# Patient Record
Sex: FEMALE | Race: WHITE | NOT HISPANIC OR LATINO | Employment: OTHER | URBAN - METROPOLITAN AREA
[De-identification: names, ages, dates, MRNs, and addresses within clinical notes are randomized per-mention and may not be internally consistent; named-entity substitution may affect disease eponyms.]

---

## 2023-02-23 LAB
ALANINE AMINOTRANSFERASE (SGPT) (U/L) IN SER/PLAS: 124 U/L (ref 7–45)
ALBUMIN (G/DL) IN SER/PLAS: 4.4 G/DL (ref 3.4–5)
ALKALINE PHOSPHATASE (U/L) IN SER/PLAS: 108 U/L (ref 33–136)
ANION GAP IN SER/PLAS: 12 MMOL/L (ref 10–20)
ASPARTATE AMINOTRANSFERASE (SGOT) (U/L) IN SER/PLAS: 101 U/L (ref 9–39)
BASOPHILS (10*3/UL) IN BLOOD BY AUTOMATED COUNT: 0.05 X10E9/L (ref 0–0.1)
BASOPHILS/100 LEUKOCYTES IN BLOOD BY AUTOMATED COUNT: 1.3 % (ref 0–2)
BILIRUBIN TOTAL (MG/DL) IN SER/PLAS: 0.9 MG/DL (ref 0–1.2)
CALCIDIOL (25 OH VITAMIN D3) (NG/ML) IN SER/PLAS: 39 NG/ML
CALCIUM (MG/DL) IN SER/PLAS: 9.3 MG/DL (ref 8.6–10.3)
CARBON DIOXIDE, TOTAL (MMOL/L) IN SER/PLAS: 30 MMOL/L (ref 21–32)
CHLORIDE (MMOL/L) IN SER/PLAS: 101 MMOL/L (ref 98–107)
CHOLESTEROL (MG/DL) IN SER/PLAS: 106 MG/DL (ref 0–199)
CHOLESTEROL IN HDL (MG/DL) IN SER/PLAS: 59.4 MG/DL
CHOLESTEROL/HDL RATIO: 1.8
COBALAMIN (VITAMIN B12) (PG/ML) IN SER/PLAS: 1246 PG/ML (ref 211–911)
CREATININE (MG/DL) IN SER/PLAS: 2.1 MG/DL (ref 0.5–1.05)
EOSINOPHILS (10*3/UL) IN BLOOD BY AUTOMATED COUNT: 0.08 X10E9/L (ref 0–0.4)
EOSINOPHILS/100 LEUKOCYTES IN BLOOD BY AUTOMATED COUNT: 2.1 % (ref 0–6)
ERYTHROCYTE DISTRIBUTION WIDTH (RATIO) BY AUTOMATED COUNT: 15.6 % (ref 11.5–14.5)
ERYTHROCYTE MEAN CORPUSCULAR HEMOGLOBIN CONCENTRATION (G/DL) BY AUTOMATED: 31.1 G/DL (ref 32–36)
ERYTHROCYTE MEAN CORPUSCULAR VOLUME (FL) BY AUTOMATED COUNT: 90 FL (ref 80–100)
ERYTHROCYTES (10*6/UL) IN BLOOD BY AUTOMATED COUNT: 5.57 X10E12/L (ref 4–5.2)
GFR FEMALE: 22 ML/MIN/1.73M2
GLUCOSE (MG/DL) IN SER/PLAS: 77 MG/DL (ref 74–99)
HEMATOCRIT (%) IN BLOOD BY AUTOMATED COUNT: 49.9 % (ref 36–46)
HEMOGLOBIN (G/DL) IN BLOOD: 15.5 G/DL (ref 12–16)
IMMATURE GRANULOCYTES/100 LEUKOCYTES IN BLOOD BY AUTOMATED COUNT: 0 % (ref 0–0.9)
LDL: 31 MG/DL (ref 0–99)
LEUKOCYTES (10*3/UL) IN BLOOD BY AUTOMATED COUNT: 3.8 X10E9/L (ref 4.4–11.3)
LYMPHOCYTES (10*3/UL) IN BLOOD BY AUTOMATED COUNT: 0.72 X10E9/L (ref 0.8–3)
LYMPHOCYTES/100 LEUKOCYTES IN BLOOD BY AUTOMATED COUNT: 18.8 % (ref 13–44)
MONOCYTES (10*3/UL) IN BLOOD BY AUTOMATED COUNT: 0.71 X10E9/L (ref 0.05–0.8)
MONOCYTES/100 LEUKOCYTES IN BLOOD BY AUTOMATED COUNT: 18.5 % (ref 2–10)
NEUTROPHILS (10*3/UL) IN BLOOD BY AUTOMATED COUNT: 2.27 X10E9/L (ref 1.6–5.5)
NEUTROPHILS/100 LEUKOCYTES IN BLOOD BY AUTOMATED COUNT: 59.3 % (ref 40–80)
PLATELETS (10*3/UL) IN BLOOD AUTOMATED COUNT: 240 X10E9/L (ref 150–450)
POTASSIUM (MMOL/L) IN SER/PLAS: 4.7 MMOL/L (ref 3.5–5.3)
PROTEIN TOTAL: 6.7 G/DL (ref 6.4–8.2)
SODIUM (MMOL/L) IN SER/PLAS: 138 MMOL/L (ref 136–145)
TRIGLYCERIDE (MG/DL) IN SER/PLAS: 77 MG/DL (ref 0–149)
UREA NITROGEN (MG/DL) IN SER/PLAS: 48 MG/DL (ref 6–23)
VLDL: 15 MG/DL (ref 0–40)

## 2023-03-10 ENCOUNTER — TELEPHONE (OUTPATIENT)
Dept: PRIMARY CARE | Facility: CLINIC | Age: 88
End: 2023-03-10

## 2023-03-23 LAB
ANION GAP IN SER/PLAS: 11 MMOL/L (ref 10–20)
CALCIUM (MG/DL) IN SER/PLAS: 9.3 MG/DL (ref 8.6–10.3)
CARBON DIOXIDE, TOTAL (MMOL/L) IN SER/PLAS: 31 MMOL/L (ref 21–32)
CHLORIDE (MMOL/L) IN SER/PLAS: 102 MMOL/L (ref 98–107)
CREATININE (MG/DL) IN SER/PLAS: 1.93 MG/DL (ref 0.5–1.05)
GFR FEMALE: 25 ML/MIN/1.73M2
GLUCOSE (MG/DL) IN SER/PLAS: 87 MG/DL (ref 74–99)
POTASSIUM (MMOL/L) IN SER/PLAS: 4.5 MMOL/L (ref 3.5–5.3)
SODIUM (MMOL/L) IN SER/PLAS: 139 MMOL/L (ref 136–145)
UREA NITROGEN (MG/DL) IN SER/PLAS: 47 MG/DL (ref 6–23)

## 2023-05-09 ENCOUNTER — OFFICE VISIT (OUTPATIENT)
Dept: PRIMARY CARE | Facility: CLINIC | Age: 88
End: 2023-05-09
Payer: MEDICARE

## 2023-05-09 VITALS
WEIGHT: 112 LBS | BODY MASS INDEX: 20.61 KG/M2 | DIASTOLIC BLOOD PRESSURE: 66 MMHG | TEMPERATURE: 97.6 F | SYSTOLIC BLOOD PRESSURE: 118 MMHG | OXYGEN SATURATION: 98 % | HEART RATE: 64 BPM | HEIGHT: 62 IN

## 2023-05-09 DIAGNOSIS — F41.0 PANIC ATTACK: ICD-10-CM

## 2023-05-09 DIAGNOSIS — F51.04 CHRONIC INSOMNIA: Primary | ICD-10-CM

## 2023-05-09 PROBLEM — D58.2 ELEVATED HEMOGLOBIN (CMS-HCC): Status: ACTIVE | Noted: 2023-05-09

## 2023-05-09 PROBLEM — I10 BENIGN ESSENTIAL HYPERTENSION: Status: ACTIVE | Noted: 2018-12-31

## 2023-05-09 PROBLEM — I43 CARDIOMYOPATHY IN DISEASES CLASSIFIED ELSEWHERE (MULTI): Status: ACTIVE | Noted: 2018-12-31

## 2023-05-09 PROBLEM — Z95.5 PRESENCE OF STENT IN CORONARY ARTERY: Status: ACTIVE | Noted: 2023-05-09

## 2023-05-09 PROBLEM — I48.20 CHRONIC ATRIAL FIBRILLATION (MULTI): Status: ACTIVE | Noted: 2023-05-09

## 2023-05-09 PROBLEM — I47.29 VENTRICULAR TACHYCARDIA (PAROXYSMAL) (MULTI): Status: ACTIVE | Noted: 2023-05-09

## 2023-05-09 PROBLEM — E03.9 HYPOTHYROIDISM: Status: ACTIVE | Noted: 2023-05-09

## 2023-05-09 PROBLEM — I25.10 ASHD (ARTERIOSCLEROTIC HEART DISEASE): Status: ACTIVE | Noted: 2023-05-09

## 2023-05-09 PROBLEM — K21.9 GASTRO-ESOPHAGEAL REFLUX DISEASE WITHOUT ESOPHAGITIS: Status: ACTIVE | Noted: 2018-12-31

## 2023-05-09 PROBLEM — E78.5 HYPERLIPIDEMIA: Status: ACTIVE | Noted: 2018-12-31

## 2023-05-09 PROBLEM — E53.8 VITAMIN B12 DEFICIENCY: Status: ACTIVE | Noted: 2023-05-09

## 2023-05-09 PROBLEM — E55.9 VITAMIN D DEFICIENCY: Status: ACTIVE | Noted: 2023-05-09

## 2023-05-09 PROBLEM — R79.89 ELEVATED LFTS: Status: ACTIVE | Noted: 2023-05-09

## 2023-05-09 PROBLEM — I50.42 CHRONIC COMBINED SYSTOLIC AND DIASTOLIC HEART FAILURE (MULTI): Status: ACTIVE | Noted: 2023-05-09

## 2023-05-09 PROCEDURE — 3078F DIAST BP <80 MM HG: CPT | Performed by: FAMILY MEDICINE

## 2023-05-09 PROCEDURE — 1036F TOBACCO NON-USER: CPT | Performed by: FAMILY MEDICINE

## 2023-05-09 PROCEDURE — 99213 OFFICE O/P EST LOW 20 MIN: CPT | Performed by: FAMILY MEDICINE

## 2023-05-09 PROCEDURE — 1160F RVW MEDS BY RX/DR IN RCRD: CPT | Performed by: FAMILY MEDICINE

## 2023-05-09 PROCEDURE — 3074F SYST BP LT 130 MM HG: CPT | Performed by: FAMILY MEDICINE

## 2023-05-09 PROCEDURE — 1159F MED LIST DOCD IN RCRD: CPT | Performed by: FAMILY MEDICINE

## 2023-05-09 PROCEDURE — 1157F ADVNC CARE PLAN IN RCRD: CPT | Performed by: FAMILY MEDICINE

## 2023-05-09 RX ORDER — OMEPRAZOLE 20 MG/1
CAPSULE, DELAYED RELEASE ORAL
Status: ON HOLD | COMMUNITY
Start: 2015-10-12 | End: 2023-10-02

## 2023-05-09 RX ORDER — ALPRAZOLAM 0.5 MG/1
0.5 TABLET ORAL NIGHTLY PRN
Qty: 90 TABLET | Refills: 0 | Status: SHIPPED | OUTPATIENT
Start: 2023-05-09 | End: 2023-08-07 | Stop reason: SDUPTHER

## 2023-05-09 RX ORDER — ATORVASTATIN CALCIUM 80 MG/1
1 TABLET, FILM COATED ORAL NIGHTLY
COMMUNITY
Start: 2017-12-20 | End: 2024-01-26 | Stop reason: SDUPTHER

## 2023-05-09 RX ORDER — CALCIUM CARBONATE/VITAMIN D3 500 MG-10
1 TABLET,CHEWABLE ORAL DAILY
COMMUNITY
End: 2023-11-03 | Stop reason: ENTERED-IN-ERROR

## 2023-05-09 RX ORDER — ASPIRIN 81 MG/1
1 TABLET ORAL DAILY
COMMUNITY
Start: 2019-02-18

## 2023-05-09 RX ORDER — LEVOTHYROXINE SODIUM 25 UG/1
25 TABLET ORAL DAILY
COMMUNITY
End: 2023-07-06 | Stop reason: SDUPTHER

## 2023-05-09 RX ORDER — GLUCOSAMINE HCL 500 MG
1 TABLET ORAL DAILY
COMMUNITY
End: 2023-11-06 | Stop reason: ALTCHOICE

## 2023-05-09 RX ORDER — LISINOPRIL 5 MG/1
5 TABLET ORAL DAILY
COMMUNITY
Start: 2020-09-17 | End: 2023-11-05 | Stop reason: HOSPADM

## 2023-05-09 RX ORDER — METOPROLOL SUCCINATE 50 MG/1
1 TABLET, EXTENDED RELEASE ORAL DAILY
COMMUNITY
Start: 2018-02-28

## 2023-05-09 RX ORDER — LANOLIN ALCOHOL/MO/W.PET/CERES
2 CREAM (GRAM) TOPICAL DAILY
COMMUNITY
Start: 2023-03-02 | End: 2024-02-22 | Stop reason: ALTCHOICE

## 2023-05-09 RX ORDER — ALPRAZOLAM 0.5 MG/1
1 TABLET ORAL NIGHTLY PRN
COMMUNITY
Start: 2015-05-23 | End: 2023-05-09 | Stop reason: SDUPTHER

## 2023-05-09 RX ORDER — FUROSEMIDE 40 MG/1
1.5 TABLET ORAL
COMMUNITY
Start: 2017-09-28 | End: 2023-10-11 | Stop reason: HOSPADM

## 2023-05-09 RX ORDER — DAPAGLIFLOZIN 10 MG/1
1 TABLET, FILM COATED ORAL DAILY
COMMUNITY
Start: 2022-11-02 | End: 2023-11-27

## 2023-05-09 RX ORDER — AMIODARONE HYDROCHLORIDE 200 MG/1
1 TABLET ORAL DAILY
COMMUNITY
Start: 2015-10-04 | End: 2024-02-21

## 2023-05-09 ASSESSMENT — PATIENT HEALTH QUESTIONNAIRE - PHQ9
1. LITTLE INTEREST OR PLEASURE IN DOING THINGS: NOT AT ALL
SUM OF ALL RESPONSES TO PHQ9 QUESTIONS 1 AND 2: 0
2. FEELING DOWN, DEPRESSED OR HOPELESS: NOT AT ALL

## 2023-05-09 NOTE — PROGRESS NOTES
Subjective   Patient ID: Adwoa Forrest is a 87 y.o. female who presents for Follow-up (Needs alprazolam refilled /Was in the hospital since last visit/Ask about her bloodcount being low).  HPI  OARRS:  Minh Farmer DO on 5/9/2023  2:00 PM  I have personally reviewed the OARRS report for Adwoa Forrest. I have considered the risks of abuse, dependence, addiction and diversion    Is the patient prescribed a combination of a benzodiazepine and opioid?  Yes, I feel it is clincially indicated to continue the medication and have discussed with the patient risks/benefits/alternatives.    Last Urine Drug Screen / ordered today: Yes  No results found for this or any previous visit (from the past 50421 hour(s)).  Results are as expected.     Controlled Substance Agreement:  Date of the Last Agreement: 5/9/2023  Reviewed Controlled Substance Agreement including but not limited to the benefits, risks, and alternatives to treatment with a Controlled Substance medication(s).    Benzodiazepines:  What is the patient's goal of therapy? Help with sleep and anxiety  Is this being achieved with current treatment? yes    KIZZY-7:  No data recorded    Activities of Daily Living:   Is your overall impression that this patient is benefiting (symptom reduction outweighs side effects) from benzodiazepine therapy? Yes     1. Physical Functioning: Better  2. Family Relationship: Same  3. Social Relationship: Same  4. Mood: Same  5. Sleep Patterns: Better  6. 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 Currently stable. Pace Maker is working well.     CKD 3B- Controlling cholesterol/Hypertension/ and sugars- stable. Increase water to 40 oz daily. Discussed adding on Farxiga 10 mg  daily    Benign essential hypertension-controlled- lisinopril 2.5 mg daily and metoprolol ER 50 mg daily    Gastroesophageal reflux disease-controlled. Stopped pantoprazole    Hyperlipidemia-controlled on atorvastatin 80 mg daily    Hypothyroidism- controlled on current dose of levothyroxine 25 µg daily    Elevated LFT's- No risk except statin medication. Continue to monitor    Stop Iron. Anemia and iron deficiency improved.     Polycythemia/Elevated HBG and HCT- Decreasing with last blood draw,. Repeat in 3 months.     Chronic insomnia/anxiety-we reviewed the patient's OARRS and it was normal with no aberrant behavior. The medication is working well for her and she is only taking it at nighttime. Currently taking alprazolam 0.5 mg 1 p.o. nightly or taking 1 as needed for the anxiety.

## 2023-06-02 LAB
ALANINE AMINOTRANSFERASE (SGPT) (U/L) IN SER/PLAS: 26 U/L (ref 7–45)
ALBUMIN (G/DL) IN SER/PLAS: 4.3 G/DL (ref 3.4–5)
ALKALINE PHOSPHATASE (U/L) IN SER/PLAS: 97 U/L (ref 33–136)
ANION GAP IN SER/PLAS: 13 MMOL/L (ref 10–20)
ASPARTATE AMINOTRANSFERASE (SGOT) (U/L) IN SER/PLAS: 32 U/L (ref 9–39)
BILIRUBIN DIRECT (MG/DL) IN SER/PLAS: 0.2 MG/DL (ref 0–0.3)
BILIRUBIN TOTAL (MG/DL) IN SER/PLAS: 1 MG/DL (ref 0–1.2)
CALCIUM (MG/DL) IN SER/PLAS: 9.9 MG/DL (ref 8.6–10.3)
CARBON DIOXIDE, TOTAL (MMOL/L) IN SER/PLAS: 30 MMOL/L (ref 21–32)
CHLORIDE (MMOL/L) IN SER/PLAS: 99 MMOL/L (ref 98–107)
CREATININE (MG/DL) IN SER/PLAS: 1.86 MG/DL (ref 0.5–1.05)
GFR FEMALE: 26 ML/MIN/1.73M2
GLUCOSE (MG/DL) IN SER/PLAS: 83 MG/DL (ref 74–99)
POTASSIUM (MMOL/L) IN SER/PLAS: 4.6 MMOL/L (ref 3.5–5.3)
PROTEIN TOTAL: 6.6 G/DL (ref 6.4–8.2)
SODIUM (MMOL/L) IN SER/PLAS: 137 MMOL/L (ref 136–145)
UREA NITROGEN (MG/DL) IN SER/PLAS: 38 MG/DL (ref 6–23)

## 2023-07-06 ENCOUNTER — PATIENT MESSAGE (OUTPATIENT)
Dept: PRIMARY CARE | Facility: CLINIC | Age: 88
End: 2023-07-06
Payer: MEDICARE

## 2023-07-06 DIAGNOSIS — E03.8 OTHER SPECIFIED HYPOTHYROIDISM: Primary | ICD-10-CM

## 2023-07-06 RX ORDER — LEVOTHYROXINE SODIUM 25 UG/1
25 TABLET ORAL DAILY
Qty: 90 TABLET | Refills: 3 | Status: SHIPPED | OUTPATIENT
Start: 2023-07-06 | End: 2023-07-10

## 2023-07-09 DIAGNOSIS — E03.8 OTHER SPECIFIED HYPOTHYROIDISM: ICD-10-CM

## 2023-07-10 RX ORDER — LEVOTHYROXINE SODIUM 25 UG/1
TABLET ORAL
Qty: 90 TABLET | Refills: 3 | Status: SHIPPED | OUTPATIENT
Start: 2023-07-10

## 2023-07-24 LAB
ANION GAP IN SER/PLAS: 9 MMOL/L (ref 10–20)
BASOPHILS (10*3/UL) IN BLOOD BY AUTOMATED COUNT: 0.03 X10E9/L (ref 0–0.1)
BASOPHILS/100 LEUKOCYTES IN BLOOD BY AUTOMATED COUNT: 0.5 % (ref 0–2)
CALCIUM (MG/DL) IN SER/PLAS: 8.6 MG/DL (ref 8.6–10.3)
CARBON DIOXIDE, TOTAL (MMOL/L) IN SER/PLAS: 29 MMOL/L (ref 21–32)
CHLORIDE (MMOL/L) IN SER/PLAS: 108 MMOL/L (ref 98–107)
CREATININE (MG/DL) IN SER/PLAS: 1.5 MG/DL (ref 0.5–1.05)
EOSINOPHILS (10*3/UL) IN BLOOD BY AUTOMATED COUNT: 0.13 X10E9/L (ref 0–0.4)
EOSINOPHILS/100 LEUKOCYTES IN BLOOD BY AUTOMATED COUNT: 2 % (ref 0–6)
ERYTHROCYTE DISTRIBUTION WIDTH (RATIO) BY AUTOMATED COUNT: 16.6 % (ref 11.5–14.5)
ERYTHROCYTE MEAN CORPUSCULAR HEMOGLOBIN CONCENTRATION (G/DL) BY AUTOMATED: 31.3 G/DL (ref 32–36)
ERYTHROCYTE MEAN CORPUSCULAR VOLUME (FL) BY AUTOMATED COUNT: 89 FL (ref 80–100)
ERYTHROCYTES (10*6/UL) IN BLOOD BY AUTOMATED COUNT: 4.64 X10E12/L (ref 4–5.2)
GFR FEMALE: 33 ML/MIN/1.73M2
GLUCOSE (MG/DL) IN SER/PLAS: 81 MG/DL (ref 74–99)
HEMATOCRIT (%) IN BLOOD BY AUTOMATED COUNT: 41.5 % (ref 36–46)
HEMOGLOBIN (G/DL) IN BLOOD: 13 G/DL (ref 12–16)
IMMATURE GRANULOCYTES/100 LEUKOCYTES IN BLOOD BY AUTOMATED COUNT: 0.8 % (ref 0–0.9)
LEUKOCYTES (10*3/UL) IN BLOOD BY AUTOMATED COUNT: 6.5 X10E9/L (ref 4.4–11.3)
LYMPHOCYTES (10*3/UL) IN BLOOD BY AUTOMATED COUNT: 0.66 X10E9/L (ref 0.8–3)
LYMPHOCYTES/100 LEUKOCYTES IN BLOOD BY AUTOMATED COUNT: 10.2 % (ref 13–44)
MONOCYTES (10*3/UL) IN BLOOD BY AUTOMATED COUNT: 0.71 X10E9/L (ref 0.05–0.8)
MONOCYTES/100 LEUKOCYTES IN BLOOD BY AUTOMATED COUNT: 11 % (ref 2–10)
NEUTROPHILS (10*3/UL) IN BLOOD BY AUTOMATED COUNT: 4.88 X10E9/L (ref 1.6–5.5)
NEUTROPHILS/100 LEUKOCYTES IN BLOOD BY AUTOMATED COUNT: 75.5 % (ref 40–80)
PLATELETS (10*3/UL) IN BLOOD AUTOMATED COUNT: 296 X10E9/L (ref 150–450)
POTASSIUM (MMOL/L) IN SER/PLAS: 4.6 MMOL/L (ref 3.5–5.3)
SODIUM (MMOL/L) IN SER/PLAS: 141 MMOL/L (ref 136–145)
UREA NITROGEN (MG/DL) IN SER/PLAS: 38 MG/DL (ref 6–23)

## 2023-07-31 LAB
ERYTHROCYTE DISTRIBUTION WIDTH (RATIO) BY AUTOMATED COUNT: 16.9 % (ref 11.5–14.5)
ERYTHROCYTE MEAN CORPUSCULAR HEMOGLOBIN CONCENTRATION (G/DL) BY AUTOMATED: 30.9 G/DL (ref 32–36)
ERYTHROCYTE MEAN CORPUSCULAR VOLUME (FL) BY AUTOMATED COUNT: 90 FL (ref 80–100)
ERYTHROCYTES (10*6/UL) IN BLOOD BY AUTOMATED COUNT: 5.05 X10E12/L (ref 4–5.2)
HEMATOCRIT (%) IN BLOOD BY AUTOMATED COUNT: 45.7 % (ref 36–46)
HEMOGLOBIN (G/DL) IN BLOOD: 14.1 G/DL (ref 12–16)
LEUKOCYTES (10*3/UL) IN BLOOD BY AUTOMATED COUNT: 6.6 X10E9/L (ref 4.4–11.3)
PLATELETS (10*3/UL) IN BLOOD AUTOMATED COUNT: 363 X10E9/L (ref 150–450)

## 2023-08-03 ENCOUNTER — APPOINTMENT (OUTPATIENT)
Dept: PRIMARY CARE | Facility: CLINIC | Age: 88
End: 2023-08-03
Payer: MEDICARE

## 2023-08-07 ENCOUNTER — TELEMEDICINE (OUTPATIENT)
Dept: PRIMARY CARE | Facility: CLINIC | Age: 88
End: 2023-08-07
Payer: MEDICARE

## 2023-08-07 DIAGNOSIS — S92.041A CLOSED DISPLACED FRACTURE OF TUBEROSITY OF RIGHT CALCANEUS, UNSPECIFIED FRACTURE MORPHOLOGY, INITIAL ENCOUNTER: Primary | ICD-10-CM

## 2023-08-07 DIAGNOSIS — F51.04 CHRONIC INSOMNIA: ICD-10-CM

## 2023-08-07 PROCEDURE — 99496 TRANSJ CARE MGMT HIGH F2F 7D: CPT | Performed by: FAMILY MEDICINE

## 2023-08-07 RX ORDER — ALPRAZOLAM 0.5 MG/1
0.5 TABLET ORAL NIGHTLY PRN
Qty: 90 TABLET | Refills: 0 | Status: ON HOLD | OUTPATIENT
Start: 2023-08-07 | End: 2023-10-11 | Stop reason: SDUPTHER

## 2023-08-07 NOTE — PROGRESS NOTES
Subjective   Patient ID: Adwoa Forrest is a 88 y.o. female who presents for follow up  HPI      OARRS:  Minh Farmer DO on 8/7/2023  5:27 PM  I have personally reviewed the OARRS report for Adwoa Forrest. I have considered the risks of abuse, dependence, addiction and diversion    Is the patient prescribed a combination of a benzodiazepine and opioid?  No    Last Urine Drug Screen / ordered today: No  No results found for this or any previous visit (from the past 04293 hour(s)).  N/A    Controlled Substance Agreement:  Date of the Last Agreement: May 9, 2023  Reviewed Controlled Substance Agreement including but not limited to the benefits, risks, and alternatives to treatment with a Controlled Substance medication(s).    Opioids:  What is the patient's goal of therapy?  Help insomnia  Is this being achieved with current treatment?  Yes    I have calculated the patient's Morphine Dose Equivalent (MED):   I have considered referral to Pain Management and/or a specialist, and do not feel it is necessary at this time.    I feel that it is clinically indicated to continue this current medication regimen after consideration of alternative therapies, and other non-opioid treatment.    Opioid Risk Screening:    Discharge Facility: [facility]   Discharge Diagnosis: UNSPECIFIED FRACTURE OF RIGHT CALCANEUS, SUBSEQUENT ENCOUNTER FOR FRACTURE WITH ROUTINE HEALING  Admission Date: 7-22-23  Discharge Date: 8-7-23     PCP Appointment Date:8-7-23  Specialist Appointment Date: 8-22-23 Dr. Naik   [facility] Encounter and Summary:  not available at this time- [facility]   See discharge assessment below for further details  Engagement  Call Start Time: 0940 (8/9/2023  9:42 AM)     Medications  Medications reviewed with patient/caregiver?: Yes (8/9/2023  9:42 AM)  Is the patient having any side effects they believe may be caused by any medication additions or changes?: No (8/9/2023  9:42 AM)  Does the patient  have all medications ordered at discharge?: Yes (8/9/2023  9:42 AM)  Care Management Interventions: No intervention needed (8/9/2023  9:42 AM)  Is the patient taking all medications as directed (includes completed medication regime)?: Yes (8/9/2023  9:42 AM)  Care Management Interventions: Provided patient education (8/9/2023  9:42 AM)     Appointments  Does the patient have a primary care provider?: Yes (8/9/2023  9:42 AM)  Care Management Interventions: Verified appointment date/time/provider (8/9/2023  9:42 AM)  Has the patient kept scheduled appointments due by today?: Yes (8/9/2023  9:42 AM)  Care Management Interventions: Advised patient to keep appointment (8/9/2023  9:42 AM)     Self Management  What is the home health agency?:  [facility] (8/9/2023  9:42 AM)  Has home health visited the patient within 72 hours of discharge?: Yes (8/9/2023  9:42 AM)     Patient Teaching  Does the patient have access to their discharge instructions?: Yes (8/9/2023  9:42 AM)  Care Management Interventions: Reviewed instructions with patient (8/9/2023  9:42 AM)  What is the patient's perception of their health status since discharge?: Improving (8/9/2023  9:42 AM)  Is the patient/caregiver able to teach back the hierarchy of who to call/visit for symptoms/problems? PCP, Specialist, Home Health nurse, Urgent Care, ED, 911: Yes (8/9/2023  9:42 AM)     No data recorded    Pain Assessment:  No data recorded  Review of Systems    Objective   Physical Exam    Assessment/Plan   Problem List Items Addressed This Visit       Chronic insomnia    Relevant Medications    ALPRAZolam (Xanax) 0.5 mg tablet   Acute on chronic combined systolic and diastolic heart failure/ASHD/chronic atrial fibrillation-followed by electrophysiologist Dr Bell and Dr Latham. Currently stable. Pace Maker is working well.      CKD 3B- Controlling cholesterol/Hypertension/ and sugars- stable. Increase water to 40 oz daily. Discussed adding on  Farxiga 10 mg daily     Benign essential hypertension-controlled- lisinopril 2.5 mg daily and metoprolol ER 50 mg daily     Gastroesophageal reflux disease-controlled. Stopped pantoprazole     Hyperlipidemia-controlled on atorvastatin 80 mg daily     Hypothyroidism- controlled on current dose of levothyroxine 25 µg daily     Elevated LFT's- No risk except statin medication. Continue to monitor     Stop Iron. Anemia and iron deficiency improved.      Polycythemia/Elevated HBG and HCT- Decreasing with last blood draw,. Repeat in 3 months.      Chronic insomnia/anxiety-we reviewed the patient's OARRS and it was normal with no aberrant behavior. The medication is working well for her and she is only taking it at nighttime. Currently taking alprazolam 0.5 mg 1 p.o. nightly or taking 1 as needed for the anxiety.    Torn Achilles tendon-healing well.  Reviewed patient's release from [place].

## 2023-08-09 ENCOUNTER — PATIENT OUTREACH (OUTPATIENT)
Dept: PRIMARY CARE | Facility: CLINIC | Age: 88
End: 2023-08-09
Payer: MEDICARE

## 2023-08-09 NOTE — PROGRESS NOTES
Discharge Facility: [facility]   Discharge Diagnosis: UNSPECIFIED FRACTURE OF RIGHT CALCANEUS, SUBSEQUENT ENCOUNTER FOR FRACTURE WITH ROUTINE HEALING  Admission Date: 7-22-23  Discharge Date: 8-7-23    PCP Appointment Date:8-7-23  Specialist Appointment Date: 8-22-23 Dr. Naik   [facility] Encounter and Summary:  not available at this time- [facility]   See discharge assessment below for further details  Engagement  Call Start Time: 0940 (8/9/2023  9:42 AM)    Medications  Medications reviewed with patient/caregiver?: Yes (8/9/2023  9:42 AM)  Is the patient having any side effects they believe may be caused by any medication additions or changes?: No (8/9/2023  9:42 AM)  Does the patient have all medications ordered at discharge?: Yes (8/9/2023  9:42 AM)  Care Management Interventions: No intervention needed (8/9/2023  9:42 AM)  Is the patient taking all medications as directed (includes completed medication regime)?: Yes (8/9/2023  9:42 AM)  Care Management Interventions: Provided patient education (8/9/2023  9:42 AM)    Appointments  Does the patient have a primary care provider?: Yes (8/9/2023  9:42 AM)  Care Management Interventions: Verified appointment date/time/provider (8/9/2023  9:42 AM)  Has the patient kept scheduled appointments due by today?: Yes (8/9/2023  9:42 AM)  Care Management Interventions: Advised patient to keep appointment (8/9/2023  9:42 AM)    Self Management  What is the home health agency?:  [facility] (8/9/2023  9:42 AM)  Has home health visited the patient within 72 hours of discharge?: Yes (8/9/2023  9:42 AM)    Patient Teaching  Does the patient have access to their discharge instructions?: Yes (8/9/2023  9:42 AM)  Care Management Interventions: Reviewed instructions with patient (8/9/2023  9:42 AM)  What is the patient's perception of their health status since discharge?: Improving (8/9/2023  9:42 AM)  Is the patient/caregiver able to teach back the  hierarchy of who to call/visit for symptoms/problems? PCP, Specialist, Home Health nurse, Urgent Care, ED, 911: Yes (8/9/2023  9:42 AM)        Bo Gómez LPN

## 2023-08-23 ENCOUNTER — PATIENT OUTREACH (OUTPATIENT)
Dept: PRIMARY CARE | Facility: CLINIC | Age: 88
End: 2023-08-23

## 2023-08-23 ENCOUNTER — APPOINTMENT (OUTPATIENT)
Dept: PRIMARY CARE | Facility: CLINIC | Age: 88
End: 2023-08-23
Payer: MEDICARE

## 2023-08-28 LAB
GRAM STAIN: ABNORMAL
TISSUE/WOUND CULTURE/SMEAR: ABNORMAL
TISSUE/WOUND CULTURE/SMEAR: ABNORMAL

## 2023-10-02 ENCOUNTER — HOSPITAL ENCOUNTER (INPATIENT)
Facility: HOSPITAL | Age: 88
LOS: 9 days | Discharge: NURSING, REHAB OR LONG-TERM CARE | DRG: 291 | End: 2023-10-11
Attending: EMERGENCY MEDICINE | Admitting: INTERNAL MEDICINE
Payer: MEDICARE

## 2023-10-02 ENCOUNTER — APPOINTMENT (OUTPATIENT)
Dept: RADIOLOGY | Facility: HOSPITAL | Age: 88
DRG: 291 | End: 2023-10-02
Payer: MEDICARE

## 2023-10-02 ENCOUNTER — APPOINTMENT (OUTPATIENT)
Dept: CARDIOLOGY | Facility: HOSPITAL | Age: 88
End: 2023-10-02
Payer: MEDICARE

## 2023-10-02 DIAGNOSIS — S91.301A OPEN WOUND OF RIGHT HEEL, INITIAL ENCOUNTER: ICD-10-CM

## 2023-10-02 DIAGNOSIS — R06.09 DYSPNEA ON EXERTION: ICD-10-CM

## 2023-10-02 DIAGNOSIS — R60.0 EDEMA LEG: ICD-10-CM

## 2023-10-02 DIAGNOSIS — R09.02 HYPOXIA: ICD-10-CM

## 2023-10-02 DIAGNOSIS — I50.9 ACUTE ON CHRONIC CONGESTIVE HEART FAILURE, UNSPECIFIED HEART FAILURE TYPE (MULTI): Primary | ICD-10-CM

## 2023-10-02 DIAGNOSIS — I43 CARDIOMYOPATHY IN DISEASES CLASSIFIED ELSEWHERE (MULTI): ICD-10-CM

## 2023-10-02 DIAGNOSIS — J96.01 ACUTE HYPOXIC RESPIRATORY FAILURE (MULTI): ICD-10-CM

## 2023-10-02 DIAGNOSIS — J43.9 PULMONARY EMPHYSEMA, UNSPECIFIED EMPHYSEMA TYPE (MULTI): ICD-10-CM

## 2023-10-02 DIAGNOSIS — I48.20 CHRONIC ATRIAL FIBRILLATION (MULTI): ICD-10-CM

## 2023-10-02 DIAGNOSIS — F51.04 CHRONIC INSOMNIA: ICD-10-CM

## 2023-10-02 DIAGNOSIS — I73.00 RAYNAUD'S DISEASE WITHOUT GANGRENE: ICD-10-CM

## 2023-10-02 PROBLEM — I10 HYPERTENSION: Status: ACTIVE | Noted: 2023-10-02

## 2023-10-02 PROBLEM — S81.801A NON-HEALING WOUND OF RIGHT LOWER EXTREMITY: Status: ACTIVE | Noted: 2023-10-02

## 2023-10-02 PROBLEM — I48.0 PAROXYSMAL ATRIAL FIBRILLATION (MULTI): Status: ACTIVE | Noted: 2023-10-02

## 2023-10-02 PROBLEM — R79.89 ELEVATED TROPONIN I LEVEL: Status: ACTIVE | Noted: 2023-10-02

## 2023-10-02 PROBLEM — N18.30 CKD (CHRONIC KIDNEY DISEASE) STAGE 3, GFR 30-59 ML/MIN (MULTI): Status: ACTIVE | Noted: 2023-10-02

## 2023-10-02 LAB
ALBUMIN SERPL BCP-MCNC: 3.6 G/DL (ref 3.4–5)
ALP SERPL-CCNC: 121 U/L (ref 33–136)
ALT SERPL W P-5'-P-CCNC: 23 U/L (ref 7–45)
ANION GAP SERPL CALC-SCNC: 13 MMOL/L (ref 10–20)
AST SERPL W P-5'-P-CCNC: 30 U/L (ref 9–39)
BASE EXCESS BLDV CALC-SCNC: 4.9 MMOL/L (ref -2–3)
BASOPHILS # BLD AUTO: 0.07 X10*3/UL (ref 0–0.1)
BASOPHILS NFR BLD AUTO: 0.8 %
BILIRUB SERPL-MCNC: 0.7 MG/DL (ref 0–1.2)
BNP SERPL-MCNC: 1279 PG/ML (ref 0–99)
BODY TEMPERATURE: 37 DEGREES CELSIUS
BUN SERPL-MCNC: 39 MG/DL (ref 6–23)
CALCIUM SERPL-MCNC: 9.1 MG/DL (ref 8.6–10.3)
CARDIAC TROPONIN I PNL SERPL HS: 29 NG/L (ref 0–13)
CARDIAC TROPONIN I PNL SERPL HS: 30 NG/L (ref 0–13)
CHLORIDE SERPL-SCNC: 99 MMOL/L (ref 98–107)
CO2 SERPL-SCNC: 28 MMOL/L (ref 21–32)
CREAT SERPL-MCNC: 1.62 MG/DL (ref 0.5–1.05)
CRP SERPL-MCNC: 6.22 MG/DL
EOSINOPHIL # BLD AUTO: 0.17 X10*3/UL (ref 0–0.4)
EOSINOPHIL NFR BLD AUTO: 1.9 %
ERYTHROCYTE [DISTWIDTH] IN BLOOD BY AUTOMATED COUNT: 18 % (ref 11.5–14.5)
ERYTHROCYTE [SEDIMENTATION RATE] IN BLOOD BY WESTERGREN METHOD: 41 MM/H (ref 0–30)
GFR SERPL CREATININE-BSD FRML MDRD: 31 ML/MIN/1.73M*2
GLUCOSE SERPL-MCNC: 108 MG/DL (ref 74–99)
HCO3 BLDV-SCNC: 33 MMOL/L (ref 22–26)
HCT VFR BLD AUTO: 45.1 % (ref 36–46)
HGB BLD-MCNC: 13.9 G/DL (ref 12–16)
IMM GRANULOCYTES # BLD AUTO: 0.07 X10*3/UL (ref 0–0.5)
IMM GRANULOCYTES NFR BLD AUTO: 0.8 % (ref 0–0.9)
LYMPHOCYTES # BLD AUTO: 0.4 X10*3/UL (ref 0.8–3)
LYMPHOCYTES NFR BLD AUTO: 4.4 %
MAGNESIUM SERPL-MCNC: 2.32 MG/DL (ref 1.6–2.4)
MCH RBC QN AUTO: 26.7 PG (ref 26–34)
MCHC RBC AUTO-ENTMCNC: 30.8 G/DL (ref 32–36)
MCV RBC AUTO: 87 FL (ref 80–100)
MONOCYTES # BLD AUTO: 0.73 X10*3/UL (ref 0.05–0.8)
MONOCYTES NFR BLD AUTO: 8 %
NEUTROPHILS # BLD AUTO: 7.65 X10*3/UL (ref 1.6–5.5)
NEUTROPHILS NFR BLD AUTO: 84.1 %
NRBC BLD-RTO: 0 /100 WBCS (ref 0–0)
OXYHGB MFR BLDV: 24.5 % (ref 45–75)
PCO2 BLDV: 64 MM HG (ref 41–51)
PH BLDV: 7.32 PH (ref 7.33–7.43)
PLATELET # BLD AUTO: 420 X10*3/UL (ref 150–450)
PMV BLD AUTO: 8.7 FL (ref 7.5–11.5)
PO2 BLDV: 23 MM HG (ref 35–45)
POTASSIUM SERPL-SCNC: 5.2 MMOL/L (ref 3.5–5.3)
PROT SERPL-MCNC: 6.4 G/DL (ref 6.4–8.2)
RBC # BLD AUTO: 5.21 X10*6/UL (ref 4–5.2)
SAO2 % BLDV: 25 % (ref 45–75)
SARS-COV-2 RNA RESP QL NAA+PROBE: NOT DETECTED
SODIUM SERPL-SCNC: 135 MMOL/L (ref 136–145)
WBC # BLD AUTO: 9.1 X10*3/UL (ref 4.4–11.3)

## 2023-10-02 PROCEDURE — 80053 COMPREHEN METABOLIC PANEL: CPT | Performed by: PHYSICIAN ASSISTANT

## 2023-10-02 PROCEDURE — 2500000001 HC RX 250 WO HCPCS SELF ADMINISTERED DRUGS (ALT 637 FOR MEDICARE OP): Performed by: INTERNAL MEDICINE

## 2023-10-02 PROCEDURE — 87075 CULTR BACTERIA EXCEPT BLOOD: CPT | Mod: CMCLAB,PORLAB | Performed by: PHYSICIAN ASSISTANT

## 2023-10-02 PROCEDURE — 82805 BLOOD GASES W/O2 SATURATION: CPT | Performed by: PHYSICIAN ASSISTANT

## 2023-10-02 PROCEDURE — 85652 RBC SED RATE AUTOMATED: CPT | Performed by: PHYSICIAN ASSISTANT

## 2023-10-02 PROCEDURE — 86140 C-REACTIVE PROTEIN: CPT | Performed by: PHYSICIAN ASSISTANT

## 2023-10-02 PROCEDURE — 83735 ASSAY OF MAGNESIUM: CPT | Performed by: PHYSICIAN ASSISTANT

## 2023-10-02 PROCEDURE — 87635 SARS-COV-2 COVID-19 AMP PRB: CPT | Performed by: PHYSICIAN ASSISTANT

## 2023-10-02 PROCEDURE — 85025 COMPLETE CBC W/AUTO DIFF WBC: CPT | Performed by: PHYSICIAN ASSISTANT

## 2023-10-02 PROCEDURE — 36415 COLL VENOUS BLD VENIPUNCTURE: CPT | Performed by: PHYSICIAN ASSISTANT

## 2023-10-02 PROCEDURE — 71045 X-RAY EXAM CHEST 1 VIEW: CPT

## 2023-10-02 PROCEDURE — 73610 X-RAY EXAM OF ANKLE: CPT | Mod: RT,FY

## 2023-10-02 PROCEDURE — 2500000001 HC RX 250 WO HCPCS SELF ADMINISTERED DRUGS (ALT 637 FOR MEDICARE OP): Performed by: STUDENT IN AN ORGANIZED HEALTH CARE EDUCATION/TRAINING PROGRAM

## 2023-10-02 PROCEDURE — 99285 EMERGENCY DEPT VISIT HI MDM: CPT | Performed by: EMERGENCY MEDICINE

## 2023-10-02 PROCEDURE — 2500000004 HC RX 250 GENERAL PHARMACY W/ HCPCS (ALT 636 FOR OP/ED): Performed by: PHYSICIAN ASSISTANT

## 2023-10-02 PROCEDURE — 73610 X-RAY EXAM OF ANKLE: CPT | Mod: RIGHT SIDE

## 2023-10-02 PROCEDURE — 96374 THER/PROPH/DIAG INJ IV PUSH: CPT

## 2023-10-02 PROCEDURE — 2060000001 HC INTERMEDIATE ICU ROOM DAILY

## 2023-10-02 PROCEDURE — 84484 ASSAY OF TROPONIN QUANT: CPT | Performed by: PHYSICIAN ASSISTANT

## 2023-10-02 PROCEDURE — 83880 ASSAY OF NATRIURETIC PEPTIDE: CPT | Performed by: PHYSICIAN ASSISTANT

## 2023-10-02 PROCEDURE — 99223 1ST HOSP IP/OBS HIGH 75: CPT | Performed by: INTERNAL MEDICINE

## 2023-10-02 PROCEDURE — 87070 CULTURE OTHR SPECIMN AEROBIC: CPT | Mod: CMCLAB,PORLAB | Performed by: PHYSICIAN ASSISTANT

## 2023-10-02 RX ORDER — FUROSEMIDE 10 MG/ML
40 INJECTION INTRAMUSCULAR; INTRAVENOUS ONCE
Status: COMPLETED | OUTPATIENT
Start: 2023-10-02 | End: 2023-10-02

## 2023-10-02 RX ORDER — LANOLIN ALCOHOL/MO/W.PET/CERES
2000 CREAM (GRAM) TOPICAL DAILY
Status: DISCONTINUED | OUTPATIENT
Start: 2023-10-02 | End: 2023-10-12 | Stop reason: HOSPADM

## 2023-10-02 RX ORDER — POLYETHYLENE GLYCOL 3350 17 G/17G
17 POWDER, FOR SOLUTION ORAL DAILY
Status: DISCONTINUED | OUTPATIENT
Start: 2023-10-02 | End: 2023-10-12 | Stop reason: HOSPADM

## 2023-10-02 RX ORDER — METOPROLOL SUCCINATE 50 MG/1
50 TABLET, EXTENDED RELEASE ORAL DAILY
Status: DISCONTINUED | OUTPATIENT
Start: 2023-10-03 | End: 2023-10-03

## 2023-10-02 RX ORDER — LISINOPRIL 5 MG/1
5 TABLET ORAL DAILY
Status: DISCONTINUED | OUTPATIENT
Start: 2023-10-03 | End: 2023-10-12 | Stop reason: HOSPADM

## 2023-10-02 RX ORDER — ACETAMINOPHEN 325 MG/1
650 TABLET ORAL EVERY 4 HOURS PRN
Status: DISCONTINUED | OUTPATIENT
Start: 2023-10-02 | End: 2023-10-12 | Stop reason: HOSPADM

## 2023-10-02 RX ORDER — ASPIRIN 81 MG/1
81 TABLET ORAL DAILY
Status: DISCONTINUED | OUTPATIENT
Start: 2023-10-02 | End: 2023-10-12 | Stop reason: HOSPADM

## 2023-10-02 RX ORDER — ATORVASTATIN CALCIUM 40 MG/1
80 TABLET, FILM COATED ORAL NIGHTLY
Status: DISCONTINUED | OUTPATIENT
Start: 2023-10-02 | End: 2023-10-12 | Stop reason: HOSPADM

## 2023-10-02 RX ORDER — HEPARIN SODIUM 5000 [USP'U]/ML
5000 INJECTION, SOLUTION INTRAVENOUS; SUBCUTANEOUS EVERY 8 HOURS
Status: DISCONTINUED | OUTPATIENT
Start: 2023-10-02 | End: 2023-10-12 | Stop reason: HOSPADM

## 2023-10-02 RX ORDER — LEVOTHYROXINE SODIUM 25 UG/1
25 TABLET ORAL
Status: DISCONTINUED | OUTPATIENT
Start: 2023-10-03 | End: 2023-10-12 | Stop reason: HOSPADM

## 2023-10-02 RX ORDER — BISACODYL 5 MG
10 TABLET, DELAYED RELEASE (ENTERIC COATED) ORAL DAILY PRN
Status: DISCONTINUED | OUTPATIENT
Start: 2023-10-02 | End: 2023-10-12 | Stop reason: HOSPADM

## 2023-10-02 RX ORDER — FUROSEMIDE 10 MG/ML
40 INJECTION INTRAMUSCULAR; INTRAVENOUS 2 TIMES DAILY
Status: DISCONTINUED | OUTPATIENT
Start: 2023-10-02 | End: 2023-10-05

## 2023-10-02 RX ORDER — AMIODARONE HYDROCHLORIDE 200 MG/1
200 TABLET ORAL DAILY
Status: DISCONTINUED | OUTPATIENT
Start: 2023-10-03 | End: 2023-10-12 | Stop reason: HOSPADM

## 2023-10-02 RX ORDER — ALPRAZOLAM 0.5 MG/1
0.5 TABLET ORAL NIGHTLY PRN
Status: DISCONTINUED | OUTPATIENT
Start: 2023-10-02 | End: 2023-10-12 | Stop reason: HOSPADM

## 2023-10-02 RX ADMIN — ATORVASTATIN CALCIUM 80 MG: 40 TABLET, FILM COATED ORAL at 20:47

## 2023-10-02 RX ADMIN — ACETAMINOPHEN 650 MG: 325 TABLET, FILM COATED ORAL at 21:48

## 2023-10-02 RX ADMIN — ALPRAZOLAM 0.5 MG: 0.5 TABLET ORAL at 21:47

## 2023-10-02 RX ADMIN — FUROSEMIDE 40 MG: 10 INJECTION, SOLUTION INTRAMUSCULAR; INTRAVENOUS at 14:01

## 2023-10-02 ASSESSMENT — PAIN SCALES - GENERAL
PAINLEVEL_OUTOF10: 0 - NO PAIN
PAINLEVEL_OUTOF10: 3
PAINLEVEL_OUTOF10: 0 - NO PAIN

## 2023-10-02 ASSESSMENT — COLUMBIA-SUICIDE SEVERITY RATING SCALE - C-SSRS
1. IN THE PAST MONTH, HAVE YOU WISHED YOU WERE DEAD OR WISHED YOU COULD GO TO SLEEP AND NOT WAKE UP?: NO
2. HAVE YOU ACTUALLY HAD ANY THOUGHTS OF KILLING YOURSELF?: NO

## 2023-10-02 ASSESSMENT — LIFESTYLE VARIABLES
HAVE PEOPLE ANNOYED YOU BY CRITICIZING YOUR DRINKING: NO
HAVE YOU EVER FELT YOU SHOULD CUT DOWN ON YOUR DRINKING: NO
EVER HAD A DRINK FIRST THING IN THE MORNING TO STEADY YOUR NERVES TO GET RID OF A HANGOVER: NO
EVER FELT BAD OR GUILTY ABOUT YOUR DRINKING: NO

## 2023-10-02 ASSESSMENT — PAIN - FUNCTIONAL ASSESSMENT: PAIN_FUNCTIONAL_ASSESSMENT: 0-10

## 2023-10-02 NOTE — PROGRESS NOTES
"[facility]   infusion Clinic Note   Date: 2023   Name: Adwoa Forrest  : 1935   MRN: [id number]         Reason for Visit: No chief complaint on file.      Accompanied by:{ACCOMPANIED BY..:[id number]}   Visit Type:: {visit type:66602}   Diagnosis: No diagnosis found.    Allergies:   Allergies as of 10/02/2023 - Reviewed 2023   Allergen Reaction Noted    Amiodarone Other 2023    Codeine Nausea Only and Other 11/15/2015    Hydrocodone Nausea Only and Swelling 2018    Sotalol Other 2023    Tetracyclines Itching 11/15/2015      Current Meds has a current medication list which includes the following prescription(s): alprazolam, amiodarone, aspirin, atorvastatin, calcium carbonate-vitamin d3, cyanocobalamin, farxiga, furosemide, glucosamine hcl, levothyroxine, lisinopril, metoprolol succinate xl, and omeprazole.        Vitals:There were no vitals filed for this visit.   Infusion Pre-procedure Checklist   Allergies reviewed: {YES NO YES DEFAULT:11652}   Medications reviewed: {YES NO YES DEFAULT:96592}   Contraindications to treatment:{YES OR NO - DEFAULT NO:02524}   Previous reaction to current treatment:{YES OR NO - DEFAULT NO:29175}   Current Health Issues: {current health issues-default None:88689::\"None\"}   Pain: '    Is the pain different from normal: {YES OR NO - DEFAULT NO:99557}   Is the pain tolerable: {YES NO YES DEFAULT:57448}   Is your Doctor aware: {YES NO YES DEFAULT:30498}   Contraindications based on patient history: {YES OR NO - DEFAULT NO:15421}   Provider notified: {YES/NO/NA:76373}   Labs: {LABS:51268}   Fall Risk Screening:      Review of Systems - Oncology   Negative for complaint: [] all other systems have been reviewed and are negative for complaint   Infusion Readiness:   Assessment Concerns Related to Infusion: {YES OR NO - DEFAULT NO:49991}  Provider notified: {YES NO YES DEFAULT:92157}  Assess patient for the concerns below. Document " "provider notification as appropriate:  - Does not meet criteria to treat {Yes No N/A Default N/A:96416::\"N/A\"}  - Has an active or recent infection with/without current antibiotic use {Yes No N/A Default N/A:63151::\"N/A\"}  - Has recent/planned dental work {Yes No N/A Default N/A:90416::\"N/A\"}  - Has recent/planned surgeries {Yes No N/A Default N/A:51396::\"N/A\"}  - Has recently received or plans to receive vaccinations {Yes No N/A Default N/A:94099::\"N/A\"}  - Has treatment related toxicities {Yes No N/A Default N/A:91868::\"N/A\"}  - Is pregnant (unless noted otherwise) {Yes No N/A Default N/A:99293::\"N/A\"}    Initiated By: Karlene Pemberton RN   Time: 10:34 AM     Adwoa Forrest had no medications administered during this visit.      "

## 2023-10-02 NOTE — ED PROVIDER NOTES
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  The patient states symptoms for the past week.  She reports exertional dyspnea along with orthopnea and swelling to her lower extremities.  She states that her Lasix was recently increased however she has not noticed any improvement.  Denies any chest pain.  No reports of fevers or myalgias.  Denies UTI-like symptoms.  Denies vomiting or diarrhea. The patient also reports that she has a wound to her right posterior ankle/heel which is being entered by a wound specialist.  She states no erythema surrounding the wound and endorses that the wound is improving      Attestation note:  88-year-old female history of for A-fib status post recent Watchman device hypertension dyslipidemia today comes in shortness of breath on exam she is in decompensated heart failure JVD elevated bibasilar crackles on auscultation chest x-ray confirms CHF BNP elevated troponin 30 patient was given IV Lasix and will be hospitalized for further care.    I agree with the CHERELLE's assessment treatment      History provided by:  Patient and medical records   used: No                        No data recorded                Patient History   Past Medical History:   Diagnosis Date    Abnormal findings on diagnostic imaging of heart and coronary circulation  06/13/2017    Abnormal computed tomography angiography of heart    Acute on chronic combined systolic (congestive) and diastolic (congestive) heart failure (CMS/[facility]) 09/28/2017    Acute on chronic combined systolic and diastolic heart failure    Acute on chronic combined systolic (congestive) and diastolic (congestive) heart failure (CMS/[facility]) 02/05/2020    Acute on chronic combined systolic and diastolic CHF, NYHA class 2    Anemia, unspecified 11/18/2020    Anemia, mild    Chronic diastolic (congestive) heart failure (CMS/[facility]) 09/15/2020    Chronic diastolic congestive heart failure    Disorder of the skin and subcutaneous tissue, unspecified 02/16/2020    Skin lesion of face    Diverticulitis of large intestine with perforation and abscess without bleeding     Perforation of sigmoid colon due to diverticulitis    Diverticulosis of intestine, part unspecified, without perforation or abscess without bleeding     Diverticulosis    Dorsalgia, unspecified 08/16/2019    Upper back pain    Encounter for immunization 11/13/2019    Need for vaccination    Encounter for surgical aftercare following surgery on the circulatory system 02/18/2019    Aftercare following surgery of the circulatory system    Essential (primary) hypertension 11/02/2022    Benign essential hypertension    Gastro-esophageal reflux disease without esophagitis 02/05/2020    Gastroesophageal reflux disease without esophagitis    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    Hypothyroidism due to medication    Long term (current) use of anticoagulants 10/22/2018    Long term (current) use of anticoagulants    Melena 04/10/2020    Blood in stool    Old myocardial infarction 10/19/2022    History of non-ST elevation myocardial infarction (NSTEMI)    Other forms of dyspnea 01/24/2020    Dyspnea on exertion    Other long term (current) drug therapy 05/10/2018    Long term current use of antiarrhythmic drug    Other specified  cardiac arrhythmias 02/26/2020    Arrhythmia, atrial    Paroxysmal atrial fibrillation (CMS/HCC) 02/01/2018    Paroxysmal atrial fibrillation    Permanent atrial fibrillation (CMS/HCC) 01/24/2020    Permanent atrial fibrillation    Personal history of diseases of the blood and blood-forming organs and certain disorders involving the immune mechanism 04/10/2020    History of anemia    Personal history of other diseases of the circulatory system 11/04/2018    History of abnormal electrocardiography    Personal history of other diseases of the digestive system 10/22/2018    History of lower GI bleeding    Personal history of other diseases of the musculoskeletal system and connective tissue     History of osteoporosis    Personal history of other diseases of the musculoskeletal system and connective tissue 08/16/2019    History of muscle spasm    Personal history of other endocrine, nutritional and metabolic disease 02/18/2019    History of hyperlipidemia    Personal history of other specified conditions 08/13/2020    History of diarrhea    Personal history of other specified conditions     History of precordial chest pain    Personal history of transient ischemic attack (TIA), and cerebral infarction without residual deficits 02/01/2018    Cerebrovascular accident (CVA) within last 3 months    Psychophysiologic insomnia 11/18/2020    Chronic insomnia    Segmental and somatic dysfunction of rib cage 08/16/2019    Segmental and somatic dysfunction of rib cage    Segmental and somatic dysfunction of thoracic region 08/16/2019    Segmental and somatic dysfunction of thoracic region    Segmental and somatic dysfunction of upper extremity 08/16/2019    Segmental and somatic dysfunction of upper extremity     Past Surgical History:   Procedure Laterality Date    APPENDECTOMY  05/04/2016    Appendectomy    CARDIAC PACEMAKER PLACEMENT  04/26/2018    Pacemaker Permanent Placement    CATARACT EXTRACTION  05/04/2016    Cataract  Surgery    CHOLECYSTECTOMY  05/04/2016    Cholecystectomy    COLECTOMY PARTIAL / TOTAL  08/17/2018    Partial Colectomy - Sigmoid    CORONARY ANGIOPLASTY  02/01/2018    PTCA    HYSTERECTOMY  05/04/2016    Hysterectomy    OTHER SURGICAL HISTORY  02/18/2019    Atrial appendage closure    OTHER SURGICAL HISTORY  04/26/2018    Catheter Ablation Atrioventricular Node    TONSILLECTOMY  05/04/2016    Tonsillectomy     No family history on file.  Social History     Tobacco Use    Smoking status: Never     Passive exposure: Never    Smokeless tobacco: Never   Substance Use Topics    Alcohol use: Not on file    Drug use: Not on file       Physical Exam   ED Triage Vitals [10/02/23 1111]   Temp Heart Rate Resp BP   36.7 °C (98 °F) 78 22 --      SpO2 Temp Source Heart Rate Source Patient Position   (!) 88 % Tympanic Monitor Sitting      BP Location FiO2 (%)     Left arm --       Physical Exam  Vitals and nursing note reviewed.   Constitutional:       General: She is not in acute distress.     Appearance: Normal appearance. She is normal weight. She is not ill-appearing, toxic-appearing or diaphoretic.   HENT:      Head: Normocephalic.      Nose: Nose normal.      Mouth/Throat:      Mouth: Mucous membranes are moist.   Eyes:      Extraocular Movements: Extraocular movements intact.      Conjunctiva/sclera: Conjunctivae normal.   Cardiovascular:      Rate and Rhythm: Normal rate and regular rhythm.      Pulses: Normal pulses.   Pulmonary:      Comments: No increased work of breathing or conversational dyspnea.  Lungs diminished with crackles at the bases bilaterally  Abdominal:      General: Abdomen is flat. Bowel sounds are normal. There is no distension.      Palpations: Abdomen is soft.      Tenderness: There is no abdominal tenderness. There is no guarding or rebound.   Musculoskeletal:         General: Normal range of motion.      Cervical back: Normal range of motion and neck supple.      Right lower leg: Edema present.       Left lower leg: Edema present.      Comments: Patient has symmetrical edema to the bilateral lower extremities.  No calf tenderness on exam.  No direct bony tenderness on evaluation of the right lower extremity   Skin:     General: Skin is warm and dry.      Capillary Refill: Capillary refill takes less than 2 seconds.      Comments: There is a ulcerative leg lesion with no surrounding erythema or induration to the posterior right ankle just above the heel   Neurological:      General: No focal deficit present.      Mental Status: She is alert and oriented to person, place, and time.   Psychiatric:         Mood and Affect: Mood normal.         Behavior: Behavior normal.         Thought Content: Thought content normal.         Judgment: Judgment normal.         ED Course & MDM   ED Course as of 10/02/23 1402   Mon Oct 02, 2023   1206 WBC: 9.1 [DS]   1207 HEMOGLOBIN: 13.9 [DS]   1207 POCT pH, Venous(!): 7.32 [DS]   1346 Serum creatinine 1.62 which is slightly increased from most recent but around baseline [DS]   1347 SODIUM(!): 135 [DS]   1347 Troponin I, High Sensitivity(!): 30 [DS]   1347 Coronavirus 2019, PCR: Not Detected [DS]   1347 MAGNESIUM: 2.32 [DS]   1347 BNP elevated at 1,279 [DS]   1348 IMPRESSION:  Unchanged cardiomegaly      Small left pleural effusion      Coarse interstitial markings are unchanged and probably related to  chronic lung disease.   [DS]   1348 IMPRESSION:  Metallic structures are present from internal fixation related to the  avulsion from the calcaneal tubercle      There is a subtle lucency on the lateral view at the cortical surface  of the tubercle just above the metallic pin which is questionably an  area of interval bone destruction versus a projectional difference.   [DS]      ED Course User Index  [DS] Hunter Carranza PA-C         Diagnoses as of 10/02/23 1402   Acute on chronic congestive heart failure, unspecified heart failure type (CMS/HCC)   Hypoxia   Dyspnea on exertion    Open wound of right heel, initial encounter       Medical Decision Making  Patient seen and evaluated for dyspnea with and without exertion as well as generalized weakness.  I reviewed the patient's electronic medical records along with her most recent encounters.  Patient states symptoms for the past 10 days.  On arrival the patient's oxygen saturation was found to be 88% on room air.  She is afebrile.  Patient reported that she has had increased swelling to her lower extremities along with orthopnea.  She states no chest pain and denies any cough or fever.  Patient does have a wound to the right posterior ankle/heel which is being managed on outpatient basis.  The patient states that the wound has been improving.  Patient was found to have symmetrical edematous lower extremities.  She also was found to have crackles at the bases bilaterally.  Blood work revealed no evidence of leukocytosis or concerning anemia.  Venous pH 7.32.  Sodium and magnesium within normal limits.  The patient's initial high-sensitivity troponin was found to be elevated at 30 however this is improved from her baseline and likely elevated due to her poor kidney function and increased oxygen demand.  The patient serum creatinine is 1.62 which is slightly increased from most recent but around her baseline.  BNP 1279.  Chest imaging revealed enlargement of the cardiac silhouette consistent with suspected acute on chronic CHF exacerbation. The patient was provided with 40 mg of Lasix IV.  Patient's oxygen saturation is around the mid 90s on supplemental oxygen.  Patient becomes hypoxic with slight increased work of breathing with exertion.  At this point given these findings I recommended admission to the hospital for further evaluation.  She was agreeable to this.  Repeat troponin will be obtained.  Patient's COVID-19 testing was negative.  Patient will be admitted to the hospitalist service for further evaluation    Amount and/or Complexity  of Data Reviewed  External Data Reviewed: labs, radiology, ECG and notes.     Details: Reviewed the most recent echocardiogram from February 16, 2023  Labs: ordered.  Radiology: ordered and independent interpretation performed.  ECG/medicine tests: ordered and independent interpretation performed.        Procedure  Procedures     Hunter Carranza PA-C  10/02/23 1402       Bala Junior MD  11/15/23 1703       Bala Junior MD  11/26/23 [id number]

## 2023-10-03 ENCOUNTER — APPOINTMENT (OUTPATIENT)
Dept: CARDIOLOGY | Facility: HOSPITAL | Age: 88
DRG: 291 | End: 2023-10-03
Payer: MEDICARE

## 2023-10-03 LAB
ANION GAP SERPL CALC-SCNC: 8 MMOL/L (ref 10–20)
APPEARANCE UR: CLEAR
BILIRUB UR STRIP.AUTO-MCNC: NEGATIVE MG/DL
BNP SERPL-MCNC: 1843 PG/ML (ref 0–99)
BUN SERPL-MCNC: 39 MG/DL (ref 6–23)
CALCIUM SERPL-MCNC: 8.7 MG/DL (ref 8.6–10.3)
CHLORIDE SERPL-SCNC: 101 MMOL/L (ref 98–107)
CO2 SERPL-SCNC: 32 MMOL/L (ref 21–32)
COLOR UR: ABNORMAL
CREAT SERPL-MCNC: 1.64 MG/DL (ref 0.5–1.05)
EJECTION FRACTION APICAL 4 CHAMBER: 76.2
ERYTHROCYTE [DISTWIDTH] IN BLOOD BY AUTOMATED COUNT: 17.6 % (ref 11.5–14.5)
GFR SERPL CREATININE-BSD FRML MDRD: 30 ML/MIN/1.73M*2
GLUCOSE SERPL-MCNC: 84 MG/DL (ref 74–99)
GLUCOSE UR STRIP.AUTO-MCNC: ABNORMAL MG/DL
HCT VFR BLD AUTO: 36.9 % (ref 36–46)
HGB BLD-MCNC: 11.5 G/DL (ref 12–16)
KETONES UR STRIP.AUTO-MCNC: NEGATIVE MG/DL
LEUKOCYTE ESTERASE UR QL STRIP.AUTO: ABNORMAL
MAGNESIUM SERPL-MCNC: 2.07 MG/DL (ref 1.6–2.4)
MCH RBC QN AUTO: 27.1 PG (ref 26–34)
MCHC RBC AUTO-ENTMCNC: 31.2 G/DL (ref 32–36)
MCV RBC AUTO: 87 FL (ref 80–100)
NITRITE UR QL STRIP.AUTO: NEGATIVE
NRBC BLD-RTO: 0 /100 WBCS (ref 0–0)
PH UR STRIP.AUTO: 6 [PH]
PLATELET # BLD AUTO: 357 X10*3/UL (ref 150–450)
PMV BLD AUTO: 8.6 FL (ref 7.5–11.5)
POTASSIUM SERPL-SCNC: 4 MMOL/L (ref 3.5–5.3)
PROT UR STRIP.AUTO-MCNC: NEGATIVE MG/DL
RBC # BLD AUTO: 4.25 X10*6/UL (ref 4–5.2)
RBC # UR STRIP.AUTO: NEGATIVE /UL
RBC #/AREA URNS AUTO: ABNORMAL /HPF
RENAL EPI CELLS #/AREA UR COMP ASSIST: ABNORMAL /HPF
SODIUM SERPL-SCNC: 137 MMOL/L (ref 136–145)
SP GR UR STRIP.AUTO: 1.01
SQUAMOUS #/AREA URNS AUTO: ABNORMAL /HPF
UROBILINOGEN UR STRIP.AUTO-MCNC: <2 MG/DL
WBC # BLD AUTO: 8 X10*3/UL (ref 4.4–11.3)
WBC #/AREA URNS AUTO: ABNORMAL /HPF

## 2023-10-03 PROCEDURE — 85027 COMPLETE CBC AUTOMATED: CPT | Performed by: STUDENT IN AN ORGANIZED HEALTH CARE EDUCATION/TRAINING PROGRAM

## 2023-10-03 PROCEDURE — 2500000004 HC RX 250 GENERAL PHARMACY W/ HCPCS (ALT 636 FOR OP/ED): Performed by: STUDENT IN AN ORGANIZED HEALTH CARE EDUCATION/TRAINING PROGRAM

## 2023-10-03 PROCEDURE — 83880 ASSAY OF NATRIURETIC PEPTIDE: CPT | Performed by: STUDENT IN AN ORGANIZED HEALTH CARE EDUCATION/TRAINING PROGRAM

## 2023-10-03 PROCEDURE — 2500000001 HC RX 250 WO HCPCS SELF ADMINISTERED DRUGS (ALT 637 FOR MEDICARE OP): Performed by: INTERNAL MEDICINE

## 2023-10-03 PROCEDURE — 80048 BASIC METABOLIC PNL TOTAL CA: CPT | Performed by: STUDENT IN AN ORGANIZED HEALTH CARE EDUCATION/TRAINING PROGRAM

## 2023-10-03 PROCEDURE — 99233 SBSQ HOSP IP/OBS HIGH 50: CPT | Performed by: PHYSICIAN ASSISTANT

## 2023-10-03 PROCEDURE — 2500000004 HC RX 250 GENERAL PHARMACY W/ HCPCS (ALT 636 FOR OP/ED): Performed by: INTERNAL MEDICINE

## 2023-10-03 PROCEDURE — 2500000005 HC RX 250 GENERAL PHARMACY W/O HCPCS: Performed by: INTERNAL MEDICINE

## 2023-10-03 PROCEDURE — 36415 COLL VENOUS BLD VENIPUNCTURE: CPT | Performed by: STUDENT IN AN ORGANIZED HEALTH CARE EDUCATION/TRAINING PROGRAM

## 2023-10-03 PROCEDURE — 99255 IP/OBS CONSLTJ NEW/EST HI 80: CPT | Performed by: INTERNAL MEDICINE

## 2023-10-03 PROCEDURE — 93306 TTE W/DOPPLER COMPLETE: CPT

## 2023-10-03 PROCEDURE — 83735 ASSAY OF MAGNESIUM: CPT | Performed by: STUDENT IN AN ORGANIZED HEALTH CARE EDUCATION/TRAINING PROGRAM

## 2023-10-03 PROCEDURE — 97165 OT EVAL LOW COMPLEX 30 MIN: CPT | Mod: GO | Performed by: OCCUPATIONAL THERAPIST

## 2023-10-03 PROCEDURE — 93306 TTE W/DOPPLER COMPLETE: CPT | Performed by: INTERNAL MEDICINE

## 2023-10-03 PROCEDURE — 97162 PT EVAL MOD COMPLEX 30 MIN: CPT | Mod: GP

## 2023-10-03 PROCEDURE — 2500000001 HC RX 250 WO HCPCS SELF ADMINISTERED DRUGS (ALT 637 FOR MEDICARE OP): Performed by: STUDENT IN AN ORGANIZED HEALTH CARE EDUCATION/TRAINING PROGRAM

## 2023-10-03 PROCEDURE — 2060000001 HC INTERMEDIATE ICU ROOM DAILY

## 2023-10-03 PROCEDURE — 2500000002 HC RX 250 W HCPCS SELF ADMINISTERED DRUGS (ALT 637 FOR MEDICARE OP, ALT 636 FOR OP/ED): Performed by: INTERNAL MEDICINE

## 2023-10-03 PROCEDURE — 81001 URINALYSIS AUTO W/SCOPE: CPT | Performed by: PHYSICIAN ASSISTANT

## 2023-10-03 RX ORDER — SPIRONOLACTONE 25 MG/1
12.5 TABLET ORAL
Status: DISCONTINUED | OUTPATIENT
Start: 2023-10-03 | End: 2023-10-12 | Stop reason: HOSPADM

## 2023-10-03 RX ORDER — SODIUM CHLORIDE 0.9 % (FLUSH) 0.9 %
SYRINGE (ML) INJECTION
Status: DISPENSED
Start: 2023-10-03 | End: 2023-10-03

## 2023-10-03 RX ORDER — METOPROLOL SUCCINATE 25 MG/1
25 TABLET, EXTENDED RELEASE ORAL DAILY
Status: DISCONTINUED | OUTPATIENT
Start: 2023-10-03 | End: 2023-10-12 | Stop reason: HOSPADM

## 2023-10-03 RX ORDER — TALC
9 POWDER (GRAM) TOPICAL NIGHTLY PRN
Status: DISCONTINUED | OUTPATIENT
Start: 2023-10-03 | End: 2023-10-12 | Stop reason: HOSPADM

## 2023-10-03 RX ORDER — CEFAZOLIN SODIUM 1 G/50ML
1 SOLUTION INTRAVENOUS EVERY 12 HOURS
Status: DISCONTINUED | OUTPATIENT
Start: 2023-10-03 | End: 2023-10-05

## 2023-10-03 RX ADMIN — HEPARIN SODIUM 5000 UNITS: 5000 INJECTION INTRAVENOUS; SUBCUTANEOUS at 23:36

## 2023-10-03 RX ADMIN — ACETAMINOPHEN 650 MG: 325 TABLET, FILM COATED ORAL at 21:04

## 2023-10-03 RX ADMIN — FUROSEMIDE 40 MG: 10 INJECTION, SOLUTION INTRAMUSCULAR; INTRAVENOUS at 09:52

## 2023-10-03 RX ADMIN — FUROSEMIDE 40 MG: 10 INJECTION, SOLUTION INTRAMUSCULAR; INTRAVENOUS at 14:00

## 2023-10-03 RX ADMIN — CYANOCOBALAMIN TAB 1000 MCG 2000 MCG: 1000 TAB at 09:54

## 2023-10-03 RX ADMIN — ASPIRIN 81 MG: 81 TABLET, COATED ORAL at 09:53

## 2023-10-03 RX ADMIN — Medication 9 MG: at 02:23

## 2023-10-03 RX ADMIN — LEVOTHYROXINE SODIUM 25 MCG: 0.03 TABLET ORAL at 05:58

## 2023-10-03 RX ADMIN — CEFAZOLIN SODIUM 1 G: 1 INJECTION, SOLUTION INTRAVENOUS at 19:54

## 2023-10-03 RX ADMIN — ACETAMINOPHEN 650 MG: 325 TABLET, FILM COATED ORAL at 02:23

## 2023-10-03 RX ADMIN — ATORVASTATIN CALCIUM 80 MG: 40 TABLET, FILM COATED ORAL at 21:02

## 2023-10-03 RX ADMIN — ALPRAZOLAM 0.5 MG: 0.5 TABLET ORAL at 21:03

## 2023-10-03 RX ADMIN — HUMAN ALBUMIN MICROSPHERES AND PERFLUTREN 0.5 ML: 10; .22 INJECTION, SOLUTION INTRAVENOUS at 15:45

## 2023-10-03 RX ADMIN — ACETAMINOPHEN 650 MG: 325 TABLET, FILM COATED ORAL at 16:49

## 2023-10-03 RX ADMIN — Medication 4 L/MIN: at 12:19

## 2023-10-03 RX ADMIN — Medication 4 L/MIN: at 08:00

## 2023-10-03 RX ADMIN — AMIODARONE HYDROCHLORIDE 200 MG: 200 TABLET ORAL at 09:54

## 2023-10-03 SDOH — ECONOMIC STABILITY: HOUSING INSECURITY
IN THE LAST 12 MONTHS, WAS THERE A TIME WHEN YOU DID NOT HAVE A STEADY PLACE TO SLEEP OR SLEPT IN A SHELTER (INCLUDING NOW)?: PATIENT REFUSED

## 2023-10-03 SDOH — SOCIAL STABILITY: SOCIAL INSECURITY: ARE THERE ANY APPARENT SIGNS OF INJURIES/BEHAVIORS THAT COULD BE RELATED TO ABUSE/NEGLECT?: NO

## 2023-10-03 SDOH — SOCIAL STABILITY: SOCIAL INSECURITY: ARE YOU OR HAVE YOU BEEN THREATENED OR ABUSED PHYSICALLY, EMOTIONALLY, OR SEXUALLY BY ANYONE?: NO

## 2023-10-03 SDOH — HEALTH STABILITY: MENTAL HEALTH: HOW OFTEN DO YOU HAVE A DRINK CONTAINING ALCOHOL?: NEVER

## 2023-10-03 SDOH — ECONOMIC STABILITY: TRANSPORTATION INSECURITY
IN THE PAST 12 MONTHS, HAS THE LACK OF TRANSPORTATION KEPT YOU FROM MEDICAL APPOINTMENTS OR FROM GETTING MEDICATIONS?: PATIENT DECLINED

## 2023-10-03 SDOH — ECONOMIC STABILITY: HOUSING INSECURITY: IN THE LAST 12 MONTHS, HOW MANY PLACES HAVE YOU LIVED?: 1

## 2023-10-03 SDOH — SOCIAL STABILITY: SOCIAL INSECURITY: DO YOU FEEL ANYONE HAS EXPLOITED OR TAKEN ADVANTAGE OF YOU FINANCIALLY OR OF YOUR PERSONAL PROPERTY?: NO

## 2023-10-03 SDOH — HEALTH STABILITY: MENTAL HEALTH: HOW MANY STANDARD DRINKS CONTAINING ALCOHOL DO YOU HAVE ON A TYPICAL DAY?: PATIENT DOES NOT DRINK

## 2023-10-03 SDOH — ECONOMIC STABILITY: INCOME INSECURITY: IN THE LAST 12 MONTHS, WAS THERE A TIME WHEN YOU WERE NOT ABLE TO PAY THE MORTGAGE OR RENT ON TIME?: PATIENT REFUSED

## 2023-10-03 SDOH — HEALTH STABILITY: MENTAL HEALTH: HOW OFTEN DO YOU HAVE 6 OR MORE DRINKS ON ONE OCCASION?: NEVER

## 2023-10-03 SDOH — SOCIAL STABILITY: SOCIAL INSECURITY: DO YOU FEEL UNSAFE GOING BACK TO THE PLACE WHERE YOU ARE LIVING?: NO

## 2023-10-03 SDOH — ECONOMIC STABILITY: TRANSPORTATION INSECURITY
IN THE PAST 12 MONTHS, HAS LACK OF TRANSPORTATION KEPT YOU FROM MEETINGS, WORK, OR FROM GETTING THINGS NEEDED FOR DAILY LIVING?: PATIENT DECLINED

## 2023-10-03 SDOH — SOCIAL STABILITY: SOCIAL INSECURITY: HAS ANYONE EVER THREATENED TO HURT YOUR FAMILY OR YOUR PETS?: NO

## 2023-10-03 SDOH — SOCIAL STABILITY: SOCIAL INSECURITY: DOES ANYONE TRY TO KEEP YOU FROM HAVING/CONTACTING OTHER FRIENDS OR DOING THINGS OUTSIDE YOUR HOME?: NO

## 2023-10-03 ASSESSMENT — LIFESTYLE VARIABLES
AUDIT-C TOTAL SCORE: 0
HOW MANY STANDARD DRINKS CONTAINING ALCOHOL DO YOU HAVE ON A TYPICAL DAY: PATIENT DOES NOT DRINK
HOW OFTEN DO YOU HAVE 6 OR MORE DRINKS ON ONE OCCASION: NEVER
SKIP TO QUESTIONS 9-10: 1
SKIP TO QUESTIONS 9-10: 1
HOW OFTEN DO YOU HAVE A DRINK CONTAINING ALCOHOL: NEVER
AUDIT-C TOTAL SCORE: 0
AUDIT-C TOTAL SCORE: 0

## 2023-10-03 ASSESSMENT — COGNITIVE AND FUNCTIONAL STATUS - GENERAL
MOVING TO AND FROM BED TO CHAIR: A LITTLE
MOBILITY SCORE: 13
MOBILITY SCORE: 15
EATING MEALS: TOTAL
DAILY ACTIVITIY SCORE: 17
HELP NEEDED FOR BATHING: A LITTLE
PERSONAL GROOMING: A LITTLE
DRESSING REGULAR LOWER BODY CLOTHING: A LOT
DRESSING REGULAR UPPER BODY CLOTHING: A LITTLE
CLIMB 3 TO 5 STEPS WITH RAILING: TOTAL
PERSONAL GROOMING: A LITTLE
DAILY ACTIVITIY SCORE: 16
MOVING TO AND FROM BED TO CHAIR: A LITTLE
DAILY ACTIVITIY SCORE: 24
CLIMB 3 TO 5 STEPS WITH RAILING: TOTAL
EATING MEALS: A LITTLE
PERSONAL GROOMING: A LITTLE
STANDING UP FROM CHAIR USING ARMS: A LITTLE
TOILETING: A LITTLE
WALKING IN HOSPITAL ROOM: A LOT
WALKING IN HOSPITAL ROOM: A LOT
DRESSING REGULAR LOWER BODY CLOTHING: A LITTLE
TURNING FROM BACK TO SIDE WHILE IN FLAT BAD: A LITTLE
MOVING FROM LYING ON BACK TO SITTING ON SIDE OF FLAT BED WITH BEDRAILS: A LITTLE
TOILETING: A LITTLE
STANDING UP FROM CHAIR USING ARMS: A LOT
MOBILITY SCORE: 17
STANDING UP FROM CHAIR USING ARMS: A LOT
DRESSING REGULAR UPPER BODY CLOTHING: A LITTLE
DRESSING REGULAR UPPER BODY CLOTHING: A LITTLE
EATING MEALS: A LITTLE
MOVING FROM LYING ON BACK TO SITTING ON SIDE OF FLAT BED WITH BEDRAILS: A LITTLE
MOBILITY SCORE: 13
HELP NEEDED FOR BATHING: A LITTLE
TURNING FROM BACK TO SIDE WHILE IN FLAT BAD: A LITTLE
DRESSING REGULAR LOWER BODY CLOTHING: A LOT
CLIMB 3 TO 5 STEPS WITH RAILING: TOTAL
HELP NEEDED FOR BATHING: A LITTLE
WALKING IN HOSPITAL ROOM: TOTAL
CLIMB 3 TO 5 STEPS WITH RAILING: A LOT
TOILETING: A LITTLE
TURNING FROM BACK TO SIDE WHILE IN FLAT BAD: A LITTLE
WALKING IN HOSPITAL ROOM: A LOT
TURNING FROM BACK TO SIDE WHILE IN FLAT BAD: A LITTLE
MOVING TO AND FROM BED TO CHAIR: A LOT
PATIENT BASELINE BEDBOUND: YES
MOVING TO AND FROM BED TO CHAIR: A LOT
STANDING UP FROM CHAIR USING ARMS: A LITTLE
DAILY ACTIVITIY SCORE: 17

## 2023-10-03 ASSESSMENT — ENCOUNTER SYMPTOMS
EYE PAIN: 0
HEMATURIA: 0
CHEST TIGHTNESS: 0
FREQUENCY: 0
FEVER: 0
LIGHT-HEADEDNESS: 0
FATIGUE: 0
CHILLS: 0
PALPITATIONS: 0
HALLUCINATIONS: 0
BACK PAIN: 0
FACIAL SWELLING: 0
WOUND: 1
NAUSEA: 0
SORE THROAT: 0
BLOOD IN STOOL: 0
WHEEZING: 0
CONSTIPATION: 0
TROUBLE SWALLOWING: 0
SHORTNESS OF BREATH: 1
FLANK PAIN: 0
WEAKNESS: 0
DIAPHORESIS: 0
APPETITE CHANGE: 0
JOINT SWELLING: 0
DYSURIA: 0
VOMITING: 0
DIZZINESS: 0
DIARRHEA: 0
NUMBNESS: 0
ABDOMINAL PAIN: 0
HEADACHES: 0
BRUISES/BLEEDS EASILY: 0
COUGH: 1

## 2023-10-03 ASSESSMENT — PAIN SCALES - GENERAL
PAINLEVEL_OUTOF10: 0 - NO PAIN
PAINLEVEL_OUTOF10: 3
PAINLEVEL_OUTOF10: 3
PAINLEVEL_OUTOF10: 5 - MODERATE PAIN
PAINLEVEL_OUTOF10: 0 - NO PAIN

## 2023-10-03 ASSESSMENT — COLUMBIA-SUICIDE SEVERITY RATING SCALE - C-SSRS
6. HAVE YOU EVER DONE ANYTHING, STARTED TO DO ANYTHING, OR PREPARED TO DO ANYTHING TO END YOUR LIFE?: NO
2. HAVE YOU ACTUALLY HAD ANY THOUGHTS OF KILLING YOURSELF?: NO
1. IN THE PAST MONTH, HAVE YOU WISHED YOU WERE DEAD OR WISHED YOU COULD GO TO SLEEP AND NOT WAKE UP?: NO

## 2023-10-03 ASSESSMENT — PAIN - FUNCTIONAL ASSESSMENT
PAIN_FUNCTIONAL_ASSESSMENT: 0-10

## 2023-10-03 ASSESSMENT — ACTIVITIES OF DAILY LIVING (ADL): ADL_ASSISTANCE: INDEPENDENT

## 2023-10-03 ASSESSMENT — PATIENT HEALTH QUESTIONNAIRE - PHQ9
2. FEELING DOWN, DEPRESSED OR HOPELESS: NOT AT ALL
1. LITTLE INTEREST OR PLEASURE IN DOING THINGS: NOT AT ALL
SUM OF ALL RESPONSES TO PHQ9 QUESTIONS 1 & 2: 0

## 2023-10-03 NOTE — H&P
Admission History  A. 07/17/23 till 07/22/23 = CKD 3, right calcaneus fracture, atrial fibrillation, diastolic congestive heart failure, anxiety and hypothyroidism.  Patient was discharged to skilled nursing facility.  Her post hospitalization was complicated by a right heel wound that has been debrided by wound care and the patient is currently scheduled for grafting of the wound on 10/06/2023.    History Of Present Illness  Adwoa Forrest is a 88 y.o.  female with a past medical history significant for hypertension/hyperlipidemia/coronary artery disease status post PCI, atrial fibrillation s/p dual-chamber PPM and complicated by failed Watchman s/p removal and left atrial appendage ligation in 2018, anxiety, depression, diverticulosis, perforated bowel s/p partial colectomy, recurrent GI bleeding secondary to anticoagulation, HFpEF: Echo 02/16/2023 with an EF of 50% and severely increased LV septal thickness compared to echo 09/22/2022 which showed an EF of 60% and mild AV regurgitation.    Patient presented to the ED with about 1 weeks worth of progressively worsening generalized weakness, shortness of breath, coughing, wheezing, congestion and difficulty ambulating.  Secondary to her ankle fracture that occurred in July 2023.  She states that she is supposed to be nonweightbearing status on that right leg for least another 3 to 4 months per her wound care physician and orthopedic surgeon.  The patient also noted increasing bilateral lower extremity edema all the way up to her knees which felt like lead breaks whenever she would try to sit up out of the chair or move her legs.  The reason for presenting today is that she could barely get up out of bed on her own or transfer herself to the wheelchair which then prompted her to present to the ED for further evaluation and management.  She denies any recent sick contacts, chemical/environmental exposures, changes in dietary habits or any recent traumatic  events/falls.  She denies any fevers, chills, night sweats, vision changes, auditory changes, change in taste/smell, loss of bowel/bladder control, loss of consciousness, dizziness, vertigo, syncope, seizure-like activity, chest pain, palpitations, hemoptysis, hematemesis, abdominal pain, nausea, vomiting, diarrhea, constipation, dysuria, hematuria, dyschezia, hematochezia or any lateralizing motor/sensory deficits other than noted above.    ED course:  1.  Vital signs on presentation: Temperature 36.7 °C, heart rate 72, respirations 20, blood pressure 138/69, pulse ox of 95% on 2 L via nasal cannula but was noted to be as low as 86% per the ED practitioner on room air  2.  Glucose of 108  3.  Sodium of 135, potassium is 5.2  4.  BUN/Creatinine of 39/1.62  5.  CRP of 6.22  6.  ESR of 41  7.  BNP of 1279  8.  HSTI = 30 --? 29 -->  9.  Tissue/wound culture was obtained per the ED orders  10.  VBG on presentation: pH is 7.32, PCO2 of 64, PO2 of 23, SO2 of 25, calculated bicarb of 33  11.  COVID-19 PCR negative  12.  XR chest: Noted unchanged cardiomegaly with a small left pleural effusion and coarse interstitial markings consistent with chronic lung disease as well as vascular congestion  13.  XR right ankle: Default full report as noted below there is noted mechanical fixation BX as well as right also questionable is interventional bone destruction versus projectional difference, full report as noted below    A 12 point ROS was performed with the patient denying any complaints at this time aside from those listed in the HPI above.     Past Medical History  She has a past medical history of Abnormal findings on diagnostic imaging of heart and coronary circulation (06/13/2017), Acute on chronic combined systolic (congestive) and diastolic (congestive) heart failure (CMS/HCC) (09/28/2017), Acute on chronic combined systolic (congestive) and diastolic (congestive) heart failure (CMS/HCC) (02/05/2020), Anemia, unspecified  (11/18/2020), Chronic diastolic (congestive) heart failure (CMS/HCC) (09/15/2020), Disorder of the skin and subcutaneous tissue, unspecified (02/16/2020), Diverticulitis of large intestine with perforation and abscess without bleeding, Diverticulosis of intestine, part unspecified, without perforation or abscess without bleeding, Dorsalgia, unspecified (08/16/2019), Encounter for immunization (11/13/2019), Encounter for surgical aftercare following surgery on the circulatory system (02/18/2019), Essential (primary) hypertension (11/02/2022), Gastro-esophageal reflux disease without esophagitis (02/05/2020), 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), Long term (current) use of anticoagulants (10/22/2018), Melena (04/10/2020), Old myocardial infarction (10/19/2022), Other forms of dyspnea (01/24/2020), Other long term (current) drug therapy (05/10/2018), Other specified cardiac arrhythmias (02/26/2020), Paroxysmal atrial fibrillation (CMS/HCC) (02/01/2018), Permanent atrial fibrillation (CMS/HCC) (01/24/2020), Personal history of diseases of the blood and blood-forming organs and certain disorders involving the immune mechanism (04/10/2020), Personal history of other diseases of the circulatory system (11/04/2018), Personal history of other diseases of the digestive system (10/22/2018), Personal history of other diseases of the musculoskeletal system and connective tissue, Personal history of other diseases of the musculoskeletal system and connective tissue (08/16/2019), Personal history of other endocrine, nutritional and metabolic disease (02/18/2019), Personal history of other specified conditions (08/13/2020), Personal history of other specified conditions, Personal history of transient ischemic attack (TIA), and cerebral infarction without residual deficits (02/01/2018), Psychophysiologic insomnia (11/18/2020), Segmental and somatic dysfunction of rib cage (08/16/2019),  Segmental and somatic dysfunction of thoracic region (08/16/2019), and Segmental and somatic dysfunction of upper extremity (08/16/2019).    Surgical History  She has a past surgical history that includes Cataract extraction (05/04/2016); Appendectomy (05/04/2016); Hysterectomy (05/04/2016); Tonsillectomy (05/04/2016); Cholecystectomy (05/04/2016); Other surgical history (02/18/2019); Other surgical history (04/26/2018); Cardiac pacemaker placement (04/26/2018); Coronary angioplasty (02/01/2018); and Colectomy partial / total (08/17/2018).     Social History  She reports that she has never smoked. She has never been exposed to tobacco smoke. She has never used smokeless tobacco. No history on file for alcohol use and drug use.    Family History  No family history on file.     Allergies  Amiodarone, Codeine, Hydrocodone, Sotalol, and Tetracyclines    Physical Exam by System:    Constitutional: Well developed, awake/alert/oriented x3, no distress on 2L via NC, alert and cooperative   Eyes: PERRL, EOMI, clear sclera   ENMT: mucous membranes moist, no apparent injury, no lesions seen   Head/Neck: Neck supple, no apparent injury, No JVD, trachea midline, no bruits   Respiratory/Thorax: Diminished breath sounds throughout all lung fields with patchy crackles throughout all lung fields particularly in the mid to lwoer lung fields L>R, nonlabored on 2L via NC    Cardiovascular: appears to be regular with the occasional extra beat on auscultation, no murmurs, 2+ equal pulses of the extremities, nml S1/S2   Gastrointestinal: Nondistended, soft, non-tender, no rebound tenderness or guarding, no adán/organomegaly appreciated on palpation, +BS   Musculoskeletal: ROM intact, no joint swelling, normal strength throughout except at the ankle --> currently wrapped intact sensation and some movement at the toes, wound pictures available within the chart   Extremities: no cyanosis, contusions, wounds or clubbing, 2+ edema bilaterally  up to the knees    Neurological: AOx3, intact senses, motor, response, reflexes & strength, CNs appear grossly intact   Psychological: Appropriate mood and behavior   Skin: Warm and dry, no lesions, no rashes      Last Recorded Vitals  /75 (BP Location: Left arm, Patient Position: Lying)   Pulse 65   Temp 36.7 °C (98 °F)   Resp 16   Wt 52.5 kg (115 lb 11.9 oz)   SpO2 96%     Relevant Results  Results for orders placed or performed during the hospital encounter of 10/02/23 (from the past 24 hour(s))   BLOOD GAS VENOUS   Result Value Ref Range    POCT pH, Venous 7.32 (L) 7.33 - 7.43 pH    POCT pCO2, Venous 64 (H) 41 - 51 mm Hg    POCT pO2, Venous 23 (L) 35 - 45 mm Hg    POCT SO2, Venous 25 (L) 45 - 75 %    POCT Oxy Hemoglobin, Venous 24.5 (L) 45.0 - 75.0 %    POCT Base Excess, Venous 4.9 (H) -2.0 - 3.0 mmol/L    POCT HCO3 Calculated, Venous 33.0 (H) 22.0 - 26.0 mmol/L    Patient Temperature 37.0 degrees Celsius   CBC and Auto Differential   Result Value Ref Range    WBC 9.1 4.4 - 11.3 x10*3/uL    nRBC 0.0 0.0 - 0.0 /100 WBCs    RBC 5.21 (H) 4.00 - 5.20 x10*6/uL    Hemoglobin 13.9 12.0 - 16.0 g/dL    Hematocrit 45.1 36.0 - 46.0 %    MCV 87 80 - 100 fL    MCH 26.7 26.0 - 34.0 pg    MCHC 30.8 (L) 32.0 - 36.0 g/dL    RDW 18.0 (H) 11.5 - 14.5 %    Platelets 420 150 - 450 x10*3/uL    MPV 8.7 7.5 - 11.5 fL    Neutrophils % 84.1 40.0 - 80.0 %    Immature Granulocytes %, Automated 0.8 0.0 - 0.9 %    Lymphocytes % 4.4 13.0 - 44.0 %    Monocytes % 8.0 2.0 - 10.0 %    Eosinophils % 1.9 0.0 - 6.0 %    Basophils % 0.8 0.0 - 2.0 %    Neutrophils Absolute 7.65 (H) 1.60 - 5.50 x10*3/uL    Immature Granulocytes Absolute, Automated 0.07 0.00 - 0.50 x10*3/uL    Lymphocytes Absolute 0.40 (L) 0.80 - 3.00 x10*3/uL    Monocytes Absolute 0.73 0.05 - 0.80 x10*3/uL    Eosinophils Absolute 0.17 0.00 - 0.40 x10*3/uL    Basophils Absolute 0.07 0.00 - 0.10 x10*3/uL   B-Type Natriuretic Peptide   Result Value Ref Range    BNP 1,279 (H) 0  - 99 pg/mL   Comprehensive metabolic panel   Result Value Ref Range    Glucose 108 (H) 74 - 99 mg/dL    Sodium 135 (L) 136 - 145 mmol/L    Potassium 5.2 3.5 - 5.3 mmol/L    Chloride 99 98 - 107 mmol/L    Bicarbonate 28 21 - 32 mmol/L    Anion Gap 13 10 - 20 mmol/L    Urea Nitrogen 39 (H) 6 - 23 mg/dL    Creatinine 1.62 (H) 0.50 - 1.05 mg/dL    eGFR 31 (L) >60 mL/min/1.73m*2    Calcium 9.1 8.6 - 10.3 mg/dL    Albumin 3.6 3.4 - 5.0 g/dL    Alkaline Phosphatase 121 33 - 136 U/L    Total Protein 6.4 6.4 - 8.2 g/dL    AST 30 9 - 39 U/L    Bilirubin, Total 0.7 0.0 - 1.2 mg/dL    ALT 23 7 - 45 U/L   Magnesium   Result Value Ref Range    Magnesium 2.32 1.60 - 2.40 mg/dL   Troponin I, High Sensitivity   Result Value Ref Range    Troponin I, High Sensitivity 30 (H) 0 - 13 ng/L   C-Reactive Protein   Result Value Ref Range    C-Reactive Protein 6.22 (H) <1.00 mg/dL   Sedimentation Rate   Result Value Ref Range    Sedimentation Rate 41 (H) 0 - 30 mm/h   SARS-CoV-2 RT PCR   Result Value Ref Range    Coronavirus 2019, PCR Not Detected Not Detected   Troponin I, High Sensitivity   Result Value Ref Range    Troponin I, High Sensitivity 29 (H) 0 - 13 ng/L       XR ankle right 3+ views    Result Date: 10/2/2023  Interpreted By:  Jeny Troy, STUDY: XR ANKLE RIGHT 3+ VIEWS; ;  10/2/2023 1:14 pm   INDICATION: Signs/Symptoms:Concern for osteomyelitis.   COMPARISON: 07/17/2023 and 09/12/2023   ACCESSION NUMBER(S): [id number]   ORDERING CLINICIAN: ISAIAH DOLL   FINDINGS: AP, oblique and lateral views were obtained. 2 screws anchor fracture fragment of the calcaneal tuberosity in position. There is also a metallic pin in the calcaneal tubercle. A small amount of lucency persists at the fracture site which does not appear significantly changed. Along the cortical surface of the calcaneal tubercle just slightly above the level of the metallic pin the bone appears questionably less dense. This is a fairly subtle finding and could be  projectional. However this is in the vicinity of the overlying skin ulceration. Superficial soft tissue air is noted at the level of the ulceration with no deeply penetrating soft tissue air.       Metallic structures are present from internal fixation related to the avulsion from the calcaneal tubercle   There is a subtle lucency on the lateral view at the cortical surface of the tubercle just above the metallic pin which is questionably an area of interval bone destruction versus a projectional difference.     MACRO: None   Signed by: Jeny Troy 10/2/2023 1:23 PM Dictation workstation:   [id number]    XR chest 1 view    Result Date: 10/2/2023  Interpreted By:  Jeny Troy, STUDY: XR CHEST 1 VIEW; ;  10/2/2023 1:13 pm   INDICATION: . Shortness of breath   COMPARISON: 02/15/2023   ACCESSION NUMBER(S): [id number]   ORDERING CLINICIAN: ISAIAH DOLL   TECHNIQUE: Portable AP upright   FINDINGS: ICD is present in the left upper chest wall with electrode wires in the right atrium and right ventricle. Cardiac silhouette is enlarged but appears similar to the prior study. Aorta is atherosclerotic. Interstitial markings are coarse but appears similar to the previous exam. Left costophrenic angle is indistinct which may indicate minimal left pleural fluid. Bones are osteopenic with no definite acute interval change.       Unchanged cardiomegaly   Small left pleural effusion   Coarse interstitial markings are unchanged and probably related to chronic lung disease.     Signed by: Jeny Troy 10/2/2023 1:18 PM Dictation workstation:   [id number]    Assessment/Plan     Results for orders placed or performed during the hospital encounter of 10/02/23 (from the past 24 hour(s))   BLOOD GAS VENOUS   Result Value Ref Range    POCT pH, Venous 7.32 (L) 7.33 - 7.43 pH    POCT pCO2, Venous 64 (H) 41 - 51 mm Hg    POCT pO2, Venous 23 (L) 35 - 45 mm Hg    POCT SO2, Venous 25 (L) 45 - 75 %    POCT Oxy Hemoglobin, Venous  24.5 (L) 45.0 - 75.0 %    POCT Base Excess, Venous 4.9 (H) -2.0 - 3.0 mmol/L    POCT HCO3 Calculated, Venous 33.0 (H) 22.0 - 26.0 mmol/L    Patient Temperature 37.0 degrees Celsius   CBC and Auto Differential   Result Value Ref Range    WBC 9.1 4.4 - 11.3 x10*3/uL    nRBC 0.0 0.0 - 0.0 /100 WBCs    RBC 5.21 (H) 4.00 - 5.20 x10*6/uL    Hemoglobin 13.9 12.0 - 16.0 g/dL    Hematocrit 45.1 36.0 - 46.0 %    MCV 87 80 - 100 fL    MCH 26.7 26.0 - 34.0 pg    MCHC 30.8 (L) 32.0 - 36.0 g/dL    RDW 18.0 (H) 11.5 - 14.5 %    Platelets 420 150 - 450 x10*3/uL    MPV 8.7 7.5 - 11.5 fL    Neutrophils % 84.1 40.0 - 80.0 %    Immature Granulocytes %, Automated 0.8 0.0 - 0.9 %    Lymphocytes % 4.4 13.0 - 44.0 %    Monocytes % 8.0 2.0 - 10.0 %    Eosinophils % 1.9 0.0 - 6.0 %    Basophils % 0.8 0.0 - 2.0 %    Neutrophils Absolute 7.65 (H) 1.60 - 5.50 x10*3/uL    Immature Granulocytes Absolute, Automated 0.07 0.00 - 0.50 x10*3/uL    Lymphocytes Absolute 0.40 (L) 0.80 - 3.00 x10*3/uL    Monocytes Absolute 0.73 0.05 - 0.80 x10*3/uL    Eosinophils Absolute 0.17 0.00 - 0.40 x10*3/uL    Basophils Absolute 0.07 0.00 - 0.10 x10*3/uL   B-Type Natriuretic Peptide   Result Value Ref Range    BNP 1,279 (H) 0 - 99 pg/mL   Comprehensive metabolic panel   Result Value Ref Range    Glucose 108 (H) 74 - 99 mg/dL    Sodium 135 (L) 136 - 145 mmol/L    Potassium 5.2 3.5 - 5.3 mmol/L    Chloride 99 98 - 107 mmol/L    Bicarbonate 28 21 - 32 mmol/L    Anion Gap 13 10 - 20 mmol/L    Urea Nitrogen 39 (H) 6 - 23 mg/dL    Creatinine 1.62 (H) 0.50 - 1.05 mg/dL    eGFR 31 (L) >60 mL/min/1.73m*2    Calcium 9.1 8.6 - 10.3 mg/dL    Albumin 3.6 3.4 - 5.0 g/dL    Alkaline Phosphatase 121 33 - 136 U/L    Total Protein 6.4 6.4 - 8.2 g/dL    AST 30 9 - 39 U/L    Bilirubin, Total 0.7 0.0 - 1.2 mg/dL    ALT 23 7 - 45 U/L   Magnesium   Result Value Ref Range    Magnesium 2.32 1.60 - 2.40 mg/dL   Troponin I, High Sensitivity   Result Value Ref Range    Troponin I, High  Sensitivity 30 (H) 0 - 13 ng/L   C-Reactive Protein   Result Value Ref Range    C-Reactive Protein 6.22 (H) <1.00 mg/dL   Sedimentation Rate   Result Value Ref Range    Sedimentation Rate 41 (H) 0 - 30 mm/h   SARS-CoV-2 RT PCR   Result Value Ref Range    Coronavirus 2019, PCR Not Detected Not Detected   Troponin I, High Sensitivity   Result Value Ref Range    Troponin I, High Sensitivity 29 (H) 0 - 13 ng/L     Paroxysmal atrial fibrillation (CMS/[facility])  Assessment & Plan  1. History of atrial fibrillation/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 and left appendage ligation  2. Status post dual-chamber PPM  3. History of recurrent diverticular bleeds preventing further anticoagulation given about 4 years ago  4. Continued on home medications   5. Continued outpatient followup with Cardiology & Electrophysiology  6. In the EMR she was noted to be allergic to Amiodarone, but this was also noted as still being taken on her medication list on Cardiology outpatient record on 09/25/23 and patient states she still takes Amiodarone  7. We will hold the amiodarone for now and discuss further with pharmacy to see if she is still filling this or not  8. Telemetry     Hypertension  Assessment & Plan  Continued on home medications    Non-healing wound of right lower extremity  Assessment & Plan  s/p ORIF 07/19/23 by Dr. Brunner with recommendations of continued non-weight bearing per orthopedics/wound care due to  a persistent wound as noted to be by the patient  Wound Care Consult   ESR = 41  CRP = 6.22  Wound pictures in chart and do not appear cellulitic or infected or osteomyelitis  Will monitor closely and will discuss with orthopedics and/or podiatry if warranted     Hypothyroidism  Assessment & Plan  Continued on home medications    Hyperlipidemia  Assessment & Plan  Continued on home medications    * Acute on chronic congestive heart failure, unspecified heart failure type (CMS/[facility])  Assessment &  Plan  Cardiology Consult appreciated   BNP = 1279  Imaging and examination consistent with volume overload  s/p 40mg IV Lasix in ED  Continued on IV Lasix 40mg IV BID  Strict I's & O's/Daily Weights   Echocardiogram pending   Echo 02/16/23 with EF of 50% and severely increased LV septal thickness compared to Echo 09/22/2022 with EF of 60% and mild AV regurgitation.  Monitor renal function closely  Per patient about a week ago she was seen in the CHF clinic and was increased back to Lasix 60mg po daily and given a IV dose as well. She was set to follow up today in the CHF clinic but given her profound symptoms she was prompted to the ED.         Jt House, DO

## 2023-10-03 NOTE — PROGRESS NOTES
"Occupational Therapy    Evaluation    Patient Name: Adwoa Forrest  MRN: [id number]  Today's Date: 10/3/2023  Time Calculation  Start Time: 1150  Stop Time: 1211  Time Calculation (min): 21 min        Assessment:  OT Assessment: at baseline function prior to admit     Plan:  No Skilled OT: At baseline function  OT Discharge Recommendations: No further acute OT       Subjective   Current Problem:  1. Acute on chronic congestive heart failure, unspecified heart failure type (CMS/HCC)  Transthoracic echo (TTE) complete    Transthoracic echo (TTE) complete      2. Hypoxia        3. Dyspnea on exertion        4. Open wound of right heel, initial encounter        5. 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 Chronic atrial fibrillation (CMS/HCC)  Transthoracic echo (TTE) complete    Transthoracic echo (TTE) complete        General:  General  Reason for Referral: ADLs  Referred By: Harsh  Past Medical History Relevant to Rehab: weakness, Hx. of R ankle fx. 7/23, CHF, hypothyroid  Patient Position Received: Bed, 0 rail up  General Comment: Pt. has no c/o any kind, states she is allowed \"toes only\" weight-bearing on l foot, which is ace-wrapped        Pain:  Pain Assessment  Pain Assessment: 0-10  Pain Score: 0 - No pain    Objective pt. pleasant, cooperative, states urinating often 2/2 Lasix  Cognition:  Overall Cognitive Status: Within Functional Limits           Home Living:  Type of Home: House  Lives With: Alone  Home Layout: One level  Home Living Comments: Pt. states home is accessible via wheelchair, door to bathroom removed for entry via wheelchair  Prior Function:  Level of [place]: Independent with ADLs and functional transfers  Receives Help From:  (daughter who works)  ADL Assistance: Independent  Ambulatory Assistance:  (transfers only to/from wheelchair 2/2 decreased weight-bearing on L foot)  IADL " History:  Homemaking Responsibilities: Yes (wheelchair level)  Meal Prep Responsibility: Primary  Cleaning Responsibility: Primary  ADL:  LE Dressing Assistance: Stand by  LE Dressing Deficit:  (to heather socks)  Toileting Deficit: Supervison/safety (to/from [facility] [place], [place])  Activity Tolerance:  Endurance: Endurance does not limit participation in activity  Bed Mobility/Transfers:     and Transfers  Transfer:  (SBA trfrs., stand-pivot)      Sensation:  Light Touch:  (cold fingers and toes 2/2 Raynauds, per pt.)  Strength:  Strength Comments: WNL UEs     Coordination:  Movements are Fluid and Coordinated: Yes        Outcome Measures:[facility] Daily Activity  Putting on and taking off regular lower body clothing: None  Bathing (including washing, rinsing, drying): None  Putting on and taking off regular upper body clothing: None  Toileting, which includes using toilet, bedpan or urinal: None  Taking care of personal grooming such as brushing teeth: None  Eating Meals: None  Daily Activity - Total Score: 24        Education Documentation  No documentation found.  Education Comments  No comments found.

## 2023-10-03 NOTE — ASSESSMENT & PLAN NOTE
Baseline creatinine of around 1.8 prior to 02/2023 and did have brief elevations throughout 2023 for dehydration and recent fractures but now seems to be around 1.6  Continue to monitor very closely in setting of diuresis   Nephrology consultation  Bladder scan for PVR x1

## 2023-10-03 NOTE — PROGRESS NOTES
Adwoa Forrest is a 88 y.o. female on day 1 of admission presenting with Acute on chronic congestive heart failure, unspecified heart failure type (CMS/HCC).      Subjective   10/03/23: No acute events overnight. Increased O2 to 4L to maintain saturation. Labs largely unchanged. Continues on diuresis, feels her breathing is improving. Appreciate podiatry evaluation of foot wound. ID consulted given elevated inflammatory markers.          Review of Systems   Constitutional:  Negative for appetite change, chills, diaphoresis, fatigue and fever.   HENT:  Negative for congestion, ear pain, facial swelling, hearing loss, nosebleeds, sore throat, tinnitus and trouble swallowing.    Eyes:  Negative for pain.   Respiratory:  Positive for cough and shortness of breath. Negative for chest tightness and wheezing.    Cardiovascular:  Positive for leg swelling. Negative for chest pain and palpitations.   Gastrointestinal:  Negative for abdominal pain, blood in stool, constipation, diarrhea, nausea and vomiting.   Genitourinary:  Negative for dysuria, flank pain, frequency, hematuria and urgency.   Musculoskeletal:  Negative for back pain and joint swelling.   Skin:  Positive for wound. Negative for rash.   Neurological:  Negative for dizziness, syncope, weakness, light-headedness, numbness and headaches.   Hematological:  Does not bruise/bleed easily.   Psychiatric/Behavioral:  Negative for behavioral problems, hallucinations and suicidal ideas.           Objective     Last Recorded Vitals  /71 (BP Location: Left arm, Patient Position: Lying)   Pulse 65   Temp 36.3 °C (97.3 °F)   Resp 16   Wt 50.7 kg (111 lb 12.4 oz)   SpO2 98%     Image Results  XR ankle right 3+ views    Result Date: 10/2/2023  Interpreted By:  Jeny Troy, STUDY: XR ANKLE RIGHT 3+ VIEWS; ;  10/2/2023 1:14 pm   INDICATION: Signs/Symptoms:Concern for osteomyelitis.   COMPARISON: 07/17/2023 and 09/12/2023   ACCESSION NUMBER(S): [id number]    ORDERING CLINICIAN: ISAIAH DOLL   FINDINGS: AP, oblique and lateral views were obtained. 2 screws anchor fracture fragment of the calcaneal tuberosity in position. There is also a metallic pin in the calcaneal tubercle. A small amount of lucency persists at the fracture site which does not appear significantly changed. Along the cortical surface of the calcaneal tubercle just slightly above the level of the metallic pin the bone appears questionably less dense. This is a fairly subtle finding and could be projectional. However this is in the vicinity of the overlying skin ulceration. Superficial soft tissue air is noted at the level of the ulceration with no deeply penetrating soft tissue air.       Metallic structures are present from internal fixation related to the avulsion from the calcaneal tubercle   There is a subtle lucency on the lateral view at the cortical surface of the tubercle just above the metallic pin which is questionably an area of interval bone destruction versus a projectional difference.     MACRO: None   Signed by: Jeny Troy 10/2/2023 1:23 PM Dictation workstation:   [id number]    XR chest 1 view    Result Date: 10/2/2023  Interpreted By:  Jeny Troy, STUDY: XR CHEST 1 VIEW; ;  10/2/2023 1:13 pm   INDICATION: . Shortness of breath   COMPARISON: 02/15/2023   ACCESSION NUMBER(S): [id number]   ORDERING CLINICIAN: ISAIAH DOLL   TECHNIQUE: Portable AP upright   FINDINGS: ICD is present in the left upper chest wall with electrode wires in the right atrium and right ventricle. Cardiac silhouette is enlarged but appears similar to the prior study. Aorta is atherosclerotic. Interstitial markings are coarse but appears similar to the previous exam. Left costophrenic angle is indistinct which may indicate minimal left pleural fluid. Bones are osteopenic with no definite acute interval change.       Unchanged cardiomegaly   Small left pleural effusion   Coarse interstitial markings are  unchanged and probably related to chronic lung disease.     Signed by: Jeny Troy 10/2/2023 1:18 PM Dictation workstation:   [id number]    XR foot    Result Date: 9/13/2023  Interpreted By:  GLADYS SHELDON MD MRN: [id number] Patient Name: CHERRY LEE  STUDY: FOOT; COMPLETE, MIN 3 VIEWS;  9/12/2023 1:03 pm  INDICATION: right foot pain  S92.031D: Closed displaced avulsion fracture of tuberosity of right calcaneus with routine healing, subsequent.  COMPARISON: 08/22/2023  ACCESSION NUMBER(S): [id number]  ORDERING CLINICIAN: JOANNE LANDA  FINDINGS: Right foot, three views  Postsurgical changes in the calcaneus status post fixation of an avulsion fracture with screws. The hardware is intact. Soft tissue edema posteriorly. There is no acute fracture. There is no dislocation      ORIF of calcaneal fracture with unchanged alignment and intact hardware. Associated soft tissue edema posteriorly       Lab Results  Results for orders placed or performed during the hospital encounter of 10/02/23 (from the past 24 hour(s))   Magnesium   Result Value Ref Range    Magnesium 2.07 1.60 - 2.40 mg/dL   B-Type Natriuretic Peptide   Result Value Ref Range    BNP 1,843 (H) 0 - 99 pg/mL   CBC   Result Value Ref Range    WBC 8.0 4.4 - 11.3 x10*3/uL    nRBC 0.0 0.0 - 0.0 /100 WBCs    RBC 4.25 4.00 - 5.20 x10*6/uL    Hemoglobin 11.5 (L) 12.0 - 16.0 g/dL    Hematocrit 36.9 36.0 - 46.0 %    MCV 87 80 - 100 fL    MCH 27.1 26.0 - 34.0 pg    MCHC 31.2 (L) 32.0 - 36.0 g/dL    RDW 17.6 (H) 11.5 - 14.5 %    Platelets 357 150 - 450 x10*3/uL    MPV 8.6 7.5 - 11.5 fL   Basic Metabolic Panel   Result Value Ref Range    Glucose 84 74 - 99 mg/dL    Sodium 137 136 - 145 mmol/L    Potassium 4.0 3.5 - 5.3 mmol/L    Chloride 101 98 - 107 mmol/L    Bicarbonate 32 21 - 32 mmol/L    Anion Gap 8 (L) 10 - 20 mmol/L    Urea Nitrogen 39 (H) 6 - 23 mg/dL    Creatinine 1.64 (H) 0.50 - 1.05 mg/dL    eGFR 30 (L) >60 mL/min/1.73m*2    Calcium 8.7 8.6 - 10.3  mg/dL   Urinalysis with Reflex Microscopic   Result Value Ref Range    Color, Urine Straw Straw, Yellow    Appearance, Urine Clear Clear    Specific Gravity, Urine 1.010 1.005 - 1.035    pH, Urine 6.0 5.0, 5.5, 6.0, 6.5, 7.0, 7.5, 8.0    Protein, Urine NEGATIVE NEGATIVE mg/dL    Glucose, Urine 150 (2+) (A) NEGATIVE mg/dL    Blood, Urine NEGATIVE NEGATIVE    Ketones, Urine NEGATIVE NEGATIVE mg/dL    Bilirubin, Urine NEGATIVE NEGATIVE    Urobilinogen, Urine <2.0 <2.0 mg/dL    Nitrite, Urine NEGATIVE NEGATIVE    Leukocyte Esterase, Urine LARGE (3+) (A) NEGATIVE        Medications  Scheduled medications:  amiodarone, 200 mg, oral, Daily  aspirin, 81 mg, oral, Daily  atorvastatin, 80 mg, oral, Nightly  cyanocobalamin, 2,000 mcg, oral, Daily  furosemide, 40 mg, intravenous, BID  heparin (porcine), 5,000 Units, subcutaneous, q8h  levothyroxine, 25 mcg, oral, Daily  [Held by provider] lisinopril, 5 mg, oral, Daily  metoprolol succinate XL, 25 mg, oral, Daily  oxygen, , inhalation, Continuous - 02/gases  perflutren protein A microsphere, 0.5 mL, intravenous, Once  polyethylene glycol, 17 g, oral, Daily  sodium chloride 0.9%, , ,   spironolactone, 12.5 mg, oral, q24h MACRINA      Continuous medications:     PRN medications:  PRN medications: acetaminophen, ALPRAZolam, bisacodyl, melatonin, sodium chloride 0.9%     Physical Exam  Constitutional:       General: She is not in acute distress.     Appearance: Normal appearance.   HENT:      Head: Normocephalic and atraumatic.      Right Ear: External ear normal.      Left Ear: External ear normal.      Nose: Nose normal.      Mouth/Throat:      Mouth: Mucous membranes are moist.      Pharynx: Oropharynx is clear.   Eyes:      Extraocular Movements: Extraocular movements intact.      Conjunctiva/sclera: Conjunctivae normal.      Pupils: Pupils are equal, round, and reactive to light.   Cardiovascular:      Rate and Rhythm: Normal rate and regular rhythm.      Pulses: Normal pulses.       Heart sounds: Normal heart sounds.   Pulmonary:      Effort: Pulmonary effort is normal. No respiratory distress.      Breath sounds: Rales present. No wheezing or rhonchi.      Comments: NC in place  Abdominal:      General: Abdomen is flat. Bowel sounds are normal.      Palpations: Abdomen is soft.      Tenderness: There is no abdominal tenderness. There is no right CVA tenderness, left CVA tenderness, guarding or rebound.   Musculoskeletal:         General: Normal range of motion.      Cervical back: Normal range of motion and neck supple.      Right lower leg: Edema present.      Left lower leg: Edema present.   Skin:     General: Skin is warm and dry.      Capillary Refill: Capillary refill takes less than 2 seconds.      Findings: Lesion (Heel) present. No rash.   Neurological:      General: No focal deficit present.      Mental Status: She is alert and oriented to person, place, and time. Mental status is at baseline.   Psychiatric:         Mood and Affect: Mood normal.         Behavior: Behavior normal.                  Code Status  Full Code     Assessment/Plan   This patient currently has cardiac telemetry ordered; if you would like to modify or discontinue the telemetry order, click here to go to the orders activity to modify/discontinue the order.      Acute on chronic congestive heart failure, unspecified heart failure type (CMS/HCC)  Cardiology Consult appreciated   BNP = 1279  Imaging and examination consistent with volume overload  Continued on IV Lasix 40mg IV BID  Strict I's & O's/Daily Weights   Echocardiogram pending   Echo 02/16/23 with EF of 50% and severely increased LV septal thickness compared to Echo 09/22/2022 with EF of 60% and mild AV regurgitation.  Monitor renal function closely    Non-healing wound of right lower extremity  s/p ORIF 07/19/23 by Dr. Brunner with recommendations of continued non-weight bearing per orthopedics/wound care due to  a persistent wound as noted to be by the  patient  Wound Care Consult   ESR = 41,CRP = 6.22  Consult to podiatry for assistance  Due o have graft in 1 week per patient  ID consult given elevated inflammatory markers  Hold off on antibiotics for time being as patient hemodynamically stable     Hypertension  Continued on home medications    Hyperlipidemia  Continued on home medications    Hypothyroidism  Continued on home medications    Paroxysmal atrial fibrillation (CMS/[facility])  History of atrial fibrillation/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 and left appendage ligation  Status post dual-chamber PPM  History of recurrent diverticular bleeds preventing further anticoagulation given about 4 years ago  Continued on home medications   ontinued outpatient followup with Cardiology & Electrophysiology  In the EMR she was noted to be allergic to Amiodarone, but this was also noted as still being taken on her medication list on Cardiology outpatient record on 09/25/23 and patient states she still takes Amiodarone- We will hold the amiodarone for now and discuss further with pharmacy to see if she is still filling this or not  Telemetry     CKD (chronic kidney disease) stage 3, GFR 30-59 ml/min (CMS/[facility])  Baseline creatinine of around 1.8 prior to 02/2023 and did have brief elevations throughout 2023 for dehydration and recent fractures but now seems to be around 1.6  Continue to monitor very closely in setting of diuresis and if progressively worsening then will need to discuss further with nephrology     Elevated troponin I level  30 --> 29   Likely in setting of volume overload and CKD   Nonischemic EKG noted on presentation  Telemetry Monitoring      DVT ppx: subcutaneous Heparin         Arpita Frazier PA-C

## 2023-10-03 NOTE — PROGRESS NOTES
Pt from home where she uses WC where as been navigating the home well with it.  Pt states she anticipates DC to home once medically cleared.  TCC to continue to follow to manage discharge planning and monitor for discharge needs.

## 2023-10-03 NOTE — ASSESSMENT & PLAN NOTE
30 --> 29   Likely in setting of volume overload and CKD   Nonischemic EKG noted on presentation  Telemetry Monitoring

## 2023-10-03 NOTE — CONSULTS
Inpatient consult to Cardiology  Consult performed by: Michael Latham MD  Consult ordered by: Bala Junior MD        History Of Present Illness:    Adwoa Forrest is a 88 y.o. female presenting with shortness of breath and ankle swelling.  She has history of persistent atrial fibrillation status post AV derek ablation and permanent pacemaker placement, failed Watchman device placement in 2018 requiring urgent surgery and left atrial appendage ligation, hypertension recurrent GI bleed not on oral anticoagulants, on amiodarone for nonsustained VT and symptomatic PVCs.  She also has chronic diastolic heart failure and follows regularly in the CHF clinic.  She used to be on 80 mg Lasix at 1 time which was reduced by the heart failure nurse practitioner to 60 mg due to worsening renal function.  Recently in July 2023 she presented with a ankle injury and at that time this was further reduced to 40 mg a day.  She states this ankle injury has been bothering her quite a bit and she has been having difficulty getting around.  This wound recently got infected and she required to take Augmentin follow almost 30 days that she just completed a week ago.  For last 1 week or so she is noticing worsening shortness of breath, legs are extremely swollen and heavy and she is unable to move as a result as it feels like lead.  She was given IV Lasix in the CHF clinic and was given 60 of Lasix to take at home but did not improve symptoms and therefore she came into the ER for further evaluation and treatment.  Denies any use of nonsteroidal inflammatories or other symptoms otherwise.  Interestingly, since starting on amiodarone she has converted to sinus rhythm.    At one time she was having deranged LFTs that improved after discontinuation of the fish oil she was taking.  Her EKG shows atrial paced ventricular paced rhythm.    Review of systems all other system reviewed and is negative.  Echocardiogram pending.  Chest x-ray  personally reviewed.  She stated ankle swelling has improved since she had IV Lasix but they are having trouble titrating off her oxygen.    Last Recorded Vitals:  Vitals:    10/03/23 0123 10/03/23 0525 10/03/23 0608 10/03/23 0938   BP: 100/63 95/54  107/62   BP Location: Left arm Right arm  Left arm   Patient Position: Lying Lying  Lying   Pulse: 65 65  65   Resp: 18 18  16   Temp: 36.2 °C (97.1 °F) 36.9 °C (98.5 °F)  36.3 °C (97.4 °F)   TempSrc:  Temporal     SpO2: 92% 92% 92% 92%   Weight:   50.7 kg (111 lb 12.4 oz)    Height:           Last Labs:  CBC - 10/3/2023:  6:06 AM  8.0 11.5 357    36.9      CMP - 10/3/2023:  6:06 AM  8.7 6.4 30 --- 0.7   4.1 3.6 23 121      PTT - 7/18/2023:  3:17 AM  1.0   11.4 32     Troponin I, High Sensitivity   Date/Time Value Ref Range Status   10/02/2023 02:05 PM 29 (H) 0 - 13 ng/L Final   10/02/2023 11:32 AM 30 (H) 0 - 13 ng/L Final     Troponin I   Date/Time Value Ref Range Status   02/15/2023 11:55 PM 48 (H) 0 - 13 ng/L Final     Comment:     .  Less than 99th percentile of normal range cutoff-  Female and children under 18 years old <14 ng/L; Male <21 ng/L: Negative  Repeat testing should be performed if clinically indicated.   .  Female and children under 18 years old 14-50 ng/L; Male 21-50 ng/L:  Consistent with possible cardiac damage and possible increased clinical   risk. Serial measurements may help to assess extent of myocardial damage.   .  >50 ng/L: Consistent with cardiac damage, increased clinical risk and  myocardial infarction. Serial measurements may help assess extent of   myocardial damage.   .   NOTE: Children less than 1 year old may have higher baseline troponin   levels and results should be interpreted in conjunction with the overall   clinical context.   .  NOTE: Troponin I testing is performed using a different   testing methodology at [facility] than at other   [facility] hospitals. Direct result comparisons should only   be made within the  same method.     02/15/2023 10:49 PM 49 (H) 0 - 13 ng/L Final     Comment:     .  Less than 99th percentile of normal range cutoff-  Female and children under 18 years old <14 ng/L; Male <21 ng/L: Negative  Repeat testing should be performed if clinically indicated.   .  Female and children under 18 years old 14-50 ng/L; Male 21-50 ng/L:  Consistent with possible cardiac damage and possible increased clinical   risk. Serial measurements may help to assess extent of myocardial damage.   .  >50 ng/L: Consistent with cardiac damage, increased clinical risk and  myocardial infarction. Serial measurements may help assess extent of   myocardial damage.   .   NOTE: Children less than 1 year old may have higher baseline troponin   levels and results should be interpreted in conjunction with the overall   clinical context.   .  NOTE: Troponin I testing is performed using a different   testing methodology at [facility] than at other   [facility]. Direct result comparisons should only   be made within the same method.     02/15/2023 09:41 PM 49 (H) 0 - 13 ng/L Final     Comment:     .  Less than 99th percentile of normal range cutoff-  Female and children under 18 years old <14 ng/L; Male <21 ng/L: Negative  Repeat testing should be performed if clinically indicated.   .  Female and children under 18 years old 14-50 ng/L; Male 21-50 ng/L:  Consistent with possible cardiac damage and possible increased clinical   risk. Serial measurements may help to assess extent of myocardial damage.   .  >50 ng/L: Consistent with cardiac damage, increased clinical risk and  myocardial infarction. Serial measurements may help assess extent of   myocardial damage.   .   NOTE: Children less than 1 year old may have higher baseline troponin   levels and results should be interpreted in conjunction with the overall   clinical context.   .  NOTE: Troponin I testing is performed using a different   testing methodology at  "[facility] than at other   [facility]. Direct result comparisons should only   be made within the same method.       BNP   Date/Time Value Ref Range Status   10/03/2023 06:06 AM 1,843 (H) 0 - 99 pg/mL Final   10/02/2023 11:32 AM 1,279 (H) 0 - 99 pg/mL Final     VLDL   Date/Time Value Ref Range Status   02/23/2023 10:08 AM 15 0 - 40 mg/dL Final   01/12/2022 09:53 AM 16 0 - 40 mg/dL Final   08/03/2021 09:45 AM 28 0 - 40 mg/dL Final      Last I/O:  I/O last 3 completed shifts:  In: 827 (16.3 mL/kg) [P.O.:827]  Out: 550 (10.8 mL/kg) [Urine:550 (0.3 mL/kg/hr)]  Weight: 50.7 kg     Past Cardiology Tests (Last 3 Years):  EKG:  No results found for this or any previous visit from the past 1095 days.    Echo:  No results found for this or any previous visit from the past 1095 days.    Ejection Fractions:  No results found for: \"EF\"  Cath:  No results found for this or any previous visit from the past 1095 days.    Stress Test:  Nuclear Stress Test 2/12/2023    Cardiac Imaging:  No results found for this or any previous visit from the past 1095 days.    CTSCAN  XR ankle right 3+ views    Result Date: 10/2/2023  Interpreted By:  Jeny Troy, STUDY: XR ANKLE RIGHT 3+ VIEWS; ;  10/2/2023 1:14 pm   INDICATION: Signs/Symptoms:Concern for osteomyelitis.   COMPARISON: 07/17/2023 and 09/12/2023   ACCESSION NUMBER(S): [id number]   ORDERING CLINICIAN: ISAIAH DOLL   FINDINGS: AP, oblique and lateral views were obtained. 2 screws anchor fracture fragment of the calcaneal tuberosity in position. There is also a metallic pin in the calcaneal tubercle. A small amount of lucency persists at the fracture site which does not appear significantly changed. Along the cortical surface of the calcaneal tubercle just slightly above the level of the metallic pin the bone appears questionably less dense. This is a fairly subtle finding and could be projectional. However this is in the vicinity of the overlying skin " ulceration. Superficial soft tissue air is noted at the level of the ulceration with no deeply penetrating soft tissue air.       Metallic structures are present from internal fixation related to the avulsion from the calcaneal tubercle   There is a subtle lucency on the lateral view at the cortical surface of the tubercle just above the metallic pin which is questionably an area of interval bone destruction versus a projectional difference.     MACRO: None   Signed by: Jeny Troy 10/2/2023 1:23 PM Dictation workstation:   [id number]    XR chest 1 view    Result Date: 10/2/2023  Interpreted By:  Jeny Troy, STUDY: XR CHEST 1 VIEW; ;  10/2/2023 1:13 pm   INDICATION: . Shortness of breath   COMPARISON: 02/15/2023   ACCESSION NUMBER(S): [id number]   ORDERING CLINICIAN: ISAIAH DOLL   TECHNIQUE: Portable AP upright   FINDINGS: ICD is present in the left upper chest wall with electrode wires in the right atrium and right ventricle. Cardiac silhouette is enlarged but appears similar to the prior study. Aorta is atherosclerotic. Interstitial markings are coarse but appears similar to the previous exam. Left costophrenic angle is indistinct which may indicate minimal left pleural fluid. Bones are osteopenic with no definite acute interval change.       Unchanged cardiomegaly   Small left pleural effusion   Coarse interstitial markings are unchanged and probably related to chronic lung disease.     Signed by: Jeny Troy 10/2/2023 1:18 PM Dictation workstation:   [id number]    XR foot    Result Date: 9/13/2023  Interpreted By:  GLADYS SHELDON MD MRN: [id number] Patient Name: CHERRY LEE  STUDY: FOOT; COMPLETE, MIN 3 VIEWS;  9/12/2023 1:03 pm  INDICATION: right foot pain  S92.031D: Closed displaced avulsion fracture of tuberosity of right calcaneus with routine healing, subsequent.  COMPARISON: 08/22/2023  ACCESSION NUMBER(S): [id number]  ORDERING CLINICIAN: JOANNE LANDA  FINDINGS: Right foot,  three views  Postsurgical changes in the calcaneus status post fixation of an avulsion fracture with screws. The hardware is intact. Soft tissue edema posteriorly. There is no acute fracture. There is no dislocation      ORIF of calcaneal fracture with unchanged alignment and intact hardware. Associated soft tissue edema posteriorly     Past Medical History:  She has a past medical history of Abnormal findings on diagnostic imaging of heart and coronary circulation (06/13/2017), Acute on chronic combined systolic (congestive) and diastolic (congestive) heart failure (CMS/[facility]) (09/28/2017), Acute on chronic combined systolic (congestive) and diastolic (congestive) heart failure (CMS/HCC) (02/05/2020), Anemia, unspecified (11/18/2020), Chronic diastolic (congestive) heart failure (CMS/[facility]) (09/15/2020), Disorder of the skin and subcutaneous tissue, unspecified (02/16/2020), Diverticulitis of large intestine with perforation and abscess without bleeding, Diverticulosis of intestine, part unspecified, without perforation or abscess without bleeding, Dorsalgia, unspecified (08/16/2019), Encounter for immunization (11/13/2019), Encounter for surgical aftercare following surgery on the circulatory system (02/18/2019), Essential (primary) hypertension (11/02/2022), Gastro-esophageal reflux disease without esophagitis (02/05/2020), 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), Long term (current) use of anticoagulants (10/22/2018), Melena (04/10/2020), Old myocardial infarction (10/19/2022), Other forms of dyspnea (01/24/2020), Other long term (current) drug therapy (05/10/2018), Other specified cardiac arrhythmias (02/26/2020), Paroxysmal atrial fibrillation (CMS/[facility]) (02/01/2018), Permanent atrial fibrillation (CMS/[facility]) (01/24/2020), Personal history of diseases of the blood and blood-forming organs and certain disorders involving the immune mechanism (04/10/2020), Personal history of other  diseases of the circulatory system (11/04/2018), Personal history of other diseases of the digestive system (10/22/2018), Personal history of other diseases of the musculoskeletal system and connective tissue, Personal history of other diseases of the musculoskeletal system and connective tissue (08/16/2019), Personal history of other endocrine, nutritional and metabolic disease (02/18/2019), Personal history of other specified conditions (08/13/2020), Personal history of other specified conditions, Personal history of transient ischemic attack (TIA), and cerebral infarction without residual deficits (02/01/2018), Psychophysiologic insomnia (11/18/2020), Segmental and somatic dysfunction of rib cage (08/16/2019), Segmental and somatic dysfunction of thoracic region (08/16/2019), and Segmental and somatic dysfunction of upper extremity (08/16/2019).    Past Surgical History:  She has a past surgical history that includes Cataract extraction (05/04/2016); Appendectomy (05/04/2016); Hysterectomy (05/04/2016); Tonsillectomy (05/04/2016); Cholecystectomy (05/04/2016); Other surgical history (02/18/2019); Other surgical history (04/26/2018); Cardiac pacemaker placement (04/26/2018); Coronary angioplasty (02/01/2018); and Colectomy partial / total (08/17/2018).      Social History:  She reports that she has never smoked. She has never been exposed to tobacco smoke. She has never used smokeless tobacco. No history on file for alcohol use and drug use.    Family History:  No family history on file.     Allergies:  Codeine, Hydrocodone, Sotalol, and Tetracyclines    Inpatient Medications:  Scheduled medications   Medication Dose Route Frequency    amiodarone  200 mg oral Daily    aspirin  81 mg oral Daily    atorvastatin  80 mg oral Nightly    cyanocobalamin  2,000 mcg oral Daily    furosemide  40 mg intravenous BID    heparin (porcine)  5,000 Units subcutaneous q8h    levothyroxine  25 mcg oral Daily    [Held by provider]  lisinopril  5 mg oral Daily    metoprolol succinate XL  25 mg oral Daily    oxygen   inhalation Continuous - 02/gases    polyethylene glycol  17 g oral Daily    sodium chloride 0.9%         PRN medications   Medication    acetaminophen    ALPRAZolam    bisacodyl    melatonin    sodium chloride 0.9%     Continuous Medications   Medication Dose Last Rate     Outpatient Medications:  Current Outpatient Medications   Medication Instructions    ALPRAZolam (XANAX) 0.5 mg, oral, Nightly PRN    amiodarone (Pacerone) 200 mg tablet 1 tablet, oral, Daily    aspirin 81 mg EC tablet 1 tablet, oral, Daily    atorvastatin (Lipitor) 80 mg tablet 1 tablet, oral, Nightly    calcium carbonate-vitamin D3 500 mg-10 mcg (400 unit) chewable tablet 1 tablet, oral, Daily    cyanocobalamin (Vitamin B-12) 2,000 mcg tablet 1 tablet, oral, Daily    Farxiga 10 mg 1 tablet, oral, Daily    furosemide (Lasix) 40 mg tablet 1.5 tablets, oral    glucosamine HCl 500 mg tablet 1 tablet, oral, Daily    levothyroxine (Synthroid, Levoxyl) 25 mcg tablet TAKE 1 TABLET BY MOUTH EVERY DAY    lisinopril 5 mg, oral, Daily    metoprolol succinate XL (Toprol-XL) 50 mg 24 hr tablet 1 tablet, oral, Daily       Physical Exam  Vitals reviewed.   Constitutional:       Appearance: Normal appearance.   Neck:      Vascular: JVD present. No carotid bruit.   Cardiovascular:      Rate and Rhythm: Normal rate and regular rhythm.      Pulses: Normal pulses.      Heart sounds: Normal heart sounds, S1 normal and S2 normal.   Pulmonary:      Effort: Pulmonary effort is normal. No respiratory distress.      Breath sounds: No wheezing or rales.   Abdominal:      General: Abdomen is flat.      Palpations: Abdomen is soft.   Musculoskeletal:      Right lower leg: No edema.      Left lower leg: No edema.   Skin:     General: Skin is warm.   Neurological:      Mental Status: She is alert and oriented to person, place, and time. Mental status is at baseline.   Psychiatric:         Mood  and Affect: Mood normal.         Behavior: Behavior normal.           Assessment/Plan   1-acute on chronic diastolic heart failure-continue IV Lasix for another 24 hours.  After that switch to 60 mg p.o. Lasix with close follow-up in CHF clinic.  Monitor electrolytes and strict I's and O's.  Add Aldactone continue SGLT2 inhibitors.  Possibly exacerbated by recent events including infection.  Will need close monitoring of electrolytes and potassium specifically.  Discussed with patient.    Agree with checking echo follow-up on results.    2-persistent atrial fibrillation-she seems to be maintaining sinus rhythm since being on amiodarone.  She is not anticoagulated but had a left atrial appendage ligation.  She has history of GI bleed.    3-nonsustained VT and PVCs highly symptomatic and remains on amiodarone with resolution of symptoms.  Outpatient monitoring.    Discussed with Arpita Turner.  Patient wants to go home instead of rehab.  I am requesting physical therapy inpatient.  Peripheral IV 10/02/23 20 G Distal;Right;Posterior Forearm (Active)   Site Assessment Clean;Dry;Intact 10/03/23 0602   Dressing Status Clean;Dry;Occlusive 10/03/23 0602   Number of days: 1       Code Status:  Full Code        Michael Latham MD  10/3/2023  11:52 AM

## 2023-10-03 NOTE — PROGRESS NOTES
Physical Therapy    Physical Therapy Evaluation    Patient Name: dAwoa Forrest  MRN: [id number]  Today's Date: 10/3/2023   Time Calculation  Start Time: 1152  Stop Time: 1211  Time Calculation (min): 19 min    Assessment/Plan   PT Assessment  PT Assessment Results: Decreased strength, Decreased mobility  Rehab Prognosis: Good  Evaluation/Treatment Tolerance: Patient tolerated treatment well  Medical Staff Made Aware: Yes  End of Session Communication: Bedside nurse  Assessment Comment:  (DECREASED STRENGTH ADN ENDURANCE, WNATS HEP FOR LEG STRENGTHENING, DECRESED ENDURANCE, WOULD BENEFIT FROM LOW REHAB ON DISCH)  End of Session Patient Position: Bed, 3 rail up, Alarm off, not on at start of session  IP OR SWING BED PT PLAN  Inpatient or Swing Bed: Inpatient  PT Plan  Treatment/Interventions: Transfer training, Strengthening  PT Plan: Skilled PT  PT Frequency: 3 times per week  Equipment Recommended upon Discharge:  (THERABAND FOR HEP)  PT Recommended Transfer Status: Stand by assist      Subjective   General Visit Information:  General  Reason for Referral: IMAPIRED MOBILITY, I MPAIRED COGNITION/SAFETY AWARENESS, GAIT TRAIING  Referred By: NANY  Past Medical History Relevant to Rehab: WEAKNESS, SOB; DX: ACUTE ON CHRONIC HF; HX: CKD, AF, R CALCANEAL FX/SURG, CHF, HYPOTHYROID, HTN, CAD, PACER, ANXIETY DEPRESSION PARTIAL COLECTOMY, TIA, CATARACT SURG  Family/Caregiver Present: No  Co-Treatment: OT  Prior to Session Communication: Bedside nurse  Patient Position Received: Bed, 3 rail up, Alarm off, not on at start of session  General Comment:  (PT STATES [facility] WAS RECENTLY TOLD THAT SE IS PERMITTED TO PLACE TOES ON FLOOR FOR BALANCE)  Home Living:  Home Living  Type of Home:  (LIVES IN 2 LEVEL HOME NORMALLY ALONE BUT DAUGHTER IS STAYING WITH HER, 5 STEPS WITH RAIL TO ENTER, DAUGHTER WORKS DAYS, DOES WC TRANSFERS INDEP, DOES MEALS ADN CHORES,HAS LIFE ALERT, DAUGHTER HAS BEEN TAKING HER TO APPTS)  Prior Level of Function:      Precautions:  Precautions  Precautions Comment: FALLS         Objective   Pain:  Pain Assessment  Pain Assessment: 0-10  Pain Score: 0 - No pain  Cognition:  Cognition  Overall Cognitive Status: Within Functional Limits  Attention: Within Functional Limits  Memory: Within Funtional Limits  Problem Solving: Within Functional Limits  Safety/Judgement: Within Functional Limits                Activity Tolerance  Endurance: Tolerates 10 - 20 min exercise with multiple rests  Activity Tolerance Comments: WFL    Sensation  Sensation Comment: STATES SHE HAS DECRESED SENSTATIN IN HANDS AND FEET DUE TO RAYNAUDS    Strength  Strength Comments: ROM IN HIPS ANDKNEES, R ANKLE ARE WFL, L ANKEL ROM IS TO NEUTRAL, TIGHTNESS IN HEEL CORDS DUE TO WOUND OVER ACHILLES;  STRENGTH IN  HIPS =3/5, KNEES 3+/5 AND NKLES WFL           Coordination  Movements are Fluid and Coordinated: Yes    Postural Control  Postural Control: Within Functional Limits    Dynamic Sitting Balance  Dynamic Sitting-Comments: BALANCE WFL       Functional Assessments:  Bed Mobility  Bed Mobility: Yes  Bed Mobility 1  Bed Mobility 1:  (SUPINE<.SIT SBA, ROLLING INDEP)    Transfers  Transfer: Yes  Transfer 1  Transfer From 1:  (SIT<>STAND AND PIVOT  BSC <> BED, SBA)                      Outcome Measures:  [facility] Basic Mobility  Turning from your back to your side while in a flat bed without using bedrails: None  Moving from lying on your back to sitting on the side of a flat bed without using bedrails: A little  Moving to and from bed to chair (including a wheelchair): A little  Standing up from a chair using your arms (e.g. wheelchair or bedside chair): A little  To walk in hospital room: Total  Climbing 3-5 steps with railing: Total  Basic Mobility - Total Score: 15    Encounter Problems       Encounter Problems (Active)       Mobility       STRENGTHENING (Progressing)       Start:  10/03/23    Expected End:  10/24/23       20+ RROM EX TO HIPS AND KNEES  STRENGTHENING LEGS FOR STABLE TRANSFERS         WC MOBILITY (Progressing)       Start:  10/03/23    Expected End:  10/06/23       INDEP AND SAFE WC MOBILITY                Education Documentation  No documentation found.  Education Comments  No comments found.

## 2023-10-03 NOTE — ASSESSMENT & PLAN NOTE
History of atrial fibrillation/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 and left appendage ligation  Status post dual-chamber PPM  History of recurrent diverticular bleeds preventing further anticoagulation given about 4 years ago  Continued on home medications   ontinued outpatient followup with Cardiology & Electrophysiology  In the EMR she was noted to be allergic to Amiodarone, but this was also noted as still being taken on her medication list on Cardiology outpatient record on 09/25/23 and patient states she still takes Amiodarone- We will hold the amiodarone for now and discuss further with pharmacy to see if she is still filling this or not  Telemetry

## 2023-10-03 NOTE — CONSULTS
Consults  Referred by  john Rivers MD: Minh Farmer DO    Reason For Consult      History Of Present Illness  Adwoa Forrest is a 88 y.o. female presenting with a past medical history of ORIF 7/19/2023 hypertension hyperlipidemia CAD status post PCI A-fib status post dual-chamber PPM complicated by failed Watchman device status post removal and left atrial hypertension ligation 2018 anxiety depression diverticulosis perforated bowel status post colectomy recurrent GI bleeding presented to the ED with worsening generalized weakness shortness of breath cough wheezing congestion and difficulty in ambulation patient noted increasing bilateral lower extremity edema denied any fevers chills nausea vomiting chest pain or diarrhea    In the ED patient was vitally stable and was placed on 2 L of oxygen C-reactive protein of 6.22 ESR of 41 BNP of 1279 tissue and wound cultures were obtained chest x-ray did not show any changes only coarse interstitial markings x-ray of the right ankle with interventional bone destruction versus projectional difference?    Pertinent labs with a C-reactive protein of 6.22 and ESR of 41 WBC 9.1 with predominance of neutrophils  Wound pictures in chart do not appear to be cellulitic or infected or deeper bone involvement  Podiatry also consulted      Past Medical History  She has a past medical history of Abnormal findings on diagnostic imaging of heart and coronary circulation (06/13/2017), Acute on chronic combined systolic (congestive) and diastolic (congestive) heart failure (CMS/HCC) (09/28/2017), Acute on chronic combined systolic (congestive) and diastolic (congestive) heart failure (CMS/HCC) (02/05/2020), Anemia, unspecified (11/18/2020), Chronic diastolic (congestive) heart failure (CMS/HCC) (09/15/2020), Disorder of the skin and subcutaneous tissue, unspecified (02/16/2020), Diverticulitis of large intestine with perforation and abscess without bleeding, Diverticulosis  of intestine, part unspecified, without perforation or abscess without bleeding, Dorsalgia, unspecified (08/16/2019), Encounter for immunization (11/13/2019), Encounter for surgical aftercare following surgery on the circulatory system (02/18/2019), Essential (primary) hypertension (11/02/2022), Gastro-esophageal reflux disease without esophagitis (02/05/2020), 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), Long term (current) use of anticoagulants (10/22/2018), Melena (04/10/2020), Old myocardial infarction (10/19/2022), Other forms of dyspnea (01/24/2020), Other long term (current) drug therapy (05/10/2018), Other specified cardiac arrhythmias (02/26/2020), Paroxysmal atrial fibrillation (CMS/HCC) (02/01/2018), Permanent atrial fibrillation (CMS/HCC) (01/24/2020), Personal history of diseases of the blood and blood-forming organs and certain disorders involving the immune mechanism (04/10/2020), Personal history of other diseases of the circulatory system (11/04/2018), Personal history of other diseases of the digestive system (10/22/2018), Personal history of other diseases of the musculoskeletal system and connective tissue, Personal history of other diseases of the musculoskeletal system and connective tissue (08/16/2019), Personal history of other endocrine, nutritional and metabolic disease (02/18/2019), Personal history of other specified conditions (08/13/2020), Personal history of other specified conditions, Personal history of transient ischemic attack (TIA), and cerebral infarction without residual deficits (02/01/2018), Psychophysiologic insomnia (11/18/2020), Segmental and somatic dysfunction of rib cage (08/16/2019), Segmental and somatic dysfunction of thoracic region (08/16/2019), and Segmental and somatic dysfunction of upper extremity (08/16/2019).    Surgical History  She has a past surgical history that includes Cataract extraction (05/04/2016); Appendectomy  (05/04/2016); Hysterectomy (05/04/2016); Tonsillectomy (05/04/2016); Cholecystectomy (05/04/2016); Other surgical history (02/18/2019); Other surgical history (04/26/2018); Cardiac pacemaker placement (04/26/2018); Coronary angioplasty (02/01/2018); and Colectomy partial / total (08/17/2018).     Social History     Occupational History    Not on file   Tobacco Use    Smoking status: Never     Passive exposure: Never    Smokeless tobacco: Never   Substance and Sexual Activity    Alcohol use: Not on file    Drug use: Not on file    Sexual activity: Not on file     Travel History   Travel since 09/03/23    No documented travel since 09/03/23              Family History  No family history on file.  Allergies  Codeine, Hydrocodone, Sotalol, and Tetracyclines     Immunization History   Administered Date(s) Administered    Pfizer Purple Cap SARS-CoV-2 01/28/2021, 02/25/2021, 11/02/2021     Medications  Home medications:  Medications Prior to Admission   Medication Sig Dispense Refill Last Dose    ALPRAZolam (Xanax) 0.5 mg tablet Take 1 tablet (0.5 mg) by mouth as needed at bedtime for sleep or anxiety. 90 tablet 0 10/1/2023    amiodarone (Pacerone) 200 mg tablet Take 1 tablet (200 mg) by mouth once daily.   10/2/2023    aspirin 81 mg EC tablet Take 1 tablet (81 mg) by mouth once daily.   10/2/2023    atorvastatin (Lipitor) 80 mg tablet Take 1 tablet (80 mg) by mouth once daily at bedtime.   10/1/2023    calcium carbonate-vitamin D3 500 mg-10 mcg (400 unit) chewable tablet Chew 1 tablet once daily.   10/1/2023    cyanocobalamin (Vitamin B-12) 2,000 mcg tablet Take 1 tablet (2,000 mcg) by mouth once daily.   10/2/2023    Farxiga 10 mg Take 1 tablet (10 mg) by mouth once daily.   10/2/2023    furosemide (Lasix) 40 mg tablet Take 1.5 tablets (60 mg) by mouth.   10/2/2023    glucosamine HCl 500 mg tablet Take 1 tablet by mouth once daily.   10/2/2023    levothyroxine (Synthroid, Levoxyl) 25 mcg tablet TAKE 1 TABLET BY MOUTH  "EVERY DAY 90 tablet 3 10/2/2023    lisinopril 2.5 mg tablet Take 2 tablets (5 mg) by mouth once daily.   10/2/2023    metoprolol succinate XL (Toprol-XL) 50 mg 24 hr tablet Take 1 tablet (50 mg) by mouth once daily.   10/2/2023     Current medications:  Scheduled medications  amiodarone, 200 mg, oral, Daily  aspirin, 81 mg, oral, Daily  atorvastatin, 80 mg, oral, Nightly  cyanocobalamin, 2,000 mcg, oral, Daily  furosemide, 40 mg, intravenous, BID  heparin (porcine), 5,000 Units, subcutaneous, q8h  levothyroxine, 25 mcg, oral, Daily  [Held by provider] lisinopril, 5 mg, oral, Daily  metoprolol succinate XL, 25 mg, oral, Daily  oxygen, , inhalation, Continuous - 02/gases  polyethylene glycol, 17 g, oral, Daily  sodium chloride 0.9%, , ,       Continuous medications     PRN medications  PRN medications: acetaminophen, ALPRAZolam, bisacodyl, melatonin, sodium chloride 0.9%    Review of Systems  12 point review of systems noted to be negative except as noted in HPI  Objective  Range of Vitals (last 24 hours)  Heart Rate:  [64-78]   Temp:  [36.2 °C (97.1 °F)-36.9 °C (98.5 °F)]   Resp:  [16-27]   BP: ()/(54-91)   Height:  [157.5 cm (5' 2\")]   Weight:  [50.7 kg (111 lb 12.4 oz)-52.5 kg (115 lb 11.9 oz)]   SpO2:  [80 %-97 %]   Daily Weight  10/03/23 : 50.7 kg (111 lb 12.4 oz)    Body mass index is 20.44 kg/m².     Physical Exam      General alert oriented x3  Lungs clear to auscultation bilaterally  Abdomen soft nontender  CV no murmurs  Ulcerative lesion with edema and surrounding erythema posteriorly to the right ankle  Relevant Results  Outside Hospital Results    Labs  Results from last 72 hours   Lab Units 10/03/23  0606 10/02/23  1132   WBC AUTO x10*3/uL 8.0 9.1   HEMOGLOBIN g/dL 11.5* 13.9   HEMATOCRIT % 36.9 45.1   PLATELETS AUTO x10*3/uL 357 420   NEUTROS PCT AUTO %  --  84.1   LYMPHS PCT AUTO %  --  4.4   MONOS PCT AUTO %  --  8.0   EOS PCT AUTO %  --  1.9     Results from last 72 hours   Lab Units " "10/03/23  0606 10/02/23  1132   SODIUM mmol/L 137 135*   POTASSIUM mmol/L 4.0 5.2   CHLORIDE mmol/L 101 99   CO2 mmol/L 32 28   BUN mg/dL 39* 39*   CREATININE mg/dL 1.64* 1.62*   GLUCOSE mg/dL 84 108*   CALCIUM mg/dL 8.7 9.1   ANION GAP mmol/L 8* 13   EGFR mL/min/1.73m*2 30* 31*     Results from last 72 hours   Lab Units 10/02/23  1132   ALK PHOS U/L 121   BILIRUBIN TOTAL mg/dL 0.7   PROTEIN TOTAL g/dL 6.4   ALT U/L 23   AST U/L 30   ALBUMIN g/dL 3.6     Estimated Creatinine Clearance: 19.1 mL/min (A) (by C-G formula based on SCr of 1.64 mg/dL (H)).  C-Reactive Protein   Date Value Ref Range Status   10/02/2023 6.22 (H) <1.00 mg/dL Final     Sedimentation Rate   Date Value Ref Range Status   10/02/2023 41 (H) 0 - 30 mm/h Final     No results found for: \"HIV1X2\", \"HIVCONF\", \"RBZMJA2MD\"  No results found for: \"HEPCABINIT\", \"HEPCAB\", \"HCVPCRQUANT\"  Microbiology  No results found for the last 14 days.        Imaging  XR ankle right 3+ views    Result Date: 10/2/2023  Interpreted By:  Jeny Troy, STUDY: XR ANKLE RIGHT 3+ VIEWS; ;  10/2/2023 1:14 pm   INDICATION: Signs/Symptoms:Concern for osteomyelitis.   COMPARISON: 07/17/2023 and 09/12/2023   ACCESSION NUMBER(S): [id number]   ORDERING CLINICIAN: ISAIAH DOLL   FINDINGS: AP, oblique and lateral views were obtained. 2 screws anchor fracture fragment of the calcaneal tuberosity in position. There is also a metallic pin in the calcaneal tubercle. A small amount of lucency persists at the fracture site which does not appear significantly changed. Along the cortical surface of the calcaneal tubercle just slightly above the level of the metallic pin the bone appears questionably less dense. This is a fairly subtle finding and could be projectional. However this is in the vicinity of the overlying skin ulceration. Superficial soft tissue air is noted at the level of the ulceration with no deeply penetrating soft tissue air.       Metallic structures are present from " internal fixation related to the avulsion from the calcaneal tubercle   There is a subtle lucency on the lateral view at the cortical surface of the tubercle just above the metallic pin which is questionably an area of interval bone destruction versus a projectional difference.     MACRO: None   Signed by: Jeny Troy 10/2/2023 1:23 PM Dictation workstation:   [id number]    XR chest 1 view    Result Date: 10/2/2023  Interpreted By:  Jeny Troy, STUDY: XR CHEST 1 VIEW; ;  10/2/2023 1:13 pm   INDICATION: . Shortness of breath   COMPARISON: 02/15/2023   ACCESSION NUMBER(S): [id number]   ORDERING CLINICIAN: ISAIAH DOLL   TECHNIQUE: Portable AP upright   FINDINGS: ICD is present in the left upper chest wall with electrode wires in the right atrium and right ventricle. Cardiac silhouette is enlarged but appears similar to the prior study. Aorta is atherosclerotic. Interstitial markings are coarse but appears similar to the previous exam. Left costophrenic angle is indistinct which may indicate minimal left pleural fluid. Bones are osteopenic with no definite acute interval change.       Unchanged cardiomegaly   Small left pleural effusion   Coarse interstitial markings are unchanged and probably related to chronic lung disease.     Signed by: Jeny Troy 10/2/2023 1:18 PM Dictation workstation:   [id number]    XR foot    Result Date: 9/13/2023  Interpreted By:  GLADYS SHELDON MD MRN: [id number] Patient Name: CHERRY LEE  STUDY: FOOT; COMPLETE, MIN 3 VIEWS;  9/12/2023 1:03 pm  INDICATION: right foot pain  S92.031D: Closed displaced avulsion fracture of tuberosity of right calcaneus with routine healing, subsequent.  COMPARISON: 08/22/2023  ACCESSION NUMBER(S): [id number]  ORDERING CLINICIAN: JOANNE LANDA  FINDINGS: Right foot, three views  Postsurgical changes in the calcaneus status post fixation of an avulsion fracture with screws. The hardware is intact. Soft tissue edema posteriorly. There  is no acute fracture. There is no dislocation      ORIF of calcaneal fracture with unchanged alignment and intact hardware. Associated soft tissue edema posteriorly      Assessment/Plan   Right lower extremity cellulitis and infected wound  Hypertension  Paroxysmal A-fib  Hypothyroidism  Hyperlipidemia  Acute on chronic congestive heart failure  Plan  We will start patient on Ancef  Follow fevers white count monitor electrolytes  If fevers obtain blood cultures  Continue wound care  Consult podiatry for additional recommendations  ID will follow with additional recommendations    Other medical issues  Paroxysmal A-fib hypothyroidism hyperlipidemia CHF no interactions noted with home medications with current antibiotics    I spent 25  minutes in the professional and overall care of this patient.      Vernon Arenas MD

## 2023-10-03 NOTE — CARE PLAN
Problem: Mobility  Goal: STRENGTHENING  Description: 20+ RROM EX TO HIPS AND KNEES STRENGTHENING LEGS FOR STABLE TRANSFERS  Outcome: Progressing  Goal: WC MOBILITY  Description: INDEP AND SAFE WC MOBILITY  Outcome: Progressing

## 2023-10-03 NOTE — ASSESSMENT & PLAN NOTE
Cardiology Consult appreciated   BNP = 1279  Imaging and examination consistent with volume overload  Continued on IV Lasix 40mg IV BID  Strict I's & O's/Daily Weights   Echocardiogram pending   Echo 02/16/23 with EF of 50% and severely increased LV septal thickness compared to Echo 09/22/2022 with EF of 60% and mild AV regurgitation.  Monitor renal function closely  Spironolactone added  Consider resumption of sglt2i

## 2023-10-03 NOTE — ASSESSMENT & PLAN NOTE
s/p ORIF 07/19/23 by Dr. Brunner with recommendations of continued non-weight bearing per orthopedics/wound care due to  a persistent wound as noted to be by the patient  Wound Care Consult   ESR = 41,CRP = 6.22  Consult to podiatry- wound healing well without infectious properties  Had graft scheduled for 10/6/23  ID consult given elevated inflammatory markers  Broad spectrum abx as stated elsewhere  W. Cx with mixed bacteria

## 2023-10-04 ENCOUNTER — APPOINTMENT (OUTPATIENT)
Dept: RADIOLOGY | Facility: HOSPITAL | Age: 88
DRG: 291 | End: 2023-10-04
Payer: MEDICARE

## 2023-10-04 PROBLEM — J96.01 ACUTE HYPOXIC RESPIRATORY FAILURE (MULTI): Status: ACTIVE | Noted: 2023-10-04

## 2023-10-04 LAB
ANION GAP SERPL CALC-SCNC: 10 MMOL/L (ref 10–20)
BASOPHILS # BLD AUTO: 0.06 X10*3/UL (ref 0–0.1)
BASOPHILS NFR BLD AUTO: 0.8 %
BUN SERPL-MCNC: 43 MG/DL (ref 6–23)
CALCIUM SERPL-MCNC: 8.6 MG/DL (ref 8.6–10.3)
CHLORIDE SERPL-SCNC: 100 MMOL/L (ref 98–107)
CO2 SERPL-SCNC: 31 MMOL/L (ref 21–32)
CREAT SERPL-MCNC: 1.85 MG/DL (ref 0.5–1.05)
CREAT UR-MCNC: 32.3 MG/DL (ref 20–320)
CREAT UR-MCNC: 32.7 MG/DL (ref 20–320)
CREAT UR-MCNC: 32.7 MG/DL (ref 20–320)
EOSINOPHIL # BLD AUTO: 0.41 X10*3/UL (ref 0–0.4)
EOSINOPHIL NFR BLD AUTO: 5.5 %
ERYTHROCYTE [DISTWIDTH] IN BLOOD BY AUTOMATED COUNT: 17.4 % (ref 11.5–14.5)
GFR SERPL CREATININE-BSD FRML MDRD: 26 ML/MIN/1.73M*2
GLUCOSE SERPL-MCNC: 94 MG/DL (ref 74–99)
HCT VFR BLD AUTO: 38.3 % (ref 36–46)
HGB BLD-MCNC: 11.5 G/DL (ref 12–16)
IMM GRANULOCYTES # BLD AUTO: 0.05 X10*3/UL (ref 0–0.5)
IMM GRANULOCYTES NFR BLD AUTO: 0.7 % (ref 0–0.9)
LYMPHOCYTES # BLD AUTO: 0.59 X10*3/UL (ref 0.8–3)
LYMPHOCYTES NFR BLD AUTO: 7.9 %
MAGNESIUM SERPL-MCNC: 1.97 MG/DL (ref 1.6–2.4)
MCH RBC QN AUTO: 26.3 PG (ref 26–34)
MCHC RBC AUTO-ENTMCNC: 30 G/DL (ref 32–36)
MCV RBC AUTO: 87 FL (ref 80–100)
MICROALBUMIN UR-MCNC: <7 MG/L
MICROALBUMIN/CREAT UR: NORMAL MG/G{CREAT}
MONOCYTES # BLD AUTO: 0.72 X10*3/UL (ref 0.05–0.8)
MONOCYTES NFR BLD AUTO: 9.6 %
NEUTROPHILS # BLD AUTO: 5.65 X10*3/UL (ref 1.6–5.5)
NEUTROPHILS NFR BLD AUTO: 75.5 %
NRBC BLD-RTO: 0 /100 WBCS (ref 0–0)
PLATELET # BLD AUTO: 357 X10*3/UL (ref 150–450)
PMV BLD AUTO: 8.7 FL (ref 7.5–11.5)
POTASSIUM SERPL-SCNC: 4.1 MMOL/L (ref 3.5–5.3)
RBC # BLD AUTO: 4.38 X10*6/UL (ref 4–5.2)
SODIUM SERPL-SCNC: 137 MMOL/L (ref 136–145)
SODIUM UR-SCNC: 66 MMOL/L
SODIUM/CREAT UR-RTO: 202 MMOL/G CREAT
WBC # BLD AUTO: 7.5 X10*3/UL (ref 4.4–11.3)

## 2023-10-04 PROCEDURE — 2500000002 HC RX 250 W HCPCS SELF ADMINISTERED DRUGS (ALT 637 FOR MEDICARE OP, ALT 636 FOR OP/ED): Performed by: INTERNAL MEDICINE

## 2023-10-04 PROCEDURE — 36415 COLL VENOUS BLD VENIPUNCTURE: CPT | Performed by: PHYSICIAN ASSISTANT

## 2023-10-04 PROCEDURE — 97530 THERAPEUTIC ACTIVITIES: CPT | Mod: GP,CQ

## 2023-10-04 PROCEDURE — 84300 ASSAY OF URINE SODIUM: CPT | Performed by: NURSE PRACTITIONER

## 2023-10-04 PROCEDURE — 82570 ASSAY OF URINE CREATININE: CPT | Performed by: NURSE PRACTITIONER

## 2023-10-04 PROCEDURE — 2500000004 HC RX 250 GENERAL PHARMACY W/ HCPCS (ALT 636 FOR OP/ED): Performed by: STUDENT IN AN ORGANIZED HEALTH CARE EDUCATION/TRAINING PROGRAM

## 2023-10-04 PROCEDURE — 99232 SBSQ HOSP IP/OBS MODERATE 35: CPT | Performed by: NURSE PRACTITIONER

## 2023-10-04 PROCEDURE — 76770 US EXAM ABDO BACK WALL COMP: CPT

## 2023-10-04 PROCEDURE — 85025 COMPLETE CBC W/AUTO DIFF WBC: CPT | Performed by: PHYSICIAN ASSISTANT

## 2023-10-04 PROCEDURE — 99233 SBSQ HOSP IP/OBS HIGH 50: CPT | Performed by: PHYSICIAN ASSISTANT

## 2023-10-04 PROCEDURE — 2500000005 HC RX 250 GENERAL PHARMACY W/O HCPCS: Performed by: INTERNAL MEDICINE

## 2023-10-04 PROCEDURE — 2060000001 HC INTERMEDIATE ICU ROOM DAILY

## 2023-10-04 PROCEDURE — 2500000001 HC RX 250 WO HCPCS SELF ADMINISTERED DRUGS (ALT 637 FOR MEDICARE OP): Performed by: INTERNAL MEDICINE

## 2023-10-04 PROCEDURE — 2500000001 HC RX 250 WO HCPCS SELF ADMINISTERED DRUGS (ALT 637 FOR MEDICARE OP): Performed by: PHYSICIAN ASSISTANT

## 2023-10-04 PROCEDURE — 2500000001 HC RX 250 WO HCPCS SELF ADMINISTERED DRUGS (ALT 637 FOR MEDICARE OP): Performed by: STUDENT IN AN ORGANIZED HEALTH CARE EDUCATION/TRAINING PROGRAM

## 2023-10-04 PROCEDURE — 2500000004 HC RX 250 GENERAL PHARMACY W/ HCPCS (ALT 636 FOR OP/ED): Performed by: INTERNAL MEDICINE

## 2023-10-04 PROCEDURE — 80048 BASIC METABOLIC PNL TOTAL CA: CPT | Performed by: PHYSICIAN ASSISTANT

## 2023-10-04 PROCEDURE — 83735 ASSAY OF MAGNESIUM: CPT | Performed by: PHYSICIAN ASSISTANT

## 2023-10-04 RX ORDER — SODIUM CHLORIDE 0.9 % (FLUSH) 0.9 %
SYRINGE (ML) INJECTION
Status: COMPLETED
Start: 2023-10-04 | End: 2023-10-04

## 2023-10-04 RX ADMIN — SPIRONOLACTONE 12.5 MG: 25 TABLET, FILM COATED ORAL at 08:31

## 2023-10-04 RX ADMIN — ACETAMINOPHEN 650 MG: 325 TABLET, FILM COATED ORAL at 22:37

## 2023-10-04 RX ADMIN — FUROSEMIDE 40 MG: 10 INJECTION, SOLUTION INTRAMUSCULAR; INTRAVENOUS at 16:53

## 2023-10-04 RX ADMIN — HEPARIN SODIUM 5000 UNITS: 5000 INJECTION INTRAVENOUS; SUBCUTANEOUS at 16:53

## 2023-10-04 RX ADMIN — CEFAZOLIN SODIUM 1 G: 1 INJECTION, SOLUTION INTRAVENOUS at 18:09

## 2023-10-04 RX ADMIN — ACETAMINOPHEN 650 MG: 325 TABLET, FILM COATED ORAL at 16:52

## 2023-10-04 RX ADMIN — ASPIRIN 81 MG: 81 TABLET, COATED ORAL at 08:32

## 2023-10-04 RX ADMIN — HEPARIN SODIUM 5000 UNITS: 5000 INJECTION INTRAVENOUS; SUBCUTANEOUS at 07:05

## 2023-10-04 RX ADMIN — Medication 4 L/MIN: at 11:45

## 2023-10-04 RX ADMIN — HEPARIN SODIUM 5000 UNITS: 5000 INJECTION INTRAVENOUS; SUBCUTANEOUS at 23:05

## 2023-10-04 RX ADMIN — ATORVASTATIN CALCIUM 80 MG: 40 TABLET, FILM COATED ORAL at 21:26

## 2023-10-04 RX ADMIN — AMIODARONE HYDROCHLORIDE 200 MG: 200 TABLET ORAL at 08:31

## 2023-10-04 RX ADMIN — Medication: at 17:00

## 2023-10-04 RX ADMIN — CYANOCOBALAMIN TAB 1000 MCG 2000 MCG: 1000 TAB at 08:31

## 2023-10-04 RX ADMIN — METOPROLOL SUCCINATE 25 MG: 25 TABLET, EXTENDED RELEASE ORAL at 08:32

## 2023-10-04 RX ADMIN — ACETAMINOPHEN 650 MG: 325 TABLET, FILM COATED ORAL at 05:28

## 2023-10-04 RX ADMIN — CEFAZOLIN SODIUM 1 G: 1 INJECTION, SOLUTION INTRAVENOUS at 05:30

## 2023-10-04 RX ADMIN — FUROSEMIDE 40 MG: 10 INJECTION, SOLUTION INTRAMUSCULAR; INTRAVENOUS at 08:32

## 2023-10-04 RX ADMIN — LEVOTHYROXINE SODIUM 25 MCG: 0.03 TABLET ORAL at 05:30

## 2023-10-04 RX ADMIN — ALPRAZOLAM 0.5 MG: 0.5 TABLET ORAL at 22:37

## 2023-10-04 ASSESSMENT — COGNITIVE AND FUNCTIONAL STATUS - GENERAL
CLIMB 3 TO 5 STEPS WITH RAILING: TOTAL
PERSONAL GROOMING: A LITTLE
WALKING IN HOSPITAL ROOM: TOTAL
STANDING UP FROM CHAIR USING ARMS: A LOT
DAILY ACTIVITIY SCORE: 18
TOILETING: A LITTLE
TURNING FROM BACK TO SIDE WHILE IN FLAT BAD: A LOT
MOBILITY SCORE: 14
CLIMB 3 TO 5 STEPS WITH RAILING: TOTAL
WALKING IN HOSPITAL ROOM: A LITTLE
TURNING FROM BACK TO SIDE WHILE IN FLAT BAD: A LITTLE
DRESSING REGULAR LOWER BODY CLOTHING: A LITTLE
MOBILITY SCORE: 17
WALKING IN HOSPITAL ROOM: TOTAL
MOVING TO AND FROM BED TO CHAIR: A LOT
STANDING UP FROM CHAIR USING ARMS: A LOT
TOILETING: A LITTLE
MOBILITY SCORE: 10
DAILY ACTIVITIY SCORE: 21
HELP NEEDED FOR BATHING: A LITTLE
MOVING TO AND FROM BED TO CHAIR: A LOT
MOVING TO AND FROM BED TO CHAIR: A LITTLE
DRESSING REGULAR LOWER BODY CLOTHING: A LITTLE
DRESSING REGULAR UPPER BODY CLOTHING: A LOT
HELP NEEDED FOR BATHING: A LITTLE
MOVING FROM LYING ON BACK TO SITTING ON SIDE OF FLAT BED WITH BEDRAILS: A LOT
MOVING FROM LYING ON BACK TO SITTING ON SIDE OF FLAT BED WITH BEDRAILS: A LITTLE
CLIMB 3 TO 5 STEPS WITH RAILING: A LOT
STANDING UP FROM CHAIR USING ARMS: A LITTLE

## 2023-10-04 ASSESSMENT — ENCOUNTER SYMPTOMS
CHEST TIGHTNESS: 0
COUGH: 1
FATIGUE: 0
PALPITATIONS: 0
SORE THROAT: 0
DIAPHORESIS: 0
TROUBLE SWALLOWING: 0
CHILLS: 0
DIARRHEA: 0
FREQUENCY: 0
WEAKNESS: 0
NUMBNESS: 0
DYSURIA: 0
FACIAL SWELLING: 0
BLOOD IN STOOL: 0
FEVER: 0
DIZZINESS: 0
WOUND: 1
CONSTIPATION: 0
HEMATURIA: 0
LIGHT-HEADEDNESS: 0
SHORTNESS OF BREATH: 1
HEADACHES: 0
WHEEZING: 0
APPETITE CHANGE: 0
FLANK PAIN: 0
VOMITING: 0
NAUSEA: 0
EYE PAIN: 0
HALLUCINATIONS: 0
BACK PAIN: 0
JOINT SWELLING: 0
ABDOMINAL PAIN: 0
BRUISES/BLEEDS EASILY: 0

## 2023-10-04 ASSESSMENT — PAIN SCALES - GENERAL
PAINLEVEL_OUTOF10: 4
PAINLEVEL_OUTOF10: 0 - NO PAIN
PAINLEVEL_OUTOF10: 3
PAINLEVEL_OUTOF10: 4

## 2023-10-04 ASSESSMENT — PAIN - FUNCTIONAL ASSESSMENT
PAIN_FUNCTIONAL_ASSESSMENT: 0-10

## 2023-10-04 ASSESSMENT — PAIN DESCRIPTION - DESCRIPTORS
DESCRIPTORS: ACHING
DESCRIPTORS: ACHING

## 2023-10-04 NOTE — PROGRESS NOTES
Physical Therapy    Physical Therapy Treatment    Patient Name: Adwoa Forrest  MRN: [id number]  Today's Date: 10/4/2023  Time Calculation  Start Time: 1248  Stop Time: 1300  Time Calculation (min): 12 min       Assessment/Plan   PT Assessment  PT Assessment Results: Decreased strength, Decreased range of motion, Decreased endurance, Impaired balance, Decreased mobility, Orthopedic restrictions  Rehab Prognosis: Fair  Evaluation/Treatment Tolerance: Patient tolerated treatment well  Medical Staff Made Aware: Yes  Barriers to Participation: Comorbidities  End of Session Communication: Bedside nurse, PCT/NA/CTA  Assessment Comment: prgoesssing, patieint requries verbal cues for safety with  mobitliy  End of Session Patient Position: Bed, 3 rail up, Alarm on  PT Plan  Inpatient/Swing Bed or Outpatient: Inpatient  PT Plan  Treatment/Interventions: Transfer training, Strengthening  PT Plan: Skilled PT  PT Frequency: 3 times per week  Equipment Recommended upon Discharge:  (THERABAND FOR HEP)  PT Recommended Transfer Status: Stand by assist      General Visit Information:   PT  Visit  PT Received On: 10/04/23  General  Prior to Session Communication: Bedside nurse, PCT/NA/CTA  Patient Position Received: Bed, 3 rail up, Alarm on    Subjective   Precautions:  Precautions  LE Weight Bearing Status: Right Non-Weight Bearing (per patient non weight bearing)  Precautions Comment: FALLS  Vital Signs:       Objective   Pain:  Pain Assessment  Pain Assessment: 0-10  Pain Score: 0 - No pain  Pain Type:  (no complaints)  Cognition:  Cognition  Overall Cognitive Status: Within Functional Limits  Postural Control:     Extremity/Trunk Assessments:    Activity Tolerance:  Activity Tolerance  Endurance: Tolerates 10 - 20 min exercise with multiple rests  Activity Tolerance Comments: fatigues quickly on feet due to NWB and patient stating she doesn' t fell today  Treatments:  Therapeutic Exercise  Therapeutic Exercise Performed:  Yes  Therapeutic Exercise Activity 1: seated marches, knee extesnion 10 reps each    Therapeutic Activity  Therapeutic Activity Performed: Yes  Therapeutic Activity 1: standpivot NWWB right back and forth to commode x 4 reps wiht mod assist  arm in arm    Transfers  Transfer: Yes  Transfer 1  Transfer From 1: Sit to, Stand to (all wiht min assit x 3 reps)    Outcome Measures:  [facility] Basic Mobility  Turning from your back to your side while in a flat bed without using bedrails: A lot  Moving from lying on your back to sitting on the side of a flat bed without using bedrails: A lot  Moving to and from bed to chair (including a wheelchair): A lot  Standing up from a chair using your arms (e.g. wheelchair or bedside chair): A lot  To walk in hospital room: Total  Climbing 3-5 steps with railing: Total  Basic Mobility - Total Score: 10    Education Documentation  Mobility Training, taught by Sherron Lozano PTA at 10/4/2023  1:40 PM.  Learner: Patient  Readiness: Acceptance  Method: Explanation  Response: Verbalizes Understanding, Needs Reinforcement  Comment: NWB right LE  and exericses    Education Comments  No comments found.        OP EDUCATION:       Encounter Problems       Encounter Problems (Active)       Mobility       STRENGTHENING (Progressing)       Start:  10/03/23    Expected End:  10/24/23       20+ RROM EX TO HIPS AND KNEES STRENGTHENING LEGS FOR STABLE TRANSFERS         WC MOBILITY (Not Progressing)       Start:  10/03/23    Expected End:  10/06/23       INDEP AND SAFE WC MOBILITY

## 2023-10-04 NOTE — CARE PLAN
Problem: Skin  Goal: Decreased wound size/increased tissue granulation at next dressing change  Outcome: Progressing  Goal: Participates in plan/prevention/treatment measures  Outcome: Progressing  Goal: Prevent/manage excess moisture  Outcome: Progressing  Goal: Prevent/minimize sheer/friction injuries  Outcome: Progressing  Goal: Promote/optimize nutrition  Outcome: Progressing  Goal: Promote skin healing  Outcome: Progressing     Problem: Fall/Injury  Goal: Not fall by end of shift  Outcome: Progressing  Goal: Be free from injury by end of the shift  Outcome: Progressing  Goal: Verbalize understanding of personal risk factors for fall in the hospital  Outcome: Progressing  Goal: Verbalize understanding of risk factor reduction measures to prevent injury from fall in the home  Outcome: Progressing  Goal: Use assistive devices by end of the shift  Outcome: Progressing  Goal: Pace activities to prevent fatigue by end of the shift  Outcome: Progressing     Problem: Fall/Injury  Goal: Not fall by end of shift  Outcome: Progressing  Goal: Be free from injury by end of the shift  Outcome: Progressing  Goal: Verbalize understanding of personal risk factors for fall in the hospital  Outcome: Progressing  Goal: Verbalize understanding of risk factor reduction measures to prevent injury from fall in the home  Outcome: Progressing  Goal: Use assistive devices by end of the shift  Outcome: Progressing  Goal: Pace activities to prevent fatigue by end of the shift  Outcome: Progressing       The patient's goals for the shift include      The clinical goals for the shift include heal wound    Over the shift, the patient did not make progress toward the following goals. Recommendations to address these barriers include education on staying ahead of the pain, nutrition, wound treatment, antibiotics.

## 2023-10-04 NOTE — CARE PLAN
The patient's goals for the shift include to be free from injury during shift.    The clinical goals for the shift include wound healing.

## 2023-10-04 NOTE — PROGRESS NOTES
History Of Present Illness:    Adwoa Forrest is a 88 y.o. female presenting with shortness of breath and ankle swelling.  She has history of persistent atrial fibrillation status post AV derek ablation and permanent pacemaker placement, failed Watchman device placement in 2018 requiring urgent surgery and left atrial appendage ligation, hypertension recurrent GI bleed not on oral anticoagulants, on amiodarone for nonsustained VT and symptomatic PVCs.  She also has chronic diastolic heart failure and follows regularly in the CHF clinic.  She used to be on 80 mg Lasix at 1 time which was reduced by the heart failure nurse practitioner to 60 mg due to worsening renal function.  Recently in July 2023 she presented with a ankle injury and at that time this was further reduced to 40 mg a day.  She states this ankle injury has been bothering her quite a bit and she has been having difficulty getting around.  This wound recently got infected and she required to take Augmentin follow almost 30 days that she just completed a week ago.  For last 1 week or so she is noticing worsening shortness of breath, legs are extremely swollen and heavy and she is unable to move as a result as it feels like lead.  She was given IV Lasix in the CHF clinic and was given 60 of Lasix to take at home but did not improve symptoms and therefore she came into the ER for further evaluation and treatment.  Denies any use of nonsteroidal inflammatories or other symptoms otherwise.  Interestingly, since starting on amiodarone she has converted to sinus rhythm.     At one time she was having deranged LFTs that improved after discontinuation of the fish oil she was taking.  Her EKG shows atrial paced ventricular paced rhythm.     Review of systems all other system reviewed and is negative.  Echocardiogram pending.  Chest x-ray personally reviewed.  She stated ankle swelling has improved since she had IV Lasix but they are having trouble titrating off  her oxygen.     Subjective Data:  No new issues overnight.  No CP/pressure/palpitations. Has mild dyspnea, still on O2.  Creatinine up to 1.85 today, K 4.1.  Nephro has been consulted. Monitor: AV paced    Overnight Events:    None     Objective Data:  Last Recorded Vitals:  Vitals:    10/03/23 2137 10/04/23 0100 10/04/23 0513 10/04/23 0919   BP: 104/58 95/52 104/58 111/67   BP Location: Left arm Left arm Left arm    Patient Position: Lying Lying Lying    Pulse: 65 65 65 64   Resp: 16 16 16 16   Temp: 36.5 °C (97.7 °F) 37.1 °C (98.8 °F) 36.2 °C (97.1 °F) 36.4 °C (97.5 °F)   TempSrc: Temporal Temporal Temporal    SpO2: 95% 93% 91% 94%   Weight:   48 kg (105 lb 13.1 oz)    Height:           Last Labs:  CBC - 10/4/2023:  3:34 AM  7.5 11.5 357    38.3      CMP - 10/4/2023:  3:34 AM  8.6 6.4 30 --- 0.7   4.1 3.6 23 121      PTT - 7/18/2023:  3:17 AM  1.0   11.4 32     TROPHS   Date/Time Value Ref Range Status   10/02/2023 02:05 PM 29 0 - 13 ng/L Final   10/02/2023 11:32 AM 30 0 - 13 ng/L Final   02/15/2023 11:55 PM 48 0 - 13 ng/L Final     Comment:     .  Less than 99th percentile of normal range cutoff-  Female and children under 18 years old <14 ng/L; Male <21 ng/L: Negative  Repeat testing should be performed if clinically indicated.   .  Female and children under 18 years old 14-50 ng/L; Male 21-50 ng/L:  Consistent with possible cardiac damage and possible increased clinical   risk. Serial measurements may help to assess extent of myocardial damage.   .  >50 ng/L: Consistent with cardiac damage, increased clinical risk and  myocardial infarction. Serial measurements may help assess extent of   myocardial damage.   .   NOTE: Children less than 1 year old may have higher baseline troponin   levels and results should be interpreted in conjunction with the overall   clinical context.   .  NOTE: Troponin I testing is performed using a different   testing methodology at [facility] than at other   system  hospitals. Direct result comparisons should only   be made within the same method.     02/15/2023 10:49 PM 49 0 - 13 ng/L Final     Comment:     .  Less than 99th percentile of normal range cutoff-  Female and children under 18 years old <14 ng/L; Male <21 ng/L: Negative  Repeat testing should be performed if clinically indicated.   .  Female and children under 18 years old 14-50 ng/L; Male 21-50 ng/L:  Consistent with possible cardiac damage and possible increased clinical   risk. Serial measurements may help to assess extent of myocardial damage.   .  >50 ng/L: Consistent with cardiac damage, increased clinical risk and  myocardial infarction. Serial measurements may help assess extent of   myocardial damage.   .   NOTE: Children less than 1 year old may have higher baseline troponin   levels and results should be interpreted in conjunction with the overall   clinical context.   .  NOTE: Troponin I testing is performed using a different   testing methodology at [facility] than at other   [facility]. Direct result comparisons should only   be made within the same method.     02/15/2023 09:41 PM 49 0 - 13 ng/L Final     Comment:     .  Less than 99th percentile of normal range cutoff-  Female and children under 18 years old <14 ng/L; Male <21 ng/L: Negative  Repeat testing should be performed if clinically indicated.   .  Female and children under 18 years old 14-50 ng/L; Male 21-50 ng/L:  Consistent with possible cardiac damage and possible increased clinical   risk. Serial measurements may help to assess extent of myocardial damage.   .  >50 ng/L: Consistent with cardiac damage, increased clinical risk and  myocardial infarction. Serial measurements may help assess extent of   myocardial damage.   .   NOTE: Children less than 1 year old may have higher baseline troponin   levels and results should be interpreted in conjunction with the overall   clinical context.   .  NOTE: Troponin I testing  is performed using a different   testing methodology at [facility] than at other   [facility]. Direct result comparisons should only   be made within the same method.       BNP   Date/Time Value Ref Range Status   10/03/2023 06:06 AM 1,843 0 - 99 pg/mL Final   10/02/2023 11:32 AM 1,279 0 - 99 pg/mL Final     VLDL   Date/Time Value Ref Range Status   02/23/2023 10:08 AM 15 0 - 40 mg/dL Final   01/12/2022 09:53 AM 16 0 - 40 mg/dL Final   08/03/2021 09:45 AM 28 0 - 40 mg/dL Final      Last I/O:  I/O last 3 completed shifts:  In: 1054 (22 mL/kg) [P.O.:1004; IV Piggyback:50]  Out: 530 (11 mL/kg) [Urine:530 (0.3 mL/kg/hr)]  Weight: 48 kg     Past Cardiology Tests (Last 3 Years):  CONCLUSIONS:   1. Left ventricular systolic function is normal with a 70-75% estimated ejection fraction.   2. There is moderate left ventricular hypertrophy.   3. There is mildly reduced right ventricular systolic function.   4. The right atrium is moderately dilated.   5. Moderately elevated right ventricular systolic pressure.   6. Mild to moderate aortic valve regurgitation.      Stress Test:  Nuclear Stress Test 2/12/2023          Inpatient Medications:  Scheduled medications   Medication Dose Route Frequency    amiodarone  200 mg oral Daily    aspirin  81 mg oral Daily    atorvastatin  80 mg oral Nightly    ceFAZolin  1 g intravenous q12h    cyanocobalamin  2,000 mcg oral Daily    furosemide  40 mg intravenous BID    heparin (porcine)  5,000 Units subcutaneous q8h    levothyroxine  25 mcg oral Daily    [Held by provider] lisinopril  5 mg oral Daily    metoprolol succinate XL  25 mg oral Daily    oxygen   inhalation Continuous - 02/gases    polyethylene glycol  17 g oral Daily    spironolactone  12.5 mg oral q24h MACRINA     PRN medications   Medication    acetaminophen    ALPRAZolam    bisacodyl    melatonin     Continuous Medications   Medication Dose Last Rate       Physical Exam:  Vitals reviewed.   Constitutional:        Appearance: Normal appearance.   Neck:      Vascular: JVD present. No carotid bruit.   Cardiovascular:      Rate and Rhythm: Normal rate and regular rhythm.      Pulses: Normal pulses.      Heart sounds: Normal heart sounds, S1 normal and S2 normal.   Pulmonary:      Effort: Pulmonary effort is normal. No respiratory distress.      Breath sounds: Crackles in posterior, on O2  Abdominal:      General: Abdomen is flat.      Palpations: Abdomen is soft.   Musculoskeletal:      Right lower leg: No edema.      Left lower leg: No edema.   Skin:     General: Skin is warm.   Neurological:      Mental Status: She is alert and oriented to person, place, and time. Mental status is at baseline.   Psychiatric:         Mood and Affect: Mood normal.         Behavior: Behavior normal.      Assessment/Plan   1-acute on chronic diastolic heart failure-continue IV Lasix for another 24 hours.  After that switch to 60 mg p.o. Lasix with close follow-up in CHF clinic.  Monitor electrolytes and strict I's and O's.  Add Aldactone continue SGLT2 inhibitors.  Possibly exacerbated by recent events including infection.  Will need close monitoring of electrolytes and potassium specifically.  Discussed with patient.     Agree with checking echo follow-up on results.    10-4-23: Echo as noted above. Lungs sound congested, no edema.  On IV Lasix, Metoprolol succinate and spironolactone.  Nephro has been consulted.  Recommend sodium restricted diet.       2-persistent atrial fibrillation-she seems to be maintaining sinus rhythm since being on amiodarone.  She is not anticoagulated but had a left atrial appendage ligation.  She has history of GI bleed.  10-4-23: On Amiodarone and BB.  HX of AV node ablation and PPM. She is no longer on oral anticoagulation, hx of STEFAINE ligation.  On ASA only. AV paced on tele.      3-nonsustained VT and PVCs highly symptomatic and remains on amiodarone with resolution of symptoms.  Outpatient monitoring.                                                                   Code Status:  Full Code            Shefali Ordaz, APRN-CNP

## 2023-10-04 NOTE — PROGRESS NOTES
Physical Therapy                 Therapy Communication Note    Patient Name: Adwoa Forrest  MRN: [id number]  Today's Date: 10/4/2023     Discipline: Physical Therapy    Missed Visit Reason:  patient just started breakfast     Missed Time: Attempt    Comment:

## 2023-10-04 NOTE — PROGRESS NOTES
Adwoa Forrest is a 88 y.o. female on day 2 of admission presenting with Acute on chronic congestive heart failure, unspecified heart failure type (CMS/HCC).      Subjective   10/04/23: No acute events overnight. Started on Ancef by ID- Podiatry eval wound and does not feel it is infected, recommend continue plan for  graft this Friday. Continue on supplemental O2. Nephro consulted for rising creatinine- suspect CRS, continue diuresis. Patient insistent on discharge to home tomorrow as she has appt for her graft on Friday         Review of Systems   Constitutional:  Negative for appetite change, chills, diaphoresis, fatigue and fever.   HENT:  Negative for congestion, ear pain, facial swelling, hearing loss, nosebleeds, sore throat, tinnitus and trouble swallowing.    Eyes:  Negative for pain.   Respiratory:  Positive for cough and shortness of breath. Negative for chest tightness and wheezing.    Cardiovascular:  Positive for leg swelling. Negative for chest pain and palpitations.   Gastrointestinal:  Negative for abdominal pain, blood in stool, constipation, diarrhea, nausea and vomiting.   Genitourinary:  Negative for dysuria, flank pain, frequency, hematuria and urgency.   Musculoskeletal:  Negative for back pain and joint swelling.   Skin:  Positive for wound. Negative for rash.   Neurological:  Negative for dizziness, syncope, weakness, light-headedness, numbness and headaches.   Hematological:  Does not bruise/bleed easily.   Psychiatric/Behavioral:  Negative for behavioral problems, hallucinations and suicidal ideas.           Objective     Last Recorded Vitals  /59   Pulse 65   Temp 36.9 °C (98.4 °F)   Resp 18   Wt 48 kg (105 lb 13.1 oz)   SpO2 92%     Image Results  XR ankle right 3+ views    Result Date: 10/2/2023  Interpreted By:  Jeny Troy, STUDY: XR ANKLE RIGHT 3+ VIEWS; ;  10/2/2023 1:14 pm   INDICATION: Signs/Symptoms:Concern for osteomyelitis.   COMPARISON: 07/17/2023 and 09/12/2023    ACCESSION NUMBER(S): [id number]   ORDERING CLINICIAN: ISAIAH DOLL   FINDINGS: AP, oblique and lateral views were obtained. 2 screws anchor fracture fragment of the calcaneal tuberosity in position. There is also a metallic pin in the calcaneal tubercle. A small amount of lucency persists at the fracture site which does not appear significantly changed. Along the cortical surface of the calcaneal tubercle just slightly above the level of the metallic pin the bone appears questionably less dense. This is a fairly subtle finding and could be projectional. However this is in the vicinity of the overlying skin ulceration. Superficial soft tissue air is noted at the level of the ulceration with no deeply penetrating soft tissue air.       Metallic structures are present from internal fixation related to the avulsion from the calcaneal tubercle   There is a subtle lucency on the lateral view at the cortical surface of the tubercle just above the metallic pin which is questionably an area of interval bone destruction versus a projectional difference.     MACRO: None   Signed by: Jeny Troy 10/2/2023 1:23 PM Dictation workstation:   [id number]    XR chest 1 view    Result Date: 10/2/2023  Interpreted By:  Jeny Troy, STUDY: XR CHEST 1 VIEW; ;  10/2/2023 1:13 pm   INDICATION: . Shortness of breath   COMPARISON: 02/15/2023   ACCESSION NUMBER(S): [id number]   ORDERING CLINICIAN: ISAIAH DOLL   TECHNIQUE: Portable AP upright   FINDINGS: ICD is present in the left upper chest wall with electrode wires in the right atrium and right ventricle. Cardiac silhouette is enlarged but appears similar to the prior study. Aorta is atherosclerotic. Interstitial markings are coarse but appears similar to the previous exam. Left costophrenic angle is indistinct which may indicate minimal left pleural fluid. Bones are osteopenic with no definite acute interval change.       Unchanged cardiomegaly   Small left pleural  effusion   Coarse interstitial markings are unchanged and probably related to chronic lung disease.     Signed by: Jeny Troy 10/2/2023 1:18 PM Dictation workstation:   [id number]    XR foot    Result Date: 9/13/2023  Interpreted By:  GLADYS SHELDON MD MRN: [id number] Patient Name: CHERRY LEE  STUDY: FOOT; COMPLETE, MIN 3 VIEWS;  9/12/2023 1:03 pm  INDICATION: right foot pain  S92.031D: Closed displaced avulsion fracture of tuberosity of right calcaneus with routine healing, subsequent.  COMPARISON: 08/22/2023  ACCESSION NUMBER(S): [id number]  ORDERING CLINICIAN: JOANNE LANDA  FINDINGS: Right foot, three views  Postsurgical changes in the calcaneus status post fixation of an avulsion fracture with screws. The hardware is intact. Soft tissue edema posteriorly. There is no acute fracture. There is no dislocation      ORIF of calcaneal fracture with unchanged alignment and intact hardware. Associated soft tissue edema posteriorly       Lab Results  Results for orders placed or performed during the hospital encounter of 10/02/23 (from the past 24 hour(s))   Transthoracic echo (TTE) complete   Result Value Ref Range    LV A4C EF 76.2    Basic Metabolic Panel   Result Value Ref Range    Glucose 94 74 - 99 mg/dL    Sodium 137 136 - 145 mmol/L    Potassium 4.1 3.5 - 5.3 mmol/L    Chloride 100 98 - 107 mmol/L    Bicarbonate 31 21 - 32 mmol/L    Anion Gap 10 10 - 20 mmol/L    Urea Nitrogen 43 (H) 6 - 23 mg/dL    Creatinine 1.85 (H) 0.50 - 1.05 mg/dL    eGFR 26 (L) >60 mL/min/1.73m*2    Calcium 8.6 8.6 - 10.3 mg/dL   Magnesium   Result Value Ref Range    Magnesium 1.97 1.60 - 2.40 mg/dL   CBC and Auto Differential   Result Value Ref Range    WBC 7.5 4.4 - 11.3 x10*3/uL    nRBC 0.0 0.0 - 0.0 /100 WBCs    RBC 4.38 4.00 - 5.20 x10*6/uL    Hemoglobin 11.5 (L) 12.0 - 16.0 g/dL    Hematocrit 38.3 36.0 - 46.0 %    MCV 87 80 - 100 fL    MCH 26.3 26.0 - 34.0 pg    MCHC 30.0 (L) 32.0 - 36.0 g/dL    RDW 17.4 (H) 11.5 - 14.5  %    Platelets 357 150 - 450 x10*3/uL    MPV 8.7 7.5 - 11.5 fL    Neutrophils % 75.5 40.0 - 80.0 %    Immature Granulocytes %, Automated 0.7 0.0 - 0.9 %    Lymphocytes % 7.9 13.0 - 44.0 %    Monocytes % 9.6 2.0 - 10.0 %    Eosinophils % 5.5 0.0 - 6.0 %    Basophils % 0.8 0.0 - 2.0 %    Neutrophils Absolute 5.65 (H) 1.60 - 5.50 x10*3/uL    Immature Granulocytes Absolute, Automated 0.05 0.00 - 0.50 x10*3/uL    Lymphocytes Absolute 0.59 (L) 0.80 - 3.00 x10*3/uL    Monocytes Absolute 0.72 0.05 - 0.80 x10*3/uL    Eosinophils Absolute 0.41 (H) 0.00 - 0.40 x10*3/uL    Basophils Absolute 0.06 0.00 - 0.10 x10*3/uL   Sodium, Urine Random   Result Value Ref Range    Sodium, Urine Random 66 mmol/L    Creatinine, Urine Random 32.7 20.0 - 320.0 mg/dL    Sodium/Creatinine Ratio 202 Not established. mmol/g Creat   Albumin, Urine Random   Result Value Ref Range    Albumin, Urine Random <7.0 Not established mg/L    Creatinine, Urine Random 32.7 20.0 - 320.0 mg/dL    Albumin/Creatine Ratio          Medications  Scheduled medications:  amiodarone, 200 mg, oral, Daily  aspirin, 81 mg, oral, Daily  atorvastatin, 80 mg, oral, Nightly  ceFAZolin, 1 g, intravenous, q12h  cyanocobalamin, 2,000 mcg, oral, Daily  furosemide, 40 mg, intravenous, BID  heparin (porcine), 5,000 Units, subcutaneous, q8h  levothyroxine, 25 mcg, oral, Daily  [Held by provider] lisinopril, 5 mg, oral, Daily  metoprolol succinate XL, 25 mg, oral, Daily  oxygen, , inhalation, Continuous - 02/gases  polyethylene glycol, 17 g, oral, Daily  spironolactone, 12.5 mg, oral, q24h MACRINA      Continuous medications:     PRN medications:  PRN medications: acetaminophen, ALPRAZolam, bisacodyl, melatonin     Physical Exam  Constitutional:       General: She is not in acute distress.     Appearance: Normal appearance.   HENT:      Head: Normocephalic and atraumatic.      Right Ear: External ear normal.      Left Ear: External ear normal.      Nose: Nose normal.      Mouth/Throat:       Mouth: Mucous membranes are moist.      Pharynx: Oropharynx is clear.   Eyes:      Extraocular Movements: Extraocular movements intact.      Conjunctiva/sclera: Conjunctivae normal.      Pupils: Pupils are equal, round, and reactive to light.   Cardiovascular:      Rate and Rhythm: Normal rate and regular rhythm.      Pulses: Normal pulses.      Heart sounds: Normal heart sounds.   Pulmonary:      Effort: Pulmonary effort is normal. No respiratory distress.      Breath sounds: Rales present. No wheezing or rhonchi.      Comments: NC in place  Abdominal:      General: Abdomen is flat. Bowel sounds are normal.      Palpations: Abdomen is soft.      Tenderness: There is no abdominal tenderness. There is no right CVA tenderness, left CVA tenderness, guarding or rebound.   Musculoskeletal:         General: Normal range of motion.      Cervical back: Normal range of motion and neck supple.      Right lower leg: Edema present.      Left lower leg: Edema present.   Skin:     General: Skin is warm and dry.      Capillary Refill: Capillary refill takes less than 2 seconds.      Findings: Lesion (Heel) present. No rash.   Neurological:      General: No focal deficit present.      Mental Status: She is alert and oriented to person, place, and time. Mental status is at baseline.   Psychiatric:         Mood and Affect: Mood normal.         Behavior: Behavior normal.                  Code Status  Full Code     Assessment/Plan   This patient currently has cardiac telemetry ordered; if you would like to modify or discontinue the telemetry order, click here to go to the orders activity to modify/discontinue the order.      Acute on chronic congestive heart failure, unspecified heart failure type (CMS/HCC)  Cardiology Consult appreciated   BNP = 1279  Imaging and examination consistent with volume overload  Continued on IV Lasix 40mg IV BID  Strict I's & O's/Daily Weights   Echocardiogram pending   Echo 02/16/23 with EF of 50% and  severely increased LV septal thickness compared to Echo 09/22/2022 with EF of 60% and mild AV regurgitation.  Monitor renal function closely  Spironolactone added  Consider resumption of sglt2i    Non-healing wound of right lower extremity  s/p ORIF 07/19/23 by Dr. Brunner with recommendations of continued non-weight bearing per orthopedics/wound care due to  a persistent wound as noted to be by the patient  Wound Care Consult   ESR = 41,CRP = 6.22  Consult to podiatry for assistance  Due o have graft in 1 week per patient  ID consult given elevated inflammatory markers  Hold off on antibiotics for time being as patient hemodynamically stable     Hypertension  Continued on home medications    Hyperlipidemia  Continued on home medications    Hypothyroidism  Continued on home medications    Paroxysmal atrial fibrillation (CMS/[facility])  History of atrial fibrillation/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 and left appendage ligation  Status post dual-chamber PPM  History of recurrent diverticular bleeds preventing further anticoagulation given about 4 years ago  Continued on home medications   ontinued outpatient followup with Cardiology & Electrophysiology  In the EMR she was noted to be allergic to Amiodarone, but this was also noted as still being taken on her medication list on Cardiology outpatient record on 09/25/23 and patient states she still takes Amiodarone- We will hold the amiodarone for now and discuss further with pharmacy to see if she is still filling this or not  Telemetry     CKD (chronic kidney disease) stage 3, GFR 30-59 ml/min (CMS/[facility])  Baseline creatinine of around 1.8 prior to 02/2023 and did have brief elevations throughout 2023 for dehydration and recent fractures but now seems to be around 1.6  Continue to monitor very closely in setting of diuresis   Nephrology consultation  Bladder scan for PVR x1    Elevated troponin I level  30 --> 29   Likely in setting of volume  overload and CKD   Nonischemic EKG noted on presentation  Telemetry Monitoring    Acute hypoxic respiratory failure (CMS/HCC)  2/2 volume overload  Continue diuresis  Home O2 eval prior to discharge      DVT ppx: subcutaneous Heparin         Arpita Frazier PA-C

## 2023-10-04 NOTE — CONSULTS
Wound Care Consult     Visit Date: 10/4/2023      Patient Name: Adwoa Forrest         MRN: [id number]           YOB: 1935    Patient was not seen for wound consult as she was seen by podiatry on 10/3/23. Will defer to Dr. Chandler recommendations. Message sent to Dr. Chandler for dressing orders.      Najma Mccarty RN  10/4/2023  9:30 AM

## 2023-10-04 NOTE — CONSULTS
Reason For Consult  Chronic ulcer on right heel    History Of Present Illness  Adwoa Forrest is a 88 y.o. female presenting with a non-healing ulcer on the right posterior heel. She reports it started after a non-helaing ORIF incision earlier this year. The surgical wound opened and formed an ulcer. She has been treated by another provider at the [facility] wound center. She reports having completed extended course of antibiotics and was due to receive skin grafting over the wound this week. She was admitted due to heart failure exacerbation, according to patient unrelated to any issue with her wound.      Past Medical History  She has a past medical history of Abnormal findings on diagnostic imaging of heart and coronary circulation (06/13/2017), Acute on chronic combined systolic (congestive) and diastolic (congestive) heart failure (CMS/HCC) (09/28/2017), Acute on chronic combined systolic (congestive) and diastolic (congestive) heart failure (CMS/HCC) (02/05/2020), Anemia, unspecified (11/18/2020), Chronic diastolic (congestive) heart failure (CMS/[facility]) (09/15/2020), Disorder of the skin and subcutaneous tissue, unspecified (02/16/2020), Diverticulitis of large intestine with perforation and abscess without bleeding, Diverticulosis of intestine, part unspecified, without perforation or abscess without bleeding, Dorsalgia, unspecified (08/16/2019), Encounter for immunization (11/13/2019), Encounter for surgical aftercare following surgery on the circulatory system (02/18/2019), Essential (primary) hypertension (11/02/2022), Gastro-esophageal reflux disease without esophagitis (02/05/2020), 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), Long term (current) use of anticoagulants (10/22/2018), Melena (04/10/2020), Old myocardial infarction (10/19/2022), Other forms of dyspnea (01/24/2020), Other long term (current) drug therapy (05/10/2018), Other specified cardiac arrhythmias  (02/26/2020), Paroxysmal atrial fibrillation (CMS/HCC) (02/01/2018), Permanent atrial fibrillation (CMS/HCC) (01/24/2020), Personal history of diseases of the blood and blood-forming organs and certain disorders involving the immune mechanism (04/10/2020), Personal history of other diseases of the circulatory system (11/04/2018), Personal history of other diseases of the digestive system (10/22/2018), Personal history of other diseases of the musculoskeletal system and connective tissue, Personal history of other diseases of the musculoskeletal system and connective tissue (08/16/2019), Personal history of other endocrine, nutritional and metabolic disease (02/18/2019), Personal history of other specified conditions (08/13/2020), Personal history of other specified conditions, Personal history of transient ischemic attack (TIA), and cerebral infarction without residual deficits (02/01/2018), Psychophysiologic insomnia (11/18/2020), Segmental and somatic dysfunction of rib cage (08/16/2019), Segmental and somatic dysfunction of thoracic region (08/16/2019), and Segmental and somatic dysfunction of upper extremity (08/16/2019).    Surgical History  She has a past surgical history that includes Cataract extraction (05/04/2016); Appendectomy (05/04/2016); Hysterectomy (05/04/2016); Tonsillectomy (05/04/2016); Cholecystectomy (05/04/2016); Other surgical history (02/18/2019); Other surgical history (04/26/2018); Cardiac pacemaker placement (04/26/2018); Coronary angioplasty (02/01/2018); and Colectomy partial / total (08/17/2018).     Social History  She reports that she has never smoked. She has never been exposed to tobacco smoke. She has never used smokeless tobacco. No history on file for alcohol use and drug use.    Family History  No family history on file.     Allergies  Codeine, Hydrocodone, Sotalol, and Tetracyclines    Review of Systems  Denies fever, chills, nausea. Reports weakness.   No shortness of breath.  "On supplemental O2.   No pain reported.   Lightly palpable pedal pulses.     Physical Exam  Full thickness ulcer on posterior right calcaneus. Exposed achilles tendon tissue, some is necrosed and fraying from the bone attachment. Bone is granulated over and not directly visible. Undermining noted. No overt signs of infection. Adequate periwound perfusion.         Last Recorded Vitals  Blood pressure 104/58, pulse 65, temperature 36.5 °C (97.7 °F), temperature source Temporal, resp. rate 16, height 1.575 m (5' 2\"), weight 50.7 kg (111 lb 12.4 oz), SpO2 95 %.    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 No current sign of infection.   -No surgical intervention planned on the wound while admitted.   -Follow up with wound care on discharge and proceed with skin grafting.   -Answered patient's questions regarding healing process.   -I will follow peripherally and reassess if needed.     I spent 35 minutes in the professional and overall care of this patient.      Nick Chandler DPM    "

## 2023-10-04 NOTE — CONSULTS
..[facility]  Nephrology Consult Note           Reason for Consult:  Volume overload/rising Scr  Requesting Physician:  Clyde Wright MD    Chief Complaint:    Chief Complaint   Patient presents with    Shortness of Breath     Pt to ER with c/o weakness and shortness of breath for 1 week       History of Present Ilness:   Patient is a 88 y.o. 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 Endorsing associated s/o ~1 week of progressively worsening generalized weakness, cough, wheezing, and congestion. Was unable to get out of bed prompting her presentation to the ED. Denies any recent sick contacts, changes in dietary habits or any recent traumatic events/falls, fevers, chills, night sweats, N/V/D, chest pain, issues with urination. Patient reports currently her breathing and swelling are significantly improved.    Nephrology is consulted for evaluation and management of YEVGENIY. Patient's current creatinine level is 1.84, previously 1.62 on admission. Baseline creatinine appears to be ~ 1.6-1.8.   Patient admits she has known about kidney disease for some time but has not seen a nephrologist.   Patient is on Lisinopril 2.5 mg, Farxiga 10 mg, Lasix 40 mg PTA.  The lisinopril is currently held. Farxiga is not ordered. Patient denies NSAID use. No evidence of recent exposure to IV contrast. Receiving IV diuretics with Lasix 40 mg BID and oral spironolactone 12.5 mg daily. Hypotension with documented BP as low as 95/52 this a.m. and is currently soft but improving.     Past Medical History:     Past Medical History:   Diagnosis Date    Abnormal findings on diagnostic imaging of heart and coronary circulation 06/13/2017    Abnormal computed tomography angiography of heart    Acute on chronic combined systolic (congestive) and diastolic (congestive) heart failure (CMS/[facility]) 09/28/2017    Acute on chronic combined systolic and diastolic heart failure    Acute on chronic combined systolic (congestive) and diastolic (congestive) heart failure (CMS/[facility]) 02/05/2020    Acute on chronic combined systolic and diastolic CHF, NYHA class 2    Anemia, unspecified 11/18/2020    Anemia, mild    Chronic diastolic (congestive) heart failure (CMS/[facility]) 09/15/2020    Chronic diastolic congestive heart failure    Disorder of the skin and subcutaneous tissue, unspecified 02/16/2020    Skin lesion of face    Diverticulitis of large intestine with perforation and abscess without bleeding     Perforation of sigmoid colon due to diverticulitis    Diverticulosis of intestine, part unspecified, without perforation or abscess without bleeding     Diverticulosis    Dorsalgia, unspecified 08/16/2019    Upper back pain    Encounter for immunization 11/13/2019    Need for vaccination    Encounter for surgical aftercare following surgery on the circulatory system 02/18/2019    Aftercare following surgery of the circulatory system    Essential (primary) hypertension 11/02/2022    Benign essential hypertension    Gastro-esophageal reflux disease without esophagitis 02/05/2020    Gastroesophageal reflux disease without esophagitis    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    Hypothyroidism due to medication    Long term (current) use of anticoagulants 10/22/2018    Long term (current) use of anticoagulants    Melena 04/10/2020    Blood in stool    Old myocardial infarction 10/19/2022    History of non-ST elevation myocardial infarction (NSTEMI)    Other forms of dyspnea 01/24/2020    Dyspnea on exertion     Other long term (current) drug therapy 05/10/2018    Long term current use of antiarrhythmic drug    Other specified cardiac arrhythmias 02/26/2020    Arrhythmia, atrial    Paroxysmal atrial fibrillation (CMS/HCC) 02/01/2018    Paroxysmal atrial fibrillation    Permanent atrial fibrillation (CMS/HCC) 01/24/2020    Permanent atrial fibrillation    Personal history of diseases of the blood and blood-forming organs and certain disorders involving the immune mechanism 04/10/2020    History of anemia    Personal history of other diseases of the circulatory system 11/04/2018    History of abnormal electrocardiography    Personal history of other diseases of the digestive system 10/22/2018    History of lower GI bleeding    Personal history of other diseases of the musculoskeletal system and connective tissue     History of osteoporosis    Personal history of other diseases of the musculoskeletal system and connective tissue 08/16/2019    History of muscle spasm    Personal history of other endocrine, nutritional and metabolic disease 02/18/2019    History of hyperlipidemia    Personal history of other specified conditions 08/13/2020    History of diarrhea    Personal history of other specified conditions     History of precordial chest pain    Personal history of transient ischemic attack (TIA), and cerebral infarction without residual deficits 02/01/2018    Cerebrovascular accident (CVA) within last 3 months    Psychophysiologic insomnia 11/18/2020    Chronic insomnia    Segmental and somatic dysfunction of rib cage 08/16/2019    Segmental and somatic dysfunction of rib cage    Segmental and somatic dysfunction of thoracic region 08/16/2019    Segmental and somatic dysfunction of thoracic region    Segmental and somatic dysfunction of upper extremity 08/16/2019    Segmental and somatic dysfunction of upper extremity       Past Surgical History:    Past Surgical History:   Procedure Laterality Date    APPENDECTOMY   05/04/2016    Appendectomy    CARDIAC PACEMAKER PLACEMENT  04/26/2018    Pacemaker Permanent Placement    CATARACT EXTRACTION  05/04/2016    Cataract Surgery    CHOLECYSTECTOMY  05/04/2016    Cholecystectomy    COLECTOMY PARTIAL / TOTAL  08/17/2018    Partial Colectomy - Sigmoid    CORONARY ANGIOPLASTY  02/01/2018    PTCA    HYSTERECTOMY  05/04/2016    Hysterectomy    OTHER SURGICAL HISTORY  02/18/2019    Atrial appendage closure    OTHER SURGICAL HISTORY  04/26/2018    Catheter Ablation Atrioventricular Node    TONSILLECTOMY  05/04/2016    Tonsillectomy       Home Medications:    No current facility-administered medications on file prior to encounter.     Current Outpatient Medications on File Prior to Encounter   Medication Sig Dispense Refill    ALPRAZolam (Xanax) 0.5 mg tablet Take 1 tablet (0.5 mg) by mouth as needed at bedtime for sleep or anxiety. 90 tablet 0    amiodarone (Pacerone) 200 mg tablet Take 1 tablet (200 mg) by mouth once daily.      aspirin 81 mg EC tablet Take 1 tablet (81 mg) by mouth once daily.      atorvastatin (Lipitor) 80 mg tablet Take 1 tablet (80 mg) by mouth once daily at bedtime.      calcium carbonate-vitamin D3 500 mg-10 mcg (400 unit) chewable tablet Chew 1 tablet once daily.      cyanocobalamin (Vitamin B-12) 2,000 mcg tablet Take 1 tablet (2,000 mcg) by mouth once daily.      Farxiga 10 mg Take 1 tablet (10 mg) by mouth once daily.      furosemide (Lasix) 40 mg tablet Take 1.5 tablets (60 mg) by mouth.      glucosamine HCl 500 mg tablet Take 1 tablet by mouth once daily.      levothyroxine (Synthroid, Levoxyl) 25 mcg tablet TAKE 1 TABLET BY MOUTH EVERY DAY 90 tablet 3    lisinopril 2.5 mg tablet Take 2 tablets (5 mg) by mouth once daily.      metoprolol succinate XL (Toprol-XL) 50 mg 24 hr tablet Take 1 tablet (50 mg) by mouth once daily.      [DISCONTINUED] omeprazole (PriLOSEC) 20 mg DR capsule          Allergies:  Codeine, Hydrocodone, Sotalol, and Tetracyclines    Social  History:    Social History     Socioeconomic History    Marital status:      Spouse name: Not on file    Number of children: Not on file    Years of education: Not on file    Highest education level: Not on file   Occupational History    Not on file   Tobacco Use    Smoking status: Never     Passive exposure: Never    Smokeless tobacco: Never   Substance and Sexual Activity    Alcohol use: Not on file    Drug use: Not on file    Sexual activity: Not on file   Other Topics Concern    Not on file   Social History Narrative    Not on file     Social Determinants of Health     Financial Resource Strain: Not on file   Food Insecurity: Not on file   Transportation Needs: Unknown (10/3/2023)    PRAPARE - Transportation     Lack of Transportation (Medical): Patient refused     Lack of Transportation (Non-Medical): Patient refused   Physical Activity: Not on file   Stress: Not on file   Social Connections: Not on file   Intimate Partner Violence: Not on file   Housing Stability: Unknown (10/3/2023)    Housing Stability Vital Sign     Unable to Pay for Housing in the Last Year: Patient refused     Number of Places Lived in the Last Year: 1     Unstable Housing in the Last Year: Patient refused       Family History:   No CKD or ESRD requiring dialysis    Review of Systems:   Pertinent positives stated above in HPI. All other systems were reviewed and were negative.      Physical exam:   Physical Exam  Constitutional:       General: She is not in acute distress.     Appearance: Normal appearance. She is not ill-appearing or toxic-appearing.   HENT:      Head: Normocephalic and atraumatic.      Mouth/Throat:      Mouth: Mucous membranes are moist.   Eyes:      General: No scleral icterus.     Extraocular Movements: Extraocular movements intact.      Conjunctiva/sclera: Conjunctivae normal.      Pupils: Pupils are equal, round, and reactive to light.   Neck:      Vascular: No carotid bruit.   Cardiovascular:      Rate and  Rhythm: Normal rate and regular rhythm.      Pulses: Normal pulses.      Heart sounds: Normal heart sounds. No murmur heard.     No friction rub. No gallop.   Pulmonary:      Effort: Pulmonary effort is normal. No respiratory distress.      Comments: Bibasilar crackles  Abdominal:      General: Abdomen is flat. Bowel sounds are normal. There is no distension.      Palpations: Abdomen is soft.      Tenderness: There is no right CVA tenderness or left CVA tenderness.   Musculoskeletal:         General: Signs of injury present.      Cervical back: Neck supple.      Right lower leg: Edema present.      Left lower leg: Edema present.   Skin:     General: Skin is warm and dry.      Findings: No rash.   Neurological:      General: No focal deficit present.      Mental Status: She is alert and oriented to person, place, and time.   Psychiatric:         Mood and Affect: Mood normal.            Vitals:    10/03/23 2137 10/04/23 0100 10/04/23 0513 10/04/23 0919   BP: 104/58 95/52 104/58 111/67   BP Location: Left arm Left arm Left arm    Patient Position: Lying Lying Lying    Pulse: 65 65 65 64   Resp: 16 16 16 16   Temp: 36.5 °C (97.7 °F) 37.1 °C (98.8 °F) 36.2 °C (97.1 °F) 36.4 °C (97.5 °F)   TempSrc: Temporal Temporal Temporal    SpO2: 95% 93% 91% 94%   Weight:   48 kg (105 lb 13.1 oz)    Height:           CURRENT TEMPERATURE:  Temp: 36.4 °C (97.5 °F)  MAXIMUM TEMPERATURE OVER 24HRS:  Temp (24hrs), Av.4 °C (97.6 °F), Min:36.2 °C (97.1 °F), Max:37.1 °C (98.8 °F)    CURRENT PULSE:  Heart Rate: 64  CURRENT BLOOD PRESSURE:  BP: 111/67  24HR BLOOD PRESSURE RANGE:  Systolic (24hrs), Av , Min:95 , Max:118   ; Diastolic (24hrs), Av, Min:52, Max:71    24HR INTAKE/OUTPUT:    Intake/Output Summary (Last 24 hours) at 10/4/2023 0964  Last data filed at 10/4/2023 0851  Gross per 24 hour   Intake 463 ml   Output 230 ml   Net 233 ml           Data:    Lab Results   Component Value Date    WBC 7.5 10/04/2023    HGB 11.5 (L)  10/04/2023    HCT 38.3 10/04/2023    MCV 87 10/04/2023     10/04/2023     Lab Results   Component Value Date    GLUCOSE 94 10/04/2023    CALCIUM 8.6 10/04/2023     10/04/2023    K 4.1 10/04/2023    CO2 31 10/04/2023     10/04/2023    BUN 43 (H) 10/04/2023    CREATININE 1.85 (H) 10/04/2023          Imaging: reviewed     XR chest: Noted unchanged cardiomegaly with a small left pleural effusion and coarse interstitial markings consistent with chronic lung disease as well as vascular congestion     Echo 10/3:   CONCLUSIONS:   1. Left ventricular systolic function is normal with a 70-75% estimated ejection fraction.   2. There is moderate left ventricular hypertrophy.   3. There is mildly reduced right ventricular systolic function.   4. The right atrium is moderately dilated.   5. Moderately elevated right ventricular systolic pressure.   6. Mild to moderate aortic valve regurgitation.      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  CKD 3b  Acute on chronic diastolic HFpEF 70-75%  Acute hypoxic respiratory failure (no oxygen at baseline)  Small left pleural effusion  RLE cellulitis and infected foot wound  Persistent Afib      Plan:  -Scr currently 1.84, bl~1.6-1.7, non oliguric, BP stable  -No acute need for RRT at this time  -Continue the IV lasix 40 mg BID and started on Aldactone 12.5 mg, cardiology managing   -Noted cardiology plans to transition Lasix to PO 60 mg possibly tomorrow, agree  -Will have to tolerate some rise in Scr for volume management  -Hold ACE-I and sglt2i for now and may consider resuming later  -YEVGENIY workup ordered: UA and urine studies  -Bladder scan x1 for pvr, place cooper for >250cc  -Avoid nephrotoxins and IV contrast dye  -Dose all meds per current eGFR  -Management of Afib defer to cardiology.  -IV Ancef for the cellulitis and defer to ID  -Further management of chronic foot would defer to surgery/Podiatry teams  -Currently on 4L oxygen via [place] and no baseline oxygen use, wean as  able, defer to primary  -All other care defer to primary   -We will follow closely with you    Thank you for the consult and the opportunity to participate in the care of this patient. Please do not hesitate to call with any questions or concerns.    INDIGO [facility], [facility]  [phone] office

## 2023-10-04 NOTE — PROGRESS NOTES
Pt plan to DC to home-may need O2 eval prior and this was explained to pt.  Pt states has had it in the past.

## 2023-10-04 NOTE — PROGRESS NOTES
Patient seen and examined denies any [facility] INFECTIOUS DISEASE PROGRESS NOTE    Patient Name: Adwoa Forrest  MRN: [id number]    INTERVAL HISTORY:   Patient seen and examined  Afebrile hemodynamically stable    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)    Chronic insomnia    Elevated hemoglobin (CMS/[facility]ly [facility])    Gastro-esophageal reflux disease without esophagitis    Elevated LFTs    Hyperlipidemia    Hypothyroidism    Presence of stent in coronary artery    Ventricular tachycardia (paroxysmal) (CMS/[facility]ly [facility])    Vitamin B12 deficiency    Vitamin D deficiency    Acute on chronic congestive heart failure, unspecified heart failure type (CMS/[facility]ly [facility])    Non-healing wound of right lower extremity    CKD (chronic kidney disease) stage 3, GFR 30-59 ml/min (CMS/[facility])    Hypertension    Paroxysmal atrial fibrillation (CMS/HCC)    Elevated troponin I level        Assessment/Plan   Right lower extremity cellulitis and infected wound  Hypertension  Paroxysmal A-fib  Hypothyroidism  Hyperlipidemia  Acute on chronic congestive heart failure  Plan  Continue patient on Ancef  Follow fevers white count monitor electrolytes  If fevers obtain blood cultures  Continue wound care  Noted podiatry recommendations  Wound cultures with mixed bacteria await identification  ID will follow with additional recommendations     Other medical issues  Paroxysmal A-fib hypothyroidism hyperlipidemia CHF no interactions noted with home medications with current antibiotics  MEDICATIONS: reviewed.    Current Facility-Administered Medications:     acetaminophen (Tylenol) tablet 650 mg, 650 mg, oral, q4h PRN, Jt House DO, 650 mg at 10/04/23 0528    ALPRAZolam (Xanax) tablet 0.5 mg, 0.5 mg, oral, Nightly PRN, Nallely  "HEATHER Clarke, 0.5 mg at 10/03/23 2103    amiodarone (Pacerone) tablet 200 mg, 200 mg, oral, Daily, Jt House DO, 200 mg at 10/04/23 0831    aspirin EC tablet 81 mg, 81 mg, oral, Daily, Nallely Clarke PA-C, 81 mg at 10/04/23 0832    atorvastatin (Lipitor) tablet 80 mg, 80 mg, oral, Nightly, Nallely Clarke PA-C, 80 mg at 10/03/23 210    bisacodyl (Dulcolax) EC tablet 10 mg, 10 mg, oral, Daily PRN, Nallely Clarke PA-C    ceFAZolin in dextrose (iso-os) (Ancef) IVPB 1 g, 1 g, intravenous, q12h, Vernon Arenas MD, Stopped at 10/04/23 0600    cyanocobalamin (Vitamin B-12) tablet 2,000 mcg, 2,000 mcg, oral, Daily, Nallely Clarke PA-C, 2,000 mcg at 10/04/23 0831    furosemide (Lasix) injection 40 mg, 40 mg, intravenous, BID, Nallely Clarke PA-C, 40 mg at 10/04/23 0832    heparin (porcine) injection 5,000 Units, 5,000 Units, subcutaneous, q8h, Nallely Clarke PA-C, 5,000 Units at 10/04/23 0705    levothyroxine (Synthroid, Levoxyl) tablet 25 mcg, 25 mcg, oral, Daily, Nallely Clarke PA-C, 25 mcg at 10/04/23 0530    [Held by provider] lisinopril tablet 5 mg, 5 mg, oral, Daily, Jt House DO    melatonin tablet 9 mg, 9 mg, oral, Nightly PRN, Jt House DO, 9 mg at 10/03/23 0223    metoprolol succinate XL (Toprol-XL) 24 hr tablet 25 mg, 25 mg, oral, Daily, Arpita Camp PA-C, 25 mg at 10/04/23 0832    oxygen (O2) therapy, , inhalation, Continuous - , Frederick Lowry MD, 4 L/min at 10/04/23 1145    polyethylene glycol (Glycolax, Miralax) packet 17 g, 17 g, oral, Daily, Nallely Clarke PA-C    spironolactone (Aldactone) tablet 12.5 mg, 12.5 mg, oral, q24h MACRINA, Michael Latham MD, 12.5 mg at 10/04/23 0831     PHYSICAL EXAM:  Vital signs: /67   Pulse 64   Temp 36.4 °C (97.5 °F)   Resp 16   Ht 1.575 m (5' 2\")   Wt 48 kg (105 lb 13.1 oz)   SpO2 92%   BMI 19.35 kg/m²   Temp (24hrs), Av.5 °C (97.7 °F), Min:36.2 °C (97.1 °F), Max:37.1 °C (98.8 °F)      General " alert oriented x3  Lungs clear to auscultation bilaterally  Abdomen soft nontender  CV no murmurs  Ulcerative lesion with edema and surrounding improved erythema posteriorly to the right ankle            Labs:    Results for orders placed or performed during the hospital encounter of 10/02/23 (from the past 96 hour(s))   BLOOD GAS VENOUS   Result Value Ref Range    POCT pH, Venous 7.32 (L) 7.33 - 7.43 pH    POCT pCO2, Venous 64 (H) 41 - 51 mm Hg    POCT pO2, Venous 23 (L) 35 - 45 mm Hg    POCT SO2, Venous 25 (L) 45 - 75 %    POCT Oxy Hemoglobin, Venous 24.5 (L) 45.0 - 75.0 %    POCT Base Excess, Venous 4.9 (H) -2.0 - 3.0 mmol/L    POCT HCO3 Calculated, Venous 33.0 (H) 22.0 - 26.0 mmol/L    Patient Temperature 37.0 degrees Celsius   CBC and Auto Differential   Result Value Ref Range    WBC 9.1 4.4 - 11.3 x10*3/uL    nRBC 0.0 0.0 - 0.0 /100 WBCs    RBC 5.21 (H) 4.00 - 5.20 x10*6/uL    Hemoglobin 13.9 12.0 - 16.0 g/dL    Hematocrit 45.1 36.0 - 46.0 %    MCV 87 80 - 100 fL    MCH 26.7 26.0 - 34.0 pg    MCHC 30.8 (L) 32.0 - 36.0 g/dL    RDW 18.0 (H) 11.5 - 14.5 %    Platelets 420 150 - 450 x10*3/uL    MPV 8.7 7.5 - 11.5 fL    Neutrophils % 84.1 40.0 - 80.0 %    Immature Granulocytes %, Automated 0.8 0.0 - 0.9 %    Lymphocytes % 4.4 13.0 - 44.0 %    Monocytes % 8.0 2.0 - 10.0 %    Eosinophils % 1.9 0.0 - 6.0 %    Basophils % 0.8 0.0 - 2.0 %    Neutrophils Absolute 7.65 (H) 1.60 - 5.50 x10*3/uL    Immature Granulocytes Absolute, Automated 0.07 0.00 - 0.50 x10*3/uL    Lymphocytes Absolute 0.40 (L) 0.80 - 3.00 x10*3/uL    Monocytes Absolute 0.73 0.05 - 0.80 x10*3/uL    Eosinophils Absolute 0.17 0.00 - 0.40 x10*3/uL    Basophils Absolute 0.07 0.00 - 0.10 x10*3/uL   B-Type Natriuretic Peptide   Result Value Ref Range    BNP 1,279 (H) 0 - 99 pg/mL   Comprehensive metabolic panel   Result Value Ref Range    Glucose 108 (H) 74 - 99 mg/dL    Sodium 135 (L) 136 - 145 mmol/L    Potassium 5.2 3.5 - 5.3 mmol/L    Chloride 99 98 - 107  mmol/L    Bicarbonate 28 21 - 32 mmol/L    Anion Gap 13 10 - 20 mmol/L    Urea Nitrogen 39 (H) 6 - 23 mg/dL    Creatinine 1.62 (H) 0.50 - 1.05 mg/dL    eGFR 31 (L) >60 mL/min/1.73m*2    Calcium 9.1 8.6 - 10.3 mg/dL    Albumin 3.6 3.4 - 5.0 g/dL    Alkaline Phosphatase 121 33 - 136 U/L    Total Protein 6.4 6.4 - 8.2 g/dL    AST 30 9 - 39 U/L    Bilirubin, Total 0.7 0.0 - 1.2 mg/dL    ALT 23 7 - 45 U/L   Magnesium   Result Value Ref Range    Magnesium 2.32 1.60 - 2.40 mg/dL   Troponin I, High Sensitivity   Result Value Ref Range    Troponin I, High Sensitivity 30 (H) 0 - 13 ng/L   C-Reactive Protein   Result Value Ref Range    C-Reactive Protein 6.22 (H) <1.00 mg/dL   Sedimentation Rate   Result Value Ref Range    Sedimentation Rate 41 (H) 0 - 30 mm/h   SARS-CoV-2 RT PCR   Result Value Ref Range    Coronavirus 2019, PCR Not Detected Not Detected   Tissue/Wound Culture/Smear    Specimen: Wound/Tissue; Tissue/Biopsy   Result Value Ref Range    Tissue/Wound Culture/Smear Culture in progress     Gram Stain No polymorphonuclear leukocytes seen (A)     Gram Stain (A)      (4+) Abundant Mixed Gram positive and Gram negative bacteria   Troponin I, High Sensitivity   Result Value Ref Range    Troponin I, High Sensitivity 29 (H) 0 - 13 ng/L   Magnesium   Result Value Ref Range    Magnesium 2.07 1.60 - 2.40 mg/dL   B-Type Natriuretic Peptide   Result Value Ref Range    BNP 1,843 (H) 0 - 99 pg/mL   CBC   Result Value Ref Range    WBC 8.0 4.4 - 11.3 x10*3/uL    nRBC 0.0 0.0 - 0.0 /100 WBCs    RBC 4.25 4.00 - 5.20 x10*6/uL    Hemoglobin 11.5 (L) 12.0 - 16.0 g/dL    Hematocrit 36.9 36.0 - 46.0 %    MCV 87 80 - 100 fL    MCH 27.1 26.0 - 34.0 pg    MCHC 31.2 (L) 32.0 - 36.0 g/dL    RDW 17.6 (H) 11.5 - 14.5 %    Platelets 357 150 - 450 x10*3/uL    MPV 8.6 7.5 - 11.5 fL   Basic Metabolic Panel   Result Value Ref Range    Glucose 84 74 - 99 mg/dL    Sodium 137 136 - 145 mmol/L    Potassium 4.0 3.5 - 5.3 mmol/L    Chloride 101 98 - 107  mmol/L    Bicarbonate 32 21 - 32 mmol/L    Anion Gap 8 (L) 10 - 20 mmol/L    Urea Nitrogen 39 (H) 6 - 23 mg/dL    Creatinine 1.64 (H) 0.50 - 1.05 mg/dL    eGFR 30 (L) >60 mL/min/1.73m*2    Calcium 8.7 8.6 - 10.3 mg/dL   Urinalysis with Reflex Microscopic   Result Value Ref Range    Color, Urine Straw Straw, Yellow    Appearance, Urine Clear Clear    Specific Gravity, Urine 1.010 1.005 - 1.035    pH, Urine 6.0 5.0, 5.5, 6.0, 6.5, 7.0, 7.5, 8.0    Protein, Urine NEGATIVE NEGATIVE mg/dL    Glucose, Urine 150 (2+) (A) NEGATIVE mg/dL    Blood, Urine NEGATIVE NEGATIVE    Ketones, Urine NEGATIVE NEGATIVE mg/dL    Bilirubin, Urine NEGATIVE NEGATIVE    Urobilinogen, Urine <2.0 <2.0 mg/dL    Nitrite, Urine NEGATIVE NEGATIVE    Leukocyte Esterase, Urine LARGE (3+) (A) NEGATIVE   Urinalysis Microscopic Only   Result Value Ref Range    WBC, Urine 11-20 (A) 1-5, NONE /HPF    RBC, Urine 1-2 NONE, 1-2, 3-5 /HPF    Squamous Epithelial Cells, Urine 10-25 (FEW) Reference range not established. /HPF    Renal Epithelial Cells, Urine 1-2 (FEW) Reference range not established. /HPF   Transthoracic echo (TTE) complete   Result Value Ref Range    LV A4C EF 76.2    Basic Metabolic Panel   Result Value Ref Range    Glucose 94 74 - 99 mg/dL    Sodium 137 136 - 145 mmol/L    Potassium 4.1 3.5 - 5.3 mmol/L    Chloride 100 98 - 107 mmol/L    Bicarbonate 31 21 - 32 mmol/L    Anion Gap 10 10 - 20 mmol/L    Urea Nitrogen 43 (H) 6 - 23 mg/dL    Creatinine 1.85 (H) 0.50 - 1.05 mg/dL    eGFR 26 (L) >60 mL/min/1.73m*2    Calcium 8.6 8.6 - 10.3 mg/dL   Magnesium   Result Value Ref Range    Magnesium 1.97 1.60 - 2.40 mg/dL   CBC and Auto Differential   Result Value Ref Range    WBC 7.5 4.4 - 11.3 x10*3/uL    nRBC 0.0 0.0 - 0.0 /100 WBCs    RBC 4.38 4.00 - 5.20 x10*6/uL    Hemoglobin 11.5 (L) 12.0 - 16.0 g/dL    Hematocrit 38.3 36.0 - 46.0 %    MCV 87 80 - 100 fL    MCH 26.3 26.0 - 34.0 pg    MCHC 30.0 (L) 32.0 - 36.0 g/dL    RDW 17.4 (H) 11.5 - 14.5 %     Platelets 357 150 - 450 x10*3/uL    MPV 8.7 7.5 - 11.5 fL    Neutrophils % 75.5 40.0 - 80.0 %    Immature Granulocytes %, Automated 0.7 0.0 - 0.9 %    Lymphocytes % 7.9 13.0 - 44.0 %    Monocytes % 9.6 2.0 - 10.0 %    Eosinophils % 5.5 0.0 - 6.0 %    Basophils % 0.8 0.0 - 2.0 %    Neutrophils Absolute 5.65 (H) 1.60 - 5.50 x10*3/uL    Immature Granulocytes Absolute, Automated 0.05 0.00 - 0.50 x10*3/uL    Lymphocytes Absolute 0.59 (L) 0.80 - 3.00 x10*3/uL    Monocytes Absolute 0.72 0.05 - 0.80 x10*3/uL    Eosinophils Absolute 0.41 (H) 0.00 - 0.40 x10*3/uL    Basophils Absolute 0.06 0.00 - 0.10 x10*3/uL                  Microbiology data: reviewed    Imaging data: reviewed    Vernon Arenas MD    Pager:[phone]  Date of service: 10/4/2023  Time of service: 12:53 PM

## 2023-10-04 NOTE — CARE PLAN
The patient's goals for the shift include  pain management.    The clinical goals for the shift include heal wound    Over the shift, the patient did not make progress toward the following goals. Recommendations to address these barriers include education on staying ahead of the pain, non pharmacology interventions, nutrition,  medication and wound treatment.

## 2023-10-04 NOTE — ASSESSMENT & PLAN NOTE
Continue diuresis  Home O2 eval prior to discharge  Worsening hypoxia morning of 10/5/23- CXR with retrocardiac opacity  Will obtain CT chest as well as VBG  Titrate oxygen  Broaden to vanco/zosyn  Check MRSA nares  IV decadron short course

## 2023-10-05 ENCOUNTER — APPOINTMENT (OUTPATIENT)
Dept: RADIOLOGY | Facility: HOSPITAL | Age: 88
DRG: 291 | End: 2023-10-05
Payer: MEDICARE

## 2023-10-05 ENCOUNTER — APPOINTMENT (OUTPATIENT)
Dept: VASCULAR MEDICINE | Facility: HOSPITAL | Age: 88
DRG: 291 | End: 2023-10-05
Payer: MEDICARE

## 2023-10-05 DIAGNOSIS — S92.031D: ICD-10-CM

## 2023-10-05 LAB
ANION GAP SERPL CALC-SCNC: 13 MMOL/L (ref 10–20)
BASE EXCESS BLDV CALC-SCNC: 7.1 MMOL/L (ref -2–3)
BASOPHILS # BLD AUTO: 0.08 X10*3/UL (ref 0–0.1)
BASOPHILS NFR BLD AUTO: 1.1 %
BODY TEMPERATURE: 37 DEGREES CELSIUS
BUN SERPL-MCNC: 46 MG/DL (ref 6–23)
CALCIUM SERPL-MCNC: 9.3 MG/DL (ref 8.6–10.3)
CHLORIDE SERPL-SCNC: 101 MMOL/L (ref 98–107)
CO2 SERPL-SCNC: 32 MMOL/L (ref 21–32)
CREAT SERPL-MCNC: 1.98 MG/DL (ref 0.5–1.05)
EOSINOPHIL # BLD AUTO: 0.42 X10*3/UL (ref 0–0.4)
EOSINOPHIL NFR BLD AUTO: 5.8 %
ERYTHROCYTE [DISTWIDTH] IN BLOOD BY AUTOMATED COUNT: 17.9 % (ref 11.5–14.5)
GFR SERPL CREATININE-BSD FRML MDRD: 24 ML/MIN/1.73M*2
GLUCOSE SERPL-MCNC: 88 MG/DL (ref 74–99)
HCO3 BLDV-SCNC: 33.9 MMOL/L (ref 22–26)
HCT VFR BLD AUTO: 42 % (ref 36–46)
HGB BLD-MCNC: 12.7 G/DL (ref 12–16)
IMM GRANULOCYTES # BLD AUTO: 0.05 X10*3/UL (ref 0–0.5)
IMM GRANULOCYTES NFR BLD AUTO: 0.7 % (ref 0–0.9)
LYMPHOCYTES # BLD AUTO: 0.41 X10*3/UL (ref 0.8–3)
LYMPHOCYTES NFR BLD AUTO: 5.6 %
MAGNESIUM SERPL-MCNC: 2.16 MG/DL (ref 1.6–2.4)
MCH RBC QN AUTO: 26.7 PG (ref 26–34)
MCHC RBC AUTO-ENTMCNC: 30.2 G/DL (ref 32–36)
MCV RBC AUTO: 88 FL (ref 80–100)
MONOCYTES # BLD AUTO: 0.75 X10*3/UL (ref 0.05–0.8)
MONOCYTES NFR BLD AUTO: 10.3 %
MRSA DNA SPEC QL NAA+PROBE: NOT DETECTED
NEUTROPHILS # BLD AUTO: 5.55 X10*3/UL (ref 1.6–5.5)
NEUTROPHILS NFR BLD AUTO: 76.5 %
NRBC BLD-RTO: 0 /100 WBCS (ref 0–0)
OXYHGB MFR BLDV: 87.6 % (ref 45–75)
PCO2 BLDV: 56 MM HG (ref 41–51)
PH BLDV: 7.39 PH (ref 7.33–7.43)
PLATELET # BLD AUTO: 389 X10*3/UL (ref 150–450)
PMV BLD AUTO: 8.7 FL (ref 7.5–11.5)
PO2 BLDV: 59 MM HG (ref 35–45)
POTASSIUM SERPL-SCNC: 4 MMOL/L (ref 3.5–5.3)
RBC # BLD AUTO: 4.76 X10*6/UL (ref 4–5.2)
SAO2 % BLDV: 90 % (ref 45–75)
SODIUM SERPL-SCNC: 142 MMOL/L (ref 136–145)
TEST COMMENT: ABNORMAL
WBC # BLD AUTO: 7.3 X10*3/UL (ref 4.4–11.3)

## 2023-10-05 PROCEDURE — 2500000002 HC RX 250 W HCPCS SELF ADMINISTERED DRUGS (ALT 637 FOR MEDICARE OP, ALT 636 FOR OP/ED): Performed by: INTERNAL MEDICINE

## 2023-10-05 PROCEDURE — 2500000001 HC RX 250 WO HCPCS SELF ADMINISTERED DRUGS (ALT 637 FOR MEDICARE OP): Performed by: STUDENT IN AN ORGANIZED HEALTH CARE EDUCATION/TRAINING PROGRAM

## 2023-10-05 PROCEDURE — 96372 THER/PROPH/DIAG INJ SC/IM: CPT | Performed by: STUDENT IN AN ORGANIZED HEALTH CARE EDUCATION/TRAINING PROGRAM

## 2023-10-05 PROCEDURE — 99233 SBSQ HOSP IP/OBS HIGH 50: CPT | Performed by: PHYSICIAN ASSISTANT

## 2023-10-05 PROCEDURE — 2060000001 HC INTERMEDIATE ICU ROOM DAILY

## 2023-10-05 PROCEDURE — 2500000001 HC RX 250 WO HCPCS SELF ADMINISTERED DRUGS (ALT 637 FOR MEDICARE OP): Performed by: PHYSICIAN ASSISTANT

## 2023-10-05 PROCEDURE — 87641 MR-STAPH DNA AMP PROBE: CPT | Performed by: PHYSICIAN ASSISTANT

## 2023-10-05 PROCEDURE — 84145 PROCALCITONIN (PCT): CPT | Mod: CMCLAB,PORLAB | Performed by: PHYSICIAN ASSISTANT

## 2023-10-05 PROCEDURE — 36415 COLL VENOUS BLD VENIPUNCTURE: CPT | Performed by: PHYSICIAN ASSISTANT

## 2023-10-05 PROCEDURE — 85025 COMPLETE CBC W/AUTO DIFF WBC: CPT | Performed by: PHYSICIAN ASSISTANT

## 2023-10-05 PROCEDURE — 71046 X-RAY EXAM CHEST 2 VIEWS: CPT | Performed by: RADIOLOGY

## 2023-10-05 PROCEDURE — 2500000004 HC RX 250 GENERAL PHARMACY W/ HCPCS (ALT 636 FOR OP/ED): Performed by: STUDENT IN AN ORGANIZED HEALTH CARE EDUCATION/TRAINING PROGRAM

## 2023-10-05 PROCEDURE — 2500000004 HC RX 250 GENERAL PHARMACY W/ HCPCS (ALT 636 FOR OP/ED): Performed by: INTERNAL MEDICINE

## 2023-10-05 PROCEDURE — 71250 CT THORAX DX C-: CPT | Performed by: RADIOLOGY

## 2023-10-05 PROCEDURE — 82805 BLOOD GASES W/O2 SATURATION: CPT | Performed by: PHYSICIAN ASSISTANT

## 2023-10-05 PROCEDURE — 99232 SBSQ HOSP IP/OBS MODERATE 35: CPT | Performed by: NURSE PRACTITIONER

## 2023-10-05 PROCEDURE — 71250 CT THORAX DX C-: CPT | Mod: MG

## 2023-10-05 PROCEDURE — 2500000001 HC RX 250 WO HCPCS SELF ADMINISTERED DRUGS (ALT 637 FOR MEDICARE OP): Performed by: INTERNAL MEDICINE

## 2023-10-05 PROCEDURE — 93970 EXTREMITY STUDY: CPT

## 2023-10-05 PROCEDURE — 2500000004 HC RX 250 GENERAL PHARMACY W/ HCPCS (ALT 636 FOR OP/ED): Performed by: PHYSICIAN ASSISTANT

## 2023-10-05 PROCEDURE — 80048 BASIC METABOLIC PNL TOTAL CA: CPT | Performed by: PHYSICIAN ASSISTANT

## 2023-10-05 PROCEDURE — 83735 ASSAY OF MAGNESIUM: CPT | Performed by: PHYSICIAN ASSISTANT

## 2023-10-05 PROCEDURE — 71046 X-RAY EXAM CHEST 2 VIEWS: CPT | Mod: FY

## 2023-10-05 PROCEDURE — 93970 EXTREMITY STUDY: CPT | Performed by: INTERNAL MEDICINE

## 2023-10-05 RX ORDER — TORSEMIDE 20 MG/1
20 TABLET ORAL DAILY
Status: DISCONTINUED | OUTPATIENT
Start: 2023-10-06 | End: 2023-10-12 | Stop reason: HOSPADM

## 2023-10-05 RX ORDER — GUAIFENESIN 600 MG/1
1200 TABLET, EXTENDED RELEASE ORAL 2 TIMES DAILY
Status: DISCONTINUED | OUTPATIENT
Start: 2023-10-05 | End: 2023-10-12 | Stop reason: HOSPADM

## 2023-10-05 RX ORDER — DEXAMETHASONE SODIUM PHOSPHATE 4 MG/ML
2 INJECTION, SOLUTION INTRA-ARTICULAR; INTRALESIONAL; INTRAMUSCULAR; INTRAVENOUS; SOFT TISSUE EVERY 8 HOURS
Status: COMPLETED | OUTPATIENT
Start: 2023-10-05 | End: 2023-10-06

## 2023-10-05 RX ORDER — VANCOMYCIN HYDROCHLORIDE 1 G/200ML
1000 INJECTION, SOLUTION INTRAVENOUS ONCE
Status: COMPLETED | OUTPATIENT
Start: 2023-10-05 | End: 2023-10-05

## 2023-10-05 RX ORDER — VANCOMYCIN HYDROCHLORIDE 1 G/20ML
INJECTION, POWDER, LYOPHILIZED, FOR SOLUTION INTRAVENOUS DAILY PRN
Status: DISCONTINUED | OUTPATIENT
Start: 2023-10-05 | End: 2023-10-06

## 2023-10-05 RX ORDER — SODIUM CHLORIDE 0.9 % (FLUSH) 0.9 %
SYRINGE (ML) INJECTION
Status: DISPENSED
Start: 2023-10-05 | End: 2023-10-06

## 2023-10-05 RX ADMIN — GUAIFENESIN 1200 MG: 600 TABLET ORAL at 21:56

## 2023-10-05 RX ADMIN — PIPERACILLIN SODIUM AND TAZOBACTAM SODIUM 2.25 G: 2; .25 INJECTION, SOLUTION INTRAVENOUS at 16:41

## 2023-10-05 RX ADMIN — ACETAMINOPHEN 650 MG: 325 TABLET, FILM COATED ORAL at 21:56

## 2023-10-05 RX ADMIN — CYANOCOBALAMIN TAB 1000 MCG 2000 MCG: 1000 TAB at 09:08

## 2023-10-05 RX ADMIN — PIPERACILLIN SODIUM AND TAZOBACTAM SODIUM 2.25 G: 2; .25 INJECTION, SOLUTION INTRAVENOUS at 22:00

## 2023-10-05 RX ADMIN — HEPARIN SODIUM 5000 UNITS: 5000 INJECTION INTRAVENOUS; SUBCUTANEOUS at 06:14

## 2023-10-05 RX ADMIN — FUROSEMIDE 40 MG: 10 INJECTION, SOLUTION INTRAMUSCULAR; INTRAVENOUS at 09:06

## 2023-10-05 RX ADMIN — HEPARIN SODIUM 5000 UNITS: 5000 INJECTION INTRAVENOUS; SUBCUTANEOUS at 23:05

## 2023-10-05 RX ADMIN — POLYETHYLENE GLYCOL 3350 17 G: 17 POWDER, FOR SOLUTION ORAL at 09:03

## 2023-10-05 RX ADMIN — HEPARIN SODIUM 5000 UNITS: 5000 INJECTION INTRAVENOUS; SUBCUTANEOUS at 14:52

## 2023-10-05 RX ADMIN — ALPRAZOLAM 0.5 MG: 0.5 TABLET ORAL at 21:56

## 2023-10-05 RX ADMIN — METOPROLOL SUCCINATE 25 MG: 25 TABLET, EXTENDED RELEASE ORAL at 09:09

## 2023-10-05 RX ADMIN — CEFAZOLIN SODIUM 1 G: 1 INJECTION, SOLUTION INTRAVENOUS at 06:13

## 2023-10-05 RX ADMIN — ACETAMINOPHEN 650 MG: 325 TABLET, FILM COATED ORAL at 09:04

## 2023-10-05 RX ADMIN — VANCOMYCIN HYDROCHLORIDE 1000 MG: 1 INJECTION, SOLUTION INTRAVENOUS at 21:46

## 2023-10-05 RX ADMIN — DEXAMETHASONE SODIUM PHOSPHATE 2 MG: 4 INJECTION, SOLUTION INTRAMUSCULAR; INTRAVENOUS at 21:56

## 2023-10-05 RX ADMIN — DEXAMETHASONE SODIUM PHOSPHATE 2 MG: 4 INJECTION, SOLUTION INTRAMUSCULAR; INTRAVENOUS at 14:52

## 2023-10-05 RX ADMIN — AMIODARONE HYDROCHLORIDE 200 MG: 200 TABLET ORAL at 09:07

## 2023-10-05 RX ADMIN — SPIRONOLACTONE 12.5 MG: 25 TABLET, FILM COATED ORAL at 09:07

## 2023-10-05 RX ADMIN — ASPIRIN 81 MG: 81 TABLET, COATED ORAL at 09:07

## 2023-10-05 RX ADMIN — LEVOTHYROXINE SODIUM 25 MCG: 0.03 TABLET ORAL at 06:14

## 2023-10-05 RX ADMIN — GUAIFENESIN 1200 MG: 600 TABLET ORAL at 14:53

## 2023-10-05 RX ADMIN — ATORVASTATIN CALCIUM 80 MG: 40 TABLET, FILM COATED ORAL at 21:56

## 2023-10-05 ASSESSMENT — ENCOUNTER SYMPTOMS
NAUSEA: 0
SHORTNESS OF BREATH: 1
FLANK PAIN: 0
APPETITE CHANGE: 0
FACIAL SWELLING: 0
DYSURIA: 0
VOMITING: 0
FEVER: 0
WOUND: 1
DIARRHEA: 0
CONSTIPATION: 0
EYE PAIN: 0
NUMBNESS: 0
LIGHT-HEADEDNESS: 0
BLOOD IN STOOL: 0
TROUBLE SWALLOWING: 0
FREQUENCY: 0
BACK PAIN: 0
CHEST TIGHTNESS: 0
WEAKNESS: 0
SORE THROAT: 0
HEADACHES: 0
DIAPHORESIS: 0
JOINT SWELLING: 0
COUGH: 1
DIZZINESS: 0
HALLUCINATIONS: 0
ABDOMINAL PAIN: 0
WHEEZING: 0
FATIGUE: 0
PALPITATIONS: 0
BRUISES/BLEEDS EASILY: 0
CHILLS: 0
HEMATURIA: 0

## 2023-10-05 ASSESSMENT — PAIN SCALES - GENERAL
PAINLEVEL_OUTOF10: 3
PAINLEVEL_OUTOF10: 0 - NO PAIN
PAINLEVEL_OUTOF10: 3

## 2023-10-05 ASSESSMENT — COGNITIVE AND FUNCTIONAL STATUS - GENERAL
MOVING FROM LYING ON BACK TO SITTING ON SIDE OF FLAT BED WITH BEDRAILS: A LITTLE
TURNING FROM BACK TO SIDE WHILE IN FLAT BAD: A LITTLE
TOILETING: A LOT
DRESSING REGULAR LOWER BODY CLOTHING: A LOT
MOBILITY SCORE: 14
HELP NEEDED FOR BATHING: A LOT
CLIMB 3 TO 5 STEPS WITH RAILING: TOTAL
MOBILITY SCORE: 13
DRESSING REGULAR UPPER BODY CLOTHING: A LITTLE
CLIMB 3 TO 5 STEPS WITH RAILING: TOTAL
STANDING UP FROM CHAIR USING ARMS: A LOT
DAILY ACTIVITIY SCORE: 16
MOVING TO AND FROM BED TO CHAIR: A LOT
DRESSING REGULAR LOWER BODY CLOTHING: A LOT
TURNING FROM BACK TO SIDE WHILE IN FLAT BAD: A LITTLE
DRESSING REGULAR UPPER BODY CLOTHING: A LITTLE
TOILETING: A LOT
PERSONAL GROOMING: A LITTLE
PERSONAL GROOMING: A LITTLE
WALKING IN HOSPITAL ROOM: TOTAL
WALKING IN HOSPITAL ROOM: A LOT
DAILY ACTIVITIY SCORE: 17
HELP NEEDED FOR BATHING: A LITTLE
STANDING UP FROM CHAIR USING ARMS: A LOT
MOVING TO AND FROM BED TO CHAIR: A LITTLE

## 2023-10-05 ASSESSMENT — PAIN - FUNCTIONAL ASSESSMENT
PAIN_FUNCTIONAL_ASSESSMENT: 0-10
PAIN_FUNCTIONAL_ASSESSMENT: 0-10

## 2023-10-05 ASSESSMENT — PAIN DESCRIPTION - DESCRIPTORS: DESCRIPTORS: ACHING

## 2023-10-05 NOTE — PROGRESS NOTES
Physical Therapy                 Therapy Communication Note    Patient Name: Adwoa Forrest  MRN: [id number]  Today's Date: 10/5/2023     Discipline: Physical Therapy    Missed Visit Reason:  patient out of room for a test     Missed Time: Attempt    Comment: third attempt 13:54 - patient out of room at CT scan now

## 2023-10-05 NOTE — PROGRESS NOTES
History Of Present Illness:    Adwoa Forrest is a 88 y.o. female presenting with shortness of breath and ankle swelling.  She has history of persistent atrial fibrillation status post AV derek ablation and permanent pacemaker placement, failed Watchman device placement in 2018 requiring urgent surgery and left atrial appendage ligation, hypertension recurrent GI bleed not on oral anticoagulants, on amiodarone for nonsustained VT and symptomatic PVCs.  She also has chronic diastolic heart failure and follows regularly in the CHF clinic.  She used to be on 80 mg Lasix at 1 time which was reduced by the heart failure nurse practitioner to 60 mg due to worsening renal function.  Recently in July 2023 she presented with a ankle injury and at that time this was further reduced to 40 mg a day.  She states this ankle injury has been bothering her quite a bit and she has been having difficulty getting around.  This wound recently got infected and she required to take Augmentin follow almost 30 days that she just completed a week ago.  For last 1 week or so she is noticing worsening shortness of breath, legs are extremely swollen and heavy and she is unable to move as a result as it feels like lead.  She was given IV Lasix in the CHF clinic and was given 60 of Lasix to take at home but did not improve symptoms and therefore she came into the ER for further evaluation and treatment.  Denies any use of nonsteroidal inflammatories or other symptoms otherwise.  Interestingly, since starting on amiodarone she has converted to sinus rhythm.     At one time she was having deranged LFTs that improved after discontinuation of the fish oil she was taking.  Her EKG shows atrial paced ventricular paced rhythm.     Review of systems all other system reviewed and is negative.  Echocardiogram pending.  Chest x-ray personally reviewed.  She stated ankle swelling has improved since she had IV Lasix but they are having trouble titrating off  "her oxygen.     Subjective Data:  No new issues overnight.  No CP/pressure/palpitations. Has mild dyspnea, still on O2.  Creatinine up to 1.85 today, K 4.1.  Nephro has been consulted. Monitor: AV paced    10-5-23: Having issues with worsening dyspnea and decreased O2 sats today. O2 has been increased and still dyspneic.  No CP/pressure/palpitations.  Just \"doesn't feel well,\"  K 4.0, creatinine 1.98. I d/w IMS: pt going for CXR and LE duplex today.      Overnight Events:    See above     Objective Data:  Last Recorded Vitals:  Vitals:    10/05/23 0059 10/05/23 0500 10/05/23 0747 10/05/23 0907   BP:  110/54  127/69   BP Location:       Patient Position:  Lying     Pulse:  65  65   Resp:  16  16   Temp:  37.2 °C (99 °F)  36.5 °C (97.7 °F)   TempSrc:    Temporal   SpO2: 93% 92%  91%   Weight:   48.7 kg (107 lb 5.8 oz)    Height:           Last Labs:  CBC - 10/5/2023:  5:48 AM  7.3 12.7 389    42.0      CMP - 10/5/2023:  5:48 AM  9.3 6.4 30 --- 0.7   4.1 3.6 23 121      PTT - 7/18/2023:  3:17 AM  1.0   11.4 32     TROPHS   Date/Time Value Ref Range Status   10/02/2023 02:05 PM 29 0 - 13 ng/L Final   10/02/2023 11:32 AM 30 0 - 13 ng/L Final   02/15/2023 11:55 PM 48 0 - 13 ng/L Final     Comment:     .  Less than 99th percentile of normal range cutoff-  Female and children under 18 years old <14 ng/L; Male <21 ng/L: Negative  Repeat testing should be performed if clinically indicated.   .  Female and children under 18 years old 14-50 ng/L; Male 21-50 ng/L:  Consistent with possible cardiac damage and possible increased clinical   risk. Serial measurements may help to assess extent of myocardial damage.   .  >50 ng/L: Consistent with cardiac damage, increased clinical risk and  myocardial infarction. Serial measurements may help assess extent of   myocardial damage.   .   NOTE: Children less than 1 year old may have higher baseline troponin   levels and results should be interpreted in conjunction with the overall "   clinical context.   .  NOTE: Troponin I testing is performed using a different   testing methodology at [facility] than at other   [facility] hospitals. Direct result comparisons should only   be made within the same method.     02/15/2023 10:49 PM 49 0 - 13 ng/L Final     Comment:     .  Less than 99th percentile of normal range cutoff-  Female and children under 18 years old <14 ng/L; Male <21 ng/L: Negative  Repeat testing should be performed if clinically indicated.   .  Female and children under 18 years old 14-50 ng/L; Male 21-50 ng/L:  Consistent with possible cardiac damage and possible increased clinical   risk. Serial measurements may help to assess extent of myocardial damage.   .  >50 ng/L: Consistent with cardiac damage, increased clinical risk and  myocardial infarction. Serial measurements may help assess extent of   myocardial damage.   .   NOTE: Children less than 1 year old may have higher baseline troponin   levels and results should be interpreted in conjunction with the overall   clinical context.   .  NOTE: Troponin I testing is performed using a different   testing methodology at [facility] than at other   [facility] hospitals. Direct result comparisons should only   be made within the same method.     02/15/2023 09:41 PM 49 0 - 13 ng/L Final     Comment:     .  Less than 99th percentile of normal range cutoff-  Female and children under 18 years old <14 ng/L; Male <21 ng/L: Negative  Repeat testing should be performed if clinically indicated.   .  Female and children under 18 years old 14-50 ng/L; Male 21-50 ng/L:  Consistent with possible cardiac damage and possible increased clinical   risk. Serial measurements may help to assess extent of myocardial damage.   .  >50 ng/L: Consistent with cardiac damage, increased clinical risk and  myocardial infarction. Serial measurements may help assess extent of   myocardial damage.   .   NOTE: Children less than 1 year old may  have higher baseline troponin   levels and results should be interpreted in conjunction with the overall   clinical context.   .  NOTE: Troponin I testing is performed using a different   testing methodology at [facility] than at other   [facility] hospitals. Direct result comparisons should only   be made within the same method.       BNP   Date/Time Value Ref Range Status   10/03/2023 06:06 AM 1,843 0 - 99 pg/mL Final   10/02/2023 11:32 AM 1,279 0 - 99 pg/mL Final     VLDL   Date/Time Value Ref Range Status   02/23/2023 10:08 AM 15 0 - 40 mg/dL Final   01/12/2022 09:53 AM 16 0 - 40 mg/dL Final   08/03/2021 09:45 AM 28 0 - 40 mg/dL Final      Last I/O:  I/O last 3 completed shifts:  In: 280 (5.8 mL/kg) [P.O.:220; I.V.:10 (0.2 mL/kg); IV Piggyback:50]  Out: 0 (0 mL/kg)   Weight: 48 kg     Past Cardiology Tests (Last 3 Years):  CONCLUSIONS:   1. Left ventricular systolic function is normal with a 70-75% estimated ejection fraction.   2. There is moderate left ventricular hypertrophy.   3. There is mildly reduced right ventricular systolic function.   4. The right atrium is moderately dilated.   5. Moderately elevated right ventricular systolic pressure.   6. Mild to moderate aortic valve regurgitation.      Stress Test:  Nuclear Stress Test 2/12/2023          Inpatient Medications:  Scheduled medications   Medication Dose Route Frequency    amiodarone  200 mg oral Daily    aspirin  81 mg oral Daily    atorvastatin  80 mg oral Nightly    ceFAZolin  1 g intravenous q12h    cyanocobalamin  2,000 mcg oral Daily    heparin (porcine)  5,000 Units subcutaneous q8h    levothyroxine  25 mcg oral Daily    [Held by provider] lisinopril  5 mg oral Daily    metoprolol succinate XL  25 mg oral Daily    oxygen   inhalation Continuous - 02/gases    polyethylene glycol  17 g oral Daily    spironolactone  12.5 mg oral q24h MACRINA    [START ON 10/6/2023] torsemide  20 mg oral Daily     PRN medications   Medication     acetaminophen    ALPRAZolam    bisacodyl    melatonin     Continuous Medications   Medication Dose Last Rate       Physical Exam:  Vitals reviewed.   Constitutional:       Appearance: Normal appearance.   Neck:      Vascular: JVD present. No carotid bruit.   Cardiovascular:      Rate and Rhythm: Normal rate and regular rhythm.      Pulses: Normal pulses.      Heart sounds: Normal heart sounds, S1 normal and S2 normal.   Pulmonary:      Effort: Pulmonary effort is normal. No respiratory distress.      Breath sounds: Crackles in posterior, worse in L base, diminished R base, on O2.  Lips slightly dusky  Abdominal:      General: Abdomen is flat.      Palpations: Abdomen is soft.   Musculoskeletal:      Right lower leg: No edema.      Left lower leg: No edema.   Skin:     General: Skin is warm.   Neurological:      Mental Status: She is alert and oriented to person, place, and time. Mental status is at baseline.   Psychiatric:         Mood and Affect: Mood normal.         Behavior: Behavior normal.      Assessment/Plan   1-acute on chronic diastolic heart failure-continue IV Lasix for another 24 hours.  After that switch to 60 mg p.o. Lasix with close follow-up in CHF clinic.  Monitor electrolytes and strict I's and O's.  Add Aldactone continue SGLT2 inhibitors.  Possibly exacerbated by recent events including infection.  Will need close monitoring of electrolytes and potassium specifically.  Discussed with patient.     Agree with checking echo follow-up on results.    10-4-23: Echo as noted above. Lungs sound congested, no edema.  On IV Lasix, Metoprolol succinate and spironolactone.  Nephro has been consulted.  Recommend sodium restricted diet.      10-5-23: Lungs with diminished R base and mod crackles L fields. Having a mild climb in creatinine (1.98) CXR and LE duplex pending. Will defer diuretics to nephro: recommendations in chart.      2-persistent atrial fibrillation-she seems to be maintaining sinus rhythm  since being on amiodarone.  She is not anticoagulated but had a left atrial appendage ligation.  She has history of GI bleed.  10-4-23: On Amiodarone and BB.  HX of AV node ablation and PPM. She is no longer on oral anticoagulation, hx of STEFANIE ligation.  On ASA only. AV paced on tele.     10-5-23: AV paced on tele. Pt is no longer on anticoagulation.     3-nonsustained VT and PVCs highly symptomatic and remains on amiodarone with resolution of symptoms.  Outpatient monitoring.    10-5-23:  continue on Amiodarone       Code Status:  Full Code          Shefali Ordaz, APRN-CNP

## 2023-10-05 NOTE — PROGRESS NOTES
Patient seen and examined denies any [facility] INFECTIOUS DISEASE PROGRESS NOTE    Patient Name: Adwoa Forrest  MRN: [id number]    INTERVAL HISTORY:   Patient seen and examined  Worsening hypoxia noted.   Remains afebrile.  SPO2 93% on 5L.  Patient reports that she is currently feeling better.   Denies any current complaints.     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)    Chronic insomnia    Elevated hemoglobin (CMS/HCC)    Gastro-esophageal reflux disease without esophagitis    Elevated LFTs    Hyperlipidemia    Hypothyroidism    Presence of stent in coronary artery    Ventricular tachycardia (paroxysmal) (CMS/[facility])    Vitamin B12 deficiency    Vitamin D deficiency    Acute on chronic congestive heart failure, unspecified heart failure type (CMS/[facility])    Non-healing wound of right lower extremity    CKD (chronic kidney disease) stage 3, GFR 30-59 ml/min (CMS/[facility])    Hypertension    Paroxysmal atrial fibrillation (CMS/HCC)    Elevated troponin I level    Acute hypoxic respiratory failure (CMS/[facility])        Assessment/Plan   Right lower extremity cellulitis and infected wound  Pneumonia   Hypoxia   Hypertension  Paroxysmal A-fib  Hypothyroidism  Hyperlipidemia  Acute on chronic congestive heart failure  Plan  Stop Ancef  Start Vancomycin  -monitor scr and vanco T  Start Zosyn   Check MRSA nares PCR   Follow fevers white count monitor electrolytes  If fevers obtain blood cultures  Continue wound care  Noted podiatry recommendations  Wound cultures with mixed bacteria await identification  ID will follow with additional recommendations  Plans d/w ID attending, Dr. Arenas and patient     Other medical issues  Paroxysmal A-fib hypothyroidism hyperlipidemia CHF no interactions noted with  home medications with current antibiotics  MEDICATIONS: reviewed.    Current Facility-Administered Medications:     acetaminophen (Tylenol) tablet 650 mg, 650 mg, oral, q4h PRN, Jt House DO, 650 mg at 10/05/23 0904    ALPRAZolam (Xanax) tablet 0.5 mg, 0.5 mg, oral, Nightly PRN, Nallely Clarke PA-C, 0.5 mg at 10/04/23 2237    amiodarone (Pacerone) tablet 200 mg, 200 mg, oral, Daily, Jt House DO, 200 mg at 10/05/23 0907    aspirin EC tablet 81 mg, 81 mg, oral, Daily, Nallely Clarke PA-C, 81 mg at 10/05/23 0907    atorvastatin (Lipitor) tablet 80 mg, 80 mg, oral, Nightly, Nalleyl Clarke PA-C, 80 mg at 10/04/23 2126    bisacodyl (Dulcolax) EC tablet 10 mg, 10 mg, oral, Daily PRN, Nallely Clarke PA-C    cyanocobalamin (Vitamin B-12) tablet 2,000 mcg, 2,000 mcg, oral, Daily, Nallely Clarke PA-C, 2,000 mcg at 10/05/23 0908    dexAMETHasone (Decadron) injection 2 mg, 2 mg, intravenous, q8h, SANDRO Tracy-C, 2 mg at 10/05/23 1452    guaiFENesin (Mucinex) 12 hr tablet 1,200 mg, 1,200 mg, oral, BID, SANDRO Tracy-C, 1,200 mg at 10/05/23 1453    heparin (porcine) injection 5,000 Units, 5,000 Units, subcutaneous, q8h, Nallely Clarke PA-C, 5,000 Units at 10/05/23 1452    levothyroxine (Synthroid, Levoxyl) tablet 25 mcg, 25 mcg, oral, Daily, Nallely Clarke PA-C, 25 mcg at 10/05/23 0614    [Held by provider] lisinopril tablet 5 mg, 5 mg, oral, Daily, Jt House DO    melatonin tablet 9 mg, 9 mg, oral, Nightly PRN, Jt House DO, 9 mg at 10/03/23 0223    metoprolol succinate XL (Toprol-XL) 24 hr tablet 25 mg, 25 mg, oral, Daily, Arpita Camp PA-C, 25 mg at 10/05/23 0909    oxygen (O2) therapy, , inhalation, Continuous - 02/gases, Frederick Lowry MD, 4 L/min at 10/04/23 2115    piperacillin-tazobactam-dextrose (Zosyn) IV 2.25 g, 2.25 g, intravenous, q8h, Arpita Camp PA-C    polyethylene glycol (Glycolax, Miralax) packet 17 g, 17 g, oral, Daily, Nallely Clarke,  "HEATHER, 17 g at 10/05/23 0903    spironolactone (Aldactone) tablet 12.5 mg, 12.5 mg, oral, q24h MACRINA, Michael Latham MD, 12.5 mg at 10/05/23 0907    [START ON 10/6/2023] torsemide (Demadex) tablet 20 mg, 20 mg, oral, Daily, Arpita Camp PA-C    vancomycin (Vancocin) placeholder, , miscellaneous, Daily PRN, Arpita Camp PA-C    vancomycin in dextrose 5 % (Vancocin) IVPB 1,000 mg, 1,000 mg, intravenous, Once, Arpita Camp PA-C     PHYSICAL EXAM:  Vital signs: /63 (Patient Position: Lying)   Pulse 65   Temp 36.7 °C (98 °F) (Temporal)   Resp 16   Ht 1.575 m (5' 2\")   Wt 48.7 kg (107 lb 5.8 oz)   SpO2 93%   BMI 19.64 kg/m²   Temp (24hrs), Av.6 °C (97.9 °F), Min:36.3 °C (97.3 °F), Max:37.2 °C (99 °F)      General alert oriented x3  Lungs clear to auscultation bilaterally - diminished to bilateral lung bases   Abdomen soft nontender  CV no murmurs  Ulcerative lesion with edema and surrounding improved erythema posteriorly to the right ankle            Labs:    Results for orders placed or performed during the hospital encounter of 10/02/23 (from the past 96 hour(s))   BLOOD GAS VENOUS   Result Value Ref Range    POCT pH, Venous 7.32 (L) 7.33 - 7.43 pH    POCT pCO2, Venous 64 (H) 41 - 51 mm Hg    POCT pO2, Venous 23 (L) 35 - 45 mm Hg    POCT SO2, Venous 25 (L) 45 - 75 %    POCT Oxy Hemoglobin, Venous 24.5 (L) 45.0 - 75.0 %    POCT Base Excess, Venous 4.9 (H) -2.0 - 3.0 mmol/L    POCT HCO3 Calculated, Venous 33.0 (H) 22.0 - 26.0 mmol/L    Patient Temperature 37.0 degrees Celsius   CBC and Auto Differential   Result Value Ref Range    WBC 9.1 4.4 - 11.3 x10*3/uL    nRBC 0.0 0.0 - 0.0 /100 WBCs    RBC 5.21 (H) 4.00 - 5.20 x10*6/uL    Hemoglobin 13.9 12.0 - 16.0 g/dL    Hematocrit 45.1 36.0 - 46.0 %    MCV 87 80 - 100 fL    MCH 26.7 26.0 - 34.0 pg    MCHC 30.8 (L) 32.0 - 36.0 g/dL    RDW 18.0 (H) 11.5 - 14.5 %    Platelets 420 150 - 450 x10*3/uL    MPV 8.7 7.5 - 11.5 fL    Neutrophils % 84.1 " 40.0 - 80.0 %    Immature Granulocytes %, Automated 0.8 0.0 - 0.9 %    Lymphocytes % 4.4 13.0 - 44.0 %    Monocytes % 8.0 2.0 - 10.0 %    Eosinophils % 1.9 0.0 - 6.0 %    Basophils % 0.8 0.0 - 2.0 %    Neutrophils Absolute 7.65 (H) 1.60 - 5.50 x10*3/uL    Immature Granulocytes Absolute, Automated 0.07 0.00 - 0.50 x10*3/uL    Lymphocytes Absolute 0.40 (L) 0.80 - 3.00 x10*3/uL    Monocytes Absolute 0.73 0.05 - 0.80 x10*3/uL    Eosinophils Absolute 0.17 0.00 - 0.40 x10*3/uL    Basophils Absolute 0.07 0.00 - 0.10 x10*3/uL   B-Type Natriuretic Peptide   Result Value Ref Range    BNP 1,279 (H) 0 - 99 pg/mL   Comprehensive metabolic panel   Result Value Ref Range    Glucose 108 (H) 74 - 99 mg/dL    Sodium 135 (L) 136 - 145 mmol/L    Potassium 5.2 3.5 - 5.3 mmol/L    Chloride 99 98 - 107 mmol/L    Bicarbonate 28 21 - 32 mmol/L    Anion Gap 13 10 - 20 mmol/L    Urea Nitrogen 39 (H) 6 - 23 mg/dL    Creatinine 1.62 (H) 0.50 - 1.05 mg/dL    eGFR 31 (L) >60 mL/min/1.73m*2    Calcium 9.1 8.6 - 10.3 mg/dL    Albumin 3.6 3.4 - 5.0 g/dL    Alkaline Phosphatase 121 33 - 136 U/L    Total Protein 6.4 6.4 - 8.2 g/dL    AST 30 9 - 39 U/L    Bilirubin, Total 0.7 0.0 - 1.2 mg/dL    ALT 23 7 - 45 U/L   Magnesium   Result Value Ref Range    Magnesium 2.32 1.60 - 2.40 mg/dL   Troponin I, High Sensitivity   Result Value Ref Range    Troponin I, High Sensitivity 30 (H) 0 - 13 ng/L   C-Reactive Protein   Result Value Ref Range    C-Reactive Protein 6.22 (H) <1.00 mg/dL   Sedimentation Rate   Result Value Ref Range    Sedimentation Rate 41 (H) 0 - 30 mm/h   SARS-CoV-2 RT PCR   Result Value Ref Range    Coronavirus 2019, PCR Not Detected Not Detected   Tissue/Wound Culture/Smear    Specimen: Wound/Tissue; Tissue/Biopsy   Result Value Ref Range    Tissue/Wound Culture/Smear Culture in progress     Gram Stain No polymorphonuclear leukocytes seen (A)     Gram Stain (A)      (4+) Abundant Mixed Gram positive and Gram negative bacteria   Troponin I,  High Sensitivity   Result Value Ref Range    Troponin I, High Sensitivity 29 (H) 0 - 13 ng/L   Magnesium   Result Value Ref Range    Magnesium 2.07 1.60 - 2.40 mg/dL   B-Type Natriuretic Peptide   Result Value Ref Range    BNP 1,843 (H) 0 - 99 pg/mL   CBC   Result Value Ref Range    WBC 8.0 4.4 - 11.3 x10*3/uL    nRBC 0.0 0.0 - 0.0 /100 WBCs    RBC 4.25 4.00 - 5.20 x10*6/uL    Hemoglobin 11.5 (L) 12.0 - 16.0 g/dL    Hematocrit 36.9 36.0 - 46.0 %    MCV 87 80 - 100 fL    MCH 27.1 26.0 - 34.0 pg    MCHC 31.2 (L) 32.0 - 36.0 g/dL    RDW 17.6 (H) 11.5 - 14.5 %    Platelets 357 150 - 450 x10*3/uL    MPV 8.6 7.5 - 11.5 fL   Basic Metabolic Panel   Result Value Ref Range    Glucose 84 74 - 99 mg/dL    Sodium 137 136 - 145 mmol/L    Potassium 4.0 3.5 - 5.3 mmol/L    Chloride 101 98 - 107 mmol/L    Bicarbonate 32 21 - 32 mmol/L    Anion Gap 8 (L) 10 - 20 mmol/L    Urea Nitrogen 39 (H) 6 - 23 mg/dL    Creatinine 1.64 (H) 0.50 - 1.05 mg/dL    eGFR 30 (L) >60 mL/min/1.73m*2    Calcium 8.7 8.6 - 10.3 mg/dL   Urinalysis with Reflex Microscopic   Result Value Ref Range    Color, Urine Straw Straw, Yellow    Appearance, Urine Clear Clear    Specific Gravity, Urine 1.010 1.005 - 1.035    pH, Urine 6.0 5.0, 5.5, 6.0, 6.5, 7.0, 7.5, 8.0    Protein, Urine NEGATIVE NEGATIVE mg/dL    Glucose, Urine 150 (2+) (A) NEGATIVE mg/dL    Blood, Urine NEGATIVE NEGATIVE    Ketones, Urine NEGATIVE NEGATIVE mg/dL    Bilirubin, Urine NEGATIVE NEGATIVE    Urobilinogen, Urine <2.0 <2.0 mg/dL    Nitrite, Urine NEGATIVE NEGATIVE    Leukocyte Esterase, Urine LARGE (3+) (A) NEGATIVE   Urinalysis Microscopic Only   Result Value Ref Range    WBC, Urine 11-20 (A) 1-5, NONE /HPF    RBC, Urine 1-2 NONE, 1-2, 3-5 /HPF    Squamous Epithelial Cells, Urine 10-25 (FEW) Reference range not established. /HPF    Renal Epithelial Cells, Urine 1-2 (FEW) Reference range not established. /HPF   Transthoracic echo (TTE) complete   Result Value Ref Range    LV A4C EF 76.2     Basic Metabolic Panel   Result Value Ref Range    Glucose 94 74 - 99 mg/dL    Sodium 137 136 - 145 mmol/L    Potassium 4.1 3.5 - 5.3 mmol/L    Chloride 100 98 - 107 mmol/L    Bicarbonate 31 21 - 32 mmol/L    Anion Gap 10 10 - 20 mmol/L    Urea Nitrogen 43 (H) 6 - 23 mg/dL    Creatinine 1.85 (H) 0.50 - 1.05 mg/dL    eGFR 26 (L) >60 mL/min/1.73m*2    Calcium 8.6 8.6 - 10.3 mg/dL   Magnesium   Result Value Ref Range    Magnesium 1.97 1.60 - 2.40 mg/dL   CBC and Auto Differential   Result Value Ref Range    WBC 7.5 4.4 - 11.3 x10*3/uL    nRBC 0.0 0.0 - 0.0 /100 WBCs    RBC 4.38 4.00 - 5.20 x10*6/uL    Hemoglobin 11.5 (L) 12.0 - 16.0 g/dL    Hematocrit 38.3 36.0 - 46.0 %    MCV 87 80 - 100 fL    MCH 26.3 26.0 - 34.0 pg    MCHC 30.0 (L) 32.0 - 36.0 g/dL    RDW 17.4 (H) 11.5 - 14.5 %    Platelets 357 150 - 450 x10*3/uL    MPV 8.7 7.5 - 11.5 fL    Neutrophils % 75.5 40.0 - 80.0 %    Immature Granulocytes %, Automated 0.7 0.0 - 0.9 %    Lymphocytes % 7.9 13.0 - 44.0 %    Monocytes % 9.6 2.0 - 10.0 %    Eosinophils % 5.5 0.0 - 6.0 %    Basophils % 0.8 0.0 - 2.0 %    Neutrophils Absolute 5.65 (H) 1.60 - 5.50 x10*3/uL    Immature Granulocytes Absolute, Automated 0.05 0.00 - 0.50 x10*3/uL    Lymphocytes Absolute 0.59 (L) 0.80 - 3.00 x10*3/uL    Monocytes Absolute 0.72 0.05 - 0.80 x10*3/uL    Eosinophils Absolute 0.41 (H) 0.00 - 0.40 x10*3/uL    Basophils Absolute 0.06 0.00 - 0.10 x10*3/uL   Sodium, Urine Random   Result Value Ref Range    Sodium, Urine Random 66 mmol/L    Creatinine, Urine Random 32.7 20.0 - 320.0 mg/dL    Sodium/Creatinine Ratio 202 Not established. mmol/g Creat   Albumin, Urine Random   Result Value Ref Range    Albumin, Urine Random <7.0 Not established mg/L    Creatinine, Urine Random 32.7 20.0 - 320.0 mg/dL    Albumin/Creatine Ratio     Creatinine, Urine Random   Result Value Ref Range    Creatinine, Urine Random 32.3 20.0 - 320.0 mg/dL   Basic Metabolic Panel   Result Value Ref Range    Glucose 88 74 -  99 mg/dL    Sodium 142 136 - 145 mmol/L    Potassium 4.0 3.5 - 5.3 mmol/L    Chloride 101 98 - 107 mmol/L    Bicarbonate 32 21 - 32 mmol/L    Anion Gap 13 10 - 20 mmol/L    Urea Nitrogen 46 (H) 6 - 23 mg/dL    Creatinine 1.98 (H) 0.50 - 1.05 mg/dL    eGFR 24 (L) >60 mL/min/1.73m*2    Calcium 9.3 8.6 - 10.3 mg/dL   Magnesium   Result Value Ref Range    Magnesium 2.16 1.60 - 2.40 mg/dL   CBC and Auto Differential   Result Value Ref Range    WBC 7.3 4.4 - 11.3 x10*3/uL    nRBC 0.0 0.0 - 0.0 /100 WBCs    RBC 4.76 4.00 - 5.20 x10*6/uL    Hemoglobin 12.7 12.0 - 16.0 g/dL    Hematocrit 42.0 36.0 - 46.0 %    MCV 88 80 - 100 fL    MCH 26.7 26.0 - 34.0 pg    MCHC 30.2 (L) 32.0 - 36.0 g/dL    RDW 17.9 (H) 11.5 - 14.5 %    Platelets 389 150 - 450 x10*3/uL    MPV 8.7 7.5 - 11.5 fL    Neutrophils % 76.5 40.0 - 80.0 %    Immature Granulocytes %, Automated 0.7 0.0 - 0.9 %    Lymphocytes % 5.6 13.0 - 44.0 %    Monocytes % 10.3 2.0 - 10.0 %    Eosinophils % 5.8 0.0 - 6.0 %    Basophils % 1.1 0.0 - 2.0 %    Neutrophils Absolute 5.55 (H) 1.60 - 5.50 x10*3/uL    Immature Granulocytes Absolute, Automated 0.05 0.00 - 0.50 x10*3/uL    Lymphocytes Absolute 0.41 (L) 0.80 - 3.00 x10*3/uL    Monocytes Absolute 0.75 0.05 - 0.80 x10*3/uL    Eosinophils Absolute 0.42 (H) 0.00 - 0.40 x10*3/uL    Basophils Absolute 0.08 0.00 - 0.10 x10*3/uL   Blood Gas Venous   Result Value Ref Range    POCT pH, Venous 7.39 7.33 - 7.43 pH    POCT pCO2, Venous 56 (H) 41 - 51 mm Hg    POCT pO2, Venous 59 (H) 35 - 45 mm Hg    POCT SO2, Venous 90 (H) 45 - 75 %    POCT Oxy Hemoglobin, Venous 87.6 (H) 45.0 - 75.0 %    POCT Base Excess, Venous 7.1 (H) -2.0 - 3.0 mmol/L    POCT HCO3 Calculated, Venous 33.9 (H) 22.0 - 26.0 mmol/L    Patient Temperature 37.0 degrees Celsius    Test Comment [facility] 1-S                   Microbiology data: Susceptibility data from last 90 days.  Collected Specimen Info Organism Amoxicillin/Clavulanate Ampicillin Aztreonam Cefazolin Cefepime  Ceftazidime Cefuroxime Ciprofloxacin Gentamicin Levofloxacin Piperacillin/Tazobactam Tobramycin Trimethoprim/Sulfamethoxazole   09/08/23 Tissue/Wound Klebsiella oxytoca/Raoultella species S R  R   S S S S S  S     Pseudomonas aeruginosa   S  S S  S S S S S    09/01/23 Tissue/Wound Pseudomonas aeruginosa   S  S S  S S S S S      Klebsiella oxytoca/Raoultella species S R  R   S S S S S  S   08/25/23 Tissue/Wound Pseudomonas aeruginosa   S  S S  S S S S S            Imaging data: CT chest wo IV contrast    Result Date: 10/5/2023  Interpreted By:  Mina Benedict, STUDY: CT CHEST WO IV CONTRAST;  10/5/2023 2:13 pm   INDICATION: Signs/Symptoms:Worsening hypoxia.   COMPARISON: 01/11/2019.   ACCESSION NUMBER(S): [id number]   ORDERING CLINICIAN: JULIET SAWYER   TECHNIQUE: Contiguous unenhanced axial images were obtained through the chest. Images were reformatted in axial, coronal, and sagittal planes.   FINDINGS: LUNGS and AIRWAYS: Emphysema is present with small scattered bulla throughout the lungs.   Multifocal irregular areas of consolidation and volume loss are seen in the left lower lobe, lingula and right lower lobe basilar aspect. Additional patchy irregular infiltrates are seen throughout the upper lungs bilaterally. Bilateral small volume pleural effusions are present.   No pneumothorax.   MEDIASTINUM and GURPREET, LOWER NECK AND AXILLA: Multi station mediastinal and bilateral hilar small partially calcified lymph nodes are compatible with old granulomatous disease. No axillary lymphadenopathy.   HEART and VESSELS: Dense irregular atherosclerotic calcifications are present throughout the aorta and branch vessels. No aortic aneurysm. Central pulmonary arterial enlargement is seen which may be related to pulmonary hypertension.   Dense coronary artery calcifications and/or stents are present. No pericardial effusion.  Heart is enlarged similar in configuration to prior.   UPPER ABDOMEN: Right hepatic lobe  posterior segment subcentimeter low-attenuation lesion is too small to characterize. Left renal posterior interpolar partially exophytic 1.3 cm cyst is noted. There is a left renal interpolar nonobstructing 5 mm calculus. No left hydronephrosis is seen.   CHEST WALL and OSSEOUS STRUCTURES: Degenerative changes of the shoulders are partially visualized. Thoracic mild S shaped scoliosis is seen. Mild multilevel disc space narrowing and endplate spurring is present in the spine.       1.  Emphysema. 2. Multifocal irregular regions of consolidation and volume loss within the left lower lobe, lingula and right lower lobe with additional patchy infiltrates in the upper aspects of both lungs. Bilateral small volume pleural effusions. Findings may reflect multifocal infection and/or edema. 3. Prominent cardiomegaly similar to prior. 4. Central pulmonary arterial enlargement which may be related to pulmonary hypertension.   MACRO: None.   Signed by: Mina Benedict 10/5/2023 2:56 PM Dictation workstation:   [id number]    XR chest 2 views    Result Date: 10/5/2023  Interpreted By:  Benito Jose, STUDY: XR CHEST 2 VIEWS;  10/5/2023 10:11 am   INDICATION: Signs/Symptoms:Hypoxia.   COMPARISON: 10/02/2023   ACCESSION NUMBER(S): [id number]   ORDERING CLINICIAN: JULIET SAWYER   FINDINGS: Left retrocardiac opacities appear worsened. Small right pleural effusion with adjacent opacity stable. Diffuse interstitial pulmonary opacities suggestive of pulmonary edema. Stable cardiomegaly. Cardiac device. Atherosclerosis. No pneumothorax.       Worsening left retrocardiac opacities could be due to atelectasis, infection, and/or effusion. Otherwise stable pulmonary edema, cardiomegaly, and small right pleural effusion.   Signed by: Benito Jose 10/5/2023 11:41 AM Dictation workstation:   [id number]    Lower extremity venous duplex bilateral    Result Date: 10/5/2023  Preliminary Cardiology Report              [place] [address]      Phone [phone] Fax [phone]  Preliminary Vascular Lab Report  Lower Venous Duplex Ultrasound  Patient Name:     CHERRY Noel Physician: [id number] Farzana Singh MD,                                                    RPVI Study Date:       10/5/2023     Ordering Provider: [id number] KISHORE CAO MRN/PID:          [id number]      Fellow: Accession#:       [id number]  Technologist:      Dori Galdamez RVT YOB: 1935      Technologist 2: Gender:           F             Encounter#:        [id number] Admission Status: Inpatient     Location           [facility]                                 Performed:  Diagnosis/ICD: Localized (leg) edema-R60.0  PRELIMINARY CONCLUSIONS: Right Lower Venous: No evidence of acute deep vein thrombus visualized in the right lower extremity. Left Lower Venous: No evidence of acute deep vein thrombus visualized in the left lower extremity. Additional Findings: Technically limited due to positioning.  Imaging & Doppler Findings:  Right                 Compressible Thrombus        Flow Distal External Iliac                None CFV                       Yes        None   Spontaneous/Phasic PFV                       Yes        None FV Proximal               Yes        None   Spontaneous/Phasic FV Mid                    Yes        None FV Distal                 Yes        None Popliteal                 Yes        None   Spontaneous/Phasic Peroneal                  Yes        None PTV                       Yes        None  Left                  Compress Thrombus        Flow Distal External Iliac            None CFV                     Yes      None   Spontaneous/Phasic PFV                     Yes      None FV Proximal             Yes      None   Spontaneous/Phasic FV Mid                  Yes      None FV Distal               Yes      None Popliteal               Yes      None   Spontaneous/Phasic  Peroneal                Yes      None PTV                     Yes      None VASCULAR PRELIMINARY REPORT completed by Dori Galdamez DIVINE on 10/5/2023 at 10:49:37 AM  ** Final **     US renal complete    Result Date: 10/4/2023  Interpreted By:  Jeny Troy, STUDY: US RENAL COMPLETE;  10/4/2023 4:27 pm   INDICATION: Signs/Symptoms:YEVGENIY.   COMPARISON: None.   ACCESSION NUMBER(S): [id number]   ORDERING CLINICIAN: GIOVANY MICHAELS   TECHNIQUE: Multiple images of the kidneys were obtained  .   FINDINGS: RIGHT KIDNEY: Length 8.6 cm. Parenchymal echogenicity is mildly increased. There is no hydronephrosis.   LEFT KIDNEY: Length 8.2 cm. Parenchymal echogenicity is mildly increased. There is no hydronephrosis. Cyst extends from the upper pole measuring 1.2 x 1.6 x 1.2 cm.   BLADDER: Bladder is physiologically full with no wall thickening or intraluminal echogenic foci.       Kidneys are in small normal range with mildly increased echogenicity   No hydronephrosis   MACRO: None   Signed by: Jeny Troy 10/4/2023 4:35 PM Dictation workstation:   [id number]    Transthoracic echo (TTE) complete    Result Date: 10/3/2023              [place] [address]      Phone [phone] Fax [phone] TRANSTHORACIC ECHOCARDIOGRAM REPORT  Patient Name:      CHERRY BOB JESUS Noel Physician:   [id number] Darrel Hampton MD Study Date:        10/3/2023          Ordering Provider:   [id number] STEWART FERREIRA MRN/PID:           [id number]           Fellow: Accession#:        [id number]       Nurse:               Amairani Zafar RN Date of Birth/Age: 1935 / 88      Sonographer:         Thea Espinoza RDCS                    years Gender:            F                  Additional Staff: Height:            157.48 cm          Admit Date:          10/2/2023 Weight:            50.35 kg           Admission Status:    Inpatient - Routine BSA:                1.49 m2            Department Location: [facility] Echo Lab Blood Pressure: 118 /71 mmHg Study Type:    TRANSTHORACIC ECHO (TTE) COMPLETE Diagnosis/ICD: Heart failure, unspecified-I50.9; Cardiomyopathy in diseases                classified elsewhere-I43 Indication:    CHF, Cardiomyopathy  Study Detail: The following Echo studies were performed: 2D, M-Mode, Doppler and               color flow. Technically challenging study due to body habitus,               prominent lung artifact and poor acoustic windows. Agitated saline               used as a contrast agent for intraseptal flow evaluation and               Optison used as a contrast agent for endocardial border               definition.  PHYSICIAN INTERPRETATION: Left Ventricle: Left ventricular systolic function is normal, with an estimated ejection fraction of 70-75%. There are no regional wall motion abnormalities. The left ventricular cavity size is normal. There is moderate left ventricular hypertrophy. Spectral Doppler shows a normal pattern of left ventricular diastolic filling. Left Atrium: The left atrium is normal in size. A bubble study using agitated saline was performed. Bubble study is negative. Right Ventricle: The right ventricle is normal in size. There is mildly reduced right ventricular systolic function. A device is visualized in the right ventricle. Right Atrium: The right atrium is moderately dilated. There is a device visualized in the right atrium. Aortic Valve: The aortic valve appears structurally normal. There is mild aortic valve cusp calcification. There is mild to moderate aortic valve thickening. There is mild to moderate aortic valve regurgitation. The peak instantaneous gradient of the aortic valve is 4.2 mmHg. The mean gradient of the aortic valve is 2.0 mmHg. Mitral Valve: The mitral valve is normal in structure. There is trace mitral valve regurgitation. Tricuspid Valve: The tricuspid valve is structurally normal.  There is mild tricuspid regurgitation. The Doppler estimated RVSP is moderately elevated at 51.7 mmHg. Pulmonic Valve: The pulmonic valve is not well visualized. There is no indication of pulmonic valve regurgitation. Pericardium: There is no pericardial effusion noted. Aorta: The aortic root is normal.  CONCLUSIONS:  1. Left ventricular systolic function is normal with a 70-75% estimated ejection fraction.  2. There is moderate left ventricular hypertrophy.  3. There is mildly reduced right ventricular systolic function.  4. The right atrium is moderately dilated.  5. Moderately elevated right ventricular systolic pressure.  6. Mild to moderate aortic valve regurgitation. QUANTITATIVE DATA SUMMARY: 2D MEASUREMENTS:                           Normal Ranges: Ao Root d:     3.00 cm    (2.0-3.7cm) LAs:           3.50 cm    (2.7-4.0cm) IVSd:          1.60 cm    (0.6-1.1cm) LVPWd:         1.42 cm    (0.6-1.1cm) LVIDd:         3.91 cm    (3.9-5.9cm) LVIDs:         2.40 cm LV Mass Index: 153.0 g/m2 LV % FS        38.6 % LA VOLUME:                               Normal Ranges: LA Vol A4C:        39.2 ml    (22+/-6mL/m2) LA Vol A2C:        50.4 ml LA Vol BP:         49.6 ml LA Vol Index A4C:  26.3ml/m2 LA Vol Index A2C:  33.8 ml/m2 LA Vol Index BP:   33.3 ml/m2 LA Area A4C:       14.7 cm2 LA Area A2C:       18.6 cm2 LA Major Axis A4C: 4.7 cm LA Major Axis A2C: 5.8 cm RA VOLUME BY A/L METHOD:                       Normal Ranges: RA Area A4C: 24.5 cm2 AORTA MEASUREMENTS:                      Normal Ranges: Ao Sinus, d: 3.00 cm (2.1-3.5cm) Ao STJ, d:   1.89 cm (1.7-3.4cm) Asc Ao, d:   3.60 cm (2.1-3.4cm) LV SYSTOLIC FUNCTION BY 2D PLANIMETRY (MOD):                     Normal Ranges: EF-A4C View: 76.2 % (>=55%) EF-A2C View: 73.7 % EF-Biplane:  74.5 % LV DIASTOLIC FUNCTION:                           Normal Ranges: MV Peak E:    0.58 m/s    (0.7-1.2 m/s) MV Peak A:    0.23 m/s    (0.42-0.7 m/s) E/A Ratio:    2.57        (1.0-2.2) MV  e'         0.04 m/s    (>8.0) MV lateral e' 0.06 m/s MV medial e'  0.03 m/s MV A Dur:     103.00 msec E/e' Ratio:   12.96       (<8.0) MITRAL VALVE:                 Normal Ranges: MV DT: 237 msec (150-240msec) AORTIC VALVE:                                   Normal Ranges: AoV Vmax:                1.03 m/s (<=1.7m/s) AoV Peak P.2 mmHg (<20mmHg) AoV Mean P.0 mmHg (1.7-11.5mmHg) LVOT Max Henry:            0.70 m/s (<=1.1m/s) AoV VTI:                 20.70 cm (18-25cm) LVOT VTI:                14.40 cm LVOT Diameter:           2.00 cm  (1.8-2.4cm) AoV Area, VTI:           2.19 cm2 (2.5-5.5cm2) AoV Area,Vmax:           2.13 cm2 (2.5-4.5cm2) AoV Dimensionless Index: 0.70 AORTIC INSUFFICIENCY: AI Vmax:       4.19 m/s AI Half-time:  438 msec AI Decel Rate: 335.50 cm/s2  RIGHT VENTRICLE: RV Basal 3.64 cm RV Mid   3.19 cm RV Major 7.5 cm TAPSE:   20.8 mm RV s'    0.07 m/s TRICUSPID VALVE/RVSP:                             Normal Ranges: Peak TR Velocity: 3.49 m/s RV Syst Pressure: 51.7 mmHg (< 30mmHg) IVC Diam:         1.50 cm  [id number] Darrel Hampton MD Electronically signed on 10/3/2023 at 4:11:07 PM  ** Final **     XR ankle right 3+ views    Result Date: 10/2/2023  Interpreted By:  Jeny Troy, STUDY: XR ANKLE RIGHT 3+ VIEWS; ;  10/2/2023 1:14 pm   INDICATION: Signs/Symptoms:Concern for osteomyelitis.   COMPARISON: 2023 and 2023   ACCESSION NUMBER(S): [id number]   ORDERING CLINICIAN: ISAIAH DOLL   FINDINGS: AP, oblique and lateral views were obtained. 2 screws anchor fracture fragment of the calcaneal tuberosity in position. There is also a metallic pin in the calcaneal tubercle. A small amount of lucency persists at the fracture site which does not appear significantly changed. Along the cortical surface of the calcaneal tubercle just slightly above the level of the metallic pin the bone appears questionably less dense. This is a fairly subtle finding and could be projectional.  However this is in the vicinity of the overlying skin ulceration. Superficial soft tissue air is noted at the level of the ulceration with no deeply penetrating soft tissue air.       Metallic structures are present from internal fixation related to the avulsion from the calcaneal tubercle   There is a subtle lucency on the lateral view at the cortical surface of the tubercle just above the metallic pin which is questionably an area of interval bone destruction versus a projectional difference.     MACRO: None   Signed by: Jeny Troy 10/2/2023 1:23 PM Dictation workstation:   [id number]    XR chest 1 view    Result Date: 10/2/2023  Interpreted By:  Jeny Troy, STUDY: XR CHEST 1 VIEW; ;  10/2/2023 1:13 pm   INDICATION: . Shortness of breath   COMPARISON: 02/15/2023   ACCESSION NUMBER(S): [id number]   ORDERING CLINICIAN: ISAIAH DOLL   TECHNIQUE: Portable AP upright   FINDINGS: ICD is present in the left upper chest wall with electrode wires in the right atrium and right ventricle. Cardiac silhouette is enlarged but appears similar to the prior study. Aorta is atherosclerotic. Interstitial markings are coarse but appears similar to the previous exam. Left costophrenic angle is indistinct which may indicate minimal left pleural fluid. Bones are osteopenic with no definite acute interval change.       Unchanged cardiomegaly   Small left pleural effusion   Coarse interstitial markings are unchanged and probably related to chronic lung disease.     Signed by: Jeny Troy 10/2/2023 1:18 PM Dictation workstation:   [id number]    XR foot    Result Date: 9/13/2023  Interpreted By:  GLADYS SHELDON MD MRN: [id number] Patient Name: CHERRY LEE  STUDY: FOOT; COMPLETE, MIN 3 VIEWS;  9/12/2023 1:03 pm  INDICATION: right foot pain  S92.031D: Closed displaced avulsion fracture of tuberosity of right calcaneus with routine healing, subsequent.  COMPARISON: 08/22/2023  ACCESSION NUMBER(S): [id number]  ORDERING  CLINICIAN: JOANNE LANDA  FINDINGS: Right foot, three views  Postsurgical changes in the calcaneus status post fixation of an avulsion fracture with screws. The hardware is intact. Soft tissue edema posteriorly. There is no acute fracture. There is no dislocation      ORIF of calcaneal fracture with unchanged alignment and intact hardware. Associated soft tissue edema posteriorly       Prachi Alfredo CNP  Answering Service [phone]  Date of service: 10/5/2023  Time of service: 4:16 PM

## 2023-10-05 NOTE — PROGRESS NOTES
..       [facility] Progress Note           Subjective/   88 y.o. year old female who we are seeing in consultation for YEVGENIY on CKD.     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  Having worsening SOB   C/o moist cough with some sputum in throat  Feels edema much better  Appetite ok  No issues with UOP  No other new issues at this time    ROS done and negative unless mentioned as above  No change in PFSH  All data labs/interval notes and overnight issues are reviewed  Objective/     Vitals:    10/05/23 0059 10/05/23 0500 10/05/23 0747 10/05/23 0907   BP:  110/54  127/69   BP Location:       Patient Position:  Lying     Pulse:  65  65   Resp:  16  16   Temp:  37.2 °C (99 °F)  36.5 °C (97.7 °F)   TempSrc:    Temporal   SpO2: 93% 92%  91%   Weight:   48.7 kg (107 lb 5.8 oz)    Height:            Intake/Output Summary (Last 24 hours) at 10/5/2023 0945  Last data filed at 10/5/2023 0747  Gross per 24 hour   Intake 630 ml   Output 0 ml   Net 630 ml          Current Facility-Administered Medications:     acetaminophen (Tylenol) tablet 650 mg, 650 mg, oral, q4h PRN, Jt House, DO, 650 mg at 10/05/23 0904    ALPRAZolam (Xanax) tablet 0.5 mg, 0.5 mg, oral, Nightly PRN, Nallely Clarke PA-C, 0.5 mg at 10/04/23 2237    amiodarone (Pacerone) tablet 200 mg, 200 mg, oral, Daily, Jt House DO, 200 mg at 10/05/23 0907    aspirin EC tablet 81 mg, 81 mg, oral, Daily, Nallely Clarke PA-C, 81 mg at 10/05/23 0907    atorvastatin (Lipitor) tablet 80 mg, 80 mg, oral, Nightly, SANDRO Lara-C, 80 mg at 10/04/23 2126    bisacodyl (Dulcolax) EC tablet 10 mg, 10 mg, oral, Daily PRN, SANDRO Lara-C    ceFAZolin in dextrose (iso-os) (Ancef) IVPB 1 g, 1 g, intravenous, q12h, Vernon Arenas MD, Stopped at 10/05/23 0643    cyanocobalamin (Vitamin B-12) tablet 2,000 mcg, 2,000 mcg, oral, Daily, Nallely Clarke PA-C, 2,000 mcg at 10/05/23 0908    heparin (porcine) injection 5,000 Units, 5,000 Units, subcutaneous,  q8h, Nallely Clarke PA-C, 5,000 Units at 10/05/23 0614    levothyroxine (Synthroid, Levoxyl) tablet 25 mcg, 25 mcg, oral, Daily, Nallely Clarke PA-C, 25 mcg at 10/05/23 0614    [Held by provider] lisinopril tablet 5 mg, 5 mg, oral, Daily, Jt House DO    melatonin tablet 9 mg, 9 mg, oral, Nightly PRN, Jt House DO, 9 mg at 10/03/23 0223    metoprolol succinate XL (Toprol-XL) 24 hr tablet 25 mg, 25 mg, oral, Daily, Arpita Camp PA-C, 25 mg at 10/05/23 0909    oxygen (O2) therapy, , inhalation, Continuous - 02/gases, Frederick Lowry MD, 4 L/min at 10/04/23 2115    polyethylene glycol (Glycolax, Miralax) packet 17 g, 17 g, oral, Daily, Nallely Clarke PA-C, 17 g at 10/05/23 0903    spironolactone (Aldactone) tablet 12.5 mg, 12.5 mg, oral, q24h MACRINA, Michael Latham MD, 12.5 mg at 10/05/23 0907    torsemide (Demadex) tablet 20 mg, 20 mg, oral, Daily, Arpita Camp PA-C    Physical Exam  Constitutional:       General: She is not in acute distress.     Appearance: Normal appearance. She is not ill-appearing or toxic-appearing.   HENT:      Head: Normocephalic and atraumatic.   Eyes:      General: No scleral icterus.     Conjunctiva/sclera: Conjunctivae normal.   Neck:      Vascular: No carotid bruit.   Cardiovascular:      Rate and Rhythm: Normal rate and regular rhythm.      Pulses: Normal pulses.      Heart sounds: Normal heart sounds.   Pulmonary:      Effort: Pulmonary effort is normal.      Comments: Faint bibasilar crackles  Abdominal:      General: Bowel sounds are normal. There is no distension.      Palpations: Abdomen is soft.      Tenderness: There is no abdominal tenderness.   Musculoskeletal:      Cervical back: Neck supple.      Right lower leg: No edema.      Left lower leg: No edema.   Skin:     General: Skin is warm and dry.   Neurological:      General: No focal deficit present.      Mental Status: She is alert and oriented to person, place, and time.   Psychiatric:          Mood and Affect: Mood normal.         Behavior: Behavior normal.         Judgment: Judgment normal.          Lab Results   Component Value Date    WBC 7.3 10/05/2023    HGB 12.7 10/05/2023    HCT 42.0 10/05/2023    MCV 88 10/05/2023     10/05/2023     Lab Results   Component Value Date    GLUCOSE 88 10/05/2023    CALCIUM 9.3 10/05/2023     10/05/2023    K 4.0 10/05/2023    CO2 32 10/05/2023     10/05/2023    BUN 46 (H) 10/05/2023    CREATININE 1.98 (H) 10/05/2023                                 Assessment/Plan   YEVGENIY/ATN likley 2/2 CRS  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  Acute on chronic diastolic HFpEF 70-75%  Acute hypoxic respiratory failure (no oxygen at baseline)  Small left pleural effusion  RLE cellulitis and infected foot wound  Persistent Afib  ASCVD        Plan:  -Scr 1.98 rising, bl~1.6-1.7, non oliguric, BP stable  -Transitioned to oral Torsemide 10 mg daily and started on Aldactone 12.5 mg, agree and cont for now  -May need to tolerate increase in Scr to keep out of CHF  -Reasonable to resume sglt2i, defer to cardiology   -Hold ACE for now and may consider resuming later  -Avoid large fluctuation in hemodynamics  -UA with glucosuria, large leukocyte esterase, WBC (11-20) ? UTI, defer to primary  -Fena 2.8 and c/w ATN. No significant albuminuria.  -On high dose statin therapy, continue.  -Management of Afib defer to cardiology.  -IV Ancef for the cellulitis and defer to ID  -Worsening dyspnea and increase O2 needs disproportionate to physical exam  -CXR noted, awaiting CT chest w/o IV contrast for further evaluation of pulmonary status  -Wean oxygen as able, defer to primary  -All other care defer to primary   -We will follow closely with you      Thank you for the consult and the opportunity to participate in the care of this patient. Please do not hesitate to contact us with any questions or concerns.     INDIGO Mckinnon-CNP  [facility], [facility]  [phone]

## 2023-10-05 NOTE — PROGRESS NOTES
Adwoa Forrest is a 88 y.o. female on day 3 of admission presenting with Acute on chronic congestive heart failure, unspecified heart failure type (CMS/HCC).      Subjective   Adwoa Forrest is a 88 y.o.  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 BNP 1800 on admission.  10/05/23: Worsening hypoxia this morning- reportedly 70's on 4L- increased to 6L. Feel more dyspneic. Productive cough. CXR with retrocardiac opacity- check CT chest. Broaden to vanc/zosyn, check MRSA nares. US BLE negative for DVT. Short course of decadron.         Review of Systems   Constitutional:  Negative for appetite change, chills, diaphoresis, fatigue and fever.   HENT:  Negative for congestion, ear pain, facial swelling, hearing loss, nosebleeds, sore throat, tinnitus and trouble swallowing.    Eyes:  Negative for pain.   Respiratory:  Positive for cough and shortness of breath. Negative for chest tightness and wheezing.    Cardiovascular:  Positive for leg swelling. Negative for chest pain and palpitations.   Gastrointestinal:  Negative for abdominal pain, blood in stool, constipation, diarrhea, nausea and vomiting.   Genitourinary:  Negative for dysuria, flank pain, frequency, hematuria and urgency.   Musculoskeletal:  Negative for back pain  and joint swelling.   Skin:  Positive for wound. Negative for rash.   Neurological:  Negative for dizziness, syncope, weakness, light-headedness, numbness and headaches.   Hematological:  Does not bruise/bleed easily.   Psychiatric/Behavioral:  Negative for behavioral problems, hallucinations and suicidal ideas.           Objective     Last Recorded Vitals  /63 (Patient Position: Lying)   Pulse 65   Temp 36.7 °C (98 °F) (Temporal)   Resp 16   Wt 48.7 kg (107 lb 5.8 oz)   SpO2 93%     Image Results  XR ankle right 3+ views    Result Date: 10/2/2023  Interpreted By:  Jeny Troy, STUDY: XR ANKLE RIGHT 3+ VIEWS; ;  10/2/2023 1:14 pm   INDICATION: Signs/Symptoms:Concern for osteomyelitis.   COMPARISON: 07/17/2023 and 09/12/2023   ACCESSION NUMBER(S): [id number]   ORDERING CLINICIAN: ISAIAH DOLL   FINDINGS: AP, oblique and lateral views were obtained. 2 screws anchor fracture fragment of the calcaneal tuberosity in position. There is also a metallic pin in the calcaneal tubercle. A small amount of lucency persists at the fracture site which does not appear significantly changed. Along the cortical surface of the calcaneal tubercle just slightly above the level of the metallic pin the bone appears questionably less dense. This is a fairly subtle finding and could be projectional. However this is in the vicinity of the overlying skin ulceration. Superficial soft tissue air is noted at the level of the ulceration with no deeply penetrating soft tissue air.       Metallic structures are present from internal fixation related to the avulsion from the calcaneal tubercle   There is a subtle lucency on the lateral view at the cortical surface of the tubercle just above the metallic pin which is questionably an area of interval bone destruction versus a projectional difference.     MACRO: None   Signed by: Jeny Troy 10/2/2023 1:23 PM Dictation workstation:   [id number]    XR chest 1 view    Result  Date: 10/2/2023  Interpreted By:  Jeny Troy, STUDY: XR CHEST 1 VIEW; ;  10/2/2023 1:13 pm   INDICATION: . Shortness of breath   COMPARISON: 02/15/2023   ACCESSION NUMBER(S): [id number]   ORDERING CLINICIAN: ISAIAH DOLL   TECHNIQUE: Portable AP upright   FINDINGS: ICD is present in the left upper chest wall with electrode wires in the right atrium and right ventricle. Cardiac silhouette is enlarged but appears similar to the prior study. Aorta is atherosclerotic. Interstitial markings are coarse but appears similar to the previous exam. Left costophrenic angle is indistinct which may indicate minimal left pleural fluid. Bones are osteopenic with no definite acute interval change.       Unchanged cardiomegaly   Small left pleural effusion   Coarse interstitial markings are unchanged and probably related to chronic lung disease.     Signed by: Jeny Troy 10/2/2023 1:18 PM Dictation workstation:   [id number]    XR foot    Result Date: 9/13/2023  Interpreted By:  GLADYS SHELDON MD MRN: [id number] Patient Name: CHERRY LEE  STUDY: FOOT; COMPLETE, MIN 3 VIEWS;  9/12/2023 1:03 pm  INDICATION: right foot pain  S92.031D: Closed displaced avulsion fracture of tuberosity of right calcaneus with routine healing, subsequent.  COMPARISON: 08/22/2023  ACCESSION NUMBER(S): [id number]  ORDERING CLINICIAN: JOANNE LANDA  FINDINGS: Right foot, three views  Postsurgical changes in the calcaneus status post fixation of an avulsion fracture with screws. The hardware is intact. Soft tissue edema posteriorly. There is no acute fracture. There is no dislocation      ORIF of calcaneal fracture with unchanged alignment and intact hardware. Associated soft tissue edema posteriorly       Lab Results  Results for orders placed or performed during the hospital encounter of 10/02/23 (from the past 24 hour(s))   Basic Metabolic Panel   Result Value Ref Range    Glucose 88 74 - 99 mg/dL    Sodium 142 136 - 145 mmol/L     Potassium 4.0 3.5 - 5.3 mmol/L    Chloride 101 98 - 107 mmol/L    Bicarbonate 32 21 - 32 mmol/L    Anion Gap 13 10 - 20 mmol/L    Urea Nitrogen 46 (H) 6 - 23 mg/dL    Creatinine 1.98 (H) 0.50 - 1.05 mg/dL    eGFR 24 (L) >60 mL/min/1.73m*2    Calcium 9.3 8.6 - 10.3 mg/dL   Magnesium   Result Value Ref Range    Magnesium 2.16 1.60 - 2.40 mg/dL   CBC and Auto Differential   Result Value Ref Range    WBC 7.3 4.4 - 11.3 x10*3/uL    nRBC 0.0 0.0 - 0.0 /100 WBCs    RBC 4.76 4.00 - 5.20 x10*6/uL    Hemoglobin 12.7 12.0 - 16.0 g/dL    Hematocrit 42.0 36.0 - 46.0 %    MCV 88 80 - 100 fL    MCH 26.7 26.0 - 34.0 pg    MCHC 30.2 (L) 32.0 - 36.0 g/dL    RDW 17.9 (H) 11.5 - 14.5 %    Platelets 389 150 - 450 x10*3/uL    MPV 8.7 7.5 - 11.5 fL    Neutrophils % 76.5 40.0 - 80.0 %    Immature Granulocytes %, Automated 0.7 0.0 - 0.9 %    Lymphocytes % 5.6 13.0 - 44.0 %    Monocytes % 10.3 2.0 - 10.0 %    Eosinophils % 5.8 0.0 - 6.0 %    Basophils % 1.1 0.0 - 2.0 %    Neutrophils Absolute 5.55 (H) 1.60 - 5.50 x10*3/uL    Immature Granulocytes Absolute, Automated 0.05 0.00 - 0.50 x10*3/uL    Lymphocytes Absolute 0.41 (L) 0.80 - 3.00 x10*3/uL    Monocytes Absolute 0.75 0.05 - 0.80 x10*3/uL    Eosinophils Absolute 0.42 (H) 0.00 - 0.40 x10*3/uL    Basophils Absolute 0.08 0.00 - 0.10 x10*3/uL   Blood Gas Venous   Result Value Ref Range    POCT pH, Venous 7.39 7.33 - 7.43 pH    POCT pCO2, Venous 56 (H) 41 - 51 mm Hg    POCT pO2, Venous 59 (H) 35 - 45 mm Hg    POCT SO2, Venous 90 (H) 45 - 75 %    POCT Oxy Hemoglobin, Venous 87.6 (H) 45.0 - 75.0 %    POCT Base Excess, Venous 7.1 (H) -2.0 - 3.0 mmol/L    POCT HCO3 Calculated, Venous 33.9 (H) 22.0 - 26.0 mmol/L    Patient Temperature 37.0 degrees Celsius    Test Comment [facility]         Medications  Scheduled medications:  amiodarone, 200 mg, oral, Daily  aspirin, 81 mg, oral, Daily  atorvastatin, 80 mg, oral, Nightly  cyanocobalamin, 2,000 mcg, oral, Daily  dexAMETHasone, 2 mg, intravenous,  q8h  guaiFENesin, 1,200 mg, oral, BID  heparin (porcine), 5,000 Units, subcutaneous, q8h  levothyroxine, 25 mcg, oral, Daily  [Held by provider] lisinopril, 5 mg, oral, Daily  metoprolol succinate XL, 25 mg, oral, Daily  oxygen, , inhalation, Continuous - 02/gases  piperacillin-tazobactam, 2.25 g, intravenous, q8h  polyethylene glycol, 17 g, oral, Daily  spironolactone, 12.5 mg, oral, q24h MACRINA  [START ON 10/6/2023] torsemide, 20 mg, oral, Daily  vancomycin, 1,000 mg, intravenous, Once      Continuous medications:     PRN medications:  PRN medications: acetaminophen, ALPRAZolam, bisacodyl, melatonin, vancomycin     Physical Exam  Constitutional:       General: She is not in acute distress.     Appearance: Normal appearance.   HENT:      Head: Normocephalic and atraumatic.      Right Ear: External ear normal.      Left Ear: External ear normal.      Nose: Nose normal.      Mouth/Throat:      Mouth: Mucous membranes are moist.      Pharynx: Oropharynx is clear.   Eyes:      Extraocular Movements: Extraocular movements intact.      Conjunctiva/sclera: Conjunctivae normal.      Pupils: Pupils are equal, round, and reactive to light.   Cardiovascular:      Rate and Rhythm: Normal rate and regular rhythm.      Pulses: Normal pulses.      Heart sounds: Normal heart sounds.   Pulmonary:      Effort: Pulmonary effort is normal. No respiratory distress.      Breath sounds: Wheezing and rales present. No rhonchi.      Comments: NC in place  Abdominal:      General: Abdomen is flat. Bowel sounds are normal.      Palpations: Abdomen is soft.      Tenderness: There is no abdominal tenderness. There is no right CVA tenderness, left CVA tenderness, guarding or rebound.   Musculoskeletal:         General: Normal range of motion.      Cervical back: Normal range of motion and neck supple.      Right lower leg: Edema present.      Left lower leg: Edema present.   Skin:     General: Skin is warm and dry.      Capillary Refill: Capillary  refill takes less than 2 seconds.      Findings: Lesion (Heel) present. No rash.   Neurological:      General: No focal deficit present.      Mental Status: She is alert and oriented to person, place, and time. Mental status is at baseline.   Psychiatric:         Mood and Affect: Mood normal.         Behavior: Behavior normal.                  Code Status  Full Code     Assessment/Plan   This patient currently has cardiac telemetry ordered; if you would like to modify or discontinue the telemetry order, click here to go to the orders activity to modify/discontinue the order.      Acute on chronic congestive heart failure, unspecified heart failure type (CMS/[facility])  Cardiology Consult appreciated   BNP = 1279  Imaging and examination consistent with volume overload  Continued on IV Lasix 40mg IV BID  Strict I's & O's/Daily Weights   Echocardiogram pending   Echo 02/16/23 with EF of 50% and severely increased LV septal thickness compared to Echo 09/22/2022 with EF of 60% and mild AV regurgitation.  Monitor renal function closely  Spironolactone added  Consider resumption of sglt2i    Non-healing wound of right lower extremity  s/p ORIF 07/19/23 by Dr. Brunner with recommendations of continued non-weight bearing per orthopedics/wound care due to  a persistent wound as noted to be by the patient  Wound Care Consult   ESR = 41,CRP = 6.22  Consult to podiatry- wound healing well without infectious properties  Had graft scheduled for 10/6/23  ID consult given elevated inflammatory markers  Broad spectrum abx as stated elsewhere  W. Cx with mixed bacteria    Hypertension  Continued on home medications    Hyperlipidemia  Continued on home medications    Hypothyroidism  Continued on home medications    Paroxysmal atrial fibrillation (CMS/[facility])  History of atrial fibrillation/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 and left appendage ligation  Status post dual-chamber PPM  History of recurrent diverticular  bleeds preventing further anticoagulation given about 4 years ago  Continued on home medications   ontinued outpatient followup with Cardiology & Electrophysiology  In the EMR she was noted to be allergic to Amiodarone, but this was also noted as still being taken on her medication list on Cardiology outpatient record on 09/25/23 and patient states she still takes Amiodarone- We will hold the amiodarone for now and discuss further with pharmacy to see if she is still filling this or not  Telemetry     CKD (chronic kidney disease) stage 3, GFR 30-59 ml/min (CMS/[facility])  Baseline creatinine of around 1.8 prior to 02/2023 and did have brief elevations throughout 2023 for dehydration and recent fractures but now seems to be around 1.6  Continue to monitor very closely in setting of diuresis   Nephrology consultation  Bladder scan for PVR x1    Elevated troponin I level  30 --> 29   Likely in setting of volume overload and CKD   Nonischemic EKG noted on presentation  Telemetry Monitoring    Acute hypoxic respiratory failure (CMS/[facility])  Continue diuresis  Home O2 eval prior to discharge  Worsening hypoxia morning of 10/5/23- CXR with retrocardiac opacity  Will obtain CT chest as well as VBG  Titrate oxygen  Broaden to vanco/zosyn  Check MRSA nares  IV decadron short course      DVT ppx: subcutaneous Heparin     Arpita Frazier PA-C

## 2023-10-06 ENCOUNTER — APPOINTMENT (OUTPATIENT)
Dept: RADIOLOGY | Facility: HOSPITAL | Age: 88
DRG: 291 | End: 2023-10-06
Payer: MEDICARE

## 2023-10-06 ENCOUNTER — APPOINTMENT (OUTPATIENT)
Dept: WOUND CARE | Facility: CLINIC | Age: 88
End: 2023-10-06
Payer: MEDICARE

## 2023-10-06 PROBLEM — J90 PLEURAL EFFUSION: Status: ACTIVE | Noted: 2023-10-06

## 2023-10-06 PROBLEM — J18.9 PNEUMONIA: Status: ACTIVE | Noted: 2023-10-06

## 2023-10-06 PROBLEM — R93.89 ABNORMAL CT OF THE CHEST: Status: ACTIVE | Noted: 2023-10-06

## 2023-10-06 LAB
ANION GAP SERPL CALC-SCNC: 11 MMOL/L (ref 10–20)
APPEARANCE UR: CLEAR
BACTERIA SPEC CULT: ABNORMAL
BASOPHILS # BLD AUTO: 0.02 X10*3/UL (ref 0–0.1)
BASOPHILS NFR BLD AUTO: 0.3 %
BILIRUB UR STRIP.AUTO-MCNC: NEGATIVE MG/DL
BNP SERPL-MCNC: 1795 PG/ML (ref 0–99)
BUN SERPL-MCNC: 49 MG/DL (ref 6–23)
CALCIUM SERPL-MCNC: 9.3 MG/DL (ref 8.6–10.3)
CHLORIDE SERPL-SCNC: 103 MMOL/L (ref 98–107)
CO2 SERPL-SCNC: 34 MMOL/L (ref 21–32)
COLOR UR: YELLOW
CREAT SERPL-MCNC: 1.8 MG/DL (ref 0.5–1.05)
EOSINOPHIL # BLD AUTO: 0 X10*3/UL (ref 0–0.4)
EOSINOPHIL NFR BLD AUTO: 0 %
ERYTHROCYTE [DISTWIDTH] IN BLOOD BY AUTOMATED COUNT: 17.5 % (ref 11.5–14.5)
GFR SERPL CREATININE-BSD FRML MDRD: 27 ML/MIN/1.73M*2
GLUCOSE SERPL-MCNC: 144 MG/DL (ref 74–99)
GLUCOSE UR STRIP.AUTO-MCNC: ABNORMAL MG/DL
GRAM STN SPEC: ABNORMAL
GRAM STN SPEC: ABNORMAL
HCT VFR BLD AUTO: 42.4 % (ref 36–46)
HGB BLD-MCNC: 12.6 G/DL (ref 12–16)
IMM GRANULOCYTES # BLD AUTO: 0.06 X10*3/UL (ref 0–0.5)
IMM GRANULOCYTES NFR BLD AUTO: 0.8 % (ref 0–0.9)
KETONES UR STRIP.AUTO-MCNC: NEGATIVE MG/DL
LDH SERPL L TO P-CCNC: 301 U/L (ref 84–246)
LEUKOCYTE ESTERASE UR QL STRIP.AUTO: NEGATIVE
LYMPHOCYTES # BLD AUTO: 0.54 X10*3/UL (ref 0.8–3)
LYMPHOCYTES NFR BLD AUTO: 7.5 %
MAGNESIUM SERPL-MCNC: 2.39 MG/DL (ref 1.6–2.4)
MCH RBC QN AUTO: 26.3 PG (ref 26–34)
MCHC RBC AUTO-ENTMCNC: 29.7 G/DL (ref 32–36)
MCV RBC AUTO: 89 FL (ref 80–100)
MONOCYTES # BLD AUTO: 0.1 X10*3/UL (ref 0.05–0.8)
MONOCYTES NFR BLD AUTO: 1.4 %
NEUTROPHILS # BLD AUTO: 6.52 X10*3/UL (ref 1.6–5.5)
NEUTROPHILS NFR BLD AUTO: 90 %
NITRITE UR QL STRIP.AUTO: NEGATIVE
NRBC BLD-RTO: 0 /100 WBCS (ref 0–0)
PH UR STRIP.AUTO: 5 [PH]
PLATELET # BLD AUTO: 374 X10*3/UL (ref 150–450)
PMV BLD AUTO: 8.6 FL (ref 7.5–11.5)
POTASSIUM SERPL-SCNC: 4.9 MMOL/L (ref 3.5–5.3)
PROCALCITONIN SERPL-MCNC: 0.04 NG/ML
PROT SERPL-MCNC: 6.3 G/DL (ref 6.4–8.2)
PROT UR STRIP.AUTO-MCNC: NEGATIVE MG/DL
RBC # BLD AUTO: 4.79 X10*6/UL (ref 4–5.2)
RBC # UR STRIP.AUTO: NEGATIVE /UL
SODIUM SERPL-SCNC: 143 MMOL/L (ref 136–145)
SP GR UR STRIP.AUTO: 1.01
UROBILINOGEN UR STRIP.AUTO-MCNC: <2 MG/DL
VANCOMYCIN SERPL-MCNC: 14 UG/ML (ref 5–20)
WBC # BLD AUTO: 7.2 X10*3/UL (ref 4.4–11.3)

## 2023-10-06 PROCEDURE — 2500000004 HC RX 250 GENERAL PHARMACY W/ HCPCS (ALT 636 FOR OP/ED): Performed by: INTERNAL MEDICINE

## 2023-10-06 PROCEDURE — 2500000001 HC RX 250 WO HCPCS SELF ADMINISTERED DRUGS (ALT 637 FOR MEDICARE OP): Performed by: INTERNAL MEDICINE

## 2023-10-06 PROCEDURE — 83880 ASSAY OF NATRIURETIC PEPTIDE: CPT | Performed by: NURSE PRACTITIONER

## 2023-10-06 PROCEDURE — 96372 THER/PROPH/DIAG INJ SC/IM: CPT | Performed by: STUDENT IN AN ORGANIZED HEALTH CARE EDUCATION/TRAINING PROGRAM

## 2023-10-06 PROCEDURE — 97530 THERAPEUTIC ACTIVITIES: CPT | Mod: GP,CQ

## 2023-10-06 PROCEDURE — 2720000007 HC OR 272 NO HCPCS

## 2023-10-06 PROCEDURE — 82310 ASSAY OF CALCIUM: CPT | Performed by: PHYSICIAN ASSISTANT

## 2023-10-06 PROCEDURE — 32555 ASPIRATE PLEURA W/ IMAGING: CPT | Mod: RT | Performed by: RADIOLOGY

## 2023-10-06 PROCEDURE — 85025 COMPLETE CBC W/AUTO DIFF WBC: CPT | Performed by: PHYSICIAN ASSISTANT

## 2023-10-06 PROCEDURE — 87070 CULTURE OTHR SPECIMN AEROBIC: CPT | Mod: CMCLAB,PORLAB | Performed by: PHYSICIAN ASSISTANT

## 2023-10-06 PROCEDURE — 88305 TISSUE EXAM BY PATHOLOGIST: CPT | Performed by: PATHOLOGY

## 2023-10-06 PROCEDURE — 83615 LACTATE (LD) (LDH) ENZYME: CPT | Mod: CMCLAB,PORLAB | Performed by: PHYSICIAN ASSISTANT

## 2023-10-06 PROCEDURE — 36415 COLL VENOUS BLD VENIPUNCTURE: CPT | Performed by: NURSE PRACTITIONER

## 2023-10-06 PROCEDURE — 80202 ASSAY OF VANCOMYCIN: CPT | Performed by: PHYSICIAN ASSISTANT

## 2023-10-06 PROCEDURE — 99233 SBSQ HOSP IP/OBS HIGH 50: CPT | Performed by: PHYSICIAN ASSISTANT

## 2023-10-06 PROCEDURE — 83615 LACTATE (LD) (LDH) ENZYME: CPT | Performed by: PHYSICIAN ASSISTANT

## 2023-10-06 PROCEDURE — 2500000004 HC RX 250 GENERAL PHARMACY W/ HCPCS (ALT 636 FOR OP/ED): Performed by: STUDENT IN AN ORGANIZED HEALTH CARE EDUCATION/TRAINING PROGRAM

## 2023-10-06 PROCEDURE — 88305 TISSUE EXAM BY PATHOLOGIST: CPT | Mod: TC | Performed by: PHYSICIAN ASSISTANT

## 2023-10-06 PROCEDURE — 88112 CYTOPATH CELL ENHANCE TECH: CPT | Performed by: PATHOLOGY

## 2023-10-06 PROCEDURE — C1729 CATH, DRAINAGE: HCPCS

## 2023-10-06 PROCEDURE — 87075 CULTR BACTERIA EXCEPT BLOOD: CPT | Mod: CMCLAB,PORLAB | Performed by: PHYSICIAN ASSISTANT

## 2023-10-06 PROCEDURE — 2500000002 HC RX 250 W HCPCS SELF ADMINISTERED DRUGS (ALT 637 FOR MEDICARE OP, ALT 636 FOR OP/ED): Performed by: INTERNAL MEDICINE

## 2023-10-06 PROCEDURE — 2500000001 HC RX 250 WO HCPCS SELF ADMINISTERED DRUGS (ALT 637 FOR MEDICARE OP): Performed by: STUDENT IN AN ORGANIZED HEALTH CARE EDUCATION/TRAINING PROGRAM

## 2023-10-06 PROCEDURE — 2500000004 HC RX 250 GENERAL PHARMACY W/ HCPCS (ALT 636 FOR OP/ED): Performed by: PHYSICIAN ASSISTANT

## 2023-10-06 PROCEDURE — 84157 ASSAY OF PROTEIN OTHER: CPT | Mod: CMCLAB,PORLAB | Performed by: PHYSICIAN ASSISTANT

## 2023-10-06 PROCEDURE — 99232 SBSQ HOSP IP/OBS MODERATE 35: CPT | Performed by: NURSE PRACTITIONER

## 2023-10-06 PROCEDURE — 97110 THERAPEUTIC EXERCISES: CPT | Mod: GP,CQ

## 2023-10-06 PROCEDURE — 0W993ZZ DRAINAGE OF RIGHT PLEURAL CAVITY, PERCUTANEOUS APPROACH: ICD-10-PCS | Performed by: RADIOLOGY

## 2023-10-06 PROCEDURE — 2060000001 HC INTERMEDIATE ICU ROOM DAILY

## 2023-10-06 PROCEDURE — 76942 ECHO GUIDE FOR BIOPSY: CPT

## 2023-10-06 PROCEDURE — 87205 SMEAR GRAM STAIN: CPT | Mod: CMCLAB,PORLAB | Performed by: PHYSICIAN ASSISTANT

## 2023-10-06 PROCEDURE — 81003 URINALYSIS AUTO W/O SCOPE: CPT | Performed by: NURSE PRACTITIONER

## 2023-10-06 PROCEDURE — 83735 ASSAY OF MAGNESIUM: CPT | Performed by: PHYSICIAN ASSISTANT

## 2023-10-06 PROCEDURE — 84155 ASSAY OF PROTEIN SERUM: CPT | Performed by: PHYSICIAN ASSISTANT

## 2023-10-06 PROCEDURE — 2500000001 HC RX 250 WO HCPCS SELF ADMINISTERED DRUGS (ALT 637 FOR MEDICARE OP): Performed by: PHYSICIAN ASSISTANT

## 2023-10-06 RX ORDER — SODIUM CHLORIDE 0.9 % (FLUSH) 0.9 %
SYRINGE (ML) INJECTION
Status: COMPLETED
Start: 2023-10-06 | End: 2023-10-06

## 2023-10-06 RX ORDER — VANCOMYCIN HYDROCHLORIDE 500 MG/100ML
500 INJECTION, SOLUTION INTRAVENOUS EVERY 24 HOURS
Status: DISCONTINUED | OUTPATIENT
Start: 2023-10-06 | End: 2023-10-09

## 2023-10-06 RX ADMIN — ACETAMINOPHEN 650 MG: 325 TABLET, FILM COATED ORAL at 06:01

## 2023-10-06 RX ADMIN — METOPROLOL SUCCINATE 25 MG: 25 TABLET, EXTENDED RELEASE ORAL at 09:00

## 2023-10-06 RX ADMIN — GUAIFENESIN 1200 MG: 600 TABLET ORAL at 09:32

## 2023-10-06 RX ADMIN — SPIRONOLACTONE 12.5 MG: 25 TABLET, FILM COATED ORAL at 09:31

## 2023-10-06 RX ADMIN — CYANOCOBALAMIN TAB 1000 MCG 2000 MCG: 1000 TAB at 09:31

## 2023-10-06 RX ADMIN — HEPARIN SODIUM 5000 UNITS: 5000 INJECTION INTRAVENOUS; SUBCUTANEOUS at 22:31

## 2023-10-06 RX ADMIN — LEVOTHYROXINE SODIUM 25 MCG: 0.03 TABLET ORAL at 05:53

## 2023-10-06 RX ADMIN — ATORVASTATIN CALCIUM 80 MG: 40 TABLET, FILM COATED ORAL at 21:01

## 2023-10-06 RX ADMIN — DEXAMETHASONE SODIUM PHOSPHATE 2 MG: 4 INJECTION, SOLUTION INTRAMUSCULAR; INTRAVENOUS at 05:53

## 2023-10-06 RX ADMIN — PIPERACILLIN SODIUM AND TAZOBACTAM SODIUM 2.25 G: 2; .25 INJECTION, SOLUTION INTRAVENOUS at 14:47

## 2023-10-06 RX ADMIN — ACETAMINOPHEN 650 MG: 325 TABLET, FILM COATED ORAL at 22:31

## 2023-10-06 RX ADMIN — ASPIRIN 81 MG: 81 TABLET, COATED ORAL at 09:31

## 2023-10-06 RX ADMIN — Medication: at 21:00

## 2023-10-06 RX ADMIN — AMIODARONE HYDROCHLORIDE 200 MG: 200 TABLET ORAL at 09:31

## 2023-10-06 RX ADMIN — ALPRAZOLAM 0.5 MG: 0.5 TABLET ORAL at 22:31

## 2023-10-06 RX ADMIN — GUAIFENESIN 1200 MG: 600 TABLET ORAL at 21:00

## 2023-10-06 RX ADMIN — ACETAMINOPHEN 650 MG: 325 TABLET, FILM COATED ORAL at 16:59

## 2023-10-06 RX ADMIN — TORSEMIDE 20 MG: 20 TABLET ORAL at 09:31

## 2023-10-06 RX ADMIN — PIPERACILLIN SODIUM AND TAZOBACTAM SODIUM 2.25 G: 2; .25 INJECTION, SOLUTION INTRAVENOUS at 22:32

## 2023-10-06 RX ADMIN — Medication: at 15:00

## 2023-10-06 RX ADMIN — Medication: at 08:00

## 2023-10-06 RX ADMIN — HEPARIN SODIUM 5000 UNITS: 5000 INJECTION INTRAVENOUS; SUBCUTANEOUS at 14:52

## 2023-10-06 RX ADMIN — VANCOMYCIN HYDROCHLORIDE 500 MG: 500 INJECTION, SOLUTION INTRAVENOUS at 09:32

## 2023-10-06 RX ADMIN — HEPARIN SODIUM 5000 UNITS: 5000 INJECTION INTRAVENOUS; SUBCUTANEOUS at 06:10

## 2023-10-06 SDOH — SOCIAL STABILITY: SOCIAL INSECURITY: DO YOU FEEL UNSAFE GOING BACK TO THE PLACE WHERE YOU ARE LIVING?: NO

## 2023-10-06 SDOH — SOCIAL STABILITY: SOCIAL INSECURITY: ARE THERE ANY APPARENT SIGNS OF INJURIES/BEHAVIORS THAT COULD BE RELATED TO ABUSE/NEGLECT?: NO

## 2023-10-06 SDOH — SOCIAL STABILITY: SOCIAL INSECURITY: ARE YOU OR HAVE YOU BEEN THREATENED OR ABUSED PHYSICALLY, EMOTIONALLY, OR SEXUALLY BY ANYONE?: NO

## 2023-10-06 SDOH — SOCIAL STABILITY: SOCIAL INSECURITY: DO YOU FEEL ANYONE HAS EXPLOITED OR TAKEN ADVANTAGE OF YOU FINANCIALLY OR OF YOUR PERSONAL PROPERTY?: NO

## 2023-10-06 SDOH — SOCIAL STABILITY: SOCIAL INSECURITY: HAS ANYONE EVER THREATENED TO HURT YOUR FAMILY OR YOUR PETS?: NO

## 2023-10-06 SDOH — SOCIAL STABILITY: SOCIAL INSECURITY: HAVE YOU HAD THOUGHTS OF HARMING ANYONE ELSE?: NO

## 2023-10-06 SDOH — SOCIAL STABILITY: SOCIAL INSECURITY: ABUSE: ADULT

## 2023-10-06 SDOH — SOCIAL STABILITY: SOCIAL INSECURITY: DOES ANYONE TRY TO KEEP YOU FROM HAVING/CONTACTING OTHER FRIENDS OR DOING THINGS OUTSIDE YOUR HOME?: NO

## 2023-10-06 SDOH — SOCIAL STABILITY: SOCIAL INSECURITY: WERE YOU ABLE TO COMPLETE ALL THE BEHAVIORAL HEALTH SCREENINGS?: YES

## 2023-10-06 ASSESSMENT — COGNITIVE AND FUNCTIONAL STATUS - GENERAL
EATING MEALS: A LITTLE
STANDING UP FROM CHAIR USING ARMS: A LOT
DRESSING REGULAR LOWER BODY CLOTHING: TOTAL
TOILETING: A LOT
TURNING FROM BACK TO SIDE WHILE IN FLAT BAD: A LOT
CLIMB 3 TO 5 STEPS WITH RAILING: TOTAL
STANDING UP FROM CHAIR USING ARMS: A LOT
WALKING IN HOSPITAL ROOM: TOTAL
CLIMB 3 TO 5 STEPS WITH RAILING: TOTAL
MOBILITY SCORE: 13
TURNING FROM BACK TO SIDE WHILE IN FLAT BAD: A LITTLE
CLIMB 3 TO 5 STEPS WITH RAILING: TOTAL
MOVING FROM LYING ON BACK TO SITTING ON SIDE OF FLAT BED WITH BEDRAILS: A LITTLE
EATING MEALS: A LITTLE
WALKING IN HOSPITAL ROOM: TOTAL
PERSONAL GROOMING: A LITTLE
MOVING FROM LYING ON BACK TO SITTING ON SIDE OF FLAT BED WITH BEDRAILS: A LOT
TURNING FROM BACK TO SIDE WHILE IN FLAT BAD: A LOT
HELP NEEDED FOR BATHING: A LITTLE
DRESSING REGULAR UPPER BODY CLOTHING: A LOT
MOBILITY SCORE: 13
MOBILITY SCORE: 10
DAILY ACTIVITIY SCORE: 14
MOVING TO AND FROM BED TO CHAIR: A LOT
TOILETING: A LOT
PERSONAL GROOMING: A LITTLE
MOVING TO AND FROM BED TO CHAIR: A LOT
DRESSING REGULAR LOWER BODY CLOTHING: A LOT
DRESSING REGULAR UPPER BODY CLOTHING: A LITTLE
WALKING IN HOSPITAL ROOM: TOTAL
MOVING TO AND FROM BED TO CHAIR: A LITTLE
DAILY ACTIVITIY SCORE: 15
STANDING UP FROM CHAIR USING ARMS: A LITTLE
HELP NEEDED FOR BATHING: A LOT

## 2023-10-06 ASSESSMENT — ENCOUNTER SYMPTOMS
TROUBLE SWALLOWING: 0
BRUISES/BLEEDS EASILY: 0
FEVER: 0
FREQUENCY: 0
HALLUCINATIONS: 0
NUMBNESS: 0
FLANK PAIN: 0
FACIAL SWELLING: 0
WOUND: 1
DYSURIA: 0
CHILLS: 0
CONSTIPATION: 0
DIAPHORESIS: 0
WHEEZING: 0
HEADACHES: 0
SHORTNESS OF BREATH: 1
PALPITATIONS: 0
VOMITING: 0
FATIGUE: 0
LIGHT-HEADEDNESS: 0
HEMATURIA: 0
EYE PAIN: 0
NAUSEA: 0
DIZZINESS: 0
BLOOD IN STOOL: 0
COUGH: 1
CHEST TIGHTNESS: 0
JOINT SWELLING: 0
APPETITE CHANGE: 0
DIARRHEA: 0
SORE THROAT: 0
WEAKNESS: 0
BACK PAIN: 0
ABDOMINAL PAIN: 0

## 2023-10-06 ASSESSMENT — ACTIVITIES OF DAILY LIVING (ADL)
GROOMING: NEEDS ASSISTANCE
ADEQUATE_TO_COMPLETE_ADL: YES
JUDGMENT_ADEQUATE_SAFELY_COMPLETE_DAILY_ACTIVITIES: YES
FEEDING YOURSELF: INDEPENDENT
BATHING: NEEDS ASSISTANCE
DRESSING YOURSELF: NEEDS ASSISTANCE
WALKS IN HOME: DEPENDENT
TOILETING: NEEDS ASSISTANCE
HEARING - LEFT EAR: FUNCTIONAL
PATIENT'S MEMORY ADEQUATE TO SAFELY COMPLETE DAILY ACTIVITIES?: YES
HEARING - RIGHT EAR: FUNCTIONAL

## 2023-10-06 ASSESSMENT — PAIN SCALES - GENERAL
PAINLEVEL_OUTOF10: 3
PAINLEVEL_OUTOF10: 0 - NO PAIN
PAINLEVEL_OUTOF10: 4
PAINLEVEL_OUTOF10: 3

## 2023-10-06 ASSESSMENT — PAIN - FUNCTIONAL ASSESSMENT: PAIN_FUNCTIONAL_ASSESSMENT: 0-10

## 2023-10-06 ASSESSMENT — LIFESTYLE VARIABLES
PRESCIPTION_ABUSE_PAST_12_MONTHS: NO
AUDIT-C TOTAL SCORE: 0
HOW MANY STANDARD DRINKS CONTAINING ALCOHOL DO YOU HAVE ON A TYPICAL DAY: PATIENT DOES NOT DRINK
AUDIT-C TOTAL SCORE: 0
SKIP TO QUESTIONS 9-10: 1
HOW OFTEN DO YOU HAVE A DRINK CONTAINING ALCOHOL: NEVER
SUBSTANCE_ABUSE_PAST_12_MONTHS: NO
HOW OFTEN DO YOU HAVE 6 OR MORE DRINKS ON ONE OCCASION: NEVER

## 2023-10-06 ASSESSMENT — PATIENT HEALTH QUESTIONNAIRE - PHQ9
2. FEELING DOWN, DEPRESSED OR HOPELESS: NOT AT ALL
SUM OF ALL RESPONSES TO PHQ9 QUESTIONS 1 & 2: 0
1. LITTLE INTEREST OR PLEASURE IN DOING THINGS: NOT AT ALL

## 2023-10-06 NOTE — PROGRESS NOTES
Notified by Jazlyn Corcoran RN [facility]    , that patient's preference for Skilled nursing facility is  [place] [facility]   .  Referral will be submitted for review.

## 2023-10-06 NOTE — ASSESSMENT & PLAN NOTE
Appears patient has emphysema based off imaging  Never formally diagnosed  Former smoker >30 years ago, +second hand smoke exposure  Patient will need PFTs once improved form respiratory standpoint  Will need follow up with pulmonology upon discharge  I discussed this at great length with patient and her son Barron at bedside, they are both in agreement

## 2023-10-06 NOTE — CARE PLAN
Problem: Skin  Goal: Decreased wound size/increased tissue granulation at next dressing change  Outcome: Progressing  Goal: Participates in plan/prevention/treatment measures  Outcome: Progressing  Goal: Prevent/manage excess moisture  Outcome: Progressing  Goal: Prevent/minimize sheer/friction injuries  Outcome: Progressing  Goal: Promote/optimize nutrition  Outcome: Progressing  Goal: Promote skin healing  Outcome: Progressing     Problem: Fall/Injury  Goal: Verbalize understanding of risk factor reduction measures to prevent injury from fall in the home  Outcome: Progressing  Goal: Use assistive devices by end of the shift  Outcome: Progressing  Goal: Pace activities to prevent fatigue by end of the shift  Outcome: Progressing     Problem: Chronic Conditions and Co-morbidities  Goal: Patient's chronic conditions and co-morbidity symptoms are monitored and maintained or improved  Outcome: Progressing     Problem: Heart Failure  Goal: Improved gas exchange this shift  Outcome: Progressing  Goal: Improved urinary output this shift  Outcome: Progressing  Goal: Reduction in peripheral edema within 24 hours  Outcome: Progressing  Goal: Report improvement of dyspnea/breathlessness this shift  Outcome: Progressing  Goal: Weight from fluid excess reduced over 2-3 days, then stabilize  Outcome: Progressing  Goal: Increase self care and/or family involvement in 24 hours  Outcome: Progressing     Problem: Pain  Goal: Performs ADL's with improved pain control throughout shift  Outcome: Progressing  Goal: Participates in PT with improved pain control throughout the shift  Outcome: Progressing  Goal: Free from acute confusion related to pain meds throughout the shift  Outcome: Progressing   The patient's goals for the shift include      The clinical goals for the shift include wound healing    Over the shift, the patient did not make progress toward the following goals. Barriers to progression include nutrition, orientation,  lives alone, education. Recommendations to address these barriers include reorienting, casewroker, nutrition consult, wound care, asking for assistance..

## 2023-10-06 NOTE — ASSESSMENT & PLAN NOTE
Has needed thoras in the past  Will order thoracentesis with protein/LDH, cytology, culture  Patient agreeable

## 2023-10-06 NOTE — PROGRESS NOTES
Patient has been recommended for Skilled Nursing Facility. Provided skilled nursing list to patient from [facility] directory that includes facilities that are within  [facility], as well as meeting patient's medical needs, and are in-network for patient's insurance; while also in discharge geographic area patient prefers, and identifies each facilities CMS star rating. Patient preferences are 1.[facility] 2. [facility]. ELENA Díaz notified.

## 2023-10-06 NOTE — PROGRESS NOTES
"Vancomycin Dosing by Pharmacy- FOLLOW UP    Adwoa Forrest is a 88 y.o. year old female who Pharmacy has been consulted for vancomycin dosing for pneumonia. Based on the patient's indication and renal status this patient is being dosed based on a goal AUC of 400-600.     Renal function is currently improving.    Current vancomycin dose: 1000 mg given once      Visit Vitals  /67 (Patient Position: Lying)   Pulse 64   Temp 36.8 °C (98.2 °F)   Resp 16        Lab Results   Component Value Date    CREATININE 1.80 (H) 10/06/2023    CREATININE 1.98 (H) 10/05/2023    CREATININE 1.85 (H) 10/04/2023    CREATININE 1.64 (H) 10/03/2023        Patient weight is No results found for: \"PTWEIGHT\"    No results found for: \"CULTURE\"     I/O last 3 completed shifts:  In: 1230 (25.3 mL/kg) [P.O.:1180; IV Piggyback:50]  Out: 0 (0 mL/kg)   Weight: 48.7 kg   [unfilled]    Lab Results   Component Value Date    PATIENTTEMP 37.0 10/05/2023    PATIENTTEMP 37.0 10/02/2023    PATIENTTEMP 37.0 12/23/2018    PATIENTTEMP 37.0 12/23/2018    PATIENTTEMP 37.0 12/23/2018        Assessment/Plan    Above goal AUC. Orders placed for new vancomcyin regimen of 500 mg every 24 hours to begin at 0900.    This dosing regimen is predicted by [employer] to result in the following pharmacokinetic parameters:  Regimen: 500 mg IV every 24 hours.  Start time: 09:46 on 10/06/2023  Exposure target: AUC24 (range)400-600 mg/L.hr   AUC24,ss: 514 mg/L.hr  Probability of AUC24 > 400: 88 %  Ctrough,ss: 17.8 mg/L  Probability of Ctrough,ss > 20: 32 %  Probability of nephrotoxicity (Lodise HUSSAIN 2009): 14 %      The next level will be obtained on 10/7 at 0500. May be obtained sooner if clinically indicated.   Will continue to monitor renal function daily while on vancomycin and order serum creatinine at least every 48 hours if not already ordered.  Follow for continued vancomycin needs, clinical response, and signs/symptoms of toxicity.       Xavier Badillo RPh "

## 2023-10-06 NOTE — PROGRESS NOTES
Patient seen and examined denies any [facility] INFECTIOUS DISEASE PROGRESS NOTE    Patient Name: Adwoa Forrest  MRN: [id number]    INTERVAL HISTORY:   Patient seen and examined  Worsening hypoxia noted.   Remains afebrile.  SPO2 93% on 5L.  Patient reports that she is currently feeling better.   Denies any current complaints.     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)    Chronic insomnia    Elevated hemoglobin (CMS/[facility])    Gastro-esophageal reflux disease without esophagitis    Elevated LFTs    Hyperlipidemia    Hypothyroidism    Presence of stent in coronary artery    Ventricular tachycardia (paroxysmal) (CMS/[facility])    Vitamin B12 deficiency    Vitamin D deficiency    Acute on chronic congestive heart failure, unspecified heart failure type (CMS/[facility])    Non-healing wound of right lower extremity    CKD (chronic kidney disease) stage 3, GFR 30-59 ml/min (CMS/[facility])    Hypertension    Paroxysmal atrial fibrillation (CMS/HCC)    Elevated troponin I level    Acute hypoxic respiratory failure (CMS/[facility])        Assessment/Plan   Right lower extremity cellulitis and infected wound  Pneumonia   Hypoxia   Hypertension  Paroxysmal A-fib  Hypothyroidism  Hyperlipidemia  Acute on chronic congestive heart failure  Plan    -monitor scr and vanco T  Continue Zosyn  Check MRSA nares PCR negative Will discontinue vancomycin  Follow fevers white count monitor electrolytes  If fevers obtain blood cultures  Continue wound care  Noted podiatry recommendations  Wound cultures with mixed bacteria await identification  ID will follow with additional recommendations      Other medical issues  Paroxysmal A-fib hypothyroidism hyperlipidemia CHF no interactions noted with home medications with current  antibiotics  MEDICATIONS: reviewed.    Current Facility-Administered Medications:     acetaminophen (Tylenol) tablet 650 mg, 650 mg, oral, q4h PRN, Jt House, DO, 650 mg at 10/06/23 0601    ALPRAZolam (Xanax) tablet 0.5 mg, 0.5 mg, oral, Nightly PRN, SANDRO Lara-C, 0.5 mg at 10/05/23 2156    amiodarone (Pacerone) tablet 200 mg, 200 mg, oral, Daily, Jt House, DO, 200 mg at 10/06/23 0931    aspirin EC tablet 81 mg, 81 mg, oral, Daily, SANDRO Lara-C, 81 mg at 10/06/23 0931    atorvastatin (Lipitor) tablet 80 mg, 80 mg, oral, Nightly, SANDRO Lara-C, 80 mg at 10/05/23 2156    bisacodyl (Dulcolax) EC tablet 10 mg, 10 mg, oral, Daily PRN, Nallely Clarke PA-C    cyanocobalamin (Vitamin B-12) tablet 2,000 mcg, 2,000 mcg, oral, Daily, SANDRO Lara-C, 2,000 mcg at 10/06/23 0931    guaiFENesin (Mucinex) 12 hr tablet 1,200 mg, 1,200 mg, oral, BID, Arpita Declaricimo, PA-C, 1,200 mg at 10/06/23 0932    heparin (porcine) injection 5,000 Units, 5,000 Units, subcutaneous, q8h, SANDRO Lara-C, 5,000 Units at 10/06/23 0610    levothyroxine (Synthroid, Levoxyl) tablet 25 mcg, 25 mcg, oral, Daily, SANDRO Lara-C, 25 mcg at 10/06/23 0553    [Held by provider] lisinopril tablet 5 mg, 5 mg, oral, Daily, Jt Franki DO    melatonin tablet 9 mg, 9 mg, oral, Nightly PRN, Jt House, DO, 9 mg at 10/03/23 0223    metoprolol succinate XL (Toprol-XL) 24 hr tablet 25 mg, 25 mg, oral, Daily, Arpita Camp PA-C, 25 mg at 10/06/23 0900    oxygen (O2) therapy, , inhalation, Continuous - 02/gases, Frederick Lowry MD, 5 L/min at 10/05/23 0903    piperacillin-tazobactam-dextrose (Zosyn) IV 2.25 g, 2.25 g, intravenous, q8h, Arpita Camp PA-C, Stopped at 10/05/23 2230    polyethylene glycol (Glycolax, Miralax) packet 17 g, 17 g, oral, Daily, Nallely lCarke PA-C, 17 g at 10/05/23 0903    spironolactone (Aldactone) tablet 12.5 mg, 12.5 mg, oral, q24h [facility], Michael Latham,  "MD, 12.5 mg at 10/06/23 0931    torsemide (Demadex) tablet 20 mg, 20 mg, oral, Daily, Arpita Camp PA-C, 20 mg at 10/06/23 0931    vancomycin in dextrose 5 % (Vancocin) IVPB 500 mg, 500 mg, intravenous, q24h, Arpita Camp PA-C, Stopped at 10/06/23 1002     PHYSICAL EXAM:  Vital signs: /72 (Patient Position: Lying)   Pulse 66   Temp 36.7 °C (98 °F) (Temporal)   Resp 16   Ht 1.575 m (5' 2\")   Wt 48.4 kg (106 lb 11.2 oz)   SpO2 (!) 87% Comment: PHOEBE Hinojosa aware of SPO2  BMI 19.52 kg/m²   Temp (24hrs), Av.7 °C (98 °F), Min:36.5 °C (97.7 °F), Max:36.8 °C (98.2 °F)      General alert oriented x3  Lungs clear to auscultation bilaterally - diminished to bilateral lung bases   Abdomen soft nontender  CV no murmurs  Ulcerative lesion with edema and surrounding improved erythema posteriorly to the right ankle            Labs:    Results for orders placed or performed during the hospital encounter of 10/02/23 (from the past 96 hour(s))   BLOOD GAS VENOUS   Result Value Ref Range    POCT pH, Venous 7.32 (L) 7.33 - 7.43 pH    POCT pCO2, Venous 64 (H) 41 - 51 mm Hg    POCT pO2, Venous 23 (L) 35 - 45 mm Hg    POCT SO2, Venous 25 (L) 45 - 75 %    POCT Oxy Hemoglobin, Venous 24.5 (L) 45.0 - 75.0 %    POCT Base Excess, Venous 4.9 (H) -2.0 - 3.0 mmol/L    POCT HCO3 Calculated, Venous 33.0 (H) 22.0 - 26.0 mmol/L    Patient Temperature 37.0 degrees Celsius   CBC and Auto Differential   Result Value Ref Range    WBC 9.1 4.4 - 11.3 x10*3/uL    nRBC 0.0 0.0 - 0.0 /100 WBCs    RBC 5.21 (H) 4.00 - 5.20 x10*6/uL    Hemoglobin 13.9 12.0 - 16.0 g/dL    Hematocrit 45.1 36.0 - 46.0 %    MCV 87 80 - 100 fL    MCH 26.7 26.0 - 34.0 pg    MCHC 30.8 (L) 32.0 - 36.0 g/dL    RDW 18.0 (H) 11.5 - 14.5 %    Platelets 420 150 - 450 x10*3/uL    MPV 8.7 7.5 - 11.5 fL    Neutrophils % 84.1 40.0 - 80.0 %    Immature Granulocytes %, Automated 0.8 0.0 - 0.9 %    Lymphocytes % 4.4 13.0 - 44.0 %    Monocytes % 8.0 2.0 - 10.0 %    " Eosinophils % 1.9 0.0 - 6.0 %    Basophils % 0.8 0.0 - 2.0 %    Neutrophils Absolute 7.65 (H) 1.60 - 5.50 x10*3/uL    Immature Granulocytes Absolute, Automated 0.07 0.00 - 0.50 x10*3/uL    Lymphocytes Absolute 0.40 (L) 0.80 - 3.00 x10*3/uL    Monocytes Absolute 0.73 0.05 - 0.80 x10*3/uL    Eosinophils Absolute 0.17 0.00 - 0.40 x10*3/uL    Basophils Absolute 0.07 0.00 - 0.10 x10*3/uL   B-Type Natriuretic Peptide   Result Value Ref Range    BNP 1,279 (H) 0 - 99 pg/mL   Comprehensive metabolic panel   Result Value Ref Range    Glucose 108 (H) 74 - 99 mg/dL    Sodium 135 (L) 136 - 145 mmol/L    Potassium 5.2 3.5 - 5.3 mmol/L    Chloride 99 98 - 107 mmol/L    Bicarbonate 28 21 - 32 mmol/L    Anion Gap 13 10 - 20 mmol/L    Urea Nitrogen 39 (H) 6 - 23 mg/dL    Creatinine 1.62 (H) 0.50 - 1.05 mg/dL    eGFR 31 (L) >60 mL/min/1.73m*2    Calcium 9.1 8.6 - 10.3 mg/dL    Albumin 3.6 3.4 - 5.0 g/dL    Alkaline Phosphatase 121 33 - 136 U/L    Total Protein 6.4 6.4 - 8.2 g/dL    AST 30 9 - 39 U/L    Bilirubin, Total 0.7 0.0 - 1.2 mg/dL    ALT 23 7 - 45 U/L   Magnesium   Result Value Ref Range    Magnesium 2.32 1.60 - 2.40 mg/dL   Troponin I, High Sensitivity   Result Value Ref Range    Troponin I, High Sensitivity 30 (H) 0 - 13 ng/L   C-Reactive Protein   Result Value Ref Range    C-Reactive Protein 6.22 (H) <1.00 mg/dL   Sedimentation Rate   Result Value Ref Range    Sedimentation Rate 41 (H) 0 - 30 mm/h   SARS-CoV-2 RT PCR   Result Value Ref Range    Coronavirus 2019, PCR Not Detected Not Detected   Tissue/Wound Culture/Smear    Specimen: Wound/Tissue; Tissue/Biopsy   Result Value Ref Range    Tissue/Wound Culture/Smear Culture in progress     Gram Stain No polymorphonuclear leukocytes seen (A)     Gram Stain (A)      (4+) Abundant Mixed Gram positive and Gram negative bacteria   Troponin I, High Sensitivity   Result Value Ref Range    Troponin I, High Sensitivity 29 (H) 0 - 13 ng/L   Magnesium   Result Value Ref Range     Magnesium 2.07 1.60 - 2.40 mg/dL   B-Type Natriuretic Peptide   Result Value Ref Range    BNP 1,843 (H) 0 - 99 pg/mL   CBC   Result Value Ref Range    WBC 8.0 4.4 - 11.3 x10*3/uL    nRBC 0.0 0.0 - 0.0 /100 WBCs    RBC 4.25 4.00 - 5.20 x10*6/uL    Hemoglobin 11.5 (L) 12.0 - 16.0 g/dL    Hematocrit 36.9 36.0 - 46.0 %    MCV 87 80 - 100 fL    MCH 27.1 26.0 - 34.0 pg    MCHC 31.2 (L) 32.0 - 36.0 g/dL    RDW 17.6 (H) 11.5 - 14.5 %    Platelets 357 150 - 450 x10*3/uL    MPV 8.6 7.5 - 11.5 fL   Basic Metabolic Panel   Result Value Ref Range    Glucose 84 74 - 99 mg/dL    Sodium 137 136 - 145 mmol/L    Potassium 4.0 3.5 - 5.3 mmol/L    Chloride 101 98 - 107 mmol/L    Bicarbonate 32 21 - 32 mmol/L    Anion Gap 8 (L) 10 - 20 mmol/L    Urea Nitrogen 39 (H) 6 - 23 mg/dL    Creatinine 1.64 (H) 0.50 - 1.05 mg/dL    eGFR 30 (L) >60 mL/min/1.73m*2    Calcium 8.7 8.6 - 10.3 mg/dL   Urinalysis with Reflex Microscopic   Result Value Ref Range    Color, Urine Straw Straw, Yellow    Appearance, Urine Clear Clear    Specific Gravity, Urine 1.010 1.005 - 1.035    pH, Urine 6.0 5.0, 5.5, 6.0, 6.5, 7.0, 7.5, 8.0    Protein, Urine NEGATIVE NEGATIVE mg/dL    Glucose, Urine 150 (2+) (A) NEGATIVE mg/dL    Blood, Urine NEGATIVE NEGATIVE    Ketones, Urine NEGATIVE NEGATIVE mg/dL    Bilirubin, Urine NEGATIVE NEGATIVE    Urobilinogen, Urine <2.0 <2.0 mg/dL    Nitrite, Urine NEGATIVE NEGATIVE    Leukocyte Esterase, Urine LARGE (3+) (A) NEGATIVE   Urinalysis Microscopic Only   Result Value Ref Range    WBC, Urine 11-20 (A) 1-5, NONE /HPF    RBC, Urine 1-2 NONE, 1-2, 3-5 /HPF    Squamous Epithelial Cells, Urine 10-25 (FEW) Reference range not established. /HPF    Renal Epithelial Cells, Urine 1-2 (FEW) Reference range not established. /HPF   Transthoracic echo (TTE) complete   Result Value Ref Range    LV A4C EF 76.2    Basic Metabolic Panel   Result Value Ref Range    Glucose 94 74 - 99 mg/dL    Sodium 137 136 - 145 mmol/L    Potassium 4.1 3.5 - 5.3  mmol/L    Chloride 100 98 - 107 mmol/L    Bicarbonate 31 21 - 32 mmol/L    Anion Gap 10 10 - 20 mmol/L    Urea Nitrogen 43 (H) 6 - 23 mg/dL    Creatinine 1.85 (H) 0.50 - 1.05 mg/dL    eGFR 26 (L) >60 mL/min/1.73m*2    Calcium 8.6 8.6 - 10.3 mg/dL   Magnesium   Result Value Ref Range    Magnesium 1.97 1.60 - 2.40 mg/dL   CBC and Auto Differential   Result Value Ref Range    WBC 7.5 4.4 - 11.3 x10*3/uL    nRBC 0.0 0.0 - 0.0 /100 WBCs    RBC 4.38 4.00 - 5.20 x10*6/uL    Hemoglobin 11.5 (L) 12.0 - 16.0 g/dL    Hematocrit 38.3 36.0 - 46.0 %    MCV 87 80 - 100 fL    MCH 26.3 26.0 - 34.0 pg    MCHC 30.0 (L) 32.0 - 36.0 g/dL    RDW 17.4 (H) 11.5 - 14.5 %    Platelets 357 150 - 450 x10*3/uL    MPV 8.7 7.5 - 11.5 fL    Neutrophils % 75.5 40.0 - 80.0 %    Immature Granulocytes %, Automated 0.7 0.0 - 0.9 %    Lymphocytes % 7.9 13.0 - 44.0 %    Monocytes % 9.6 2.0 - 10.0 %    Eosinophils % 5.5 0.0 - 6.0 %    Basophils % 0.8 0.0 - 2.0 %    Neutrophils Absolute 5.65 (H) 1.60 - 5.50 x10*3/uL    Immature Granulocytes Absolute, Automated 0.05 0.00 - 0.50 x10*3/uL    Lymphocytes Absolute 0.59 (L) 0.80 - 3.00 x10*3/uL    Monocytes Absolute 0.72 0.05 - 0.80 x10*3/uL    Eosinophils Absolute 0.41 (H) 0.00 - 0.40 x10*3/uL    Basophils Absolute 0.06 0.00 - 0.10 x10*3/uL   Sodium, Urine Random   Result Value Ref Range    Sodium, Urine Random 66 mmol/L    Creatinine, Urine Random 32.7 20.0 - 320.0 mg/dL    Sodium/Creatinine Ratio 202 Not established. mmol/g Creat   Albumin, Urine Random   Result Value Ref Range    Albumin, Urine Random <7.0 Not established mg/L    Creatinine, Urine Random 32.7 20.0 - 320.0 mg/dL    Albumin/Creatine Ratio     Creatinine, Urine Random   Result Value Ref Range    Creatinine, Urine Random 32.3 20.0 - 320.0 mg/dL   Basic Metabolic Panel   Result Value Ref Range    Glucose 88 74 - 99 mg/dL    Sodium 142 136 - 145 mmol/L    Potassium 4.0 3.5 - 5.3 mmol/L    Chloride 101 98 - 107 mmol/L    Bicarbonate 32 21 - 32  mmol/L    Anion Gap 13 10 - 20 mmol/L    Urea Nitrogen 46 (H) 6 - 23 mg/dL    Creatinine 1.98 (H) 0.50 - 1.05 mg/dL    eGFR 24 (L) >60 mL/min/1.73m*2    Calcium 9.3 8.6 - 10.3 mg/dL   Magnesium   Result Value Ref Range    Magnesium 2.16 1.60 - 2.40 mg/dL   CBC and Auto Differential   Result Value Ref Range    WBC 7.3 4.4 - 11.3 x10*3/uL    nRBC 0.0 0.0 - 0.0 /100 WBCs    RBC 4.76 4.00 - 5.20 x10*6/uL    Hemoglobin 12.7 12.0 - 16.0 g/dL    Hematocrit 42.0 36.0 - 46.0 %    MCV 88 80 - 100 fL    MCH 26.7 26.0 - 34.0 pg    MCHC 30.2 (L) 32.0 - 36.0 g/dL    RDW 17.9 (H) 11.5 - 14.5 %    Platelets 389 150 - 450 x10*3/uL    MPV 8.7 7.5 - 11.5 fL    Neutrophils % 76.5 40.0 - 80.0 %    Immature Granulocytes %, Automated 0.7 0.0 - 0.9 %    Lymphocytes % 5.6 13.0 - 44.0 %    Monocytes % 10.3 2.0 - 10.0 %    Eosinophils % 5.8 0.0 - 6.0 %    Basophils % 1.1 0.0 - 2.0 %    Neutrophils Absolute 5.55 (H) 1.60 - 5.50 x10*3/uL    Immature Granulocytes Absolute, Automated 0.05 0.00 - 0.50 x10*3/uL    Lymphocytes Absolute 0.41 (L) 0.80 - 3.00 x10*3/uL    Monocytes Absolute 0.75 0.05 - 0.80 x10*3/uL    Eosinophils Absolute 0.42 (H) 0.00 - 0.40 x10*3/uL    Basophils Absolute 0.08 0.00 - 0.10 x10*3/uL   Blood Gas Venous   Result Value Ref Range    POCT pH, Venous 7.39 7.33 - 7.43 pH    POCT pCO2, Venous 56 (H) 41 - 51 mm Hg    POCT pO2, Venous 59 (H) 35 - 45 mm Hg    POCT SO2, Venous 90 (H) 45 - 75 %    POCT Oxy Hemoglobin, Venous 87.6 (H) 45.0 - 75.0 %    POCT Base Excess, Venous 7.1 (H) -2.0 - 3.0 mmol/L    POCT HCO3 Calculated, Venous 33.9 (H) 22.0 - 26.0 mmol/L    Patient Temperature 37.0 degrees Celsius    Test Comment [facility]-S    MRSA Surveillance for Vancomycin De-escalation, PCR    Specimen: Anterior Nares; Swab   Result Value Ref Range    MRSA PCR Not Detected Not Detected   Basic Metabolic Panel   Result Value Ref Range    Glucose 144 (H) 74 - 99 mg/dL    Sodium 143 136 - 145 mmol/L    Potassium 4.9 3.5 - 5.3 mmol/L     Chloride 103 98 - 107 mmol/L    Bicarbonate 34 (H) 21 - 32 mmol/L    Anion Gap 11 10 - 20 mmol/L    Urea Nitrogen 49 (H) 6 - 23 mg/dL    Creatinine 1.80 (H) 0.50 - 1.05 mg/dL    eGFR 27 (L) >60 mL/min/1.73m*2    Calcium 9.3 8.6 - 10.3 mg/dL   Magnesium   Result Value Ref Range    Magnesium 2.39 1.60 - 2.40 mg/dL   CBC and Auto Differential   Result Value Ref Range    WBC 7.2 4.4 - 11.3 x10*3/uL    nRBC 0.0 0.0 - 0.0 /100 WBCs    RBC 4.79 4.00 - 5.20 x10*6/uL    Hemoglobin 12.6 12.0 - 16.0 g/dL    Hematocrit 42.4 36.0 - 46.0 %    MCV 89 80 - 100 fL    MCH 26.3 26.0 - 34.0 pg    MCHC 29.7 (L) 32.0 - 36.0 g/dL    RDW 17.5 (H) 11.5 - 14.5 %    Platelets 374 150 - 450 x10*3/uL    MPV 8.6 7.5 - 11.5 fL    Neutrophils % 90.0 40.0 - 80.0 %    Immature Granulocytes %, Automated 0.8 0.0 - 0.9 %    Lymphocytes % 7.5 13.0 - 44.0 %    Monocytes % 1.4 2.0 - 10.0 %    Eosinophils % 0.0 0.0 - 6.0 %    Basophils % 0.3 0.0 - 2.0 %    Neutrophils Absolute 6.52 (H) 1.60 - 5.50 x10*3/uL    Immature Granulocytes Absolute, Automated 0.06 0.00 - 0.50 x10*3/uL    Lymphocytes Absolute 0.54 (L) 0.80 - 3.00 x10*3/uL    Monocytes Absolute 0.10 0.05 - 0.80 x10*3/uL    Eosinophils Absolute 0.00 0.00 - 0.40 x10*3/uL    Basophils Absolute 0.02 0.00 - 0.10 x10*3/uL   Vancomycin   Result Value Ref Range    Vancomycin 14.0 5.0 - 20.0 ug/mL                  Microbiology data: Susceptibility data from last 90 days.  Collected Specimen Info Organism Amoxicillin/Clavulanate Ampicillin Aztreonam Cefazolin Cefepime Ceftazidime Cefuroxime Ciprofloxacin Gentamicin Levofloxacin Piperacillin/Tazobactam Tobramycin Trimethoprim/Sulfamethoxazole   09/08/23 Tissue/Wound Klebsiella oxytoca/Raoultella species S R  R   S S S S S  S     Pseudomonas aeruginosa   S  S S  S S S S S    09/01/23 Tissue/Wound Pseudomonas aeruginosa   S  S S  S S S S S      Klebsiella oxytoca/Raoultella species S R  R   S S S S S  S   08/25/23 Tissue/Wound Pseudomonas aeruginosa   S  S S  S S  S S S              Imaging data: CT chest wo IV contrast    Result Date: 10/5/2023  Interpreted By:  Mina Benedict, STUDY: CT CHEST WO IV CONTRAST;  10/5/2023 2:13 pm   INDICATION: Signs/Symptoms:Worsening hypoxia.   COMPARISON: 01/11/2019.   ACCESSION NUMBER(S): [id number]   ORDERING CLINICIAN: JULIET SAWYER   TECHNIQUE: Contiguous unenhanced axial images were obtained through the chest. Images were reformatted in axial, coronal, and sagittal planes.   FINDINGS: LUNGS and AIRWAYS: Emphysema is present with small scattered bulla throughout the lungs.   Multifocal irregular areas of consolidation and volume loss are seen in the left lower lobe, lingula and right lower lobe basilar aspect. Additional patchy irregular infiltrates are seen throughout the upper lungs bilaterally. Bilateral small volume pleural effusions are present.   No pneumothorax.   MEDIASTINUM and GURPREET, LOWER NECK AND AXILLA: Multi station mediastinal and bilateral hilar small partially calcified lymph nodes are compatible with old granulomatous disease. No axillary lymphadenopathy.   HEART and VESSELS: Dense irregular atherosclerotic calcifications are present throughout the aorta and branch vessels. No aortic aneurysm. Central pulmonary arterial enlargement is seen which may be related to pulmonary hypertension.   Dense coronary artery calcifications and/or stents are present. No pericardial effusion.  Heart is enlarged similar in configuration to prior.   UPPER ABDOMEN: Right hepatic lobe posterior segment subcentimeter low-attenuation lesion is too small to characterize. Left renal posterior interpolar partially exophytic 1.3 cm cyst is noted. There is a left renal interpolar nonobstructing 5 mm calculus. No left hydronephrosis is seen.   CHEST WALL and OSSEOUS STRUCTURES: Degenerative changes of the shoulders are partially visualized. Thoracic mild S shaped scoliosis is seen. Mild multilevel disc space narrowing and endplate spurring  is present in the spine.       1.  Emphysema. 2. Multifocal irregular regions of consolidation and volume loss within the left lower lobe, lingula and right lower lobe with additional patchy infiltrates in the upper aspects of both lungs. Bilateral small volume pleural effusions. Findings may reflect multifocal infection and/or edema. 3. Prominent cardiomegaly similar to prior. 4. Central pulmonary arterial enlargement which may be related to pulmonary hypertension.   MACRO: None.   Signed by: Mina Benedict 10/5/2023 2:56 PM Dictation workstation:   [id number]    XR chest 2 views    Result Date: 10/5/2023  Interpreted By:  Benito Jose, STUDY: XR CHEST 2 VIEWS;  10/5/2023 10:11 am   INDICATION: Signs/Symptoms:Hypoxia.   COMPARISON: 10/02/2023   ACCESSION NUMBER(S): [id number]   ORDERING CLINICIAN: JULIET SAWYER   FINDINGS: Left retrocardiac opacities appear worsened. Small right pleural effusion with adjacent opacity stable. Diffuse interstitial pulmonary opacities suggestive of pulmonary edema. Stable cardiomegaly. Cardiac device. Atherosclerosis. No pneumothorax.       Worsening left retrocardiac opacities could be due to atelectasis, infection, and/or effusion. Otherwise stable pulmonary edema, cardiomegaly, and small right pleural effusion.   Signed by: Benito Jose 10/5/2023 11:41 AM Dictation workstation:   [id number]    Lower extremity venous duplex bilateral    Result Date: 10/5/2023  Preliminary Cardiology Report              [place] [id number]      Phone [phone] Fax [phone]  Preliminary Vascular Lab Report  Lower Venous Duplex Ultrasound  Patient Name:     CHERRY Noel Physician: [id number] Farzana Singh MD,                                                    RPVI Study Date:       10/5/2023     Ordering Provider: [id number] KISHORE CAO MRN/PID:          [id number]      Fellow: Accession#:       [id number]  Technologist:       Dori Galdamez DIVINE YOB: 1935      Technologist 2: Gender:           F             Encounter#:        [id number] Admission Status: Inpatient     Location           [facility]                                 Performed:  Diagnosis/ICD: Localized (leg) edema-R60.0  PRELIMINARY CONCLUSIONS: Right Lower Venous: No evidence of acute deep vein thrombus visualized in the right lower extremity. Left Lower Venous: No evidence of acute deep vein thrombus visualized in the left lower extremity. Additional Findings: Technically limited due to positioning.  Imaging & Doppler Findings:  Right                 Compressible Thrombus        Flow Distal External Iliac                None CFV                       Yes        None   Spontaneous/Phasic PFV                       Yes        None FV Proximal               Yes        None   Spontaneous/Phasic FV Mid                    Yes        None FV Distal                 Yes        None Popliteal                 Yes        None   Spontaneous/Phasic Peroneal                  Yes        None PTV                       Yes        None  Left                  Compress Thrombus        Flow Distal External Iliac            None CFV                     Yes      None   Spontaneous/Phasic PFV                     Yes      None FV Proximal             Yes      None   Spontaneous/Phasic FV Mid                  Yes      None FV Distal               Yes      None Popliteal               Yes      None   Spontaneous/Phasic Peroneal                Yes      None PTV                     Yes      None VASCULAR PRELIMINARY REPORT completed by Dori Galdamez DIVINE on 10/5/2023 at 10:49:37 AM  ** Final **     US renal complete    Result Date: 10/4/2023  Interpreted By:  Jeny Troy, STUDY: US RENAL COMPLETE;  10/4/2023 4:27 pm   INDICATION: Signs/Symptoms:YEVGENIY.   COMPARISON: None.   ACCESSION NUMBER(S): [id number]   ORDERING CLINICIAN: GIOVANY MICHAELS   TECHNIQUE: Multiple  images of the kidneys were obtained  .   FINDINGS: RIGHT KIDNEY: Length 8.6 cm. Parenchymal echogenicity is mildly increased. There is no hydronephrosis.   LEFT KIDNEY: Length 8.2 cm. Parenchymal echogenicity is mildly increased. There is no hydronephrosis. Cyst extends from the upper pole measuring 1.2 x 1.6 x 1.2 cm.   BLADDER: Bladder is physiologically full with no wall thickening or intraluminal echogenic foci.       Kidneys are in small normal range with mildly increased echogenicity   No hydronephrosis   MACRO: None   Signed by: Jeny Troy 10/4/2023 4:35 PM Dictation workstation:   [id number]    Transthoracic echo (TTE) complete    Result Date: 10/3/2023              [place] [address]      Phone [phone] Fax [phone] TRANSTHORACIC ECHOCARDIOGRAM REPORT  Patient Name:      CHERRY Noel Physician:   [id number] Hampton MD Study Date:        10/3/2023          Ordering Provider:   [id number] STEWART FERREIRA MRN/PID:           [id number]           Fellow: Accession#:        [id number]       Nurse:               Amairani Zafar RN Date of Birth/Age: 1935 / 88      Sonographer:         Thea Espinoza RDCS                    years Gender:            F                  Additional Staff: Height:            157.48 cm          Admit Date:          10/2/2023 Weight:            50.35 kg           Admission Status:    Inpatient - Routine BSA:               1.49 m2            Department Location: [facility] Echo Lab Blood Pressure: 118 /71 mmHg Study Type:    TRANSTHORACIC ECHO (TTE) COMPLETE Diagnosis/ICD: Heart failure, unspecified-I50.9; Cardiomyopathy in diseases                classified elsewhere-I43 Indication:    CHF, Cardiomyopathy  Study Detail: The following Echo studies were performed: 2D, M-Mode, Doppler and               color flow. Technically challenging study due to body  habitus,               prominent lung artifact and poor acoustic windows. Agitated saline               used as a contrast agent for intraseptal flow evaluation and               Optison used as a contrast agent for endocardial border               definition.  PHYSICIAN INTERPRETATION: Left Ventricle: Left ventricular systolic function is normal, with an estimated ejection fraction of 70-75%. There are no regional wall motion abnormalities. The left ventricular cavity size is normal. There is moderate left ventricular hypertrophy. Spectral Doppler shows a normal pattern of left ventricular diastolic filling. Left Atrium: The left atrium is normal in size. A bubble study using agitated saline was performed. Bubble study is negative. Right Ventricle: The right ventricle is normal in size. There is mildly reduced right ventricular systolic function. A device is visualized in the right ventricle. Right Atrium: The right atrium is moderately dilated. There is a device visualized in the right atrium. Aortic Valve: The aortic valve appears structurally normal. There is mild aortic valve cusp calcification. There is mild to moderate aortic valve thickening. There is mild to moderate aortic valve regurgitation. The peak instantaneous gradient of the aortic valve is 4.2 mmHg. The mean gradient of the aortic valve is 2.0 mmHg. Mitral Valve: The mitral valve is normal in structure. There is trace mitral valve regurgitation. Tricuspid Valve: The tricuspid valve is structurally normal. There is mild tricuspid regurgitation. The Doppler estimated RVSP is moderately elevated at 51.7 mmHg. Pulmonic Valve: The pulmonic valve is not well visualized. There is no indication of pulmonic valve regurgitation. Pericardium: There is no pericardial effusion noted. Aorta: The aortic root is normal.  CONCLUSIONS:  1. Left ventricular systolic function is normal with a 70-75% estimated ejection fraction.  2. There is moderate left ventricular  hypertrophy.  3. There is mildly reduced right ventricular systolic function.  4. The right atrium is moderately dilated.  5. Moderately elevated right ventricular systolic pressure.  6. Mild to moderate aortic valve regurgitation. QUANTITATIVE DATA SUMMARY: 2D MEASUREMENTS:                           Normal Ranges: Ao Root d:     3.00 cm    (2.0-3.7cm) LAs:           3.50 cm    (2.7-4.0cm) IVSd:          1.60 cm    (0.6-1.1cm) LVPWd:         1.42 cm    (0.6-1.1cm) LVIDd:         3.91 cm    (3.9-5.9cm) LVIDs:         2.40 cm LV Mass Index: 153.0 g/m2 LV % FS        38.6 % LA VOLUME:                               Normal Ranges: LA Vol A4C:        39.2 ml    (22+/-6mL/m2) LA Vol A2C:        50.4 ml LA Vol BP:         49.6 ml LA Vol Index A4C:  26.3ml/m2 LA Vol Index A2C:  33.8 ml/m2 LA Vol Index BP:   33.3 ml/m2 LA Area A4C:       14.7 cm2 LA Area A2C:       18.6 cm2 LA Major Axis A4C: 4.7 cm LA Major Axis A2C: 5.8 cm RA VOLUME BY A/L METHOD:                       Normal Ranges: RA Area A4C: 24.5 cm2 AORTA MEASUREMENTS:                      Normal Ranges: Ao Sinus, d: 3.00 cm (2.1-3.5cm) Ao STJ, d:   1.89 cm (1.7-3.4cm) Asc Ao, d:   3.60 cm (2.1-3.4cm) LV SYSTOLIC FUNCTION BY 2D PLANIMETRY (MOD):                     Normal Ranges: EF-A4C View: 76.2 % (>=55%) EF-A2C View: 73.7 % EF-Biplane:  74.5 % LV DIASTOLIC FUNCTION:                           Normal Ranges: MV Peak E:    0.58 m/s    (0.7-1.2 m/s) MV Peak A:    0.23 m/s    (0.42-0.7 m/s) E/A Ratio:    2.57        (1.0-2.2) MV e'         0.04 m/s    (>8.0) MV lateral e' 0.06 m/s MV medial e'  0.03 m/s MV A Dur:     103.00 msec E/e' Ratio:   12.96       (<8.0) MITRAL VALVE:                 Normal Ranges: MV DT: 237 msec (150-240msec) AORTIC VALVE:                                   Normal Ranges: AoV Vmax:                1.03 m/s (<=1.7m/s) AoV Peak P.2 mmHg (<20mmHg) AoV Mean P.0 mmHg (1.7-11.5mmHg) LVOT Max Henry:            0.70 m/s  (<=1.1m/s) AoV VTI:                 20.70 cm (18-25cm) LVOT VTI:                14.40 cm LVOT Diameter:           2.00 cm  (1.8-2.4cm) AoV Area, VTI:           2.19 cm2 (2.5-5.5cm2) AoV Area,Vmax:           2.13 cm2 (2.5-4.5cm2) AoV Dimensionless Index: 0.70 AORTIC INSUFFICIENCY: AI Vmax:       4.19 m/s AI Half-time:  438 msec AI Decel Rate: 335.50 cm/s2  RIGHT VENTRICLE: RV Basal 3.64 cm RV Mid   3.19 cm RV Major 7.5 cm TAPSE:   20.8 mm RV s'    0.07 m/s TRICUSPID VALVE/RVSP:                             Normal Ranges: Peak TR Velocity: 3.49 m/s RV Syst Pressure: 51.7 mmHg (< 30mmHg) IVC Diam:         1.50 cm  [id number] Darrel Hampton MD Electronically signed on 10/3/2023 at 4:11:07 PM  ** Final **     XR ankle right 3+ views    Result Date: 10/2/2023  Interpreted By:  Jeny Troy, STUDY: XR ANKLE RIGHT 3+ VIEWS; ;  10/2/2023 1:14 pm   INDICATION: Signs/Symptoms:Concern for osteomyelitis.   COMPARISON: 07/17/2023 and 09/12/2023   ACCESSION NUMBER(S): [id number]   ORDERING CLINICIAN: ISAIAH DOLL   FINDINGS: AP, oblique and lateral views were obtained. 2 screws anchor fracture fragment of the calcaneal tuberosity in position. There is also a metallic pin in the calcaneal tubercle. A small amount of lucency persists at the fracture site which does not appear significantly changed. Along the cortical surface of the calcaneal tubercle just slightly above the level of the metallic pin the bone appears questionably less dense. This is a fairly subtle finding and could be projectional. However this is in the vicinity of the overlying skin ulceration. Superficial soft tissue air is noted at the level of the ulceration with no deeply penetrating soft tissue air.       Metallic structures are present from internal fixation related to the avulsion from the calcaneal tubercle   There is a subtle lucency on the lateral view at the cortical surface of the tubercle just above the metallic pin which is questionably an area of  interval bone destruction versus a projectional difference.     MACRO: None   Signed by: Jeny Troy 10/2/2023 1:23 PM Dictation workstation:   [id number]    XR chest 1 view    Result Date: 10/2/2023  Interpreted By:  Jeny Troy, STUDY: XR CHEST 1 VIEW; ;  10/2/2023 1:13 pm   INDICATION: . Shortness of breath   COMPARISON: 02/15/2023   ACCESSION NUMBER(S): [id number]   ORDERING CLINICIAN: ISAIAH DOLL   TECHNIQUE: Portable AP upright   FINDINGS: ICD is present in the left upper chest wall with electrode wires in the right atrium and right ventricle. Cardiac silhouette is enlarged but appears similar to the prior study. Aorta is atherosclerotic. Interstitial markings are coarse but appears similar to the previous exam. Left costophrenic angle is indistinct which may indicate minimal left pleural fluid. Bones are osteopenic with no definite acute interval change.       Unchanged cardiomegaly   Small left pleural effusion   Coarse interstitial markings are unchanged and probably related to chronic lung disease.     Signed by: Jeny Troy 10/2/2023 1:18 PM Dictation workstation:   [id number]    XR foot    Result Date: 9/13/2023  Interpreted By:  GLADYS SHELDON MD MRN: [id number] Patient Name: CHERRY LEE  STUDY: FOOT; COMPLETE, MIN 3 VIEWS;  9/12/2023 1:03 pm  INDICATION: right foot pain  S92.031D: Closed displaced avulsion fracture of tuberosity of right calcaneus with routine healing, subsequent.  COMPARISON: 08/22/2023  ACCESSION NUMBER(S): [id number]  ORDERING CLINICIAN: JOANNE LANDA  FINDINGS: Right foot, three views  Postsurgical changes in the calcaneus status post fixation of an avulsion fracture with screws. The hardware is intact. Soft tissue edema posteriorly. There is no acute fracture. There is no dislocation      ORIF of calcaneal fracture with unchanged alignment and intact hardware. Associated soft tissue edema posteriorly       Vernon Arenas MD  Answering Service  [phone]  Date of service: 10/6/2023  Time of service: 9:40 AM

## 2023-10-06 NOTE — ASSESSMENT & PLAN NOTE
Noted multifocal pneumonia on CT chest  Stopped ancef and broadened to zosyn/vancomycin  Check procalcitonin  Blood cultures if fevers  ID on consult  Check MRSA nares

## 2023-10-06 NOTE — PROGRESS NOTES
History Of Present Illness:    Adwoa Forrest is a 88 y.o. female presenting with shortness of breath and ankle swelling.  She has history of persistent atrial fibrillation status post AV derek ablation and permanent pacemaker placement, failed Watchman device placement in 2018 requiring urgent surgery and left atrial appendage ligation, hypertension recurrent GI bleed not on oral anticoagulants, on amiodarone for nonsustained VT and symptomatic PVCs.  She also has chronic diastolic heart failure and follows regularly in the CHF clinic.  She used to be on 80 mg Lasix at 1 time which was reduced by the heart failure nurse practitioner to 60 mg due to worsening renal function.  Recently in July 2023 she presented with a ankle injury and at that time this was further reduced to 40 mg a day.  She states this ankle injury has been bothering her quite a bit and she has been having difficulty getting around.  This wound recently got infected and she required to take Augmentin follow almost 30 days that she just completed a week ago.  For last 1 week or so she is noticing worsening shortness of breath, legs are extremely swollen and heavy and she is unable to move as a result as it feels like lead.  She was given IV Lasix in the CHF clinic and was given 60 of Lasix to take at home but did not improve symptoms and therefore she came into the ER for further evaluation and treatment.  Denies any use of nonsteroidal inflammatories or other symptoms otherwise.  Interestingly, since starting on amiodarone she has converted to sinus rhythm.     At one time she was having deranged LFTs that improved after discontinuation of the fish oil she was taking.  Her EKG shows atrial paced ventricular paced rhythm.     Review of systems all other system reviewed and is negative.  Echocardiogram pending.  Chest x-ray personally reviewed.  She stated ankle swelling has improved since she had IV Lasix but they are having trouble titrating off  "her oxygen.     Subjective Data:  No new issues overnight.  No CP/pressure/palpitations. Has mild dyspnea, still on O2.  Creatinine up to 1.85 today, K 4.1.  Nephro has been consulted. Monitor: AV paced    10-5-23: Having issues with worsening dyspnea and decreased O2 sats today. O2 has been increased and still dyspneic.  No CP/pressure/palpitations.  Just \"doesn't feel well,\"  K 4.0, creatinine 1.98. I d/w IMS: pt going for CXR and LE duplex today.    10-6-23: Breathing is somewhat better today.  No CP/pressure.  Creatinine 1.8, K 4.9.    Overnight Events:    None     Objective Data:  Last Recorded Vitals:  Vitals:    10/06/23 0130 10/06/23 0500 10/06/23 0700 10/06/23 0900   BP: 122/70 110/67  147/72   Patient Position: Lying Lying  Lying   Pulse: 65 64  66   Resp: 16 16  16   Temp: 36.6 °C (97.8 °F) 36.8 °C (98.2 °F)  36.7 °C (98 °F)   TempSrc:    Temporal   SpO2: 92% 93%  (!) 87%   Weight:   48.4 kg (106 lb 11.2 oz)    Height:           Last Labs:  CBC - 10/6/2023:  5:39 AM  7.2 12.6 374    42.4      CMP - 10/6/2023:  5:39 AM  9.3 6.4 30 --- 0.7   4.1 3.6 23 121      PTT - 7/18/2023:  3:17 AM  1.0   11.4 32          Last I/O:  I/O last 3 completed shifts:  In: 1230 (25.3 mL/kg) [P.O.:1180; IV Piggyback:50]  Out: 0 (0 mL/kg)   Weight: 48.7 kg     Past Cardiology Tests (Last 3 Years):  CONCLUSIONS:   1. Left ventricular systolic function is normal with a 70-75% estimated ejection fraction.   2. There is moderate left ventricular hypertrophy.   3. There is mildly reduced right ventricular systolic function.   4. The right atrium is moderately dilated.   5. Moderately elevated right ventricular systolic pressure.   6. Mild to moderate aortic valve regurgitation.      Stress Test:  Nuclear Stress Test 2/12/2023     CT chest wo IV contrast    Result Date: 10/5/2023  Interpreted By:  Mina Benedict, STUDY: CT CHEST WO IV CONTRAST;  10/5/2023 2:13 pm   INDICATION: Signs/Symptoms:Worsening hypoxia.   COMPARISON: " 01/11/2019.   ACCESSION NUMBER(S): [id number]   ORDERING CLINICIAN: JULIET SAWYER   TECHNIQUE: Contiguous unenhanced axial images were obtained through the chest. Images were reformatted in axial, coronal, and sagittal planes.   FINDINGS: LUNGS and AIRWAYS: Emphysema is present with small scattered bulla throughout the lungs.   Multifocal irregular areas of consolidation and volume loss are seen in the left lower lobe, lingula and right lower lobe basilar aspect. Additional patchy irregular infiltrates are seen throughout the upper lungs bilaterally. Bilateral small volume pleural effusions are present.   No pneumothorax.   MEDIASTINUM and GURPREET, LOWER NECK AND AXILLA: Multi station mediastinal and bilateral hilar small partially calcified lymph nodes are compatible with old granulomatous disease. No axillary lymphadenopathy.   HEART and VESSELS: Dense irregular atherosclerotic calcifications are present throughout the aorta and branch vessels. No aortic aneurysm. Central pulmonary arterial enlargement is seen which may be related to pulmonary hypertension.   Dense coronary artery calcifications and/or stents are present. No pericardial effusion.  Heart is enlarged similar in configuration to prior.   UPPER ABDOMEN: Right hepatic lobe posterior segment subcentimeter low-attenuation lesion is too small to characterize. Left renal posterior interpolar partially exophytic 1.3 cm cyst is noted. There is a left renal interpolar nonobstructing 5 mm calculus. No left hydronephrosis is seen.   CHEST WALL and OSSEOUS STRUCTURES: Degenerative changes of the shoulders are partially visualized. Thoracic mild S shaped scoliosis is seen. Mild multilevel disc space narrowing and endplate spurring is present in the spine.       1.  Emphysema. 2. Multifocal irregular regions of consolidation and volume loss within the left lower lobe, lingula and right lower lobe with additional patchy infiltrates in the upper aspects of both  lungs. Bilateral small volume pleural effusions. Findings may reflect multifocal infection and/or edema. 3. Prominent cardiomegaly similar to prior. 4. Central pulmonary arterial enlargement which may be related to pulmonary hypertension.   MACRO: None.   Signed by: Mina Benedict 10/5/2023 2:56 PM Dictation workstation:   [id number]    XR chest 2 views    Result Date: 10/5/2023  Interpreted By:  Benito Jose, STUDY: XR CHEST 2 VIEWS;  10/5/2023 10:11 am   INDICATION: Signs/Symptoms:Hypoxia.   COMPARISON: 10/02/2023   ACCESSION NUMBER(S): [id number]   ORDERING CLINICIAN: JULIET SAWYER   FINDINGS: Left retrocardiac opacities appear worsened. Small right pleural effusion with adjacent opacity stable. Diffuse interstitial pulmonary opacities suggestive of pulmonary edema. Stable cardiomegaly. Cardiac device. Atherosclerosis. No pneumothorax.       Worsening left retrocardiac opacities could be due to atelectasis, infection, and/or effusion. Otherwise stable pulmonary edema, cardiomegaly, and small right pleural effusion.   Signed by: Benito Jose 10/5/2023 11:41 AM Dictation workstation:   [id number]        patient Medications:  Scheduled medications   Medication Dose Route Frequency    amiodarone  200 mg oral Daily    aspirin  81 mg oral Daily    atorvastatin  80 mg oral Nightly    cyanocobalamin  2,000 mcg oral Daily    guaiFENesin  1,200 mg oral BID    heparin (porcine)  5,000 Units subcutaneous q8h    levothyroxine  25 mcg oral Daily    [Held by provider] lisinopril  5 mg oral Daily    metoprolol succinate XL  25 mg oral Daily    oxygen   inhalation Continuous - 02/gases    piperacillin-tazobactam  2.25 g intravenous q8h    polyethylene glycol  17 g oral Daily    spironolactone  12.5 mg oral q24h MACRINA    torsemide  20 mg oral Daily    vancomycin  500 mg intravenous q24h     PRN medications   Medication    acetaminophen    ALPRAZolam    bisacodyl    melatonin     Continuous Medications    Medication Dose Last Rate       Physical Exam:  Vitals reviewed.   Constitutional:       Appearance: Normal appearance.   Neck:      Vascular: No JVD No carotid bruit.   Cardiovascular:      Rate and Rhythm: Normal rate and regular rhythm.      Pulses: Normal pulses.      Heart sounds: Normal heart sounds, S1 normal and S2 normal.   Pulmonary:      Effort: Pulmonary effort is normal. No respiratory distress.      Breath sounds: Crackles in posterior, worse in L base, diminished R base, on O2.    Abdominal:      General: Abdomen is flat.      Palpations: Abdomen is soft.   Musculoskeletal:      Right lower leg: No edema.      Left lower leg: No edema.   Skin:     General: Skin is warm.   Neurological:      Mental Status: She is alert and oriented to person, place, and time. Mental status is at baseline.   Psychiatric:         Mood and Affect: Mood normal.         Behavior: Behavior normal.      Assessment/Plan   1-acute on chronic diastolic heart failure-continue IV Lasix for another 24 hours.  After that switch to 60 mg p.o. Lasix with close follow-up in CHF clinic.  Monitor electrolytes and strict I's and O's.  Add Aldactone continue SGLT2 inhibitors.  Possibly exacerbated by recent events including infection.  Will need close monitoring of electrolytes and potassium specifically.  Discussed with patient.     Agree with checking echo follow-up on results.    10-4-23: Echo as noted above. Lungs sound congested, no edema.  On IV Lasix, Metoprolol succinate and spironolactone.  Nephro has been consulted.  Recommend sodium restricted diet.      10-5-23: Lungs with diminished R base and mod crackles L fields. Having a mild climb in creatinine (1.98) CXR and LE duplex pending. Will defer diuretics to nephro: recommendations in chart.     10-6-23: Remains with dyspnea but improved some from yesterday. Currently on spironolactone.  Lasix was stopped d/t climbing creatinine. Will add oral Lasix.  Check BNP today      2-persistent atrial fibrillation-she seems to be maintaining sinus rhythm since being on amiodarone.  She is not anticoagulated but had a left atrial appendage ligation.  She has history of GI bleed.  10-4-23: On Amiodarone and BB.  HX of AV node ablation and PPM. She is no longer on oral anticoagulation, hx of STEFANIE ligation.  On ASA only. AV paced on tele.     10-5-23: AV paced on tele. Pt is no longer on anticoagulation.    10-6-23: Remains AV paced.          3-nonsustained VT and PVCs highly symptomatic and remains on amiodarone with resolution of symptoms.  Outpatient monitoring.    10-5-23:  continue on Amiodarone    10-6-223: On Amiodarone.  No VT this admission.        Code Status:  Full Code          Shefali Ordaz, APRN-CNP

## 2023-10-06 NOTE — PROGRESS NOTES
Adwoa Forrest is a 88 y.o. female on day 4 of admission presenting with Acute on chronic congestive heart failure, unspecified heart failure type (CMS/HCC).      Subjective   Adwoa Forrest is a 88 y.o.  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 BNP 1800 on admission. Patient had acute worsening of respiratory status on 10/5/23. CXR with retrocardiac opacity, CT scan with multifocal pneumonia vs edema. Continued on diuresis, antibiotics broadened, stable on 6L NC. Appears to have emphysema based off CT scan as well- shew ill need pulmonlogy follow up.   10/06/23: No acute events overnight. Remained 92-93% on 6L NC. Cr improving to 1.8 (1.98). No leukocytosis but does have neutrophil predominance. MRSA nares negative- will discuss discontinuation of vanco with ID. US BLE negative for DVT. Thoracentesis right side ordered         Review of Systems   Constitutional:  Negative for appetite change, chills, diaphoresis, fatigue and fever.   HENT:  Negative for congestion, ear pain, facial swelling, hearing loss, nosebleeds, sore throat, tinnitus and trouble swallowing.    Eyes:  Negative for pain.   Respiratory:  Positive for cough and shortness of breath. Negative for chest tightness and wheezing.    Cardiovascular:  Positive  for leg swelling. Negative for chest pain and palpitations.   Gastrointestinal:  Negative for abdominal pain, blood in stool, constipation, diarrhea, nausea and vomiting.   Genitourinary:  Negative for dysuria, flank pain, frequency, hematuria and urgency.   Musculoskeletal:  Negative for back pain and joint swelling.   Skin:  Positive for wound. Negative for rash.   Neurological:  Negative for dizziness, syncope, weakness, light-headedness, numbness and headaches.   Hematological:  Does not bruise/bleed easily.   Psychiatric/Behavioral:  Negative for behavioral problems, hallucinations and suicidal ideas.           Objective     Last Recorded Vitals  /68   Pulse 65   Temp 35.9 °C (96.7 °F) (Temporal)   Resp 18   Wt 48.4 kg (106 lb 11.2 oz)   SpO2 91%     Image Results  XR ankle right 3+ views    Result Date: 10/2/2023  Interpreted By:  Jeny Troy, STUDY: XR ANKLE RIGHT 3+ VIEWS; ;  10/2/2023 1:14 pm   INDICATION: Signs/Symptoms:Concern for osteomyelitis.   COMPARISON: 07/17/2023 and 09/12/2023   ACCESSION NUMBER(S): [id number]   ORDERING CLINICIAN: ISAIAH DOLL   FINDINGS: AP, oblique and lateral views were obtained. 2 screws anchor fracture fragment of the calcaneal tuberosity in position. There is also a metallic pin in the calcaneal tubercle. A small amount of lucency persists at the fracture site which does not appear significantly changed. Along the cortical surface of the calcaneal tubercle just slightly above the level of the metallic pin the bone appears questionably less dense. This is a fairly subtle finding and could be projectional. However this is in the vicinity of the overlying skin ulceration. Superficial soft tissue air is noted at the level of the ulceration with no deeply penetrating soft tissue air.       Metallic structures are present from internal fixation related to the avulsion from the calcaneal tubercle   There is a subtle lucency on the lateral view at the cortical  surface of the tubercle just above the metallic pin which is questionably an area of interval bone destruction versus a projectional difference.     MACRO: None   Signed by: Jeny Troy 10/2/2023 1:23 PM Dictation workstation:   [id number]    XR chest 1 view    Result Date: 10/2/2023  Interpreted By:  Jeny Troy, STUDY: XR CHEST 1 VIEW; ;  10/2/2023 1:13 pm   INDICATION: . Shortness of breath   COMPARISON: 02/15/2023   ACCESSION NUMBER(S): [id number]   ORDERING CLINICIAN: ISAIAH DOLL   TECHNIQUE: Portable AP upright   FINDINGS: ICD is present in the left upper chest wall with electrode wires in the right atrium and right ventricle. Cardiac silhouette is enlarged but appears similar to the prior study. Aorta is atherosclerotic. Interstitial markings are coarse but appears similar to the previous exam. Left costophrenic angle is indistinct which may indicate minimal left pleural fluid. Bones are osteopenic with no definite acute interval change.       Unchanged cardiomegaly   Small left pleural effusion   Coarse interstitial markings are unchanged and probably related to chronic lung disease.     Signed by: Jeny Troy 10/2/2023 1:18 PM Dictation workstation:   [id number]    XR foot    Result Date: 9/13/2023  Interpreted By:  GLADYS SHELDON MD MRN: [id number] Patient Name: CHERRY LEE  STUDY: FOOT; COMPLETE, MIN 3 VIEWS;  9/12/2023 1:03 pm  INDICATION: right foot pain  S92.031D: Closed displaced avulsion fracture of tuberosity of right calcaneus with routine healing, subsequent.  COMPARISON: 08/22/2023  ACCESSION NUMBER(S): [id number]  ORDERING CLINICIAN: JOANNE LANDA  FINDINGS: Right foot, three views  Postsurgical changes in the calcaneus status post fixation of an avulsion fracture with screws. The hardware is intact. Soft tissue edema posteriorly. There is no acute fracture. There is no dislocation      ORIF of calcaneal fracture with unchanged alignment and intact hardware. Associated  soft tissue edema posteriorly       Lab Results  Results for orders placed or performed during the hospital encounter of 10/02/23 (from the past 24 hour(s))   MRSA Surveillance for Vancomycin De-escalation, PCR    Specimen: Anterior Nares; Swab   Result Value Ref Range    MRSA PCR Not Detected Not Detected   Procalcitonin   Result Value Ref Range    Procalcitonin 0.04 <=0.07 ng/mL   Basic Metabolic Panel   Result Value Ref Range    Glucose 144 (H) 74 - 99 mg/dL    Sodium 143 136 - 145 mmol/L    Potassium 4.9 3.5 - 5.3 mmol/L    Chloride 103 98 - 107 mmol/L    Bicarbonate 34 (H) 21 - 32 mmol/L    Anion Gap 11 10 - 20 mmol/L    Urea Nitrogen 49 (H) 6 - 23 mg/dL    Creatinine 1.80 (H) 0.50 - 1.05 mg/dL    eGFR 27 (L) >60 mL/min/1.73m*2    Calcium 9.3 8.6 - 10.3 mg/dL   Magnesium   Result Value Ref Range    Magnesium 2.39 1.60 - 2.40 mg/dL   CBC and Auto Differential   Result Value Ref Range    WBC 7.2 4.4 - 11.3 x10*3/uL    nRBC 0.0 0.0 - 0.0 /100 WBCs    RBC 4.79 4.00 - 5.20 x10*6/uL    Hemoglobin 12.6 12.0 - 16.0 g/dL    Hematocrit 42.4 36.0 - 46.0 %    MCV 89 80 - 100 fL    MCH 26.3 26.0 - 34.0 pg    MCHC 29.7 (L) 32.0 - 36.0 g/dL    RDW 17.5 (H) 11.5 - 14.5 %    Platelets 374 150 - 450 x10*3/uL    MPV 8.6 7.5 - 11.5 fL    Neutrophils % 90.0 40.0 - 80.0 %    Immature Granulocytes %, Automated 0.8 0.0 - 0.9 %    Lymphocytes % 7.5 13.0 - 44.0 %    Monocytes % 1.4 2.0 - 10.0 %    Eosinophils % 0.0 0.0 - 6.0 %    Basophils % 0.3 0.0 - 2.0 %    Neutrophils Absolute 6.52 (H) 1.60 - 5.50 x10*3/uL    Immature Granulocytes Absolute, Automated 0.06 0.00 - 0.50 x10*3/uL    Lymphocytes Absolute 0.54 (L) 0.80 - 3.00 x10*3/uL    Monocytes Absolute 0.10 0.05 - 0.80 x10*3/uL    Eosinophils Absolute 0.00 0.00 - 0.40 x10*3/uL    Basophils Absolute 0.02 0.00 - 0.10 x10*3/uL   Vancomycin   Result Value Ref Range    Vancomycin 14.0 5.0 - 20.0 ug/mL   B-Type Natriuretic Peptide   Result Value Ref Range    BNP 1,795 (H) 0 - 99 pg/mL         Medications  Scheduled medications:  amiodarone, 200 mg, oral, Daily  aspirin, 81 mg, oral, Daily  atorvastatin, 80 mg, oral, Nightly  cyanocobalamin, 2,000 mcg, oral, Daily  guaiFENesin, 1,200 mg, oral, BID  heparin (porcine), 5,000 Units, subcutaneous, q8h  levothyroxine, 25 mcg, oral, Daily  [Held by provider] lisinopril, 5 mg, oral, Daily  metoprolol succinate XL, 25 mg, oral, Daily  oxygen, , inhalation, Continuous - 02/gases  piperacillin-tazobactam, 2.25 g, intravenous, q8h  polyethylene glycol, 17 g, oral, Daily  spironolactone, 12.5 mg, oral, q24h MACRINA  torsemide, 20 mg, oral, Daily  vancomycin, 500 mg, intravenous, q24h      Continuous medications:     PRN medications:  PRN medications: acetaminophen, ALPRAZolam, bisacodyl, melatonin     Physical Exam  Constitutional:       General: She is not in acute distress.     Appearance: Normal appearance.   HENT:      Head: Normocephalic and atraumatic.      Right Ear: External ear normal.      Left Ear: External ear normal.      Nose: Nose normal.      Mouth/Throat:      Mouth: Mucous membranes are moist.      Pharynx: Oropharynx is clear.   Eyes:      Extraocular Movements: Extraocular movements intact.      Conjunctiva/sclera: Conjunctivae normal.      Pupils: Pupils are equal, round, and reactive to light.   Cardiovascular:      Rate and Rhythm: Normal rate and regular rhythm.      Pulses: Normal pulses.      Heart sounds: Normal heart sounds.   Pulmonary:      Effort: Pulmonary effort is normal. No respiratory distress.      Breath sounds: Rales present. No wheezing or rhonchi.      Comments: NC in place  Abdominal:      General: Abdomen is flat. Bowel sounds are normal.      Palpations: Abdomen is soft.      Tenderness: There is no abdominal tenderness. There is no right CVA tenderness, left CVA tenderness, guarding or rebound.   Musculoskeletal:         General: Normal range of motion.      Cervical back: Normal range of motion and neck supple.      Right  lower leg: Edema present.      Left lower leg: Edema present.   Skin:     General: Skin is warm and dry.      Capillary Refill: Capillary refill takes less than 2 seconds.      Findings: Lesion (Heel) present. No rash.   Neurological:      General: No focal deficit present.      Mental Status: She is alert and oriented to person, place, and time. Mental status is at baseline.   Psychiatric:         Mood and Affect: Mood normal.         Behavior: Behavior normal.                  Code Status  Full Code     Assessment/Plan   This patient currently has cardiac telemetry ordered; if you would like to modify or discontinue the telemetry order, click here to go to the orders activity to modify/discontinue the order.      Acute on chronic congestive heart failure, unspecified heart failure type (CMS/[facility])  Cardiology Consult appreciated   BNP = 1279  Imaging and examination consistent with volume overload  Continued on IV Lasix 40mg IV BID  Strict I's & O's/Daily Weights   Echocardiogram pending   Echo 02/16/23 with EF of 50% and severely increased LV septal thickness compared to Echo 09/22/2022 with EF of 60% and mild AV regurgitation.  Monitor renal function closely  Spironolactone added  Consider resumption of sglt2i    Non-healing wound of right lower extremity  s/p ORIF 07/19/23 by Dr. Brunner with recommendations of continued non-weight bearing per orthopedics/wound care due to  a persistent wound as noted to be by the patient  Wound Care Consult   ESR = 41,CRP = 6.22  Consult to podiatry- wound healing well without infectious properties  Had graft scheduled for 10/6/23  ID consult given elevated inflammatory markers  Broad spectrum abx as stated elsewhere  W. Cx with mixed bacteria    Hypertension  Continued on home medications    Hyperlipidemia  Continued on home medications    Hypothyroidism  Continued on home medications    Paroxysmal atrial fibrillation (CMS/[facility])  History of atrial fibrillation/flutter with failed  Watchman status post open heart retrieval and left appendage ligation  Status post dual-chamber PPM  History of recurrent diverticular bleeds preventing further anticoagulation given about 4 years ago  Continued on home medications   ontinued outpatient followup with Cardiology & Electrophysiology  In the EMR she was noted to be allergic to Amiodarone, but this was also noted as still being taken on her medication list on Cardiology outpatient record on 09/25/23 and patient states she still takes Amiodarone- We will hold the amiodarone for now and discuss further with pharmacy to see if she is still filling this or not  Telemetry     CKD (chronic kidney disease) stage 3, GFR 30-59 ml/min (CMS/[facility])  Baseline creatinine of around 1.8 prior to 02/2023 and did have brief elevations throughout 2023 for dehydration and recent fractures but now seems to be around 1.6  Continue to monitor very closely in setting of diuresis   Nephrology consultation  Bladder scan for PVR x1    Elevated troponin I level  30 --> 29   Likely in setting of volume overload and CKD   Nonischemic EKG noted on presentation  Telemetry Monitoring    Acute hypoxic respiratory failure (CMS/[facility])  Continue diuresis  Home O2 eval prior to discharge  Worsening hypoxia morning of 10/5/23- CXR with retrocardiac opacity  Will obtain CT chest as well as VBG  Titrate oxygen  Broaden to vanco/zosyn  Check MRSA nares  IV decadron short course    Pneumonia  Noted multifocal pneumonia on CT chest  Stopped ancef and broadened to zosyn/vancomycin  Check procalcitonin  Blood cultures if fevers  ID on consult  Check MRSA nares     Pleural effusion  Has needed thoras in the past  Will order thoracentesis with protein/LDH, cytology, culture  Patient agreeable    Abnormal CT of the chest  Appears patient has emphysema based off imaging  Never formally diagnosed  Former smoker >30 years ago, +second hand smoke exposure  Patient will need PFTs once improved form respiratory  standpoint  Will need follow up with pulmonology upon discharge  I discussed this at great length with patient and her son Barron at bedside, they are both in agreement      DVT ppx: subcutaneous Heparin     Arpita Frazier PA-C

## 2023-10-06 NOTE — PROGRESS NOTES
..       [facility] Progress Note           Subjective/   88 y.o. year old female who we are seeing in consultation for YEVGENIY on CKD.     MILLER  Feeling ok  SOB persistent but not worsening  Told today she has emphysema  Still as sputum feeling in throat  Feels edema much better  Appetite ok  No issues with UOP  No other new issues at this time    ROS done and negative unless mentioned as above  No change in PFSH  All data labs/interval notes and overnight issues are reviewed  Objective/     Vitals:    10/06/23 0130 10/06/23 0500 10/06/23 0700 10/06/23 0900   BP: 122/70 110/67  147/72   Patient Position: Lying Lying  Lying   Pulse: 65 64  66   Resp: 16 16  16   Temp: 36.6 °C (97.8 °F) 36.8 °C (98.2 °F)  36.7 °C (98 °F)   TempSrc:    Temporal   SpO2: 92% 93%  (!) 87%   Weight:   48.4 kg (106 lb 11.2 oz)    Height:            Intake/Output Summary (Last 24 hours) at 10/6/2023 0924  Last data filed at 10/5/2023 1711  Gross per 24 hour   Intake 730 ml   Output --   Net 730 ml            Current Facility-Administered Medications:     acetaminophen (Tylenol) tablet 650 mg, 650 mg, oral, q4h PRN, Jt House DO, 650 mg at 10/06/23 0601    ALPRAZolam (Xanax) tablet 0.5 mg, 0.5 mg, oral, Nightly PRN, Nallely Clarke PA-C, 0.5 mg at 10/05/23 2156    amiodarone (Pacerone) tablet 200 mg, 200 mg, oral, Daily, Jt House DO, 200 mg at 10/05/23 0907    aspirin EC tablet 81 mg, 81 mg, oral, Daily, Nallely Clarke PA-C, 81 mg at 10/05/23 0907    atorvastatin (Lipitor) tablet 80 mg, 80 mg, oral, Nightly, Nallely Clarke PA-C, 80 mg at 10/05/23 2156    bisacodyl (Dulcolax) EC tablet 10 mg, 10 mg, oral, Daily PRN, Nallely Clakre PA-C    cyanocobalamin (Vitamin B-12) tablet 2,000 mcg, 2,000 mcg, oral, Daily, Nallely Clarke PA-C, 2,000 mcg at 10/05/23 0908    guaiFENesin (Mucinex) 12 hr tablet 1,200 mg, 1,200 mg, oral, BID, Arpita Camp PA-C, 1,200 mg at 10/05/23 2156    heparin (porcine) injection  5,000 Units, 5,000 Units, subcutaneous, q8h, Nallely Clarke PA-C, 5,000 Units at 10/06/23 0610    levothyroxine (Synthroid, Levoxyl) tablet 25 mcg, 25 mcg, oral, Daily, Nallely Clarke PA-C, 25 mcg at 10/06/23 0553    [Held by provider] lisinopril tablet 5 mg, 5 mg, oral, Daily, Jt House DO    melatonin tablet 9 mg, 9 mg, oral, Nightly PRN, Jt House DO, 9 mg at 10/03/23 0223    metoprolol succinate XL (Toprol-XL) 24 hr tablet 25 mg, 25 mg, oral, Daily, Arpita Camp PA-C, 25 mg at 10/05/23 0909    oxygen (O2) therapy, , inhalation, Continuous - 02/gases, Frederick Lowry MD, 5 L/min at 10/05/23 0903    piperacillin-tazobactam-dextrose (Zosyn) IV 2.25 g, 2.25 g, intravenous, q8h, Arpita Camp PA-C, Stopped at 10/05/23 2230    polyethylene glycol (Glycolax, Miralax) packet 17 g, 17 g, oral, Daily, Nallely Clarke PA-C, 17 g at 10/05/23 0903    spironolactone (Aldactone) tablet 12.5 mg, 12.5 mg, oral, q24h MACRINA, Michael Latham MD, 12.5 mg at 10/05/23 0907    torsemide (Demadex) tablet 20 mg, 20 mg, oral, Daily, Arpita Camp PA-C    vancomycin in dextrose 5 % (Vancocin) IVPB 500 mg, 500 mg, intravenous, q24h, Arpita Camp PA-C    Physical Exam  Constitutional:       General: She is not in acute distress.     Appearance: Normal appearance. She is not ill-appearing or toxic-appearing.   HENT:      Head: Normocephalic and atraumatic.   Eyes:      General: No scleral icterus.     Conjunctiva/sclera: Conjunctivae normal.   Neck:      Vascular: No carotid bruit.   Cardiovascular:      Rate and Rhythm: Normal rate and regular rhythm.      Pulses: Normal pulses.      Heart sounds: Normal heart sounds.   Pulmonary:      Effort: Pulmonary effort is normal.      Comments: Faint bibasilar crackles  Abdominal:      General: Bowel sounds are normal. There is no distension.      Palpations: Abdomen is soft.      Tenderness: There is no abdominal tenderness.   Musculoskeletal:      Cervical  back: Neck supple.      Right lower leg: No edema.      Left lower leg: No edema.   Skin:     General: Skin is warm and dry.   Neurological:      General: No focal deficit present.      Mental Status: She is alert and oriented to person, place, and time.   Psychiatric:         Mood and Affect: Mood normal.         Behavior: Behavior normal.         Judgment: Judgment normal.          Lab Results   Component Value Date    WBC 7.2 10/06/2023    HGB 12.6 10/06/2023    HCT 42.4 10/06/2023    MCV 89 10/06/2023     10/06/2023     Lab Results   Component Value Date    GLUCOSE 144 (H) 10/06/2023    CALCIUM 9.3 10/06/2023     10/06/2023    K 4.9 10/06/2023    CO2 34 (H) 10/06/2023     10/06/2023    BUN 49 (H) 10/06/2023    CREATININE 1.80 (H) 10/06/2023                                 Assessment/Plan   YEVGENIY/ATN likley 2/2 CRS  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  Acute on chronic diastolic HFpEF 70-75%  Acute hypoxic respiratory failure (no oxygen at baseline)  Pulmonary hypertension  Emphysema  B/L pleural effusions (small)  RLE cellulitis and infected foot wound  Persistent Afib  ASCVD        Plan:  -Scr starting to improve again, near her baseline, bl~1.6-1.7, non oliguric, BP stable  -C/w oral Torsemide 20 mg daily and Aldactone 12.5 mg and continue on discharge  -Reasonable to resume sglt2i, defer to cardiology   -Hold ACE for now and may consider resuming later  -Avoid large fluctuation in hemodynamics  -UA with glucosuria, large leukocyte esterase, WBC (11-20) ? UTI, defer to primary  -Fena 2.8 and c/w ATN. No significant albuminuria.  -On high dose statin therapy, continue.  -CHF appears well compensated on exam  -CT chest 10/5 findings consistent with Emphysema, pulmonary hypertension, and persistent pleural effusions  -Echo 10/3 findings c/w HF. PA pressure 51.7 mmHg with dilated IVC diameter 1.50 cm noted  -Dyspnea likely 2/2 chronic respiratory etiology discussed with Dr. Logan and ok for patient to follow up  with Pulm outpatient  -Consider thoracentesis for the persistent pleural effusions   -Wean oxygen as able, defer to primary  -Management of Afib defer to cardiology.  -IV Ancef for the cellulitis and defer to ID-All other care defer to primary   -We will follow closely with you      Thank you for the consult and the opportunity to participate in the care of this patient. Please do not hesitate to contact us with any questions or concerns.     INDIGO Mckinnon-CNP  [facility], [facility]  [phone]

## 2023-10-06 NOTE — PROGRESS NOTES
Physical Therapy    Physical Therapy Treatment    Patient Name: Adwoa Forrest  MRN: [id number]  Today's Date: 10/6/2023  Time Calculation  Start Time: 0900  Stop Time: 0923  Time Calculation (min): 23 min       Assessment/Plan   PT Assessment  PT Assessment Results: Decreased endurance, Impaired balance, Decreased mobility  Rehab Prognosis: Fair  Barriers to Discharge: progessing  Evaluation/Treatment Tolerance: Patient tolerated treatment well  Medical Staff Made Aware: Yes  Barriers to Participation: Comorbidities  End of Session Communication: Bedside nurse, PCT/NA/CTA, Care Coordinator, Physician  Assessment Comment:  (pateitn  now on 6 litres and fatigue very quickly is able to mantian NWB with transfers)  End of Session Patient Position:  (wheelchair, daughter present)  PT Plan  Inpatient/Swing Bed or Outpatient: Inpatient  PT Plan  Treatment/Interventions: Transfer training, Strengthening  PT Plan: Skilled PT  PT Frequency: 3 times per week  PT Discharge Recommendations: Moderate intensity level of continued care (spoke wiht PT due toapteitn's increase need in 02 and increased weakness  changing plan to mod per PT)  Equipment Recommended upon Discharge:  (THERABAND FOR HEP)  PT Recommended Transfer Status: Stand by assist      General Visit Information:   PT  Visit  PT Received On: 10/06/23 (patietn now on 6 litresof 02)  General  Prior to Session Communication: Bedside nurse, PCT/NA/CTA  Patient Position Received: Bed, 3 rail up, Alarm on, Bed, 0 rail up    Subjective   Precautions:  Precautions  LE Weight Bearing Status: Right Non-Weight Bearing (per patient non weight bearing)  Precautions Comment: FALLS  Vital Signs:  Vital Signs  SpO2:  (PHOEBE Hinojosa aware of SPO2)    Objective   Pain:  Pain Assessment  Pain Assessment: 0-10  Pain Score: 0 - No pain  Pain Type:  (no complaints)  Cognition:  Cognition  Overall Cognitive Status: Within Functional Limits  Postural Control:     Extremity/Trunk  Assessments:    Activity Tolerance:  Activity Tolerance  Endurance: Tolerates 10 - 20 min exercise with multiple rests  Activity Tolerance Comments: fatigues quickly on feet due to NWB and patient stating she doesn' t fell today  Treatments:  Therapeutic Exercise  Therapeutic Exercise Performed: Yes  Therapeutic Exercise Activity 1: seated marches, knee extesnion 10 reps each    Therapeutic Activity  Therapeutic Activity Performed: Yes  Therapeutic Activity 1: static sitting EoB         Bed Mobility  Bed Mobility: Yes  Bed Mobility 1  Bed Mobility 1: Supine to sitting  Level of Assistance 1: Minimum assistance    Transfers  Transfer: Yes  Transfer 1  Transfer From 1: Sit to, Stand to (standpivot)  Transfer Level of Assistance 1: Minimal verbal cues, Minimum assistance  Trials/Comments 1: verbal cue for safety    Outcome Measures:  [facility] Basic Mobility  Turning from your back to your side while in a flat bed without using bedrails: A little  Moving from lying on your back to sitting on the side of a flat bed without using bedrails: A lot  Moving to and from bed to chair (including a wheelchair): A little  Standing up from a chair using your arms (e.g. wheelchair or bedside chair): A little  To walk in hospital room: Total  Climbing 3-5 steps with railing: Total  Basic Mobility - Total Score: 13    Education Documentation  Mobility Training, taught by Sherron Lozano PTA at 10/6/2023 11:57 AM.  Learner: Patient  Readiness: Acceptance  Method: Explanation  Response: Verbalizes Understanding    Education Comments  No comments found.        OP EDUCATION:       Encounter Problems       Encounter Problems (Active)       Mobility       STRENGTHENING (Progressing)       Start:  10/03/23    Expected End:  10/24/23       20+ RROM EX TO HIPS AND KNEES STRENGTHENING LEGS FOR STABLE TRANSFERS         WC MOBILITY (Progressing)       Start:  10/03/23    Expected End:  10/06/23       INDEP AND SAFE WC MOBILITY

## 2023-10-07 LAB
ANION GAP SERPL CALC-SCNC: 9 MMOL/L (ref 10–20)
BASOPHILS # BLD AUTO: 0.01 X10*3/UL (ref 0–0.1)
BASOPHILS NFR BLD AUTO: 0.1 %
BUN SERPL-MCNC: 57 MG/DL (ref 6–23)
CALCIUM SERPL-MCNC: 9.2 MG/DL (ref 8.6–10.3)
CHLORIDE SERPL-SCNC: 103 MMOL/L (ref 98–107)
CO2 SERPL-SCNC: 36 MMOL/L (ref 21–32)
CREAT SERPL-MCNC: 1.89 MG/DL (ref 0.5–1.05)
EOSINOPHIL # BLD AUTO: 0.02 X10*3/UL (ref 0–0.4)
EOSINOPHIL NFR BLD AUTO: 0.2 %
ERYTHROCYTE [DISTWIDTH] IN BLOOD BY AUTOMATED COUNT: 17.7 % (ref 11.5–14.5)
GFR SERPL CREATININE-BSD FRML MDRD: 25 ML/MIN/1.73M*2
GLUCOSE SERPL-MCNC: 102 MG/DL (ref 74–99)
HCT VFR BLD AUTO: 39.8 % (ref 36–46)
HGB BLD-MCNC: 11.7 G/DL (ref 12–16)
IMM GRANULOCYTES # BLD AUTO: 0.06 X10*3/UL (ref 0–0.5)
IMM GRANULOCYTES NFR BLD AUTO: 0.6 % (ref 0–0.9)
LDH FLD L TO P-CCNC: 80 U/L
LYMPHOCYTES # BLD AUTO: 0.64 X10*3/UL (ref 0.8–3)
LYMPHOCYTES NFR BLD AUTO: 6.4 %
MAGNESIUM SERPL-MCNC: 2.34 MG/DL (ref 1.6–2.4)
MCH RBC QN AUTO: 26.4 PG (ref 26–34)
MCHC RBC AUTO-ENTMCNC: 29.4 G/DL (ref 32–36)
MCV RBC AUTO: 90 FL (ref 80–100)
MONOCYTES # BLD AUTO: 0.65 X10*3/UL (ref 0.05–0.8)
MONOCYTES NFR BLD AUTO: 6.5 %
NEUTROPHILS # BLD AUTO: 8.56 X10*3/UL (ref 1.6–5.5)
NEUTROPHILS NFR BLD AUTO: 86.2 %
NRBC BLD-RTO: 0 /100 WBCS (ref 0–0)
PLATELET # BLD AUTO: 353 X10*3/UL (ref 150–450)
PMV BLD AUTO: 9 FL (ref 7.5–11.5)
POTASSIUM SERPL-SCNC: 4.8 MMOL/L (ref 3.5–5.3)
PROT FLD-MCNC: 1 G/DL
RBC # BLD AUTO: 4.43 X10*6/UL (ref 4–5.2)
SODIUM SERPL-SCNC: 143 MMOL/L (ref 136–145)
VANCOMYCIN SERPL-MCNC: 11.6 UG/ML (ref 5–20)
WBC # BLD AUTO: 9.9 X10*3/UL (ref 4.4–11.3)

## 2023-10-07 PROCEDURE — 2500000004 HC RX 250 GENERAL PHARMACY W/ HCPCS (ALT 636 FOR OP/ED): Performed by: STUDENT IN AN ORGANIZED HEALTH CARE EDUCATION/TRAINING PROGRAM

## 2023-10-07 PROCEDURE — 99233 SBSQ HOSP IP/OBS HIGH 50: CPT | Performed by: INTERNAL MEDICINE

## 2023-10-07 PROCEDURE — 83735 ASSAY OF MAGNESIUM: CPT | Performed by: PHYSICIAN ASSISTANT

## 2023-10-07 PROCEDURE — 80048 BASIC METABOLIC PNL TOTAL CA: CPT | Performed by: PHYSICIAN ASSISTANT

## 2023-10-07 PROCEDURE — 36415 COLL VENOUS BLD VENIPUNCTURE: CPT | Performed by: PHYSICIAN ASSISTANT

## 2023-10-07 PROCEDURE — 2500000004 HC RX 250 GENERAL PHARMACY W/ HCPCS (ALT 636 FOR OP/ED): Performed by: PHYSICIAN ASSISTANT

## 2023-10-07 PROCEDURE — 80202 ASSAY OF VANCOMYCIN: CPT | Performed by: PHYSICIAN ASSISTANT

## 2023-10-07 PROCEDURE — 93010 ELECTROCARDIOGRAM REPORT: CPT | Performed by: INTERNAL MEDICINE

## 2023-10-07 PROCEDURE — 99232 SBSQ HOSP IP/OBS MODERATE 35: CPT | Performed by: PHYSICIAN ASSISTANT

## 2023-10-07 PROCEDURE — 2500000004 HC RX 250 GENERAL PHARMACY W/ HCPCS (ALT 636 FOR OP/ED): Performed by: INTERNAL MEDICINE

## 2023-10-07 PROCEDURE — 2060000001 HC INTERMEDIATE ICU ROOM DAILY

## 2023-10-07 PROCEDURE — 2500000001 HC RX 250 WO HCPCS SELF ADMINISTERED DRUGS (ALT 637 FOR MEDICARE OP): Performed by: PHYSICIAN ASSISTANT

## 2023-10-07 PROCEDURE — 96372 THER/PROPH/DIAG INJ SC/IM: CPT | Performed by: STUDENT IN AN ORGANIZED HEALTH CARE EDUCATION/TRAINING PROGRAM

## 2023-10-07 PROCEDURE — 2500000001 HC RX 250 WO HCPCS SELF ADMINISTERED DRUGS (ALT 637 FOR MEDICARE OP): Performed by: INTERNAL MEDICINE

## 2023-10-07 PROCEDURE — 2500000001 HC RX 250 WO HCPCS SELF ADMINISTERED DRUGS (ALT 637 FOR MEDICARE OP): Performed by: STUDENT IN AN ORGANIZED HEALTH CARE EDUCATION/TRAINING PROGRAM

## 2023-10-07 PROCEDURE — 2500000002 HC RX 250 W HCPCS SELF ADMINISTERED DRUGS (ALT 637 FOR MEDICARE OP, ALT 636 FOR OP/ED): Performed by: INTERNAL MEDICINE

## 2023-10-07 PROCEDURE — 85025 COMPLETE CBC W/AUTO DIFF WBC: CPT | Performed by: PHYSICIAN ASSISTANT

## 2023-10-07 RX ADMIN — Medication: at 19:00

## 2023-10-07 RX ADMIN — METOPROLOL SUCCINATE 25 MG: 25 TABLET, EXTENDED RELEASE ORAL at 08:54

## 2023-10-07 RX ADMIN — GUAIFENESIN 1200 MG: 600 TABLET ORAL at 08:54

## 2023-10-07 RX ADMIN — PIPERACILLIN SODIUM AND TAZOBACTAM SODIUM 2.25 G: 2; .25 INJECTION, SOLUTION INTRAVENOUS at 21:30

## 2023-10-07 RX ADMIN — Medication: at 08:00

## 2023-10-07 RX ADMIN — ALPRAZOLAM 0.5 MG: 0.5 TABLET ORAL at 21:29

## 2023-10-07 RX ADMIN — CYANOCOBALAMIN TAB 1000 MCG 2000 MCG: 1000 TAB at 08:54

## 2023-10-07 RX ADMIN — HEPARIN SODIUM 5000 UNITS: 5000 INJECTION INTRAVENOUS; SUBCUTANEOUS at 22:33

## 2023-10-07 RX ADMIN — PIPERACILLIN SODIUM AND TAZOBACTAM SODIUM 2.25 G: 2; .25 INJECTION, SOLUTION INTRAVENOUS at 06:43

## 2023-10-07 RX ADMIN — ASPIRIN 81 MG: 81 TABLET, COATED ORAL at 08:54

## 2023-10-07 RX ADMIN — GUAIFENESIN 1200 MG: 600 TABLET ORAL at 21:29

## 2023-10-07 RX ADMIN — ACETAMINOPHEN 650 MG: 325 TABLET, FILM COATED ORAL at 21:29

## 2023-10-07 RX ADMIN — LEVOTHYROXINE SODIUM 25 MCG: 0.03 TABLET ORAL at 06:43

## 2023-10-07 RX ADMIN — HEPARIN SODIUM 5000 UNITS: 5000 INJECTION INTRAVENOUS; SUBCUTANEOUS at 14:20

## 2023-10-07 RX ADMIN — ATORVASTATIN CALCIUM 80 MG: 40 TABLET, FILM COATED ORAL at 21:30

## 2023-10-07 RX ADMIN — VANCOMYCIN HYDROCHLORIDE 500 MG: 500 INJECTION, SOLUTION INTRAVENOUS at 10:32

## 2023-10-07 RX ADMIN — HEPARIN SODIUM 5000 UNITS: 5000 INJECTION INTRAVENOUS; SUBCUTANEOUS at 06:43

## 2023-10-07 RX ADMIN — SPIRONOLACTONE 12.5 MG: 25 TABLET, FILM COATED ORAL at 08:54

## 2023-10-07 RX ADMIN — PIPERACILLIN SODIUM AND TAZOBACTAM SODIUM 2.25 G: 2; .25 INJECTION, SOLUTION INTRAVENOUS at 14:20

## 2023-10-07 RX ADMIN — TORSEMIDE 20 MG: 20 TABLET ORAL at 08:54

## 2023-10-07 RX ADMIN — Medication 9 MG: at 22:32

## 2023-10-07 RX ADMIN — AMIODARONE HYDROCHLORIDE 200 MG: 200 TABLET ORAL at 08:54

## 2023-10-07 ASSESSMENT — ENCOUNTER SYMPTOMS
PALPITATIONS: 0
NUMBNESS: 0
TROUBLE SWALLOWING: 0
WOUND: 1
DYSURIA: 0
FEVER: 0
FLANK PAIN: 0
FATIGUE: 0
HALLUCINATIONS: 0
BACK PAIN: 0
COUGH: 1
CHILLS: 0
SHORTNESS OF BREATH: 1
HEADACHES: 0
WHEEZING: 0
LIGHT-HEADEDNESS: 0
VOMITING: 0
SORE THROAT: 0
DIAPHORESIS: 0
EYE PAIN: 0
CONSTIPATION: 0
CHEST TIGHTNESS: 0
NAUSEA: 0
FACIAL SWELLING: 0
BRUISES/BLEEDS EASILY: 0
FREQUENCY: 0
JOINT SWELLING: 0
DIZZINESS: 0
ABDOMINAL PAIN: 0
DIARRHEA: 0
WEAKNESS: 0
APPETITE CHANGE: 0
ORTHOPNEA: 0
HEMATURIA: 0
BLOOD IN STOOL: 0

## 2023-10-07 ASSESSMENT — COGNITIVE AND FUNCTIONAL STATUS - GENERAL
TOILETING: A LITTLE
MOBILITY SCORE: 17
WALKING IN HOSPITAL ROOM: A LITTLE
MOVING TO AND FROM BED TO CHAIR: A LITTLE
TURNING FROM BACK TO SIDE WHILE IN FLAT BAD: A LITTLE
PERSONAL GROOMING: A LITTLE
EATING MEALS: A LITTLE
DRESSING REGULAR UPPER BODY CLOTHING: A LITTLE
TOILETING: A LITTLE
CLIMB 3 TO 5 STEPS WITH RAILING: A LOT
MOVING FROM LYING ON BACK TO SITTING ON SIDE OF FLAT BED WITH BEDRAILS: A LITTLE
DAILY ACTIVITIY SCORE: 18
MOVING TO AND FROM BED TO CHAIR: A LITTLE
WALKING IN HOSPITAL ROOM: A LOT
DRESSING REGULAR UPPER BODY CLOTHING: A LITTLE
HELP NEEDED FOR BATHING: A LITTLE
PERSONAL GROOMING: A LITTLE
STANDING UP FROM CHAIR USING ARMS: A LITTLE
HELP NEEDED FOR BATHING: A LITTLE
MOBILITY SCORE: 17
CLIMB 3 TO 5 STEPS WITH RAILING: A LOT
STANDING UP FROM CHAIR USING ARMS: A LITTLE
DRESSING REGULAR LOWER BODY CLOTHING: A LITTLE
DRESSING REGULAR LOWER BODY CLOTHING: A LITTLE
TURNING FROM BACK TO SIDE WHILE IN FLAT BAD: A LITTLE
DAILY ACTIVITIY SCORE: 19

## 2023-10-07 ASSESSMENT — PAIN SCALES - GENERAL: PAINLEVEL_OUTOF10: 3

## 2023-10-07 ASSESSMENT — PAIN - FUNCTIONAL ASSESSMENT: PAIN_FUNCTIONAL_ASSESSMENT: 0-10

## 2023-10-07 NOTE — PROGRESS NOTES
"Vancomycin Dosing by Pharmacy- FOLLOW UP    Adwoa Forrest is a 88 y.o. year old female who Pharmacy has been consulted for vancomycin dosing for other Pneumonia, RLE cellulitis, CNS? . Based on the patient's indication and renal status this patient is being dosed based on a goal AUC of 400-600.     Renal function is currently declining.    Current vancomycin dose: 500 mg given every 24 hours    Estimated vancomycin AUC on current dose: 512 mg/L.hr     Visit Vitals  /72 (BP Location: Left arm, Patient Position: Lying)   Pulse 66   Temp 36.8 °C (98.2 °F)   Resp 18        Lab Results   Component Value Date    CREATININE 1.89 (H) 10/07/2023    CREATININE 1.80 (H) 10/06/2023    CREATININE 1.98 (H) 10/05/2023    CREATININE 1.85 (H) 10/04/2023        Patient weight is No results found for: \"PTWEIGHT\"    No results found for: \"CULTURE\"     I/O last 3 completed shifts:  In: 240 (5 mL/kg) [P.O.:240]  Out: 175 (3.6 mL/kg) [Urine:175 (0.1 mL/kg/hr)]  Weight: 48.4 kg   [unfilled]    Lab Results   Component Value Date    PATIENTTEMP 37.0 10/05/2023    PATIENTTEMP 37.0 10/02/2023    PATIENTTEMP 37.0 12/23/2018    PATIENTTEMP 37.0 12/23/2018    PATIENTTEMP 37.0 12/23/2018        Assessment/Plan    Within goal AUC range. Continue current vancomycin regimen.    This dosing regimen is predicted by [employer] to result in the following pharmacokinetic parameters:  Loading dose: N/A  Regimen: 500 mg IV every 24 hours.  Start time: 10:06 on 10/07/2023  Exposure target: AUC24 (range)400-600 mg/L.hr   AUC24,ss: 512 mg/L.hr  Probability of AUC24 > 400: 89 %  Ctrough,ss: 17.8 mg/L  Probability of Ctrough,ss > 20: 32 %  Probability of nephrotoxicity (Lodise HUSSAIN 2009): 14 %    The next level will be obtained on 10/11 at 0500. May be obtained sooner if clinically indicated.   Will continue to monitor renal function daily while on vancomycin and order serum creatinine at least every 48 hours if not already ordered.  Follow for continued " vancomycin needs, clinical response, and signs/symptoms of toxicity.       Jj Vega, PharmD

## 2023-10-07 NOTE — PROGRESS NOTES
..       [facility] Progress Note           Subjective/   88 y.o. year old female who we are seeing in consultation for YEVGENIY on CKD.     NAEON  Feeling ok  Edema improved  Appetite ok  No issues with UOP  Reports breathing feels improved s/p thoracentesis yesterday    ROS done and negative unless mentioned as above  No change in PFSH  All data labs/interval notes and overnight issues are reviewed  Objective/     Vitals:    10/06/23 1700 10/06/23 2008 10/07/23 0039 10/07/23 0516   BP: 123/67 142/68 90/75 121/50   Patient Position: Lying      Pulse: 65 65 65 65   Resp: 18 16 16 16   Temp: 36.6 °C (97.9 °F) 36.7 °C (98 °F) 36.8 °C (98.3 °F) 37.1 °C (98.7 °F)   TempSrc: Temporal      SpO2: 93% 95% 94% 91%   Weight:       Height:            Intake/Output Summary (Last 24 hours) at 10/7/2023 0758  Last data filed at 10/7/2023 0528  Gross per 24 hour   Intake 240 ml   Output 175 ml   Net 65 ml            Current Facility-Administered Medications:     acetaminophen (Tylenol) tablet 650 mg, 650 mg, oral, q4h PRN, Jt House DO, 650 mg at 10/06/23 2231    ALPRAZolam (Xanax) tablet 0.5 mg, 0.5 mg, oral, Nightly PRN, SANDRO Lara-C, 0.5 mg at 10/06/23 2231    amiodarone (Pacerone) tablet 200 mg, 200 mg, oral, Daily, Jt House DO, 200 mg at 10/06/23 0931    aspirin EC tablet 81 mg, 81 mg, oral, Daily, SANDRO Lara-C, 81 mg at 10/06/23 0931    atorvastatin (Lipitor) tablet 80 mg, 80 mg, oral, Nightly, SANDRO Lara-ECHO, 80 mg at 10/06/23 2101    bisacodyl (Dulcolax) EC tablet 10 mg, 10 mg, oral, Daily PRN, Nallely Clarke PA-C    cyanocobalamin (Vitamin B-12) tablet 2,000 mcg, 2,000 mcg, oral, Daily, Nallely Clarke PA-C, 2,000 mcg at 10/06/23 0931    guaiFENesin (Mucinex) 12 hr tablet 1,200 mg, 1,200 mg, oral, BID, SANDRO Tracy-ECHO, 1,200 mg at 10/06/23 2100    heparin (porcine) injection 5,000 Units, 5,000 Units, subcutaneous, q8h, Nallely Clarke PA-C, 5,000 Units at  10/07/23 0643    levothyroxine (Synthroid, Levoxyl) tablet 25 mcg, 25 mcg, oral, Daily, Nallely Clarke PA-C, 25 mcg at 10/07/23 0643    [Held by provider] lisinopril tablet 5 mg, 5 mg, oral, Daily, Jt House DO    melatonin tablet 9 mg, 9 mg, oral, Nightly PRN, Jt House DO, 9 mg at 10/03/23 0223    metoprolol succinate XL (Toprol-XL) 24 hr tablet 25 mg, 25 mg, oral, Daily, Arpita Camp PA-C, 25 mg at 10/06/23 0900    oxygen (O2) therapy, , inhalation, Continuous - 02/gases, Frederick Lowry MD, Start at 10/07/23 0800    piperacillin-tazobactam-dextrose (Zosyn) IV 2.25 g, 2.25 g, intravenous, q8h, Arpita Camp PA-C, Last Rate: 100 mL/hr at 10/07/23 0643, 2.25 g at 10/07/23 0643    polyethylene glycol (Glycolax, Miralax) packet 17 g, 17 g, oral, Daily, Nallely Clarke PA-C, 17 g at 10/05/23 0903    spironolactone (Aldactone) tablet 12.5 mg, 12.5 mg, oral, q24h MACRINA, Michael Latham MD, 12.5 mg at 10/06/23 0931    torsemide (Demadex) tablet 20 mg, 20 mg, oral, Daily, Arpita Camp PA-C, 20 mg at 10/06/23 0931    vancomycin in dextrose 5 % (Vancocin) IVPB 500 mg, 500 mg, intravenous, q24h, Arpita Camp PA-C, Stopped at 10/06/23 1002    Physical Exam  Constitutional:       General: She is not in acute distress.     Appearance: Normal appearance. She is not ill-appearing or toxic-appearing.   HENT:      Head: Normocephalic and atraumatic.   Eyes:      General: No scleral icterus.     Conjunctiva/sclera: Conjunctivae normal.   Neck:      Vascular: No carotid bruit.   Cardiovascular:      Rate and Rhythm: Normal rate and regular rhythm.      Pulses: Normal pulses.      Heart sounds: Normal heart sounds.   Pulmonary:      Effort: Pulmonary effort is normal.      Breath sounds: Rhonchi present.      Comments: Breathing comfortably on 5 L O2  Abdominal:      General: Bowel sounds are normal. There is no distension.      Palpations: Abdomen is soft.      Tenderness: There is no abdominal  tenderness.   Musculoskeletal:      Cervical back: Neck supple.      Right lower leg: No edema.      Left lower leg: No edema.   Skin:     General: Skin is warm and dry.   Neurological:      General: No focal deficit present.      Mental Status: She is alert and oriented to person, place, and time.   Psychiatric:         Mood and Affect: Mood normal.         Behavior: Behavior normal.         Judgment: Judgment normal.          Lab Results   Component Value Date    WBC 7.2 10/06/2023    HGB 12.6 10/06/2023    HCT 42.4 10/06/2023    MCV 89 10/06/2023     10/06/2023     Lab Results   Component Value Date    GLUCOSE 144 (H) 10/06/2023    CALCIUM 9.3 10/06/2023     10/06/2023    K 4.9 10/06/2023    CO2 34 (H) 10/06/2023     10/06/2023    BUN 49 (H) 10/06/2023    CREATININE 1.80 (H) 10/06/2023         Assessment/Plan   YEVGENIY/ATN likley 2/2 CRS  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  Acute on chronic diastolic HFpEF 70-75%  Acute hypoxic respiratory failure (no oxygen at baseline) 2/2 PNA  Pulmonary hypertension  Emphysema  B/L pleural effusions (small)  RLE cellulitis and infected foot wound  Persistent Afib  ASCVD        Plan:  -Scr stable around bl~1.6-1.8, non oliguric, BP soft  -C/w oral Torsemide 20 mg daily and Aldactone 12.5 mg and continue on discharge  -Hypoxia likely related to PNA  -Ok for prn IV diuresis for signs/symptoms of volume overload  -CHF appears well compensated on exam  -Wean oxygen as able, defer to primary  -Management of Afib defer to cardiology  -IV Ancef for the cellulitis and defer to ID, recommend monitor vanc levels   -All other care defer to primary     We will follow closely with you.  Please do not hesitate to call with any questions or concerns.    HAYLIE Munoz, PAFloresC  [facility]  Office [phone]

## 2023-10-07 NOTE — PROGRESS NOTES
[facility] is able to accept patient, Requested auth be submitted.

## 2023-10-07 NOTE — PROGRESS NOTES
Adwoa Forrest is a 88 y.o. female on day 5 of admission presenting with Acute on chronic congestive heart failure, unspecified heart failure type (CMS/HCC).      Subjective   Adwao Forrest is a 88 y.o.  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 BNP 1800 on admission. Patient had acute worsening of respiratory status on 10/5/23. CXR with retrocardiac opacity, CT scan with multifocal pneumonia vs edema. Continued on diuresis, antibiotics broadened, stable on 6L NC. Appears to have emphysema based off CT scan as well- shew ill need pulmonlogy follow up.   10/6/23: No acute events overnight. Remained 92-93% on 6L NC. Cr improving to 1.8 (1.98). No leukocytosis but does have neutrophil predominance. MRSA nares negative- will discuss discontinuation of vanco with ID. US BLE negative for DVT. Thoracentesis right side ordered    10/7/2023: Patient was seen and examined.  Now on 5 L nasal cannula oxygen to maintain saturation.  Still no leukocytosis.  Thoracentesis with cytology completed yesterday however no reports in chart.  Continue to wean oxygen as tolerated.  Continue current IV antibiotics.  IV Lasix changed to p.o. from yesterday.  Seen by PT OT who recommend extended-care facility on discharge.         Review of  Systems   Constitutional:  Negative for appetite change, chills, diaphoresis, fatigue and fever.   HENT:  Negative for congestion, ear pain, facial swelling, hearing loss, nosebleeds, sore throat, tinnitus and trouble swallowing.    Eyes:  Negative for pain.   Respiratory:  Positive for cough and shortness of breath. Negative for chest tightness and wheezing.    Cardiovascular:  Positive for leg swelling. Negative for chest pain and palpitations.   Gastrointestinal:  Negative for abdominal pain, blood in stool, constipation, diarrhea, nausea and vomiting.   Genitourinary:  Negative for dysuria, flank pain, frequency, hematuria and urgency.   Musculoskeletal:  Negative for back pain and joint swelling.   Skin:  Positive for wound. Negative for rash.   Neurological:  Negative for dizziness, syncope, weakness, light-headedness, numbness and headaches.   Hematological:  Does not bruise/bleed easily.   Psychiatric/Behavioral:  Negative for behavioral problems, hallucinations and suicidal ideas.           Objective     Last Recorded Vitals  /72 (BP Location: Left arm, Patient Position: Lying)   Pulse 66   Temp 36.8 °C (98.2 °F)   Resp 18   Wt 48.4 kg (106 lb 11.2 oz)   SpO2 92%     Image Results  XR ankle right 3+ views    Result Date: 10/2/2023  Interpreted By:  Jeny Troy, STUDY: XR ANKLE RIGHT 3+ VIEWS; ;  10/2/2023 1:14 pm   INDICATION: Signs/Symptoms:Concern for osteomyelitis.   COMPARISON: 07/17/2023 and 09/12/2023   ACCESSION NUMBER(S): [id number]   ORDERING CLINICIAN: ISAIAH DOLL   FINDINGS: AP, oblique and lateral views were obtained. 2 screws anchor fracture fragment of the calcaneal tuberosity in position. There is also a metallic pin in the calcaneal tubercle. A small amount of lucency persists at the fracture site which does not appear significantly changed. Along the cortical surface of the calcaneal tubercle just slightly above the level of the metallic pin the bone appears questionably  less dense. This is a fairly subtle finding and could be projectional. However this is in the vicinity of the overlying skin ulceration. Superficial soft tissue air is noted at the level of the ulceration with no deeply penetrating soft tissue air.       Metallic structures are present from internal fixation related to the avulsion from the calcaneal tubercle   There is a subtle lucency on the lateral view at the cortical surface of the tubercle just above the metallic pin which is questionably an area of interval bone destruction versus a projectional difference.     MACRO: None   Signed by: Jeny Troy 10/2/2023 1:23 PM Dictation workstation:   [id number]    XR chest 1 view    Result Date: 10/2/2023  Interpreted By:  Jeny Troy, STUDY: XR CHEST 1 VIEW; ;  10/2/2023 1:13 pm   INDICATION: . Shortness of breath   COMPARISON: 02/15/2023   ACCESSION NUMBER(S): [id number]   ORDERING CLINICIAN: ISAIAH DOLL   TECHNIQUE: Portable AP upright   FINDINGS: ICD is present in the left upper chest wall with electrode wires in the right atrium and right ventricle. Cardiac silhouette is enlarged but appears similar to the prior study. Aorta is atherosclerotic. Interstitial markings are coarse but appears similar to the previous exam. Left costophrenic angle is indistinct which may indicate minimal left pleural fluid. Bones are osteopenic with no definite acute interval change.       Unchanged cardiomegaly   Small left pleural effusion   Coarse interstitial markings are unchanged and probably related to chronic lung disease.     Signed by: Jeny Troy 10/2/2023 1:18 PM Dictation workstation:   [id number]    XR foot    Result Date: 9/13/2023  Interpreted By:  GLADYS SHELDON MD MRN: [id number] Patient Name: CHERRY LEE  STUDY: FOOT; COMPLETE, MIN 3 VIEWS;  9/12/2023 1:03 pm  INDICATION: right foot pain  S92.031D: Closed displaced avulsion fracture of tuberosity of right calcaneus with routine healing,  subsequent.  COMPARISON: 08/22/2023  ACCESSION NUMBER(S): [id number]  ORDERING CLINICIAN: JOANNE LANDA  FINDINGS: Right foot, three views  Postsurgical changes in the calcaneus status post fixation of an avulsion fracture with screws. The hardware is intact. Soft tissue edema posteriorly. There is no acute fracture. There is no dislocation      ORIF of calcaneal fracture with unchanged alignment and intact hardware. Associated soft tissue edema posteriorly       Lab Results  Results for orders placed or performed during the hospital encounter of 10/02/23 (from the past 24 hour(s))   Protein, Total Fluid   Result Value Ref Range    Protein, Total Fluid 1.0 Not established g/dL   Lactate Dehydrogenase, Fluid   Result Value Ref Range    LD, Fluid 80 Not established. U/L   Urinalysis with Reflex Microscopic   Result Value Ref Range    Color, Urine Yellow Straw, Yellow    Appearance, Urine Clear Clear    Specific Gravity, Urine 1.015 1.005 - 1.035    pH, Urine 5.0 5.0, 5.5, 6.0, 6.5, 7.0, 7.5, 8.0    Protein, Urine NEGATIVE NEGATIVE mg/dL    Glucose, Urine 50 (1+) (A) NEGATIVE mg/dL    Blood, Urine NEGATIVE NEGATIVE    Ketones, Urine NEGATIVE NEGATIVE mg/dL    Bilirubin, Urine NEGATIVE NEGATIVE    Urobilinogen, Urine <2.0 <2.0 mg/dL    Nitrite, Urine NEGATIVE NEGATIVE    Leukocyte Esterase, Urine NEGATIVE NEGATIVE   Basic Metabolic Panel   Result Value Ref Range    Glucose 102 (H) 74 - 99 mg/dL    Sodium 143 136 - 145 mmol/L    Potassium 4.8 3.5 - 5.3 mmol/L    Chloride 103 98 - 107 mmol/L    Bicarbonate 36 (H) 21 - 32 mmol/L    Anion Gap 9 (L) 10 - 20 mmol/L    Urea Nitrogen 57 (H) 6 - 23 mg/dL    Creatinine 1.89 (H) 0.50 - 1.05 mg/dL    eGFR 25 (L) >60 mL/min/1.73m*2    Calcium 9.2 8.6 - 10.3 mg/dL   Magnesium   Result Value Ref Range    Magnesium 2.34 1.60 - 2.40 mg/dL   CBC and Auto Differential   Result Value Ref Range    WBC 9.9 4.4 - 11.3 x10*3/uL    nRBC 0.0 0.0 - 0.0 /100 WBCs    RBC 4.43 4.00 - 5.20  x10*6/uL    Hemoglobin 11.7 (L) 12.0 - 16.0 g/dL    Hematocrit 39.8 36.0 - 46.0 %    MCV 90 80 - 100 fL    MCH 26.4 26.0 - 34.0 pg    MCHC 29.4 (L) 32.0 - 36.0 g/dL    RDW 17.7 (H) 11.5 - 14.5 %    Platelets 353 150 - 450 x10*3/uL    MPV 9.0 7.5 - 11.5 fL    Neutrophils % 86.2 40.0 - 80.0 %    Immature Granulocytes %, Automated 0.6 0.0 - 0.9 %    Lymphocytes % 6.4 13.0 - 44.0 %    Monocytes % 6.5 2.0 - 10.0 %    Eosinophils % 0.2 0.0 - 6.0 %    Basophils % 0.1 0.0 - 2.0 %    Neutrophils Absolute 8.56 (H) 1.60 - 5.50 x10*3/uL    Immature Granulocytes Absolute, Automated 0.06 0.00 - 0.50 x10*3/uL    Lymphocytes Absolute 0.64 (L) 0.80 - 3.00 x10*3/uL    Monocytes Absolute 0.65 0.05 - 0.80 x10*3/uL    Eosinophils Absolute 0.02 0.00 - 0.40 x10*3/uL    Basophils Absolute 0.01 0.00 - 0.10 x10*3/uL   Vancomycin   Result Value Ref Range    Vancomycin 11.6 5.0 - 20.0 ug/mL        Medications  Scheduled medications:  amiodarone, 200 mg, oral, Daily  aspirin, 81 mg, oral, Daily  atorvastatin, 80 mg, oral, Nightly  cyanocobalamin, 2,000 mcg, oral, Daily  guaiFENesin, 1,200 mg, oral, BID  heparin (porcine), 5,000 Units, subcutaneous, q8h  levothyroxine, 25 mcg, oral, Daily  [Held by provider] lisinopril, 5 mg, oral, Daily  metoprolol succinate XL, 25 mg, oral, Daily  oxygen, , inhalation, Continuous - 02/gases  piperacillin-tazobactam, 2.25 g, intravenous, q8h  polyethylene glycol, 17 g, oral, Daily  spironolactone, 12.5 mg, oral, q24h MACRINA  torsemide, 20 mg, oral, Daily  vancomycin, 500 mg, intravenous, q24h      Continuous medications:     PRN medications:  PRN medications: acetaminophen, ALPRAZolam, bisacodyl, melatonin     Physical Exam  Constitutional:       General: She is not in acute distress.     Appearance: Normal appearance.   HENT:      Head: Normocephalic and atraumatic.      Right Ear: External ear normal.      Left Ear: External ear normal.      Nose: Nose normal.      Mouth/Throat:      Mouth: Mucous membranes are  moist.      Pharynx: Oropharynx is clear.   Eyes:      Extraocular Movements: Extraocular movements intact.      Conjunctiva/sclera: Conjunctivae normal.      Pupils: Pupils are equal, round, and reactive to light.   Cardiovascular:      Rate and Rhythm: Normal rate and regular rhythm.      Pulses: Normal pulses.      Heart sounds: Normal heart sounds.   Pulmonary:      Effort: Pulmonary effort is normal. No respiratory distress.      Breath sounds: Rales present. No wheezing or rhonchi.      Comments: NC in place  Abdominal:      General: Abdomen is flat. Bowel sounds are normal.      Palpations: Abdomen is soft.      Tenderness: There is no abdominal tenderness. There is no right CVA tenderness, left CVA tenderness, guarding or rebound.   Musculoskeletal:         General: Normal range of motion.      Cervical back: Normal range of motion and neck supple.      Right lower leg: Edema present.      Left lower leg: Edema present.   Skin:     General: Skin is warm and dry.      Capillary Refill: Capillary refill takes less than 2 seconds.      Findings: Lesion (Heel) present. No rash.   Neurological:      General: No focal deficit present.      Mental Status: She is alert and oriented to person, place, and time. Mental status is at baseline.   Psychiatric:         Mood and Affect: Mood normal.         Behavior: Behavior normal.                  Code Status  Full Code     Assessment/Plan   This patient currently has cardiac telemetry ordered; if you would like to modify or discontinue the telemetry order, click here to go to the orders activity to modify/discontinue the order.      Acute on chronic congestive heart failure, unspecified heart failure type (CMS/HCC)  Cardiology Consult appreciated   BNP = 1279  Imaging and examination consistent with volume overload  Continued on IV Lasix 40mg IV BID  Strict I's & O's/Daily Weights   Echocardiogram pending   Echo 02/16/23 with EF of 50% and severely increased LV septal  thickness compared to Echo 09/22/2022 with EF of 60% and mild AV regurgitation.  Monitor renal function closely  Spironolactone added  Consider resumption of sglt2i    Non-healing wound of right lower extremity  s/p ORIF 07/19/23 by Dr. Brunner with recommendations of continued non-weight bearing per orthopedics/wound care due to  a persistent wound as noted to be by the patient  Wound Care Consult   ESR = 41,CRP = 6.22  Consult to podiatry- wound healing well without infectious properties  Had graft scheduled for 10/6/23  ID consult given elevated inflammatory markers  Broad spectrum abx as stated elsewhere  W. Cx with mixed bacteria    Hypertension  Continued on home medications    Hyperlipidemia  Continued on home medications    Hypothyroidism  Continued on home medications    Paroxysmal atrial fibrillation (CMS/[facility])  History of atrial fibrillation/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 and left appendage ligation  Status post dual-chamber PPM  History of recurrent diverticular bleeds preventing further anticoagulation given about 4 years ago  Continued on home medications   ontinued outpatient followup with Cardiology & Electrophysiology  In the EMR she was noted to be allergic to Amiodarone, but this was also noted as still being taken on her medication list on Cardiology outpatient record on 09/25/23 and patient states she still takes Amiodarone- We will hold the amiodarone for now and discuss further with pharmacy to see if she is still filling this or not  Telemetry     CKD (chronic kidney disease) stage 3, GFR 30-59 ml/min (CMS/[facility])  Baseline creatinine of around 1.8 prior to 02/2023 and did have brief elevations throughout 2023 for dehydration and recent fractures but now seems to be around 1.6  Continue to monitor very closely in setting of diuresis   Nephrology consultation  Bladder scan for PVR x1    Elevated troponin I level  30 --> 29   Likely in setting of volume overload and  CKD   Nonischemic EKG noted on presentation  Telemetry Monitoring    Acute hypoxic respiratory failure (CMS/HCC)  Continue diuresis  Home O2 eval prior to discharge  Worsening hypoxia morning of 10/5/23- CXR with retrocardiac opacity  Will obtain CT chest as well as VBG  Titrate oxygen  Broaden to vanco/zosyn  Check MRSA nares  IV decadron short course    Pneumonia  Noted multifocal pneumonia on CT chest  Stopped ancef and broadened to zosyn/vancomycin  Check procalcitonin  Blood cultures if fevers  ID on consult  Check MRSA nares     Pleural effusion  Has needed thoras in the past  Will order thoracentesis with protein/LDH, cytology, culture  Patient agreeable    Abnormal CT of the chest  Appears patient has emphysema based off imaging  Never formally diagnosed  Former smoker >30 years ago, +second hand smoke exposure  Patient will need PFTs once improved form respiratory standpoint  Will need follow up with pulmonology upon discharge  I discussed this at great length with patient and her son Barron at bedside, they are both in agreement      DVT ppx: subcutaneous Heparin     Nita Coles MD

## 2023-10-07 NOTE — PROGRESS NOTES
PROGRESS NOTE    HPI  Adwoa Forrest is a 88 y.o. female presenting with shortness of breath and ankle swelling.  She has history of persistent atrial fibrillation status post AV derek ablation and permanent pacemaker placement, failed Watchman device placement in 2018 requiring urgent surgery and left atrial appendage ligation, hypertension recurrent GI bleed not on oral anticoagulants, on amiodarone for nonsustained VT and symptomatic PVCs.  She also has chronic diastolic heart failure and follows regularly in the CHF clinic.  She used to be on 80 mg Lasix at 1 time which was reduced by the heart failure nurse practitioner to 60 mg due to worsening renal function.  Recently in July 2023 she presented with a ankle injury and at that time this was further reduced to 40 mg a day.  She states this ankle injury has been bothering her quite a bit and she has been having difficulty getting around.  This wound recently got infected and she required to take Augmentin follow almost 30 days that she just completed a week ago.  For last 1 week or so she is noticing worsening shortness of breath, legs are extremely swollen and heavy and she is unable to move as a result as it feels like lead.  She was given IV Lasix in the CHF clinic and was given 60 of Lasix to take at home but did not improve symptoms and therefore she came into the ER for further evaluation and treatment.  Denies any use of nonsteroidal inflammatories or other symptoms otherwise.  Interestingly, since starting on amiodarone she has converted to sinus rhythm.     At one time she was having deranged LFTs that improved after discontinuation of the fish oil she was taking.  Her EKG shows atrial paced ventricular paced rhythm.     Review of systems all other system reviewed and is negative.  Echocardiogram pending.  Chest x-ray personally reviewed.  She stated ankle swelling has improved since she had IV Lasix but they are having trouble titrating off her oxygen.  "    Subjective Data:  No new issues overnight.  No CP/pressure/palpitations. Has mild dyspnea, still on O2.  Creatinine up to 1.85 today, K 4.1.  Nephro has been consulted. Monitor: AV paced     10-5-23: Having issues with worsening dyspnea and decreased O2 sats today. O2 has been increased and still dyspneic.  No CP/pressure/palpitations.  Just \"doesn't feel well,\"  K 4.0, creatinine 1.98. I d/w IMS: pt going for CXR and LE duplex today.    10-6-23: Breathing is somewhat better today.  No CP/pressure.  Creatinine 1.8, K 4.9.   10-7-23: Patient resting comfortably.  She voices no new cardiac complaints or concerns at this time.    Overnight Events:    none     Objective Data:  Last Recorded Vitals:  Vitals:    10/06/23 2008 10/07/23 0039 10/07/23 0516 10/07/23 0907   BP: 142/68 90/75 121/50 135/72   BP Location:    Left arm   Patient Position:    Lying   Pulse: 65 65 65 66   Resp: 16 16 16 18   Temp: 36.7 °C (98 °F) 36.8 °C (98.3 °F) 37.1 °C (98.7 °F) 36.8 °C (98.2 °F)   TempSrc:       SpO2: 95% 94% 91% 92%   Weight:       Height:           Last Labs:  CBC - 10/7/2023:  9:07 AM  9.9 11.7 353    39.8      CMP - 10/7/2023:  9:07 AM  9.2 6.3 30 --- 0.7   4.1 3.6 23 121      PTT - 7/18/2023:  3:17 AM  1.0   11.4 32     Last I/O:  I/O last 3 completed shifts:  In: 240 (5 mL/kg) [P.O.:240]  Out: 175 (3.6 mL/kg) [Urine:175 (0.1 mL/kg/hr)]  Weight: 48.4 kg     Past Cardiology Tests (Last 3 Years):  Echo:  Transthoracic echo (TTE) complete 10/3/2023--CONCLUSIONS:   1. Left ventricular systolic function is normal with a 70-75% estimated ejection fraction.   2. There is moderate left ventricular hypertrophy.   3. There is mildly reduced right ventricular systolic function.   4. The right atrium is moderately dilated.   5. Moderately elevated right ventricular systolic pressure.   6. Mild to moderate aortic valve regurgitation.    Cath:  No results found for this or any previous visit from the past 1095 days.    Stress " Test:  Nuclear Stress Test 2/12/2023      Inpatient Medications:  Scheduled medications   Medication Dose Route Frequency    amiodarone  200 mg oral Daily    aspirin  81 mg oral Daily    atorvastatin  80 mg oral Nightly    cyanocobalamin  2,000 mcg oral Daily    guaiFENesin  1,200 mg oral BID    heparin (porcine)  5,000 Units subcutaneous q8h    levothyroxine  25 mcg oral Daily    [Held by provider] lisinopril  5 mg oral Daily    metoprolol succinate XL  25 mg oral Daily    oxygen   inhalation Continuous - 02/gases    piperacillin-tazobactam  2.25 g intravenous q8h    polyethylene glycol  17 g oral Daily    spironolactone  12.5 mg oral q24h MACRINA    torsemide  20 mg oral Daily    vancomycin  500 mg intravenous q24h       Review of Systems   Constitutional: Negative for fever and malaise/fatigue.   Cardiovascular:  Negative for chest pain, leg swelling, orthopnea and palpitations.   Respiratory:  Positive for shortness of breath (Overall breathing is improving). Negative for wheezing.    Skin:  Negative for itching and rash.   Gastrointestinal:  Negative for abdominal pain, diarrhea, nausea and vomiting.   Genitourinary:  Negative for dysuria.   Neurological:  Negative for weakness.        Physical Exam  Constitutional:       General: She is not in acute distress.     Appearance: Normal appearance.   HENT:      Mouth/Throat:      Mouth: Mucous membranes are moist.   Cardiovascular:      Rate and Rhythm: Normal rate and regular rhythm.      Comments: Tele is A-V pace with no WCT/PVC's  Pulmonary:      Effort: Pulmonary effort is normal.      Breath sounds: Rales (L base s/p thoracentesis) present.   Abdominal:      Palpations: Abdomen is soft.      Tenderness: There is no abdominal tenderness.   Musculoskeletal:      Right lower leg: No edema.      Left lower leg: No edema.   Skin:     General: Skin is warm and dry.   Neurological:      Mental Status: She is alert and oriented to person, place, and time.   Psychiatric:          Behavior: Behavior is cooperative.            ASSESSMENT/PLAN  1-acute on chronic diastolic heart failure-continue IV Lasix for another 24 hours.  After that switch to 60 mg p.o. Lasix with close follow-up in CHF clinic.  Monitor electrolytes and strict I's and O's.  Add Aldactone continue SGLT2 inhibitors.  Possibly exacerbated by recent events including infection.  Will need close monitoring of electrolytes and potassium specifically.  Discussed with patient.     Agree with checking echo follow-up on results.     10-4-23: Echo as noted above. Lungs sound congested, no edema.  On IV Lasix, Metoprolol succinate and spironolactone.  Nephro has been consulted.  Recommend sodium restricted diet.       10-5-23: Lungs with diminished R base and mod crackles L fields. Having a mild climb in creatinine (1.98) CXR and LE duplex pending. Will defer diuretics to nephro: recommendations in chart.      10-6-23: Remains with dyspnea but improved some from yesterday. Currently on spironolactone.  Lasix was stopped d/t climbing creatinine. Will add oral Lasix.  Check BNP today  10-7-23: Patient does not feel overall some improvement in her breathing.  She had thoracentesis of the left side yesterday however the formal report is not in the computer system.  For now we will continue current medical regimen.  She remains on oxygen.     2-persistent atrial fibrillation-she seems to be maintaining sinus rhythm since being on amiodarone.  She is not anticoagulated but had a left atrial appendage ligation.  She has history of GI bleed.  10-4-23: On Amiodarone and BB.  HX of AV node ablation and PPM. She is no longer on oral anticoagulation, hx of STEFANIE ligation.  On ASA only. AV paced on tele.      10-5-23: AV paced on tele. Pt is no longer on anticoagulation.     10-6-23: Remains AV paced.   10-7-23: Remains AV paced.  Continue current meds including amiodarone.      3-nonsustained VT and PVCs highly symptomatic and remains on  amiodarone with resolution of symptoms.  Outpatient monitoring.     10-5-23:  continue on Amiodarone     10-6-223: On Amiodarone.  No VT this admission.   10-7-23: Telemetry review reveals no wide-complex tachycardia.    Recommendations: Continue conservative medical management with current meds.  Continue torsemide and spironolactone.  We will recheck labs in AM.       Wilder Hurd PA-C  10/7/2023  1:10 PM

## 2023-10-08 PROBLEM — F41.9 ANXIETY DISORDER, UNSPECIFIED: Status: ACTIVE | Noted: 2023-07-21

## 2023-10-08 PROBLEM — L97.313: Status: ACTIVE | Noted: 2023-10-08

## 2023-10-08 PROBLEM — I50.32 HYPERTENSIVE HEART AND KIDNEY DISEASE WITH CHRONIC DIASTOLIC CONGESTIVE HEART FAILURE AND STAGE 3B CHRONIC KIDNEY DISEASE (MULTI): Status: ACTIVE | Noted: 2023-10-08

## 2023-10-08 PROBLEM — M62.81 MUSCLE WEAKNESS (GENERALIZED): Status: ACTIVE | Noted: 2018-12-31

## 2023-10-08 PROBLEM — N18.32 HYPERTENSIVE HEART AND KIDNEY DISEASE WITH CHRONIC DIASTOLIC CONGESTIVE HEART FAILURE AND STAGE 3B CHRONIC KIDNEY DISEASE (MULTI): Status: ACTIVE | Noted: 2023-10-08

## 2023-10-08 PROBLEM — S92.009A CALCANEAL FRACTURE: Status: ACTIVE | Noted: 2023-10-08

## 2023-10-08 PROBLEM — B35.1 DERMATOPHYTOSIS OF NAIL: Status: ACTIVE | Noted: 2023-06-16

## 2023-10-08 PROBLEM — R13.12 DYSPHAGIA, OROPHARYNGEAL PHASE: Status: ACTIVE | Noted: 2023-07-22

## 2023-10-08 PROBLEM — I70.203 UNSPECIFIED ATHEROSCLEROSIS OF NATIVE ARTERIES OF EXTREMITIES, BILATERAL LEGS (CMS-HCC): Status: ACTIVE | Noted: 2023-06-16

## 2023-10-08 PROBLEM — Z87.898 HISTORY OF PRECORDIAL CHEST PAIN: Status: ACTIVE | Noted: 2023-10-08

## 2023-10-08 PROBLEM — I25.10 ATHEROSCLEROTIC HEART DISEASE OF NATIVE CORONARY ARTERY WITHOUT ANGINA PECTORIS: Status: ACTIVE | Noted: 2018-12-31

## 2023-10-08 PROBLEM — Z79.899 LONG TERM CURRENT USE OF ANTIARRHYTHMIC DRUG: Status: ACTIVE | Noted: 2023-10-08

## 2023-10-08 PROBLEM — Z86.39 HISTORY OF HYPERLIPIDEMIA: Status: ACTIVE | Noted: 2023-10-08

## 2023-10-08 PROBLEM — Z95.0 PRESENCE OF CARDIAC PACEMAKER: Status: ACTIVE | Noted: 2018-12-31

## 2023-10-08 PROBLEM — I51.9 LEFT VENTRICULAR SYSTOLIC DYSFUNCTION: Status: ACTIVE | Noted: 2023-10-08

## 2023-10-08 PROBLEM — I25.2 HISTORY OF NON-ST ELEVATION MYOCARDIAL INFARCTION (NSTEMI): Status: ACTIVE | Noted: 2023-10-08

## 2023-10-08 PROBLEM — R26.89 OTHER ABNORMALITIES OF GAIT AND MOBILITY: Status: ACTIVE | Noted: 2023-07-22

## 2023-10-08 PROBLEM — I73.00 RAYNAUD DISEASE: Status: ACTIVE | Noted: 2023-06-16

## 2023-10-08 PROBLEM — R06.02 SHORT OF BREATH ON EXERTION: Status: ACTIVE | Noted: 2023-10-08

## 2023-10-08 PROBLEM — L97.515: Status: ACTIVE | Noted: 2023-10-08

## 2023-10-08 PROBLEM — R41.81 AGE-RELATED COGNITIVE DECLINE: Status: ACTIVE | Noted: 2023-07-22

## 2023-10-08 PROBLEM — L84 CORNS AND CALLOSITIES: Status: ACTIVE | Noted: 2023-06-16

## 2023-10-08 PROBLEM — R29.6 FALLS: Status: ACTIVE | Noted: 2023-10-08

## 2023-10-08 PROBLEM — E78.2 ELEVATED TRIGLYCERIDES WITH HIGH CHOLESTEROL: Status: ACTIVE | Noted: 2023-10-08

## 2023-10-08 PROBLEM — F32.A DEPRESSION, UNSPECIFIED: Status: ACTIVE | Noted: 2023-07-21

## 2023-10-08 PROBLEM — I13.0 HYPERTENSIVE HEART AND KIDNEY DISEASE WITH CHRONIC DIASTOLIC CONGESTIVE HEART FAILURE AND STAGE 3B CHRONIC KIDNEY DISEASE (MULTI): Status: ACTIVE | Noted: 2023-10-08

## 2023-10-08 PROBLEM — K57.20 DIVERTICULITIS OF LARGE INTESTINE WITH PERFORATION AND ABSCESS WITHOUT BLEEDING: Status: ACTIVE | Noted: 2018-12-31
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
## 2023-10-08 PROBLEM — I50.30 HEART FAILURE WITH PRESERVED EJECTION FRACTION (MULTI): Status: ACTIVE | Noted: 2023-10-08

## 2023-10-08 LAB
ANION GAP SERPL CALC-SCNC: 8 MMOL/L (ref 10–20)
BASOPHILS # BLD AUTO: 0.06 X10*3/UL (ref 0–0.1)
BASOPHILS NFR BLD AUTO: 0.7 %
BUN SERPL-MCNC: 56 MG/DL (ref 6–23)
CALCIUM SERPL-MCNC: 9 MG/DL (ref 8.6–10.3)
CHLORIDE SERPL-SCNC: 104 MMOL/L (ref 98–107)
CO2 SERPL-SCNC: 37 MMOL/L (ref 21–32)
CREAT SERPL-MCNC: 1.76 MG/DL (ref 0.5–1.05)
EOSINOPHIL # BLD AUTO: 0.39 X10*3/UL (ref 0–0.4)
EOSINOPHIL NFR BLD AUTO: 4.7 %
ERYTHROCYTE [DISTWIDTH] IN BLOOD BY AUTOMATED COUNT: 17.9 % (ref 11.5–14.5)
GFR SERPL CREATININE-BSD FRML MDRD: 28 ML/MIN/1.73M*2
GLUCOSE SERPL-MCNC: 108 MG/DL (ref 74–99)
HCT VFR BLD AUTO: 39.3 % (ref 36–46)
HGB BLD-MCNC: 11.4 G/DL (ref 12–16)
IMM GRANULOCYTES # BLD AUTO: 0.05 X10*3/UL (ref 0–0.5)
IMM GRANULOCYTES NFR BLD AUTO: 0.6 % (ref 0–0.9)
LYMPHOCYTES # BLD AUTO: 0.51 X10*3/UL (ref 0.8–3)
LYMPHOCYTES NFR BLD AUTO: 6.1 %
MAGNESIUM SERPL-MCNC: 2.34 MG/DL (ref 1.6–2.4)
MCH RBC QN AUTO: 25.9 PG (ref 26–34)
MCHC RBC AUTO-ENTMCNC: 29 G/DL (ref 32–36)
MCV RBC AUTO: 89 FL (ref 80–100)
MONOCYTES # BLD AUTO: 0.63 X10*3/UL (ref 0.05–0.8)
MONOCYTES NFR BLD AUTO: 7.6 %
NEUTROPHILS # BLD AUTO: 6.69 X10*3/UL (ref 1.6–5.5)
NEUTROPHILS NFR BLD AUTO: 80.3 %
NRBC BLD-RTO: 0 /100 WBCS (ref 0–0)
PLATELET # BLD AUTO: 351 X10*3/UL (ref 150–450)
PMV BLD AUTO: 8.9 FL (ref 7.5–11.5)
POTASSIUM SERPL-SCNC: 5 MMOL/L (ref 3.5–5.3)
RBC # BLD AUTO: 4.41 X10*6/UL (ref 4–5.2)
SODIUM SERPL-SCNC: 144 MMOL/L (ref 136–145)
WBC # BLD AUTO: 8.3 X10*3/UL (ref 4.4–11.3)

## 2023-10-08 PROCEDURE — 2500000001 HC RX 250 WO HCPCS SELF ADMINISTERED DRUGS (ALT 637 FOR MEDICARE OP): Performed by: PHYSICIAN ASSISTANT

## 2023-10-08 PROCEDURE — 80048 BASIC METABOLIC PNL TOTAL CA: CPT | Performed by: INTERNAL MEDICINE

## 2023-10-08 PROCEDURE — 2500000004 HC RX 250 GENERAL PHARMACY W/ HCPCS (ALT 636 FOR OP/ED): Performed by: STUDENT IN AN ORGANIZED HEALTH CARE EDUCATION/TRAINING PROGRAM

## 2023-10-08 PROCEDURE — 2500000002 HC RX 250 W HCPCS SELF ADMINISTERED DRUGS (ALT 637 FOR MEDICARE OP, ALT 636 FOR OP/ED): Performed by: INTERNAL MEDICINE

## 2023-10-08 PROCEDURE — 85025 COMPLETE CBC W/AUTO DIFF WBC: CPT | Performed by: INTERNAL MEDICINE

## 2023-10-08 PROCEDURE — 2500000004 HC RX 250 GENERAL PHARMACY W/ HCPCS (ALT 636 FOR OP/ED): Performed by: INTERNAL MEDICINE

## 2023-10-08 PROCEDURE — 2500000004 HC RX 250 GENERAL PHARMACY W/ HCPCS (ALT 636 FOR OP/ED): Performed by: PHYSICIAN ASSISTANT

## 2023-10-08 PROCEDURE — 99233 SBSQ HOSP IP/OBS HIGH 50: CPT | Performed by: INTERNAL MEDICINE

## 2023-10-08 PROCEDURE — 2060000001 HC INTERMEDIATE ICU ROOM DAILY

## 2023-10-08 PROCEDURE — 83735 ASSAY OF MAGNESIUM: CPT | Performed by: PHYSICIAN ASSISTANT

## 2023-10-08 PROCEDURE — 36415 COLL VENOUS BLD VENIPUNCTURE: CPT | Performed by: INTERNAL MEDICINE

## 2023-10-08 PROCEDURE — 99232 SBSQ HOSP IP/OBS MODERATE 35: CPT | Performed by: PHYSICIAN ASSISTANT

## 2023-10-08 PROCEDURE — 96372 THER/PROPH/DIAG INJ SC/IM: CPT | Performed by: STUDENT IN AN ORGANIZED HEALTH CARE EDUCATION/TRAINING PROGRAM

## 2023-10-08 PROCEDURE — 2500000001 HC RX 250 WO HCPCS SELF ADMINISTERED DRUGS (ALT 637 FOR MEDICARE OP): Performed by: INTERNAL MEDICINE

## 2023-10-08 PROCEDURE — 2500000001 HC RX 250 WO HCPCS SELF ADMINISTERED DRUGS (ALT 637 FOR MEDICARE OP): Performed by: STUDENT IN AN ORGANIZED HEALTH CARE EDUCATION/TRAINING PROGRAM

## 2023-10-08 RX ORDER — FUROSEMIDE 40 MG/1
40 TABLET ORAL DAILY
COMMUNITY
End: 2023-10-11 | Stop reason: HOSPADM

## 2023-10-08 RX ORDER — AMOXICILLIN AND CLAVULANATE POTASSIUM 875; 125 MG/1; MG/1
1 TABLET, FILM COATED ORAL 2 TIMES DAILY
COMMUNITY
Start: 2023-09-13 | End: 2023-10-11 | Stop reason: HOSPADM

## 2023-10-08 RX ORDER — MELATONIN 3 MG
1 CAPSULE ORAL NIGHTLY PRN
COMMUNITY
Start: 2023-07-22 | End: 2023-11-03 | Stop reason: ENTERED-IN-ERROR

## 2023-10-08 RX ORDER — FLUCONAZOLE 150 MG/1
1 TABLET ORAL
COMMUNITY
Start: 2023-01-09 | End: 2023-11-03 | Stop reason: ENTERED-IN-ERROR

## 2023-10-08 RX ORDER — ICOSAPENT ETHYL 1 G/1
2 CAPSULE ORAL 2 TIMES DAILY
COMMUNITY
End: 2023-11-03 | Stop reason: ENTERED-IN-ERROR

## 2023-10-08 RX ORDER — OXYCODONE AND ACETAMINOPHEN 5; 325 MG/1; MG/1
1 TABLET ORAL EVERY 8 HOURS PRN
Status: ON HOLD | COMMUNITY
End: 2023-10-11 | Stop reason: SDUPTHER

## 2023-10-08 RX ORDER — CYANOCOBALAMIN 1000 UG/ML
INJECTION, SOLUTION INTRAMUSCULAR; SUBCUTANEOUS
COMMUNITY
End: 2023-11-03 | Stop reason: ENTERED-IN-ERROR

## 2023-10-08 RX ORDER — TRIAMCINOLONE ACETONIDE 1 MG/G
CREAM TOPICAL
COMMUNITY
Start: 2023-01-03 | End: 2023-11-03 | Stop reason: ENTERED-IN-ERROR

## 2023-10-08 RX ORDER — SODIUM CHLORIDE 0.9 % (FLUSH) 0.9 %
SYRINGE (ML) INJECTION
Status: DISPENSED
Start: 2023-10-08 | End: 2023-10-08

## 2023-10-08 RX ORDER — AZITHROMYCIN 500 MG/1
1 TABLET, FILM COATED ORAL DAILY
COMMUNITY
Start: 2022-10-18 | End: 2023-10-11 | Stop reason: HOSPADM

## 2023-10-08 RX ORDER — DOCUSATE SODIUM 100 MG/1
100 CAPSULE, LIQUID FILLED ORAL 2 TIMES DAILY
COMMUNITY
Start: 2023-07-22 | End: 2023-11-03 | Stop reason: ENTERED-IN-ERROR

## 2023-10-08 RX ADMIN — ASPIRIN 81 MG: 81 TABLET, COATED ORAL at 09:02

## 2023-10-08 RX ADMIN — SPIRONOLACTONE 12.5 MG: 25 TABLET, FILM COATED ORAL at 09:02

## 2023-10-08 RX ADMIN — LEVOTHYROXINE SODIUM 25 MCG: 0.03 TABLET ORAL at 05:26

## 2023-10-08 RX ADMIN — PIPERACILLIN SODIUM AND TAZOBACTAM SODIUM 2.25 G: 2; .25 INJECTION, SOLUTION INTRAVENOUS at 20:54

## 2023-10-08 RX ADMIN — METOPROLOL SUCCINATE 25 MG: 25 TABLET, EXTENDED RELEASE ORAL at 09:02

## 2023-10-08 RX ADMIN — CYANOCOBALAMIN TAB 1000 MCG 2000 MCG: 1000 TAB at 09:02

## 2023-10-08 RX ADMIN — Medication: at 08:00

## 2023-10-08 RX ADMIN — PIPERACILLIN SODIUM AND TAZOBACTAM SODIUM 2.25 G: 2; .25 INJECTION, SOLUTION INTRAVENOUS at 05:25

## 2023-10-08 RX ADMIN — ATORVASTATIN CALCIUM 80 MG: 40 TABLET, FILM COATED ORAL at 20:54

## 2023-10-08 RX ADMIN — HEPARIN SODIUM 5000 UNITS: 5000 INJECTION INTRAVENOUS; SUBCUTANEOUS at 14:18

## 2023-10-08 RX ADMIN — GUAIFENESIN 1200 MG: 600 TABLET ORAL at 09:02

## 2023-10-08 RX ADMIN — VANCOMYCIN HYDROCHLORIDE 500 MG: 500 INJECTION, SOLUTION INTRAVENOUS at 09:06

## 2023-10-08 RX ADMIN — HEPARIN SODIUM 5000 UNITS: 5000 INJECTION INTRAVENOUS; SUBCUTANEOUS at 06:25

## 2023-10-08 RX ADMIN — PIPERACILLIN SODIUM AND TAZOBACTAM SODIUM 2.25 G: 2; .25 INJECTION, SOLUTION INTRAVENOUS at 14:18

## 2023-10-08 RX ADMIN — ACETAMINOPHEN 650 MG: 325 TABLET, FILM COATED ORAL at 20:54

## 2023-10-08 RX ADMIN — Medication 9 MG: at 22:33

## 2023-10-08 RX ADMIN — AMIODARONE HYDROCHLORIDE 200 MG: 200 TABLET ORAL at 09:02

## 2023-10-08 RX ADMIN — ACETAMINOPHEN 650 MG: 325 TABLET, FILM COATED ORAL at 05:37

## 2023-10-08 RX ADMIN — GUAIFENESIN 1200 MG: 600 TABLET ORAL at 20:54

## 2023-10-08 RX ADMIN — TORSEMIDE 20 MG: 20 TABLET ORAL at 09:02

## 2023-10-08 RX ADMIN — ALPRAZOLAM 0.5 MG: 0.5 TABLET ORAL at 22:33

## 2023-10-08 RX ADMIN — HEPARIN SODIUM 5000 UNITS: 5000 INJECTION INTRAVENOUS; SUBCUTANEOUS at 22:36

## 2023-10-08 ASSESSMENT — ENCOUNTER SYMPTOMS
FACIAL SWELLING: 0
NAUSEA: 0
JOINT SWELLING: 0
DIAPHORESIS: 0
CHEST TIGHTNESS: 0
FREQUENCY: 0
FEVER: 0
EYE PAIN: 0
HEADACHES: 0
WOUND: 1
LIGHT-HEADEDNESS: 0
APPETITE CHANGE: 0
ORTHOPNEA: 0
PALPITATIONS: 0
DYSURIA: 0
VOMITING: 0
FATIGUE: 0
ABDOMINAL PAIN: 0
SORE THROAT: 0
DIARRHEA: 0
WEAKNESS: 0
NUMBNESS: 0
CHILLS: 0
FLANK PAIN: 0
SHORTNESS OF BREATH: 1
HALLUCINATIONS: 0
WHEEZING: 0
BACK PAIN: 0
CONSTIPATION: 0
SHORTNESS OF BREATH: 0
HEMATURIA: 0
TROUBLE SWALLOWING: 0
BLOOD IN STOOL: 0
BRUISES/BLEEDS EASILY: 0
DIZZINESS: 0
COUGH: 1

## 2023-10-08 ASSESSMENT — COGNITIVE AND FUNCTIONAL STATUS - GENERAL
TURNING FROM BACK TO SIDE WHILE IN FLAT BAD: A LITTLE
CLIMB 3 TO 5 STEPS WITH RAILING: TOTAL
MOVING TO AND FROM BED TO CHAIR: A LITTLE
TOILETING: A LITTLE
WALKING IN HOSPITAL ROOM: TOTAL
DRESSING REGULAR UPPER BODY CLOTHING: A LITTLE
DAILY ACTIVITIY SCORE: 18
MOBILITY SCORE: 14
STANDING UP FROM CHAIR USING ARMS: A LITTLE
PERSONAL GROOMING: A LITTLE
EATING MEALS: A LITTLE
HELP NEEDED FOR BATHING: A LITTLE
MOVING FROM LYING ON BACK TO SITTING ON SIDE OF FLAT BED WITH BEDRAILS: A LITTLE
DRESSING REGULAR LOWER BODY CLOTHING: A LITTLE

## 2023-10-08 ASSESSMENT — PAIN - FUNCTIONAL ASSESSMENT
PAIN_FUNCTIONAL_ASSESSMENT: 0-10
PAIN_FUNCTIONAL_ASSESSMENT: 0-10

## 2023-10-08 ASSESSMENT — PAIN SCALES - GENERAL
PAINLEVEL_OUTOF10: 3
PAINLEVEL_OUTOF10: 3

## 2023-10-08 ASSESSMENT — PAIN DESCRIPTION - DESCRIPTORS: DESCRIPTORS: ACHING

## 2023-10-08 NOTE — PROGRESS NOTES
PROGRESS NOTE    HPI  Adwoa Forrest is a 88 y.o. female presenting with shortness of breath and ankle swelling.  She has history of persistent atrial fibrillation status post AV derek ablation and permanent pacemaker placement, failed Watchman device placement in 2018 requiring urgent surgery and left atrial appendage ligation, hypertension recurrent GI bleed not on oral anticoagulants, on amiodarone for nonsustained VT and symptomatic PVCs.  She also has chronic diastolic heart failure and follows regularly in the CHF clinic.  She used to be on 80 mg Lasix at 1 time which was reduced by the heart failure nurse practitioner to 60 mg due to worsening renal function.  Recently in July 2023 she presented with a ankle injury and at that time this was further reduced to 40 mg a day.  She states this ankle injury has been bothering her quite a bit and she has been having difficulty getting around.  This wound recently got infected and she required to take Augmentin follow almost 30 days that she just completed a week ago.  For last 1 week or so she is noticing worsening shortness of breath, legs are extremely swollen and heavy and she is unable to move as a result as it feels like lead.  She was given IV Lasix in the CHF clinic and was given 60 of Lasix to take at home but did not improve symptoms and therefore she came into the ER for further evaluation and treatment.  Denies any use of nonsteroidal inflammatories or other symptoms otherwise.  Interestingly, since starting on amiodarone she has converted to sinus rhythm.     At one time she was having deranged LFTs that improved after discontinuation of the fish oil she was taking.  Her EKG shows atrial paced ventricular paced rhythm.     Review of systems all other system reviewed and is negative.  Echocardiogram pending.  Chest x-ray personally reviewed.  She stated ankle swelling has improved since she had IV Lasix but they are having trouble titrating off her oxygen.  "    Subjective Data:  No new issues overnight.  No CP/pressure/palpitations. Has mild dyspnea, still on O2.  Creatinine up to 1.85 today, K 4.1.  Nephro has been consulted. Monitor: AV paced     10-5-23: Having issues with worsening dyspnea and decreased O2 sats today. O2 has been increased and still dyspneic.  No CP/pressure/palpitations.  Just \"doesn't feel well,\"  K 4.0, creatinine 1.98. I d/w IMS: pt going for CXR and LE duplex today.    10-6-23: Breathing is somewhat better today.  No CP/pressure.  Creatinine 1.8, K 4.9.   10-7-23: Patient resting comfortably.  She voices no new cardiac complaints or concerns at this time.  10-8-23: Patient describes an odd sensation with her breathing and that when she inhales she feels that she is having the spasming sensation in her abdomen causing her to feel slightly short of breath.  She is able to take a full deep breath but feels that there is spasm with it.  She denies any specific chest pain and overall compared to yesterday she does feel better.    Overnight Events:    none     Objective Data:  Last Recorded Vitals:  Vitals:    10/07/23 2055 10/07/23 2107 10/07/23 2351 10/08/23 0403   BP: 148/72  123/68 112/60   BP Location: Left arm  Left arm Left arm   Patient Position: Lying  Lying Lying   Pulse: 57  65 65   Resp: 18  18 16   Temp: 36.4 °C (97.5 °F)  36.8 °C (98.3 °F) 36.1 °C (97 °F)   TempSrc: Temporal  Temporal Temporal   SpO2: 93% 91% 96% 95%   Weight:       Height:           Last Labs:  CBC - 10/8/2023:  5:26 AM  8.3 11.4 351    39.3      CMP - 10/8/2023:  5:26 AM  9.0 6.3 30 --- 0.7   4.1 3.6 23 121      PTT - 7/18/2023:  3:17 AM  1.0   11.4 32     Last I/O:  I/O last 3 completed shifts:  In: 1200 (24.8 mL/kg) [P.O.:1200]  Out: 1200 (24.8 mL/kg) [Urine:1200 (0.7 mL/kg/hr)]  Weight: 48.4 kg     Past Cardiology Tests (Last 3 Years):  Echo:  Transthoracic echo (TTE) complete 10/3/2023--CONCLUSIONS:   1. Left ventricular systolic function is normal with a 70-75% " estimated ejection fraction.   2. There is moderate left ventricular hypertrophy.   3. There is mildly reduced right ventricular systolic function.   4. The right atrium is moderately dilated.   5. Moderately elevated right ventricular systolic pressure.   6. Mild to moderate aortic valve regurgitation.    Cath:  No results found for this or any previous visit from the past 1095 days.    Stress Test:  Nuclear Stress Test 2/12/2023      Inpatient Medications:  Scheduled medications   Medication Dose Route Frequency    amiodarone  200 mg oral Daily    aspirin  81 mg oral Daily    atorvastatin  80 mg oral Nightly    cyanocobalamin  2,000 mcg oral Daily    guaiFENesin  1,200 mg oral BID    heparin (porcine)  5,000 Units subcutaneous q8h    levothyroxine  25 mcg oral Daily    [Held by provider] lisinopril  5 mg oral Daily    metoprolol succinate XL  25 mg oral Daily    oxygen   inhalation Continuous - 02/gases    piperacillin-tazobactam  2.25 g intravenous q8h    polyethylene glycol  17 g oral Daily    sodium chloride 0.9%        spironolactone  12.5 mg oral q24h MACRINA    torsemide  20 mg oral Daily    vancomycin  500 mg intravenous q24h       Review of Systems   Constitutional: Negative for fever and malaise/fatigue.   Cardiovascular:  Negative for chest pain, orthopnea and palpitations.   Respiratory:  Negative for shortness of breath and wheezing.    Skin:  Negative for itching and rash.   Gastrointestinal:  Negative for abdominal pain, diarrhea, nausea and vomiting.   Genitourinary:  Negative for dysuria.   Neurological:  Negative for weakness.        Physical Exam  Constitutional:       General: She is not in acute distress.     Appearance: Normal appearance.   HENT:      Mouth/Throat:      Mouth: Mucous membranes are moist.   Cardiovascular:      Rate and Rhythm: Normal rate and regular rhythm.      Comments: Tele is A-V pace with no WCT/PVC's  Pulmonary:      Effort: Pulmonary effort is normal.      Breath sounds:  Rales (bibasilar crackles) present.   Abdominal:      Palpations: Abdomen is soft.      Tenderness: There is no abdominal tenderness.   Musculoskeletal:      Right lower leg: No edema.      Left lower leg: No edema.   Skin:     General: Skin is warm and dry.   Neurological:      Mental Status: She is alert and oriented to person, place, and time.   Psychiatric:         Behavior: Behavior is cooperative.            ASSESSMENT/PLAN  1-acute on chronic diastolic heart failure-continue IV Lasix for another 24 hours.  After that switch to 60 mg p.o. Lasix with close follow-up in CHF clinic.  Monitor electrolytes and strict I's and O's.  Add Aldactone continue SGLT2 inhibitors.  Possibly exacerbated by recent events including infection.  Will need close monitoring of electrolytes and potassium specifically.  Discussed with patient.     Agree with checking echo follow-up on results.     10-4-23: Echo as noted above. Lungs sound congested, no edema.  On IV Lasix, Metoprolol succinate and spironolactone.  Nephro has been consulted.  Recommend sodium restricted diet.       10-5-23: Lungs with diminished R base and mod crackles L fields. Having a mild climb in creatinine (1.98) CXR and LE duplex pending. Will defer diuretics to nephro: recommendations in chart.      10-6-23: Remains with dyspnea but improved some from yesterday. Currently on spironolactone.  Lasix was stopped d/t climbing creatinine. Will add oral Lasix.  Check BNP today  10-7-23: Patient does not feel overall some improvement in her breathing.  She had thoracentesis of the left side yesterday however the formal report is not in the computer system.  For now we will continue current medical regimen.  She remains on oxygen.  10-8-23: Patient overall does feel some improvement but has this odd sensation of spasming in her abdomen with breathing.  She continues to have some bibasilar crackles but has not had hypoxia.  Creatinine is improved today at 1.76 and her  potassium and magnesium are normal.  Continue current regimen of diuretics.     2-persistent atrial fibrillation-she seems to be maintaining sinus rhythm since being on amiodarone.  She is not anticoagulated but had a left atrial appendage ligation.  She has history of GI bleed.  10-4-23: On Amiodarone and BB.  HX of AV node ablation and PPM. She is no longer on oral anticoagulation, hx of STEFANIE ligation.  On ASA only. AV paced on tele.      10-5-23: AV paced on tele. Pt is no longer on anticoagulation.     10-6-23: Remains AV paced.   10-7-23: Remains AV paced.  Continue current meds including amiodarone.   10-8-23: Continued AV pacing on telemetry.  No PVCs or wide-complex tachycardia on amiodarone.     3-nonsustained VT and PVCs highly symptomatic and remains on amiodarone with resolution of symptoms.  Outpatient monitoring.     10-5-23:  continue on Amiodarone     10-6-223: On Amiodarone.  No VT this admission.   10-7-23: Telemetry review reveals no wide-complex tachycardia.    Recommendations: Continue conservative medical management with current meds.  Continue torsemide and spironolactone.  We will recheck labs in AM.       Wilder Hurd PA-C  10/8/2023  9:07 AM

## 2023-10-08 NOTE — PROGRESS NOTES
Adwoa Forrest is a 88 y.o. female on day 6 of admission presenting with Acute on chronic congestive heart failure, unspecified heart failure type (CMS/HCC).      Subjective   Adwoa Forrest is a 88 y.o.  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 BNP 1800 on admission. Patient had acute worsening of respiratory status on 10/5/23. CXR with retrocardiac opacity, CT scan with multifocal pneumonia vs edema. Continued on diuresis, antibiotics broadened, stable on 6L NC. Appears to have emphysema based off CT scan as well- shew ill need pulmonlogy follow up.   10/6/23: No acute events overnight. Remained 92-93% on 6L NC. Cr improving to 1.8 (1.98). No leukocytosis but does have neutrophil predominance. MRSA nares negative- will discuss discontinuation of vanco with ID. US BLE negative for DVT. Thoracentesis right side ordered    10/7/2023: Patient was seen and examined.  Now on 5 L nasal cannula oxygen to maintain saturation.  Still no leukocytosis.  Thoracentesis with cytology completed yesterday however no reports in chart.  Continue to wean oxygen as tolerated.  Continue current IV antibiotics.  IV Lasix changed to p.o. from yesterday.  Seen by PT OT who recommend extended-care facility on discharge.   10/8: Patient was seen  and examined.  Oxygen has now been weaned down to 4 L nasal cannula.  Thoracentesis and fluid analysis is still pending.  Continue current IV antibiotics and change to oral on discharge.  Potential discharge to extended-care facility within the next 48 to 72 hours.     Review of Systems   Constitutional:  Negative for appetite change, chills, diaphoresis, fatigue and fever.   HENT:  Negative for congestion, ear pain, facial swelling, hearing loss, nosebleeds, sore throat, tinnitus and trouble swallowing.    Eyes:  Negative for pain.   Respiratory:  Positive for cough and shortness of breath. Negative for chest tightness and wheezing.    Cardiovascular:  Positive for leg swelling. Negative for chest pain and palpitations.   Gastrointestinal:  Negative for abdominal pain, blood in stool, constipation, diarrhea, nausea and vomiting.   Genitourinary:  Negative for dysuria, flank pain, frequency, hematuria and urgency.   Musculoskeletal:  Negative for back pain and joint swelling.   Skin:  Positive for wound. Negative for rash.   Neurological:  Negative for dizziness, syncope, weakness, light-headedness, numbness and headaches.   Hematological:  Does not bruise/bleed easily.   Psychiatric/Behavioral:  Negative for behavioral problems, hallucinations and suicidal ideas.           Objective     Last Recorded Vitals  /66 (BP Location: Left arm, Patient Position: Lying)   Pulse 63   Temp 36.3 °C (97.4 °F) (Temporal)   Resp 14   Wt 48.4 kg (106 lb 11.2 oz)   SpO2 92%     Image Results  XR ankle right 3+ views    Result Date: 10/2/2023  Interpreted By:  Jeny Troy, STUDY: XR ANKLE RIGHT 3+ VIEWS; ;  10/2/2023 1:14 pm   INDICATION: Signs/Symptoms:Concern for osteomyelitis.   COMPARISON: 07/17/2023 and 09/12/2023   ACCESSION NUMBER(S): [id number]   ORDERING CLINICIAN: ISAIAH DOLL   FINDINGS: AP, oblique and lateral views were obtained. 2 screws anchor fracture fragment of the calcaneal tuberosity in  position. There is also a metallic pin in the calcaneal tubercle. A small amount of lucency persists at the fracture site which does not appear significantly changed. Along the cortical surface of the calcaneal tubercle just slightly above the level of the metallic pin the bone appears questionably less dense. This is a fairly subtle finding and could be projectional. However this is in the vicinity of the overlying skin ulceration. Superficial soft tissue air is noted at the level of the ulceration with no deeply penetrating soft tissue air.       Metallic structures are present from internal fixation related to the avulsion from the calcaneal tubercle   There is a subtle lucency on the lateral view at the cortical surface of the tubercle just above the metallic pin which is questionably an area of interval bone destruction versus a projectional difference.     MACRO: None   Signed by: Jeny Troy 10/2/2023 1:23 PM Dictation workstation:   [id number]    XR chest 1 view    Result Date: 10/2/2023  Interpreted By:  Jeny Troy, STUDY: XR CHEST 1 VIEW; ;  10/2/2023 1:13 pm   INDICATION: . Shortness of breath   COMPARISON: 02/15/2023   ACCESSION NUMBER(S): [id number]   ORDERING CLINICIAN: ISAIAH DOLL   TECHNIQUE: Portable AP upright   FINDINGS: ICD is present in the left upper chest wall with electrode wires in the right atrium and right ventricle. Cardiac silhouette is enlarged but appears similar to the prior study. Aorta is atherosclerotic. Interstitial markings are coarse but appears similar to the previous exam. Left costophrenic angle is indistinct which may indicate minimal left pleural fluid. Bones are osteopenic with no definite acute interval change.       Unchanged cardiomegaly   Small left pleural effusion   Coarse interstitial markings are unchanged and probably related to chronic lung disease.     Signed by: Jeny Troy 10/2/2023 1:18 PM Dictation workstation:   [id number]    XR  foot    Result Date: 9/13/2023  Interpreted By:  GLADYS SHELDON MD MRN: [id number] Patient Name: CHERRY LEE  STUDY: FOOT; COMPLETE, MIN 3 VIEWS;  9/12/2023 1:03 pm  INDICATION: right foot pain  S92.031D: Closed displaced avulsion fracture of tuberosity of right calcaneus with routine healing, subsequent.  COMPARISON: 08/22/2023  ACCESSION NUMBER(S): [id number]  ORDERING CLINICIAN: JOANNE LANDA  FINDINGS: Right foot, three views  Postsurgical changes in the calcaneus status post fixation of an avulsion fracture with screws. The hardware is intact. Soft tissue edema posteriorly. There is no acute fracture. There is no dislocation      ORIF of calcaneal fracture with unchanged alignment and intact hardware. Associated soft tissue edema posteriorly       Lab Results  Results for orders placed or performed during the hospital encounter of 10/02/23 (from the past 24 hour(s))   Basic metabolic panel   Result Value Ref Range    Glucose 108 (H) 74 - 99 mg/dL    Sodium 144 136 - 145 mmol/L    Potassium 5.0 3.5 - 5.3 mmol/L    Chloride 104 98 - 107 mmol/L    Bicarbonate 37 (H) 21 - 32 mmol/L    Anion Gap 8 (L) 10 - 20 mmol/L    Urea Nitrogen 56 (H) 6 - 23 mg/dL    Creatinine 1.76 (H) 0.50 - 1.05 mg/dL    eGFR 28 (L) >60 mL/min/1.73m*2    Calcium 9.0 8.6 - 10.3 mg/dL   CBC and Auto Differential   Result Value Ref Range    WBC 8.3 4.4 - 11.3 x10*3/uL    nRBC 0.0 0.0 - 0.0 /100 WBCs    RBC 4.41 4.00 - 5.20 x10*6/uL    Hemoglobin 11.4 (L) 12.0 - 16.0 g/dL    Hematocrit 39.3 36.0 - 46.0 %    MCV 89 80 - 100 fL    MCH 25.9 (L) 26.0 - 34.0 pg    MCHC 29.0 (L) 32.0 - 36.0 g/dL    RDW 17.9 (H) 11.5 - 14.5 %    Platelets 351 150 - 450 x10*3/uL    MPV 8.9 7.5 - 11.5 fL    Neutrophils % 80.3 40.0 - 80.0 %    Immature Granulocytes %, Automated 0.6 0.0 - 0.9 %    Lymphocytes % 6.1 13.0 - 44.0 %    Monocytes % 7.6 2.0 - 10.0 %    Eosinophils % 4.7 0.0 - 6.0 %    Basophils % 0.7 0.0 - 2.0 %    Neutrophils Absolute 6.69 (H) 1.60 - 5.50  x10*3/uL    Immature Granulocytes Absolute, Automated 0.05 0.00 - 0.50 x10*3/uL    Lymphocytes Absolute 0.51 (L) 0.80 - 3.00 x10*3/uL    Monocytes Absolute 0.63 0.05 - 0.80 x10*3/uL    Eosinophils Absolute 0.39 0.00 - 0.40 x10*3/uL    Basophils Absolute 0.06 0.00 - 0.10 x10*3/uL   Magnesium   Result Value Ref Range    Magnesium 2.34 1.60 - 2.40 mg/dL        Medications  Scheduled medications:  amiodarone, 200 mg, oral, Daily  aspirin, 81 mg, oral, Daily  atorvastatin, 80 mg, oral, Nightly  cyanocobalamin, 2,000 mcg, oral, Daily  guaiFENesin, 1,200 mg, oral, BID  heparin (porcine), 5,000 Units, subcutaneous, q8h  levothyroxine, 25 mcg, oral, Daily  [Held by provider] lisinopril, 5 mg, oral, Daily  metoprolol succinate XL, 25 mg, oral, Daily  oxygen, , inhalation, Continuous - 02/gases  piperacillin-tazobactam, 2.25 g, intravenous, q8h  polyethylene glycol, 17 g, oral, Daily  sodium chloride 0.9%, , ,   spironolactone, 12.5 mg, oral, q24h MACRINA  torsemide, 20 mg, oral, Daily  vancomycin, 500 mg, intravenous, q24h      Continuous medications:     PRN medications:  PRN medications: acetaminophen, ALPRAZolam, bisacodyl, melatonin, sodium chloride 0.9%     Physical Exam  Constitutional:       General: She is not in acute distress.     Appearance: Normal appearance.   HENT:      Head: Normocephalic and atraumatic.      Right Ear: External ear normal.      Left Ear: External ear normal.      Nose: Nose normal.      Mouth/Throat:      Mouth: Mucous membranes are moist.      Pharynx: Oropharynx is clear.   Eyes:      Extraocular Movements: Extraocular movements intact.      Conjunctiva/sclera: Conjunctivae normal.      Pupils: Pupils are equal, round, and reactive to light.   Cardiovascular:      Rate and Rhythm: Normal rate and regular rhythm.      Pulses: Normal pulses.      Heart sounds: Normal heart sounds.   Pulmonary:      Effort: Pulmonary effort is normal. No respiratory distress.      Breath sounds: Rales present. No  wheezing or rhonchi.      Comments: NC in place  Abdominal:      General: Abdomen is flat. Bowel sounds are normal.      Palpations: Abdomen is soft.      Tenderness: There is no abdominal tenderness. There is no right CVA tenderness, left CVA tenderness, guarding or rebound.   Musculoskeletal:         General: Normal range of motion.      Cervical back: Normal range of motion and neck supple.      Right lower leg: Edema present.      Left lower leg: Edema present.   Skin:     General: Skin is warm and dry.      Capillary Refill: Capillary refill takes less than 2 seconds.      Findings: Lesion (Heel) present. No rash.   Neurological:      General: No focal deficit present.      Mental Status: She is alert and oriented to person, place, and time. Mental status is at baseline.   Psychiatric:         Mood and Affect: Mood normal.         Behavior: Behavior normal.                  Code Status  Full Code     Assessment/Plan   This patient currently has cardiac telemetry ordered; if you would like to modify or discontinue the telemetry order, click here to go to the orders activity to modify/discontinue the order.      Acute on chronic congestive heart failure, unspecified heart failure type (CMS/HCC)  Cardiology Consult appreciated   BNP = 1279  Imaging and examination consistent with volume overload  Continued on IV Lasix 40mg IV BID  Strict I's & O's/Daily Weights   Echocardiogram pending   Echo 02/16/23 with EF of 50% and severely increased LV septal thickness compared to Echo 09/22/2022 with EF of 60% and mild AV regurgitation.  Monitor renal function closely  Spironolactone added  Consider resumption of sglt2i    Non-healing wound of right lower extremity  s/p ORIF 07/19/23 by Dr. Brunner with recommendations of continued non-weight bearing per orthopedics/wound care due to  a persistent wound as noted to be by the patient  Wound Care Consult   ESR = 41,CRP = 6.22  Consult to podiatry- wound healing well without  infectious properties  Had graft scheduled for 10/6/23  ID consult given elevated inflammatory markers  Broad spectrum abx as stated elsewhere  W. Cx with mixed bacteria    Hypertension  Continued on home medications    Hyperlipidemia  Continued on home medications    Hypothyroidism  Continued on home medications    Paroxysmal atrial fibrillation (CMS/[facility])  History of atrial fibrillation/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 and left appendage ligation  Status post dual-chamber PPM  History of recurrent diverticular bleeds preventing further anticoagulation given about 4 years ago  Continued on home medications   ontinued outpatient followup with Cardiology & Electrophysiology  In the EMR she was noted to be allergic to Amiodarone, but this was also noted as still being taken on her medication list on Cardiology outpatient record on 09/25/23 and patient states she still takes Amiodarone- We will hold the amiodarone for now and discuss further with pharmacy to see if she is still filling this or not  Telemetry     CKD (chronic kidney disease) stage 3, GFR 30-59 ml/min (CMS/[facility])  Baseline creatinine of around 1.8 prior to 02/2023 and did have brief elevations throughout 2023 for dehydration and recent fractures but now seems to be around 1.6  Continue to monitor very closely in setting of diuresis   Nephrology consultation  Bladder scan for PVR x1    Elevated troponin I level  30 --> 29   Likely in setting of volume overload and CKD   Nonischemic EKG noted on presentation  Telemetry Monitoring    Acute hypoxic respiratory failure (CMS/[facility])  Continue diuresis  Home O2 eval prior to discharge  Worsening hypoxia morning of 10/5/23- CXR with retrocardiac opacity  Will obtain CT chest as well as VBG  Titrate oxygen  Broaden to vanco/zosyn  Check MRSA nares  IV decadron short course    Pneumonia  Noted multifocal pneumonia on CT chest  Stopped ancef and broadened to zosyn/vancomycin  Check  procalcitonin  Blood cultures if fevers  ID on consult  Check MRSA nares     Pleural effusion  Has needed thoras in the past  Will order thoracentesis with protein/LDH, cytology, culture  Patient agreeable    Abnormal CT of the chest  Appears patient has emphysema based off imaging  Never formally diagnosed  Former smoker >30 years ago, +second hand smoke exposure  Patient will need PFTs once improved form respiratory standpoint  Will need follow up with pulmonology upon discharge  I discussed this at great length with patient and her son Barron at bedside, they are both in agreement      DVT ppx: subcutaneous Heparin     Nita Coles MD

## 2023-10-08 NOTE — CARE PLAN
The patient's goals for the shift include      The clinical goals for the shift include wound healing    Over the shift, the patient did not make progress toward the following goals. Barriers to progression include . Recommendations to address these barriers include   Problem: Skin  Goal: Decreased wound size/increased tissue granulation at next dressing change  Outcome: Progressing  Goal: Participates in plan/prevention/treatment measures  Outcome: Progressing  Goal: Prevent/manage excess moisture  Outcome: Progressing  Goal: Prevent/minimize sheer/friction injuries  Outcome: Progressing  Goal: Promote/optimize nutrition  Outcome: Progressing  Goal: Promote skin healing  Outcome: Progressing     Problem: Fall/Injury  Goal: Verbalize understanding of risk factor reduction measures to prevent injury from fall in the home  Outcome: Progressing   .

## 2023-10-08 NOTE — PROGRESS NOTES
..       [facility] Progress Note           Subjective/   88 y.o. year old female who we are seeing in consultation for YEVGENIY on CKD.     NAEON  Feeling ok  Edema improved  Appetite ok  No issues with UOP  s/p thoracentesis 10/6    ROS done and negative unless mentioned as above  No change in PFSH  All data labs/interval notes and overnight issues are reviewed  Objective/     Vitals:    10/07/23 2055 10/07/23 2107 10/07/23 2351 10/08/23 0403   BP: 148/72  123/68 112/60   BP Location: Left arm  Left arm Left arm   Patient Position: Lying  Lying Lying   Pulse: 57  65 65   Resp: 18  18 16   Temp: 36.4 °C (97.5 °F)  36.8 °C (98.3 °F) 36.1 °C (97 °F)   TempSrc: Temporal  Temporal Temporal   SpO2: 93% 91% 96% 95%   Weight:       Height:            Intake/Output Summary (Last 24 hours) at 10/8/2023 0758  Last data filed at 10/8/2023 0525  Gross per 24 hour   Intake 1200 ml   Output 1025 ml   Net 175 ml            Current Facility-Administered Medications:     acetaminophen (Tylenol) tablet 650 mg, 650 mg, oral, q4h PRN, Jt House, DO, 650 mg at 10/08/23 0537    ALPRAZolam (Xanax) tablet 0.5 mg, 0.5 mg, oral, Nightly PRN, SANDRO Lara-C, 0.5 mg at 10/07/23 2129    amiodarone (Pacerone) tablet 200 mg, 200 mg, oral, Daily, Jt Franki, DO, 200 mg at 10/07/23 0854    aspirin EC tablet 81 mg, 81 mg, oral, Daily, Nallely Clarke PA-C, 81 mg at 10/07/23 0854    atorvastatin (Lipitor) tablet 80 mg, 80 mg, oral, Nightly, SANDRO Lara-C, 80 mg at 10/07/23 2130    bisacodyl (Dulcolax) EC tablet 10 mg, 10 mg, oral, Daily PRN, EMRE LaraC    cyanocobalamin (Vitamin B-12) tablet 2,000 mcg, 2,000 mcg, oral, Daily, Nallely Clarke PA-C, 2,000 mcg at 10/07/23 0854    guaiFENesin (Mucinex) 12 hr tablet 1,200 mg, 1,200 mg, oral, BID, Arpita Camp PA-C, 1,200 mg at 10/07/23 2129    heparin (porcine) injection 5,000 Units, 5,000 Units, subcutaneous, q8h, Nallely Clarke PA-C, 5,000 Units  at 10/08/23 0625    levothyroxine (Synthroid, Levoxyl) tablet 25 mcg, 25 mcg, oral, Daily, Nallely Clarke PA-C, 25 mcg at 10/08/23 0526    [Held by provider] lisinopril tablet 5 mg, 5 mg, oral, Daily, Jt House DO    melatonin tablet 9 mg, 9 mg, oral, Nightly PRN, Jt House DO, 9 mg at 10/07/23 2232    metoprolol succinate XL (Toprol-XL) 24 hr tablet 25 mg, 25 mg, oral, Daily, Arpita Camp PA-C, 25 mg at 10/07/23 0854    oxygen (O2) therapy, , inhalation, Continuous - 02/gases, Frederick Lowry MD, 4 L/min at 10/07/23 2351    piperacillin-tazobactam-dextrose (Zosyn) IV 2.25 g, 2.25 g, intravenous, q8h, Arpita Camp PA-C, Stopped at 10/08/23 0555    polyethylene glycol (Glycolax, Miralax) packet 17 g, 17 g, oral, Daily, Nallely Clarke PA-C, 17 g at 10/05/23 0903    sodium chloride 0.9% flush  - Omnicell Override Pull, , , ,     spironolactone (Aldactone) tablet 12.5 mg, 12.5 mg, oral, q24h MACRINA, Michael Latham MD, 12.5 mg at 10/07/23 0854    torsemide (Demadex) tablet 20 mg, 20 mg, oral, Daily, Arpita Camp PA-C, 20 mg at 10/07/23 0854    vancomycin in dextrose 5 % (Vancocin) IVPB 500 mg, 500 mg, intravenous, q24h, Arpita Camp PA-C, Stopped at 10/07/23 1102    Physical Exam  Constitutional:       General: She is not in acute distress.     Appearance: Normal appearance. She is not ill-appearing or toxic-appearing.   HENT:      Head: Normocephalic and atraumatic.   Eyes:      General: No scleral icterus.     Conjunctiva/sclera: Conjunctivae normal.   Neck:      Vascular: No carotid bruit.   Cardiovascular:      Rate and Rhythm: Normal rate and regular rhythm.      Pulses: Normal pulses.      Heart sounds: Normal heart sounds.   Pulmonary:      Effort: Pulmonary effort is normal.      Breath sounds: Rhonchi present.      Comments: Breathing comfortably on 5 L O2  Abdominal:      General: Bowel sounds are normal. There is no distension.      Palpations: Abdomen is soft.       Tenderness: There is no abdominal tenderness.   Musculoskeletal:      Cervical back: Neck supple.      Right lower leg: No edema.      Left lower leg: No edema.   Skin:     General: Skin is warm and dry.   Neurological:      General: No focal deficit present.      Mental Status: She is alert and oriented to person, place, and time.   Psychiatric:         Mood and Affect: Mood normal.         Behavior: Behavior normal.         Judgment: Judgment normal.          Lab Results   Component Value Date    WBC 8.3 10/08/2023    HGB 11.4 (L) 10/08/2023    HCT 39.3 10/08/2023    MCV 89 10/08/2023     10/08/2023     Lab Results   Component Value Date    GLUCOSE 108 (H) 10/08/2023    CALCIUM 9.0 10/08/2023     10/08/2023    K 5.0 10/08/2023    CO2 37 (H) 10/08/2023     10/08/2023    BUN 56 (H) 10/08/2023    CREATININE 1.76 (H) 10/08/2023         Assessment/Plan   YEVGENIY/ATN likley 2/2 CRS  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  Acute on chronic diastolic HFpEF 70-75%  Acute hypoxic respiratory failure (no oxygen at baseline) 2/2 PNA  Pulmonary hypertension  Emphysema  B/L pleural effusions (small)  RLE cellulitis and infected foot wound  Persistent Afib  ASCVD        Plan:  -Scr stable around bl~1.6-1.8, non oliguric, BP stable  -C/w oral Torsemide 20 mg daily and Aldactone 12.5 mg and continue on discharge  -Hypoxia likely related to PNA  -Ok for prn IV diuresis for signs/symptoms of volume overload  -CHF appears well compensated on exam  -K noted to be borderline, start on low K diet monitor for now  -Wean oxygen as able, defer to primary  -Management of Afib defer to cardiology  -IV Ancef for the cellulitis and defer to ID, recommend monitor vanc levels   -All other care defer to primary     We will follow closely with you.  Please do not hesitate to call with any questions or concerns.    HAYLIE Munoz, PAFloresC  [facility]  Office [phone]

## 2023-10-09 ENCOUNTER — APPOINTMENT (OUTPATIENT)
Dept: CARDIOLOGY | Facility: HOSPITAL | Age: 88
DRG: 291 | End: 2023-10-09
Payer: MEDICARE

## 2023-10-09 LAB
ANION GAP SERPL CALC-SCNC: 8 MMOL/L (ref 10–20)
ATRIAL RATE: 66 BPM
BUN SERPL-MCNC: 54 MG/DL (ref 6–23)
CALCIUM SERPL-MCNC: 8.9 MG/DL (ref 8.6–10.3)
CHLORIDE SERPL-SCNC: 102 MMOL/L (ref 98–107)
CO2 SERPL-SCNC: 36 MMOL/L (ref 21–32)
CREAT SERPL-MCNC: 1.58 MG/DL (ref 0.5–1.05)
GFR SERPL CREATININE-BSD FRML MDRD: 32 ML/MIN/1.73M*2
GLUCOSE SERPL-MCNC: 109 MG/DL (ref 74–99)
P AXIS: -78 DEGREES
POTASSIUM SERPL-SCNC: 4.3 MMOL/L (ref 3.5–5.3)
PR INTERVAL: 217 MS
Q ONSET: 251 MS
QRS COUNT: 10 BEATS
QRS DURATION: 140 MS
QT INTERVAL: 463 MS
QTC CALCULATION(BAZETT): 486 MS
QTC FREDERICIA: 478 MS
R AXIS: 266 DEGREES
SODIUM SERPL-SCNC: 142 MMOL/L (ref 136–145)
T AXIS: 78 DEGREES
T OFFSET: 482 MS
VENTRICULAR RATE: 66 BPM

## 2023-10-09 PROCEDURE — 2500000001 HC RX 250 WO HCPCS SELF ADMINISTERED DRUGS (ALT 637 FOR MEDICARE OP): Performed by: INTERNAL MEDICINE

## 2023-10-09 PROCEDURE — 96372 THER/PROPH/DIAG INJ SC/IM: CPT | Performed by: STUDENT IN AN ORGANIZED HEALTH CARE EDUCATION/TRAINING PROGRAM

## 2023-10-09 PROCEDURE — 93005 ELECTROCARDIOGRAM TRACING: CPT

## 2023-10-09 PROCEDURE — 2500000004 HC RX 250 GENERAL PHARMACY W/ HCPCS (ALT 636 FOR OP/ED): Performed by: INTERNAL MEDICINE

## 2023-10-09 PROCEDURE — 2500000004 HC RX 250 GENERAL PHARMACY W/ HCPCS (ALT 636 FOR OP/ED): Performed by: STUDENT IN AN ORGANIZED HEALTH CARE EDUCATION/TRAINING PROGRAM

## 2023-10-09 PROCEDURE — 2060000001 HC INTERMEDIATE ICU ROOM DAILY

## 2023-10-09 PROCEDURE — 97530 THERAPEUTIC ACTIVITIES: CPT | Mod: GP,CQ,59

## 2023-10-09 PROCEDURE — 36415 COLL VENOUS BLD VENIPUNCTURE: CPT | Performed by: NURSE PRACTITIONER

## 2023-10-09 PROCEDURE — 2500000002 HC RX 250 W HCPCS SELF ADMINISTERED DRUGS (ALT 637 FOR MEDICARE OP, ALT 636 FOR OP/ED): Performed by: INTERNAL MEDICINE

## 2023-10-09 PROCEDURE — 82947 ASSAY GLUCOSE BLOOD QUANT: CPT | Performed by: NURSE PRACTITIONER

## 2023-10-09 PROCEDURE — 2500000004 HC RX 250 GENERAL PHARMACY W/ HCPCS (ALT 636 FOR OP/ED): Performed by: PHYSICIAN ASSISTANT

## 2023-10-09 PROCEDURE — 97110 THERAPEUTIC EXERCISES: CPT | Mod: GP,CQ

## 2023-10-09 PROCEDURE — 82565 ASSAY OF CREATININE: CPT | Performed by: NURSE PRACTITIONER

## 2023-10-09 PROCEDURE — 2500000001 HC RX 250 WO HCPCS SELF ADMINISTERED DRUGS (ALT 637 FOR MEDICARE OP): Performed by: STUDENT IN AN ORGANIZED HEALTH CARE EDUCATION/TRAINING PROGRAM

## 2023-10-09 PROCEDURE — 99233 SBSQ HOSP IP/OBS HIGH 50: CPT | Performed by: INTERNAL MEDICINE

## 2023-10-09 PROCEDURE — 99232 SBSQ HOSP IP/OBS MODERATE 35: CPT | Performed by: NURSE PRACTITIONER

## 2023-10-09 PROCEDURE — 2500000001 HC RX 250 WO HCPCS SELF ADMINISTERED DRUGS (ALT 637 FOR MEDICARE OP): Performed by: PHYSICIAN ASSISTANT

## 2023-10-09 RX ORDER — SODIUM CHLORIDE 0.9 % (FLUSH) 0.9 %
10 SYRINGE (ML) INJECTION EVERY 8 HOURS PRN
Status: DISCONTINUED | OUTPATIENT
Start: 2023-10-09 | End: 2023-10-12 | Stop reason: HOSPADM

## 2023-10-09 RX ADMIN — PIPERACILLIN SODIUM AND TAZOBACTAM SODIUM 2.25 G: 2; .25 INJECTION, SOLUTION INTRAVENOUS at 23:14

## 2023-10-09 RX ADMIN — HEPARIN SODIUM 5000 UNITS: 5000 INJECTION INTRAVENOUS; SUBCUTANEOUS at 13:58

## 2023-10-09 RX ADMIN — ACETAMINOPHEN 650 MG: 325 TABLET, FILM COATED ORAL at 13:58

## 2023-10-09 RX ADMIN — PIPERACILLIN SODIUM AND TAZOBACTAM SODIUM 2.25 G: 2; .25 INJECTION, SOLUTION INTRAVENOUS at 06:04

## 2023-10-09 RX ADMIN — VANCOMYCIN HYDROCHLORIDE 500 MG: 500 INJECTION, SOLUTION INTRAVENOUS at 08:53

## 2023-10-09 RX ADMIN — HEPARIN SODIUM 5000 UNITS: 5000 INJECTION INTRAVENOUS; SUBCUTANEOUS at 06:05

## 2023-10-09 RX ADMIN — CYANOCOBALAMIN TAB 1000 MCG 2000 MCG: 1000 TAB at 08:50

## 2023-10-09 RX ADMIN — ACETAMINOPHEN 650 MG: 325 TABLET, FILM COATED ORAL at 22:03

## 2023-10-09 RX ADMIN — GUAIFENESIN 1200 MG: 600 TABLET ORAL at 21:15

## 2023-10-09 RX ADMIN — ACETAMINOPHEN 650 MG: 325 TABLET, FILM COATED ORAL at 06:04

## 2023-10-09 RX ADMIN — HEPARIN SODIUM 5000 UNITS: 5000 INJECTION INTRAVENOUS; SUBCUTANEOUS at 21:15

## 2023-10-09 RX ADMIN — PIPERACILLIN SODIUM AND TAZOBACTAM SODIUM 2.25 G: 2; .25 INJECTION, SOLUTION INTRAVENOUS at 13:58

## 2023-10-09 RX ADMIN — TORSEMIDE 20 MG: 20 TABLET ORAL at 08:49

## 2023-10-09 RX ADMIN — ATORVASTATIN CALCIUM 80 MG: 40 TABLET, FILM COATED ORAL at 21:15

## 2023-10-09 RX ADMIN — Medication: at 08:00

## 2023-10-09 RX ADMIN — ALPRAZOLAM 0.5 MG: 0.5 TABLET ORAL at 21:18

## 2023-10-09 RX ADMIN — AMIODARONE HYDROCHLORIDE 200 MG: 200 TABLET ORAL at 08:51

## 2023-10-09 RX ADMIN — Medication 9 MG: at 22:03

## 2023-10-09 RX ADMIN — SPIRONOLACTONE 12.5 MG: 25 TABLET, FILM COATED ORAL at 08:52

## 2023-10-09 RX ADMIN — GUAIFENESIN 1200 MG: 600 TABLET ORAL at 08:51

## 2023-10-09 RX ADMIN — LEVOTHYROXINE SODIUM 25 MCG: 0.03 TABLET ORAL at 06:04

## 2023-10-09 RX ADMIN — METOPROLOL SUCCINATE 25 MG: 25 TABLET, EXTENDED RELEASE ORAL at 08:50

## 2023-10-09 RX ADMIN — ACETAMINOPHEN 650 MG: 325 TABLET, FILM COATED ORAL at 01:58

## 2023-10-09 RX ADMIN — ASPIRIN 81 MG: 81 TABLET, COATED ORAL at 08:50

## 2023-10-09 ASSESSMENT — COGNITIVE AND FUNCTIONAL STATUS - GENERAL
TOILETING: A LITTLE
EATING MEALS: A LITTLE
TURNING FROM BACK TO SIDE WHILE IN FLAT BAD: A LOT
MOBILITY SCORE: 10
WALKING IN HOSPITAL ROOM: TOTAL
MOBILITY SCORE: 10
CLIMB 3 TO 5 STEPS WITH RAILING: TOTAL
CLIMB 3 TO 5 STEPS WITH RAILING: TOTAL
PERSONAL GROOMING: A LITTLE
DRESSING REGULAR UPPER BODY CLOTHING: A LITTLE
MOVING TO AND FROM BED TO CHAIR: A LOT
EATING MEALS: A LITTLE
TOILETING: A LITTLE
MOVING FROM LYING ON BACK TO SITTING ON SIDE OF FLAT BED WITH BEDRAILS: A LOT
STANDING UP FROM CHAIR USING ARMS: A LOT
MOVING TO AND FROM BED TO CHAIR: A LOT
WALKING IN HOSPITAL ROOM: TOTAL
STANDING UP FROM CHAIR USING ARMS: A LOT
DAILY ACTIVITIY SCORE: 18
CLIMB 3 TO 5 STEPS WITH RAILING: TOTAL
TURNING FROM BACK TO SIDE WHILE IN FLAT BAD: A LOT
DRESSING REGULAR LOWER BODY CLOTHING: A LITTLE
MOVING TO AND FROM BED TO CHAIR: A LOT
DAILY ACTIVITIY SCORE: 18
MOVING FROM LYING ON BACK TO SITTING ON SIDE OF FLAT BED WITH BEDRAILS: A LOT
HELP NEEDED FOR BATHING: A LITTLE
PERSONAL GROOMING: A LITTLE
DRESSING REGULAR UPPER BODY CLOTHING: A LITTLE
TURNING FROM BACK TO SIDE WHILE IN FLAT BAD: A LOT
STANDING UP FROM CHAIR USING ARMS: A LOT
MOBILITY SCORE: 10
DRESSING REGULAR LOWER BODY CLOTHING: A LITTLE
WALKING IN HOSPITAL ROOM: TOTAL
MOVING FROM LYING ON BACK TO SITTING ON SIDE OF FLAT BED WITH BEDRAILS: A LOT
HELP NEEDED FOR BATHING: A LITTLE

## 2023-10-09 ASSESSMENT — ENCOUNTER SYMPTOMS
ABDOMINAL PAIN: 0
BRUISES/BLEEDS EASILY: 0
CHEST TIGHTNESS: 0
PALPITATIONS: 0
HALLUCINATIONS: 0
LIGHT-HEADEDNESS: 0
FEVER: 0
DIAPHORESIS: 0
DIZZINESS: 0
SORE THROAT: 0
VOMITING: 0
NAUSEA: 0
DYSURIA: 0
FLANK PAIN: 0
JOINT SWELLING: 0
HEMATURIA: 0
TROUBLE SWALLOWING: 0
NUMBNESS: 0
EYE PAIN: 0
CONSTIPATION: 0
BACK PAIN: 0
WEAKNESS: 0
APPETITE CHANGE: 0
DIARRHEA: 0
ORTHOPNEA: 0
FREQUENCY: 0
FATIGUE: 0
HEADACHES: 0
WHEEZING: 0
CHILLS: 0
FACIAL SWELLING: 0
SHORTNESS OF BREATH: 1
WOUND: 1
COUGH: 1
BLOOD IN STOOL: 0

## 2023-10-09 ASSESSMENT — PAIN SCALES - GENERAL
PAINLEVEL_OUTOF10: 5 - MODERATE PAIN
PAINLEVEL_OUTOF10: 3
PAINLEVEL_OUTOF10: 0 - NO PAIN
PAINLEVEL_OUTOF10: 3
PAINLEVEL_OUTOF10: 0 - NO PAIN
PAINLEVEL_OUTOF10: 3
PAINLEVEL_OUTOF10: 3
PAINLEVEL_OUTOF10: 0 - NO PAIN

## 2023-10-09 ASSESSMENT — PAIN - FUNCTIONAL ASSESSMENT
PAIN_FUNCTIONAL_ASSESSMENT: 0-10

## 2023-10-09 ASSESSMENT — PAIN DESCRIPTION - DESCRIPTORS: DESCRIPTORS: HEADACHE

## 2023-10-09 NOTE — TREATMENT PLAN
CRS +  Cr stable   Maintain current diuresis  CDW PA and plan was co-formulated  Noted the K   Low K diet and may need aldactone held and demadex increased  to help with the CHF and K \  Check labs am  Abx per , primary team for the multifocal PNA  Avoid over diuresis as some SOB could be pna more likely than the fluid    Thoracentesis plan is noted  Will follow

## 2023-10-09 NOTE — PROGRESS NOTES
PROGRESS NOTE    Shortness of Breath  Pertinent negatives include no abdominal pain, chest pain, fever, orthopnea, rash, vomiting or wheezing.     Adwoa Forrest is a 88 y.o. female presenting with shortness of breath and ankle swelling.  She has history of persistent atrial fibrillation status post AV derek ablation and permanent pacemaker placement, failed Watchman device placement in 2018 requiring urgent surgery and left atrial appendage ligation, hypertension recurrent GI bleed not on oral anticoagulants, on amiodarone for nonsustained VT and symptomatic PVCs.  She also has chronic diastolic heart failure and follows regularly in the CHF clinic.  She used to be on 80 mg Lasix at 1 time which was reduced by the heart failure nurse practitioner to 60 mg due to worsening renal function.  Recently in July 2023 she presented with a ankle injury and at that time this was further reduced to 40 mg a day.  She states this ankle injury has been bothering her quite a bit and she has been having difficulty getting around.  This wound recently got infected and she required to take Augmentin follow almost 30 days that she just completed a week ago.  For last 1 week or so she is noticing worsening shortness of breath, legs are extremely swollen and heavy and she is unable to move as a result as it feels like lead.  She was given IV Lasix in the CHF clinic and was given 60 of Lasix to take at home but did not improve symptoms and therefore she came into the ER for further evaluation and treatment.  Denies any use of nonsteroidal inflammatories or other symptoms otherwise.  Interestingly, since starting on amiodarone she has converted to sinus rhythm.     At one time she was having deranged LFTs that improved after discontinuation of the fish oil she was taking.  Her EKG shows atrial paced ventricular paced rhythm.     Review of systems all other system reviewed and is negative.  Echocardiogram pending.  Chest x-ray personally  "reviewed.  She stated ankle swelling has improved since she had IV Lasix but they are having trouble titrating off her oxygen.     Subjective Data:  No new issues overnight.  No CP/pressure/palpitations. Has mild dyspnea, still on O2.  Creatinine up to 1.85 today, K 4.1.  Nephro has been consulted. Monitor: AV paced     10-5-23: Having issues with worsening dyspnea and decreased O2 sats today. O2 has been increased and still dyspneic.  No CP/pressure/palpitations.  Just \"doesn't feel well,\"  K 4.0, creatinine 1.98. I d/w IMS: pt going for CXR and LE duplex today.    10-6-23: Breathing is somewhat better today.  No CP/pressure.  Creatinine 1.8, K 4.9.   10-7-23: Patient resting comfortably.  She voices no new cardiac complaints or concerns at this time.  10-8-23: Patient describes an odd sensation with her breathing and that when she inhales she feels that she is having the spasming sensation in her abdomen causing her to feel slightly short of breath.  She is able to take a full deep breath but feels that there is spasm with it.  She denies any specific chest pain and overall compared to yesterday she does feel better.  10-9-23: Feels weak today. Currently working with PT/OT and having issues with dyspnea.  O2 sat running low.  Has some chest tightness when she is dyspneic but not at other times.  Creatinine yesterday 1.76    Overnight Events:    none     Objective Data:  Last Recorded Vitals:  Vitals:    10/09/23 0147 10/09/23 0155 10/09/23 0200 10/09/23 0602   BP: 119/66   121/66   BP Location:    Right arm   Patient Position: Lying   Lying   Pulse: 65  65 65   Resp: 17   17   Temp: 36.5 °C (97.7 °F)   36.9 °C (98.5 °F)   TempSrc: Temporal   Temporal   SpO2: (!) 77% (!) 87% 91% 90%   Weight:    45.9 kg (101 lb 3.2 oz)   Height:           Last Labs:  CBC - 10/8/2023:  5:26 AM  8.3 11.4 351    39.3      CMP - 10/8/2023:  5:26 AM  9.0 6.3 30 --- 0.7   4.1 3.6 23 121      PTT - 7/18/2023:  3:17 AM  1.0   11.4 32 "     Last I/O:  I/O last 3 completed shifts:  In: 1750 (38.1 mL/kg) [P.O.:1300; IV Piggyback:450]  Out: 875 (19.1 mL/kg) [Urine:875 (0.5 mL/kg/hr)]  Weight: 45.9 kg     Past Cardiology Tests (Last 3 Years):  Echo:  Transthoracic echo (TTE) complete 10/3/2023--CONCLUSIONS:   1. Left ventricular systolic function is normal with a 70-75% estimated ejection fraction.   2. There is moderate left ventricular hypertrophy.   3. There is mildly reduced right ventricular systolic function.   4. The right atrium is moderately dilated.   5. Moderately elevated right ventricular systolic pressure.   6. Mild to moderate aortic valve regurgitation.    Cath:  No results found for this or any previous visit from the past 1095 days.    Stress Test:  Nuclear Stress Test 2/12/2023      Inpatient Medications:  Scheduled medications   Medication Dose Route Frequency    amiodarone  200 mg oral Daily    aspirin  81 mg oral Daily    atorvastatin  80 mg oral Nightly    cyanocobalamin  2,000 mcg oral Daily    guaiFENesin  1,200 mg oral BID    heparin (porcine)  5,000 Units subcutaneous q8h    levothyroxine  25 mcg oral Daily    [Held by provider] lisinopril  5 mg oral Daily    metoprolol succinate XL  25 mg oral Daily    oxygen   inhalation Continuous - 02/gases    piperacillin-tazobactam  2.25 g intravenous q8h    polyethylene glycol  17 g oral Daily    spironolactone  12.5 mg oral q24h MACRINA    torsemide  20 mg oral Daily    vancomycin  500 mg intravenous q24h       Review of Systems   Constitutional: Positive for malaise/fatigue. Negative for fever.   Cardiovascular:  Negative for chest pain, orthopnea and palpitations.   Respiratory:  Positive for shortness of breath. Negative for wheezing.    Skin:  Negative for itching and rash.   Gastrointestinal:  Negative for abdominal pain, diarrhea, nausea and vomiting.   Genitourinary:  Negative for dysuria.   Neurological:  Negative for weakness.        Physical Exam  Constitutional:       General:  She is not in acute distress.     Appearance: Normal appearance.   HENT:      Mouth/Throat:      Mouth: Mucous membranes are moist.   Cardiovascular:      Rate and Rhythm: Normal rate and regular rhythm.      Comments: Tele is A-V pace with no WCT/PVC's  Pulmonary:      Effort: Pulmonary effort is normal.      Breath sounds: Rales (bibasilar crackles) present.   Abdominal:      Palpations: Abdomen is soft.      Tenderness: There is no abdominal tenderness.   Musculoskeletal:      Right lower leg: No edema.      Left lower leg: No edema.   Skin:     General: Skin is warm and dry.   Neurological:      Mental Status: She is alert and oriented to person, place, and time.   Psychiatric:         Behavior: Behavior is cooperative.            ASSESSMENT/PLAN  1-acute on chronic diastolic heart failure-continue IV Lasix for another 24 hours.  After that switch to 60 mg p.o. Lasix with close follow-up in CHF clinic.  Monitor electrolytes and strict I's and O's.  Add Aldactone continue SGLT2 inhibitors.  Possibly exacerbated by recent events including infection.  Will need close monitoring of electrolytes and potassium specifically.  Discussed with patient.     Agree with checking echo follow-up on results.     10-4-23: Echo as noted above. Lungs sound congested, no edema.  On IV Lasix, Metoprolol succinate and spironolactone.  Nephro has been consulted.  Recommend sodium restricted diet.       10-5-23: Lungs with diminished R base and mod crackles L fields. Having a mild climb in creatinine (1.98) CXR and LE duplex pending. Will defer diuretics to nephro: recommendations in chart.      10-6-23: Remains with dyspnea but improved some from yesterday. Currently on spironolactone.  Lasix was stopped d/t climbing creatinine. Will add oral Lasix.  Check BNP today  10-7-23: Patient does not feel overall some improvement in her breathing.  She had thoracentesis of the left side yesterday however the formal report is not in the  computer system.  For now we will continue current medical regimen.  She remains on oxygen.  10-8-23: Patient overall does feel some improvement but has this odd sensation of spasming in her abdomen with breathing.  She continues to have some bibasilar crackles but has not had hypoxia.  Creatinine is improved today at 1.76 and her potassium and magnesium are normal.  Continue current regimen of diuretics.  10-9-23: Having some PEÑA today while working with PT/OT.  Feels washed out and fatigued. Monitor: AV paced     2-persistent atrial fibrillation-she seems to be maintaining sinus rhythm since being on amiodarone.  She is not anticoagulated but had a left atrial appendage ligation.  She has history of GI bleed.  10-4-23: On Amiodarone and BB.  HX of AV node ablation and PPM. She is no longer on oral anticoagulation, hx of STEFANIE ligation.  On ASA only. AV paced on tele.      10-5-23: AV paced on tele. Pt is no longer on anticoagulation.     10-6-23: Remains AV paced.   10-7-23: Remains AV paced.  Continue current meds including amiodarone.   10-8-23: Continued AV pacing on telemetry.  No PVCs or wide-complex tachycardia on amiodarone.  10-9-23: Remains AV paced.      3-nonsustained VT and PVCs highly symptomatic and remains on amiodarone with resolution of symptoms.  Outpatient monitoring.     10-5-23:  continue on Amiodarone     10-6-223: On Amiodarone.  No VT this admission.   10-7-23: Telemetry review reveals no wide-complex tachycardia.  10-9-23: No VT noted on tele this admission.  Continues on Amiodarone.     Recommendations: Continue conservative medical management with current meds.  Continue torsemide and spironolactone.  Will order labs for today. May benefit from Pulm consult.       Shefali Ordaz, APRN-CNP  10/9/2023  9:06 AM

## 2023-10-09 NOTE — PROGRESS NOTES
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   TECHNIQUE: Portable AP upright   FINDINGS: ICD is present in the left upper chest wall with electrode wires in the right atrium and right ventricle. Cardiac silhouette is enlarged but appears similar to the prior study. Aorta is atherosclerotic. Interstitial markings are coarse but appears similar to the previous exam. Left costophrenic angle is indistinct which may indicate minimal left pleural fluid. Bones are osteopenic with no definite acute interval change.       Unchanged cardiomegaly   Small left pleural effusion   Coarse interstitial markings are unchanged and probably related to chronic lung disease.     Signed by: Jeny Troy 10/2/2023 1:18 PM Dictation workstation:   [id number]    XR foot    Result Date: 9/13/2023  Interpreted By:  GLADYS SHELDON MD MRN: [id number] Patient Name: CHERRY LEE  STUDY: FOOT; COMPLETE, MIN 3 VIEWS;  9/12/2023 1:03 pm  INDICATION: right foot pain  S92.031D: Closed displaced avulsion fracture of tuberosity of right calcaneus with routine healing, subsequent.  COMPARISON: 08/22/2023  ACCESSION  NUMBER(S): [id number]  ORDERING CLINICIAN: JOANNE LANDA  FINDINGS: Right foot, three views  Postsurgical changes in the calcaneus status post fixation of an avulsion fracture with screws. The hardware is intact. Soft tissue edema posteriorly. There is no acute fracture. There is no dislocation      ORIF of calcaneal fracture with unchanged alignment and intact hardware. Associated soft tissue edema posteriorly       Vernon Arenas MD      Answering Service [phone]  Date of service: 10/9/2023  Time of service: 3:33 PM

## 2023-10-09 NOTE — PROGRESS NOTES
..       [facility] Progress Note           Subjective/   88 y.o. year old female who we are seeing in consultation for YEVGENIY on CKD.     NAEON  Feeling ok  Edema improved  Appetite ok  No issues with UOP  s/p thoracentesis 10/6    ROS done and negative unless mentioned as above  No change in PFSH  All data labs/interval notes and overnight issues are reviewed  Objective/     Vitals:    10/09/23 0200 10/09/23 0602 10/09/23 0909 10/09/23 0927   BP:  121/66  133/65   BP Location:  Right arm     Patient Position:  Lying     Pulse: 65 65  65   Resp:  17  20   Temp:  36.9 °C (98.5 °F)  36 °C (96.8 °F)   TempSrc:  Temporal     SpO2: 91% 90% 96% 92%   Weight:  45.9 kg (101 lb 3.2 oz)     Height:            Intake/Output Summary (Last 24 hours) at 10/9/2023 1042  Last data filed at 10/9/2023 0932  Gross per 24 hour   Intake 1190 ml   Output 700 ml   Net 490 ml            Current Facility-Administered Medications:     acetaminophen (Tylenol) tablet 650 mg, 650 mg, oral, q4h PRN, Jt House DO, 650 mg at 10/09/23 0604    ALPRAZolam (Xanax) tablet 0.5 mg, 0.5 mg, oral, Nightly PRN, Nallely Clarke PA-C, 0.5 mg at 10/08/23 2233    amiodarone (Pacerone) tablet 200 mg, 200 mg, oral, Daily, Jt House DO, 200 mg at 10/09/23 0851    aspirin EC tablet 81 mg, 81 mg, oral, Daily, Nallely Clarke PA-C, 81 mg at 10/09/23 0850    atorvastatin (Lipitor) tablet 80 mg, 80 mg, oral, Nightly, Nallely Clarke PA-C, 80 mg at 10/08/23 2054    bisacodyl (Dulcolax) EC tablet 10 mg, 10 mg, oral, Daily PRN, Nallely Clarke PA-C    cyanocobalamin (Vitamin B-12) tablet 2,000 mcg, 2,000 mcg, oral, Daily, Nallely Clarke PA-C, 2,000 mcg at 10/09/23 0850    guaiFENesin (Mucinex) 12 hr tablet 1,200 mg, 1,200 mg, oral, BID, Arpita Camp PA-C, 1,200 mg at 10/09/23 0851    heparin (porcine) injection 5,000 Units, 5,000 Units, subcutaneous, q8h, Nallely Clarke PA-C, 5,000 Units at 10/09/23 0605    levothyroxine  Patient: Kami Villanueva    Procedure Summary     Date:  09/17/18 Room / Location:   [facility] OR 03 /  [facility]    Anesthesia Start:  0925 Anesthesia Stop:  1035    Procedure:  ENDOSCOPIC FUNCTIONAL SINUS SURGERY W TRACKING OF RIGHT MAXILLARY SINUS WITH BIOPSY. (Right Nose) Diagnosis:       Chronic maxillary sinusitis      Maxillary sinus mass      Right facial swelling      History of tooth extraction, unspecified edentulism class      (Chronic maxillary sinusitis [J32.0])      (Maxillary sinus mass [R22.0])      (Right facial swelling [R22.0])      (History of tooth extraction, unspecified edentulism class [K08.409])    Surgeon:  Mirza Felix MD Provider:  Kobi Spencer CRNA    Anesthesia Type:  general ASA Status:  2          Anesthesia Type: general  Last vitals  BP   157/79 (09/17/18 1117)   Temp   99 °F (37.2 °C) (09/17/18 1100)   Pulse   85 (09/17/18 1117)   Resp   15 (09/17/18 1117)     SpO2   94 % (09/17/18 1117)     Post Anesthesia Care and Evaluation    Patient location during evaluation: PACU  Patient participation: complete - patient participated  Level of consciousness: awake and alert  Pain management: adequate  Airway patency: patent  Anesthetic complications: No anesthetic complications  PONV Status: controlled  Cardiovascular status: acceptable and hemodynamically stable  Respiratory status: acceptable  Hydration status: acceptable    Comments: Patient discharged from PACU prior to anesthesia evaluation based on Ximena Score.  For details, see RN note.     /79 (BP Location: Left arm, Patient Position: Lying)   Pulse 85   Temp 99 °F (37.2 °C)   Resp 15   SpO2 94%        (Synthroid, Levoxyl) tablet 25 mcg, 25 mcg, oral, Daily, Nallely Clarke PA-C, 25 mcg at 10/09/23 0604    [Held by provider] lisinopril tablet 5 mg, 5 mg, oral, Daily, Jt House DO    melatonin tablet 9 mg, 9 mg, oral, Nightly PRN, Jt House DO, 9 mg at 10/08/23 2233    metoprolol succinate XL (Toprol-XL) 24 hr tablet 25 mg, 25 mg, oral, Daily, Arpita Camp PA-C, 25 mg at 10/09/23 0850    oxygen (O2) therapy, , inhalation, Continuous - 02/gases, Frederick Lowry MD, 2 L/min at 10/09/23 0909    piperacillin-tazobactam-dextrose (Zosyn) IV 2.25 g, 2.25 g, intravenous, q8h, Arpita Camp PA-C, Stopped at 10/09/23 0634    polyethylene glycol (Glycolax, Miralax) packet 17 g, 17 g, oral, Daily, Nallely Clarke PA-C, 17 g at 10/05/23 0903    sodium chloride 0.9% flush 10 mL, 10 mL, intravenous, q8h PRN, Catherine Rashid MD    spironolactone (Aldactone) tablet 12.5 mg, 12.5 mg, oral, q24h MACRINA, Michael Latham MD, 12.5 mg at 10/09/23 0852    torsemide (Demadex) tablet 20 mg, 20 mg, oral, Daily, Arpita Camp PA-C, 20 mg at 10/09/23 0849    vancomycin in dextrose 5 % (Vancocin) IVPB 500 mg, 500 mg, intravenous, q24h, Arpita Camp PA-C, Stopped at 10/09/23 0923    Physical Exam  Constitutional:       General: She is not in acute distress.     Appearance: Normal appearance. She is not ill-appearing or toxic-appearing.   HENT:      Head: Normocephalic and atraumatic.   Eyes:      General: No scleral icterus.     Conjunctiva/sclera: Conjunctivae normal.   Neck:      Vascular: No carotid bruit.   Cardiovascular:      Rate and Rhythm: Normal rate and regular rhythm.      Pulses: Normal pulses.      Heart sounds: Normal heart sounds.   Pulmonary:      Effort: Pulmonary effort is normal.      Breath sounds: Rhonchi present.      Comments: Breathing comfortably on  2 lts oxygen now   Abdominal:      General: Bowel sounds are normal. There is no distension.      Palpations: Abdomen is soft.       Tenderness: There is no abdominal tenderness.   Musculoskeletal:      Cervical back: Neck supple.      Right lower leg: No edema.      Left lower leg: No edema.   Skin:     General: Skin is warm and dry.   Neurological:      General: No focal deficit present.      Mental Status: She is alert and oriented to person, place, and time.   Psychiatric:         Mood and Affect: Mood normal.         Behavior: Behavior normal.         Judgment: Judgment normal.        Results from last 7 days   Lab Units 10/09/23  0941 10/08/23  0526 10/07/23  0907   SODIUM mmol/L 142 144 143   POTASSIUM mmol/L 4.3 5.0 4.8   CHLORIDE mmol/L 102 104 103   CO2 mmol/L 36* 37* 36*   BUN mg/dL 54* 56* 57*   CREATININE mg/dL 1.58* 1.76* 1.89*   EGFR mL/min/1.73m*2 32* 28* 25*   GLUCOSE mg/dL 109* 108* 102*   CALCIUM mg/dL 8.9 9.0 9.2     Results from last 7 days   Lab Units 10/08/23  0526 10/07/23  0907 10/06/23  0539   WBC AUTO x10*3/uL 8.3 9.9 7.2   HEMOGLOBIN g/dL 11.4* 11.7* 12.6   HEMATOCRIT % 39.3 39.8 42.4   PLATELETS AUTO x10*3/uL 351 353 374   NEUTROS PCT AUTO % 80.3 86.2 90.0   LYMPHS PCT AUTO % 6.1 6.4 7.5   MONOS PCT AUTO % 7.6 6.5 1.4   EOS PCT AUTO % 4.7 0.2 0.0      Results from last 7 days   Lab Units 10/08/23  0526 10/07/23  0907 10/06/23  0539   WBC AUTO x10*3/uL 8.3 9.9 7.2   HEMOGLOBIN g/dL 11.4* 11.7* 12.6   HEMATOCRIT % 39.3 39.8 42.4   PLATELETS AUTO x10*3/uL 351 353 374       Assessment/Plan   YEVGENIY/ATN likley 2/2 CRS  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  Acute on chronic diastolic HFpEF 70-75%  Acute hypoxic respiratory failure (no oxygen at baseline) 2/2 PNA  Pulmonary hypertension  Emphysema  B/L pleural effusions (small)  RLE cellulitis and infected foot wound  Persistent Afib  ASCVD        Plan:  -Scr stable around bl~1.6-1.8, non oliguric, BP stable  -C/w oral Torsemide 20 mg daily and Aldactone 12.5 mg and continue on discharge  -Hypoxia likely related to PNA  -Ok for prn IV diuresis for signs/symptoms of volume overload  -CHF appears  well compensated on exam     -Wean oxygen as able, defer to primary  -Management of Afib defer to cardiology  -IV Ancef for the cellulitis and defer to ID, recommend monitor vanc levels   -All other care defer to primary

## 2023-10-09 NOTE — PROGRESS NOTES
Physical Therapy    Physical Therapy Treatment    Patient Name: Adwoa Forrest  MRN: [id number]  Today's Date: 10/9/2023  Time Calculation  Start Time: 0815  Stop Time: 0841  Time Calculation (min): 26 min       Assessment/Plan   PT Assessment  PT Assessment Results: Decreased strength, Decreased range of motion, Decreased endurance, Decreased mobility  Rehab Prognosis: Fair  Barriers to Discharge: progessing  Evaluation/Treatment Tolerance: Patient tolerated treatment well  Medical Staff Made Aware: Yes  Barriers to Participation: Comorbidities  End of Session Communication: PCT/NA/CTA  Assessment Comment: pateint fatigues quickly and requires verbal cues for motivation. patient  End of Session Patient Position: Bed, 3 rail up, Alarm on  PT Plan  Inpatient/Swing Bed or Outpatient: Inpatient  PT Plan  Treatment/Interventions: Bed mobility, Transfer training, Therapeutic exercise, Therapeutic activity  PT Plan: Skilled PT  PT Frequency: 3 times per week  PT Discharge Recommendations: Moderate intensity level of continued care (spoke wiht PT due toapteitn's increase need in 02 and increased weakness  changing plan to mod per PT)  Equipment Recommended upon Discharge:  (THERABAND FOR HEP)  PT Recommended Transfer Status: Stand by assist      General Visit Information:   PT  Visit  PT Received On: 10/09/23  Response to Previous Treatment: Patient with no complaints from previous session.  General  Prior to Session Communication: Bedside nurse, PCT/NA/CTA  Patient Position Received: Bed, 3 rail up, Alarm on, Bed, 0 rail up    Subjective   Precautions:  Precautions  LE Weight Bearing Status: Right Non-Weight Bearing (per patient non weight bearing)  Precautions Comment: FALLS  Vital Signs:  Vital Signs  SpO2:  (PHOEBE Hinojosa aware of SPO2)    Objective   Pain:  Pain Assessment  Pain Assessment: 0-10  Pain Score: 3  Pain Type:  (no complaints)  Pain Interventions: Distraction  Cognition:  Cognition  Overall Cognitive Status:  Within Functional Limits  Postural Control:     Extremity/Trunk Assessments:    Activity Tolerance:  Activity Tolerance  Endurance: Tolerates 10 - 20 min exercise with multiple rests  Activity Tolerance Comments: fatigues quickly on feet due to NWB and patient stating she doesn' t fell today  Treatments:  Therapeutic Exercise  Therapeutic Exercise Performed: Yes  Therapeutic Exercise Activity 1: seated marches, knee extesnion 10 reps each    Therapeutic Activity  Therapeutic Activity Performed: Yes  Therapeutic Activity 1: toilet transfers    Bed Mobility  Bed Mobility: Yes  Bed Mobility 1  Bed Mobility 1: Supine to sitting, Sitting to supine  Level of Assistance 1: Minimum assistance    Transfers  Transfer: Yes (stand pivot)  Transfer 1  Transfer From 1: Bed to, Commode-standard to  Transfer Level of Assistance 1: Moderate assistance  Trials/Comments 1: verbal cue for safety    Outcome Measures:  [facility] Basic Mobility  Turning from your back to your side while in a flat bed without using bedrails: A lot  Moving from lying on your back to sitting on the side of a flat bed without using bedrails: A lot  Moving to and from bed to chair (including a wheelchair): A lot  Standing up from a chair using your arms (e.g. wheelchair or bedside chair): A lot  To walk in hospital room: Total  Climbing 3-5 steps with railing: Total  Basic Mobility - Total Score: 10    Education Documentation  Mobility Training, taught by Sherron Lozano PTA at 10/9/2023  9:29 AM.  Learner: Patient  Readiness: Acceptance  Method: Explanation  Response: Verbalizes Understanding, Needs Reinforcement  Comment: inceased mobility and safey    Education Comments  No comments found.        OP EDUCATION:       Encounter Problems       Encounter Problems (Active)       Mobility       STRENGTHENING (Progressing)       Start:  10/03/23    Expected End:  10/24/23       20+ RROM EX TO HIPS AND KNEES STRENGTHENING LEGS FOR STABLE TRANSFERS         WC MOBILITY  (Progressing)       Start:  10/03/23    Expected End:  10/06/23       INDEP AND SAFE WC MOBILITY

## 2023-10-10 ENCOUNTER — DOCUMENTATION (OUTPATIENT)
Dept: CARDIOLOGY | Facility: CLINIC | Age: 88
End: 2023-10-10

## 2023-10-10 ENCOUNTER — APPOINTMENT (OUTPATIENT)
Dept: ORTHOPEDIC SURGERY | Facility: CLINIC | Age: 88
End: 2023-10-10
Payer: MEDICARE

## 2023-10-10 PROCEDURE — 2500000002 HC RX 250 W HCPCS SELF ADMINISTERED DRUGS (ALT 637 FOR MEDICARE OP, ALT 636 FOR OP/ED): Performed by: INTERNAL MEDICINE

## 2023-10-10 PROCEDURE — 2500000004 HC RX 250 GENERAL PHARMACY W/ HCPCS (ALT 636 FOR OP/ED): Performed by: STUDENT IN AN ORGANIZED HEALTH CARE EDUCATION/TRAINING PROGRAM

## 2023-10-10 PROCEDURE — 2500000004 HC RX 250 GENERAL PHARMACY W/ HCPCS (ALT 636 FOR OP/ED): Performed by: PHYSICIAN ASSISTANT

## 2023-10-10 PROCEDURE — 96372 THER/PROPH/DIAG INJ SC/IM: CPT | Performed by: STUDENT IN AN ORGANIZED HEALTH CARE EDUCATION/TRAINING PROGRAM

## 2023-10-10 PROCEDURE — 97110 THERAPEUTIC EXERCISES: CPT | Mod: GP,CQ

## 2023-10-10 PROCEDURE — 2060000001 HC INTERMEDIATE ICU ROOM DAILY

## 2023-10-10 PROCEDURE — 2500000001 HC RX 250 WO HCPCS SELF ADMINISTERED DRUGS (ALT 637 FOR MEDICARE OP): Performed by: STUDENT IN AN ORGANIZED HEALTH CARE EDUCATION/TRAINING PROGRAM

## 2023-10-10 PROCEDURE — 99233 SBSQ HOSP IP/OBS HIGH 50: CPT | Performed by: INTERNAL MEDICINE

## 2023-10-10 PROCEDURE — 99232 SBSQ HOSP IP/OBS MODERATE 35: CPT | Performed by: PHYSICIAN ASSISTANT

## 2023-10-10 PROCEDURE — 2500000001 HC RX 250 WO HCPCS SELF ADMINISTERED DRUGS (ALT 637 FOR MEDICARE OP): Performed by: INTERNAL MEDICINE

## 2023-10-10 PROCEDURE — 2500000001 HC RX 250 WO HCPCS SELF ADMINISTERED DRUGS (ALT 637 FOR MEDICARE OP): Performed by: PHYSICIAN ASSISTANT

## 2023-10-10 PROCEDURE — 97530 THERAPEUTIC ACTIVITIES: CPT | Mod: GP,59

## 2023-10-10 PROCEDURE — 2500000004 HC RX 250 GENERAL PHARMACY W/ HCPCS (ALT 636 FOR OP/ED): Performed by: INTERNAL MEDICINE

## 2023-10-10 RX ADMIN — GUAIFENESIN 1200 MG: 600 TABLET ORAL at 20:21

## 2023-10-10 RX ADMIN — Medication 9 MG: at 22:51

## 2023-10-10 RX ADMIN — Medication 2 L/MIN: at 08:00

## 2023-10-10 RX ADMIN — SPIRONOLACTONE 12.5 MG: 25 TABLET, FILM COATED ORAL at 08:56

## 2023-10-10 RX ADMIN — HEPARIN SODIUM 5000 UNITS: 5000 INJECTION INTRAVENOUS; SUBCUTANEOUS at 21:22

## 2023-10-10 RX ADMIN — TORSEMIDE 20 MG: 20 TABLET ORAL at 08:57

## 2023-10-10 RX ADMIN — ATORVASTATIN CALCIUM 80 MG: 40 TABLET, FILM COATED ORAL at 20:21

## 2023-10-10 RX ADMIN — ASPIRIN 81 MG: 81 TABLET, COATED ORAL at 08:58

## 2023-10-10 RX ADMIN — GUAIFENESIN 1200 MG: 600 TABLET ORAL at 08:56

## 2023-10-10 RX ADMIN — LEVOTHYROXINE SODIUM 25 MCG: 0.03 TABLET ORAL at 06:14

## 2023-10-10 RX ADMIN — PIPERACILLIN SODIUM AND TAZOBACTAM SODIUM 2.25 G: 2; .25 INJECTION, SOLUTION INTRAVENOUS at 06:14

## 2023-10-10 RX ADMIN — HEPARIN SODIUM 5000 UNITS: 5000 INJECTION INTRAVENOUS; SUBCUTANEOUS at 06:14

## 2023-10-10 RX ADMIN — METOPROLOL SUCCINATE 25 MG: 25 TABLET, EXTENDED RELEASE ORAL at 08:56

## 2023-10-10 RX ADMIN — ALPRAZOLAM 0.5 MG: 0.5 TABLET ORAL at 22:53

## 2023-10-10 RX ADMIN — AMIODARONE HYDROCHLORIDE 200 MG: 200 TABLET ORAL at 08:57

## 2023-10-10 RX ADMIN — Medication 2 L/MIN: at 08:51

## 2023-10-10 RX ADMIN — HEPARIN SODIUM 5000 UNITS: 5000 INJECTION INTRAVENOUS; SUBCUTANEOUS at 14:00

## 2023-10-10 RX ADMIN — CYANOCOBALAMIN TAB 1000 MCG 2000 MCG: 1000 TAB at 08:57

## 2023-10-10 RX ADMIN — ACETAMINOPHEN 650 MG: 325 TABLET, FILM COATED ORAL at 08:55

## 2023-10-10 ASSESSMENT — ENCOUNTER SYMPTOMS
WHEEZING: 0
CHILLS: 0
FACIAL SWELLING: 0
TROUBLE SWALLOWING: 0
HEMATURIA: 0
WOUND: 1
BACK PAIN: 0
BLOOD IN STOOL: 0
COUGH: 1
ABDOMINAL PAIN: 0
PALPITATIONS: 0
EYE PAIN: 0
WEAKNESS: 0
FREQUENCY: 0
SORE THROAT: 0
CHEST TIGHTNESS: 0
DIZZINESS: 0
BRUISES/BLEEDS EASILY: 0
JOINT SWELLING: 0
FATIGUE: 0
CONSTIPATION: 0
FEVER: 0
SHORTNESS OF BREATH: 1
DIARRHEA: 0
NAUSEA: 0
VOMITING: 0
LIGHT-HEADEDNESS: 0
DYSURIA: 0
APPETITE CHANGE: 0
NUMBNESS: 0
HEADACHES: 0
DIAPHORESIS: 0
HALLUCINATIONS: 0
FLANK PAIN: 0

## 2023-10-10 ASSESSMENT — COGNITIVE AND FUNCTIONAL STATUS - GENERAL
MOVING TO AND FROM BED TO CHAIR: A LOT
TURNING FROM BACK TO SIDE WHILE IN FLAT BAD: A LOT
DRESSING REGULAR LOWER BODY CLOTHING: A LITTLE
EATING MEALS: A LITTLE
DRESSING REGULAR UPPER BODY CLOTHING: A LITTLE
WALKING IN HOSPITAL ROOM: TOTAL
TURNING FROM BACK TO SIDE WHILE IN FLAT BAD: A LOT
MOVING FROM LYING ON BACK TO SITTING ON SIDE OF FLAT BED WITH BEDRAILS: A LOT
MOBILITY SCORE: 10
DAILY ACTIVITIY SCORE: 18
CLIMB 3 TO 5 STEPS WITH RAILING: TOTAL
STANDING UP FROM CHAIR USING ARMS: A LOT
PERSONAL GROOMING: A LITTLE
DAILY ACTIVITIY SCORE: 17
MOVING FROM LYING ON BACK TO SITTING ON SIDE OF FLAT BED WITH BEDRAILS: A LOT
PERSONAL GROOMING: A LITTLE
MOVING TO AND FROM BED TO CHAIR: A LOT
DRESSING REGULAR UPPER BODY CLOTHING: A LITTLE
MOBILITY SCORE: 10
EATING MEALS: TOTAL
MOVING FROM LYING ON BACK TO SITTING ON SIDE OF FLAT BED WITH BEDRAILS: A LOT
MOBILITY SCORE: 10
TURNING FROM BACK TO SIDE WHILE IN FLAT BAD: A LOT
WALKING IN HOSPITAL ROOM: TOTAL
HELP NEEDED FOR BATHING: A LITTLE
CLIMB 3 TO 5 STEPS WITH RAILING: TOTAL
MOVING TO AND FROM BED TO CHAIR: A LOT
HELP NEEDED FOR BATHING: A LITTLE
WALKING IN HOSPITAL ROOM: TOTAL
TOILETING: A LITTLE
DRESSING REGULAR LOWER BODY CLOTHING: A LITTLE
STANDING UP FROM CHAIR USING ARMS: A LOT
STANDING UP FROM CHAIR USING ARMS: A LOT
CLIMB 3 TO 5 STEPS WITH RAILING: TOTAL

## 2023-10-10 ASSESSMENT — PAIN - FUNCTIONAL ASSESSMENT
PAIN_FUNCTIONAL_ASSESSMENT: 0-10

## 2023-10-10 ASSESSMENT — PAIN SCALES - GENERAL
PAINLEVEL_OUTOF10: 4
PAINLEVEL_OUTOF10: 0 - NO PAIN
PAINLEVEL_OUTOF10: 0 - NO PAIN

## 2023-10-10 ASSESSMENT — ACTIVITIES OF DAILY LIVING (ADL)
GROOMING: NEEDS ASSISTANCE
HEARING - RIGHT EAR: FUNCTIONAL
BATHING: NEEDS ASSISTANCE
HEARING - LEFT EAR: FUNCTIONAL
ASSISTIVE_DEVICE: WALKER
DRESSING YOURSELF: NEEDS ASSISTANCE
TOILETING: NEEDS ASSISTANCE
FEEDING YOURSELF: INDEPENDENT
ADEQUATE_TO_COMPLETE_ADL: NO
WALKS IN HOME: NEEDS ASSISTANCE
PATIENT'S MEMORY ADEQUATE TO SAFELY COMPLETE DAILY ACTIVITIES?: NO
JUDGMENT_ADEQUATE_SAFELY_COMPLETE_DAILY_ACTIVITIES: NO

## 2023-10-10 NOTE — PROGRESS NOTES
PROGRESS NOTE    HPI  Adwoa Forrest is a 88 y.o. female presenting with shortness of breath and ankle swelling.  She has history of persistent atrial fibrillation status post AV derek ablation and permanent pacemaker placement, failed Watchman device placement in 2018 requiring urgent surgery and left atrial appendage ligation, hypertension recurrent GI bleed not on oral anticoagulants, on amiodarone for nonsustained VT and symptomatic PVCs.  She also has chronic diastolic heart failure and follows regularly in the CHF clinic.  She used to be on 80 mg Lasix at 1 time which was reduced by the heart failure nurse practitioner to 60 mg due to worsening renal function.  Recently in July 2023 she presented with a ankle injury and at that time this was further reduced to 40 mg a day.  She states this ankle injury has been bothering her quite a bit and she has been having difficulty getting around.  This wound recently got infected and she required to take Augmentin follow almost 30 days that she just completed a week ago.  For last 1 week or so she is noticing worsening shortness of breath, legs are extremely swollen and heavy and she is unable to move as a result as it feels like lead.  She was given IV Lasix in the CHF clinic and was given 60 of Lasix to take at home but did not improve symptoms and therefore she came into the ER for further evaluation and treatment.  Denies any use of nonsteroidal inflammatories or other symptoms otherwise.  Interestingly, since starting on amiodarone she has converted to sinus rhythm.     At one time she was having deranged LFTs that improved after discontinuation of the fish oil she was taking.  Her EKG shows atrial paced ventricular paced rhythm.     Review of systems all other system reviewed and is negative.  Echocardiogram pending.  Chest x-ray personally reviewed.  She stated ankle swelling has improved since she had IV Lasix but they are having trouble titrating off her oxygen.  "    Subjective Data:  No new issues overnight.  No CP/pressure/palpitations. Has mild dyspnea, still on O2.  Creatinine up to 1.85 today, K 4.1.  Nephro has been consulted. Monitor: AV paced     10-5-23: Having issues with worsening dyspnea and decreased O2 sats today. O2 has been increased and still dyspneic.  No CP/pressure/palpitations.  Just \"doesn't feel well,\"  K 4.0, creatinine 1.98. I d/w IMS: pt going for CXR and LE duplex today.    10-6-23: Breathing is somewhat better today.  No CP/pressure.  Creatinine 1.8, K 4.9.   10-7-23: Patient resting comfortably.  She voices no new cardiac complaints or concerns at this time.  10-8-23: Patient describes an odd sensation with her breathing and that when she inhales she feels that she is having the spasming sensation in her abdomen causing her to feel slightly short of breath.  She is able to take a full deep breath but feels that there is spasm with it.  She denies any specific chest pain and overall compared to yesterday she does feel better.  10-9-23: Feels weak today. Currently working with PT/OT and having issues with dyspnea.  O2 sat running low.  Has some chest tightness when she is dyspneic but not at other times.  Creatinine yesterday 1.76  10-10-23: Patient just getting back to bed.  Just had bowel movement and states that today she is feeling significantly improved.  Her breathing is much improved.  She is not having cough.  No chest pain.  Telemetry shows continued AV pacing.  She verbalizes no new cardiac complaints or concerns.  She is beginning to wonder when she will be going to rehab.    Overnight Events:    none     Objective Data:  Last Recorded Vitals:  Vitals:    10/09/23 2100 10/10/23 0050 10/10/23 0445 10/10/23 0914   BP: 149/76 109/54 110/61 145/77   BP Location:       Patient Position:       Pulse: 65 65 64 65   Resp: 16 16 16 16   Temp: 36.8 °C (98.3 °F) 36.7 °C (98 °F) 36.4 °C (97.5 °F) 36.3 °C (97.3 °F)   TempSrc:       SpO2: 96% 94% 92% " 96%   Weight:       Height:           Last Labs:  CBC - 10/8/2023:  5:26 AM  8.3 11.4 351    39.3      CMP - 10/9/2023:  9:41 AM  8.9 6.3 30 --- 0.7   4.1 3.6 23 121      PTT - 7/18/2023:  3:17 AM  1.0   11.4 32     Last I/O:  I/O last 3 completed shifts:  In: 950 (20.7 mL/kg) [IV Piggyback:950]  Out: 1627 (35.4 mL/kg) [Urine:1625 (1 mL/kg/hr); Stool:2]  Weight: 45.9 kg     Past Cardiology Tests (Last 3 Years):  Echo:  Transthoracic echo (TTE) complete 10/3/2023--CONCLUSIONS:   1. Left ventricular systolic function is normal with a 70-75% estimated ejection fraction.   2. There is moderate left ventricular hypertrophy.   3. There is mildly reduced right ventricular systolic function.   4. The right atrium is moderately dilated.   5. Moderately elevated right ventricular systolic pressure.   6. Mild to moderate aortic valve regurgitation.    Cath:  No results found for this or any previous visit from the past 1095 days.    Stress Test:  Nuclear Stress Test 2/12/2023      Inpatient Medications:  Scheduled medications   Medication Dose Route Frequency    amiodarone  200 mg oral Daily    aspirin  81 mg oral Daily    atorvastatin  80 mg oral Nightly    cyanocobalamin  2,000 mcg oral Daily    guaiFENesin  1,200 mg oral BID    heparin (porcine)  5,000 Units subcutaneous q8h    levothyroxine  25 mcg oral Daily    [Held by provider] lisinopril  5 mg oral Daily    metoprolol succinate XL  25 mg oral Daily    oxygen   inhalation Continuous - 02/gases    piperacillin-tazobactam  2.25 g intravenous q8h    polyethylene glycol  17 g oral Daily    spironolactone  12.5 mg oral q24h MACRINA    torsemide  20 mg oral Daily       Review of Systems   Constitutional: Negative for malaise/fatigue.        Overall is feeling much better   Cardiovascular:  Negative for chest pain and palpitations.   Skin:  Negative for itching.   Gastrointestinal:  Negative for diarrhea and nausea.   Genitourinary:  Negative for dysuria.   Neurological:  Negative  for weakness.        Physical Exam  Constitutional:       General: She is not in acute distress.     Appearance: Normal appearance.   HENT:      Mouth/Throat:      Mouth: Mucous membranes are moist.   Cardiovascular:      Rate and Rhythm: Normal rate and regular rhythm.      Comments: Tele is A-V pace with no WCT/PVC's  Pulmonary:      Effort: Pulmonary effort is normal.      Breath sounds: Rales (bibasilar crackles) present.      Comments: Crackles R base only;  overall aeration sounds better  Abdominal:      Palpations: Abdomen is soft.      Tenderness: There is no abdominal tenderness.   Musculoskeletal:      Right lower leg: No edema.      Left lower leg: No edema.   Skin:     General: Skin is warm and dry.   Neurological:      Mental Status: She is alert and oriented to person, place, and time.   Psychiatric:         Behavior: Behavior is cooperative.            ASSESSMENT/PLAN  1-acute on chronic diastolic heart failure-continue IV Lasix for another 24 hours.  After that switch to 60 mg p.o. Lasix with close follow-up in CHF clinic.  Monitor electrolytes and strict I's and O's.  Add Aldactone continue SGLT2 inhibitors.  Possibly exacerbated by recent events including infection.  Will need close monitoring of electrolytes and potassium specifically.  Discussed with patient.     Agree with checking echo follow-up on results.     10-4-23: Echo as noted above. Lungs sound congested, no edema.  On IV Lasix, Metoprolol succinate and spironolactone.  Nephro has been consulted.  Recommend sodium restricted diet.       10-5-23: Lungs with diminished R base and mod crackles L fields. Having a mild climb in creatinine (1.98) CXR and LE duplex pending. Will defer diuretics to nephro: recommendations in chart.      10-6-23: Remains with dyspnea but improved some from yesterday. Currently on spironolactone.  Lasix was stopped d/t climbing creatinine. Will add oral Lasix.  Check BNP today  10-7-23: Patient does not feel  overall some improvement in her breathing.  She had thoracentesis of the left side yesterday however the formal report is not in the computer system.  For now we will continue current medical regimen.  She remains on oxygen.  10-8-23: Patient overall does feel some improvement but has this odd sensation of spasming in her abdomen with breathing.  She continues to have some bibasilar crackles but has not had hypoxia.  Creatinine is improved today at 1.76 and her potassium and magnesium are normal.  Continue current regimen of diuretics.  10-9-23: Having some PEÑA today while working with PT/OT.  Feels washed out and fatigued. Monitor: AV paced  10-10-23: Overall is feeling much improved.  Still a few crackles at the right base where she had her thoracentesis but otherwise her aeration of the lungs sounds better with no wheezing.  Kidney function continues to improve despite use of torsemide and Aldactone.  We will continue this regimen.     2-persistent atrial fibrillation-she seems to be maintaining sinus rhythm since being on amiodarone.  She is not anticoagulated but had a left atrial appendage ligation.  She has history of GI bleed.  10-4-23: On Amiodarone and BB.  HX of AV node ablation and PPM. She is no longer on oral anticoagulation, hx of STEFANIE ligation.  On ASA only. AV paced on tele.      10-5-23: AV paced on tele. Pt is no longer on anticoagulation.     10-6-23: Remains AV paced.   10-7-23: Remains AV paced.  Continue current meds including amiodarone.   10-8-23: Continued AV pacing on telemetry.  No PVCs or wide-complex tachycardia on amiodarone.  10-9-23: Remains AV paced.   10-10-23: Denies palpitations and telemetry continues to show AV paced rhythm.     3-nonsustained VT and PVCs highly symptomatic and remains on amiodarone with resolution of symptoms.  Outpatient monitoring.     10-5-23:  continue on Amiodarone     10-6-223: On Amiodarone.  No VT this admission.   10-7-23: Telemetry review reveals no  wide-complex tachycardia.  10-9-23: No VT noted on tele this admission.  Continues on Amiodarone.  No further cardiac testing is planned at this time.    Recommendations: Continue conservative medical management with current meds.  Continue torsemide and spironolactone.  Hopefully will be discharged to rehab soon.    Wilder Hurd PA-C  10/10/2023  10:04 AM

## 2023-10-10 NOTE — PROGRESS NOTES
"Adwoa Forrest is a 88 y.o. female on day 7 of admission presenting with Acute on chronic congestive heart failure, unspecified heart failure type (CMS/HCC).    Subjective   Patient seen at bedside in no acute distress.  She denies much discomfort in her right heel wound. She denies N,V,C,F,SOB.         Physical Exam    Objective     Objective:   Vasc: DP and PT pulses are faintly palpable  on the right.    Skin temperature is warm to cool proximal to distal on the right.      Neuro:  Light touch and epicritic sensation is intact to right foot.     Derm: Dressing noted to be clean, dry and intact to right foot. Upon removal of dressing. Posterior heel noted with a full thickness ulcer with a mixed fibrotic and granular base.  Some active sero-sanguinous drainage. Exposed achilles tendon tissue, some is necrosed and fraying from the bone attachment. Bone is granulated over and not directly visible. Undermining noted. No overt signs of infection. Adequate periwound perfusion.      Ortho: Muscle strength is 5/5 for all pedal groups tested.      Last Recorded Vitals  Blood pressure 149/76, pulse 65, temperature 36.8 °C (98.3 °F), resp. rate 16, height 1.575 m (5' 2\"), weight 45.9 kg (101 lb 3.2 oz), SpO2 96 %.    Intake/Output last 3 Shifts:  I/O last 3 completed shifts:  In: 2010 (43.8 mL/kg) [P.O.:1060; IV Piggyback:950]  Out: 1952 (42.5 mL/kg) [Urine:1950 (1.2 mL/kg/hr); Stool:2]  Weight: 45.9 kg     Relevant Results  Scheduled medications  amiodarone, 200 mg, oral, Daily  aspirin, 81 mg, oral, Daily  atorvastatin, 80 mg, oral, Nightly  cyanocobalamin, 2,000 mcg, oral, Daily  guaiFENesin, 1,200 mg, oral, BID  heparin (porcine), 5,000 Units, subcutaneous, q8h  levothyroxine, 25 mcg, oral, Daily  [Held by provider] lisinopril, 5 mg, oral, Daily  metoprolol succinate XL, 25 mg, oral, Daily  oxygen, , inhalation, Continuous - 02/gases  piperacillin-tazobactam, 2.25 g, intravenous, q8h  polyethylene glycol, 17 g, oral, " Daily  spironolactone, 12.5 mg, oral, q24h MACRINA  torsemide, 20 mg, oral, Daily      Continuous medications     PRN medications  PRN medications: acetaminophen, ALPRAZolam, bisacodyl, melatonin, sodium chloride, sodium chloride 0.9%        Results for orders placed or performed during the hospital encounter of 10/02/23 (from the past 24 hour(s))   Basic Metabolic Panel   Result Value Ref Range    Glucose 109 (H) 74 - 99 mg/dL    Sodium 142 136 - 145 mmol/L    Potassium 4.3 3.5 - 5.3 mmol/L    Chloride 102 98 - 107 mmol/L    Bicarbonate 36 (H) 21 - 32 mmol/L    Anion Gap 8 (L) 10 - 20 mmol/L    Urea Nitrogen 54 (H) 6 - 23 mg/dL    Creatinine 1.58 (H) 0.50 - 1.05 mg/dL    eGFR 32 (L) >60 mL/min/1.73m*2    Calcium 8.9 8.6 - 10.3 mg/dL   ECG 12 Lead   Result Value Ref Range    Ventricular Rate 66 BPM    Atrial Rate 66 BPM    DC Interval 217 ms    QRS Duration 140 ms    QT Interval 463 ms    QTC Calculation(Bazett) 486 ms    P Axis -78 degrees    R Axis 266 degrees    T Axis 78 degrees    QRS Count 10 beats    Q Onset 251 ms    T Offset 482 ms    QTC Fredericia 478 ms        Assessment/Plan   Principal Problem:    Acute on chronic congestive heart failure, unspecified heart failure type (CMS/HCC)  Active Problems:    Hyperlipidemia    Hypothyroidism    Non-healing wound of right lower extremity    CKD (chronic kidney disease) stage 3, GFR 30-59 ml/min (CMS/HCC)    Hypertension    Paroxysmal atrial fibrillation (CMS/HCC)    Elevated troponin I level    Acute hypoxic respiratory failure (CMS/HCC)    Pneumonia    Pleural effusion    Abnormal CT of the chest      Chronic ulcer right calcaneus    - Patient examined and evaluated.  All findings discussed and all questions answered to patient's satisfaction.   - No surgical intervention planned.  Will continue with local wound care.   - Dressing change performed today. Dressing orders in place.   - Patient to follow up with wound care upon discharge and proceed with skin  grafting.   - Podiatry will continue to follow as needed.          Kristi Sierra DPM       [Patient presents for infusion] : infusion therapy as documented below

## 2023-10-10 NOTE — PROGRESS NOTES
Physical Therapy    Physical Therapy Treatment    Patient Name: Adwoa Forrest  MRN: [id number]  Today's Date: 10/10/2023  Time Calculation  Start Time: 0845  Stop Time: 0908  Time Calculation (min): 23 min       Assessment/Plan   PT Assessment  PT Assessment Results: Decreased strength, Decreased range of motion, Decreased endurance, Impaired balance, Decreased mobility, Decreased safety awareness  Rehab Prognosis: Good  Barriers to Discharge: progessing  Evaluation/Treatment Tolerance: Patient limited by fatigue  Medical Staff Made Aware: Yes  Barriers to Participation: Comorbidities  End of Session Communication: Bedside nurse, PCT/NA/CTA  Assessment Comment: patient requires  verbal cus for safety with herb lantiguaslizzie. patient now on 2 litres wiht miinimal SOBwiht activity and mobility  End of Session Patient Position:  (wheelchair call ligh in reach)  PT Plan  Inpatient/Swing Bed or Outpatient: Inpatient  PT Plan  Treatment/Interventions: Bed mobility, Transfer training, Neuromuscular re-education, Strengthening, Endurance training  PT Plan: Skilled PT  PT Frequency: 3 times per week  PT Discharge Recommendations: Moderate intensity level of continued care (spoke wiht PT due toapteitn's increase need in 02 and increased weakness  changing plan to mod per PT)  Equipment Recommended upon Discharge:  (THERABAND FOR HEP)  PT Recommended Transfer Status: Stand by assist      General Visit Information:   PT  Visit  PT Received On: 10/10/23  Response to Previous Treatment: Patient with no complaints from previous session.  General  Prior to Session Communication: Bedside nurse, PCT/NA/CTA  Patient Position Received: Bed, 3 rail up, Alarm on  General Comment: patient now on 2 litres looks better today and is more agreeable to therapy    Subjective   Precautions:  Precautions  LE Weight Bearing Status: Right Non-Weight Bearing (per patient non weight bearing)  Precautions Comment: FALLS  Vital Signs:  Vital Signs  SpO2:   (RN Micaela aware of SPO2)    Objective   Pain:  Pain Assessment  Pain Assessment: 0-10  Pain Score: 0 - No pain  Pain Type:  (no complaints)  Pain Interventions: Distraction  Cognition:  Cognition  Overall Cognitive Status: Within Functional Limits  Postural Control:     Extremity/Trunk Assessments:    Activity Tolerance:  Activity Tolerance  Endurance: Tolerates 10 - 20 min exercise with multiple rests  Activity Tolerance Comments: fatigues quickly on feet due to NWB and patient stating she doesn' t fell today  Treatments:  Therapeutic Exercise  Therapeutic Exercise Performed: Yes  Therapeutic Exercise Activity 1: supine ankle pumps, glute and quad sets 20 reps each, heelslides, SAQ and hip abduction 15 reps each.    Therapeutic Activity  Therapeutic Activity Performed: Yes  Therapeutic Activity 1: static standing  NWB right  LE x 30 seconds x2  to allow donning and doffing of breif    Bed Mobility  Bed Mobility: Yes  Bed Mobility 1  Bed Mobility 1: Supine to sitting, Sitting to supine  Level of Assistance 1: Minimum assistance  Bed Mobility Comments 1: patient requires verbal cues forsafeety and use of bed rail    Transfers  Transfer: Yes  Transfer 1  Transfer From 1: Bed to, Chair with arms to  Technique 1: Stand pivot  Transfer Level of Assistance 1: Minimum assistance  Trials/Comments 1: verbal cue for safety    Outcome Measures:  [facility] Basic Mobility  Turning from your back to your side while in a flat bed without using bedrails: A lot  Moving from lying on your back to sitting on the side of a flat bed without using bedrails: A lot  Moving to and from bed to chair (including a wheelchair): A lot  Standing up from a chair using your arms (e.g. wheelchair or bedside chair): A lot  To walk in hospital room: Total  Climbing 3-5 steps with railing: Total  Basic Mobility - Total Score: 10    Education Documentation  Mobility Training, taught by Sherron Lozano PTA at 10/10/2023  9:20 AM.  Learner:  Patient  Readiness: Acceptance  Method: Explanation  Response: Verbalizes Understanding, Needs Reinforcement  Comment: safety with mobilityi    Mobility Training, taught by Sherron Lozano PTA at 10/9/2023  9:29 AM.  Learner: Patient  Readiness: Acceptance  Method: Explanation  Response: Verbalizes Understanding, Needs Reinforcement  Comment: inceased mobility and safey    Education Comments  No comments found.        OP EDUCATION:       Encounter Problems       Encounter Problems (Active)       Mobility       STRENGTHENING (Progressing)       Start:  10/03/23    Expected End:  10/24/23       20+ RROM EX TO HIPS AND KNEES STRENGTHENING LEGS FOR STABLE TRANSFERS         WC MOBILITY (Progressing)       Start:  10/03/23    Expected End:  10/24/23       INDEP AND SAFE WC MOBILITY      Goal note on 10/10/23 0920 by Carol Ramirez, PT       PROPEL WC  100 FT SBA

## 2023-10-10 NOTE — PROGRESS NOTES
Adwoa Forrest is a 88 y.o. female on day 8 of admission presenting with Acute on chronic congestive heart failure, unspecified heart failure type (CMS/HCC).      Subjective   Adwoa Forrest is a 88 y.o.  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 BNP 1800 on admission. Patient had acute worsening of respiratory status on 10/5/23. CXR with retrocardiac opacity, CT scan with multifocal pneumonia vs edema. Continued on diuresis, antibiotics broadened, stable on 6L NC. Appears to have emphysema based off CT scan as well- shew ill need pulmonlogy follow up.   10/6/23: No acute events overnight. Remained 92-93% on 6L NC. Cr improving to 1.8 (1.98). No leukocytosis but does have neutrophil predominance. MRSA nares negative- will discuss discontinuation of vanco with ID. US BLE negative for DVT. Thoracentesis right side ordered    10/7/2023: Patient was seen and examined.  Now on 5 L nasal cannula oxygen to maintain saturation.  Still no leukocytosis.  Thoracentesis with cytology completed yesterday however no reports in chart.  Continue to wean oxygen as tolerated.  Continue current IV antibiotics.  IV Lasix changed to p.o. from yesterday.  Seen by PT OT who recommend extended-care facility on discharge.   10/8: Patient was seen  and examined.  Oxygen has now been weaned down to 4 L nasal cannula.  Thoracentesis and fluid analysis is still pending.  Continue current IV antibiotics and change to oral on discharge.  Potential discharge to extended-care facility within the next 48 to 72 hours.   10/9: Patient was seen and examined.  Now saturating well on 2.5 L nasal cannula oxygen.  Seen by cardiologist who recommended that for a pulmonology consult.  I have consulted pulmonology.  Continue current IV antibiotics per ID recommendation.  Creatinine continues to trend down.  I will repeat BMP in a.m. potential discharge to extended-care facility within the next 24 to 48 hours.  10/10: Patient was seen and examined.  Still on 2.5 L nasal cannula oxygen to maintain saturation.  Pulmonology Is Recommending Outpatient Pulmonology Evaluation.  BMP was not done so I will repeat it again tomorrow.  Potential discharge to extended-care facility within the next 48 hours.    Review of Systems   Constitutional:  Negative for appetite change, chills, diaphoresis, fatigue and fever.   HENT:  Negative for congestion, ear pain, facial swelling, hearing loss, nosebleeds, sore throat, tinnitus and trouble swallowing.    Eyes:  Negative for pain.   Respiratory:  Positive for cough and shortness of breath. Negative for chest tightness and wheezing.    Cardiovascular:  Positive for leg swelling. Negative for chest pain and palpitations.   Gastrointestinal:  Negative for abdominal pain, blood in stool, constipation, diarrhea, nausea and vomiting.   Genitourinary:  Negative for dysuria, flank pain, frequency, hematuria and urgency.   Musculoskeletal:  Negative for back pain and joint swelling.   Skin:  Positive for wound. Negative for rash.   Neurological:  Negative for dizziness, syncope, weakness, light-headedness, numbness and headaches.   Hematological:  Does not bruise/bleed easily.   Psychiatric/Behavioral:  Negative for behavioral problems, hallucinations and  suicidal ideas.           Objective     Last Recorded Vitals  /70   Pulse 66   Temp 36.2 °C (97.2 °F)   Resp 20   Wt 45.9 kg (101 lb 3.2 oz)   SpO2 92%     Image Results  XR ankle right 3+ views    Result Date: 10/2/2023  Interpreted By:  Jeny Troy, STUDY: XR ANKLE RIGHT 3+ VIEWS; ;  10/2/2023 1:14 pm   INDICATION: Signs/Symptoms:Concern for osteomyelitis.   COMPARISON: 07/17/2023 and 09/12/2023   ACCESSION NUMBER(S): [id number]   ORDERING CLINICIAN: ISAIAH DOLL   FINDINGS: AP, oblique and lateral views were obtained. 2 screws anchor fracture fragment of the calcaneal tuberosity in position. There is also a metallic pin in the calcaneal tubercle. A small amount of lucency persists at the fracture site which does not appear significantly changed. Along the cortical surface of the calcaneal tubercle just slightly above the level of the metallic pin the bone appears questionably less dense. This is a fairly subtle finding and could be projectional. However this is in the vicinity of the overlying skin ulceration. Superficial soft tissue air is noted at the level of the ulceration with no deeply penetrating soft tissue air.       Metallic structures are present from internal fixation related to the avulsion from the calcaneal tubercle   There is a subtle lucency on the lateral view at the cortical surface of the tubercle just above the metallic pin which is questionably an area of interval bone destruction versus a projectional difference.     MACRO: None   Signed by: Jeny Troy 10/2/2023 1:23 PM Dictation workstation:   [id number]    XR chest 1 view    Result Date: 10/2/2023  Interpreted By:  Jeny Troy, STUDY: XR CHEST 1 VIEW; ;  10/2/2023 1:13 pm   INDICATION: . Shortness of breath   COMPARISON: 02/15/2023   ACCESSION NUMBER(S): [id number]   ORDERING CLINICIAN: ISAIAH DOLL   TECHNIQUE: Portable AP upright   FINDINGS: ICD is present in the left upper chest wall with electrode  wires in the right atrium and right ventricle. Cardiac silhouette is enlarged but appears similar to the prior study. Aorta is atherosclerotic. Interstitial markings are coarse but appears similar to the previous exam. Left costophrenic angle is indistinct which may indicate minimal left pleural fluid. Bones are osteopenic with no definite acute interval change.       Unchanged cardiomegaly   Small left pleural effusion   Coarse interstitial markings are unchanged and probably related to chronic lung disease.     Signed by: Jeny Troy 10/2/2023 1:18 PM Dictation workstation:   [id number]    XR foot    Result Date: 9/13/2023  Interpreted By:  GLADYS SHELDON MD MRN: [id number] Patient Name: CHERRY LEE  STUDY: FOOT; COMPLETE, MIN 3 VIEWS;  9/12/2023 1:03 pm  INDICATION: right foot pain  S92.031D: Closed displaced avulsion fracture of tuberosity of right calcaneus with routine healing, subsequent.  COMPARISON: 08/22/2023  ACCESSION NUMBER(S): [id number]  ORDERING CLINICIAN: JOANNE LANDA  FINDINGS: Right foot, three views  Postsurgical changes in the calcaneus status post fixation of an avulsion fracture with screws. The hardware is intact. Soft tissue edema posteriorly. There is no acute fracture. There is no dislocation      ORIF of calcaneal fracture with unchanged alignment and intact hardware. Associated soft tissue edema posteriorly       Lab Results  No results found for this or any previous visit (from the past 24 hour(s)).       Medications  Scheduled medications:  amiodarone, 200 mg, oral, Daily  aspirin, 81 mg, oral, Daily  atorvastatin, 80 mg, oral, Nightly  cyanocobalamin, 2,000 mcg, oral, Daily  guaiFENesin, 1,200 mg, oral, BID  heparin (porcine), 5,000 Units, subcutaneous, q8h  levothyroxine, 25 mcg, oral, Daily  [Held by provider] lisinopril, 5 mg, oral, Daily  metoprolol succinate XL, 25 mg, oral, Daily  polyethylene glycol, 17 g, oral, Daily  spironolactone, 12.5 mg, oral, q24h  MACRINA  torsemide, 20 mg, oral, Daily      Continuous medications:     PRN medications:  PRN medications: acetaminophen, ALPRAZolam, bisacodyl, melatonin, sodium chloride, sodium chloride 0.9%     Physical Exam  Constitutional:       General: She is not in acute distress.     Appearance: Normal appearance.   HENT:      Head: Normocephalic and atraumatic.      Right Ear: External ear normal.      Left Ear: External ear normal.      Nose: Nose normal.      Mouth/Throat:      Mouth: Mucous membranes are moist.      Pharynx: Oropharynx is clear.   Eyes:      Extraocular Movements: Extraocular movements intact.      Conjunctiva/sclera: Conjunctivae normal.      Pupils: Pupils are equal, round, and reactive to light.   Cardiovascular:      Rate and Rhythm: Normal rate and regular rhythm.      Pulses: Normal pulses.      Heart sounds: Normal heart sounds.   Pulmonary:      Effort: Pulmonary effort is normal. No respiratory distress.      Breath sounds: Rales present. No wheezing or rhonchi.      Comments: NC in place  Abdominal:      General: Abdomen is flat. Bowel sounds are normal.      Palpations: Abdomen is soft.      Tenderness: There is no abdominal tenderness. There is no right CVA tenderness, left CVA tenderness, guarding or rebound.   Musculoskeletal:         General: Normal range of motion.      Cervical back: Normal range of motion and neck supple.      Right lower leg: Edema present.      Left lower leg: Edema present.   Skin:     General: Skin is warm and dry.      Capillary Refill: Capillary refill takes less than 2 seconds.      Findings: Lesion (Heel) present. No rash.   Neurological:      General: No focal deficit present.      Mental Status: She is alert and oriented to person, place, and time. Mental status is at baseline.   Psychiatric:         Mood and Affect: Mood normal.         Behavior: Behavior normal.                  Code Status  Full Code     Assessment/Plan   This patient currently has cardiac  telemetry ordered; if you would like to modify or discontinue the telemetry order, click here to go to the orders activity to modify/discontinue the order.      Acute on chronic congestive heart failure, unspecified heart failure type (CMS/[facility])  Cardiology Consult appreciated   BNP = 1279  Imaging and examination consistent with volume overload  Continued on IV Lasix 40mg IV BID  Strict I's & O's/Daily Weights   Echocardiogram pending   Echo 02/16/23 with EF of 50% and severely increased LV septal thickness compared to Echo 09/22/2022 with EF of 60% and mild AV regurgitation.  Monitor renal function closely  Spironolactone added  Consider resumption of sglt2i    Non-healing wound of right lower extremity  s/p ORIF 07/19/23 by Dr. Brunner with recommendations of continued non-weight bearing per orthopedics/wound care due to  a persistent wound as noted to be by the patient  Wound Care Consult   ESR = 41,CRP = 6.22  Consult to podiatry- wound healing well without infectious properties  Had graft scheduled for 10/6/23  ID consult given elevated inflammatory markers  Broad spectrum abx as stated elsewhere  W. Cx with mixed bacteria    Hypertension  Continued on home medications    Hyperlipidemia  Continued on home medications    Hypothyroidism  Continued on home medications    Paroxysmal atrial fibrillation (CMS/[facility])  History of atrial fibrillation/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 and left appendage ligation  Status post dual-chamber PPM  History of recurrent diverticular bleeds preventing further anticoagulation given about 4 years ago  Continued on home medications   ontinued outpatient followup with Cardiology & Electrophysiology  In the EMR she was noted to be allergic to Amiodarone, but this was also noted as still being taken on her medication list on Cardiology outpatient record on 09/25/23 and patient states she still takes Amiodarone- We will hold the amiodarone for now and discuss  further with pharmacy to see if she is still filling this or not  Telemetry     CKD (chronic kidney disease) stage 3, GFR 30-59 ml/min (CMS/[facility])  Baseline creatinine of around 1.8 prior to 02/2023 and did have brief elevations throughout 2023 for dehydration and recent fractures but now seems to be around 1.6  Continue to monitor very closely in setting of diuresis   Nephrology consultation  Bladder scan for PVR x1    Elevated troponin I level  30 --> 29   Likely in setting of volume overload and CKD   Nonischemic EKG noted on presentation  Telemetry Monitoring    Acute hypoxic respiratory failure (CMS/[facility])  Continue diuresis  Home O2 eval prior to discharge  Worsening hypoxia morning of 10/5/23- CXR with retrocardiac opacity  Will obtain CT chest as well as VBG  Titrate oxygen  Broaden to vanco/zosyn  Check MRSA nares  IV decadron short course    Pneumonia  Noted multifocal pneumonia on CT chest  Stopped ancef and broadened to zosyn/vancomycin  Check procalcitonin  Blood cultures if fevers  ID on consult  Check MRSA nares     Pleural effusion  Has needed thoras in the past  Will order thoracentesis with protein/LDH, cytology, culture  Patient agreeable    Abnormal CT of the chest  Appears patient has emphysema based off imaging  Never formally diagnosed  Former smoker >30 years ago, +second hand smoke exposure  Patient will need PFTs once improved form respiratory standpoint  Will need follow up with pulmonology upon discharge  I discussed this at great length with patient and her son Barron at bedside, they are both in agreement      DVT ppx: subcutaneous Heparin     Nita Coles MD

## 2023-10-10 NOTE — PROGRESS NOTES
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 status post fixation of an avulsion fracture with screws. The hardware is intact. Soft tissue edema posteriorly. There is no acute fracture. There is no dislocation      ORIF of calcaneal fracture with unchanged alignment and intact hardware. Associated soft tissue edema posteriorly       Prachi Alfredo CNP  [facility]   Answering Service [phone]  Date of service: 10/10/2023  Time of service: 12:05 PM

## 2023-10-10 NOTE — PROGRESS NOTES
Adwoa Forrest is a 88 y.o. female on day 7 of admission presenting with Acute on chronic congestive heart failure, unspecified heart failure type (CMS/HCC).      Subjective   Adwoa Forrest is a 88 y.o.  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 BNP 1800 on admission. Patient had acute worsening of respiratory status on 10/5/23. CXR with retrocardiac opacity, CT scan with multifocal pneumonia vs edema. Continued on diuresis, antibiotics broadened, stable on 6L NC. Appears to have emphysema based off CT scan as well- shew ill need pulmonlogy follow up.   10/6/23: No acute events overnight. Remained 92-93% on 6L NC. Cr improving to 1.8 (1.98). No leukocytosis but does have neutrophil predominance. MRSA nares negative- will discuss discontinuation of vanco with ID. US BLE negative for DVT. Thoracentesis right side ordered    10/7/2023: Patient was seen and examined.  Now on 5 L nasal cannula oxygen to maintain saturation.  Still no leukocytosis.  Thoracentesis with cytology completed yesterday however no reports in chart.  Continue to wean oxygen as tolerated.  Continue current IV antibiotics.  IV Lasix changed to p.o. from yesterday.  Seen by PT OT who recommend extended-care facility on discharge.   10/8: Patient was seen  and examined.  Oxygen has now been weaned down to 4 L nasal cannula.  Thoracentesis and fluid analysis is still pending.  Continue current IV antibiotics and change to oral on discharge.  Potential discharge to extended-care facility within the next 48 to 72 hours.   10/9: Patient was seen and examined.  Now saturating well on 2.5 L nasal cannula oxygen.  Seen by cardiologist who recommended that for a pulmonology consult.  I have consulted pulmonology.  Continue current IV antibiotics per ID recommendation.  Creatinine continues to trend down.  I will repeat BMP in a.m. potential discharge to extended-care facility within the next 24 to 48 hours.    Review of Systems   Constitutional:  Negative for appetite change, chills, diaphoresis, fatigue and fever.   HENT:  Negative for congestion, ear pain, facial swelling, hearing loss, nosebleeds, sore throat, tinnitus and trouble swallowing.    Eyes:  Negative for pain.   Respiratory:  Positive for cough and shortness of breath. Negative for chest tightness and wheezing.    Cardiovascular:  Positive for leg swelling. Negative for chest pain and palpitations.   Gastrointestinal:  Negative for abdominal pain, blood in stool, constipation, diarrhea, nausea and vomiting.   Genitourinary:  Negative for dysuria, flank pain, frequency, hematuria and urgency.   Musculoskeletal:  Negative for back pain and joint swelling.   Skin:  Positive for wound. Negative for rash.   Neurological:  Negative for dizziness, syncope, weakness, light-headedness, numbness and headaches.   Hematological:  Does not bruise/bleed easily.   Psychiatric/Behavioral:  Negative for behavioral problems, hallucinations and suicidal ideas.           Objective     Last Recorded Vitals  /69   Pulse 65   Temp 36.3 °C (97.3 °F)   Resp 16   Wt 45.9 kg (101 lb 3.2 oz)   SpO2 94%     Image Results  XR ankle right 3+ views    Result Date: 10/2/2023  Interpreted By:  Jeny Troy, STUDY: XR ANKLE RIGHT  3+ VIEWS; ;  10/2/2023 1:14 pm   INDICATION: Signs/Symptoms:Concern for osteomyelitis.   COMPARISON: 07/17/2023 and 09/12/2023   ACCESSION NUMBER(S): [id number]   ORDERING CLINICIAN: ISAIAH DOLL   FINDINGS: AP, oblique and lateral views were obtained. 2 screws anchor fracture fragment of the calcaneal tuberosity in position. There is also a metallic pin in the calcaneal tubercle. A small amount of lucency persists at the fracture site which does not appear significantly changed. Along the cortical surface of the calcaneal tubercle just slightly above the level of the metallic pin the bone appears questionably less dense. This is a fairly subtle finding and could be projectional. However this is in the vicinity of the overlying skin ulceration. Superficial soft tissue air is noted at the level of the ulceration with no deeply penetrating soft tissue air.       Metallic structures are present from internal fixation related to the avulsion from the calcaneal tubercle   There is a subtle lucency on the lateral view at the cortical surface of the tubercle just above the metallic pin which is questionably an area of interval bone destruction versus a projectional difference.     MACRO: None   Signed by: Jeny Troy 10/2/2023 1:23 PM Dictation workstation:   [id number]    XR chest 1 view    Result Date: 10/2/2023  Interpreted By:  Jeny Troy, STUDY: XR CHEST 1 VIEW; ;  10/2/2023 1:13 pm   INDICATION: . Shortness of breath   COMPARISON: 02/15/2023   ACCESSION NUMBER(S): [id number]   ORDERING CLINICIAN: ISAIAH DOLL   TECHNIQUE: Portable AP upright   FINDINGS: ICD is present in the left upper chest wall with electrode wires in the right atrium and right ventricle. Cardiac silhouette is enlarged but appears similar to the prior study. Aorta is atherosclerotic. Interstitial markings are coarse but appears similar to the previous exam. Left costophrenic angle is indistinct which may indicate minimal left  pleural fluid. Bones are osteopenic with no definite acute interval change.       Unchanged cardiomegaly   Small left pleural effusion   Coarse interstitial markings are unchanged and probably related to chronic lung disease.     Signed by: Jeny Troy 10/2/2023 1:18 PM Dictation workstation:   [id number]    XR foot    Result Date: 9/13/2023  Interpreted By:  GLADYS SHELDON MD MRN: [id number] Patient Name: CHERRY LEE  STUDY: FOOT; COMPLETE, MIN 3 VIEWS;  9/12/2023 1:03 pm  INDICATION: right foot pain  S92.031D: Closed displaced avulsion fracture of tuberosity of right calcaneus with routine healing, subsequent.  COMPARISON: 08/22/2023  ACCESSION NUMBER(S): [id number]  ORDERING CLINICIAN: JOANNE LANDA  FINDINGS: Right foot, three views  Postsurgical changes in the calcaneus status post fixation of an avulsion fracture with screws. The hardware is intact. Soft tissue edema posteriorly. There is no acute fracture. There is no dislocation      ORIF of calcaneal fracture with unchanged alignment and intact hardware. Associated soft tissue edema posteriorly       Lab Results  Results for orders placed or performed during the hospital encounter of 10/02/23 (from the past 24 hour(s))   Basic Metabolic Panel   Result Value Ref Range    Glucose 109 (H) 74 - 99 mg/dL    Sodium 142 136 - 145 mmol/L    Potassium 4.3 3.5 - 5.3 mmol/L    Chloride 102 98 - 107 mmol/L    Bicarbonate 36 (H) 21 - 32 mmol/L    Anion Gap 8 (L) 10 - 20 mmol/L    Urea Nitrogen 54 (H) 6 - 23 mg/dL    Creatinine 1.58 (H) 0.50 - 1.05 mg/dL    eGFR 32 (L) >60 mL/min/1.73m*2    Calcium 8.9 8.6 - 10.3 mg/dL   ECG 12 Lead   Result Value Ref Range    Ventricular Rate 66 BPM    Atrial Rate 66 BPM    MS Interval 217 ms    QRS Duration 140 ms    QT Interval 463 ms    QTC Calculation(Bazett) 486 ms    P Axis -78 degrees    R Axis 266 degrees    T Axis 78 degrees    QRS Count 10 beats    Q Onset 251 ms    T Offset 482 ms    QTC Fredericia 478 ms         Medications  Scheduled medications:  amiodarone, 200 mg, oral, Daily  aspirin, 81 mg, oral, Daily  atorvastatin, 80 mg, oral, Nightly  cyanocobalamin, 2,000 mcg, oral, Daily  guaiFENesin, 1,200 mg, oral, BID  heparin (porcine), 5,000 Units, subcutaneous, q8h  levothyroxine, 25 mcg, oral, Daily  [Held by provider] lisinopril, 5 mg, oral, Daily  metoprolol succinate XL, 25 mg, oral, Daily  oxygen, , inhalation, Continuous - 02/gases  piperacillin-tazobactam, 2.25 g, intravenous, q8h  polyethylene glycol, 17 g, oral, Daily  spironolactone, 12.5 mg, oral, q24h MACRINA  torsemide, 20 mg, oral, Daily      Continuous medications:     PRN medications:  PRN medications: acetaminophen, ALPRAZolam, bisacodyl, melatonin, sodium chloride, sodium chloride 0.9%     Physical Exam  Constitutional:       General: She is not in acute distress.     Appearance: Normal appearance.   HENT:      Head: Normocephalic and atraumatic.      Right Ear: External ear normal.      Left Ear: External ear normal.      Nose: Nose normal.      Mouth/Throat:      Mouth: Mucous membranes are moist.      Pharynx: Oropharynx is clear.   Eyes:      Extraocular Movements: Extraocular movements intact.      Conjunctiva/sclera: Conjunctivae normal.      Pupils: Pupils are equal, round, and reactive to light.   Cardiovascular:      Rate and Rhythm: Normal rate and regular rhythm.      Pulses: Normal pulses.      Heart sounds: Normal heart sounds.   Pulmonary:      Effort: Pulmonary effort is normal. No respiratory distress.      Breath sounds: Rales present. No wheezing or rhonchi.      Comments: NC in place  Abdominal:      General: Abdomen is flat. Bowel sounds are normal.      Palpations: Abdomen is soft.      Tenderness: There is no abdominal tenderness. There is no right CVA tenderness, left CVA tenderness, guarding or rebound.   Musculoskeletal:         General: Normal range of motion.      Cervical back: Normal range of motion and neck supple.      Right  lower leg: Edema present.      Left lower leg: Edema present.   Skin:     General: Skin is warm and dry.      Capillary Refill: Capillary refill takes less than 2 seconds.      Findings: Lesion (Heel) present. No rash.   Neurological:      General: No focal deficit present.      Mental Status: She is alert and oriented to person, place, and time. Mental status is at baseline.   Psychiatric:         Mood and Affect: Mood normal.         Behavior: Behavior normal.                  Code Status  Full Code     Assessment/Plan   This patient currently has cardiac telemetry ordered; if you would like to modify or discontinue the telemetry order, click here to go to the orders activity to modify/discontinue the order.      Acute on chronic congestive heart failure, unspecified heart failure type (CMS/[facility])  Cardiology Consult appreciated   BNP = 1279  Imaging and examination consistent with volume overload  Continued on IV Lasix 40mg IV BID  Strict I's & O's/Daily Weights   Echocardiogram pending   Echo 02/16/23 with EF of 50% and severely increased LV septal thickness compared to Echo 09/22/2022 with EF of 60% and mild AV regurgitation.  Monitor renal function closely  Spironolactone added  Consider resumption of sglt2i    Non-healing wound of right lower extremity  s/p ORIF 07/19/23 by Dr. Brunner with recommendations of continued non-weight bearing per orthopedics/wound care due to  a persistent wound as noted to be by the patient  Wound Care Consult   ESR = 41,CRP = 6.22  Consult to podiatry- wound healing well without infectious properties  Had graft scheduled for 10/6/23  ID consult given elevated inflammatory markers  Broad spectrum abx as stated elsewhere  W. Cx with mixed bacteria    Hypertension  Continued on home medications    Hyperlipidemia  Continued on home medications    Hypothyroidism  Continued on home medications    Paroxysmal atrial fibrillation (CMS/[facility])  History of atrial fibrillation/flutter with failed  Watchman status post open heart retrieval and left appendage ligation  Status post dual-chamber PPM  History of recurrent diverticular bleeds preventing further anticoagulation given about 4 years ago  Continued on home medications   ontinued outpatient followup with Cardiology & Electrophysiology  In the EMR she was noted to be allergic to Amiodarone, but this was also noted as still being taken on her medication list on Cardiology outpatient record on 09/25/23 and patient states she still takes Amiodarone- We will hold the amiodarone for now and discuss further with pharmacy to see if she is still filling this or not  Telemetry     CKD (chronic kidney disease) stage 3, GFR 30-59 ml/min (CMS/[facility])  Baseline creatinine of around 1.8 prior to 02/2023 and did have brief elevations throughout 2023 for dehydration and recent fractures but now seems to be around 1.6  Continue to monitor very closely in setting of diuresis   Nephrology consultation  Bladder scan for PVR x1    Elevated troponin I level  30 --> 29   Likely in setting of volume overload and CKD   Nonischemic EKG noted on presentation  Telemetry Monitoring    Acute hypoxic respiratory failure (CMS/[facility])  Continue diuresis  Home O2 eval prior to discharge  Worsening hypoxia morning of 10/5/23- CXR with retrocardiac opacity  Will obtain CT chest as well as VBG  Titrate oxygen  Broaden to vanco/zosyn  Check MRSA nares  IV decadron short course    Pneumonia  Noted multifocal pneumonia on CT chest  Stopped ancef and broadened to zosyn/vancomycin  Check procalcitonin  Blood cultures if fevers  ID on consult  Check MRSA nares     Pleural effusion  Has needed thoras in the past  Will order thoracentesis with protein/LDH, cytology, culture  Patient agreeable    Abnormal CT of the chest  Appears patient has emphysema based off imaging  Never formally diagnosed  Former smoker >30 years ago, +second hand smoke exposure  Patient will need PFTs once improved form respiratory  standpoint  Will need follow up with pulmonology upon discharge  I discussed this at great length with patient and her son Barron at bedside, they are both in agreement      DVT ppx: subcutaneous Heparin     Nita Coles MD

## 2023-10-10 NOTE — PROGRESS NOTES
Physical Therapy                 Therapy Communication Note    Patient Name: Adwoa Forrest  MRN: [id number]  Today's Date: 10/10/2023     Discipline: Physical Therapy    Missed Visit Reason:      Missed Time:     Comment:PT. WITH MOBILITY DECLINE, GOALS UPDATED

## 2023-10-10 NOTE — PROGRESS NOTES
..       [facility] Progress Note           Subjective/   88 y.o. year old female who we are seeing in consultation for YEVGENIY on CKD.     NAEON  Feeling ok  Edema improved  Appetite ok  Breathing better  No issues with UOP  s/p thoracentesis 10/6    ROS done and negative unless mentioned as above  No change in PFSH  All data labs/interval notes and overnight issues are reviewed  Objective/     Vitals:    10/10/23 0445 10/10/23 0914 10/10/23 1312 10/10/23 1338   BP: 110/61 145/77  143/70   Pulse: 64 65  66   Resp: 16 16  20   Temp: 36.4 °C (97.5 °F) 36.3 °C (97.3 °F)  36.2 °C (97.2 °F)   TempSrc:   Temporal    SpO2: 92% 96%  92%   Weight:       Height:            Intake/Output Summary (Last 24 hours) at 10/10/2023 1630  Last data filed at 10/10/2023 1522  Gross per 24 hour   Intake 120 ml   Output 1401 ml   Net -1281 ml            Current Facility-Administered Medications:     acetaminophen (Tylenol) tablet 650 mg, 650 mg, oral, q4h PRN, Jt House DO, 650 mg at 10/10/23 0855    ALPRAZolam (Xanax) tablet 0.5 mg, 0.5 mg, oral, Nightly PRN, Nallely Clarke PA-C, 0.5 mg at 10/09/23 2118    amiodarone (Pacerone) tablet 200 mg, 200 mg, oral, Daily, Jt House DO, 200 mg at 10/10/23 0857    aspirin EC tablet 81 mg, 81 mg, oral, Daily, Nallely Clarke PA-C, 81 mg at 10/10/23 0858    atorvastatin (Lipitor) tablet 80 mg, 80 mg, oral, Nightly, Nallely Clarke PA-C, 80 mg at 10/09/23 2115    bisacodyl (Dulcolax) EC tablet 10 mg, 10 mg, oral, Daily PRN, Nallely Clarke PA-C    cyanocobalamin (Vitamin B-12) tablet 2,000 mcg, 2,000 mcg, oral, Daily, Nallely Clarke PA-C, 2,000 mcg at 10/10/23 0857    guaiFENesin (Mucinex) 12 hr tablet 1,200 mg, 1,200 mg, oral, BID, Aripta Camp PA-C, 1,200 mg at 10/10/23 0856    heparin (porcine) injection 5,000 Units, 5,000 Units, subcutaneous, q8h, Nallely Clarke PA-C, 5,000 Units at 10/10/23 1400    levothyroxine (Synthroid, Levoxyl) tablet 25 mcg, 25 mcg,  oral, Daily, Nallely Clarke PA-C, 25 mcg at 10/10/23 0614    [Held by provider] lisinopril tablet 5 mg, 5 mg, oral, Daily, Jt House DO    melatonin tablet 9 mg, 9 mg, oral, Nightly PRN, Jt House DO, 9 mg at 10/09/23 2203    metoprolol succinate XL (Toprol-XL) 24 hr tablet 25 mg, 25 mg, oral, Daily, Arpita Camp PA-C, 25 mg at 10/10/23 0856    polyethylene glycol (Glycolax, Miralax) packet 17 g, 17 g, oral, Daily, Nallely Clarke PA-C, 17 g at 10/05/23 0903    sodium chloride (Ocean) 0.65 % nasal spray 1 spray, 1 spray, Each Nostril, PRN, Nita Coles MD    sodium chloride 0.9% flush 10 mL, 10 mL, intravenous, q8h PRN, Catherine Rashid MD    spironolactone (Aldactone) tablet 12.5 mg, 12.5 mg, oral, q24h MACRINA, Michael Latham MD, 12.5 mg at 10/10/23 0856    torsemide (Demadex) tablet 20 mg, 20 mg, oral, Daily, Arpita Camp PA-C, 20 mg at 10/10/23 0857    Physical Exam  Constitutional:       General: She is not in acute distress.     Appearance: Normal appearance. She is not ill-appearing or toxic-appearing.   HENT:      Head: Normocephalic and atraumatic.   Eyes:      General: No scleral icterus.     Conjunctiva/sclera: Conjunctivae normal.   Neck:      Vascular: No carotid bruit.   Cardiovascular:      Rate and Rhythm: Normal rate and regular rhythm.      Pulses: Normal pulses.      Heart sounds: Normal heart sounds.   Pulmonary:      Effort: Pulmonary effort is normal.      Breath sounds: Rhonchi present.      Comments: Breathing comfortably on  2 lts oxygen now   Abdominal:      General: Bowel sounds are normal. There is no distension.      Palpations: Abdomen is soft.      Tenderness: There is no abdominal tenderness.   Musculoskeletal:      Cervical back: Neck supple.      Right lower leg: No edema.      Left lower leg: No edema.   Skin:     General: Skin is warm and dry.   Neurological:      General: No focal deficit present.      Mental Status: She is alert and  oriented to person, place, and time.   Psychiatric:         Mood and Affect: Mood normal.         Behavior: Behavior normal.         Judgment: Judgment normal.        Results from last 7 days   Lab Units 10/09/23  0941 10/08/23  0526 10/07/23  0907   SODIUM mmol/L 142 144 143   POTASSIUM mmol/L 4.3 5.0 4.8   CHLORIDE mmol/L 102 104 103   CO2 mmol/L 36* 37* 36*   BUN mg/dL 54* 56* 57*   CREATININE mg/dL 1.58* 1.76* 1.89*   EGFR mL/min/1.73m*2 32* 28* 25*   GLUCOSE mg/dL 109* 108* 102*   CALCIUM mg/dL 8.9 9.0 9.2       Results from last 7 days   Lab Units 10/08/23  0526 10/07/23  0907 10/06/23  0539   WBC AUTO x10*3/uL 8.3 9.9 7.2   HEMOGLOBIN g/dL 11.4* 11.7* 12.6   HEMATOCRIT % 39.3 39.8 42.4   PLATELETS AUTO x10*3/uL 351 353 374   NEUTROS PCT AUTO % 80.3 86.2 90.0   LYMPHS PCT AUTO % 6.1 6.4 7.5   MONOS PCT AUTO % 7.6 6.5 1.4   EOS PCT AUTO % 4.7 0.2 0.0        Results from last 7 days   Lab Units 10/08/23  0526 10/07/23  0907 10/06/23  0539   WBC AUTO x10*3/uL 8.3 9.9 7.2   HEMOGLOBIN g/dL 11.4* 11.7* 12.6   HEMATOCRIT % 39.3 39.8 42.4   PLATELETS AUTO x10*3/uL 351 353 374         Assessment/Plan   YEVGENIY/ATN likley 2/2 CRS  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  Acute on chronic diastolic HFpEF 70-75%  Acute hypoxic respiratory failure (no oxygen at baseline) 2/2 PNA  Pulmonary hypertension  Emphysema  B/L pleural effusions (small)  RLE cellulitis and infected foot wound  Persistent Afib  ASCVD        Plan:  -Scr stable around bl~1.6-1.8, non oliguric, BP stable  -C/w oral Torsemide 20 mg daily and Aldactone 12.5 mg and continue on discharge  -Hypoxia likely related to PNA and   -s/p thoracentesis 10/6  -CHF appears well compensated on exam  -Wean oxygen as able, defer to primary  -Management of Afib defer to cardiology  -IV Ancef for the cellulitis and defer to ID, recommend monitor vanc levels   -Ok for discharge from renal standpoint and follow up closely in outpatient clinic in 1 week, BMP prior  -All other care defer to primary      Savanah Paul APRN-CNP  [facility], [facility]  [phone]

## 2023-10-10 NOTE — CARE PLAN
The patient's goals for the shift include      The clinical goals for the shift include Patient will be free from s/sx of new skin breakdown by end of shift.    Over the shift, the patient did not make progress toward the following goals. Barriers to progression include ***. Recommendations to address these barriers include ***.

## 2023-10-10 NOTE — CARE PLAN
Problem: Mobility  Goal: STRENGTHENING  Description: 20+ RROM EX TO HIPS AND KNEES STRENGTHENING LEGS FOR STABLE TRANSFERS  Outcome: Progressing     Problem: Mobility  Goal: WC MOBILITY  Description: INDEP AND SAFE WC MOBILITY  10/10/2023 0924 by Carol Ramirez, PT  Outcome: Not Progressing  10/10/2023 0920 by Carol Ramirez, PT  Note: PROPEL WC  100 FT SBA     Problem: Transfers  Goal: BED MOBILTIY  Description: SUPINE<>SIT INDEP  Outcome: Not Progressing  Goal: BED<>WC  Description: S FOR STAND PIVOT TRANSFERS  Outcome: Not Progressing

## 2023-10-10 NOTE — PROGRESS NOTES
Pt was accepted at [facility].  They initiated auth 10/7 @208pm.   Patient insurance Precert was escalated to  Case management leadership.

## 2023-10-11 ENCOUNTER — PHARMACY VISIT (OUTPATIENT)
Dept: PHARMACY | Facility: CLINIC | Age: 88
End: 2023-10-11
Payer: COMMERCIAL

## 2023-10-11 VITALS
TEMPERATURE: 96.9 F | OXYGEN SATURATION: 92 % | SYSTOLIC BLOOD PRESSURE: 164 MMHG | RESPIRATION RATE: 18 BRPM | BODY MASS INDEX: 18.62 KG/M2 | HEART RATE: 66 BPM | DIASTOLIC BLOOD PRESSURE: 67 MMHG | WEIGHT: 101.2 LBS | HEIGHT: 62 IN

## 2023-10-11 PROBLEM — I48.0 PAROXYSMAL ATRIAL FIBRILLATION (MULTI): Status: RESOLVED | Noted: 2023-10-02 | Resolved: 2023-10-11

## 2023-10-11 PROBLEM — R93.89 ABNORMAL CT OF THE CHEST: Status: RESOLVED | Noted: 2023-10-06 | Resolved: 2023-10-11

## 2023-10-11 PROBLEM — J18.9 PNEUMONIA: Status: RESOLVED | Noted: 2023-10-06 | Resolved: 2023-10-11

## 2023-10-11 LAB
ANION GAP SERPL CALC-SCNC: 9 MMOL/L (ref 10–20)
BUN SERPL-MCNC: 51 MG/DL (ref 6–23)
CALCIUM SERPL-MCNC: 9 MG/DL (ref 8.6–10.3)
CHLORIDE SERPL-SCNC: 102 MMOL/L (ref 98–107)
CO2 SERPL-SCNC: 37 MMOL/L (ref 21–32)
CREAT SERPL-MCNC: 1.51 MG/DL (ref 0.5–1.05)
GFR SERPL CREATININE-BSD FRML MDRD: 33 ML/MIN/1.73M*2
GLUCOSE SERPL-MCNC: 120 MG/DL (ref 74–99)
LABORATORY COMMENT REPORT: NORMAL
LABORATORY COMMENT REPORT: NORMAL
PATH REPORT.FINAL DX SPEC: NORMAL
PATH REPORT.GROSS SPEC: NORMAL
PATH REPORT.RELEVANT HX SPEC: NORMAL
PATH REPORT.TOTAL CANCER: NORMAL
POTASSIUM SERPL-SCNC: 4.3 MMOL/L (ref 3.5–5.3)
SODIUM SERPL-SCNC: 144 MMOL/L (ref 136–145)

## 2023-10-11 PROCEDURE — 99239 HOSP IP/OBS DSCHRG MGMT >30: CPT | Performed by: INTERNAL MEDICINE

## 2023-10-11 PROCEDURE — 80048 BASIC METABOLIC PNL TOTAL CA: CPT | Performed by: INTERNAL MEDICINE

## 2023-10-11 PROCEDURE — 36415 COLL VENOUS BLD VENIPUNCTURE: CPT | Performed by: INTERNAL MEDICINE

## 2023-10-11 PROCEDURE — 96372 THER/PROPH/DIAG INJ SC/IM: CPT | Performed by: STUDENT IN AN ORGANIZED HEALTH CARE EDUCATION/TRAINING PROGRAM

## 2023-10-11 PROCEDURE — 2500000004 HC RX 250 GENERAL PHARMACY W/ HCPCS (ALT 636 FOR OP/ED): Performed by: INTERNAL MEDICINE

## 2023-10-11 PROCEDURE — 2500000004 HC RX 250 GENERAL PHARMACY W/ HCPCS (ALT 636 FOR OP/ED): Performed by: PHYSICIAN ASSISTANT

## 2023-10-11 PROCEDURE — 2500000002 HC RX 250 W HCPCS SELF ADMINISTERED DRUGS (ALT 637 FOR MEDICARE OP, ALT 636 FOR OP/ED): Performed by: INTERNAL MEDICINE

## 2023-10-11 PROCEDURE — 2500000004 HC RX 250 GENERAL PHARMACY W/ HCPCS (ALT 636 FOR OP/ED): Performed by: STUDENT IN AN ORGANIZED HEALTH CARE EDUCATION/TRAINING PROGRAM

## 2023-10-11 PROCEDURE — 2500000001 HC RX 250 WO HCPCS SELF ADMINISTERED DRUGS (ALT 637 FOR MEDICARE OP): Performed by: STUDENT IN AN ORGANIZED HEALTH CARE EDUCATION/TRAINING PROGRAM

## 2023-10-11 PROCEDURE — 99232 SBSQ HOSP IP/OBS MODERATE 35: CPT | Performed by: PHYSICIAN ASSISTANT

## 2023-10-11 PROCEDURE — 99222 1ST HOSP IP/OBS MODERATE 55: CPT | Performed by: NURSE PRACTITIONER

## 2023-10-11 PROCEDURE — 2500000001 HC RX 250 WO HCPCS SELF ADMINISTERED DRUGS (ALT 637 FOR MEDICARE OP): Performed by: PHYSICIAN ASSISTANT

## 2023-10-11 RX ORDER — OXYCODONE AND ACETAMINOPHEN 5; 325 MG/1; MG/1
1 TABLET ORAL EVERY 8 HOURS PRN
Qty: 5 TABLET | Refills: 0 | Status: SHIPPED | OUTPATIENT
Start: 2023-10-11 | End: 2023-10-14

## 2023-10-11 RX ORDER — SPIRONOLACTONE 25 MG/1
12.5 TABLET ORAL
Qty: 15 TABLET | Refills: 0 | Status: SHIPPED | OUTPATIENT
Start: 2023-10-12 | End: 2023-11-05 | Stop reason: HOSPADM

## 2023-10-11 RX ORDER — ALPRAZOLAM 0.5 MG/1
0.5 TABLET ORAL NIGHTLY PRN
Qty: 3 TABLET | Refills: 0 | Status: SHIPPED | OUTPATIENT
Start: 2023-10-11 | End: 2023-10-25 | Stop reason: SDUPTHER

## 2023-10-11 RX ORDER — ALPRAZOLAM 0.5 MG/1
0.5 TABLET ORAL NIGHTLY PRN
Qty: 3 TABLET | Refills: 0 | Status: SHIPPED | OUTPATIENT
Start: 2023-10-11 | End: 2023-10-11 | Stop reason: SDUPTHER

## 2023-10-11 RX ORDER — TORSEMIDE 20 MG/1
20 TABLET ORAL DAILY
Qty: 30 TABLET | Refills: 0 | Status: SHIPPED | OUTPATIENT
Start: 2023-10-12 | End: 2023-11-06 | Stop reason: SDUPTHER

## 2023-10-11 RX ORDER — GUAIFENESIN 600 MG/1
1200 TABLET, EXTENDED RELEASE ORAL 2 TIMES DAILY
Qty: 20 TABLET | Refills: 0 | Status: SHIPPED | OUTPATIENT
Start: 2023-10-11 | End: 2024-01-30 | Stop reason: WASHOUT

## 2023-10-11 RX ORDER — OXYCODONE AND ACETAMINOPHEN 5; 325 MG/1; MG/1
1 TABLET ORAL EVERY 8 HOURS PRN
Qty: 5 TABLET | Refills: 0 | Status: SHIPPED | OUTPATIENT
Start: 2023-10-11 | End: 2023-10-11 | Stop reason: SDUPTHER

## 2023-10-11 RX ORDER — AMOXICILLIN AND CLAVULANATE POTASSIUM 500; 125 MG/1; MG/1
1 TABLET, FILM COATED ORAL 2 TIMES DAILY
Qty: 14 TABLET | Refills: 0 | Status: SHIPPED | OUTPATIENT
Start: 2023-10-11 | End: 2023-10-18

## 2023-10-11 RX ADMIN — LEVOTHYROXINE SODIUM 25 MCG: 0.03 TABLET ORAL at 06:24

## 2023-10-11 RX ADMIN — AMIODARONE HYDROCHLORIDE 200 MG: 200 TABLET ORAL at 08:13

## 2023-10-11 RX ADMIN — ALPRAZOLAM 0.5 MG: 0.5 TABLET ORAL at 21:06

## 2023-10-11 RX ADMIN — TORSEMIDE 20 MG: 20 TABLET ORAL at 08:12

## 2023-10-11 RX ADMIN — ACETAMINOPHEN 650 MG: 325 TABLET, FILM COATED ORAL at 02:09

## 2023-10-11 RX ADMIN — GUAIFENESIN 1200 MG: 600 TABLET ORAL at 08:14

## 2023-10-11 RX ADMIN — METOPROLOL SUCCINATE 25 MG: 25 TABLET, EXTENDED RELEASE ORAL at 08:13

## 2023-10-11 RX ADMIN — HEPARIN SODIUM 5000 UNITS: 5000 INJECTION INTRAVENOUS; SUBCUTANEOUS at 06:24

## 2023-10-11 RX ADMIN — ASPIRIN 81 MG: 81 TABLET, COATED ORAL at 08:12

## 2023-10-11 RX ADMIN — GUAIFENESIN 1200 MG: 600 TABLET ORAL at 21:06

## 2023-10-11 RX ADMIN — ATORVASTATIN CALCIUM 80 MG: 40 TABLET, FILM COATED ORAL at 21:06

## 2023-10-11 RX ADMIN — SPIRONOLACTONE 12.5 MG: 25 TABLET, FILM COATED ORAL at 08:14

## 2023-10-11 RX ADMIN — HEPARIN SODIUM 5000 UNITS: 5000 INJECTION INTRAVENOUS; SUBCUTANEOUS at 14:01

## 2023-10-11 RX ADMIN — ACETAMINOPHEN 650 MG: 325 TABLET, FILM COATED ORAL at 12:34

## 2023-10-11 RX ADMIN — CYANOCOBALAMIN TAB 1000 MCG 2000 MCG: 1000 TAB at 08:12

## 2023-10-11 ASSESSMENT — ENCOUNTER SYMPTOMS
NAUSEA: 0
DYSURIA: 0
CONFUSION: 0
VOMITING: 0
DIARRHEA: 0
WOUND: 1
APPETITE CHANGE: 1
CONSTIPATION: 0
FATIGUE: 1
HEADACHES: 0
DIZZINESS: 0
ABDOMINAL PAIN: 0
WEAKNESS: 0
SHORTNESS OF BREATH: 1
DYSPHORIC MOOD: 0
BRUISES/BLEEDS EASILY: 0
WEAKNESS: 1
POLYPHAGIA: 0
POLYDIPSIA: 0
CHEST TIGHTNESS: 0
ACTIVITY CHANGE: 1
COLOR CHANGE: 0
CHILLS: 0
FEVER: 0
PALPITATIONS: 0
WHEEZING: 1
COUGH: 1
MYALGIAS: 0

## 2023-10-11 ASSESSMENT — COGNITIVE AND FUNCTIONAL STATUS - GENERAL
DRESSING REGULAR LOWER BODY CLOTHING: A LITTLE
DRESSING REGULAR UPPER BODY CLOTHING: A LITTLE
CLIMB 3 TO 5 STEPS WITH RAILING: TOTAL
TURNING FROM BACK TO SIDE WHILE IN FLAT BAD: A LOT
TOILETING: A LITTLE
STANDING UP FROM CHAIR USING ARMS: A LOT
MOVING TO AND FROM BED TO CHAIR: A LITTLE
HELP NEEDED FOR BATHING: A LITTLE
EATING MEALS: A LITTLE
MOBILITY SCORE: 11
WALKING IN HOSPITAL ROOM: TOTAL
DAILY ACTIVITIY SCORE: 18
MOVING FROM LYING ON BACK TO SITTING ON SIDE OF FLAT BED WITH BEDRAILS: A LOT
PERSONAL GROOMING: A LITTLE

## 2023-10-11 ASSESSMENT — PAIN SCALES - GENERAL
PAINLEVEL_OUTOF10: 0 - NO PAIN
PAINLEVEL_OUTOF10: 2

## 2023-10-11 ASSESSMENT — PAIN - FUNCTIONAL ASSESSMENT
PAIN_FUNCTIONAL_ASSESSMENT: 0-10

## 2023-10-11 NOTE — DISCHARGE SUMMARY
Discharge Diagnosis  Acute on chronic congestive heart failure, unspecified heart failure type (CMS/[facility])  Non-healing wound of right lower extremity  Hypertension  Hyperlipidemia  Hypothyroidism  Paroxysmal atrial fibrillation (CMS/[facility])  YEVGENIY on CKD (chronic kidney disease) stage 3, GFR 30-59 ml/min (CMS/[facility])  Elevated troponin I level  Acute hypoxic respiratory failure (CMS/[facility]   Pneumonia   Pleural effusion   Abnormal CT of the chest      Issues Requiring Follow-Up      Discharge Meds     Your medication list        START taking these medications        Instructions Last Dose Given Next Dose Due   amoxicillin-pot clavulanate 500-125 mg tablet  Commonly known as: Augmentin  Replaces: amoxicillin-pot clavulanate 875-125 mg tablet      Take 1 tablet (500 mg) by mouth 2 times a day for 7 days.       guaiFENesin 1,200 mg tablet extended release 12hr  Commonly known as: Mucinex      Take 1 tablet (1,200 mg) by mouth 2 times a day for 7 days. Do not crush, chew, or split.       spironolactone 25 mg tablet  Commonly known as: Aldactone  Start taking on: October 12, 2023      Take 0.5 tablets (12.5 mg) by mouth once every 24 hours. Do not start before October 12, 2023.       torsemide 20 mg tablet  Commonly known as: Demadex  Start taking on: October 12, 2023      Take 1 tablet (20 mg) by mouth once daily. Do not start before October 12, 2023.              CHANGE how you take these medications        Instructions Last Dose Given Next Dose Due   oxyCODONE-acetaminophen 5-325 mg tablet  Commonly known as: Percocet  What changed: reasons to take this      Take 1 tablet by mouth every 8 hours if needed for severe pain (7 - 10).              CONTINUE taking these medications        Instructions Last Dose Given Next Dose Due   ALPRAZolam 0.5 mg tablet  Commonly known as: Xanax      Take 1 tablet (0.5 mg) by mouth as needed at bedtime for sleep or anxiety.       amiodarone 200 mg tablet  Commonly known as: Pacerone            aspirin 81 mg EC tablet           atorvastatin 80 mg tablet  Commonly known as: Lipitor           calcium carbonate-vitamin D3 500 mg-10 mcg (400 unit) chewable tablet           cyanocobalamin 1,000 mcg/mL injection  Commonly known as: Vitamin B-12           cyanocobalamin 2,000 mcg tablet  Commonly known as: Vitamin B-12           docusate sodium 100 mg capsule  Commonly known as: Colace           Farxiga 10 mg  Generic drug: dapagliflozin propanediol           fluconazole 150 mg tablet  Commonly known as: Diflucan           glucosamine HCl 500 mg tablet           icosapent ethyL 1 gram capsule  Commonly known as: Vascepa           levothyroxine 25 mcg tablet  Commonly known as: Synthroid, Levoxyl      TAKE 1 TABLET BY MOUTH EVERY DAY       lisinopril 2.5 mg tablet           melatonin 3 mg capsule           metoprolol succinate XL 50 mg 24 hr tablet  Commonly known as: Toprol-XL           MULTIVITAMIN ORAL           triamcinolone 0.1 % cream  Commonly known as: Kenalog                  STOP taking these medications      amoxicillin-pot clavulanate 875-125 mg tablet  Commonly known as: Augmentin  Replaced by: amoxicillin-pot clavulanate 500-125 mg tablet        azithromycin 500 mg tablet  Commonly known as: Zithromax        furosemide 40 mg tablet  Commonly known as: Lasix                  Where to Get Your Medications        These medications were sent to [facility]  [address] [place] [address]      Hours: 8AM to 6PM Mon-Fri, 8AM to 2PM Sat, 8AM to 12PM Sun Phone: [phone]   amoxicillin-pot clavulanate 500-125 mg tablet  guaiFENesin 1,200 mg tablet extended release 12hr  oxyCODONE-acetaminophen 5-325 mg tablet  spironolactone 25 mg tablet  torsemide 20 mg tablet         Test Results Pending At Discharge  Pending Labs       Order Current Status    Cytology (Non-Gynecologic) In process    Sterile Fluid Culture/Smear Preliminary result            Hospital Course    Adwoa Forrest is a 88  y.o.  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 BNP 1800 on admission. Patient had acute worsening of respiratory status on 10/5/23. CXR with retrocardiac opacity, CT scan with multifocal pneumonia vs edema. Continued on diuresis, antibiotics broadened, stable on 6L NC. Appears to have emphysema based off CT scan as well- shew ill need pulmonlogy follow up.   10/6/23: No acute events overnight. Remained 92-93% on 6L NC. Cr improving to 1.8 (1.98). No leukocytosis but does have neutrophil predominance. MRSA nares negative- will discuss discontinuation of vanco with ID. US BLE negative for DVT. Thoracentesis right side ordered     10/7/2023: Patient was seen and examined.  Now on 5 L nasal cannula oxygen to maintain saturation.  Still no leukocytosis.  Thoracentesis with cytology completed yesterday however no reports in chart.  Continue to wean oxygen as tolerated.  Continue current IV antibiotics.  IV Lasix changed to p.o. from yesterday.  Seen by PT OT who recommend extended-care facility on discharge.   10/8: Patient was seen and examined.  Oxygen has now been weaned down to 4 L nasal cannula.  Thoracentesis and fluid analysis is still pending.  Continue current IV antibiotics and change to oral on discharge.   Potential discharge to extended-care facility within the next 48 to 72 hours.     10/9: Patient was seen and examined.  Now saturating well on 2.5 L nasal cannula oxygen.  Seen by cardiologist who recommended that for a pulmonology consult.  I have consulted pulmonology.  Continue current IV antibiotics per ID recommendation.  Creatinine continues to trend down.  I will repeat BMP in a.m. potential discharge to extended-care facility within the next 24 to 48 hours.  10/10: Patient was seen and examined.  Still on 2.5 L nasal cannula oxygen to maintain saturation.  Pulmonology Is Recommending Outpatient Pulmonology Evaluation.  BMP was not done so I will repeat it again tomorrow.  Potential discharge to extended-care facility within the next 48 hours.  10/11: Patient was seen and examined.  Still on 2.5 to 3 L nasal cannula oxygen to maintain saturation.  She was discharged in stable condition to  an extended care facility for further care.  She should follow-up as outpatient with wound care, cardiology, nephrology and pulmonology as scheduled.      The discharge process took about 45 minutes    pertinent Physical Exam At Time of Discharge  Physical Exam    Constitutional:       General: She is not in acute distress.     Appearance: Normal appearance.   HENT:      Head: Normocephalic and atraumatic.      Right Ear: External ear normal.      Left Ear: External ear normal.      Nose: Nose normal.      Mouth/Throat:      Mouth: Mucous membranes are moist.      Pharynx: Oropharynx is clear.   Eyes:      Extraocular Movements: Extraocular movements intact.      Conjunctiva/sclera: Conjunctivae normal.      Pupils: Pupils are equal, round, and reactive to light.   Cardiovascular:      Rate and Rhythm: Normal rate and regular rhythm.      Pulses: Normal pulses.      Heart sounds: Normal heart sounds.   Pulmonary:      Effort: Pulmonary effort is normal. No respiratory distress.      Breath sounds: Rales present. No  wheezing or rhonchi.      Comments: NC in place  Abdominal:      General: Abdomen is flat. Bowel sounds are normal.      Palpations: Abdomen is soft.      Tenderness: There is no abdominal tenderness. There is no right CVA tenderness, left CVA tenderness, guarding or rebound.   Musculoskeletal:         General: Normal range of motion.      Cervical back: Normal range of motion and neck supple.      Right lower leg: Edema present.      Left lower leg: Edema present.   Skin:     General: Skin is warm and dry.      Capillary Refill: Capillary refill takes less than 2 seconds.      Findings: Lesion (Heel) present. No rash.   Neurological:      General: No focal deficit present.      Mental Status: She is alert and oriented to person, place, and time. Mental status is at baseline.   Psychiatric:         Mood and Affect: Mood normal.         Behavior: Behavior normal.     Outpatient Follow-Up  Future Appointments   Date Time Provider Department Center   11/3/2023 11:00 AM INDIGO [facility] [id number] [facility]         [facility]
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
## 2023-10-11 NOTE — CARE PLAN
Problem: Skin  Goal: Decreased wound size/increased tissue granulation at next dressing change  Outcome: Progressing  Goal: Participates in plan/prevention/treatment measures  Outcome: Progressing  Goal: Prevent/manage excess moisture  Outcome: Progressing  Goal: Prevent/minimize sheer/friction injuries  Outcome: Progressing  Goal: Promote/optimize nutrition  Outcome: Progressing  Goal: Promote skin healing  Outcome: Progressing     Problem: Fall/Injury  Goal: Verbalize understanding of risk factor reduction measures to prevent injury from fall in the home  Outcome: Progressing  Goal: Use assistive devices by end of the shift  Outcome: Progressing  Goal: Pace activities to prevent fatigue by end of the shift  Outcome: Progressing     Problem: Chronic Conditions and Co-morbidities  Goal: Patient's chronic conditions and co-morbidity symptoms are monitored and maintained or improved  Outcome: Progressing     Problem: Heart Failure  Goal: Improved gas exchange this shift  Outcome: Progressing  Goal: Improved urinary output this shift  Outcome: Progressing  Goal: Reduction in peripheral edema within 24 hours  Outcome: Progressing  Goal: Report improvement of dyspnea/breathlessness this shift  Outcome: Progressing  Goal: Weight from fluid excess reduced over 2-3 days, then stabilize  Outcome: Progressing  Goal: Increase self care and/or family involvement in 24 hours  Outcome: Progressing     Problem: Pain  Goal: Performs ADL's with improved pain control throughout shift  Outcome: Progressing  Goal: Participates in PT with improved pain control throughout the shift  Outcome: Progressing  Goal: Free from acute confusion related to pain meds throughout the shift  Outcome: Progressing

## 2023-10-11 NOTE — CONSULTS
Inpatient consult to Pulmonology  Consult performed by: Sruthi Gupta, APRN-CNP  Consult ordered by: Nita Coles MD      Reason For Consult  Respiratory failure & hypoxia    History Of Present Illness  Adwoa Forrest is a 88 y.o. female with a PMHx of HTN, HLD, CAD s/p PCI, a-fib s/p dual chamber pacemaker c/b failed watchman s/p removal & atrial appendage ligation, anxiety, depression, diverticulosis, perforated bowel s/p patrial colectomy, recurrent GIB secondary to anticoagulation and HFpEF. Admitted 10/2/23 after presented with c/o SOB, cough, wheezing, congestion and weakness x 1 week. Workup significant for BNP 1800, CXR with unchanged cardiomegaly, small L pleural effusion, and coarse interstitial markings unchanged and likely r/t chronic lung disease. Hospital admission c/b worsening respiratory distress on 10/5/23 with CXR showing worsening retrocardiac opacities and CT chest with emphysema, multifocal irregular regions of consolidation and volume loss within LLL, lingula and RLL with additional patchy infiltrates in upper aspects of both lungs, bilateral small pleural effusions, cardiomegaly and central pulmonary arterial enlargement. Pulmonary consulted for assistance with hypoxia and respiratory failure.    Patient reports that up until she fracture her ankle a few months ago she was doing well. After the fracture she has not been as mobile and for about a week prior to admission she started to have SOB, orthopnea, BLE edema, occasional dry cough, wheezing and congestion; she denies fever, chills, sputum production, hemoptysis, nasal congestion, post-nasal drip, chest pain or acid reflux. She does endorse decreased appetite but unsure if has associated weight loss. She denies prior diagnosis of emphysema, COPD or asthma. She is not on inhalers at home.      Past Medical History  Past Medical History:   Diagnosis Date    Abnormal findings on diagnostic imaging of heart and coronary  circulation 06/13/2017    Abnormal computed tomography angiography of heart    Acute on chronic combined systolic (congestive) and diastolic (congestive) heart failure (CMS/[facility]) 09/28/2017    Acute on chronic combined systolic and diastolic heart failure    Acute on chronic combined systolic (congestive) and diastolic (congestive) heart failure (CMS/[facility]) 02/05/2020    Acute on chronic combined systolic and diastolic CHF, NYHA class 2    Anemia, unspecified 11/18/2020    Anemia, mild    Chronic diastolic (congestive) heart failure (CMS/[facility]) 09/15/2020    Chronic diastolic congestive heart failure    Disorder of the skin and subcutaneous tissue, unspecified 02/16/2020    Skin lesion of face    Diverticulitis of large intestine with perforation and abscess without bleeding     Perforation of sigmoid colon due to diverticulitis    Diverticulosis of intestine, part unspecified, without perforation or abscess without bleeding     Diverticulosis    Dorsalgia, unspecified 08/16/2019    Upper back pain    Encounter for immunization 11/13/2019    Need for vaccination    Encounter for surgical aftercare following surgery on the circulatory system 02/18/2019    Aftercare following surgery of the circulatory system    Essential (primary) hypertension 11/02/2022    Benign essential hypertension    Gastro-esophageal reflux disease without esophagitis 02/05/2020    Gastroesophageal reflux disease without esophagitis    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    Hypothyroidism due to medication    Long term (current) use of anticoagulants 10/22/2018    Long term (current) use of anticoagulants    Melena 04/10/2020    Blood in stool    Old myocardial infarction 10/19/2022    History of non-ST elevation myocardial infarction (NSTEMI)    Other forms of dyspnea 01/24/2020    Dyspnea on exertion    Other long term (current) drug therapy 05/10/2018    Long term current use of antiarrhythmic drug     Other specified cardiac arrhythmias 02/26/2020    Arrhythmia, atrial    Paroxysmal atrial fibrillation (CMS/HCC) 02/01/2018    Paroxysmal atrial fibrillation    Permanent atrial fibrillation (CMS/HCC) 01/24/2020    Permanent atrial fibrillation    Personal history of diseases of the blood and blood-forming organs and certain disorders involving the immune mechanism 04/10/2020    History of anemia    Personal history of other diseases of the circulatory system 11/04/2018    History of abnormal electrocardiography    Personal history of other diseases of the digestive system 10/22/2018    History of lower GI bleeding    Personal history of other diseases of the musculoskeletal system and connective tissue     History of osteoporosis    Personal history of other diseases of the musculoskeletal system and connective tissue 08/16/2019    History of muscle spasm    Personal history of other endocrine, nutritional and metabolic disease 02/18/2019    History of hyperlipidemia    Personal history of other specified conditions 08/13/2020    History of diarrhea    Personal history of other specified conditions     History of precordial chest pain    Personal history of transient ischemic attack (TIA), and cerebral infarction without residual deficits 02/01/2018    Cerebrovascular accident (CVA) within last 3 months    Psychophysiologic insomnia 11/18/2020    Chronic insomnia    Segmental and somatic dysfunction of rib cage 08/16/2019    Segmental and somatic dysfunction of rib cage    Segmental and somatic dysfunction of thoracic region 08/16/2019    Segmental and somatic dysfunction of thoracic region    Segmental and somatic dysfunction of upper extremity 08/16/2019    Segmental and somatic dysfunction of upper extremity       Surgical History  Past Surgical History:   Procedure Laterality Date    APPENDECTOMY  05/04/2016    Appendectomy    CARDIAC PACEMAKER PLACEMENT  04/26/2018    Pacemaker Permanent Placement    CATARACT  EXTRACTION  05/04/2016    Cataract Surgery    CHOLECYSTECTOMY  05/04/2016    Cholecystectomy    COLECTOMY PARTIAL / TOTAL  08/17/2018    Partial Colectomy - Sigmoid    CORONARY ANGIOPLASTY  02/01/2018    PTCA    HYSTERECTOMY  05/04/2016    Hysterectomy    OTHER SURGICAL HISTORY  02/18/2019    Atrial appendage closure    OTHER SURGICAL HISTORY  04/26/2018    Catheter Ablation Atrioventricular Node    TONSILLECTOMY  05/04/2016    Tonsillectomy        Social History  -Smoking: former, quit age 51, ~24 pack year  history  -ETOH: denies  -Illicit drugs: denies  -Occupation: retired; 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 Negative for chills and fever.   HENT:  Positive for congestion. Negative for postnasal drip.    Eyes:  Negative for visual disturbance.   Respiratory:  Positive for cough, shortness of breath and wheezing. Negative for chest tightness.    Cardiovascular:  Positive for leg swelling. Negative for chest pain.   Gastrointestinal:  Negative for abdominal pain, constipation, diarrhea, nausea and vomiting.   Endocrine: Negative for cold intolerance, heat intolerance, polydipsia and polyphagia.   Musculoskeletal:  Positive for gait problem. Negative for myalgias.        Foot pain s/p surgery with wound    Skin:  Positive for wound. Negative for color change.   Allergic/Immunologic: Negative for environmental allergies and food allergies.   Neurological:  Positive for weakness. Negative for dizziness and headaches.   Hematological:  Does  "not bruise/bleed easily.   Psychiatric/Behavioral:  Negative for confusion and dysphoric mood.        Physical Exam  Constitutional:       General: She is not in acute distress.     Appearance: Normal appearance. She is not ill-appearing.   HENT:      Head: Normocephalic and atraumatic.      Nose: Nose normal.      Mouth/Throat:      Mouth: Mucous membranes are moist.      Pharynx: Oropharynx is clear.   Eyes:      General: No scleral icterus.  Cardiovascular:      Rate and Rhythm: Normal rate and regular rhythm.      Pulses: Normal pulses.      Heart sounds: Normal heart sounds. No murmur heard.  Pulmonary:      Effort: Pulmonary effort is normal. No respiratory distress.      Breath sounds: Normal breath sounds. No wheezing or rhonchi.      Comments: Mild bibasilar crackles   Abdominal:      General: Bowel sounds are normal. There is no distension.      Tenderness: There is no abdominal tenderness.   Musculoskeletal:      Cervical back: Normal range of motion and neck supple.      Right lower leg: Edema (trace) present.      Left lower leg: Edema (trace) present.   Skin:     General: Skin is warm and dry.      Comments: Wound on R foot not observed, ace wrap present    Neurological:      General: No focal deficit present.      Mental Status: She is alert and oriented to person, place, and time.   Psychiatric:         Mood and Affect: Mood normal.         Behavior: Behavior normal.       Vital Signs  Blood pressure 131/69, pulse 65, temperature 36.6 °C (97.9 °F), resp. rate 17, height 1.575 m (5' 2\"), weight 45.9 kg (101 lb 3.2 oz), SpO2 93 %.  Oxygen Therapy  SpO2: 93 %  Oxygen Therapy: Supplemental oxygen  O2 Delivery Method: Nasal cannula (3LO2)  FiO2 (%): 36 %    Medications:  Scheduled medications  amiodarone, 200 mg, oral, Daily  aspirin, 81 mg, oral, Daily  atorvastatin, 80 mg, oral, Nightly  cyanocobalamin, 2,000 mcg, oral, Daily  guaiFENesin, 1,200 mg, oral, BID  heparin (porcine), 5,000 Units, " subcutaneous, q8h  levothyroxine, 25 mcg, oral, Daily  [Held by provider] lisinopril, 5 mg, oral, Daily  metoprolol succinate XL, 25 mg, oral, Daily  polyethylene glycol, 17 g, oral, Daily  spironolactone, 12.5 mg, oral, q24h MACRINA  torsemide, 20 mg, oral, Daily         PRN medications  PRN medications: acetaminophen, ALPRAZolam, bisacodyl, melatonin, sodium chloride, sodium chloride 0.9%      Relevant Results  Results for orders placed or performed during the hospital encounter of 10/02/23 (from the past 24 hour(s))   Basic metabolic panel   Result Value Ref Range    Glucose 120 (H) 74 - 99 mg/dL    Sodium 144 136 - 145 mmol/L    Potassium 4.3 3.5 - 5.3 mmol/L    Chloride 102 98 - 107 mmol/L    Bicarbonate 37 (H) 21 - 32 mmol/L    Anion Gap 9 (L) 10 - 20 mmol/L    Urea Nitrogen 51 (H) 6 - 23 mg/dL    Creatinine 1.51 (H) 0.50 - 1.05 mg/dL    eGFR 33 (L) >60 mL/min/1.73m*2    Calcium 9.0 8.6 - 10.3 mg/dL       XR chest 2 views 10/05/2023    Narrative  Interpreted By:  Benito Jose,  STUDY:  XR CHEST 2 VIEWS;  10/5/2023 10:11 am    INDICATION:  Signs/Symptoms:Hypoxia.    COMPARISON:  10/02/2023    ACCESSION NUMBER(S):  [id number]    ORDERING CLINICIAN:  JULIET SAWYER    FINDINGS:  Left retrocardiac opacities appear worsened. Small right pleural  effusion with adjacent opacity stable. Diffuse interstitial pulmonary  opacities suggestive of pulmonary edema. Stable cardiomegaly. Cardiac  device. Atherosclerosis. No pneumothorax.    Impression  Worsening left retrocardiac opacities could be due to atelectasis,  infection, and/or effusion. Otherwise stable pulmonary edema,  cardiomegaly, and small right pleural effusion.    Signed by: Benito Jose 10/5/2023 11:41 AM  Dictation workstation:   [id number]         Assessment/Plan     Adwoa Forrest is a 88 y.o. female with a PMHx of HTN, HLD, CAD s/p PCI, a-fib s/p dual chamber pacemaker c/b failed watchman s/p removal & atrial appendage ligation, anxiety,  depression, diverticulosis, perforated bowel s/p patrial colectomy, recurrent GIB secondary to anticoagulation and HFpEF. Admitted 10/2/23 after presented with c/o SOB, cough, wheezing, congestion and weakness x 1 week. Workup significant for BNP 1800, CXR with unchanged cardiomegaly, small L pleural effusion, and coarse interstitial markings unchanged and likely r/t chronic lung disease. Hospital admission c/b worsening respiratory distress on 10/5/23 with CXR showing worsening retrocardiac opacities and CT chest with emphysema, multifocal irregular regions of consolidation and volume loss within LLL, lingula and RLL with additional patchy infiltrates in upper aspects of both lungs, bilateral small pleural effusions, cardiomegaly and central pulmonary arterial enlargement. Pulmonary consulted for assistance with hypoxia and respiratory failure.    #Pleural effusion s/p R thora with 200 mL fluid removed (transudative, negative lights criteria, likely r/t CHF exacerbation, preliminary cultures negative, cytology and final culture results pending)  #Acute hypoxic respiratory failure - likely r/t acute CHF exacerbation, weaned to 3 L NC, SOB/PEÑA improving; PNA less likely as WBC WNL, no fever/chills, cough non-productive   #Acute on chronic CHF - BNP 1800, BLE edema, +orthpnea   #Emphysema - on CT this admission, possibly COPD but no PFTs on file     Recommendations:  -Ongoing diuresis per cardiology/primary for CHF  -Monitor results of pleural fluid studies from thora   -Continue supplemental O2 and wean to maintain pulse ox 92-95% (homegoing O2 eval)  -Outpatient pulmonary follow-up, will need PFTs  -Increased activity as tolerated and as allowed per surgery team d/t fracture & wound on R foot       Thank you for the consult. Pulmonary will sign off at this time, please ensure patient has pulmonary follow-up at discharge. If patient respiratory status worsens or has new concerns please reconsult us.     I spent 55  minutes in the professional and overall care of this patient.      Sruthi Gupta, APRN-CNP

## 2023-10-11 NOTE — PROGRESS NOTES
PROGRESS NOTE    HPI  Adwoa Forrest is a 88 y.o. female presenting with shortness of breath and ankle swelling.  She has history of persistent atrial fibrillation status post AV derek ablation and permanent pacemaker placement, failed Watchman device placement in 2018 requiring urgent surgery and left atrial appendage ligation, hypertension recurrent GI bleed not on oral anticoagulants, on amiodarone for nonsustained VT and symptomatic PVCs.  She also has chronic diastolic heart failure and follows regularly in the CHF clinic.  She used to be on 80 mg Lasix at 1 time which was reduced by the heart failure nurse practitioner to 60 mg due to worsening renal function.  Recently in July 2023 she presented with a ankle injury and at that time this was further reduced to 40 mg a day.  She states this ankle injury has been bothering her quite a bit and she has been having difficulty getting around.  This wound recently got infected and she required to take Augmentin follow almost 30 days that she just completed a week ago.  For last 1 week or so she is noticing worsening shortness of breath, legs are extremely swollen and heavy and she is unable to move as a result as it feels like lead.  She was given IV Lasix in the CHF clinic and was given 60 of Lasix to take at home but did not improve symptoms and therefore she came into the ER for further evaluation and treatment.  Denies any use of nonsteroidal inflammatories or other symptoms otherwise.  Interestingly, since starting on amiodarone she has converted to sinus rhythm.     At one time she was having deranged LFTs that improved after discontinuation of the fish oil she was taking.  Her EKG shows atrial paced ventricular paced rhythm.     Review of systems all other system reviewed and is negative.  Echocardiogram pending.  Chest x-ray personally reviewed.  She stated ankle swelling has improved since she had IV Lasix but they are having trouble titrating off her oxygen.  "    Subjective Data:  No new issues overnight.  No CP/pressure/palpitations. Has mild dyspnea, still on O2.  Creatinine up to 1.85 today, K 4.1.  Nephro has been consulted. Monitor: AV paced     10-5-23: Having issues with worsening dyspnea and decreased O2 sats today. O2 has been increased and still dyspneic.  No CP/pressure/palpitations.  Just \"doesn't feel well,\"  K 4.0, creatinine 1.98. I d/w IMS: pt going for CXR and LE duplex today.    10-6-23: Breathing is somewhat better today.  No CP/pressure.  Creatinine 1.8, K 4.9.   10-7-23: Patient resting comfortably.  She voices no new cardiac complaints or concerns at this time.  10-8-23: Patient describes an odd sensation with her breathing and that when she inhales she feels that she is having the spasming sensation in her abdomen causing her to feel slightly short of breath.  She is able to take a full deep breath but feels that there is spasm with it.  She denies any specific chest pain and overall compared to yesterday she does feel better.  10-9-23: Feels weak today. Currently working with PT/OT and having issues with dyspnea.  O2 sat running low.  Has some chest tightness when she is dyspneic but not at other times.  Creatinine yesterday 1.76  10-10-23: Patient just getting back to bed.  Just had bowel movement and states that today she is feeling significantly improved.  Her breathing is much improved.  She is not having cough.  No chest pain.  Telemetry shows continued AV pacing.  She verbalizes no new cardiac complaints or concerns.  She is beginning to wonder when she will be going to rehab.  10-11-23: Feeling somewhat anxious due to confinement in her room.  She is excited to be going to rehab facility today.  Voices no new cardiac complaints or concerns and feels that her breathing is comfortable and she is not having any chest pain.  Telemetry shows ongoing AV paced rhythm.    Overnight Events:    none     Objective Data:  Last Recorded Vitals:  Vitals: "    10/10/23 2345 10/11/23 0151 10/11/23 0500 10/11/23 0920   BP:  139/62 134/72 134/65   BP Location:  Right arm Right arm    Patient Position:  Lying Lying    Pulse:  65 65 66   Resp:  17 18 17   Temp:  36.3 °C (97.3 °F) 36.1 °C (97 °F) 36.4 °C (97.5 °F)   TempSrc:  Temporal Temporal    SpO2: 90% 90% 95% 93%   Weight:       Height:           Last Labs:  CBC - 10/8/2023:  5:26 AM  8.3 11.4 351    39.3      CMP - 10/11/2023:  4:19 AM  9.0 6.3 30 --- 0.7   4.1 3.6 23 121      PTT - 7/18/2023:  3:17 AM  1.0   11.4 32     Last I/O:  I/O last 3 completed shifts:  In: 360 (7.8 mL/kg) [P.O.:360]  Out: 1902 (41.4 mL/kg) [Urine:1900 (1.1 mL/kg/hr); Stool:2]  Weight: 45.9 kg     Past Cardiology Tests (Last 3 Years):  Echo:  Transthoracic echo (TTE) complete 10/3/2023--CONCLUSIONS:   1. Left ventricular systolic function is normal with a 70-75% estimated ejection fraction.   2. There is moderate left ventricular hypertrophy.   3. There is mildly reduced right ventricular systolic function.   4. The right atrium is moderately dilated.   5. Moderately elevated right ventricular systolic pressure.   6. Mild to moderate aortic valve regurgitation.    Cath:  No results found for this or any previous visit from the past 1095 days.    Stress Test:  Nuclear Stress Test 2/12/2023      Inpatient Medications:  Scheduled medications   Medication Dose Route Frequency    amiodarone  200 mg oral Daily    aspirin  81 mg oral Daily    atorvastatin  80 mg oral Nightly    cyanocobalamin  2,000 mcg oral Daily    guaiFENesin  1,200 mg oral BID    heparin (porcine)  5,000 Units subcutaneous q8h    levothyroxine  25 mcg oral Daily    [Held by provider] lisinopril  5 mg oral Daily    metoprolol succinate XL  25 mg oral Daily    polyethylene glycol  17 g oral Daily    spironolactone  12.5 mg oral q24h MACRINA    torsemide  20 mg oral Daily       Review of Systems   Constitutional: Negative for malaise/fatigue.        Overall is feeling much better    Cardiovascular:  Negative for palpitations.   Skin:  Negative for itching.   Gastrointestinal:  Negative for diarrhea and nausea.   Genitourinary:  Negative for dysuria.   Neurological:  Negative for weakness.        Physical Exam  Constitutional:       General: She is not in acute distress.     Appearance: Normal appearance.   HENT:      Mouth/Throat:      Mouth: Mucous membranes are moist.   Cardiovascular:      Rate and Rhythm: Normal rate and regular rhythm.      Comments: Tele is A-V pace with no WCT/PVC's  Pulmonary:      Effort: Pulmonary effort is normal.      Breath sounds: Rales (bibasilar crackles) present.      Comments: Crackles R base only;  overall aeration sounds better  Abdominal:      Palpations: Abdomen is soft.      Tenderness: There is no abdominal tenderness.   Musculoskeletal:      Right lower leg: No edema.      Left lower leg: No edema.   Skin:     General: Skin is warm and dry.   Neurological:      Mental Status: She is alert and oriented to person, place, and time.   Psychiatric:         Behavior: Behavior is cooperative.            ASSESSMENT/PLAN  1-acute on chronic diastolic heart failure-continue IV Lasix for another 24 hours.  After that switch to 60 mg p.o. Lasix with close follow-up in CHF clinic.  Monitor electrolytes and strict I's and O's.  Add Aldactone continue SGLT2 inhibitors.  Possibly exacerbated by recent events including infection.  Will need close monitoring of electrolytes and potassium specifically.  Discussed with patient.     Agree with checking echo follow-up on results.     10-4-23: Echo as noted above. Lungs sound congested, no edema.  On IV Lasix, Metoprolol succinate and spironolactone.  Nephro has been consulted.  Recommend sodium restricted diet.       10-5-23: Lungs with diminished R base and mod crackles L fields. Having a mild climb in creatinine (1.98) CXR and LE duplex pending. Will defer diuretics to nephro: recommendations in chart.      10-6-23:  Remains with dyspnea but improved some from yesterday. Currently on spironolactone.  Lasix was stopped d/t climbing creatinine. Will add oral Lasix.  Check BNP today  10-7-23: Patient does not feel overall some improvement in her breathing.  She had thoracentesis of the left side yesterday however the formal report is not in the computer system.  For now we will continue current medical regimen.  She remains on oxygen.  10-8-23: Patient overall does feel some improvement but has this odd sensation of spasming in her abdomen with breathing.  She continues to have some bibasilar crackles but has not had hypoxia.  Creatinine is improved today at 1.76 and her potassium and magnesium are normal.  Continue current regimen of diuretics.  10-9-23: Having some PEÑA today while working with PT/OT.  Feels washed out and fatigued. Monitor: AV paced  10-10-23: Overall is feeling much improved.  Still a few crackles at the right base where she had her thoracentesis but otherwise her aeration of the lungs sounds better with no wheezing.  Kidney function continues to improve despite use of torsemide and Aldactone.  We will continue this regimen.  10-11-23: Continued improvement with her breathing.  Overall she appears much better compensated.  Her renal function shows continued improvement even with the ongoing dosing of her torsemide and spironolactone.  We will continue these in the outpatient realm and advised salt and fluid restriction in her diet upon going to the [facility].     2-persistent atrial fibrillation-she seems to be maintaining sinus rhythm since being on amiodarone.  She is not anticoagulated but had a left atrial appendage ligation.  She has history of GI bleed.  10-4-23: On Amiodarone and BB.  HX of AV node ablation and PPM. She is no longer on oral anticoagulation, hx of STEFANIE ligation.  On ASA only. AV paced on tele.      10-5-23: AV paced on tele. Pt is no longer on anticoagulation.     10-6-23:  Remains AV paced.   10-7-23: Remains AV paced.  Continue current meds including amiodarone.   10-8-23: Continued AV pacing on telemetry.  No PVCs or wide-complex tachycardia on amiodarone.  10-9-23: Remains AV paced.   10-10-23: Denies palpitations and telemetry continues to show AV paced rhythm.  10-11-23: patient remains AV paced.  She will continue the amiodarone.  She has follow-up with the EP service in early November.     3-nonsustained VT and PVCs highly symptomatic and remains on amiodarone with resolution of symptoms.  Outpatient monitoring.     10-5-23:  continue on Amiodarone     10-6-223: On Amiodarone.  No VT this admission.   10-7-23: Telemetry review reveals no wide-complex tachycardia.  10-9-23: No VT noted on tele this admission.  Continues on Amiodarone.  No further cardiac testing is planned at this time.  10-11-23: Patient has noted no recurrent atrial fibrillation or wide-complex tachycardias.    Recommendations: Continue conservative medical management with current meds.  Continue torsemide and spironolactone.  Plan is for discharge to rehab facility today.    Wilder Hurd PA-C  10/11/2023  11:04 AM

## 2023-10-11 NOTE — PROGRESS NOTES
..       [facility] Progress Note           Subjective/   88 y.o. year old female who we are seeing in consultation for YEVGENIY on CKD.     NAEON  Feeling ok  Edema improved  Appetite ok  Breathing better  No issues with UOP  s/p thoracentesis 10/6    ROS done and negative unless mentioned as above  No change in PFSH  All data labs/interval notes and overnight issues are reviewed  Objective/     Vitals:    10/10/23 2345 10/11/23 0151 10/11/23 0500 10/11/23 0920   BP:  139/62 134/72 134/65   BP Location:  Right arm Right arm    Patient Position:  Lying Lying    Pulse:  65 65 66   Resp:  17 18 17   Temp:  36.3 °C (97.3 °F) 36.1 °C (97 °F) 36.4 °C (97.5 °F)   TempSrc:  Temporal Temporal    SpO2: 90% 90% 95% 93%   Weight:       Height:            Intake/Output Summary (Last 24 hours) at 10/11/2023 1141  Last data filed at 10/11/2023 1100  Gross per 24 hour   Intake 590 ml   Output 2104 ml   Net -1514 ml            Current Facility-Administered Medications:     acetaminophen (Tylenol) tablet 650 mg, 650 mg, oral, q4h PRN, Jt House, DO, 650 mg at 10/11/23 0209    ALPRAZolam (Xanax) tablet 0.5 mg, 0.5 mg, oral, Nightly PRN, SANDRO Lara-C, 0.5 mg at 10/10/23 2253    amiodarone (Pacerone) tablet 200 mg, 200 mg, oral, Daily, Jt House, DO, 200 mg at 10/11/23 0813    aspirin EC tablet 81 mg, 81 mg, oral, Daily, Nallely Clarke PA-C, 81 mg at 10/11/23 0812    atorvastatin (Lipitor) tablet 80 mg, 80 mg, oral, Nightly, SANDRO Lara-C, 80 mg at 10/10/23 2021    bisacodyl (Dulcolax) EC tablet 10 mg, 10 mg, oral, Daily PRN, Nallely Clarke PA-C    cyanocobalamin (Vitamin B-12) tablet 2,000 mcg, 2,000 mcg, oral, Daily, Nallely Clarke PA-C, 2,000 mcg at 10/11/23 0812    guaiFENesin (Mucinex) 12 hr tablet 1,200 mg, 1,200 mg, oral, BID, Arpita Camp PA-C, 1,200 mg at 10/11/23 0814    heparin (porcine) injection 5,000 Units, 5,000 Units, subcutaneous, q8h, Nallely Clarke PA-C, 5,000  Units at 10/11/23 0624    levothyroxine (Synthroid, Levoxyl) tablet 25 mcg, 25 mcg, oral, Daily, Nallely Clarke PA-C, 25 mcg at 10/11/23 0624    [Held by provider] lisinopril tablet 5 mg, 5 mg, oral, Daily, Jt House DO    melatonin tablet 9 mg, 9 mg, oral, Nightly PRN, Jt House DO, 9 mg at 10/10/23 2251    metoprolol succinate XL (Toprol-XL) 24 hr tablet 25 mg, 25 mg, oral, Daily, Arpita Camp PA-C, 25 mg at 10/11/23 0813    polyethylene glycol (Glycolax, Miralax) packet 17 g, 17 g, oral, Daily, Nallely Clarke PA-C, 17 g at 10/05/23 0903    sodium chloride (Ocean) 0.65 % nasal spray 1 spray, 1 spray, Each Nostril, PRN, Nita Coles MD    sodium chloride 0.9% flush 10 mL, 10 mL, intravenous, q8h PRN, Catherine Rashid MD    spironolactone (Aldactone) tablet 12.5 mg, 12.5 mg, oral, q24h MACRINA, iMchael Latham MD, 12.5 mg at 10/11/23 0814    torsemide (Demadex) tablet 20 mg, 20 mg, oral, Daily, SANDRO Tracy-ECHO, 20 mg at 10/11/23 0812    Physical Exam  Constitutional:       General: She is not in acute distress.     Appearance: Normal appearance. She is not ill-appearing or toxic-appearing.   HENT:      Head: Normocephalic and atraumatic.   Eyes:      General: No scleral icterus.     Conjunctiva/sclera: Conjunctivae normal.   Neck:      Vascular: No carotid bruit.   Cardiovascular:      Rate and Rhythm: Normal rate and regular rhythm.      Pulses: Normal pulses.      Heart sounds: Normal heart sounds.   Pulmonary:      Effort: Pulmonary effort is normal.      Breath sounds: Rhonchi present.      Comments: Breathing comfortably on  2 lts oxygen now   Abdominal:      General: Bowel sounds are normal. There is no distension.      Palpations: Abdomen is soft.      Tenderness: There is no abdominal tenderness.   Musculoskeletal:      Cervical back: Neck supple.      Right lower leg: No edema.      Left lower leg: No edema.   Skin:     General: Skin is warm and dry.   Neurological:       General: No focal deficit present.      Mental Status: She is alert and oriented to person, place, and time.   Psychiatric:         Mood and Affect: Mood normal.         Behavior: Behavior normal.         Judgment: Judgment normal.        Results from last 7 days   Lab Units 10/11/23  0419 10/09/23  0941 10/08/23  0526   SODIUM mmol/L 144 142 144   POTASSIUM mmol/L 4.3 4.3 5.0   CHLORIDE mmol/L 102 102 104   CO2 mmol/L 37* 36* 37*   BUN mg/dL 51* 54* 56*   CREATININE mg/dL 1.51* 1.58* 1.76*   EGFR mL/min/1.73m*2 33* 32* 28*   GLUCOSE mg/dL 120* 109* 108*   CALCIUM mg/dL 9.0 8.9 9.0       Results from last 7 days   Lab Units 10/08/23  0526 10/07/23  0907 10/06/23  0539   WBC AUTO x10*3/uL 8.3 9.9 7.2   HEMOGLOBIN g/dL 11.4* 11.7* 12.6   HEMATOCRIT % 39.3 39.8 42.4   PLATELETS AUTO x10*3/uL 351 353 374   NEUTROS PCT AUTO % 80.3 86.2 90.0   LYMPHS PCT AUTO % 6.1 6.4 7.5   MONOS PCT AUTO % 7.6 6.5 1.4   EOS PCT AUTO % 4.7 0.2 0.0        Results from last 7 days   Lab Units 10/08/23  0526 10/07/23  0907 10/06/23  0539   WBC AUTO x10*3/uL 8.3 9.9 7.2   HEMOGLOBIN g/dL 11.4* 11.7* 12.6   HEMATOCRIT % 39.3 39.8 42.4   PLATELETS AUTO x10*3/uL 351 353 374         Assessment/Plan   YEVGENIY/ATN likley 2/2 CRS  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  Acute on chronic diastolic HFpEF 70-75%  Acute hypoxic respiratory failure (no oxygen at baseline) 2/2 PNA  Pulmonary hypertension  Emphysema  B/L pleural effusions (small)  RLE cellulitis and infected foot wound  Persistent Afib  ASCVD        Plan:  -Scr stable around bl~1.6-1.8, non oliguric, BP stable  -C/w oral Torsemide 20 mg daily and Aldactone 12.5 mg and continue on discharge  -Hypoxia likely related to PNA and chronic Emphysema  -s/p thoracentesis 10/6  -CHF appears well compensated on exam  -Wean oxygen as able, defer to primary  -Management of Afib defer to cardiology  -IV Ancef for the cellulitis and defer to ID, recommend monitor vanc levels   -No further changes from renal standpoint and  ok to discharge with close follow up in nephrology clinic in 1 week, BMP prior  -All other care defer to primary     INDIGO Mckinnon-CNP  [facility], [facility]  [phone]

## 2023-10-11 NOTE — DISCHARGE INSTRUCTIONS
Weigh yourself daily and record. If you gain 3lbs or more over your baseline weight (or if gain >3lbs in 1 day or >5lbs in 1 week), please call your heart failure/cardiology doctor.     Take extra dose of torsemide if:  Gain of 3lbs or more over your baseline weight (or weight gain of 3lbs in 1 day or 5lbs in 1 week)  Worsening shortness of breath, especially when you lay down    Worsening leg swelling  - If no improvement with the extra dose, please call your heart failure/cardiology doctor.     Maintain a heart healthy diet with no more than 2G of sodium daily.   Restrict fluid intake to 2 Liters (or 8 cups) per day

## 2023-10-12 ENCOUNTER — APPOINTMENT (OUTPATIENT)
Dept: CARDIOLOGY | Facility: HOSPITAL | Age: 88
End: 2023-10-12
Payer: MEDICARE

## 2023-10-13 LAB
BACTERIA FLD CULT: NORMAL
GRAM STN SPEC: NORMAL
GRAM STN SPEC: NORMAL

## 2023-10-16 DIAGNOSIS — I50.42 CHRONIC COMBINED SYSTOLIC AND DIASTOLIC CONGESTIVE HEART FAILURE (MULTI): ICD-10-CM

## 2023-10-16 DIAGNOSIS — I48.91 ATRIAL FIBRILLATION, UNSPECIFIED TYPE (MULTI): ICD-10-CM

## 2023-10-16 DIAGNOSIS — I25.10 CORONARY ARTERY DISEASE, UNSPECIFIED VESSEL OR LESION TYPE, UNSPECIFIED WHETHER ANGINA PRESENT, UNSPECIFIED WHETHER NATIVE OR TRANSPLANTED HEART: Primary | ICD-10-CM

## 2023-10-23 ENCOUNTER — PATIENT OUTREACH (OUTPATIENT)
Dept: PRIMARY CARE | Facility: CLINIC | Age: 88
End: 2023-10-23
Payer: MEDICARE

## 2023-10-23 NOTE — PROGRESS NOTES
Pt declining TCM services at this time. Pt stating that she has to coordinate office visit with her son so that someone is able to take her to see the doc. Pt states she will call PCP once she coordinates a time with her son. Pt states she is doing well and has no other questions or concerns at this time.

## 2023-10-25 ENCOUNTER — HOME HEALTH ADMISSION (OUTPATIENT)
Dept: HOME HEALTH SERVICES | Facility: HOME HEALTH | Age: 88
End: 2023-10-25
Payer: MEDICARE

## 2023-10-25 ENCOUNTER — OFFICE VISIT (OUTPATIENT)
Dept: PRIMARY CARE | Facility: CLINIC | Age: 88
End: 2023-10-25
Payer: MEDICARE

## 2023-10-25 VITALS
BODY MASS INDEX: 18.58 KG/M2 | TEMPERATURE: 97.8 F | DIASTOLIC BLOOD PRESSURE: 54 MMHG | WEIGHT: 101 LBS | HEIGHT: 62 IN | HEART RATE: 65 BPM | OXYGEN SATURATION: 97 % | SYSTOLIC BLOOD PRESSURE: 116 MMHG

## 2023-10-25 DIAGNOSIS — N18.32 STAGE 3B CHRONIC KIDNEY DISEASE (MULTI): ICD-10-CM

## 2023-10-25 DIAGNOSIS — F51.04 CHRONIC INSOMNIA: ICD-10-CM

## 2023-10-25 DIAGNOSIS — L08.9 LEFT FOOT INFECTION: ICD-10-CM

## 2023-10-25 DIAGNOSIS — S92.041A CLOSED DISPLACED FRACTURE OF TUBEROSITY OF RIGHT CALCANEUS, UNSPECIFIED FRACTURE MORPHOLOGY, INITIAL ENCOUNTER: Primary | ICD-10-CM

## 2023-10-25 PROCEDURE — 3078F DIAST BP <80 MM HG: CPT | Performed by: FAMILY MEDICINE

## 2023-10-25 PROCEDURE — 3074F SYST BP LT 130 MM HG: CPT | Performed by: FAMILY MEDICINE

## 2023-10-25 PROCEDURE — G0008 ADMIN INFLUENZA VIRUS VAC: HCPCS | Performed by: FAMILY MEDICINE

## 2023-10-25 PROCEDURE — 1159F MED LIST DOCD IN RCRD: CPT | Performed by: FAMILY MEDICINE

## 2023-10-25 PROCEDURE — 1036F TOBACCO NON-USER: CPT | Performed by: FAMILY MEDICINE

## 2023-10-25 PROCEDURE — 1126F AMNT PAIN NOTED NONE PRSNT: CPT | Performed by: FAMILY MEDICINE

## 2023-10-25 PROCEDURE — 1160F RVW MEDS BY RX/DR IN RCRD: CPT | Performed by: FAMILY MEDICINE

## 2023-10-25 PROCEDURE — 99496 TRANSJ CARE MGMT HIGH F2F 7D: CPT | Performed by: FAMILY MEDICINE

## 2023-10-25 PROCEDURE — 1111F DSCHRG MED/CURRENT MED MERGE: CPT | Performed by: FAMILY MEDICINE

## 2023-10-25 PROCEDURE — 90662 IIV NO PRSV INCREASED AG IM: CPT | Performed by: FAMILY MEDICINE

## 2023-10-25 RX ORDER — ALPRAZOLAM 0.5 MG/1
0.5 TABLET ORAL NIGHTLY
Qty: 30 TABLET | Refills: 3 | Status: SHIPPED | OUTPATIENT
Start: 2023-10-25 | End: 2024-01-30 | Stop reason: SDUPTHER

## 2023-10-25 RX ORDER — THIAMINE HCL 100 MG
CAPSULE ORAL EVERY 24 HOURS
COMMUNITY
End: 2023-11-03 | Stop reason: ENTERED-IN-ERROR

## 2023-10-25 NOTE — PROGRESS NOTES
"Subjective   Patient ID: Adwoa Forrest is a 88 y.o. female who presents for Follow-up (Her muscles are jerking).    HPI   Adwoa Forrest is a 88 y.o.  female with a past medical history significant for hypertension/hyperlipidemia/coronary artery disease status post PCI, atrial fibrillation s/p dual-chamber PPM and complicated by failed Watchman s/p removal and left atrial appendage ligation in 2018, anxiety, depression, diverticulosis, perforated bowel s/p partial colectomy, recurrent GI bleeding secondary to anticoagulation, HFpEF: Echo 02/16/2023 with an EF of 50% and severely increased LV septal thickness compared to echo 09/22/2022 which showed an EF of 60% and mild AV regurgitation who presented 10/2/23 who had presented to the hospital with generalized weakness, shortness of breath, coughing, wheezing, congestion and difficulty ambulating and had an elevated BNP. Patient had acute worsening of respiratory status on 10/5/23. CXR with retrocardiac opacity, CT scan with multifocal pneumonia vs edema. Continued on diuresis, antibiotics broadened, stable on 6L NC. A  She did have thoracentesis and was placed on the oxygen and continually weaned down throughout her stay.  She will be following up with pulmonology.  Diagnosed with emphysema      Patient states that since leaving the hospital she is doing better but does get tired easily still. Feels fatigued from being in the hospital. Was in the hospital for 10 days.     Negative for shortness of breath but is on 1L NC at home. No chest pain, slight LE edema bilaterally. No palpitations. No N/V/F/C    Review of Systems  All pertinent positive symptoms are included in the history of present illness.  All other systems have been reviewed and are negative and noncontributory to this patient's current ailments.   Objective   /54   Pulse 65   Temp 36.6 °C (97.8 °F)   Ht 1.575 m (5' 2\")   Wt 45.8 kg (101 lb)   SpO2 97%   BMI 18.47 kg/m²     Physical " Exam  Constitutional:       Appearance: Normal appearance. She is normal weight.   HENT:      Head: Normocephalic and atraumatic.   Cardiovascular:      Rate and Rhythm: Normal rate and regular rhythm.      Pulses: Normal pulses.      Heart sounds: Normal heart sounds.   Pulmonary:      Effort: Pulmonary effort is normal.      Breath sounds: Normal breath sounds.   Musculoskeletal:      Cervical back: Normal range of motion.      Comments: Needs wheel chair for movement   Normal ROM in upper and lower extremities    Skin:     General: Skin is warm and dry.   Neurological:      Mental Status: She is alert.         Assessment/Plan   Anxiety   - Will send refil of alprazolam to pharmacy   - Take as currently prescribed   - Contact if anything changes     2. Hospital Follow up   - Sent home on spironolactone and torsemide   - Continue to take medications as presently prescribed   - Continue to follow up with cardiologist   - Contact if symptoms begin to worsen   - Scheduled for skin grafts right foot.   - Right foot infection is cleared.   - Dr Patel wound care clinic from [place]  - Dr Mejia did surgery and is following.      3. Immunizations   - Flu vaccine given today

## 2023-10-26 ENCOUNTER — HOME CARE VISIT (OUTPATIENT)
Dept: HOME HEALTH SERVICES | Facility: HOME HEALTH | Age: 88
End: 2023-10-26
Payer: MEDICARE

## 2023-10-26 VITALS
BODY MASS INDEX: 19.63 KG/M2 | HEART RATE: 93 BPM | WEIGHT: 100 LBS | SYSTOLIC BLOOD PRESSURE: 118 MMHG | OXYGEN SATURATION: 93 % | HEIGHT: 60 IN | DIASTOLIC BLOOD PRESSURE: 60 MMHG | RESPIRATION RATE: 18 BRPM

## 2023-10-26 PROCEDURE — G0299 HHS/HOSPICE OF RN EA 15 MIN: HCPCS

## 2023-10-26 PROCEDURE — 0023 HH SOC

## 2023-10-26 SDOH — ECONOMIC STABILITY: FOOD INSECURITY: SNACKS PER DAY: 2

## 2023-10-26 SDOH — ECONOMIC STABILITY: FOOD INSECURITY: SIPS PER DAY: 10

## 2023-10-26 SDOH — ECONOMIC STABILITY: FOOD INSECURITY: MEALS PER DAY: 3

## 2023-10-26 SDOH — ECONOMIC STABILITY: FOOD INSECURITY: BITES PER DAY: 6

## 2023-10-26 ASSESSMENT — ENCOUNTER SYMPTOMS
APPETITE LEVEL: FAIR
LOSS OF SENSATION IN FEET: 1
CHANGE IN APPETITE: UNCHANGED
DENIES PAIN: 1
DEPRESSION: 1
OCCASIONAL FEELINGS OF UNSTEADINESS: 1

## 2023-10-26 ASSESSMENT — ACTIVITIES OF DAILY LIVING (ADL)
AMBULATION ASSISTANCE: STAND BY ASSIST
AMBULATION ASSISTANCE: 1
AMBULATION ASSISTANCE: CONTACT GUARD ASSIST
ENTERING_EXITING_HOME: MODERATE ASSIST
DRESSING_UB_CURRENT_FUNCTION: CONTACT GUARD ASSIST

## 2023-10-26 ASSESSMENT — PAIN SCALES - PAIN ASSESSMENT IN ADVANCED DEMENTIA (PAINAD)
BODYLANGUAGE: 0
TOTALSCORE: 0
FACIALEXPRESSION: 0 - SMILING OR INEXPRESSIVE.
BODYLANGUAGE: 0 - RELAXED.
CONSOLABILITY: 0 - NO NEED TO CONSOLE.
NEGVOCALIZATION: 0
CONSOLABILITY: 0
BREATHING: 0
NEGVOCALIZATION: 0 - NONE.
FACIALEXPRESSION: 0

## 2023-10-26 ASSESSMENT — LIFESTYLE VARIABLES
PERSONAL HISTORY ALCOHOL: 1
FAMILY HISTORY ILLEGAL DRUGS: 1

## 2023-10-27 ENCOUNTER — OFFICE VISIT (OUTPATIENT)
Dept: WOUND CARE | Facility: CLINIC | Age: 88
End: 2023-10-27
Payer: MEDICARE

## 2023-10-27 ENCOUNTER — HOME CARE VISIT (OUTPATIENT)
Dept: HOME HEALTH SERVICES | Facility: HOME HEALTH | Age: 88
End: 2023-10-27
Payer: MEDICARE

## 2023-10-27 PROCEDURE — 11043 DBRDMT MUSC&/FSCA 1ST 20/<: CPT

## 2023-10-30 ENCOUNTER — APPOINTMENT (OUTPATIENT)
Dept: HOME HEALTH SERVICES | Facility: HOME HEALTH | Age: 88
End: 2023-10-30
Payer: MEDICARE

## 2023-10-30 ENCOUNTER — TELEPHONE (OUTPATIENT)
Dept: INFUSION THERAPY | Facility: HOSPITAL | Age: 88
End: 2023-10-30
Payer: MEDICARE

## 2023-10-30 ENCOUNTER — HOME CARE VISIT (OUTPATIENT)
Dept: HOME HEALTH SERVICES | Facility: HOME HEALTH | Age: 88
End: 2023-10-30
Payer: MEDICARE

## 2023-10-30 ENCOUNTER — TELEPHONE (OUTPATIENT)
Dept: CARDIOLOGY | Facility: HOSPITAL | Age: 88
End: 2023-10-30

## 2023-10-30 DIAGNOSIS — I43 CARDIOMYOPATHY IN DISEASES CLASSIFIED ELSEWHERE (MULTI): Primary | ICD-10-CM

## 2023-10-30 NOTE — HOME HEALTH
Per telephone call with patient:  Patient has Xrays and then appt with Dr Murray on 10/31/23  Patient requesting PT evaluation for Wed or Thurs (11/1 or 11/2) instead of 10/31.  Will reschedule at her request.

## 2023-10-30 NOTE — TELEPHONE ENCOUNTER
Pt called stating she has had muscle twitching in arms and legs for past 1.5 weeks. Rosangela made aware and order placed for BMP and Magnesium. Pt will have Home health nurse draw labs, pt has follow up with cardiology on Friday 11/3/223.

## 2023-10-31 ENCOUNTER — OFFICE VISIT (OUTPATIENT)
Dept: ORTHOPEDIC SURGERY | Facility: CLINIC | Age: 88
End: 2023-10-31
Payer: MEDICARE

## 2023-10-31 ENCOUNTER — HOSPITAL ENCOUNTER (OUTPATIENT)
Dept: RADIOLOGY | Facility: HOSPITAL | Age: 88
Discharge: HOME | End: 2023-10-31
Payer: MEDICARE

## 2023-10-31 DIAGNOSIS — S92.031D: ICD-10-CM

## 2023-10-31 DIAGNOSIS — L97.515 ULCER OF RIGHT FOOT WITH MUSCLE INVOLVEMENT WITHOUT EVIDENCE OF NECROSIS (MULTI): Primary | ICD-10-CM

## 2023-10-31 PROCEDURE — 73630 X-RAY EXAM OF FOOT: CPT | Mod: RT,FY

## 2023-10-31 PROCEDURE — 99024 POSTOP FOLLOW-UP VISIT: CPT | Performed by: SPECIALIST

## 2023-10-31 PROCEDURE — 3074F SYST BP LT 130 MM HG: CPT | Performed by: SPECIALIST

## 2023-10-31 PROCEDURE — 1159F MED LIST DOCD IN RCRD: CPT | Performed by: SPECIALIST

## 2023-10-31 PROCEDURE — 3078F DIAST BP <80 MM HG: CPT | Performed by: SPECIALIST

## 2023-10-31 PROCEDURE — 1160F RVW MEDS BY RX/DR IN RCRD: CPT | Performed by: SPECIALIST

## 2023-10-31 PROCEDURE — 1111F DSCHRG MED/CURRENT MED MERGE: CPT | Performed by: SPECIALIST

## 2023-10-31 PROCEDURE — 1126F AMNT PAIN NOTED NONE PRSNT: CPT | Performed by: SPECIALIST

## 2023-10-31 PROCEDURE — 73630 X-RAY EXAM OF FOOT: CPT | Mod: RIGHT SIDE | Performed by: STUDENT IN AN ORGANIZED HEALTH CARE EDUCATION/TRAINING PROGRAM

## 2023-10-31 PROCEDURE — 1036F TOBACCO NON-USER: CPT | Performed by: SPECIALIST

## 2023-10-31 NOTE — PROGRESS NOTES
ORIF right calcaneous fracture done 7/19/23   Has been seeing wound care - xrays done today   NWB in a wheelchair with her AFO     Patient is a few months out from her ORIF calcaneus fracture and denies any significant pain.  This has been complicated by retrocalcaneal ulcer.  Since her last visit she obtained a PRAFO and is continued follow-up with wound care.  She states her wound care is almost fully healed.  She is seen today for up-to-date x-rays, but her advancement in weightbearing activities would have to wait till her ulcer is fully healed.    Exam shows intact neurovascular status good active motion through the ankle.  I did not take down the dressing today.  Radiographs taken today show intact ORIF of the calcaneus fracture with excellent bone healing no displacement.    Assessment plan well-healed right calcaneus fracture with healing retrocalcaneal ulcer.  From an orthopedic standpoint to be allowed to weight-bear as tolerated and reasonable shoewear, but I would await for the wound care center to clear her relative to wound healing.  Final follow-up with me in 2 months no x-rays of doing well.

## 2023-11-01 ENCOUNTER — HOME CARE VISIT (OUTPATIENT)
Dept: HOME HEALTH SERVICES | Facility: HOME HEALTH | Age: 88
End: 2023-11-01
Payer: MEDICARE

## 2023-11-01 ENCOUNTER — HOSPITAL ENCOUNTER (OUTPATIENT)
Dept: CARDIOLOGY | Facility: HOSPITAL | Age: 88
Discharge: HOME | End: 2023-11-01
Payer: MEDICARE

## 2023-11-01 VITALS — RESPIRATION RATE: 18 BRPM | HEART RATE: 67 BPM | OXYGEN SATURATION: 97 %

## 2023-11-01 DIAGNOSIS — I48.91 ATRIAL FIBRILLATION, UNSPECIFIED TYPE (MULTI): ICD-10-CM

## 2023-11-01 DIAGNOSIS — I50.42 CHRONIC COMBINED SYSTOLIC AND DIASTOLIC CONGESTIVE HEART FAILURE (MULTI): ICD-10-CM

## 2023-11-01 DIAGNOSIS — Z95.0 PRESENCE OF CARDIAC PACEMAKER: Primary | ICD-10-CM

## 2023-11-01 DIAGNOSIS — I25.10 CORONARY ARTERY DISEASE, UNSPECIFIED VESSEL OR LESION TYPE, UNSPECIFIED WHETHER ANGINA PRESENT, UNSPECIFIED WHETHER NATIVE OR TRANSPLANTED HEART: ICD-10-CM

## 2023-11-01 PROCEDURE — G0151 HHCP-SERV OF PT,EA 15 MIN: HCPCS

## 2023-11-01 PROCEDURE — G0152 HHCP-SERV OF OT,EA 15 MIN: HCPCS

## 2023-11-01 SDOH — ECONOMIC STABILITY: HOUSING INSECURITY: EVIDENCE OF SMOKING MATERIAL: 0

## 2023-11-01 SDOH — ECONOMIC STABILITY: HOUSING INSECURITY: HOME SAFETY: UH BOOKLET REVIEWED

## 2023-11-01 SDOH — ECONOMIC STABILITY: HOUSING INSECURITY

## 2023-11-01 SDOH — HEALTH STABILITY: MENTAL HEALTH: SMOKING IN HOME: 0

## 2023-11-01 SDOH — HEALTH STABILITY: PHYSICAL HEALTH: EXERCISE COMMENTS: INITIATED BLE AROM SEATED FOR CIRCULATION, JOINT MOBILITY

## 2023-11-01 ASSESSMENT — PAIN SCALES - PAIN ASSESSMENT IN ADVANCED DEMENTIA (PAINAD)
FACIALEXPRESSION: 0 - SMILING OR INEXPRESSIVE.
CONSOLABILITY: 0 - NO NEED TO CONSOLE.
TOTALSCORE: 0
BODYLANGUAGE: 0
FACIALEXPRESSION: 0
BODYLANGUAGE: 0 - RELAXED.
CONSOLABILITY: 0
NEGVOCALIZATION: 0 - NONE.
NEGVOCALIZATION: 0
BREATHING: 0

## 2023-11-01 ASSESSMENT — ACTIVITIES OF DAILY LIVING (ADL)
AMBULATION ASSISTANCE: 1
TOILETING: INDEPENDENT
PHYSICAL TRANSFERS ASSESSED: 1
DRESSING_UB_CURRENT_FUNCTION: INDEPENDENT
TOILETING: 1
PHYSICAL TRANSFERS ASSESSED: 1
AMBULATION ASSISTANCE: NON-AMBULATORY
CURRENT_FUNCTION: STAND BY ASSIST
BATHING ASSESSED: 1
CURRENT_FUNCTION: SUPERVISION
AMBULATION ASSISTANCE: 1
DRESSING_LB_CURRENT_FUNCTION: INDEPENDENT

## 2023-11-01 ASSESSMENT — ENCOUNTER SYMPTOMS
PAIN: PATIENT DENIES PAIN
OCCASIONAL FEELINGS OF UNSTEADINESS: 1
DENIES PAIN: 1
LOSS OF SENSATION IN FEET: 0
DEPRESSION: 0
DENIES PAIN: 1

## 2023-11-01 NOTE — PROGRESS NOTES
Electrophysiology Follow up Visit    Adwoa Forrest is a 88 y.o. year old female patient with    1. Atrial fibrillation: Status post AV node ablation. She has a dual-chamber pacemaker was set VVIR though after she was started on amiodarone for NSVT, she spontaneously converted to sinus rhythm.      2. Diabetes     3. Hypertension     4. PVCs/nonsustained VT: Noted to have frequent PVCs as well as a short episode of symptomatic VT with PVC morphology at least via pacemaker with similar EGM characteristics. Arrhythmia appears to be automatic and mechanism with a period of ramping up prior to termination. Given palpitations and lightheadedness from episode she was hospitalized in February 2023 and was started on amiodarone. Echocardiogram with normal LV function.     Patient here for follow up for NSVT, AF, and amiodarone therapy.     Patient seen today in the clinic. Interestingly, EKG shows underlying sinus bradycardia with complete heart block and ventricular pacing. Here she spontaneously converted with amiodarone. We will set her up with device clinic so we can switch her mode to DDDR. Regarding her ectopy, no ectopy on today's ECG. She feels much better since discharge. Denies any further episodes of palpitations or lightheadedness. She has never had syncope. We will continue amiodarone indefinitely, will need regular blood work.     PMHx/PSHx:   Past Medical History:   Diagnosis Date    Abnormal findings on diagnostic imaging of heart and coronary circulation 06/13/2017    Abnormal computed tomography angiography of heart    Acute on chronic combined systolic (congestive) and diastolic (congestive) heart failure (CMS/[facility]) 09/28/2017    Acute on chronic combined systolic and diastolic heart failure    Acute on chronic combined systolic (congestive) and diastolic (congestive) heart failure (CMS/[facility]) 02/05/2020    Acute on chronic combined systolic and diastolic CHF, NYHA class 2    Anemia, unspecified 11/18/2020     Anemia, mild    Chronic diastolic (congestive) heart failure (CMS/[facility]) 09/15/2020    Chronic diastolic congestive heart failure    Disorder of the skin and subcutaneous tissue, unspecified 02/16/2020    Skin lesion of face    Diverticulitis of large intestine with perforation and abscess without bleeding     Perforation of sigmoid colon due to diverticulitis    Diverticulosis of intestine, part unspecified, without perforation or abscess without bleeding     Diverticulosis    Dorsalgia, unspecified 08/16/2019    Upper back pain    Encounter for immunization 11/13/2019    Need for vaccination    Encounter for surgical aftercare following surgery on the circulatory system 02/18/2019    Aftercare following surgery of the circulatory system    Essential (primary) hypertension 11/02/2022    Benign essential hypertension    Gastro-esophageal reflux disease without esophagitis 02/05/2020    Gastroesophageal reflux disease without esophagitis    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    Hypothyroidism due to medication    Long term (current) use of anticoagulants 10/22/2018    Long term (current) use of anticoagulants    Melena 04/10/2020    Blood in stool    Old myocardial infarction 10/19/2022    History of non-ST elevation myocardial infarction (NSTEMI)    Other forms of dyspnea 01/24/2020    Dyspnea on exertion    Other long term (current) drug therapy 05/10/2018    Long term current use of antiarrhythmic drug    Other specified cardiac arrhythmias 02/26/2020    Arrhythmia, atrial    Paroxysmal atrial fibrillation (CMS/[facility]) 02/01/2018    Paroxysmal atrial fibrillation    Permanent atrial fibrillation (CMS/[facility]) 01/24/2020    Permanent atrial fibrillation    Personal history of diseases of the blood and blood-forming organs and certain disorders involving the immune mechanism 04/10/2020    History of anemia    Personal history of other diseases of the circulatory system 11/04/2018     "History of abnormal electrocardiography    Personal history of other diseases of the digestive system 10/22/2018    History of lower GI bleeding    Personal history of other diseases of the musculoskeletal system and connective tissue     History of osteoporosis    Personal history of other diseases of the musculoskeletal system and connective tissue 08/16/2019    History of muscle spasm    Personal history of other endocrine, nutritional and metabolic disease 02/18/2019    History of hyperlipidemia    Personal history of other specified conditions 08/13/2020    History of diarrhea    Personal history of other specified conditions     History of precordial chest pain    Personal history of transient ischemic attack (TIA), and cerebral infarction without residual deficits 02/01/2018    Cerebrovascular accident (CVA) within last 3 months    Psychophysiologic insomnia 11/18/2020    Chronic insomnia    Segmental and somatic dysfunction of rib cage 08/16/2019    Segmental and somatic dysfunction of rib cage    Segmental and somatic dysfunction of thoracic region 08/16/2019    Segmental and somatic dysfunction of thoracic region    Segmental and somatic dysfunction of upper extremity 08/16/2019    Segmental and somatic dysfunction of upper extremity      Tobacco {SJTobacco:27162::\"Denies\"}, Alcohol {SJAlcohol:14612}, Caffeine use  {SJCaffiene:18255::\"Denies\"}, Drug use  {SJDRUGuse:40774::\"Denies\"}    FamHx:   Family History   Problem Relation Name Age of Onset    Coronary artery disease Mother      Coronary artery disease Father         Allergies   Allergen Reactions    Codeine Nausea Only and Other     nausea    Hydrocodone Nausea Only and Swelling     nausea    Sotalol Other     Bradycardia     Tetracyclines Itching        Outpatient Medications:  Current Outpatient Medications   Medication Instructions    ALPRAZolam (XANAX) 0.5 mg, oral, Nightly    amiodarone (Pacerone) 200 mg tablet 1 tablet, oral, Daily    aspirin 81 " "mg EC tablet 1 tablet, oral, Daily    atorvastatin (Lipitor) 80 mg tablet 1 tablet, oral, Nightly    calcium carbonate-vitamin D3 500 mg-10 mcg (400 unit) chewable tablet 1 tablet, oral, Daily    cyanocobalamin (Vitamin B-12) 1,000 mcg/mL injection INJECT EVERY 6 MONTS AS NEEDED    cyanocobalamin (Vitamin B-12) 2,000 mcg tablet 1 tablet, oral, Daily    cyanocobalamin, vitamin B-12, 2,000 mcg lozenge Every 24 hours    docusate sodium (COLACE) 100 mg, oral, 2 times daily    Farxiga 10 mg 1 tablet, oral, Daily    fluconazole (Diflucan) 150 mg tablet 1 tablet, oral, Every 7 days    glucosamine HCl 500 mg tablet 1 tablet, oral, Daily    guaiFENesin (MUCINEX) 1,200 mg, oral, 2 times daily    icosapent ethyL (VASCEPA) 2 g, oral, 2 times daily    levothyroxine (Synthroid, Levoxyl) 25 mcg tablet TAKE 1 TABLET BY MOUTH EVERY DAY    lisinopril 5 mg, oral, Daily    melatonin 3 mg capsule 1 tablet, oral, Nightly PRN    metoprolol succinate XL (Toprol-XL) 50 mg 24 hr tablet 1 tablet, oral, Daily    multivit with minerals/lutein (MULTIVITAMIN 50 PLUS ORAL) Every 24 hours    MULTIVITAMIN ORAL 1 tablet, oral, Daily    spironolactone (ALDACTONE) 12.5 mg, oral, Every 24 hours scheduled    torsemide (DEMADEX) 20 mg, oral, Daily    triamcinolone (Kenalog) 0.1 % cream APPLY TO AFFECTED AREA TWICE DAILY AS NEEDED FOR FLARES         Last Recorded Vitals: .vital      10/11/2023     5:00 AM 10/11/2023     9:20 AM 10/11/2023     1:46 PM 10/11/2023     5:43 PM 10/11/2023     7:55 PM 10/25/2023     4:32 PM 10/26/2023    10:22 AM   Vitals   Systolic 134 134 131 180 164 116 118   Diastolic 72 65 69 66 67 54 60   Heart Rate 65 66 65 65 66 65 93   Temp 36.1 °C (97 °F) 36.4 °C (97.5 °F) 36.6 °C (97.9 °F) 36.4 °C (97.5 °F) 36.1 °C (96.9 °F) 36.6 °C (97.8 °F)    Resp 18 17 17 19 18  18   Height (in)      1.575 m (5' 2\") 1.524 m (5')   Weight (lb)      101 100   BMI      18.47 kg/m2 19.53 kg/m2   BSA (m2)      1.42 m2 1.39 m2   Visit Report      Report "     Visit Vitals  Smoking Status Never        Physical Exam   My Interpretation of Reviewed Study(s):  Echo({MONTHS OF THE YEAR:23015}/***): *** Transthoracic echo (TTE) complete    Result Date: 10/3/2023              [place] [address]      Phone [phone] Fax [phone] TRANSTHORACIC ECHOCARDIOGRAM REPORT  Patient Name:      CHERRY Noel Physician:   [id number] Darrel Hampton MD Study Date:        10/3/2023          Ordering Provider:   [id number] STEWART FERREIRA MRN/PID:           [id number]           Fellow: Accession#:        [id number]       Nurse:               Amairani Zafar RN Date of Birth/Age: 1935 / 88      Sonographer:         Thea Espinoza RDCS                    years Gender:            F                  Additional Staff: Height:            157.48 cm          Admit Date:          10/2/2023 Weight:            50.35 kg           Admission Status:    Inpatient - Routine BSA:               1.49 m2            Department Location: [facility] Blood Pressure: 118 /71 mmHg Study Type:    TRANSTHORACIC ECHO (TTE) COMPLETE Diagnosis/ICD: Heart failure, unspecified-I50.9; Cardiomyopathy in diseases                classified elsewhere-I43 Indication:    CHF, Cardiomyopathy  Study Detail: The following Echo studies were performed: 2D, M-Mode, Doppler and               color flow. Technically challenging study due to body habitus,               prominent lung artifact and poor acoustic windows. Agitated saline               used as a contrast agent for intraseptal flow evaluation and               Optison used as a contrast agent for endocardial border               definition.  PHYSICIAN INTERPRETATION: Left Ventricle: Left ventricular systolic function is normal, with an estimated ejection fraction of 70-75%. There are no regional wall motion abnormalities. The left ventricular cavity  size is normal. There is moderate left ventricular hypertrophy. Spectral Doppler shows a normal pattern of left ventricular diastolic filling. Left Atrium: The left atrium is normal in size. A bubble study using agitated saline was performed. Bubble study is negative. Right Ventricle: The right ventricle is normal in size. There is mildly reduced right ventricular systolic function. A device is visualized in the right ventricle. Right Atrium: The right atrium is moderately dilated. There is a device visualized in the right atrium. Aortic Valve: The aortic valve appears structurally normal. There is mild aortic valve cusp calcification. There is mild to moderate aortic valve thickening. There is mild to moderate aortic valve regurgitation. The peak instantaneous gradient of the aortic valve is 4.2 mmHg. The mean gradient of the aortic valve is 2.0 mmHg. Mitral Valve: The mitral valve is normal in structure. There is trace mitral valve regurgitation. Tricuspid Valve: The tricuspid valve is structurally normal. There is mild tricuspid regurgitation. The Doppler estimated RVSP is moderately elevated at 51.7 mmHg. Pulmonic Valve: The pulmonic valve is not well visualized. There is no indication of pulmonic valve regurgitation. Pericardium: There is no pericardial effusion noted. Aorta: The aortic root is normal.  CONCLUSIONS:  1. Left ventricular systolic function is normal with a 70-75% estimated ejection fraction.  2. There is moderate left ventricular hypertrophy.  3. There is mildly reduced right ventricular systolic function.  4. The right atrium is moderately dilated.  5. Moderately elevated right ventricular systolic pressure.  6. Mild to moderate aortic valve regurgitation. QUANTITATIVE DATA SUMMARY: 2D MEASUREMENTS:                           Normal Ranges: Ao Root d:     3.00 cm    (2.0-3.7cm) LAs:           3.50 cm    (2.7-4.0cm) IVSd:          1.60 cm    (0.6-1.1cm) LVPWd:         1.42 cm    (0.6-1.1cm) LVIDd:          3.91 cm    (3.9-5.9cm) LVIDs:         2.40 cm LV Mass Index: 153.0 g/m2 LV % FS        38.6 % LA VOLUME:                               Normal Ranges: LA Vol A4C:        39.2 ml    (22+/-6mL/m2) LA Vol A2C:        50.4 ml LA Vol BP:         49.6 ml LA Vol Index A4C:  26.3ml/m2 LA Vol Index A2C:  33.8 ml/m2 LA Vol Index BP:   33.3 ml/m2 LA Area A4C:       14.7 cm2 LA Area A2C:       18.6 cm2 LA Major Axis A4C: 4.7 cm LA Major Axis A2C: 5.8 cm RA VOLUME BY A/L METHOD:                       Normal Ranges: RA Area A4C: 24.5 cm2 AORTA MEASUREMENTS:                      Normal Ranges: Ao Sinus, d: 3.00 cm (2.1-3.5cm) Ao STJ, d:   1.89 cm (1.7-3.4cm) Asc Ao, d:   3.60 cm (2.1-3.4cm) LV SYSTOLIC FUNCTION BY 2D PLANIMETRY (MOD):                     Normal Ranges: EF-A4C View: 76.2 % (>=55%) EF-A2C View: 73.7 % EF-Biplane:  74.5 % LV DIASTOLIC FUNCTION:                           Normal Ranges: MV Peak E:    0.58 m/s    (0.7-1.2 m/s) MV Peak A:    0.23 m/s    (0.42-0.7 m/s) E/A Ratio:    2.57        (1.0-2.2) MV e'         0.04 m/s    (>8.0) MV lateral e' 0.06 m/s MV medial e'  0.03 m/s MV A Dur:     103.00 msec E/e' Ratio:   12.96       (<8.0) MITRAL VALVE:                 Normal Ranges: MV DT: 237 msec (150-240msec) AORTIC VALVE:                                   Normal Ranges: AoV Vmax:                1.03 m/s (<=1.7m/s) AoV Peak P.2 mmHg (<20mmHg) AoV Mean P.0 mmHg (1.7-11.5mmHg) LVOT Max Henry:            0.70 m/s (<=1.1m/s) AoV VTI:                 20.70 cm (18-25cm) LVOT VTI:                14.40 cm LVOT Diameter:           2.00 cm  (1.8-2.4cm) AoV Area, VTI:           2.19 cm2 (2.5-5.5cm2) AoV Area,Vmax:           2.13 cm2 (2.5-4.5cm2) AoV Dimensionless Index: 0.70 AORTIC INSUFFICIENCY: AI Vmax:       4.19 m/s AI Half-time:  438 msec AI Decel Rate: 335.50 cm/s2  RIGHT VENTRICLE: RV Basal 3.64 cm RV Mid   3.19 cm RV Major 7.5 cm TAPSE:   20.8 mm RV s'    0.07 m/s TRICUSPID  "VALVE/RVSP:                             Normal Ranges: Peak TR Velocity: 3.49 m/s RV Syst Pressure: 51.7 mmHg (< 30mmHg) IVC Diam:         1.50 cm  [id number] Darrel Hampton MD Electronically signed on 10/3/2023 at 4:11:07 PM  ** Final **     ***  No results found for: \"LASIZE\", \"AVLVOTPG\", \"LVWALLTHIC\", \"PW\"   Cath ({MONTHS OF THE YEAR:23015}/***)): ***  Stress Test ({MONTHS OF THE YEAR:23015}/***): ***  ECGs (reviewed and my interpretation):   Encounter Date: 10/02/23   ECG 12 Lead   Result Value    Ventricular Rate 66    Atrial Rate 66    MO Interval 217    QRS Duration 140    QT Interval 463    QTC Calculation(Bazett) 486    P Axis -78    R Axis 266    T Axis 78    QRS Count 10    Q Onset 251    T Offset 482    QTC Fredericia 478    Narrative    A-V dual-paced rhythm with some inhibition  No further analysis attempted due to paced rhythm    Confirmed by Michael Latham ([id number]) on 10/9/2023 5:27:42 PM    ***    Lab Results   Component Value Date    WBC 8.3 10/08/2023    HGB 11.4 (L) 10/08/2023    HCT 39.3 10/08/2023     10/08/2023    ALT 23 10/02/2023    AST 30 10/02/2023     10/11/2023    K 4.3 10/11/2023     10/11/2023    CREATININE 1.51 (H) 10/11/2023    BUN 51 (H) 10/11/2023    CO2 37 (H) 10/11/2023    TSH 4.38 (H) 09/25/2023    INR 1.0 07/18/2023       Assessment      PLAN        Exclusive of any other services or procedures performed, Stefanie MURRELL APRN-CNP , spent 30 minutes in duration for this visit today.  This time consisted of chart review, obtaining history, and/or performing the exam as documented above as well as documenting the clinical information for the encounter in the electronic record, discussing treatment options, plans, and/or goals with patient, family, and/or caregiver, refilling medications, updating the electronic record, ordering medicines, lab work, imaging, referrals, and/or procedures as documented above and communicating with other [facility]. " I have discussed the results of laboratory, radiology, and cardiology studies with the patient and their family/caregiver.

## 2023-11-02 ENCOUNTER — HOME CARE VISIT (OUTPATIENT)
Dept: HOME HEALTH SERVICES | Facility: HOME HEALTH | Age: 88
End: 2023-11-02
Payer: MEDICARE

## 2023-11-02 VITALS
HEART RATE: 70 BPM | SYSTOLIC BLOOD PRESSURE: 120 MMHG | DIASTOLIC BLOOD PRESSURE: 70 MMHG | OXYGEN SATURATION: 96 % | TEMPERATURE: 98 F | RESPIRATION RATE: 18 BRPM

## 2023-11-02 PROCEDURE — 80053 COMPREHEN METABOLIC PANEL: CPT

## 2023-11-02 PROCEDURE — G0300 HHS/HOSPICE OF LPN EA 15 MIN: HCPCS

## 2023-11-02 PROCEDURE — 85025 COMPLETE CBC W/AUTO DIFF WBC: CPT

## 2023-11-02 PROCEDURE — 80048 BASIC METABOLIC PNL TOTAL CA: CPT

## 2023-11-02 PROCEDURE — 83735 ASSAY OF MAGNESIUM: CPT

## 2023-11-02 SDOH — ECONOMIC STABILITY: HOUSING INSECURITY: EVIDENCE OF SMOKING MATERIAL: 0

## 2023-11-02 SDOH — HEALTH STABILITY: MENTAL HEALTH: SMOKING IN HOME: 0

## 2023-11-02 ASSESSMENT — ENCOUNTER SYMPTOMS
LAST BOWEL MOVEMENT: 66780
CHANGE IN APPETITE: UNCHANGED
LOWEST PAIN SEVERITY IN PAST 24 HOURS: 0/10
DENIES PAIN: 1
APPETITE LEVEL: POOR
HIGHEST PAIN SEVERITY IN PAST 24 HOURS: 0/10
BOWEL PATTERN NORMAL: 1
OCCASIONAL FEELINGS OF UNSTEADINESS: 0

## 2023-11-02 ASSESSMENT — PAIN SCALES - PAIN ASSESSMENT IN ADVANCED DEMENTIA (PAINAD)
BODYLANGUAGE: 0
CONSOLABILITY: 0
NEGVOCALIZATION: 0
FACIALEXPRESSION: 0 - SMILING OR INEXPRESSIVE.
CONSOLABILITY: 0 - NO NEED TO CONSOLE.
FACIALEXPRESSION: 0
NEGVOCALIZATION: 0 - NONE.
TOTALSCORE: 0
BREATHING: 0
BODYLANGUAGE: 0 - RELAXED.

## 2023-11-03 ENCOUNTER — TELEPHONE (OUTPATIENT)
Dept: PRIMARY CARE | Facility: CLINIC | Age: 88
End: 2023-11-03

## 2023-11-03 ENCOUNTER — APPOINTMENT (OUTPATIENT)
Dept: WOUND CARE | Facility: CLINIC | Age: 88
End: 2023-11-03
Payer: MEDICARE

## 2023-11-03 ENCOUNTER — HOSPITAL ENCOUNTER (OUTPATIENT)
Facility: HOSPITAL | Age: 88
Setting detail: OBSERVATION
Discharge: HOME | End: 2023-11-05
Attending: EMERGENCY MEDICINE | Admitting: INTERNAL MEDICINE
Payer: MEDICARE

## 2023-11-03 ENCOUNTER — APPOINTMENT (OUTPATIENT)
Dept: CARDIOLOGY | Facility: CLINIC | Age: 88
End: 2023-11-03
Payer: MEDICARE

## 2023-11-03 ENCOUNTER — LAB REQUISITION (OUTPATIENT)
Dept: LAB | Facility: HOSPITAL | Age: 88
End: 2023-11-03
Payer: MEDICARE

## 2023-11-03 DIAGNOSIS — I50.9 HEART FAILURE, UNSPECIFIED (MULTI): ICD-10-CM

## 2023-11-03 DIAGNOSIS — R07.9 CHEST PAIN, UNSPECIFIED TYPE: ICD-10-CM

## 2023-11-03 DIAGNOSIS — E87.5 HYPERKALEMIA: Primary | ICD-10-CM

## 2023-11-03 LAB
ALBUMIN SERPL BCP-MCNC: 3.9 G/DL (ref 3.4–5)
ALP SERPL-CCNC: 90 U/L (ref 33–136)
ALT SERPL W P-5'-P-CCNC: 33 U/L (ref 7–45)
ANION GAP SERPL CALC-SCNC: 12 MMOL/L (ref 10–20)
ANION GAP SERPL CALC-SCNC: 15 MMOL/L
ANION GAP SERPL CALC-SCNC: 15 MMOL/L
AST SERPL W P-5'-P-CCNC: 27 U/L (ref 9–39)
BASOPHILS # BLD AUTO: 0.06 X10*3/UL (ref 0–0.1)
BASOPHILS # BLD AUTO: 0.06 X10*3/UL (ref 0–0.1)
BASOPHILS NFR BLD AUTO: 1.1 %
BASOPHILS NFR BLD AUTO: 1.3 %
BILIRUB SERPL-MCNC: 0.5 MG/DL (ref 0–1.2)
BUN SERPL-MCNC: 51 MG/DL (ref 6–23)
BUN SERPL-MCNC: 56 MG/DL (ref 6–23)
BUN SERPL-MCNC: 56 MG/DL (ref 6–23)
CALCIUM SERPL-MCNC: 9.1 MG/DL (ref 8.6–10.3)
CALCIUM SERPL-MCNC: 9.7 MG/DL (ref 8.6–10.6)
CALCIUM SERPL-MCNC: 9.7 MG/DL (ref 8.6–10.6)
CARDIAC TROPONIN I PNL SERPL HS: 33 NG/L (ref 0–13)
CARDIAC TROPONIN I PNL SERPL HS: 34 NG/L (ref 0–13)
CHLORIDE SERPL-SCNC: 97 MMOL/L (ref 98–107)
CHLORIDE SERPL-SCNC: 98 MMOL/L (ref 98–107)
CHLORIDE SERPL-SCNC: 98 MMOL/L (ref 98–107)
CO2 SERPL-SCNC: 28 MMOL/L (ref 21–32)
CO2 SERPL-SCNC: 32 MMOL/L (ref 21–32)
CO2 SERPL-SCNC: 32 MMOL/L (ref 21–32)
CREAT SERPL-MCNC: 1.95 MG/DL (ref 0.5–1.05)
CREAT SERPL-MCNC: 2.02 MG/DL (ref 0.5–1.05)
CREAT SERPL-MCNC: 2.02 MG/DL (ref 0.5–1.05)
EOSINOPHIL # BLD AUTO: 0.04 X10*3/UL (ref 0–0.4)
EOSINOPHIL # BLD AUTO: 0.07 X10*3/UL (ref 0–0.4)
EOSINOPHIL NFR BLD AUTO: 0.9 %
EOSINOPHIL NFR BLD AUTO: 1.3 %
ERYTHROCYTE [DISTWIDTH] IN BLOOD BY AUTOMATED COUNT: 18.5 % (ref 11.5–14.5)
ERYTHROCYTE [DISTWIDTH] IN BLOOD BY AUTOMATED COUNT: 18.6 % (ref 11.5–14.5)
GFR SERPL CREATININE-BSD FRML MDRD: 24 ML/MIN/1.73M*2
GFR SERPL CREATININE-BSD FRML MDRD: 24 ML/MIN/1.73M*2
GFR SERPL CREATININE-BSD FRML MDRD: 25 ML/MIN/1.73M*2
GLUCOSE BLD MANUAL STRIP-MCNC: 71 MG/DL (ref 74–99)
GLUCOSE SERPL-MCNC: 78 MG/DL (ref 74–99)
GLUCOSE SERPL-MCNC: 80 MG/DL (ref 74–99)
GLUCOSE SERPL-MCNC: 80 MG/DL (ref 74–99)
HCT VFR BLD AUTO: 40.9 % (ref 36–46)
HCT VFR BLD AUTO: 42.8 % (ref 36–46)
HGB BLD-MCNC: 12 G/DL (ref 12–16)
HGB BLD-MCNC: 12.4 G/DL (ref 12–16)
IMM GRANULOCYTES # BLD AUTO: 0.01 X10*3/UL (ref 0–0.5)
IMM GRANULOCYTES # BLD AUTO: 0.03 X10*3/UL (ref 0–0.5)
IMM GRANULOCYTES NFR BLD AUTO: 0.2 % (ref 0–0.9)
IMM GRANULOCYTES NFR BLD AUTO: 0.5 % (ref 0–0.9)
LYMPHOCYTES # BLD AUTO: 0.8 X10*3/UL (ref 0.8–3)
LYMPHOCYTES # BLD AUTO: 0.86 X10*3/UL (ref 0.8–3)
LYMPHOCYTES NFR BLD AUTO: 15.5 %
LYMPHOCYTES NFR BLD AUTO: 17.1 %
MAGNESIUM SERPL-MCNC: 2.26 MG/DL (ref 1.6–2.4)
MAGNESIUM SERPL-MCNC: 2.76 MG/DL (ref 1.6–2.4)
MCH RBC QN AUTO: 26 PG (ref 26–34)
MCH RBC QN AUTO: 26.2 PG (ref 26–34)
MCHC RBC AUTO-ENTMCNC: 28 G/DL (ref 32–36)
MCHC RBC AUTO-ENTMCNC: 30.3 G/DL (ref 32–36)
MCV RBC AUTO: 86 FL (ref 80–100)
MCV RBC AUTO: 93 FL (ref 80–100)
MONOCYTES # BLD AUTO: 0.55 X10*3/UL (ref 0.05–0.8)
MONOCYTES # BLD AUTO: 0.66 X10*3/UL (ref 0.05–0.8)
MONOCYTES NFR BLD AUTO: 11.8 %
MONOCYTES NFR BLD AUTO: 11.9 %
NEUTROPHILS # BLD AUTO: 3.22 X10*3/UL (ref 1.6–5.5)
NEUTROPHILS # BLD AUTO: 3.87 X10*3/UL (ref 1.6–5.5)
NEUTROPHILS NFR BLD AUTO: 68.7 %
NEUTROPHILS NFR BLD AUTO: 69.7 %
NRBC BLD-RTO: 0 /100 WBCS (ref 0–0)
NRBC BLD-RTO: 0 /100 WBCS (ref 0–0)
PLATELET # BLD AUTO: 236 X10*3/UL (ref 150–450)
PLATELET # BLD AUTO: 343 X10*3/UL (ref 150–450)
POCT GLUCOSE: 71 MG/DL (ref 74–99)
POTASSIUM SERPL-SCNC: 5 MMOL/L (ref 3.5–5.3)
POTASSIUM SERPL-SCNC: 5.2 MMOL/L (ref 3.5–5.3)
POTASSIUM SERPL-SCNC: 6.4 MMOL/L (ref 3.5–5.3)
POTASSIUM SERPL-SCNC: 7 MMOL/L (ref 3.5–5.3)
POTASSIUM SERPL-SCNC: 7 MMOL/L (ref 3.5–5.3)
PROT SERPL-MCNC: 6.3 G/DL (ref 6.4–8.2)
RBC # BLD AUTO: 4.62 X10*6/UL (ref 4–5.2)
RBC # BLD AUTO: 4.74 X10*6/UL (ref 4–5.2)
SODIUM SERPL-SCNC: 131 MMOL/L (ref 136–145)
SODIUM SERPL-SCNC: 138 MMOL/L (ref 136–145)
SODIUM SERPL-SCNC: 138 MMOL/L (ref 136–145)
WBC # BLD AUTO: 4.7 X10*3/UL (ref 4.4–11.3)
WBC # BLD AUTO: 5.6 X10*3/UL (ref 4.4–11.3)

## 2023-11-03 PROCEDURE — 82947 ASSAY GLUCOSE BLOOD QUANT: CPT

## 2023-11-03 PROCEDURE — 2500000005 HC RX 250 GENERAL PHARMACY W/O HCPCS: Performed by: EMERGENCY MEDICINE

## 2023-11-03 PROCEDURE — 80048 BASIC METABOLIC PNL TOTAL CA: CPT | Performed by: EMERGENCY MEDICINE

## 2023-11-03 PROCEDURE — 83735 ASSAY OF MAGNESIUM: CPT | Performed by: NURSE PRACTITIONER

## 2023-11-03 PROCEDURE — 2500000004 HC RX 250 GENERAL PHARMACY W/ HCPCS (ALT 636 FOR OP/ED): Performed by: EMERGENCY MEDICINE

## 2023-11-03 PROCEDURE — 99285 EMERGENCY DEPT VISIT HI MDM: CPT | Mod: 25 | Performed by: EMERGENCY MEDICINE

## 2023-11-03 PROCEDURE — 84484 ASSAY OF TROPONIN QUANT: CPT | Performed by: PHYSICIAN ASSISTANT

## 2023-11-03 PROCEDURE — 84132 ASSAY OF SERUM POTASSIUM: CPT | Mod: 59 | Performed by: PHYSICIAN ASSISTANT

## 2023-11-03 PROCEDURE — 99223 1ST HOSP IP/OBS HIGH 75: CPT | Performed by: PHYSICIAN ASSISTANT

## 2023-11-03 PROCEDURE — 96365 THER/PROPH/DIAG IV INF INIT: CPT

## 2023-11-03 PROCEDURE — 2500000001 HC RX 250 WO HCPCS SELF ADMINISTERED DRUGS (ALT 637 FOR MEDICARE OP): Performed by: PHYSICIAN ASSISTANT

## 2023-11-03 PROCEDURE — 2500000002 HC RX 250 W HCPCS SELF ADMINISTERED DRUGS (ALT 637 FOR MEDICARE OP, ALT 636 FOR OP/ED): Performed by: EMERGENCY MEDICINE

## 2023-11-03 PROCEDURE — 96375 TX/PRO/DX INJ NEW DRUG ADDON: CPT

## 2023-11-03 PROCEDURE — 85025 COMPLETE CBC W/AUTO DIFF WBC: CPT | Performed by: EMERGENCY MEDICINE

## 2023-11-03 PROCEDURE — G0378 HOSPITAL OBSERVATION PER HR: HCPCS

## 2023-11-03 PROCEDURE — 99291 CRITICAL CARE FIRST HOUR: CPT | Performed by: NURSE PRACTITIONER

## 2023-11-03 PROCEDURE — 36415 COLL VENOUS BLD VENIPUNCTURE: CPT | Performed by: EMERGENCY MEDICINE

## 2023-11-03 PROCEDURE — 84132 ASSAY OF SERUM POTASSIUM: CPT | Performed by: EMERGENCY MEDICINE

## 2023-11-03 PROCEDURE — 84484 ASSAY OF TROPONIN QUANT: CPT | Performed by: EMERGENCY MEDICINE

## 2023-11-03 RX ORDER — HYDRALAZINE HYDROCHLORIDE 10 MG/1
10 TABLET, FILM COATED ORAL ONCE
Status: DISCONTINUED | OUTPATIENT
Start: 2023-11-03 | End: 2023-11-05 | Stop reason: HOSPADM

## 2023-11-03 RX ORDER — DEXTROSE MONOHYDRATE 100 MG/ML
0.3 INJECTION, SOLUTION INTRAVENOUS ONCE AS NEEDED
Status: DISCONTINUED | OUTPATIENT
Start: 2023-11-03 | End: 2023-11-05 | Stop reason: HOSPADM

## 2023-11-03 RX ORDER — ASPIRIN 81 MG/1
81 TABLET ORAL DAILY
Status: DISCONTINUED | OUTPATIENT
Start: 2023-11-03 | End: 2023-11-05 | Stop reason: HOSPADM

## 2023-11-03 RX ORDER — DEXTROSE 50 % IN WATER (D50W) INTRAVENOUS SYRINGE
25 ONCE
Status: COMPLETED | OUTPATIENT
Start: 2023-11-03 | End: 2023-11-03

## 2023-11-03 RX ORDER — ACETAMINOPHEN 325 MG/1
650 TABLET ORAL EVERY 4 HOURS PRN
Status: DISCONTINUED | OUTPATIENT
Start: 2023-11-03 | End: 2023-11-05 | Stop reason: HOSPADM

## 2023-11-03 RX ORDER — HEPARIN SODIUM 5000 [USP'U]/ML
5000 INJECTION, SOLUTION INTRAVENOUS; SUBCUTANEOUS EVERY 8 HOURS
Status: DISCONTINUED | OUTPATIENT
Start: 2023-11-03 | End: 2023-11-05 | Stop reason: HOSPADM

## 2023-11-03 RX ORDER — ATORVASTATIN CALCIUM 40 MG/1
80 TABLET, FILM COATED ORAL NIGHTLY
Status: DISCONTINUED | OUTPATIENT
Start: 2023-11-03 | End: 2023-11-05 | Stop reason: HOSPADM

## 2023-11-03 RX ORDER — LEVOTHYROXINE SODIUM 25 UG/1
25 TABLET ORAL DAILY
Status: DISCONTINUED | OUTPATIENT
Start: 2023-11-03 | End: 2023-11-05 | Stop reason: HOSPADM

## 2023-11-03 RX ORDER — ACETAMINOPHEN 650 MG/1
650 SUPPOSITORY RECTAL EVERY 4 HOURS PRN
Status: DISCONTINUED | OUTPATIENT
Start: 2023-11-03 | End: 2023-11-05 | Stop reason: HOSPADM

## 2023-11-03 RX ORDER — ACETAMINOPHEN 160 MG/5ML
650 SOLUTION ORAL EVERY 4 HOURS PRN
Status: DISCONTINUED | OUTPATIENT
Start: 2023-11-03 | End: 2023-11-05 | Stop reason: HOSPADM

## 2023-11-03 RX ORDER — PANTOPRAZOLE SODIUM 40 MG/1
40 TABLET, DELAYED RELEASE ORAL
Status: DISCONTINUED | OUTPATIENT
Start: 2023-11-04 | End: 2023-11-05 | Stop reason: HOSPADM

## 2023-11-03 RX ORDER — DOCUSATE SODIUM 100 MG/1
100 CAPSULE, LIQUID FILLED ORAL 2 TIMES DAILY
Status: DISCONTINUED | OUTPATIENT
Start: 2023-11-03 | End: 2023-11-05 | Stop reason: HOSPADM

## 2023-11-03 RX ORDER — AMIODARONE HYDROCHLORIDE 200 MG/1
200 TABLET ORAL DAILY
Status: DISCONTINUED | OUTPATIENT
Start: 2023-11-03 | End: 2023-11-05 | Stop reason: HOSPADM

## 2023-11-03 RX ORDER — ALPRAZOLAM 0.5 MG/1
0.5 TABLET ORAL NIGHTLY PRN
Status: DISCONTINUED | OUTPATIENT
Start: 2023-11-03 | End: 2023-11-05 | Stop reason: HOSPADM

## 2023-11-03 RX ORDER — DEXTROSE 50 % IN WATER (D50W) INTRAVENOUS SYRINGE
25
Status: DISCONTINUED | OUTPATIENT
Start: 2023-11-03 | End: 2023-11-05 | Stop reason: HOSPADM

## 2023-11-03 RX ORDER — POLYETHYLENE GLYCOL 3350 17 G/17G
17 POWDER, FOR SOLUTION ORAL DAILY
Status: DISCONTINUED | OUTPATIENT
Start: 2023-11-03 | End: 2023-11-05 | Stop reason: HOSPADM

## 2023-11-03 RX ORDER — LISINOPRIL 5 MG/1
5 TABLET ORAL DAILY
Status: CANCELLED | OUTPATIENT
Start: 2023-11-03

## 2023-11-03 RX ORDER — ACETAMINOPHEN 325 MG/1
650 TABLET ORAL ONCE
Status: COMPLETED | OUTPATIENT
Start: 2023-11-03 | End: 2023-11-03

## 2023-11-03 RX ORDER — PANTOPRAZOLE SODIUM 40 MG/10ML
40 INJECTION, POWDER, LYOPHILIZED, FOR SOLUTION INTRAVENOUS
Status: DISCONTINUED | OUTPATIENT
Start: 2023-11-04 | End: 2023-11-05 | Stop reason: HOSPADM

## 2023-11-03 RX ORDER — CALCIUM GLUCONATE 20 MG/ML
2 INJECTION, SOLUTION INTRAVENOUS ONCE
Status: COMPLETED | OUTPATIENT
Start: 2023-11-03 | End: 2023-11-03

## 2023-11-03 RX ORDER — TALC
3 POWDER (GRAM) TOPICAL NIGHTLY PRN
Status: DISCONTINUED | OUTPATIENT
Start: 2023-11-03 | End: 2023-11-05 | Stop reason: HOSPADM

## 2023-11-03 RX ORDER — METOPROLOL SUCCINATE 50 MG/1
50 TABLET, EXTENDED RELEASE ORAL DAILY
Status: DISCONTINUED | OUTPATIENT
Start: 2023-11-03 | End: 2023-11-05 | Stop reason: HOSPADM

## 2023-11-03 RX ORDER — TORSEMIDE 20 MG/1
20 TABLET ORAL DAILY
Status: CANCELLED | OUTPATIENT
Start: 2023-11-03

## 2023-11-03 RX ORDER — INSULIN LISPRO 100 [IU]/ML
0-10 INJECTION, SOLUTION INTRAVENOUS; SUBCUTANEOUS
Status: DISCONTINUED | OUTPATIENT
Start: 2023-11-03 | End: 2023-11-04

## 2023-11-03 RX ADMIN — DEXTROSE MONOHYDRATE 25 G: 25 INJECTION, SOLUTION INTRAVENOUS at 10:53

## 2023-11-03 RX ADMIN — DOCUSATE SODIUM 100 MG: 100 CAPSULE, LIQUID FILLED ORAL at 21:23

## 2023-11-03 RX ADMIN — Medication 3 MG: at 21:25

## 2023-11-03 RX ADMIN — ACETAMINOPHEN 650 MG: 325 TABLET ORAL at 12:26

## 2023-11-03 RX ADMIN — CALCIUM GLUCONATE 2 G: 20 INJECTION, SOLUTION INTRAVENOUS at 10:39

## 2023-11-03 RX ADMIN — ALPRAZOLAM 0.5 MG: 0.5 TABLET ORAL at 22:33

## 2023-11-03 RX ADMIN — INSULIN HUMAN 10 UNITS: 100 INJECTION, SOLUTION PARENTERAL at 10:54

## 2023-11-03 RX ADMIN — ATORVASTATIN CALCIUM 80 MG: 40 TABLET, FILM COATED ORAL at 21:14

## 2023-11-03 SDOH — SOCIAL STABILITY: SOCIAL INSECURITY: HAVE YOU HAD THOUGHTS OF HARMING ANYONE ELSE?: NO

## 2023-11-03 SDOH — SOCIAL STABILITY: SOCIAL INSECURITY: ARE THERE ANY APPARENT SIGNS OF INJURIES/BEHAVIORS THAT COULD BE RELATED TO ABUSE/NEGLECT?: NO

## 2023-11-03 SDOH — SOCIAL STABILITY: SOCIAL INSECURITY: HAS ANYONE EVER THREATENED TO HURT YOUR FAMILY OR YOUR PETS?: NO

## 2023-11-03 SDOH — SOCIAL STABILITY: SOCIAL INSECURITY: DO YOU FEEL UNSAFE GOING BACK TO THE PLACE WHERE YOU ARE LIVING?: NO

## 2023-11-03 SDOH — SOCIAL STABILITY: SOCIAL INSECURITY: ARE YOU OR HAVE YOU BEEN THREATENED OR ABUSED PHYSICALLY, EMOTIONALLY, OR SEXUALLY BY ANYONE?: NO

## 2023-11-03 SDOH — SOCIAL STABILITY: SOCIAL INSECURITY: DOES ANYONE TRY TO KEEP YOU FROM HAVING/CONTACTING OTHER FRIENDS OR DOING THINGS OUTSIDE YOUR HOME?: NO

## 2023-11-03 SDOH — SOCIAL STABILITY: SOCIAL INSECURITY: WERE YOU ABLE TO COMPLETE ALL THE BEHAVIORAL HEALTH SCREENINGS?: YES

## 2023-11-03 SDOH — SOCIAL STABILITY: SOCIAL INSECURITY: ABUSE: ADULT

## 2023-11-03 SDOH — SOCIAL STABILITY: SOCIAL INSECURITY: DO YOU FEEL ANYONE HAS EXPLOITED OR TAKEN ADVANTAGE OF YOU FINANCIALLY OR OF YOUR PERSONAL PROPERTY?: NO

## 2023-11-03 ASSESSMENT — COGNITIVE AND FUNCTIONAL STATUS - GENERAL
HELP NEEDED FOR BATHING: A LITTLE
DRESSING REGULAR UPPER BODY CLOTHING: A LITTLE
DRESSING REGULAR LOWER BODY CLOTHING: A LITTLE
TURNING FROM BACK TO SIDE WHILE IN FLAT BAD: A LITTLE
PERSONAL GROOMING: A LITTLE
DAILY ACTIVITIY SCORE: 18
WALKING IN HOSPITAL ROOM: A LITTLE
CLIMB 3 TO 5 STEPS WITH RAILING: A LITTLE
TOILETING: A LITTLE
MOBILITY SCORE: 18
MOVING TO AND FROM BED TO CHAIR: A LITTLE
STANDING UP FROM CHAIR USING ARMS: A LITTLE
EATING MEALS: A LITTLE
MOVING FROM LYING ON BACK TO SITTING ON SIDE OF FLAT BED WITH BEDRAILS: A LITTLE

## 2023-11-03 ASSESSMENT — PAIN DESCRIPTION - LOCATION: LOCATION: ABDOMEN

## 2023-11-03 ASSESSMENT — PAIN DESCRIPTION - ORIENTATION: ORIENTATION: LOWER;RIGHT

## 2023-11-03 ASSESSMENT — ACTIVITIES OF DAILY LIVING (ADL)
PATIENT'S MEMORY ADEQUATE TO SAFELY COMPLETE DAILY ACTIVITIES?: YES
HEARING - LEFT EAR: HEARING AID
DRESSING YOURSELF: NEEDS ASSISTANCE
WALKS IN HOME: NEEDS ASSISTANCE
GROOMING: NEEDS ASSISTANCE
TOILETING: NEEDS ASSISTANCE
ADEQUATE_TO_COMPLETE_ADL: YES
BATHING: NEEDS ASSISTANCE
FEEDING YOURSELF: NEEDS ASSISTANCE
HEARING - RIGHT EAR: HEARING AID
LACK_OF_TRANSPORTATION: NO
JUDGMENT_ADEQUATE_SAFELY_COMPLETE_DAILY_ACTIVITIES: YES

## 2023-11-03 ASSESSMENT — HEART SCORE
ECG: NORMAL
HISTORY: SLIGHTLY SUSPICIOUS
AGE: 65+
HEART SCORE: 6
TROPONIN: GREATER THAN OR EQUAL TO 3 TIMES NORMAL LIMIT
RISK FACTORS: >2 RISK FACTORS OR HX OF ATHEROSCLEROTIC DISEASE

## 2023-11-03 ASSESSMENT — LIFESTYLE VARIABLES
SKIP TO QUESTIONS 9-10: 1
HOW MANY STANDARD DRINKS CONTAINING ALCOHOL DO YOU HAVE ON A TYPICAL DAY: PATIENT DOES NOT DRINK
AUDIT-C TOTAL SCORE: 0
HOW OFTEN DO YOU HAVE 6 OR MORE DRINKS ON ONE OCCASION: NEVER
AUDIT-C TOTAL SCORE: 0
HOW OFTEN DO YOU HAVE A DRINK CONTAINING ALCOHOL: NEVER

## 2023-11-03 ASSESSMENT — PATIENT HEALTH QUESTIONNAIRE - PHQ9
SUM OF ALL RESPONSES TO PHQ9 QUESTIONS 1 & 2: 0
2. FEELING DOWN, DEPRESSED OR HOPELESS: NOT AT ALL
1. LITTLE INTEREST OR PLEASURE IN DOING THINGS: NOT AT ALL

## 2023-11-03 ASSESSMENT — PAIN - FUNCTIONAL ASSESSMENT
PAIN_FUNCTIONAL_ASSESSMENT: 0-10

## 2023-11-03 ASSESSMENT — PAIN SCALES - GENERAL
PAINLEVEL_OUTOF10: 2
PAINLEVEL_OUTOF10: 0 - NO PAIN
PAINLEVEL_OUTOF10: 1

## 2023-11-03 ASSESSMENT — COLUMBIA-SUICIDE SEVERITY RATING SCALE - C-SSRS
1. IN THE PAST MONTH, HAVE YOU WISHED YOU WERE DEAD OR WISHED YOU COULD GO TO SLEEP AND NOT WAKE UP?: NO
2. HAVE YOU ACTUALLY HAD ANY THOUGHTS OF KILLING YOURSELF?: NO
6. HAVE YOU EVER DONE ANYTHING, STARTED TO DO ANYTHING, OR PREPARED TO DO ANYTHING TO END YOUR LIFE?: NO

## 2023-11-03 NOTE — ASSESSMENT & PLAN NOTE
Likely related to recent medication changes on the spironolactone.  We will hold on spironolactone for now.  Repeat K levels to ensure not increasing.  Monitor fluid status closely.  Continue to monitor on telemetry  Not much change off aldactone and lisinopril.  Will restart Torsemide.  K is 5.2 on day of discharge. Will continue to hold ACEI and Aldactone. To be seen later this week in CHF clinic. Spoke with cards briefly today, does not think it will be hard to arrange. Pt instructed to call CHF clinic tomorrow.  OK to discharge to home.

## 2023-11-03 NOTE — ASSESSMENT & PLAN NOTE
Holding home lisinopril in setting of worsening kidney function.  Continue to monitor BP closely.  BP is acceptable at present.  Will continue to observe.  Needs close follow up with CHF clinic.

## 2023-11-03 NOTE — ASSESSMENT & PLAN NOTE
Likely slight YEVGENIY on CKD, holding spironolactone as above, will also hold lisinopril and torsemide for now.  Continue to monitor kidney function.  Cr is 1.96 today.  Baseline appears to be 1.51-1.80

## 2023-11-03 NOTE — H&P
History Of Present Illness  Adwoa Forrest is a 88 y.o. female with PMH of CKD, A-fib not on AC due to history of bleeding, HTN, DLD, CAD s/p prior PCI, PPM implant, anxiety, depression, HFpEF 50%, who presents with concerns for hyperkalemia.  Patient was recently admitted last month with acute on chronic CHF.  Was discharged after that admission on torsemide and spironolactone.  Over the past several days has been noting increased muscle twitching.  Followed up with her PCP yesterday and had lab work drawn.  Was called with results the day showing an elevated potassium of 7.0 patient was instructed to come to the ED. denies any history of issues with potassium levels previously    ED course: Afebrile, HR 67, RR 20, /66, pulse ox 98% on room air.  No leukocytosis, Hgb 12.4.  Chemistries remarkable for potassium 6.4.  Creatinine 1.95, BUN 51.  Troponin 33.  Patient was given hyperkalemia cocktail with K improvement to 5.0.  Decision made for admission      Past Medical History  She has a past medical history of Abnormal findings on diagnostic imaging of heart and coronary circulation (06/13/2017), Acute on chronic combined systolic (congestive) and diastolic (congestive) heart failure (CMS/HCC) (09/28/2017), Acute on chronic combined systolic (congestive) and diastolic (congestive) heart failure (CMS/HCC) (02/05/2020), Anemia, unspecified (11/18/2020), Chronic diastolic (congestive) heart failure (CMS/HCC) (09/15/2020), Disorder of the skin and subcutaneous tissue, unspecified (02/16/2020), Diverticulitis of large intestine with perforation and abscess without bleeding, Diverticulosis of intestine, part unspecified, without perforation or abscess without bleeding, Dorsalgia, unspecified (08/16/2019), Encounter for immunization (11/13/2019), Encounter for surgical aftercare following surgery on the circulatory system (02/18/2019), Essential (primary) hypertension (11/02/2022), Gastro-esophageal reflux disease  without esophagitis (02/05/2020), 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), Long term (current) use of anticoagulants (10/22/2018), Melena (04/10/2020), Old myocardial infarction (10/19/2022), Other forms of dyspnea (01/24/2020), Other long term (current) drug therapy (05/10/2018), Other specified cardiac arrhythmias (02/26/2020), Paroxysmal atrial fibrillation (CMS/HCC) (02/01/2018), Permanent atrial fibrillation (CMS/HCC) (01/24/2020), Personal history of diseases of the blood and blood-forming organs and certain disorders involving the immune mechanism (04/10/2020), Personal history of other diseases of the circulatory system (11/04/2018), Personal history of other diseases of the digestive system (10/22/2018), Personal history of other diseases of the musculoskeletal system and connective tissue, Personal history of other diseases of the musculoskeletal system and connective tissue (08/16/2019), Personal history of other endocrine, nutritional and metabolic disease (02/18/2019), Personal history of other specified conditions (08/13/2020), Personal history of other specified conditions, Personal history of transient ischemic attack (TIA), and cerebral infarction without residual deficits (02/01/2018), Psychophysiologic insomnia (11/18/2020), Segmental and somatic dysfunction of rib cage (08/16/2019), Segmental and somatic dysfunction of thoracic region (08/16/2019), and Segmental and somatic dysfunction of upper extremity (08/16/2019).    Surgical History  She has a past surgical history that includes Cataract extraction (05/04/2016); Appendectomy (05/04/2016); Hysterectomy (05/04/2016); Tonsillectomy (05/04/2016); Cholecystectomy (05/04/2016); Other surgical history (02/18/2019); Other surgical history (04/26/2018); Cardiac pacemaker placement (04/26/2018); Coronary angioplasty (02/01/2018); and Colectomy partial / total (08/17/2018).     Social History  She reports that she  has never smoked. She has never been exposed to tobacco smoke. She has never used smokeless tobacco. No history on file for alcohol use and drug use.    Family History  Family History   Problem Relation Name Age of Onset    Coronary artery disease Mother      Coronary artery disease Father          Allergies  Codeine, Hydrocodone, Sotalol, and Tetracyclines    Review of Systems  12 point ROS reviewed, negative except for as noted above    Last Recorded Vitals  /60   Pulse 65   Temp 36.7 °C (98 °F) (Temporal)   Resp 20   Wt 47.6 kg (105 lb)   SpO2 100%       Physical Exam  Constitutional: Well developed, well nourished, in no acute distress. Laying back in bed comfortably and talkative    Eyes: PERRL, EOMI    Head/Neck: Normocephalic, atraumatic. Neck supple, no thyromegaly, JVD. Trachea midline    Respiratory/Thorax: Normal respiratory effort. Lungs CTAB with no wheezing, rales, or rhonchi noted    Cardiovascular: RRR, murmur present, no rubs or gallops noted. Peripheral pulses 2+    Gastrointestinal: Bowel sounds normal. Abdomen soft, nontender, nondistended, with no palpable masses    Musculoskeletal: No gross deformities. No gross muscular weakness with no ROM limitation    Extremities: No lower extremity edema noted bilaterally    Neurological: Alert and oriented x3, CN II - VII grossly intact    Psychological: Appropriate mood and affect    Skin: No rashes or lesions noted.         Relevant Results  Results for orders placed or performed during the hospital encounter of 11/03/23 (from the past 24 hour(s))   Basic metabolic panel   Result Value Ref Range    Glucose 78 74 - 99 mg/dL    Sodium 131 (L) 136 - 145 mmol/L    Potassium 6.4 (HH) 3.5 - 5.3 mmol/L    Chloride 97 (L) 98 - 107 mmol/L    Bicarbonate 28 21 - 32 mmol/L    Anion Gap 12 10 - 20 mmol/L    Urea Nitrogen 51 (H) 6 - 23 mg/dL    Creatinine 1.95 (H) 0.50 - 1.05 mg/dL    eGFR 25 (L) >60 mL/min/1.73m*2    Calcium 9.1 8.6 - 10.3 mg/dL    Magnesium   Result Value Ref Range    Magnesium 2.26 1.60 - 2.40 mg/dL   CBC and Auto Differential   Result Value Ref Range    WBC 4.7 4.4 - 11.3 x10*3/uL    nRBC 0.0 0.0 - 0.0 /100 WBCs    RBC 4.74 4.00 - 5.20 x10*6/uL    Hemoglobin 12.4 12.0 - 16.0 g/dL    Hematocrit 40.9 36.0 - 46.0 %    MCV 86 80 - 100 fL    MCH 26.2 26.0 - 34.0 pg    MCHC 30.3 (L) 32.0 - 36.0 g/dL    RDW 18.6 (H) 11.5 - 14.5 %    Platelets 236 150 - 450 x10*3/uL    Neutrophils % 68.7 40.0 - 80.0 %    Immature Granulocytes %, Automated 0.2 0.0 - 0.9 %    Lymphocytes % 17.1 13.0 - 44.0 %    Monocytes % 11.8 2.0 - 10.0 %    Eosinophils % 0.9 0.0 - 6.0 %    Basophils % 1.3 0.0 - 2.0 %    Neutrophils Absolute 3.22 1.60 - 5.50 x10*3/uL    Immature Granulocytes Absolute, Automated 0.01 0.00 - 0.50 x10*3/uL    Lymphocytes Absolute 0.80 0.80 - 3.00 x10*3/uL    Monocytes Absolute 0.55 0.05 - 0.80 x10*3/uL    Eosinophils Absolute 0.04 0.00 - 0.40 x10*3/uL    Basophils Absolute 0.06 0.00 - 0.10 x10*3/uL   POCT GLUCOSE   Result Value Ref Range    POCT Glucose 71 (L) 74 - 99 mg/dL   POCT glucose   Result Value Ref Range    POCT Glucose 71 (A) 74 - 99 mg/dL   Potassium   Result Value Ref Range    Potassium 5.0 3.5 - 5.3 mmol/L   Troponin I, High Sensitivity   Result Value Ref Range    Troponin I, High Sensitivity 33 (H) 0 - 13 ng/L      No results found.   Scheduled medications:  hydrALAZINE, 10 mg, oral, Once      Continuous medications:     PRN medications:       Assessment and Plan  * Hyperkalemia  Assessment & Plan  Likely related to recent medication changes on the spironolactone.  We will hold on spironolactone for now.  Repeat K levels to ensure not increasing.  Monitor fluid status closely.  Continue to monitor on telemetry    CKD (chronic kidney disease) stage 3, GFR 30-59 ml/min (CMS/[facility])  Assessment & Plan  Likely slight YEVGENIY on CKD, holding spironolactone as above, will also hold lisinopril and torsemide for now.  Continue to monitor kidney  function    Chronic combined systolic and diastolic heart failure (CMS/HCC)  Assessment & Plan  Appears well compensated currently.  Holding diuretics as above.  Continue to monitor fluid status closely    Chronic atrial fibrillation (CMS/[facility])  Assessment & Plan  Not on AC due to history of bleeding.  Continue home rate controlling medications    Benign essential hypertension  Assessment & Plan  Holding home lisinopril in setting of worsening kidney function.  Continue to monitor BP closely    ASHD (arteriosclerotic heart disease)  Assessment & Plan  S/p prior PCI.  Continue home medications    Anxiety disorder, unspecified  Assessment & Plan  Continue home medications    Hypothyroidism  Assessment & Plan  Continue home medications    Hyperlipidemia  Assessment & Plan  Continue home medications         DVT ppx    SCDs, SQ Heparin     Efrain Augustin PA-C

## 2023-11-03 NOTE — ED PROVIDER NOTES
Chief Complaint   Patient presents with    abnormal labs     Sent Per KYRIE Butterfield , Mag       HPI       88 year old female presents to the Emergency Department today complaining of having an elevated potassium and magnesium level on outpatient lab work performed yesterday. Notes that she had a follow up appointment with her PCP after a recent admission. Reports that she had been having muscle twitching which prompted the lab work. Denies any associated fever, chills, headache, neck pain, chest pain, shortness of breath, abdominal pain, nausea, vomiting, diarrhea, constipation, or urinary symptoms. Old records were reviewed and she was noted to have medication changes to Torsemide and Spironolactone.        History provided by:  Patient             Patient History   Past Medical History:   Diagnosis Date    Abnormal findings on diagnostic imaging of heart and coronary circulation 06/13/2017    Abnormal computed tomography angiography of heart    Acute on chronic combined systolic (congestive) and diastolic (congestive) heart failure (CMS/[facility]) 09/28/2017    Acute on chronic combined systolic and diastolic heart failure    Acute on chronic combined systolic (congestive) and diastolic (congestive) heart failure (CMS/[facility]) 02/05/2020    Acute on chronic combined systolic and diastolic CHF, NYHA class 2    Anemia, unspecified 11/18/2020    Anemia, mild    Chronic diastolic (congestive) heart failure (CMS/[facility]) 09/15/2020    Chronic diastolic congestive heart failure    Disorder of the skin and subcutaneous tissue, unspecified 02/16/2020    Skin lesion of face    Diverticulitis of large intestine with perforation and abscess without bleeding     Perforation of sigmoid colon due to diverticulitis    Diverticulosis of intestine, part unspecified, without perforation or abscess without bleeding     Diverticulosis    Dorsalgia, unspecified 08/16/2019    Upper back pain    Encounter for immunization 11/13/2019    Need for  vaccination    Encounter for surgical aftercare following surgery on the circulatory system 02/18/2019    Aftercare following surgery of the circulatory system    Essential (primary) hypertension 11/02/2022    Benign essential hypertension    Gastro-esophageal reflux disease without esophagitis 02/05/2020    Gastroesophageal reflux disease without esophagitis    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    Hypothyroidism due to medication    Long term (current) use of anticoagulants 10/22/2018    Long term (current) use of anticoagulants    Melena 04/10/2020    Blood in stool    Old myocardial infarction 10/19/2022    History of non-ST elevation myocardial infarction (NSTEMI)    Other forms of dyspnea 01/24/2020    Dyspnea on exertion    Other long term (current) drug therapy 05/10/2018    Long term current use of antiarrhythmic drug    Other specified cardiac arrhythmias 02/26/2020    Arrhythmia, atrial    Paroxysmal atrial fibrillation (CMS/HCC) 02/01/2018    Paroxysmal atrial fibrillation    Permanent atrial fibrillation (CMS/HCC) 01/24/2020    Permanent atrial fibrillation    Personal history of diseases of the blood and blood-forming organs and certain disorders involving the immune mechanism 04/10/2020    History of anemia    Personal history of other diseases of the circulatory system 11/04/2018    History of abnormal electrocardiography    Personal history of other diseases of the digestive system 10/22/2018    History of lower GI bleeding    Personal history of other diseases of the musculoskeletal system and connective tissue     History of osteoporosis    Personal history of other diseases of the musculoskeletal system and connective tissue 08/16/2019    History of muscle spasm    Personal history of other endocrine, nutritional and metabolic disease 02/18/2019    History of hyperlipidemia    Personal history of other specified conditions 08/13/2020    History of diarrhea     Personal history of other specified conditions     History of precordial chest pain    Personal history of transient ischemic attack (TIA), and cerebral infarction without residual deficits 02/01/2018    Cerebrovascular accident (CVA) within last 3 months    Psychophysiologic insomnia 11/18/2020    Chronic insomnia    Segmental and somatic dysfunction of rib cage 08/16/2019    Segmental and somatic dysfunction of rib cage    Segmental and somatic dysfunction of thoracic region 08/16/2019    Segmental and somatic dysfunction of thoracic region    Segmental and somatic dysfunction of upper extremity 08/16/2019    Segmental and somatic dysfunction of upper extremity     Past Surgical History:   Procedure Laterality Date    APPENDECTOMY  05/04/2016    Appendectomy    CARDIAC PACEMAKER PLACEMENT  04/26/2018    Pacemaker Permanent Placement    CATARACT EXTRACTION  05/04/2016    Cataract Surgery    CHOLECYSTECTOMY  05/04/2016    Cholecystectomy    COLECTOMY PARTIAL / TOTAL  08/17/2018    Partial Colectomy - Sigmoid    CORONARY ANGIOPLASTY  02/01/2018    PTCA    HYSTERECTOMY  05/04/2016    Hysterectomy    OTHER SURGICAL HISTORY  02/18/2019    Atrial appendage closure    OTHER SURGICAL HISTORY  04/26/2018    Catheter Ablation Atrioventricular Node    TONSILLECTOMY  05/04/2016    Tonsillectomy     Family History   Problem Relation Name Age of Onset    Coronary artery disease Mother      Coronary artery disease Father       Social History     Tobacco Use    Smoking status: Never     Passive exposure: Never    Smokeless tobacco: Never   Substance Use Topics    Alcohol use: Not on file    Drug use: Not on file           Physical Exam  Constitutional:       Appearance: Normal appearance.   HENT:      Head: Normocephalic.      Right Ear: Tympanic membrane, ear canal and external ear normal.      Left Ear: Tympanic membrane, ear canal and external ear normal.      Nose: Nose normal.      Mouth/Throat:      Mouth: Mucous  membranes are moist.      Pharynx: Oropharynx is clear. No oropharyngeal exudate or posterior oropharyngeal erythema.   Eyes:      Conjunctiva/sclera: Conjunctivae normal.      Pupils: Pupils are equal, round, and reactive to light.   Cardiovascular:      Rate and Rhythm: Normal rate and regular rhythm.      Pulses:           Radial pulses are 3+ on the right side and 3+ on the left side.        Dorsalis pedis pulses are 3+ on the right side and 3+ on the left side.      Heart sounds: Normal heart sounds. No murmur heard.     No friction rub. No gallop.   Pulmonary:      Effort: Pulmonary effort is normal. No respiratory distress.      Breath sounds: Normal breath sounds. No wheezing, rhonchi or rales.   Abdominal:      General: Abdomen is flat. Bowel sounds are normal.      Palpations: Abdomen is soft.      Tenderness: There is no abdominal tenderness. There is no right CVA tenderness, left CVA tenderness, guarding or rebound. Negative signs include Corcoran's sign and McBurney's sign.   Musculoskeletal:         General: No swelling or deformity.      Cervical back: Full passive range of motion without pain.      Right lower leg: No edema.      Left lower leg: No edema.   Lymphadenopathy:      Cervical: No cervical adenopathy.   Skin:     Capillary Refill: Capillary refill takes less than 2 seconds.      Coloration: Skin is not jaundiced.      Findings: No rash.   Neurological:      General: No focal deficit present.      Mental Status: She is alert and oriented to person, place, and time. Mental status is at baseline.      Gait: Gait is intact.   Psychiatric:         Mood and Affect: Mood normal.         Behavior: Behavior is cooperative.         Labs Reviewed   BASIC METABOLIC PANEL - Abnormal       Result Value    Glucose 78      Sodium 131 (*)     Potassium 6.4 (*)     Chloride 97 (*)     Bicarbonate 28      Anion Gap 12      Urea Nitrogen 51 (*)     Creatinine 1.95 (*)     eGFR 25 (*)     Calcium 9.1     CBC  WITH AUTO DIFFERENTIAL - Abnormal    WBC 4.7      nRBC 0.0      RBC 4.74      Hemoglobin 12.4      Hematocrit 40.9      MCV 86      MCH 26.2      MCHC 30.3 (*)     RDW 18.6 (*)     Platelets 236      Neutrophils % 68.7      Immature Granulocytes %, Automated 0.2      Lymphocytes % 17.1      Monocytes % 11.8      Eosinophils % 0.9      Basophils % 1.3      Neutrophils Absolute 3.22      Immature Granulocytes Absolute, Automated 0.01      Lymphocytes Absolute 0.80      Monocytes Absolute 0.55      Eosinophils Absolute 0.04      Basophils Absolute 0.06     TROPONIN I, HIGH SENSITIVITY - Abnormal    Troponin I, High Sensitivity 33 (*)     Narrative:     Less than 99th percentile of normal range cutoff-  Female and children under 18 years old <14 ng/L; Male <21 ng/L: Negative  Repeat testing should be performed if clinically indicated.     Female and children under 18 years old 14-50 ng/L; Male 21-50 ng/L:  Consistent with possible cardiac damage and possible increased clinical   risk. Serial measurements may help to assess extent of myocardial damage.     >50 ng/L: Consistent with cardiac damage, increased clinical risk and  myocardial infarction. Serial measurements may help assess extent of   myocardial damage.      NOTE: Children less than 1 year old may have higher baseline troponin   levels and results should be interpreted in conjunction with the overall   clinical context.     NOTE: Troponin I testing is performed using a different   testing methodology at [facility] than at other   [facility] hospitals. Direct result comparisons should only   be made within the same method.   POCT GLUCOSE - Abnormal    POCT Glucose 71 (*)    POCT GLUCOSE METER - Abnormal    POCT Glucose 71 (*)    MAGNESIUM - Normal    Magnesium 2.26     POTASSIUM - Normal    Potassium 5.0         No orders to display            ED Course & MDM   Diagnoses as of 11/03/23 1525   Hyperkalemia   Chest pain, unspecified type            Medical Decision Making  EKG #1 interpreted by Dr. Nguyen. Indication: hyperkalemia. Findings: paced rhythm with a ventricular rate of 65, normal axis, normal intervals, and no acute ischemic or injury pattern. Impression: No acute pathology.      EKG #2 interpreted by Dr. Nguyen. Indication: chest pain. Findings: paced rhythm with a ventricular rate of 65, normal axis, normal intervals, and no acute ischemic or injury pattern. Impression: No acute pathology.       Patient was seen and evaluated by Dr. Nguyen. Placed on a cardiac monitor which showed paced rhythm without ectopy throughout ED stay. Rhythm strip obtained showed paced rhythm. Impression: No acute pathology. Continuous pulse oximetry monitoring showed no signs of hypoxia. Saline lock was established with labs drawn and results as above. Potassium was 6.4. Treated with Calcium Gluconate, Humulin R insulin, and D50. Her potassium improved to 5.0. She may need further medication adjustments, specifically Spironolactone. Remainder of electrolytes and blood counts were unremarkable. Kidney function was at baseline. During her stay, she continued to have intermittent chest pain. EKGs showed no acute pathology. Heart Score of 6 with an elevated troponin that is at baseline. Case was discussed with WILMER Potter PA-C, who agrees to place the patient under 23 hour observation for further evaluation and care. Will be transferred to the SDU in stable condition.     Diagnostic Impression:    1. Acute hyperkalemia    2. Intermittent nonspecific chest pain    3. IV meds in ED    4. Critical care time 35 minutes           Your medication list        ASK your doctor about these medications        Instructions Last Dose Given Next Dose Due   ALPRAZolam 0.5 mg tablet  Commonly known as: Xanax      Take 1 tablet (0.5 mg) by mouth once daily at bedtime.       amiodarone 200 mg tablet  Commonly known as: Pacerone           aspirin 81 mg EC tablet           atorvastatin  80 mg tablet  Commonly known as: Lipitor           calcium carbonate-vitamin D3 500 mg-10 mcg (400 unit) chewable tablet           cyanocobalamin 1,000 mcg/mL injection  Commonly known as: Vitamin B-12           cyanocobalamin (vitamin B-12) 2,000 mcg lozenge           cyanocobalamin 2,000 mcg tablet  Commonly known as: Vitamin B-12           docusate sodium 100 mg capsule  Commonly known as: Colace           Farxiga 10 mg  Generic drug: dapagliflozin propanediol           fluconazole 150 mg tablet  Commonly known as: Diflucan           glucosamine HCl 500 mg tablet           guaiFENesin 600 mg 12 hr tablet  Commonly known as: Mucinex      Take 2 tablets (1,200 mg) by mouth 2 times a day.       icosapent ethyL 1 gram capsule  Commonly known as: Vascepa           levothyroxine 25 mcg tablet  Commonly known as: Synthroid, Levoxyl      TAKE 1 TABLET BY MOUTH EVERY DAY       lisinopril 2.5 mg tablet           melatonin 3 mg capsule           metoprolol succinate XL 50 mg 24 hr tablet  Commonly known as: Toprol-XL           MULTIVITAMIN 50 PLUS ORAL           MULTIVITAMIN ORAL           spironolactone 25 mg tablet  Commonly known as: Aldactone      Take 0.5 tablets (12.5 mg) by mouth once every 24 hours. Do not start before October 12, 2023.       torsemide 20 mg tablet  Commonly known as: Demadex      Take 1 tablet (20 mg) by mouth once daily. Do not start before October 12, 2023.       triamcinolone 0.1 % cream  Commonly known as: Kenalog                      Procedure  Critical Care    Performed by: INDIGO Packer-CNP  Authorized by: Frederick Nguyen MD    Critical care provider statement:     Critical care time (minutes):  35    Critical care time was exclusive of:  Separately billable procedures and treating other patients    Critical care was necessary to treat or prevent imminent or life-threatening deterioration of the following conditions: hyperkalemia with chest pain.    Critical care was time spent  personally by me on the following activities:  Development of treatment plan with patient or surrogate, discussions with consultants, evaluation of patient's response to treatment, examination of patient, obtaining history from patient or surrogate, re-evaluation of patient's condition, pulse oximetry, ordering and review of radiographic studies, ordering and review of laboratory studies and ordering and performing treatments and interventions       Jeffrey Toscano, INDIGO-CNP  11/03/23 1529

## 2023-11-03 NOTE — ASSESSMENT & PLAN NOTE
Appears well compensated currently.  Holding diuretics as above.  Continue to monitor fluid status closely.  BNP elevated at 883 but largely asymptomatic.  Resume Torsemide.  No evidence of acute CHF  Will need close follow up with CHF clinic.

## 2023-11-03 NOTE — TELEPHONE ENCOUNTER
Received a critical lab of potassium of 7 on the lab.  Called the patient and instructed her to go the emergency room.  Patient understood and states that she is getting a ride now.  She denied any problems with muscle weakness.  Has a history of arrhythmias.  Was just in the hospital

## 2023-11-04 ENCOUNTER — HOME CARE VISIT (OUTPATIENT)
Dept: HOME HEALTH SERVICES | Facility: HOME HEALTH | Age: 88
End: 2023-11-04
Payer: MEDICARE

## 2023-11-04 LAB
ALBUMIN SERPL BCP-MCNC: 3.5 G/DL (ref 3.4–5)
ALP SERPL-CCNC: 73 U/L (ref 33–136)
ALT SERPL W P-5'-P-CCNC: 26 U/L (ref 7–45)
ANION GAP SERPL CALC-SCNC: 9 MMOL/L (ref 10–20)
AST SERPL W P-5'-P-CCNC: 24 U/L (ref 9–39)
BILIRUB SERPL-MCNC: 0.5 MG/DL (ref 0–1.2)
BNP SERPL-MCNC: 883 PG/ML (ref 0–99)
BUN SERPL-MCNC: 51 MG/DL (ref 6–23)
CALCIUM SERPL-MCNC: 9.5 MG/DL (ref 8.6–10.3)
CHLORIDE SERPL-SCNC: 101 MMOL/L (ref 98–107)
CO2 SERPL-SCNC: 30 MMOL/L (ref 21–32)
CREAT SERPL-MCNC: 1.86 MG/DL (ref 0.5–1.05)
ERYTHROCYTE [DISTWIDTH] IN BLOOD BY AUTOMATED COUNT: 18.6 % (ref 11.5–14.5)
GFR SERPL CREATININE-BSD FRML MDRD: 26 ML/MIN/1.73M*2
GLUCOSE BLD MANUAL STRIP-MCNC: 121 MG/DL (ref 74–99)
GLUCOSE SERPL-MCNC: 90 MG/DL (ref 74–99)
HCT VFR BLD AUTO: 37.9 % (ref 36–46)
HGB BLD-MCNC: 11.7 G/DL (ref 12–16)
MCH RBC QN AUTO: 26.7 PG (ref 26–34)
MCHC RBC AUTO-ENTMCNC: 30.9 G/DL (ref 32–36)
MCV RBC AUTO: 86 FL (ref 80–100)
NRBC BLD-RTO: 0 /100 WBCS (ref 0–0)
PLATELET # BLD AUTO: 263 X10*3/UL (ref 150–450)
POTASSIUM SERPL-SCNC: 5.3 MMOL/L (ref 3.5–5.3)
POTASSIUM SERPL-SCNC: 5.5 MMOL/L (ref 3.5–5.3)
PROT SERPL-MCNC: 5.2 G/DL (ref 6.4–8.2)
RBC # BLD AUTO: 4.39 X10*6/UL (ref 4–5.2)
SODIUM SERPL-SCNC: 135 MMOL/L (ref 136–145)
WBC # BLD AUTO: 4.8 X10*3/UL (ref 4.4–11.3)

## 2023-11-04 PROCEDURE — 2500000001 HC RX 250 WO HCPCS SELF ADMINISTERED DRUGS (ALT 637 FOR MEDICARE OP): Performed by: INTERNAL MEDICINE

## 2023-11-04 PROCEDURE — 82947 ASSAY GLUCOSE BLOOD QUANT: CPT

## 2023-11-04 PROCEDURE — 83880 ASSAY OF NATRIURETIC PEPTIDE: CPT | Performed by: INTERNAL MEDICINE

## 2023-11-04 PROCEDURE — 85027 COMPLETE CBC AUTOMATED: CPT | Performed by: PHYSICIAN ASSISTANT

## 2023-11-04 PROCEDURE — G0378 HOSPITAL OBSERVATION PER HR: HCPCS

## 2023-11-04 PROCEDURE — 99233 SBSQ HOSP IP/OBS HIGH 50: CPT | Performed by: INTERNAL MEDICINE

## 2023-11-04 PROCEDURE — 96372 THER/PROPH/DIAG INJ SC/IM: CPT | Performed by: PHYSICIAN ASSISTANT

## 2023-11-04 PROCEDURE — 80053 COMPREHEN METABOLIC PANEL: CPT | Performed by: PHYSICIAN ASSISTANT

## 2023-11-04 PROCEDURE — 2500000004 HC RX 250 GENERAL PHARMACY W/ HCPCS (ALT 636 FOR OP/ED): Performed by: PHYSICIAN ASSISTANT

## 2023-11-04 PROCEDURE — 2500000002 HC RX 250 W HCPCS SELF ADMINISTERED DRUGS (ALT 637 FOR MEDICARE OP, ALT 636 FOR OP/ED): Mod: MUE | Performed by: PHYSICIAN ASSISTANT

## 2023-11-04 PROCEDURE — 36415 COLL VENOUS BLD VENIPUNCTURE: CPT | Performed by: PHYSICIAN ASSISTANT

## 2023-11-04 PROCEDURE — 2500000001 HC RX 250 WO HCPCS SELF ADMINISTERED DRUGS (ALT 637 FOR MEDICARE OP): Performed by: PHYSICIAN ASSISTANT

## 2023-11-04 RX ORDER — TORSEMIDE 20 MG/1
20 TABLET ORAL DAILY
Status: DISCONTINUED | OUTPATIENT
Start: 2023-11-04 | End: 2023-11-05 | Stop reason: HOSPADM

## 2023-11-04 RX ADMIN — ATORVASTATIN CALCIUM 80 MG: 40 TABLET, FILM COATED ORAL at 20:54

## 2023-11-04 RX ADMIN — ACETAMINOPHEN 650 MG: 325 TABLET ORAL at 20:54

## 2023-11-04 RX ADMIN — PANTOPRAZOLE SODIUM 40 MG: 40 TABLET, DELAYED RELEASE ORAL at 06:35

## 2023-11-04 RX ADMIN — ALPRAZOLAM 0.5 MG: 0.5 TABLET ORAL at 20:54

## 2023-11-04 RX ADMIN — METOPROLOL SUCCINATE 50 MG: 50 TABLET, EXTENDED RELEASE ORAL at 10:38

## 2023-11-04 RX ADMIN — HEPARIN SODIUM 5000 UNITS: 5000 INJECTION INTRAVENOUS; SUBCUTANEOUS at 14:48

## 2023-11-04 RX ADMIN — Medication 3 MG: at 21:03

## 2023-11-04 RX ADMIN — TORSEMIDE 20 MG: 20 TABLET ORAL at 14:48

## 2023-11-04 RX ADMIN — HEPARIN SODIUM 5000 UNITS: 5000 INJECTION INTRAVENOUS; SUBCUTANEOUS at 06:35

## 2023-11-04 RX ADMIN — ACETAMINOPHEN 650 MG: 325 TABLET ORAL at 06:48

## 2023-11-04 RX ADMIN — HEPARIN SODIUM 5000 UNITS: 5000 INJECTION INTRAVENOUS; SUBCUTANEOUS at 03:23

## 2023-11-04 RX ADMIN — ASPIRIN 81 MG: 81 TABLET, COATED ORAL at 10:39

## 2023-11-04 RX ADMIN — HEPARIN SODIUM 5000 UNITS: 5000 INJECTION INTRAVENOUS; SUBCUTANEOUS at 21:03

## 2023-11-04 RX ADMIN — AMIODARONE HYDROCHLORIDE 200 MG: 200 TABLET ORAL at 10:38

## 2023-11-04 ASSESSMENT — COGNITIVE AND FUNCTIONAL STATUS - GENERAL
HELP NEEDED FOR BATHING: A LITTLE
HELP NEEDED FOR BATHING: A LITTLE
MOVING FROM LYING ON BACK TO SITTING ON SIDE OF FLAT BED WITH BEDRAILS: A LITTLE
TOILETING: A LITTLE
DRESSING REGULAR LOWER BODY CLOTHING: A LITTLE
MOBILITY SCORE: 18
STANDING UP FROM CHAIR USING ARMS: A LITTLE
MOVING TO AND FROM BED TO CHAIR: A LITTLE
CLIMB 3 TO 5 STEPS WITH RAILING: A LITTLE
TURNING FROM BACK TO SIDE WHILE IN FLAT BAD: A LITTLE
DAILY ACTIVITIY SCORE: 22
MOBILITY SCORE: 20
DRESSING REGULAR UPPER BODY CLOTHING: A LITTLE
DAILY ACTIVITIY SCORE: 20
WALKING IN HOSPITAL ROOM: A LITTLE
CLIMB 3 TO 5 STEPS WITH RAILING: A LITTLE
TOILETING: A LITTLE
MOVING TO AND FROM BED TO CHAIR: A LITTLE
WALKING IN HOSPITAL ROOM: A LITTLE
STANDING UP FROM CHAIR USING ARMS: A LITTLE

## 2023-11-04 ASSESSMENT — PAIN SCALES - GENERAL
PAINLEVEL_OUTOF10: 0 - NO PAIN
PAINLEVEL_OUTOF10: 3
PAINLEVEL_OUTOF10: 0 - NO PAIN
PAINLEVEL_OUTOF10: 3
PAINLEVEL_OUTOF10: 0 - NO PAIN
PAINLEVEL_OUTOF10: 3

## 2023-11-04 ASSESSMENT — PAIN - FUNCTIONAL ASSESSMENT
PAIN_FUNCTIONAL_ASSESSMENT: 0-10

## 2023-11-04 NOTE — CARE PLAN
Problem: Pain - Adult  Goal: Verbalizes/displays adequate comfort level or baseline comfort level  Outcome: Progressing     Problem: Safety - Adult  Goal: Free from fall injury  Outcome: Progressing     Problem: Discharge Planning  Goal: Discharge to home or other facility with appropriate resources  Outcome: Progressing     Problem: Chronic Conditions and Co-morbidities  Goal: Patient's chronic conditions and co-morbidity symptoms are monitored and maintained or improved  Outcome: Progressing     Problem: Fall/Injury  Goal: Not fall by end of shift  Outcome: Progressing  Goal: Be free from injury by end of the shift  Outcome: Progressing  Goal: Verbalize understanding of personal risk factors for fall in the hospital  Outcome: Progressing  Goal: Verbalize understanding of risk factor reduction measures to prevent injury from fall in the home  Outcome: Progressing  Goal: Use assistive devices by end of the shift  Outcome: Progressing  Goal: Pace activities to prevent fatigue by end of the shift  Outcome: Progressing

## 2023-11-04 NOTE — PROGRESS NOTES
Adwoa Forrest is a 88 y.o. female on day 0 of admission presenting with Hyperkalemia.    Review of Systems   Subjective   Adwoa Forrest is a 88 y.o. female with PMH of CKD, A-fib not on AC due to history of bleeding, HTN, DLD, CAD s/p prior PCI, PPM implant, anxiety, depression, HFpEF 50%, who presents with concerns for hyperkalemia.  Patient was recently admitted last month with acute on chronic CHF.  Was discharged after that admission on torsemide and spironolactone.  Over the past several days has been noting increased muscle twitching.  Followed up with her PCP yesterday and had lab work drawn.  Was called with results the day showing an elevated potassium of 7.0 patient was instructed to come to the ED. denies any history of issues with potassium levels previously     ED course: Afebrile, HR 67, RR 20, /66, pulse ox 98% on room air.  No leukocytosis, Hgb 12.4.  Chemistries remarkable for potassium 6.4.  Creatinine 1.95, BUN 51.  Troponin 33.  Patient was given hyperkalemia cocktail with K improvement to 5.0.  Decision made for admission    11/4: Pt seen. Asks about resuming other medications. Not DM2, on SGLT2 for CHF reasons. Will discontinue BGT. K is down to 5.3 off Aldactone and lisinopril. Will continue to hold. Recheck in AM after restarting Torsemide. Will check with cards tomorrow to see if she can be scheduled for sooner F/U for CHF clinic. Consider discharge tomorrow if stable.       Objective     Last Recorded Vitals  /71 (BP Location: Left arm, Patient Position: Lying)   Pulse 65   Temp 36.3 °C (97.4 °F) (Temporal)   Resp 16   Wt (!) 44.5 kg (98 lb 3.2 oz)   SpO2 96%   Intake/Output last 3 Shifts:    Intake/Output Summary (Last 24 hours) at 11/4/2023 1446  Last data filed at 11/4/2023 1100  Gross per 24 hour   Intake 540 ml   Output 0 ml   Net 540 ml       Admission Weight  Weight: 47.6 kg (105 lb) (11/03/23 0758)    Daily Weight  11/04/23 : (!) 44.5 kg (98 lb 3.2 oz)      Physical  Exam     Lab Results  Results for orders placed or performed during the hospital encounter of 11/03/23 (from the past 24 hour(s))   Potassium   Result Value Ref Range    Potassium 5.2 3.5 - 5.3 mmol/L   Troponin I, High Sensitivity   Result Value Ref Range    Troponin I, High Sensitivity 34 (H) 0 - 13 ng/L   Potassium   Result Value Ref Range    Potassium 5.5 (H) 3.5 - 5.3 mmol/L   CBC   Result Value Ref Range    WBC 4.8 4.4 - 11.3 x10*3/uL    nRBC 0.0 0.0 - 0.0 /100 WBCs    RBC 4.39 4.00 - 5.20 x10*6/uL    Hemoglobin 11.7 (L) 12.0 - 16.0 g/dL    Hematocrit 37.9 36.0 - 46.0 %    MCV 86 80 - 100 fL    MCH 26.7 26.0 - 34.0 pg    MCHC 30.9 (L) 32.0 - 36.0 g/dL    RDW 18.6 (H) 11.5 - 14.5 %    Platelets 263 150 - 450 x10*3/uL   Comprehensive metabolic panel   Result Value Ref Range    Glucose 90 74 - 99 mg/dL    Sodium 135 (L) 136 - 145 mmol/L    Potassium 5.3 3.5 - 5.3 mmol/L    Chloride 101 98 - 107 mmol/L    Bicarbonate 30 21 - 32 mmol/L    Anion Gap 9 (L) 10 - 20 mmol/L    Urea Nitrogen 51 (H) 6 - 23 mg/dL    Creatinine 1.86 (H) 0.50 - 1.05 mg/dL    eGFR 26 (L) >60 mL/min/1.73m*2    Calcium 9.5 8.6 - 10.3 mg/dL    Albumin 3.5 3.4 - 5.0 g/dL    Alkaline Phosphatase 73 33 - 136 U/L    Total Protein 5.2 (L) 6.4 - 8.2 g/dL    AST 24 9 - 39 U/L    Bilirubin, Total 0.5 0.0 - 1.2 mg/dL    ALT 26 7 - 45 U/L   B-type natriuretic peptide   Result Value Ref Range     (H) 0 - 99 pg/mL   POCT GLUCOSE   Result Value Ref Range    POCT Glucose 121 (H) 74 - 99 mg/dL        Image Results  XR foot right 3+ views  Narrative: Interpreted By:  Kiko Donnelly,   STUDY:  XR FOOT RIGHT 3+ VIEWS; ;  10/31/2023 12:56 pm      INDICATION:  Signs/Symptoms:RIGHT CALC FX.      COMPARISON:  Multiple calcaneal radiographs, most recently 09/12/2023.      ACCESSION NUMBER(S):  [id number]      ORDERING CLINICIAN:  JOANNE LANDA      FINDINGS:  Stable postoperative changes screw fixation avulsion fracture of the  calcaneus with intact  hardware. No acute findings. Mild persistent  soft tissue swelling.      Impression: Stable postoperative changes with intact hardware. No acute findings.          MACRO:  None      Signed by: Kiko Donnelly 11/1/2023 9:38 PM  Dictation workstation:   [id number]       Assessment/Plan     CKD (chronic kidney disease) stage 3, GFR 30-59 ml/min (CMS/HCC)  Likely slight YEVGENIY on CKD, holding spironolactone as above, will also hold lisinopril and torsemide for now.  Continue to monitor kidney function.  Cr 1.85 today, slightly better than upon admission.    Hypothyroidism  Continue home medications    Hyperlipidemia  Continue home medications    Hyperkalemia  Likely related to recent medication changes on the spironolactone.  We will hold on spironolactone for now.  Repeat K levels to ensure not increasing.  Monitor fluid status closely.  Continue to monitor on telemetry  Not much change off aldactone and lisinopril.  Will restart Torsemide.  Check levels in AM. If improved can consider discharge and close follow up with CHF Clinic. Will need to discuss with cardiology tomorrow morning.    Chronic combined systolic and diastolic heart failure (CMS/HCC)  Appears well compensated currently.  Holding diuretics as above.  Continue to monitor fluid status closely.  BNP elevated at 883 but largely asymptomatic.  Resume Torsemide.  Will need close follow up with CHF clinic.    Chronic atrial fibrillation (CMS/HCC)  Not on AC due to history of bleeding.  Continue home rate controlling medications    Benign essential hypertension  Holding home lisinopril in setting of worsening kidney function.  Continue to monitor BP closely    ASHD (arteriosclerotic heart disease)  S/p prior PCI.  Continue home medications    Anxiety disorder, unspecified  Continue home medications.  Asymptomatic.                 Frederick Lowry MD

## 2023-11-05 VITALS
RESPIRATION RATE: 15 BRPM | DIASTOLIC BLOOD PRESSURE: 72 MMHG | TEMPERATURE: 97.4 F | OXYGEN SATURATION: 94 % | BODY MASS INDEX: 18.4 KG/M2 | SYSTOLIC BLOOD PRESSURE: 125 MMHG | HEART RATE: 66 BPM | WEIGHT: 100 LBS | HEIGHT: 62 IN

## 2023-11-05 LAB
ANION GAP SERPL CALC-SCNC: 11 MMOL/L (ref 10–20)
BUN SERPL-MCNC: 53 MG/DL (ref 6–23)
CALCIUM SERPL-MCNC: 9 MG/DL (ref 8.6–10.3)
CHLORIDE SERPL-SCNC: 100 MMOL/L (ref 98–107)
CO2 SERPL-SCNC: 29 MMOL/L (ref 21–32)
CREAT SERPL-MCNC: 1.95 MG/DL (ref 0.5–1.05)
ERYTHROCYTE [DISTWIDTH] IN BLOOD BY AUTOMATED COUNT: 18.8 % (ref 11.5–14.5)
GFR SERPL CREATININE-BSD FRML MDRD: 25 ML/MIN/1.73M*2
GLUCOSE SERPL-MCNC: 87 MG/DL (ref 74–99)
HCT VFR BLD AUTO: 38.3 % (ref 36–46)
HGB BLD-MCNC: 11.6 G/DL (ref 12–16)
MCH RBC QN AUTO: 26 PG (ref 26–34)
MCHC RBC AUTO-ENTMCNC: 30.3 G/DL (ref 32–36)
MCV RBC AUTO: 86 FL (ref 80–100)
NRBC BLD-RTO: 0 /100 WBCS (ref 0–0)
PLATELET # BLD AUTO: 285 X10*3/UL (ref 150–450)
POTASSIUM SERPL-SCNC: 5.2 MMOL/L (ref 3.5–5.3)
RBC # BLD AUTO: 4.46 X10*6/UL (ref 4–5.2)
SODIUM SERPL-SCNC: 135 MMOL/L (ref 136–145)
WBC # BLD AUTO: 5 X10*3/UL (ref 4.4–11.3)

## 2023-11-05 PROCEDURE — 36415 COLL VENOUS BLD VENIPUNCTURE: CPT | Performed by: INTERNAL MEDICINE

## 2023-11-05 PROCEDURE — 2500000004 HC RX 250 GENERAL PHARMACY W/ HCPCS (ALT 636 FOR OP/ED): Mod: MUE | Performed by: PHYSICIAN ASSISTANT

## 2023-11-05 PROCEDURE — 2500000001 HC RX 250 WO HCPCS SELF ADMINISTERED DRUGS (ALT 637 FOR MEDICARE OP): Performed by: INTERNAL MEDICINE

## 2023-11-05 PROCEDURE — 96372 THER/PROPH/DIAG INJ SC/IM: CPT | Performed by: PHYSICIAN ASSISTANT

## 2023-11-05 PROCEDURE — 99239 HOSP IP/OBS DSCHRG MGMT >30: CPT | Performed by: INTERNAL MEDICINE

## 2023-11-05 PROCEDURE — 84520 ASSAY OF UREA NITROGEN: CPT | Performed by: INTERNAL MEDICINE

## 2023-11-05 PROCEDURE — 2500000001 HC RX 250 WO HCPCS SELF ADMINISTERED DRUGS (ALT 637 FOR MEDICARE OP): Performed by: PHYSICIAN ASSISTANT

## 2023-11-05 PROCEDURE — 85027 COMPLETE CBC AUTOMATED: CPT | Performed by: INTERNAL MEDICINE

## 2023-11-05 PROCEDURE — G0378 HOSPITAL OBSERVATION PER HR: HCPCS

## 2023-11-05 PROCEDURE — 2500000002 HC RX 250 W HCPCS SELF ADMINISTERED DRUGS (ALT 637 FOR MEDICARE OP, ALT 636 FOR OP/ED): Mod: MUE | Performed by: PHYSICIAN ASSISTANT

## 2023-11-05 RX ADMIN — HEPARIN SODIUM 5000 UNITS: 5000 INJECTION INTRAVENOUS; SUBCUTANEOUS at 05:39

## 2023-11-05 RX ADMIN — PANTOPRAZOLE SODIUM 40 MG: 40 TABLET, DELAYED RELEASE ORAL at 06:20

## 2023-11-05 RX ADMIN — METOPROLOL SUCCINATE 50 MG: 50 TABLET, EXTENDED RELEASE ORAL at 08:43

## 2023-11-05 RX ADMIN — ACETAMINOPHEN 650 MG: 325 TABLET ORAL at 06:20

## 2023-11-05 RX ADMIN — ASPIRIN 81 MG: 81 TABLET, COATED ORAL at 08:44

## 2023-11-05 RX ADMIN — AMIODARONE HYDROCHLORIDE 200 MG: 200 TABLET ORAL at 08:44

## 2023-11-05 RX ADMIN — TORSEMIDE 20 MG: 20 TABLET ORAL at 08:44

## 2023-11-05 ASSESSMENT — COGNITIVE AND FUNCTIONAL STATUS - GENERAL
DRESSING REGULAR LOWER BODY CLOTHING: A LITTLE
TURNING FROM BACK TO SIDE WHILE IN FLAT BAD: A LITTLE
PERSONAL GROOMING: A LITTLE
MOBILITY SCORE: 15
MOVING TO AND FROM BED TO CHAIR: A LITTLE
TOILETING: A LITTLE
DAILY ACTIVITIY SCORE: 19
WALKING IN HOSPITAL ROOM: A LOT
STANDING UP FROM CHAIR USING ARMS: A LOT
MOVING FROM LYING ON BACK TO SITTING ON SIDE OF FLAT BED WITH BEDRAILS: A LITTLE
DRESSING REGULAR UPPER BODY CLOTHING: A LITTLE
HELP NEEDED FOR BATHING: A LITTLE
CLIMB 3 TO 5 STEPS WITH RAILING: A LOT

## 2023-11-05 ASSESSMENT — PAIN - FUNCTIONAL ASSESSMENT
PAIN_FUNCTIONAL_ASSESSMENT: 0-10

## 2023-11-05 ASSESSMENT — PAIN SCALES - GENERAL
PAINLEVEL_OUTOF10: 0 - NO PAIN
PAINLEVEL_OUTOF10: 3

## 2023-11-05 NOTE — DISCHARGE SUMMARY
Discharge Diagnosis  Hyperkalemia    Issues Requiring Follow-Up  Needs close follow up with CHF clinic this week.  Will need follow up on potassium. May want to consider resuming lisinopril.    Discharge Meds     Your medication list        CONTINUE taking these medications        Instructions Last Dose Given Next Dose Due   ALPRAZolam 0.5 mg tablet  Commonly known as: Xanax      Take 1 tablet (0.5 mg) by mouth once daily at bedtime.       amiodarone 200 mg tablet  Commonly known as: Pacerone           aspirin 81 mg EC tablet           atorvastatin 80 mg tablet  Commonly known as: Lipitor           cyanocobalamin 1,000 mcg tablet  Commonly known as: Vitamin B-12           Farxiga 10 mg  Generic drug: dapagliflozin propanediol           glucosamine HCl 500 mg tablet           guaiFENesin 600 mg 12 hr tablet  Commonly known as: Mucinex      Take 2 tablets (1,200 mg) by mouth 2 times a day.       levothyroxine 25 mcg tablet  Commonly known as: Synthroid, Levoxyl      TAKE 1 TABLET BY MOUTH EVERY DAY       metoprolol succinate XL 50 mg 24 hr tablet  Commonly known as: Toprol-XL           torsemide 20 mg tablet  Commonly known as: Demadex      Take 1 tablet (20 mg) by mouth once daily. Do not start before October 12, 2023.              STOP taking these medications      lisinopril 5 mg tablet        spironolactone 25 mg tablet  Commonly known as: Aldactone                 Test Results Pending At Discharge  Pending Labs       No current pending labs.            Hospital Course   Adwoa Forrest is a 88 y.o. female with PMH of CKD, A-fib not on AC due to history of bleeding, HTN, DLD, CAD s/p prior PCI, PPM implant, anxiety, depression, HFpEF 50%, who presents with concerns for hyperkalemia.  Patient was recently admitted last month with acute on chronic CHF.  Was discharged after that admission on torsemide and spironolactone.  Over the past several days has been noting increased muscle twitching.  Followed up with her PCP  yesterday and had lab work drawn.  Was called with results the day showing an elevated potassium of 7.0 patient was instructed to come to the ED. denies any history of issues with potassium levels previously     ED course: Afebrile, HR 67, RR 20, /66, pulse ox 98% on room air.  No leukocytosis, Hgb 12.4.  Chemistries remarkable for potassium 6.4.  Creatinine 1.95, BUN 51.  Troponin 33.  Patient was given hyperkalemia cocktail with K improvement to 5.0.  Decision made for admission     11/4: Pt seen. Asks about resuming other medications. Not DM2, on SGLT2 for CHF reasons. Will discontinue BGT. K is down to 5.3 off Aldactone and lisinopril. Will continue to hold. Recheck in AM after restarting Torsemide. Will check with cards tomorrow to see if she can be scheduled for sooner F/U for CHF clinic. Consider discharge tomorrow if stable.    11/5: Pt seen. No new complaints. K remains around 5.2 off Aldactone and lisinopril. Acceptable but slightly high. Will continue to hold meds. To be seen in CHF clinic later this week. Spoke with on call cards, thinks it can be arranged. No evidence of acute CHF at present. Discussed with patient and family. OK to discharge to home. Cr is baseline for patient. Follow with PCP in 1-2 weeks and with cards as already scheduled this week.    This discharge took greater than 35 minutes to arrange.    CKD (chronic kidney disease) stage 3, GFR 30-59 ml/min (CMS/[facility])  Likely slight YEVGENIY on CKD, holding spironolactone as above, will also hold lisinopril and torsemide for now.  Continue to monitor kidney function.  Cr is 1.96 today.  Baseline appears to be 1.51-1.80    Hypothyroidism  Continue home medications    Hyperlipidemia  Continue home medications    Hyperkalemia  Likely related to recent medication changes on the spironolactone.  We will hold on spironolactone for now.  Repeat K levels to ensure not increasing.  Monitor fluid status closely.  Continue to monitor on telemetry  Not  much change off aldactone and lisinopril.  Will restart Torsemide.  K is 5.2 on day of discharge. Will continue to hold ACEI and Aldactone. To be seen later this week in CHF clinic. Spoke with cards briefly today, does not think it will be hard to arrange. Pt instructed to call CHF clinic tomorrow.  OK to discharge to home.    Chronic combined systolic and diastolic heart failure (CMS/HCC)  Appears well compensated currently.  Holding diuretics as above.  Continue to monitor fluid status closely.  BNP elevated at 883 but largely asymptomatic.  Resume Torsemide.  No evidence of acute CHF  Will need close follow up with CHF clinic.    Chronic atrial fibrillation (CMS/HCC)  Not on AC due to history of bleeding.  Continue home rate controlling medications    Benign essential hypertension  Holding home lisinopril in setting of worsening kidney function.  Continue to monitor BP closely.  BP is acceptable at present.  Will continue to observe.  Needs close follow up with CHF clinic.    ASHD (arteriosclerotic heart disease)  S/p prior PCI.  Continue home medications    Anxiety disorder, unspecified  Continue home medications.  Asymptomatic.      Pertinent Physical Exam At Time of Discharge  Physical Exam  Constitutional:       Appearance: Normal appearance. She is normal weight.   HENT:      Head: Normocephalic and atraumatic.      Nose: Nose normal. No congestion or rhinorrhea.      Mouth/Throat:      Mouth: Mucous membranes are dry.      Pharynx: Oropharynx is clear.   Eyes:      General: No scleral icterus.     Extraocular Movements: Extraocular movements intact.      Conjunctiva/sclera: Conjunctivae normal.      Pupils: Pupils are equal, round, and reactive to light.   Cardiovascular:      Rate and Rhythm: Normal rate and regular rhythm.      Pulses: Normal pulses.      Heart sounds: No murmur heard.     No gallop.   Pulmonary:      Effort: Pulmonary effort is normal.      Breath sounds: Normal breath sounds. No  wheezing, rhonchi or rales.   Chest:      Chest wall: No tenderness.   Abdominal:      General: Abdomen is flat. There is no distension.      Palpations: Abdomen is soft.      Tenderness: There is no abdominal tenderness. There is no guarding or rebound.   Musculoskeletal:         General: No swelling, tenderness or signs of injury. Normal range of motion.   Skin:     General: Skin is warm and dry.      Coloration: Skin is not jaundiced.      Findings: No bruising, erythema or rash.   Neurological:      General: No focal deficit present.      Mental Status: She is alert and oriented to person, place, and time.      Cranial Nerves: No cranial nerve deficit.      Sensory: No sensory deficit.   Psychiatric:         Mood and Affect: Mood normal.         Behavior: Behavior normal.         Outpatient Follow-Up  Future Appointments   Date Time Provider Department Center   11/6/2023 To Be Determined Diann Gaspar RN [facility]   11/6/2023 11:00 AM Swapna Max, [facility]   11/7/2023 10:30 AM Imani Croft, [facility]   11/8/2023 To Be Determined Carmen Dykes RN [facility]   11/9/2023 10:30 AM Imani Vickersa, [facility]   11/10/2023 To Be Determined Swapna Max, [facility]   11/13/2023 To Be Determined Carmen Dykes RN [facility]   11/13/2023 To Be Determined Imani Mcnultyerra, [facility]   11/15/2023 To Be Determined Swapna Max, [facility]   11/16/2023 To Be Determined Imani Sferra, [facility]   11/17/2023 11:30 AM Stefanie Richey, APRN-CNP [id number]   11/20/2023 To Be Determined Carmen Dykes RN [facility]   11/20/2023 To Be Determined Imani Sferra, [facility]   11/24/2023 To Be Determined Imani Sferra, [facility]   11/27/2023 To Be Determined Imani Vickersa, [facility]   11/27/2023 To Be Determined Carmen Dykes RN [facility]   11/29/2023 To Be Determined Enriqueta Caban, PT [facility]   12/4/2023 To Be Determined Carmen Dykes RN [facility]   12/27/2023 11:30 AM  Yan Brunner MD [id number] [facility]         Frederick Lowry MD

## 2023-11-05 NOTE — NURSING NOTE
Discharge instructions reviewed with patient and patient had no further questions. Patient discharged home with daughter. Patient transported off unit via wheelchair being pushed by daughter per patient's request.

## 2023-11-05 NOTE — DISCHARGE INSTRUCTIONS
Follow up with PCP in 1-2 weeks. Call to schedule. Bring all your discharge paperwork and medications when you go to your follow up appointment. Return to ED if your symptoms come back.    Call CHF clinic tomorrow to review medications and to be seen this week. We removed your Aldactone and Lisinopril due to hyperkalemia, may want to consider resuming some of these.

## 2023-11-06 ENCOUNTER — PATIENT OUTREACH (OUTPATIENT)
Dept: PRIMARY CARE | Facility: CLINIC | Age: 88
End: 2023-11-06
Payer: MEDICARE

## 2023-11-06 ENCOUNTER — OFFICE VISIT (OUTPATIENT)
Dept: CARDIOLOGY | Facility: HOSPITAL | Age: 88
End: 2023-11-06
Payer: MEDICARE

## 2023-11-06 ENCOUNTER — HOME CARE VISIT (OUTPATIENT)
Dept: HOME HEALTH SERVICES | Facility: HOME HEALTH | Age: 88
End: 2023-11-06
Payer: MEDICARE

## 2023-11-06 VITALS
WEIGHT: 97.4 LBS | TEMPERATURE: 97.4 F | HEART RATE: 64 BPM | DIASTOLIC BLOOD PRESSURE: 60 MMHG | RESPIRATION RATE: 20 BRPM | BODY MASS INDEX: 17.81 KG/M2 | OXYGEN SATURATION: 96 % | SYSTOLIC BLOOD PRESSURE: 100 MMHG

## 2023-11-06 VITALS
DIASTOLIC BLOOD PRESSURE: 72 MMHG | BODY MASS INDEX: 17.81 KG/M2 | RESPIRATION RATE: 20 BRPM | SYSTOLIC BLOOD PRESSURE: 115 MMHG | OXYGEN SATURATION: 97 % | WEIGHT: 97.4 LBS | HEART RATE: 65 BPM

## 2023-11-06 VITALS
RESPIRATION RATE: 20 BRPM | DIASTOLIC BLOOD PRESSURE: 60 MMHG | HEART RATE: 64 BPM | BODY MASS INDEX: 17.74 KG/M2 | OXYGEN SATURATION: 96 % | SYSTOLIC BLOOD PRESSURE: 100 MMHG | WEIGHT: 97 LBS | TEMPERATURE: 97.4 F

## 2023-11-06 DIAGNOSIS — I50.32 HYPERTENSIVE HEART AND KIDNEY DISEASE WITH CHRONIC DIASTOLIC CONGESTIVE HEART FAILURE AND STAGE 3B CHRONIC KIDNEY DISEASE (MULTI): ICD-10-CM

## 2023-11-06 DIAGNOSIS — N18.32 HYPERTENSIVE HEART AND KIDNEY DISEASE WITH CHRONIC DIASTOLIC CONGESTIVE HEART FAILURE AND STAGE 3B CHRONIC KIDNEY DISEASE (MULTI): ICD-10-CM

## 2023-11-06 DIAGNOSIS — I50.9 ACUTE ON CHRONIC CONGESTIVE HEART FAILURE, UNSPECIFIED HEART FAILURE TYPE (MULTI): ICD-10-CM

## 2023-11-06 DIAGNOSIS — I13.0 HYPERTENSIVE HEART AND KIDNEY DISEASE WITH CHRONIC DIASTOLIC CONGESTIVE HEART FAILURE AND STAGE 3B CHRONIC KIDNEY DISEASE (MULTI): ICD-10-CM

## 2023-11-06 DIAGNOSIS — I50.32 CHRONIC HEART FAILURE WITH PRESERVED EJECTION FRACTION (MULTI): Primary | ICD-10-CM

## 2023-11-06 PROCEDURE — G0152 HHCP-SERV OF OT,EA 15 MIN: HCPCS

## 2023-11-06 PROCEDURE — 1160F RVW MEDS BY RX/DR IN RCRD: CPT | Performed by: NURSE PRACTITIONER

## 2023-11-06 PROCEDURE — 3078F DIAST BP <80 MM HG: CPT | Performed by: NURSE PRACTITIONER

## 2023-11-06 PROCEDURE — 99213 OFFICE O/P EST LOW 20 MIN: CPT | Performed by: NURSE PRACTITIONER

## 2023-11-06 PROCEDURE — 3074F SYST BP LT 130 MM HG: CPT | Performed by: NURSE PRACTITIONER

## 2023-11-06 PROCEDURE — 1126F AMNT PAIN NOTED NONE PRSNT: CPT | Performed by: NURSE PRACTITIONER

## 2023-11-06 PROCEDURE — G0299 HHS/HOSPICE OF RN EA 15 MIN: HCPCS

## 2023-11-06 PROCEDURE — 1036F TOBACCO NON-USER: CPT | Performed by: NURSE PRACTITIONER

## 2023-11-06 PROCEDURE — 1159F MED LIST DOCD IN RCRD: CPT | Performed by: NURSE PRACTITIONER

## 2023-11-06 PROCEDURE — 1111F DSCHRG MED/CURRENT MED MERGE: CPT | Performed by: NURSE PRACTITIONER

## 2023-11-06 RX ORDER — TORSEMIDE 20 MG/1
10 TABLET ORAL DAILY
Qty: 15 TABLET | Refills: 0 | Status: SHIPPED | OUTPATIENT
Start: 2023-11-06 | End: 2023-11-28 | Stop reason: DRUGHIGH

## 2023-11-06 SDOH — ECONOMIC STABILITY: GENERAL

## 2023-11-06 ASSESSMENT — ENCOUNTER SYMPTOMS
LOSS OF SENSATION IN FEET: 0
PAIN LOCATION - PAIN SEVERITY: 1/10
PAIN SEVERITY GOAL: 0/10
SHORTNESS OF BREATH: 0
PAIN LOCATION - PAIN QUALITY: ACHE
PAIN LOCATION - EXACERBATING FACTORS: STRESS
HEMATURIA: 0
HIGHEST PAIN SEVERITY IN PAST 24 HOURS: 1/10
BOWEL PATTERN NORMAL: 1
CHEST TIGHTNESS: 0
PALPITATIONS: 0
PAIN: 1
PAIN LOCATION - PAIN FREQUENCY: CONSTANT
DYSPNEA ON EXERTION: 1
DEPRESSION: 0
WEAKNESS: 0
SUBJECTIVE PAIN PROGRESSION: UNCHANGED
MUSCLE WEAKNESS: 1
DENIES PAIN: 1
STOOL FREQUENCY: DAILY
BLOOD IN STOOL: 0
ABDOMINAL DISTENTION: 0
PAIN LOCATION - PAIN SEVERITY: 1/10
WHEEZING: 0
HIGHEST PAIN SEVERITY IN PAST 24 HOURS: 1/10
PAIN LOCATION: HEAD
LOWEST PAIN SEVERITY IN PAST 24 HOURS: 0/10
PAIN SEVERITY GOAL: 0/10
CHILLS: 0
PAIN LOCATION - RELIEVING FACTORS: MEDICATION
PAIN LOCATION - PAIN QUALITY: ACHE
SUBJECTIVE PAIN PROGRESSION: WAXING AND WANING
OCCASIONAL FEELINGS OF UNSTEADINESS: 1
CONFUSION: 0
FATIGUES EASILY: 1
PAIN LOCATION: HEAD
LAST BOWEL MOVEMENT: 66784
PAIN: 1
FEVER: 0
CHANGE IN APPETITE: UNCHANGED
LOWEST PAIN SEVERITY IN PAST 24 HOURS: 0/10
LIGHT-HEADEDNESS: 0
EYES NEGATIVE: 1
PAIN LOCATION - RELIEVING FACTORS: MEDICATION
APPETITE LEVEL: FAIR
PAIN LOCATION - PAIN FREQUENCY: INTERMITTENT
PERSON REPORTING PAIN: PATIENT
PAIN LOCATION - PAIN DURATION: CONSTANT
ACTIVITY CHANGE: 0
HEADACHES: 1
PAIN LOCATION - PAIN DURATION: VARIABLE
COUGH: 0

## 2023-11-06 ASSESSMENT — ACTIVITIES OF DAILY LIVING (ADL)
AMBULATION ASSISTANCE: 1
PHYSICAL TRANSFERS ASSESSED: 1
BATHING_CURRENT_FUNCTION: ONE PERSON
MONEY MANAGEMENT (EXPENSES/BILLS): INDEPENDENT
DRESSING_UB_CURRENT_FUNCTION: STAND BY ASSIST
DRESSING_UB_CURRENT_FUNCTION: MINIMUM ASSIST
GROOMING_CURRENT_FUNCTION: SUPERVISION
LIGHT HOUSEKEEPING: NEEDS ASSISTANCE
GROOMING ASSESSED: 1
FEEDING ASSESSED: 1
TOILETING: 1
PREPARING MEALS: NEEDS ASSISTANCE
CURRENT_FUNCTION: ONE PERSON
DRESSING_LB_CURRENT_FUNCTION: MODERATE ASSIST
HOUSEKEEPING ASSESSED: 1
AMBULATION ASSISTANCE: ONE PERSON
TOILETING: ONE PERSON
BATHING ASSESSED: 1
FEEDING: SUPERVISION

## 2023-11-06 ASSESSMENT — PATIENT HEALTH QUESTIONNAIRE - PHQ9
1. LITTLE INTEREST OR PLEASURE IN DOING THINGS: NOT AT ALL
2. FEELING DOWN, DEPRESSED OR HOPELESS: NOT AT ALL
SUM OF ALL RESPONSES TO PHQ9 QUESTIONS 1 AND 2: 0

## 2023-11-06 ASSESSMENT — PAIN SCALES - GENERAL: PAINLEVEL: 0-NO PAIN

## 2023-11-06 NOTE — PATIENT INSTRUCTIONS
Thank you for coming in today.  If you have any questions you may contact the office Monday through Friday at [phone] or on week ends at [phone].     Please decrease the Torsemide 20 mg to 1/2 tablet daily.     Please follow  a 2 GM sodium diet and limit fluid intake to 2 liters per day or 8 servings ( serving size = 8 oz. = 1 cup = 240 ml) per day.   Please avoid processed meat products (luncheon meats, sausages, loya, hot dogs for example) eat 4 servings of vegetables and 1-2 whole servings of whole fruits per day.   Please weigh daily and call [phone] for weight gain of 3 pounds in 24 hours or 5 pounds or if you experience increased swelling or shortness of breath.     Follow up lab in 1 week.     Follow up in clinic 2-3 weeks.

## 2023-11-06 NOTE — PROGRESS NOTES
Pt declining TCM servies at this time. Pt stating that she will call and make a follow up appt with PCP when she gets a chance. Pt denies any questions concerns, or issues with medications at this time. Pt currently waiting in waiting room to see Cardiology at time of outreach call.

## 2023-11-06 NOTE — PROGRESS NOTES
Subjective   Patient ID: Adwoa Forrest is a 88 y.o. female who presents for follow-up of congestive heart failure.     Current Outpatient Medications:   •  ALPRAZolam (Xanax) 0.5 mg tablet, Take 1 tablet (0.5 mg) by mouth once daily at bedtime., Disp: 30 tablet, Rfl: 3  •  amiodarone (Pacerone) 200 mg tablet, Take 1 tablet (200 mg) by mouth once daily., Disp: , Rfl:   •  aspirin 81 mg EC tablet, Take 1 tablet (81 mg) by mouth once daily., Disp: , Rfl:   •  atorvastatin (Lipitor) 80 mg tablet, Take 1 tablet (80 mg) by mouth once daily at bedtime., Disp: , Rfl:   •  cyanocobalamin (Vitamin B-12) 1,000 mcg tablet, Take 2 tablets (2,000 mcg) by mouth once daily., Disp: , Rfl:   •  Farxiga 10 mg, Take 1 tablet (10 mg) by mouth once daily., Disp: , Rfl:   •  guaiFENesin (Mucinex) 600 mg 12 hr tablet, Take 2 tablets (1,200 mg) by mouth 2 times a day., Disp: 20 tablet, Rfl: 0  •  levothyroxine (Synthroid, Levoxyl) 25 mcg tablet, TAKE 1 TABLET BY MOUTH EVERY DAY, Disp: 90 tablet, Rfl: 3  •  metoprolol succinate XL (Toprol-XL) 50 mg 24 hr tablet, Take 1 tablet (50 mg) by mouth once daily., Disp: , Rfl:   •  torsemide (Demadex) 20 mg tablet, Take 1 tablet (20 mg) by mouth once daily. Do not start before October 12, 2023., Disp: 30 tablet, Rfl: 0  No current facility-administered medications for this visit.     HPI   Current symptoms include: Chronically ill appearing. She has been of the O2 since release from hospital.  She was admitted for hyperkalemia with potassium as high as 7. Spironolactone and ACEI were stopped. She denies chest pressure/discomfort, dyspnea, lower extremity edema, near-syncope, orthopnea, palpitations, paroxysmal nocturnal dyspnea, and syncope. She states she is compliant all of the time with her medications. She states she is compliant most of the time with her diet.    Review of Systems   Constitutional:  Negative for activity change, chills and fever.   HENT:  Negative for hearing loss.    Eyes:  Negative.    Respiratory:  Negative for cough, chest tightness, shortness of breath and wheezing.    Cardiovascular:  Negative for chest pain, palpitations and leg swelling.   Gastrointestinal:  Negative for abdominal distention and blood in stool.   Genitourinary:  Negative for hematuria.   Neurological:  Negative for syncope, weakness and light-headedness.   Psychiatric/Behavioral:  Negative for confusion.        Objective     /72 (BP Location: Right arm, Patient Position: Sitting)   Pulse 65   Resp 20   Wt (!) 44.2 kg (97 lb 6.4 oz)   SpO2 97%   BMI 17.81 kg/m²     Transthoracic echo (TTE) complete    Result Date: 10/3/2023              [place] [address]      Phone [phone] Fax [phone] TRANSTHORACIC ECHOCARDIOGRAM REPORT  Patient Name:      CHERRY Noel Physician:   [id number] Darrel Hampton MD Study Date:        10/3/2023          Ordering Provider:   [id number] STEWART FERREIRA MRN/PID:           [id number]           Fellow: Accession#:        [id number]       Nurse:               Amairani Zafar RN Date of Birth/Age: 1935 / 88      Sonographer:         Thea Espinoza RDCS                    years Gender:            F                  Additional Staff: Height:            157.48 cm          Admit Date:          10/2/2023 Weight:            50.35 kg           Admission Status:    Inpatient - Routine BSA:               1.49 m2            Department Location: [facility] Echo Lab Blood Pressure: 118 /71 mmHg Study Type:    TRANSTHORACIC ECHO (TTE) COMPLETE Diagnosis/ICD: Heart failure, unspecified-I50.9; Cardiomyopathy in diseases                classified elsewhere-I43 Indication:    CHF, Cardiomyopathy  Study Detail: The following Echo studies were performed: 2D, M-Mode, Doppler and               color flow. Technically challenging study due to body habitus,               prominent  lung artifact and poor acoustic windows. Agitated saline               used as a contrast agent for intraseptal flow evaluation and               Optison used as a contrast agent for endocardial border               definition.  PHYSICIAN INTERPRETATION: Left Ventricle: Left ventricular systolic function is normal, with an estimated ejection fraction of 70-75%. There are no regional wall motion abnormalities. The left ventricular cavity size is normal. There is moderate left ventricular hypertrophy. Spectral Doppler shows a normal pattern of left ventricular diastolic filling. Left Atrium: The left atrium is normal in size. A bubble study using agitated saline was performed. Bubble study is negative. Right Ventricle: The right ventricle is normal in size. There is mildly reduced right ventricular systolic function. A device is visualized in the right ventricle. Right Atrium: The right atrium is moderately dilated. There is a device visualized in the right atrium. Aortic Valve: The aortic valve appears structurally normal. There is mild aortic valve cusp calcification. There is mild to moderate aortic valve thickening. There is mild to moderate aortic valve regurgitation. The peak instantaneous gradient of the aortic valve is 4.2 mmHg. The mean gradient of the aortic valve is 2.0 mmHg. Mitral Valve: The mitral valve is normal in structure. There is trace mitral valve regurgitation. Tricuspid Valve: The tricuspid valve is structurally normal. There is mild tricuspid regurgitation. The Doppler estimated RVSP is moderately elevated at 51.7 mmHg. Pulmonic Valve: The pulmonic valve is not well visualized. There is no indication of pulmonic valve regurgitation. Pericardium: There is no pericardial effusion noted. Aorta: The aortic root is normal.  CONCLUSIONS:  1. Left ventricular systolic function is normal with a 70-75% estimated ejection fraction.  2. There is moderate left ventricular hypertrophy.  3. There is mildly  reduced right ventricular systolic function.  4. The right atrium is moderately dilated.  5. Moderately elevated right ventricular systolic pressure.  6. Mild to moderate aortic valve regurgitation. QUANTITATIVE DATA SUMMARY: 2D MEASUREMENTS:                           Normal Ranges: Ao Root d:     3.00 cm    (2.0-3.7cm) LAs:           3.50 cm    (2.7-4.0cm) IVSd:          1.60 cm    (0.6-1.1cm) LVPWd:         1.42 cm    (0.6-1.1cm) LVIDd:         3.91 cm    (3.9-5.9cm) LVIDs:         2.40 cm LV Mass Index: 153.0 g/m2 LV % FS        38.6 % LA VOLUME:                               Normal Ranges: LA Vol A4C:        39.2 ml    (22+/-6mL/m2) LA Vol A2C:        50.4 ml LA Vol BP:         49.6 ml LA Vol Index A4C:  26.3ml/m2 LA Vol Index A2C:  33.8 ml/m2 LA Vol Index BP:   33.3 ml/m2 LA Area A4C:       14.7 cm2 LA Area A2C:       18.6 cm2 LA Major Axis A4C: 4.7 cm LA Major Axis A2C: 5.8 cm RA VOLUME BY A/L METHOD:                       Normal Ranges: RA Area A4C: 24.5 cm2 AORTA MEASUREMENTS:                      Normal Ranges: Ao Sinus, d: 3.00 cm (2.1-3.5cm) Ao STJ, d:   1.89 cm (1.7-3.4cm) Asc Ao, d:   3.60 cm (2.1-3.4cm) LV SYSTOLIC FUNCTION BY 2D PLANIMETRY (MOD):                     Normal Ranges: EF-A4C View: 76.2 % (>=55%) EF-A2C View: 73.7 % EF-Biplane:  74.5 % LV DIASTOLIC FUNCTION:                           Normal Ranges: MV Peak E:    0.58 m/s    (0.7-1.2 m/s) MV Peak A:    0.23 m/s    (0.42-0.7 m/s) E/A Ratio:    2.57        (1.0-2.2) MV e'         0.04 m/s    (>8.0) MV lateral e' 0.06 m/s MV medial e'  0.03 m/s MV A Dur:     103.00 msec E/e' Ratio:   12.96       (<8.0) MITRAL VALVE:                 Normal Ranges: MV DT: 237 msec (150-240msec) AORTIC VALVE:                                   Normal Ranges: AoV Vmax:                1.03 m/s (<=1.7m/s) AoV Peak P.2 mmHg (<20mmHg) AoV Mean P.0 mmHg (1.7-11.5mmHg) LVOT Max Henry:            0.70 m/s (<=1.1m/s) AoV VTI:                  20.70 cm (18-25cm) LVOT VTI:                14.40 cm LVOT Diameter:           2.00 cm  (1.8-2.4cm) AoV Area, VTI:           2.19 cm2 (2.5-5.5cm2) AoV Area,Vmax:           2.13 cm2 (2.5-4.5cm2) AoV Dimensionless Index: 0.70 AORTIC INSUFFICIENCY: AI Vmax:       4.19 m/s AI Half-time:  438 msec AI Decel Rate: 335.50 cm/s2  RIGHT VENTRICLE: RV Basal 3.64 cm RV Mid   3.19 cm RV Major 7.5 cm TAPSE:   20.8 mm RV s'    0.07 m/s TRICUSPID VALVE/RVSP:                             Normal Ranges: Peak TR Velocity: 3.49 m/s RV Syst Pressure: 51.7 mmHg (< 30mmHg) IVC Diam:         1.50 cm  [id number] Darrel Hampton MD Electronically signed on 10/3/2023 at 4:11:07 PM  ** Final **       Lab Results   Component Value Date    BUN 53 (H) 11/05/2023    CREATININE 1.95 (H) 11/05/2023     (H) 11/04/2023    MG 2.26 11/03/2023    K 5.2 11/05/2023     (L) 11/05/2023       Physical Exam  Constitutional:       Appearance: She is not toxic-appearing.   HENT:      Head: Normocephalic.      Nose: Nose normal.   Eyes:      Conjunctiva/sclera: Conjunctivae normal.   Neck:      Comments: No JVD/HJR  Cardiovascular:      Rate and Rhythm: Normal rate and regular rhythm.      Pulses: Normal pulses.      Heart sounds: No murmur heard.  Pulmonary:      Effort: No respiratory distress.      Breath sounds: No wheezing, rhonchi or rales.   Abdominal:      General: Bowel sounds are normal.      Palpations: Abdomen is soft.   Musculoskeletal:         General: No swelling.   Skin:     General: Skin is warm and dry.   Neurological:      General: No focal deficit present.      Mental Status: She is alert and oriented to person, place, and time.   Psychiatric:         Mood and Affect: Mood normal.       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     CHF: well controlled and no significant medication side effects noted. She has lost significant weight and oral intake is low.  Cr. Is trendimg up will trial lower dose diuretic    Emphasized salt restriction.  Encouraged daily monitoring of the patient's weight.  Labs ordered today.  Follow up in 2 weeks.    2. Atrial fibrillation: persistent.   She is s/p AV node ablation and PPM placement in the past.     3. NSVT:  Amiodarone.  No palpitations.     3. CKD 3: GFR 25.   Potassium 5.2    4. HTN:  Controlled.      5. ASHD with PCI to LAD 2017:  Secondary prevention medications ASA, Statin, BB     Rosangela Lane, APRN-CNP

## 2023-11-07 ENCOUNTER — HOME CARE VISIT (OUTPATIENT)
Dept: HOME HEALTH SERVICES | Facility: HOME HEALTH | Age: 88
End: 2023-11-07
Payer: MEDICARE

## 2023-11-07 VITALS
OXYGEN SATURATION: 95 % | DIASTOLIC BLOOD PRESSURE: 63 MMHG | RESPIRATION RATE: 17 BRPM | TEMPERATURE: 97.8 F | SYSTOLIC BLOOD PRESSURE: 122 MMHG | HEART RATE: 65 BPM

## 2023-11-07 PROCEDURE — G0157 HHC PT ASSISTANT EA 15: HCPCS

## 2023-11-07 SDOH — HEALTH STABILITY: PHYSICAL HEALTH
EXERCISE COMMENTS: 1 SET OF 10 EACH, GREEN THERABAND  SEATED THER EX: MARCHES, LEG EXT, LAQ, TBAND HIP ABD, BALL SQUEEZES HIP ADD, GREEN TBAND HIP ABD, SEATED MARCHING ACTIVITY TAPPING ORANGE CONES WITH B LE FOR IMPROVED STRENGTHENING AND ENDURANCE

## 2023-11-07 SDOH — HEALTH STABILITY: PHYSICAL HEALTH: EXERCISE TYPE: B LE STRENGTHENING THER EX

## 2023-11-07 ASSESSMENT — ENCOUNTER SYMPTOMS
SUBJECTIVE PAIN PROGRESSION: UNCHANGED
MUSCLE WEAKNESS: 1
PERSON REPORTING PAIN: PATIENT
DENIES PAIN: 1
OCCASIONAL FEELINGS OF UNSTEADINESS: 1

## 2023-11-07 ASSESSMENT — ACTIVITIES OF DAILY LIVING (ADL)
AMBULATION ASSISTANCE: 1
BATHING ASSESSED: 1
AMBULATION ASSISTANCE: ONE PERSON
BATHING_CURRENT_FUNCTION: ONE PERSON

## 2023-11-08 ENCOUNTER — HOME CARE VISIT (OUTPATIENT)
Dept: HOME HEALTH SERVICES | Facility: HOME HEALTH | Age: 88
End: 2023-11-08
Payer: MEDICARE

## 2023-11-09 ENCOUNTER — HOME CARE VISIT (OUTPATIENT)
Dept: HOME HEALTH SERVICES | Facility: HOME HEALTH | Age: 88
End: 2023-11-09
Payer: MEDICARE

## 2023-11-09 VITALS
DIASTOLIC BLOOD PRESSURE: 62 MMHG | OXYGEN SATURATION: 97 % | TEMPERATURE: 97.9 F | HEART RATE: 65 BPM | RESPIRATION RATE: 17 BRPM | SYSTOLIC BLOOD PRESSURE: 121 MMHG

## 2023-11-09 PROCEDURE — G0157 HHC PT ASSISTANT EA 15: HCPCS

## 2023-11-09 SDOH — HEALTH STABILITY: PHYSICAL HEALTH
EXERCISE COMMENTS: 1 SET OF 15 EACH, BLUE THERABAND  SEATED THER EX: MARCHES, LEG EXT, LAQ, TBAND HIP ABD, BALL SQUEEZES HIP ADD, SEATED HIP ABD, MARCHES TO TOUCH TOP OF CONES LAYED OUT FOR IMPROVED STRENGTH AND ENDURANCE IN SEATED POSITION.  2# BALL BICEP CURLS AND SH

## 2023-11-09 SDOH — HEALTH STABILITY: PHYSICAL HEALTH: EXERCISE TYPE: B LE STRENGTHENING THER EX

## 2023-11-09 SDOH — HEALTH STABILITY: PHYSICAL HEALTH: EXERCISE COMMENTS: OULDER PRESS.

## 2023-11-09 ASSESSMENT — ENCOUNTER SYMPTOMS
SUBJECTIVE PAIN PROGRESSION: RESOLVED
PAIN LOCATION - RELIEVING FACTORS: POSITIONING, TIME OF DAY
DENIES PAIN: 1
PERSON REPORTING PAIN: PATIENT
PAIN LOCATION: RIGHT FOOT
LIMITED RANGE OF MOTION: 1
MUSCLE WEAKNESS: 1
PAIN SEVERITY GOAL: 0/10
LOWEST PAIN SEVERITY IN PAST 24 HOURS: 0/10
OCCASIONAL FEELINGS OF UNSTEADINESS: 1
PAIN LOCATION - PAIN SEVERITY: 0/10
HIGHEST PAIN SEVERITY IN PAST 24 HOURS: 1/10

## 2023-11-09 ASSESSMENT — ACTIVITIES OF DAILY LIVING (ADL)
BATHING ASSESSED: 1
BATHING_CURRENT_FUNCTION: STAND BY ASSIST
AMBULATION ASSISTANCE: STAND BY ASSIST
AMBULATION ASSISTANCE: 1

## 2023-11-10 ENCOUNTER — OFFICE VISIT (OUTPATIENT)
Dept: WOUND CARE | Facility: CLINIC | Age: 88
End: 2023-11-10
Payer: MEDICARE

## 2023-11-10 PROCEDURE — 11043 DBRDMT MUSC&/FSCA 1ST 20/<: CPT

## 2023-11-10 ASSESSMENT — ACTIVITIES OF DAILY LIVING (ADL): OASIS_M1830: 05

## 2023-11-11 ENCOUNTER — APPOINTMENT (OUTPATIENT)
Dept: HOME HEALTH SERVICES | Facility: HOME HEALTH | Age: 88
End: 2023-11-11
Payer: MEDICARE

## 2023-11-13 ENCOUNTER — TELEPHONE (OUTPATIENT)
Dept: PRIMARY CARE | Facility: CLINIC | Age: 88
End: 2023-11-13
Payer: MEDICARE

## 2023-11-13 ENCOUNTER — HOME CARE VISIT (OUTPATIENT)
Dept: HOME HEALTH SERVICES | Facility: HOME HEALTH | Age: 88
End: 2023-11-13
Payer: MEDICARE

## 2023-11-13 DIAGNOSIS — J96.01 ACUTE RESPIRATORY FAILURE WITH HYPOXIA (MULTI): Primary | ICD-10-CM

## 2023-11-13 PROCEDURE — G0152 HHCP-SERV OF OT,EA 15 MIN: HCPCS

## 2023-11-13 ASSESSMENT — ACTIVITIES OF DAILY LIVING (ADL)
BATHING ASSESSED: 1
BATHING_CURRENT_FUNCTION: SUPERVISION
AMBULATION ASSISTANCE: SUPERVISION
DRESSING_LB_CURRENT_FUNCTION: INDEPENDENT
PREPARING MEALS: INDEPENDENT
DRESSING_UB_CURRENT_FUNCTION: INDEPENDENT
TOILETING: 1
TOILETING: INDEPENDENT
AMBULATION ASSISTANCE: 1

## 2023-11-13 ASSESSMENT — ENCOUNTER SYMPTOMS
DENIES PAIN: 1
PERSON REPORTING PAIN: PATIENT

## 2023-11-13 NOTE — TELEPHONE ENCOUNTER
Patient was told by [facility] that she no longer needs supplemental oxygen. Will need an order to discontinue sent to [facility] [phone]

## 2023-11-14 ENCOUNTER — HOME CARE VISIT (OUTPATIENT)
Dept: HOME HEALTH SERVICES | Facility: HOME HEALTH | Age: 88
End: 2023-11-14
Payer: MEDICARE

## 2023-11-14 VITALS
HEART RATE: 65 BPM | OXYGEN SATURATION: 100 % | RESPIRATION RATE: 16 BRPM | DIASTOLIC BLOOD PRESSURE: 80 MMHG | TEMPERATURE: 97.7 F | SYSTOLIC BLOOD PRESSURE: 137 MMHG

## 2023-11-14 VITALS
SYSTOLIC BLOOD PRESSURE: 144 MMHG | HEART RATE: 56 BPM | RESPIRATION RATE: 18 BRPM | DIASTOLIC BLOOD PRESSURE: 80 MMHG | TEMPERATURE: 97.8 F | OXYGEN SATURATION: 99 %

## 2023-11-14 PROCEDURE — G0157 HHC PT ASSISTANT EA 15: HCPCS

## 2023-11-14 PROCEDURE — G0300 HHS/HOSPICE OF LPN EA 15 MIN: HCPCS

## 2023-11-14 SDOH — HEALTH STABILITY: PHYSICAL HEALTH
EXERCISE COMMENTS: 1 SET OF 15 EACH. 2# ANKLE WEIGHT ON R LE   SEATED THER EX: MARCHES, LEG EXT, LAQ, TBAND HIP ABD, BALL SQUEEZES HIP ADD  STANDING THER EX: MARCHES, HEEL/TOE RAISES, HIP ABD, HIP FLEX/EXT, MINI SQUATS , HAM CURLS

## 2023-11-14 SDOH — HEALTH STABILITY: PHYSICAL HEALTH: EXERCISE TYPE: B LE STRENGTHENING THER EX

## 2023-11-14 ASSESSMENT — ENCOUNTER SYMPTOMS
MUSCLE WEAKNESS: 1
LOWEST PAIN SEVERITY IN PAST 24 HOURS: 0/10
OCCASIONAL FEELINGS OF UNSTEADINESS: 1
DENIES PAIN: 1
PERSON REPORTING PAIN: PATIENT
HIGHEST PAIN SEVERITY IN PAST 24 HOURS: 0/10
DENIES PAIN: 1
PAIN SEVERITY GOAL: 0/10
SUBJECTIVE PAIN PROGRESSION: UNCHANGED

## 2023-11-14 ASSESSMENT — ACTIVITIES OF DAILY LIVING (ADL)
BATHING ASSESSED: 1
AMBULATION_DISTANCE/DURATION_TOLERATED: 50 FT
AMBULATION ASSISTANCE ON FLAT SURFACES: 1
AMBULATION ASSISTANCE: ONE PERSON
BATHING_CURRENT_FUNCTION: ONE PERSON
AMBULATION ASSISTANCE: 1

## 2023-11-15 ENCOUNTER — HOME CARE VISIT (OUTPATIENT)
Dept: HOME HEALTH SERVICES | Facility: HOME HEALTH | Age: 88
End: 2023-11-15
Payer: MEDICARE

## 2023-11-15 ENCOUNTER — LAB REQUISITION (OUTPATIENT)
Dept: LAB | Facility: HOSPITAL | Age: 88
End: 2023-11-15
Payer: MEDICARE

## 2023-11-15 VITALS
HEART RATE: 70 BPM | RESPIRATION RATE: 18 BRPM | OXYGEN SATURATION: 98 % | TEMPERATURE: 98 F | SYSTOLIC BLOOD PRESSURE: 133 MMHG | DIASTOLIC BLOOD PRESSURE: 70 MMHG

## 2023-11-15 DIAGNOSIS — I50.43 ACUTE ON CHRONIC COMBINED SYSTOLIC (CONGESTIVE) AND DIASTOLIC (CONGESTIVE) HEART FAILURE (MULTI): ICD-10-CM

## 2023-11-15 DIAGNOSIS — I13.0 HYPERTENSIVE HEART AND CHRONIC KIDNEY DISEASE WITH HEART FAILURE AND STAGE 1 THROUGH STAGE 4 CHRONIC KIDNEY DISEASE, OR UNSPECIFIED CHRONIC KIDNEY DISEASE (MULTI): ICD-10-CM

## 2023-11-15 LAB
ANION GAP SERPL CALC-SCNC: 13 MMOL/L (ref 10–20)
BNP SERPL-MCNC: 1261 PG/ML (ref 0–99)
BUN SERPL-MCNC: 47 MG/DL (ref 6–23)
CALCIUM SERPL-MCNC: 9.4 MG/DL (ref 8.6–10.3)
CHLORIDE SERPL-SCNC: 100 MMOL/L (ref 98–107)
CO2 SERPL-SCNC: 28 MMOL/L (ref 21–32)
CREAT SERPL-MCNC: 1.86 MG/DL (ref 0.5–1.05)
GFR SERPL CREATININE-BSD FRML MDRD: 26 ML/MIN/1.73M*2
GLUCOSE SERPL-MCNC: 95 MG/DL (ref 74–99)
MAGNESIUM SERPL-MCNC: 2.36 MG/DL (ref 1.6–2.4)
POTASSIUM SERPL-SCNC: 5.1 MMOL/L (ref 3.5–5.3)
SODIUM SERPL-SCNC: 136 MMOL/L (ref 136–145)

## 2023-11-15 PROCEDURE — G0180 MD CERTIFICATION HHA PATIENT: HCPCS | Performed by: FAMILY MEDICINE

## 2023-11-15 PROCEDURE — 83735 ASSAY OF MAGNESIUM: CPT

## 2023-11-15 PROCEDURE — 80048 BASIC METABOLIC PNL TOTAL CA: CPT

## 2023-11-15 PROCEDURE — 83880 ASSAY OF NATRIURETIC PEPTIDE: CPT

## 2023-11-15 PROCEDURE — G0300 HHS/HOSPICE OF LPN EA 15 MIN: HCPCS

## 2023-11-15 ASSESSMENT — PAIN SCALES - PAIN ASSESSMENT IN ADVANCED DEMENTIA (PAINAD)
BREATHING: 0
TOTALSCORE: 0
BODYLANGUAGE: 0
FACIALEXPRESSION: 0
NEGVOCALIZATION: 0 - NONE.
BODYLANGUAGE: 0 - RELAXED.
FACIALEXPRESSION: 0 - SMILING OR INEXPRESSIVE.
NEGVOCALIZATION: 0
CONSOLABILITY: 0 - NO NEED TO CONSOLE.
CONSOLABILITY: 0

## 2023-11-15 ASSESSMENT — ENCOUNTER SYMPTOMS
LOWEST PAIN SEVERITY IN PAST 24 HOURS: 0/10
APPETITE LEVEL: GOOD
HIGHEST PAIN SEVERITY IN PAST 24 HOURS: 0/10
PAIN SEVERITY GOAL: 0/10
SUBJECTIVE PAIN PROGRESSION: UNCHANGED
OCCASIONAL FEELINGS OF UNSTEADINESS: 0
PERSON REPORTING PAIN: PATIENT
CHANGE IN APPETITE: UNCHANGED
DENIES PAIN: 1

## 2023-11-16 ENCOUNTER — HOME CARE VISIT (OUTPATIENT)
Dept: HOME HEALTH SERVICES | Facility: HOME HEALTH | Age: 88
End: 2023-11-16
Payer: MEDICARE

## 2023-11-16 VITALS
HEART RATE: 67 BPM | DIASTOLIC BLOOD PRESSURE: 63 MMHG | TEMPERATURE: 97.9 F | OXYGEN SATURATION: 96 % | RESPIRATION RATE: 16 BRPM | SYSTOLIC BLOOD PRESSURE: 128 MMHG

## 2023-11-16 PROCEDURE — G0157 HHC PT ASSISTANT EA 15: HCPCS

## 2023-11-16 SDOH — HEALTH STABILITY: PHYSICAL HEALTH: EXERCISE TYPE: B LE STRENGTHENING THER EX

## 2023-11-16 SDOH — HEALTH STABILITY: PHYSICAL HEALTH
EXERCISE COMMENTS: 1 SET OF 15 EACH  SEATED THER EX: MARCHES, LEG EXT, LAQ, TBAND HIP ABD, BALL SQUEEZES HIP ADD  STANDING THER EX: MARCHES, HEEL/TOE RAISES, HIP ABD, HIP FLEX/EXT, MINI SQUATS , HAM CURLS  2# BALL BICEP CURLS

## 2023-11-16 ASSESSMENT — ENCOUNTER SYMPTOMS
PAIN SEVERITY GOAL: 0/10
OCCASIONAL FEELINGS OF UNSTEADINESS: 1
LOWEST PAIN SEVERITY IN PAST 24 HOURS: 0/10
DENIES PAIN: 1
PERSON REPORTING PAIN: PATIENT
LIMITED RANGE OF MOTION: 1
HIGHEST PAIN SEVERITY IN PAST 24 HOURS: 2/10
SUBJECTIVE PAIN PROGRESSION: UNCHANGED
MUSCLE WEAKNESS: 1

## 2023-11-16 ASSESSMENT — ACTIVITIES OF DAILY LIVING (ADL)
AMBULATION ASSISTANCE: 1
AMBULATION ASSISTANCE ON FLAT SURFACES: 1
BATHING ASSESSED: 1
BATHING EQUIPMENT USED: SHOWER BENCH
AMBULATION ASSISTANCE: STAND BY ASSIST
BATHING_CURRENT_FUNCTION: STAND BY ASSIST
AMBULATION_DISTANCE/DURATION_TOLERATED: 65 FT

## 2023-11-16 NOTE — PROGRESS NOTES
Electrophysiology Follow up Visit    Adwoa Forrest is a 88 y.o. year old female patient with    1. Atrial fibrillation: Status post AV node ablation. She has a dual-chamber pacemaker was set VVIR though after she was started on amiodarone for NSVT, she spontaneously converted to sinus rhythm. She was noted to have underlying sinus rhythm with CHB and she was changed to DDDR  mode.      2. Diabetes     3. Hypertension     4. PVCs/nonsustained VT: Noted to have frequent PVCs as well as a short episode of symptomatic VT with PVC morphology at least via pacemaker with similar EGM characteristics. Arrhythmia appears to be automatic and mechanism with a period of ramping up prior to termination. Given palpitations and lightheadedness from episode she was hospitalized in February 2023 and was started on amiodarone. Echocardiogram with normal LV function.     Patient here for follow-up for atrial fibrillation, NSVT, and amiodarone monitoring.  She reports she has been feeling well and is continuing to heal from an infection in her foot.  She denies any palpitations or syncope.      PMHx/PSHx:   Past Medical History:   Diagnosis Date    Abnormal findings on diagnostic imaging of heart and coronary circulation 06/13/2017    Abnormal computed tomography angiography of heart    Acute on chronic combined systolic (congestive) and diastolic (congestive) heart failure (CMS/HCC) 09/28/2017    Acute on chronic combined systolic and diastolic heart failure    Acute on chronic combined systolic (congestive) and diastolic (congestive) heart failure (CMS/HCC) 02/05/2020    Acute on chronic combined systolic and diastolic CHF, NYHA class 2    Anemia, unspecified 11/18/2020    Anemia, mild    Chronic diastolic (congestive) heart failure (CMS/HCC) 09/15/2020    Chronic diastolic congestive heart failure    Disorder of the skin and subcutaneous tissue, unspecified 02/16/2020    Skin lesion of face    Diverticulitis of large intestine with  perforation and abscess without bleeding     Perforation of sigmoid colon due to diverticulitis    Diverticulosis of intestine, part unspecified, without perforation or abscess without bleeding     Diverticulosis    Dorsalgia, unspecified 08/16/2019    Upper back pain    Encounter for immunization 11/13/2019    Need for vaccination    Encounter for surgical aftercare following surgery on the circulatory system 02/18/2019    Aftercare following surgery of the circulatory system    Essential (primary) hypertension 11/02/2022    Benign essential hypertension    Gastro-esophageal reflux disease without esophagitis 02/05/2020    Gastroesophageal reflux disease without esophagitis    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    Hypothyroidism due to medication    Long term (current) use of anticoagulants 10/22/2018    Long term (current) use of anticoagulants    Melena 04/10/2020    Blood in stool    Old myocardial infarction 10/19/2022    History of non-ST elevation myocardial infarction (NSTEMI)    Other forms of dyspnea 01/24/2020    Dyspnea on exertion    Other long term (current) drug therapy 05/10/2018    Long term current use of antiarrhythmic drug    Other specified cardiac arrhythmias 02/26/2020    Arrhythmia, atrial    Paroxysmal atrial fibrillation (CMS/HCC) 02/01/2018    Paroxysmal atrial fibrillation    Permanent atrial fibrillation (CMS/HCC) 01/24/2020    Permanent atrial fibrillation    Personal history of diseases of the blood and blood-forming organs and certain disorders involving the immune mechanism 04/10/2020    History of anemia    Personal history of other diseases of the circulatory system 11/04/2018    History of abnormal electrocardiography    Personal history of other diseases of the digestive system 10/22/2018    History of lower GI bleeding    Personal history of other diseases of the musculoskeletal system and connective tissue     History of osteoporosis     Personal history of other diseases of the musculoskeletal system and connective tissue 08/16/2019    History of muscle spasm    Personal history of other endocrine, nutritional and metabolic disease 02/18/2019    History of hyperlipidemia    Personal history of other specified conditions 08/13/2020    History of diarrhea    Personal history of other specified conditions     History of precordial chest pain    Personal history of transient ischemic attack (TIA), and cerebral infarction without residual deficits 02/01/2018    Cerebrovascular accident (CVA) within last 3 months    Psychophysiologic insomnia 11/18/2020    Chronic insomnia    Segmental and somatic dysfunction of rib cage 08/16/2019    Segmental and somatic dysfunction of rib cage    Segmental and somatic dysfunction of thoracic region 08/16/2019    Segmental and somatic dysfunction of thoracic region    Segmental and somatic dysfunction of upper extremity 08/16/2019    Segmental and somatic dysfunction of upper extremity      Social History     Socioeconomic History    Marital status:      Spouse name: Not on file    Number of children: Not on file    Years of education: Not on file    Highest education level: Not on file   Occupational History    Not on file   Tobacco Use    Smoking status: Never     Passive exposure: Never    Smokeless tobacco: Never   Substance and Sexual Activity    Alcohol use: Never    Drug use: Never    Sexual activity: Not on file   Other Topics Concern    Not on file   Social History Narrative    Not on file     Social Determinants of Health     Financial Resource Strain: Low Risk  (11/3/2023)    Overall Financial Resource Strain (CARDIA)     Difficulty of Paying Living Expenses: Not very hard   Food Insecurity: Not on file   Transportation Needs: No Transportation Needs (11/3/2023)    PRAPARE - Transportation     Lack of Transportation (Medical): No     Lack of Transportation (Non-Medical): No   Physical Activity: Not on  "file   Stress: Not on file   Social Connections: Feeling Socially Integrated (10/26/2023)    OASIS : Social Isolation     Frequency of experiencing loneliness or isolation: Never   Intimate Partner Violence: Not on file   Housing Stability: High Risk (11/3/2023)    Housing Stability Vital Sign     Unable to Pay for Housing in the Last Year: No     Number of Places Lived in the Last Year: 52     Unstable Housing in the Last Year: No      FamHx:   Family History   Problem Relation Name Age of Onset    Coronary artery disease Mother      Coronary artery disease Father         Allergies   Allergen Reactions    Codeine Nausea Only and Other     nausea    Hydrocodone Nausea Only and Swelling     nausea    Sotalol Other     Bradycardia     Tetracyclines Itching        Outpatient Medications:  Current Outpatient Medications   Medication Instructions    ALPRAZolam (XANAX) 0.5 mg, oral, Nightly    amiodarone (Pacerone) 200 mg tablet 1 tablet, oral, Daily    aspirin 81 mg EC tablet 1 tablet, oral, Daily    atorvastatin (Lipitor) 80 mg tablet 1 tablet, oral, Nightly    cyanocobalamin (Vitamin B-12) 1,000 mcg tablet 2 tablets, oral, Daily    Farxiga 10 mg 1 tablet, oral, Daily    guaiFENesin (MUCINEX) 1,200 mg, oral, 2 times daily    levothyroxine (Synthroid, Levoxyl) 25 mcg tablet TAKE 1 TABLET BY MOUTH EVERY DAY    metoprolol succinate XL (Toprol-XL) 50 mg 24 hr tablet 1 tablet, oral, Daily    torsemide (DEMADEX) 10 mg, oral, Daily         Last Recorded Vitals: .vital      11/7/2023    11:16 AM 11/9/2023    11:19 AM 11/14/2023    11:38 AM 11/14/2023    12:11 PM 11/15/2023    12:51 PM 11/16/2023    11:05 AM 11/17/2023    11:37 AM   Vitals   Systolic 122 121 144 137 133 128 124   Diastolic 63 62 80 80 70 63 72   Heart Rate 65 65 56 65 70 67 65   Temp 36.6 °C (97.8 °F) 36.6 °C (97.9 °F) 36.6 °C (97.8 °F) 36.5 °C (97.7 °F) 36.7 °C (98 °F) 36.6 °C (97.9 °F)    Resp 17 17 18 16 18 16    Height (in)       1.575 m (5' 2\") " "  Weight (lb)       100.6   BMI       18.4 kg/m2   BSA (m2)       1.41 m2   Visit Report       Report    Visit Vitals  /72   Pulse 65   Ht 1.575 m (5' 2\")   Wt 45.6 kg (100 lb 9.6 oz)   BMI 18.40 kg/m²   Smoking Status Never   BSA 1.41 m²          Physical Exam  Constitutional:       Appearance: Normal appearance.   Cardiovascular:      Rate and Rhythm: Normal rate and regular rhythm.      Pulses: Normal pulses.      Heart sounds: Normal heart sounds.   Pulmonary:      Effort: Pulmonary effort is normal.      Breath sounds: Normal breath sounds.   Musculoskeletal:         General: Normal range of motion.      Right lower leg: No edema.      Left lower leg: No edema.   Skin:     General: Skin is warm and dry.   Neurological:      Mental Status: She is alert and oriented to person, place, and time.   Psychiatric:         Mood and Affect: Mood normal.      My Interpretation of Reviewed Study(s):  Echo(October/2023):   CONCLUSIONS:   1. Left ventricular systolic function is normal with a 70-75% estimated ejection fraction.   2. There is moderate left ventricular hypertrophy.   3. There is mildly reduced right ventricular systolic function.   4. The right atrium is moderately dilated.   5. Moderately elevated right ventricular systolic pressure.   6. Mild to moderate aortic valve regurgitation.   ECGs (reviewed and my interpretation):   Encounter Date: 10/02/23   ECG 12 Lead   Result Value    Ventricular Rate 66    Atrial Rate 66    DE Interval 217    QRS Duration 140    QT Interval 463    QTC Calculation(Bazett) 486    P Axis -78    R Axis 266    T Axis 78    QRS Count 10    Q Onset 251    T Offset 482    QTC Fredericia 478    Narrative    A-V dual-paced rhythm with some inhibition  No further analysis attempted due to paced rhythm    Confirmed by Michael Latham ([id number]) on 10/9/2023 5:27:42 PM   11/17/2023 ECG today AV paced at 65 bpm with QTc of 509 ms    Lab Results   Component Value Date    WBC 5.0 " 11/05/2023    HGB 11.6 (L) 11/05/2023    HCT 38.3 11/05/2023     11/05/2023    ALT 26 11/04/2023    AST 24 11/04/2023     11/15/2023    K 5.1 11/15/2023     11/15/2023    CREATININE 1.86 (H) 11/15/2023    BUN 47 (H) 11/15/2023    CO2 28 11/15/2023    TSH 4.38 (H) 09/25/2023    INR 1.0 07/18/2023       Assessment  1. Atrial fibrillation: Status post AV node ablation. ECG today AV paced with Qtc 509ms.  Labs reviewed and recent LFT normal and PCP manages TSH. Device report showed no recurrence of AT/AF on recent device checks. Device is being checked monthly as she is nearing MATTHEW.     2. Hypertension: reviewed and acceptable     3. PVCs/nonsustained VT: No further NSVT noted on device check. No ectopy on ECG today. We will continue amiodarone.      PLAN  Continue amiodarone 200mg daily  We will follow up in 6 months  She will continue to follow closely with device clinic  She will continue to follow with general cardiology as scheduled      Exclusive of any other services or procedures performed, IStefanie APRN-CNP , spent 30 minutes in duration for this visit today.  This time consisted of chart review, obtaining history, and/or performing the exam as documented above as well as documenting the clinical information for the encounter in the electronic record, discussing treatment options, plans, and/or goals with patient, family, and/or caregiver, refilling medications, updating the electronic record, ordering medicines, lab work, imaging, referrals, and/or procedures as documented above and communicating with other [facility] professionals. I have discussed the results of laboratory, radiology, and cardiology studies with the patient and their family/caregiver.

## 2023-11-17 ENCOUNTER — DOCUMENTATION (OUTPATIENT)
Dept: INFUSION THERAPY | Facility: HOSPITAL | Age: 88
End: 2023-11-17

## 2023-11-17 ENCOUNTER — OFFICE VISIT (OUTPATIENT)
Dept: WOUND CARE | Facility: CLINIC | Age: 88
End: 2023-11-17
Payer: MEDICARE

## 2023-11-17 ENCOUNTER — OFFICE VISIT (OUTPATIENT)
Dept: CARDIOLOGY | Facility: CLINIC | Age: 88
End: 2023-11-17
Payer: MEDICARE

## 2023-11-17 VITALS
BODY MASS INDEX: 18.51 KG/M2 | HEART RATE: 65 BPM | SYSTOLIC BLOOD PRESSURE: 124 MMHG | HEIGHT: 62 IN | WEIGHT: 100.6 LBS | DIASTOLIC BLOOD PRESSURE: 72 MMHG

## 2023-11-17 DIAGNOSIS — I50.9 CHRONIC HEART FAILURE, UNSPECIFIED HEART FAILURE TYPE (MULTI): Primary | ICD-10-CM

## 2023-11-17 DIAGNOSIS — Z95.0 PRESENCE OF CARDIAC PACEMAKER: ICD-10-CM

## 2023-11-17 DIAGNOSIS — I48.0 PAROXYSMAL ATRIAL FIBRILLATION (MULTI): Primary | ICD-10-CM

## 2023-11-17 DIAGNOSIS — I47.29 NSVT (NONSUSTAINED VENTRICULAR TACHYCARDIA) (MULTI): ICD-10-CM

## 2023-11-17 PROCEDURE — 11043 DBRDMT MUSC&/FSCA 1ST 20/<: CPT

## 2023-11-17 PROCEDURE — 93000 ELECTROCARDIOGRAM COMPLETE: CPT | Performed by: INTERNAL MEDICINE

## 2023-11-17 PROCEDURE — 3074F SYST BP LT 130 MM HG: CPT | Performed by: NURSE PRACTITIONER

## 2023-11-17 PROCEDURE — 11043 DBRDMT MUSC&/FSCA 1ST 20/<: CPT | Performed by: CLINICAL NURSE SPECIALIST

## 2023-11-17 PROCEDURE — 1126F AMNT PAIN NOTED NONE PRSNT: CPT | Performed by: NURSE PRACTITIONER

## 2023-11-17 PROCEDURE — 1159F MED LIST DOCD IN RCRD: CPT | Performed by: NURSE PRACTITIONER

## 2023-11-17 PROCEDURE — 3078F DIAST BP <80 MM HG: CPT | Performed by: NURSE PRACTITIONER

## 2023-11-17 PROCEDURE — 1036F TOBACCO NON-USER: CPT | Performed by: NURSE PRACTITIONER

## 2023-11-17 PROCEDURE — 1160F RVW MEDS BY RX/DR IN RCRD: CPT | Performed by: NURSE PRACTITIONER

## 2023-11-17 PROCEDURE — 99214 OFFICE O/P EST MOD 30 MIN: CPT | Performed by: NURSE PRACTITIONER

## 2023-11-20 ENCOUNTER — HOME CARE VISIT (OUTPATIENT)
Dept: HOME HEALTH SERVICES | Facility: HOME HEALTH | Age: 88
End: 2023-11-20
Payer: MEDICARE

## 2023-11-20 VITALS
DIASTOLIC BLOOD PRESSURE: 72 MMHG | OXYGEN SATURATION: 93 % | SYSTOLIC BLOOD PRESSURE: 112 MMHG | RESPIRATION RATE: 16 BRPM | HEART RATE: 72 BPM | TEMPERATURE: 97.3 F

## 2023-11-20 PROCEDURE — G0299 HHS/HOSPICE OF RN EA 15 MIN: HCPCS

## 2023-11-20 SDOH — ECONOMIC STABILITY: GENERAL

## 2023-11-20 ASSESSMENT — PAIN SCALES - PAIN ASSESSMENT IN ADVANCED DEMENTIA (PAINAD)
NEGVOCALIZATION: 0
NEGVOCALIZATION: 0 - NONE.
FACIALEXPRESSION: 0 - SMILING OR INEXPRESSIVE.
BODYLANGUAGE: 0
FACIALEXPRESSION: 0
BREATHING: 0
TOTALSCORE: 0
CONSOLABILITY: 0 - NO NEED TO CONSOLE.
BODYLANGUAGE: 0 - RELAXED.
CONSOLABILITY: 0

## 2023-11-20 ASSESSMENT — ENCOUNTER SYMPTOMS
OCCASIONAL FEELINGS OF UNSTEADINESS: 0
CHANGE IN APPETITE: UNCHANGED
APPETITE LEVEL: FAIR
DENIES PAIN: 1

## 2023-11-20 ASSESSMENT — ACTIVITIES OF DAILY LIVING (ADL): MONEY MANAGEMENT (EXPENSES/BILLS): INDEPENDENT

## 2023-11-20 NOTE — HOME HEALTH
Pt in good spirts today.  Wound care done as ordered.   pt had increase in diruetic due to elevated bnp from last week some edema noted in ble.

## 2023-11-21 ENCOUNTER — HOME CARE VISIT (OUTPATIENT)
Dept: HOME HEALTH SERVICES | Facility: HOME HEALTH | Age: 88
End: 2023-11-21
Payer: MEDICARE

## 2023-11-21 VITALS
TEMPERATURE: 97.8 F | SYSTOLIC BLOOD PRESSURE: 132 MMHG | RESPIRATION RATE: 16 BRPM | OXYGEN SATURATION: 98 % | HEART RATE: 78 BPM | DIASTOLIC BLOOD PRESSURE: 65 MMHG

## 2023-11-21 PROCEDURE — G0157 HHC PT ASSISTANT EA 15: HCPCS

## 2023-11-21 ASSESSMENT — ENCOUNTER SYMPTOMS
OCCASIONAL FEELINGS OF UNSTEADINESS: 1
SUBJECTIVE PAIN PROGRESSION: UNCHANGED
PAIN SEVERITY GOAL: 0/10
PAIN LOCATION: HEAD
HIGHEST PAIN SEVERITY IN PAST 24 HOURS: 5/10
LOWEST PAIN SEVERITY IN PAST 24 HOURS: 0/10
PAIN: 1
PERSON REPORTING PAIN: PATIENT
PAIN LOCATION - PAIN FREQUENCY: CONSTANT
PAIN LOCATION - PAIN SEVERITY: 5/10
MUSCLE WEAKNESS: 1

## 2023-11-21 ASSESSMENT — ACTIVITIES OF DAILY LIVING (ADL)
AMBULATION ASSISTANCE ON FLAT SURFACES: 1
BATHING ASSESSED: 1
AMBULATION ASSISTANCE: STAND BY ASSIST
AMBULATION ASSISTANCE: 1
AMBULATION_DISTANCE/DURATION_TOLERATED: 75 FT
BATHING_CURRENT_FUNCTION: STAND BY ASSIST
BATHING EQUIPMENT USED: SHOWER BENCH

## 2023-11-22 ENCOUNTER — HOME CARE VISIT (OUTPATIENT)
Dept: HOME HEALTH SERVICES | Facility: HOME HEALTH | Age: 88
End: 2023-11-22
Payer: MEDICARE

## 2023-11-22 VITALS
TEMPERATURE: 97.7 F | DIASTOLIC BLOOD PRESSURE: 77 MMHG | SYSTOLIC BLOOD PRESSURE: 122 MMHG | RESPIRATION RATE: 16 BRPM | OXYGEN SATURATION: 93 % | HEART RATE: 69 BPM

## 2023-11-22 PROCEDURE — G0157 HHC PT ASSISTANT EA 15: HCPCS

## 2023-11-22 SDOH — HEALTH STABILITY: PHYSICAL HEALTH
EXERCISE COMMENTS: 1 SET OF 15 EACH, BLUE THERABAND  SEATED THER EX: MARCHES, LEG EXT, LAQ, TBAND HIP ABD, BALL SQUEEZES HIP ADD  STANDING THER EX: MARCHES, HEEL/TOE RAISES, HIP ABD, HIP FLEX/EXT, MINI SQUATS , HAM CURLS

## 2023-11-22 SDOH — HEALTH STABILITY: PHYSICAL HEALTH: EXERCISE TYPE: B LE STRENGTHENING THER EX

## 2023-11-22 ASSESSMENT — ACTIVITIES OF DAILY LIVING (ADL)
BATHING EQUIPMENT USED: SHOWER BENCH
AMBULATION ASSISTANCE: 1
AMBULATION_DISTANCE/DURATION_TOLERATED: 125
BATHING ASSESSED: 1
AMBULATION ASSISTANCE ON FLAT SURFACES: 1
BATHING_CURRENT_FUNCTION: STAND BY ASSIST
AMBULATION ASSISTANCE: STAND BY ASSIST

## 2023-11-22 ASSESSMENT — ENCOUNTER SYMPTOMS
PERSON REPORTING PAIN: PATIENT
OCCASIONAL FEELINGS OF UNSTEADINESS: 1
MUSCLE WEAKNESS: 1
DENIES PAIN: 1

## 2023-11-25 DIAGNOSIS — I51.9 HEART DISEASE, UNSPECIFIED: ICD-10-CM

## 2023-11-27 RX ORDER — DAPAGLIFLOZIN 10 MG/1
10 TABLET, FILM COATED ORAL DAILY
Qty: 90 TABLET | Refills: 3 | Status: SHIPPED | OUTPATIENT
Start: 2023-11-27

## 2023-11-28 ENCOUNTER — LAB (OUTPATIENT)
Dept: LAB | Facility: LAB | Age: 88
End: 2023-11-28
Payer: MEDICARE

## 2023-11-28 ENCOUNTER — HOME CARE VISIT (OUTPATIENT)
Dept: HOME HEALTH SERVICES | Facility: HOME HEALTH | Age: 88
End: 2023-11-28
Payer: MEDICARE

## 2023-11-28 ENCOUNTER — OFFICE VISIT (OUTPATIENT)
Dept: CARDIOLOGY | Facility: HOSPITAL | Age: 88
End: 2023-11-28
Payer: MEDICARE

## 2023-11-28 VITALS
WEIGHT: 100.5 LBS | DIASTOLIC BLOOD PRESSURE: 82 MMHG | RESPIRATION RATE: 20 BRPM | BODY MASS INDEX: 18.38 KG/M2 | HEART RATE: 65 BPM | SYSTOLIC BLOOD PRESSURE: 138 MMHG | OXYGEN SATURATION: 99 %

## 2023-11-28 VITALS
DIASTOLIC BLOOD PRESSURE: 66 MMHG | SYSTOLIC BLOOD PRESSURE: 122 MMHG | OXYGEN SATURATION: 96 % | TEMPERATURE: 97.7 F | HEART RATE: 90 BPM | RESPIRATION RATE: 15 BRPM

## 2023-11-28 DIAGNOSIS — I13.0 HYPERTENSIVE HEART AND KIDNEY DISEASE WITH CHRONIC DIASTOLIC CONGESTIVE HEART FAILURE AND STAGE 3B CHRONIC KIDNEY DISEASE (MULTI): ICD-10-CM

## 2023-11-28 DIAGNOSIS — I50.32 HYPERTENSIVE HEART AND KIDNEY DISEASE WITH CHRONIC DIASTOLIC CONGESTIVE HEART FAILURE AND STAGE 3B CHRONIC KIDNEY DISEASE (MULTI): ICD-10-CM

## 2023-11-28 DIAGNOSIS — N18.32 HYPERTENSIVE HEART AND KIDNEY DISEASE WITH CHRONIC DIASTOLIC CONGESTIVE HEART FAILURE AND STAGE 3B CHRONIC KIDNEY DISEASE (MULTI): ICD-10-CM

## 2023-11-28 DIAGNOSIS — I50.32 CHRONIC HEART FAILURE WITH PRESERVED EJECTION FRACTION (MULTI): ICD-10-CM

## 2023-11-28 DIAGNOSIS — I43 CARDIOMYOPATHY IN DISEASES CLASSIFIED ELSEWHERE (MULTI): ICD-10-CM

## 2023-11-28 DIAGNOSIS — L08.9 LEFT FOOT INFECTION: ICD-10-CM

## 2023-11-28 DIAGNOSIS — I50.32 CHRONIC HEART FAILURE WITH PRESERVED EJECTION FRACTION (MULTI): Primary | ICD-10-CM

## 2023-11-28 LAB
ALBUMIN SERPL BCP-MCNC: 4.5 G/DL (ref 3.4–5)
ALP SERPL-CCNC: 103 U/L (ref 33–136)
ALT SERPL W P-5'-P-CCNC: 31 U/L (ref 7–45)
ANION GAP SERPL CALC-SCNC: 13 MMOL/L (ref 10–20)
AST SERPL W P-5'-P-CCNC: 31 U/L (ref 9–39)
BASOPHILS # BLD AUTO: 0.06 X10*3/UL (ref 0–0.1)
BASOPHILS NFR BLD AUTO: 0.9 %
BILIRUB SERPL-MCNC: 0.6 MG/DL (ref 0–1.2)
BNP SERPL-MCNC: 1309 PG/ML (ref 0–99)
BUN SERPL-MCNC: 51 MG/DL (ref 6–23)
CALCIUM SERPL-MCNC: 9.5 MG/DL (ref 8.6–10.3)
CHLORIDE SERPL-SCNC: 101 MMOL/L (ref 98–107)
CO2 SERPL-SCNC: 32 MMOL/L (ref 21–32)
CREAT SERPL-MCNC: 1.61 MG/DL (ref 0.5–1.05)
EOSINOPHIL # BLD AUTO: 0.05 X10*3/UL (ref 0–0.4)
EOSINOPHIL NFR BLD AUTO: 0.8 %
ERYTHROCYTE [DISTWIDTH] IN BLOOD BY AUTOMATED COUNT: 17.7 % (ref 11.5–14.5)
GFR SERPL CREATININE-BSD FRML MDRD: 31 ML/MIN/1.73M*2
GLUCOSE SERPL-MCNC: 86 MG/DL (ref 74–99)
HCT VFR BLD AUTO: 41.8 % (ref 36–46)
HGB BLD-MCNC: 12.5 G/DL (ref 12–16)
IMM GRANULOCYTES # BLD AUTO: 0.02 X10*3/UL (ref 0–0.5)
IMM GRANULOCYTES NFR BLD AUTO: 0.3 % (ref 0–0.9)
LYMPHOCYTES # BLD AUTO: 1.05 X10*3/UL (ref 0.8–3)
LYMPHOCYTES NFR BLD AUTO: 16.6 %
MAGNESIUM SERPL-MCNC: 2.3 MG/DL (ref 1.6–2.4)
MCH RBC QN AUTO: 25.7 PG (ref 26–34)
MCHC RBC AUTO-ENTMCNC: 29.9 G/DL (ref 32–36)
MCV RBC AUTO: 86 FL (ref 80–100)
MONOCYTES # BLD AUTO: 0.64 X10*3/UL (ref 0.05–0.8)
MONOCYTES NFR BLD AUTO: 10.1 %
NEUTROPHILS # BLD AUTO: 4.5 X10*3/UL (ref 1.6–5.5)
NEUTROPHILS NFR BLD AUTO: 71.3 %
NRBC BLD-RTO: 0 /100 WBCS (ref 0–0)
PLATELET # BLD AUTO: 305 X10*3/UL (ref 150–450)
POTASSIUM SERPL-SCNC: 4.6 MMOL/L (ref 3.5–5.3)
PROT SERPL-MCNC: 6.6 G/DL (ref 6.4–8.2)
RBC # BLD AUTO: 4.86 X10*6/UL (ref 4–5.2)
SODIUM SERPL-SCNC: 141 MMOL/L (ref 136–145)
WBC # BLD AUTO: 6.3 X10*3/UL (ref 4.4–11.3)

## 2023-11-28 PROCEDURE — 85025 COMPLETE CBC W/AUTO DIFF WBC: CPT

## 2023-11-28 PROCEDURE — 99213 OFFICE O/P EST LOW 20 MIN: CPT | Performed by: NURSE PRACTITIONER

## 2023-11-28 PROCEDURE — 83880 ASSAY OF NATRIURETIC PEPTIDE: CPT

## 2023-11-28 PROCEDURE — 3078F DIAST BP <80 MM HG: CPT | Performed by: NURSE PRACTITIONER

## 2023-11-28 PROCEDURE — G0157 HHC PT ASSISTANT EA 15: HCPCS

## 2023-11-28 PROCEDURE — 1159F MED LIST DOCD IN RCRD: CPT | Performed by: NURSE PRACTITIONER

## 2023-11-28 PROCEDURE — 1036F TOBACCO NON-USER: CPT | Performed by: NURSE PRACTITIONER

## 2023-11-28 PROCEDURE — 83735 ASSAY OF MAGNESIUM: CPT

## 2023-11-28 PROCEDURE — 36415 COLL VENOUS BLD VENIPUNCTURE: CPT

## 2023-11-28 PROCEDURE — 1160F RVW MEDS BY RX/DR IN RCRD: CPT | Performed by: NURSE PRACTITIONER

## 2023-11-28 PROCEDURE — 1126F AMNT PAIN NOTED NONE PRSNT: CPT | Performed by: NURSE PRACTITIONER

## 2023-11-28 PROCEDURE — 80053 COMPREHEN METABOLIC PANEL: CPT

## 2023-11-28 PROCEDURE — 0023 HH SOC

## 2023-11-28 PROCEDURE — 3075F SYST BP GE 130 - 139MM HG: CPT | Performed by: NURSE PRACTITIONER

## 2023-11-28 RX ORDER — TORSEMIDE 20 MG/1
20 TABLET ORAL DAILY
Qty: 90 TABLET | Refills: 1 | Status: SHIPPED | OUTPATIENT
Start: 2023-11-28 | End: 2023-12-18 | Stop reason: SDUPTHER

## 2023-11-28 SDOH — HEALTH STABILITY: PHYSICAL HEALTH
EXERCISE COMMENTS: 2 SETS OF 10 EACH, 2# ANKLE WEIGHTS FOR SEATED THER EX,   SEATED THER EX: MARCHES, LEG EXT, LAQ, TBAND HIP ABD, BALL SQUEEZES HIP ADD  STANDING THER EX: MARCHES, HEEL/TOE RAISES, HIP ABD, HIP FLEX/EXT, MINI SQUATS , HAM CURLS  2# BALL BICEP CURLS

## 2023-11-28 SDOH — HEALTH STABILITY: PHYSICAL HEALTH: EXERCISE TYPE: B LE STRENGTHENING THER EX

## 2023-11-28 SDOH — ECONOMIC STABILITY: FOOD INSECURITY: WITHIN THE PAST 12 MONTHS, THE FOOD YOU BOUGHT JUST DIDN'T LAST AND YOU DIDN'T HAVE MONEY TO GET MORE.: NEVER TRUE

## 2023-11-28 SDOH — ECONOMIC STABILITY: FOOD INSECURITY: WITHIN THE PAST 12 MONTHS, YOU WORRIED THAT YOUR FOOD WOULD RUN OUT BEFORE YOU GOT MONEY TO BUY MORE.: NEVER TRUE

## 2023-11-28 ASSESSMENT — ENCOUNTER SYMPTOMS
ABDOMINAL DISTENTION: 0
CHEST TIGHTNESS: 0
DEPRESSION: 0
SUBJECTIVE PAIN PROGRESSION: UNCHANGED
DENIES PAIN: 1
OCCASIONAL FEELINGS OF UNSTEADINESS: 0
EYES NEGATIVE: 1
HIGHEST PAIN SEVERITY IN PAST 24 HOURS: 2/10
ACTIVITY CHANGE: 0
WEAKNESS: 0
LIGHT-HEADEDNESS: 0
LOSS OF SENSATION IN FEET: 0
SHORTNESS OF BREATH: 0
WHEEZING: 0
FEVER: 0
PALPITATIONS: 0
CHILLS: 0
MUSCLE WEAKNESS: 1
BLOOD IN STOOL: 0
OCCASIONAL FEELINGS OF UNSTEADINESS: 1
COUGH: 0
CONFUSION: 0
PERSON REPORTING PAIN: PATIENT
HEMATURIA: 0

## 2023-11-28 ASSESSMENT — PATIENT HEALTH QUESTIONNAIRE - PHQ9
SUM OF ALL RESPONSES TO PHQ9 QUESTIONS 1 AND 2: 0
2. FEELING DOWN, DEPRESSED OR HOPELESS: NOT AT ALL
1. LITTLE INTEREST OR PLEASURE IN DOING THINGS: NOT AT ALL

## 2023-11-28 ASSESSMENT — ACTIVITIES OF DAILY LIVING (ADL)
AMBULATION ASSISTANCE ON FLAT SURFACES: 1
AMBULATION_DISTANCE/DURATION_TOLERATED: 150 FT
BATHING_CURRENT_FUNCTION: SUPERVISION
BATHING EQUIPMENT USED: SHOWER BENCH
BATHING ASSESSED: 1

## 2023-11-28 ASSESSMENT — COLUMBIA-SUICIDE SEVERITY RATING SCALE - C-SSRS
1. IN THE PAST MONTH, HAVE YOU WISHED YOU WERE DEAD OR WISHED YOU COULD GO TO SLEEP AND NOT WAKE UP?: NO
6. HAVE YOU EVER DONE ANYTHING, STARTED TO DO ANYTHING, OR PREPARED TO DO ANYTHING TO END YOUR LIFE?: NO
2. HAVE YOU ACTUALLY HAD ANY THOUGHTS OF KILLING YOURSELF?: NO

## 2023-11-28 NOTE — PROGRESS NOTES
Subjective   Patient ID: Adwoa Forrest is a 88 y.o. female who presents for follow-up of congestive heart failure.     Current Outpatient Medications:     ALPRAZolam (Xanax) 0.5 mg tablet, Take 1 tablet (0.5 mg) by mouth once daily at bedtime., Disp: 30 tablet, Rfl: 3    amiodarone (Pacerone) 200 mg tablet, Take 1 tablet (200 mg) by mouth once daily., Disp: , Rfl:     aspirin 81 mg EC tablet, Take 1 tablet (81 mg) by mouth once daily., Disp: , Rfl:     atorvastatin (Lipitor) 80 mg tablet, Take 1 tablet (80 mg) by mouth once daily at bedtime., Disp: , Rfl:     cyanocobalamin (Vitamin B-12) 1,000 mcg tablet, Take 2 tablets (2,000 mcg) by mouth once daily., Disp: , Rfl:     Farxiga 10 mg, TAKE 1 TABLET BY MOUTH EVERY DAY, Disp: 90 tablet, Rfl: 3    guaiFENesin (Mucinex) 600 mg 12 hr tablet, Take 2 tablets (1,200 mg) by mouth 2 times a day. (Patient taking differently: Take 1 tablet (600 mg) by mouth 2 times a day.), Disp: 20 tablet, Rfl: 0    levothyroxine (Synthroid, Levoxyl) 25 mcg tablet, TAKE 1 TABLET BY MOUTH EVERY DAY, Disp: 90 tablet, Rfl: 3    metoprolol succinate XL (Toprol-XL) 50 mg 24 hr tablet, Take 1 tablet (50 mg) by mouth once daily., Disp: , Rfl:     torsemide (Demadex) 20 mg tablet, Take 0.5 tablets (10 mg) by mouth once daily. (Patient taking differently: Take 1 tablet (20 mg) by mouth once daily. 1 whole tablet daily.), Disp: 15 tablet, Rfl: 0     HPI   Patient presents for follow up of chronic heart failure. Current symptoms include:  fatigue and sob with activity, she has had mild edema for last several days . She denies chest pressure/discomfort, orthopnea, palpitations, paroxysmal nocturnal dyspnea, syncope, and   . She states she is compliant all of the time with her medications. She states she is compliant most of the time with her diet.      Review of Systems   Constitutional:  Negative for activity change, chills and fever.   HENT:  Negative for hearing loss.    Eyes: Negative.     Respiratory:  Negative for cough, chest tightness, shortness of breath and wheezing.    Cardiovascular:  Positive for leg swelling. Negative for chest pain and palpitations.        Trace at the sock line.    Gastrointestinal:  Negative for abdominal distention and blood in stool.   Genitourinary:  Negative for hematuria.   Neurological:  Negative for syncope, weakness and light-headedness.   Psychiatric/Behavioral:  Negative for confusion.        Objective     /68 (BP Location: Left arm, Patient Position: Sitting, BP Cuff Size: Adult)   Pulse 65   Resp 20   Wt 45.6 kg (100 lb 8 oz)   SpO2 99%   BMI 18.38 kg/m²     Repeat /82.     [id number]    Transthoracic echo (TTE) complete    Result Date: 10/3/2023              [place] [address]      Phone [phone] Fax [phone] TRANSTHORACIC ECHOCARDIOGRAM REPORT  Patient Name:      CHERRY Noel Physician:   [id number] Hampton MD Study Date:        10/3/2023          Ordering Provider:   [id number] STEWART FERREIRA MRN/PID:           [id number]           Fellow: Accession#:        [id number]       Nurse:               Amairani Zafar RN Date of Birth/Age: 1935 / 88      Sonographer:         Thea Espinoza RDCS                    years Gender:            F                  Additional Staff: Height:            157.48 cm          Admit Date:          10/2/2023 Weight:            50.35 kg           Admission Status:    Inpatient - Routine BSA:               1.49 m2            Department Location: [facility] Echo Lab Blood Pressure: 118 /71 mmHg Study Type:    TRANSTHORACIC ECHO (TTE) COMPLETE Diagnosis/ICD: Heart failure, unspecified-I50.9; Cardiomyopathy in diseases                classified elsewhere-I43 Indication:    CHF, Cardiomyopathy  Study Detail: The following Echo studies were performed: 2D, M-Mode, Doppler and                color flow. Technically challenging study due to body habitus,               prominent lung artifact and poor acoustic windows. Agitated saline               used as a contrast agent for intraseptal flow evaluation and               Optison used as a contrast agent for endocardial border               definition.  PHYSICIAN INTERPRETATION: Left Ventricle: Left ventricular systolic function is normal, with an estimated ejection fraction of 70-75%. There are no regional wall motion abnormalities. The left ventricular cavity size is normal. There is moderate left ventricular hypertrophy. Spectral Doppler shows a normal pattern of left ventricular diastolic filling. Left Atrium: The left atrium is normal in size. A bubble study using agitated saline was performed. Bubble study is negative. Right Ventricle: The right ventricle is normal in size. There is mildly reduced right ventricular systolic function. A device is visualized in the right ventricle. Right Atrium: The right atrium is moderately dilated. There is a device visualized in the right atrium. Aortic Valve: The aortic valve appears structurally normal. There is mild aortic valve cusp calcification. There is mild to moderate aortic valve thickening. There is mild to moderate aortic valve regurgitation. The peak instantaneous gradient of the aortic valve is 4.2 mmHg. The mean gradient of the aortic valve is 2.0 mmHg. Mitral Valve: The mitral valve is normal in structure. There is trace mitral valve regurgitation. Tricuspid Valve: The tricuspid valve is structurally normal. There is mild tricuspid regurgitation. The Doppler estimated RVSP is moderately elevated at 51.7 mmHg. Pulmonic Valve: The pulmonic valve is not well visualized. There is no indication of pulmonic valve regurgitation. Pericardium: There is no pericardial effusion noted. Aorta: The aortic root is normal.  CONCLUSIONS:  1. Left ventricular systolic function is normal with a 70-75% estimated  ejection fraction.  2. There is moderate left ventricular hypertrophy.  3. There is mildly reduced right ventricular systolic function.  4. The right atrium is moderately dilated.  5. Moderately elevated right ventricular systolic pressure.  6. Mild to moderate aortic valve regurgitation. QUANTITATIVE DATA SUMMARY: 2D MEASUREMENTS:                           Normal Ranges: Ao Root d:     3.00 cm    (2.0-3.7cm) LAs:           3.50 cm    (2.7-4.0cm) IVSd:          1.60 cm    (0.6-1.1cm) LVPWd:         1.42 cm    (0.6-1.1cm) LVIDd:         3.91 cm    (3.9-5.9cm) LVIDs:         2.40 cm LV Mass Index: 153.0 g/m2 LV % FS        38.6 % LA VOLUME:                               Normal Ranges: LA Vol A4C:        39.2 ml    (22+/-6mL/m2) LA Vol A2C:        50.4 ml LA Vol BP:         49.6 ml LA Vol Index A4C:  26.3ml/m2 LA Vol Index A2C:  33.8 ml/m2 LA Vol Index BP:   33.3 ml/m2 LA Area A4C:       14.7 cm2 LA Area A2C:       18.6 cm2 LA Major Axis A4C: 4.7 cm LA Major Axis A2C: 5.8 cm RA VOLUME BY A/L METHOD:                       Normal Ranges: RA Area A4C: 24.5 cm2 AORTA MEASUREMENTS:                      Normal Ranges: Ao Sinus, d: 3.00 cm (2.1-3.5cm) Ao STJ, d:   1.89 cm (1.7-3.4cm) Asc Ao, d:   3.60 cm (2.1-3.4cm) LV SYSTOLIC FUNCTION BY 2D PLANIMETRY (MOD):                     Normal Ranges: EF-A4C View: 76.2 % (>=55%) EF-A2C View: 73.7 % EF-Biplane:  74.5 % LV DIASTOLIC FUNCTION:                           Normal Ranges: MV Peak E:    0.58 m/s    (0.7-1.2 m/s) MV Peak A:    0.23 m/s    (0.42-0.7 m/s) E/A Ratio:    2.57        (1.0-2.2) MV e'         0.04 m/s    (>8.0) MV lateral e' 0.06 m/s MV medial e'  0.03 m/s MV A Dur:     103.00 msec E/e' Ratio:   12.96       (<8.0) MITRAL VALVE:                 Normal Ranges: MV DT: 237 msec (150-240msec) AORTIC VALVE:                                   Normal Ranges: AoV Vmax:                1.03 m/s (<=1.7m/s) AoV Peak P.2 mmHg (<20mmHg) AoV Mean P.0  mmHg (1.7-11.5mmHg) LVOT Max Henry:            0.70 m/s (<=1.1m/s) AoV VTI:                 20.70 cm (18-25cm) LVOT VTI:                14.40 cm LVOT Diameter:           2.00 cm  (1.8-2.4cm) AoV Area, VTI:           2.19 cm2 (2.5-5.5cm2) AoV Area,Vmax:           2.13 cm2 (2.5-4.5cm2) AoV Dimensionless Index: 0.70 AORTIC INSUFFICIENCY: AI Vmax:       4.19 m/s AI Half-time:  438 msec AI Decel Rate: 335.50 cm/s2  RIGHT VENTRICLE: RV Basal 3.64 cm RV Mid   3.19 cm RV Major 7.5 cm TAPSE:   20.8 mm RV s'    0.07 m/s TRICUSPID VALVE/RVSP:                             Normal Ranges: Peak TR Velocity: 3.49 m/s RV Syst Pressure: 51.7 mmHg (< 30mmHg) IVC Diam:         1.50 cm  [id number] Darrel Hampton MD Electronically signed on 10/3/2023 at 4:11:07 PM  ** Final **       Lab Results   Component Value Date    BUN 47 (H) 11/15/2023    CREATININE 1.86 (H) 11/15/2023    BNP 1,261 (H) 11/15/2023    MG 2.36 11/15/2023    K 5.1 11/15/2023     11/15/2023       Constitutional:       General: NAD , Fail elderly female   HENT:   Normocephalic.  No other gross abnormality.   No JVD or hepatojugular reflex.  Cardiovascular:      Rate and Rhythm: Normal rate and regular rhythm.      Heart sounds: S1 S2 regular.  No murmurs appreciated.   Pulmonary:      Effort: Pulmonary effort is normal.      Breath sounds: prolonged expiratory phase,  no wheezes or rales.   Abdominal:      General: Abdomen is flat. Bowel sounds are normal.      Palpations: Abdomen is soft.   Musculoskeletal:         General: trace at ankles.   Skin:     General: Skin is warm and dry.        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   SGLT2i - Farxiga 10 mg once a day   Diuretic -  Torsemide 20 mg  1 tablet daily -- increase Torsemide to 30 mg once a day.      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      CHF:  She has not been feeling quite as well.  SOB with activity perhaps a bit more.  Weight is up three pounds.   BNP was rising.  She resumed the Torsemide 20 mg once a day.  She is not overtly congested. Will check the BMP/BNP today.  Further medication adjustment pending lab findings.       Emphasized salt restriction.  Encouraged daily monitoring of the patient's weight.  Labs ordered today.  Follow up in 4 weeks.      2. Atrial fibrillation: persistent.   She is s/p AV node ablation and PPM placement in the past.      3. NSVT:  Amiodarone.  No palpitations.      3. CKD 3:  GFR 26.  Potassium 5.1       4. HTN:  Controlled.       5. ASHD with PCI to LAD 2017:  Secondary prevention medications ASA, Statin, BB      Addendum:  Lab work shows BNP is still rising. Cr 1.6  Will increase the Torsemide to 30 mg once a day.  Follow up 4 weeks or sooner if needed.     INDIGO Freeman-CNP

## 2023-11-29 ENCOUNTER — HOME CARE VISIT (OUTPATIENT)
Dept: HOME HEALTH SERVICES | Facility: HOME HEALTH | Age: 88
End: 2023-11-29
Payer: MEDICARE

## 2023-11-29 VITALS
DIASTOLIC BLOOD PRESSURE: 88 MMHG | TEMPERATURE: 96.6 F | HEART RATE: 72 BPM | SYSTOLIC BLOOD PRESSURE: 130 MMHG | OXYGEN SATURATION: 98 % | RESPIRATION RATE: 18 BRPM

## 2023-11-29 VITALS — RESPIRATION RATE: 18 BRPM

## 2023-11-29 PROCEDURE — G0300 HHS/HOSPICE OF LPN EA 15 MIN: HCPCS

## 2023-11-29 PROCEDURE — G0151 HHCP-SERV OF PT,EA 15 MIN: HCPCS

## 2023-11-29 RX ORDER — TORSEMIDE 20 MG/1
30 TABLET ORAL DAILY
Qty: 90 TABLET | Refills: 1 | Status: CANCELLED | OUTPATIENT
Start: 2023-11-29

## 2023-11-29 SDOH — HEALTH STABILITY: PHYSICAL HEALTH: EXERCISE TYPE: INDEP WITH HEP

## 2023-11-29 ASSESSMENT — PAIN SCALES - PAIN ASSESSMENT IN ADVANCED DEMENTIA (PAINAD)
BREATHING: 0
BODYLANGUAGE: 0
BODYLANGUAGE: 0 - RELAXED.
NEGVOCALIZATION: 0 - NONE.
BODYLANGUAGE: 0
TOTALSCORE: 0
CONSOLABILITY: 0 - NO NEED TO CONSOLE.
CONSOLABILITY: 0
FACIALEXPRESSION: 0
BREATHING: 0
NEGVOCALIZATION: 0
FACIALEXPRESSION: 0 - SMILING OR INEXPRESSIVE.
BODYLANGUAGE: 0 - RELAXED.
FACIALEXPRESSION: 0 - SMILING OR INEXPRESSIVE.
FACIALEXPRESSION: 0
CONSOLABILITY: 0
CONSOLABILITY: 0 - NO NEED TO CONSOLE.

## 2023-11-29 ASSESSMENT — ENCOUNTER SYMPTOMS
DENIES PAIN: 1
DENIES PAIN: 1
PERSON REPORTING PAIN: PATIENT
CHANGE IN APPETITE: UNCHANGED
APPETITE LEVEL: GOOD
LOSS OF SENSATION IN FEET: 0
DEPRESSION: 0
OCCASIONAL FEELINGS OF UNSTEADINESS: 1

## 2023-11-29 ASSESSMENT — ACTIVITIES OF DAILY LIVING (ADL)
AMBULATION ASSISTANCE: 1
AMBULATION ASSISTANCE: INDEPENDENT
CURRENT_FUNCTION: INDEPENDENT
PHYSICAL TRANSFERS ASSESSED: 1

## 2023-12-01 ENCOUNTER — OFFICE VISIT (OUTPATIENT)
Dept: WOUND CARE | Facility: CLINIC | Age: 88
End: 2023-12-01
Payer: MEDICARE

## 2023-12-01 PROCEDURE — 11043 DBRDMT MUSC&/FSCA 1ST 20/<: CPT

## 2023-12-04 ENCOUNTER — HOME CARE VISIT (OUTPATIENT)
Dept: HOME HEALTH SERVICES | Facility: HOME HEALTH | Age: 88
End: 2023-12-04
Payer: MEDICARE

## 2023-12-04 VITALS
DIASTOLIC BLOOD PRESSURE: 50 MMHG | BODY MASS INDEX: 17.82 KG/M2 | RESPIRATION RATE: 16 BRPM | OXYGEN SATURATION: 97 % | HEART RATE: 64 BPM | TEMPERATURE: 97.7 F | WEIGHT: 97.44 LBS | SYSTOLIC BLOOD PRESSURE: 118 MMHG

## 2023-12-04 PROCEDURE — G0299 HHS/HOSPICE OF RN EA 15 MIN: HCPCS

## 2023-12-04 ASSESSMENT — ENCOUNTER SYMPTOMS
PAIN: 1
PAIN LOCATION - PAIN QUALITY: ACHY
PERSON REPORTING PAIN: PATIENT
APPETITE LEVEL: FAIR
CHANGE IN APPETITE: UNCHANGED
PAIN LOCATION: RIGHT ANKLE
SUBJECTIVE PAIN PROGRESSION: UNCHANGED
PAIN LOCATION - PAIN SEVERITY: 4/10
LOWEST PAIN SEVERITY IN PAST 24 HOURS: 1/10
PAIN SEVERITY GOAL: 4/10
OCCASIONAL FEELINGS OF UNSTEADINESS: 0
HIGHEST PAIN SEVERITY IN PAST 24 HOURS: 4/10

## 2023-12-04 ASSESSMENT — PAIN SCALES - PAIN ASSESSMENT IN ADVANCED DEMENTIA (PAINAD)
CONSOLABILITY: 0
TOTALSCORE: 0
NEGVOCALIZATION: 0 - NONE.
BODYLANGUAGE: 0 - RELAXED.
FACIALEXPRESSION: 0 - SMILING OR INEXPRESSIVE.
NEGVOCALIZATION: 0
BODYLANGUAGE: 0
FACIALEXPRESSION: 0
CONSOLABILITY: 0 - NO NEED TO CONSOLE.
BREATHING: 0

## 2023-12-04 NOTE — HOME HEALTH
Wound care doen as orderd pt does not have a caregiver that is reliable for wound care.will recert.  pt struggles with chf flare ups.  pt has some edema.

## 2023-12-06 ENCOUNTER — HOSPITAL ENCOUNTER (OUTPATIENT)
Dept: CARDIOLOGY | Facility: HOSPITAL | Age: 88
Discharge: HOME | End: 2023-12-06
Payer: MEDICARE

## 2023-12-06 DIAGNOSIS — I48.91 ATRIAL FIBRILLATION, UNSPECIFIED TYPE (MULTI): ICD-10-CM

## 2023-12-06 DIAGNOSIS — Z95.0 PRESENCE OF CARDIAC PACEMAKER: ICD-10-CM

## 2023-12-08 ENCOUNTER — OFFICE VISIT (OUTPATIENT)
Dept: WOUND CARE | Facility: CLINIC | Age: 88
End: 2023-12-08
Payer: MEDICARE

## 2023-12-08 PROCEDURE — 11042 DBRDMT SUBQ TIS 1ST 20SQCM/<: CPT

## 2023-12-11 ENCOUNTER — HOME CARE VISIT (OUTPATIENT)
Dept: HOME HEALTH SERVICES | Facility: HOME HEALTH | Age: 88
End: 2023-12-11
Payer: MEDICARE

## 2023-12-11 VITALS
RESPIRATION RATE: 16 BRPM | TEMPERATURE: 97.2 F | HEART RATE: 72 BPM | OXYGEN SATURATION: 100 % | SYSTOLIC BLOOD PRESSURE: 140 MMHG | DIASTOLIC BLOOD PRESSURE: 85 MMHG

## 2023-12-11 PROCEDURE — G0300 HHS/HOSPICE OF LPN EA 15 MIN: HCPCS

## 2023-12-11 ASSESSMENT — ENCOUNTER SYMPTOMS
CHANGE IN APPETITE: UNCHANGED
APPETITE LEVEL: GOOD

## 2023-12-15 ENCOUNTER — OFFICE VISIT (OUTPATIENT)
Dept: WOUND CARE | Facility: CLINIC | Age: 88
End: 2023-12-15
Payer: MEDICARE

## 2023-12-15 PROCEDURE — 11042 DBRDMT SUBQ TIS 1ST 20SQCM/<: CPT | Performed by: CLINICAL NURSE SPECIALIST

## 2023-12-15 PROCEDURE — 11042 DBRDMT SUBQ TIS 1ST 20SQCM/<: CPT

## 2023-12-17 RX ORDER — TORSEMIDE 20 MG/1
30 TABLET ORAL DAILY
Qty: 90 TABLET | Refills: 1 | Status: CANCELLED | OUTPATIENT
Start: 2023-12-17

## 2023-12-18 ENCOUNTER — OFFICE VISIT (OUTPATIENT)
Dept: CARDIOLOGY | Facility: HOSPITAL | Age: 88
End: 2023-12-18
Payer: MEDICARE

## 2023-12-18 VITALS
BODY MASS INDEX: 18.2 KG/M2 | DIASTOLIC BLOOD PRESSURE: 64 MMHG | SYSTOLIC BLOOD PRESSURE: 129 MMHG | OXYGEN SATURATION: 92 % | HEART RATE: 66 BPM | RESPIRATION RATE: 20 BRPM | WEIGHT: 99.5 LBS

## 2023-12-18 DIAGNOSIS — N18.32 HYPERTENSIVE HEART AND KIDNEY DISEASE WITH CHRONIC DIASTOLIC CONGESTIVE HEART FAILURE AND STAGE 3B CHRONIC KIDNEY DISEASE (MULTI): ICD-10-CM

## 2023-12-18 DIAGNOSIS — I50.32 HYPERTENSIVE HEART AND KIDNEY DISEASE WITH CHRONIC DIASTOLIC CONGESTIVE HEART FAILURE AND STAGE 3B CHRONIC KIDNEY DISEASE (MULTI): ICD-10-CM

## 2023-12-18 DIAGNOSIS — I13.0 HYPERTENSIVE HEART AND KIDNEY DISEASE WITH CHRONIC DIASTOLIC CONGESTIVE HEART FAILURE AND STAGE 3B CHRONIC KIDNEY DISEASE (MULTI): ICD-10-CM

## 2023-12-18 DIAGNOSIS — I50.32 CHRONIC HEART FAILURE WITH PRESERVED EJECTION FRACTION (MULTI): Primary | ICD-10-CM

## 2023-12-18 DIAGNOSIS — I25.10 ASHD (ARTERIOSCLEROTIC HEART DISEASE): ICD-10-CM

## 2023-12-18 PROCEDURE — 99214 OFFICE O/P EST MOD 30 MIN: CPT | Performed by: NURSE PRACTITIONER

## 2023-12-18 PROCEDURE — 1125F AMNT PAIN NOTED PAIN PRSNT: CPT | Performed by: NURSE PRACTITIONER

## 2023-12-18 PROCEDURE — 3074F SYST BP LT 130 MM HG: CPT | Performed by: NURSE PRACTITIONER

## 2023-12-18 PROCEDURE — 1159F MED LIST DOCD IN RCRD: CPT | Performed by: NURSE PRACTITIONER

## 2023-12-18 PROCEDURE — 1160F RVW MEDS BY RX/DR IN RCRD: CPT | Performed by: NURSE PRACTITIONER

## 2023-12-18 PROCEDURE — 1036F TOBACCO NON-USER: CPT | Performed by: NURSE PRACTITIONER

## 2023-12-18 PROCEDURE — 99213 OFFICE O/P EST LOW 20 MIN: CPT | Mod: ZK | Performed by: NURSE PRACTITIONER

## 2023-12-18 PROCEDURE — 3078F DIAST BP <80 MM HG: CPT | Performed by: NURSE PRACTITIONER

## 2023-12-18 RX ORDER — TORSEMIDE 20 MG/1
30 TABLET ORAL DAILY
Qty: 135 TABLET | Refills: 1 | Status: SHIPPED | OUTPATIENT
Start: 2023-12-18 | End: 2024-01-25 | Stop reason: SDUPTHER

## 2023-12-18 ASSESSMENT — ENCOUNTER SYMPTOMS
WHEEZING: 0
WEAKNESS: 0
LIGHT-HEADEDNESS: 0
PALPITATIONS: 0
EYES NEGATIVE: 1
SHORTNESS OF BREATH: 1
OCCASIONAL FEELINGS OF UNSTEADINESS: 0
ACTIVITY CHANGE: 0
CONFUSION: 0
DEPRESSION: 0
COUGH: 0
ARTHRALGIAS: 1
BLOOD IN STOOL: 0
LOSS OF SENSATION IN FEET: 0
CHEST TIGHTNESS: 0
HEMATURIA: 0
ABDOMINAL DISTENTION: 0
CHILLS: 0
FEVER: 0

## 2023-12-18 ASSESSMENT — COLUMBIA-SUICIDE SEVERITY RATING SCALE - C-SSRS
6. HAVE YOU EVER DONE ANYTHING, STARTED TO DO ANYTHING, OR PREPARED TO DO ANYTHING TO END YOUR LIFE?: NO
1. IN THE PAST MONTH, HAVE YOU WISHED YOU WERE DEAD OR WISHED YOU COULD GO TO SLEEP AND NOT WAKE UP?: NO
2. HAVE YOU ACTUALLY HAD ANY THOUGHTS OF KILLING YOURSELF?: NO

## 2023-12-18 ASSESSMENT — PATIENT HEALTH QUESTIONNAIRE - PHQ9
2. FEELING DOWN, DEPRESSED OR HOPELESS: NOT AT ALL
SUM OF ALL RESPONSES TO PHQ9 QUESTIONS 1 AND 2: 0
1. LITTLE INTEREST OR PLEASURE IN DOING THINGS: NOT AT ALL

## 2023-12-18 ASSESSMENT — PAIN SCALES - GENERAL: PAINLEVEL: 5

## 2023-12-18 NOTE — PROGRESS NOTES
Subjective   Patient ID: Adwoa Forrest is a 88 y.o. female who presents for follow-up of congestive heart failure.     Current Outpatient Medications:     ALPRAZolam (Xanax) 0.5 mg tablet, Take 1 tablet (0.5 mg) by mouth once daily at bedtime., Disp: 30 tablet, Rfl: 3    amiodarone (Pacerone) 200 mg tablet, Take 1 tablet (200 mg) by mouth once daily., Disp: , Rfl:     aspirin 81 mg EC tablet, Take 1 tablet (81 mg) by mouth once daily., Disp: , Rfl:     atorvastatin (Lipitor) 80 mg tablet, Take 1 tablet (80 mg) by mouth once daily at bedtime., Disp: , Rfl:     cyanocobalamin (Vitamin B-12) 1,000 mcg tablet, Take 2 tablets (2,000 mcg) by mouth once daily., Disp: , Rfl:     Farxiga 10 mg, TAKE 1 TABLET BY MOUTH EVERY DAY, Disp: 90 tablet, Rfl: 3    guaiFENesin (Mucinex) 600 mg 12 hr tablet, Take 2 tablets (1,200 mg) by mouth 2 times a day. (Patient taking differently: Take 1 tablet (600 mg) by mouth 2 times a day.), Disp: 20 tablet, Rfl: 0    levothyroxine (Synthroid, Levoxyl) 25 mcg tablet, TAKE 1 TABLET BY MOUTH EVERY DAY, Disp: 90 tablet, Rfl: 3    metoprolol succinate XL (Toprol-XL) 50 mg 24 hr tablet, Take 1 tablet (50 mg) by mouth once daily., Disp: , Rfl:     torsemide (Demadex) 20 mg tablet, Take 1 tablet (20 mg) by mouth once daily. (Patient taking differently: Take 1.5 tablets (30 mg) by mouth once daily.), Disp: 90 tablet, Rfl: 1     HPI   Last medical history of permanent atrial fibrillation, minute pacemaker, AV node ablation, she is pacer dependent.  Hypertension, recurrent GI bleed, status post left atrial appendage ligation.  She had a failed attempt at Watchman device placement in 2018.  History of nonsustained ventricular tachycardia and ventricular ectopy.    Review of Systems   Constitutional:  Negative for activity change, chills and fever.   HENT:  Negative for hearing loss.    Eyes: Negative.    Respiratory:  Positive for shortness of breath. Negative for cough, chest tightness and wheezing.          With walking moderate distances   Cardiovascular:  Negative for chest pain, palpitations and leg swelling.   Gastrointestinal:  Negative for abdominal distention and blood in stool.   Genitourinary:  Negative for hematuria.   Musculoskeletal:  Positive for arthralgias.   Neurological:  Negative for syncope, weakness and light-headedness.   Psychiatric/Behavioral:  Negative for confusion.        Objective     /64 (BP Location: Right arm, Patient Position: Sitting)   Pulse 66   Resp 20   Wt 45.1 kg (99 lb 8 oz)   SpO2 92%   BMI 18.20 kg/m²     .  Transthoracic echo (TTE) complete    Result Date: 10/3/2023              [place] [address]      Phone [phone] Fax [phone] TRANSTHORACIC ECHOCARDIOGRAM REPORT  Patient Name:      CHERRY Noel Physician:   [id number] Darrel Hampton MD Study Date:        10/3/2023          Ordering Provider:   [id number] STEWART FERREIRA MRN/PID:           [id number]           Fellow: Accession#:        [id number]       Nurse:               Amairani Zafar RN Date of Birth/Age: 1935 / 88      Sonographer:         Thea Espinoza RDCS                    years Gender:            F                  Additional Staff: Height:            157.48 cm          Admit Date:          10/2/2023 Weight:            50.35 kg           Admission Status:    Inpatient - Routine BSA:               1.49 m2            Department Location: [facility] Echo Lab Blood Pressure: 118 /71 mmHg Study Type:    TRANSTHORACIC ECHO (TTE) COMPLETE Diagnosis/ICD: Heart failure, unspecified-I50.9; Cardiomyopathy in diseases                classified elsewhere-I43 Indication:    CHF, Cardiomyopathy  Study Detail: The following Echo studies were performed: 2D, M-Mode, Doppler and               color flow. Technically challenging study due to body habitus,               prominent lung artifact and poor  acoustic windows. Agitated saline               used as a contrast agent for intraseptal flow evaluation and               Optison used as a contrast agent for endocardial border               definition.  PHYSICIAN INTERPRETATION: Left Ventricle: Left ventricular systolic function is normal, with an estimated ejection fraction of 70-75%. There are no regional wall motion abnormalities. The left ventricular cavity size is normal. There is moderate left ventricular hypertrophy. Spectral Doppler shows a normal pattern of left ventricular diastolic filling. Left Atrium: The left atrium is normal in size. A bubble study using agitated saline was performed. Bubble study is negative. Right Ventricle: The right ventricle is normal in size. There is mildly reduced right ventricular systolic function. A device is visualized in the right ventricle. Right Atrium: The right atrium is moderately dilated. There is a device visualized in the right atrium. Aortic Valve: The aortic valve appears structurally normal. There is mild aortic valve cusp calcification. There is mild to moderate aortic valve thickening. There is mild to moderate aortic valve regurgitation. The peak instantaneous gradient of the aortic valve is 4.2 mmHg. The mean gradient of the aortic valve is 2.0 mmHg. Mitral Valve: The mitral valve is normal in structure. There is trace mitral valve regurgitation. Tricuspid Valve: The tricuspid valve is structurally normal. There is mild tricuspid regurgitation. The Doppler estimated RVSP is moderately elevated at 51.7 mmHg. Pulmonic Valve: The pulmonic valve is not well visualized. There is no indication of pulmonic valve regurgitation. Pericardium: There is no pericardial effusion noted. Aorta: The aortic root is normal.  CONCLUSIONS:  1. Left ventricular systolic function is normal with a 70-75% estimated ejection fraction.  2. There is moderate left ventricular hypertrophy.  3. There is mildly reduced right  ventricular systolic function.  4. The right atrium is moderately dilated.  5. Moderately elevated right ventricular systolic pressure.  6. Mild to moderate aortic valve regurgitation. QUANTITATIVE DATA SUMMARY: 2D MEASUREMENTS:                           Normal Ranges: Ao Root d:     3.00 cm    (2.0-3.7cm) LAs:           3.50 cm    (2.7-4.0cm) IVSd:          1.60 cm    (0.6-1.1cm) LVPWd:         1.42 cm    (0.6-1.1cm) LVIDd:         3.91 cm    (3.9-5.9cm) LVIDs:         2.40 cm LV Mass Index: 153.0 g/m2 LV % FS        38.6 % LA VOLUME:                               Normal Ranges: LA Vol A4C:        39.2 ml    (22+/-6mL/m2) LA Vol A2C:        50.4 ml LA Vol BP:         49.6 ml LA Vol Index A4C:  26.3ml/m2 LA Vol Index A2C:  33.8 ml/m2 LA Vol Index BP:   33.3 ml/m2 LA Area A4C:       14.7 cm2 LA Area A2C:       18.6 cm2 LA Major Axis A4C: 4.7 cm LA Major Axis A2C: 5.8 cm RA VOLUME BY A/L METHOD:                       Normal Ranges: RA Area A4C: 24.5 cm2 AORTA MEASUREMENTS:                      Normal Ranges: Ao Sinus, d: 3.00 cm (2.1-3.5cm) Ao STJ, d:   1.89 cm (1.7-3.4cm) Asc Ao, d:   3.60 cm (2.1-3.4cm) LV SYSTOLIC FUNCTION BY 2D PLANIMETRY (MOD):                     Normal Ranges: EF-A4C View: 76.2 % (>=55%) EF-A2C View: 73.7 % EF-Biplane:  74.5 % LV DIASTOLIC FUNCTION:                           Normal Ranges: MV Peak E:    0.58 m/s    (0.7-1.2 m/s) MV Peak A:    0.23 m/s    (0.42-0.7 m/s) E/A Ratio:    2.57        (1.0-2.2) MV e'         0.04 m/s    (>8.0) MV lateral e' 0.06 m/s MV medial e'  0.03 m/s MV A Dur:     103.00 msec E/e' Ratio:   12.96       (<8.0) MITRAL VALVE:                 Normal Ranges: MV DT: 237 msec (150-240msec) AORTIC VALVE:                                   Normal Ranges: AoV Vmax:                1.03 m/s (<=1.7m/s) AoV Peak P.2 mmHg (<20mmHg) AoV Mean P.0 mmHg (1.7-11.5mmHg) LVOT Max Henry:            0.70 m/s (<=1.1m/s) AoV VTI:                 20.70 cm  (18-25cm) LVOT VTI:                14.40 cm LVOT Diameter:           2.00 cm  (1.8-2.4cm) AoV Area, VTI:           2.19 cm2 (2.5-5.5cm2) AoV Area,Vmax:           2.13 cm2 (2.5-4.5cm2) AoV Dimensionless Index: 0.70 AORTIC INSUFFICIENCY: AI Vmax:       4.19 m/s AI Half-time:  438 msec AI Decel Rate: 335.50 cm/s2  RIGHT VENTRICLE: RV Basal 3.64 cm RV Mid   3.19 cm RV Major 7.5 cm TAPSE:   20.8 mm RV s'    0.07 m/s TRICUSPID VALVE/RVSP:                             Normal Ranges: Peak TR Velocity: 3.49 m/s RV Syst Pressure: 51.7 mmHg (< 30mmHg) IVC Diam:         1.50 cm  [id number] Darrel Hampton MD Electronically signed on 10/3/2023 at 4:11:07 PM  ** Final **       Lab Results   Component Value Date    BUN 51 (H) 11/28/2023    CREATININE 1.61 (H) 11/28/2023    BNP 1,309 (H) 11/28/2023    MG 2.30 11/28/2023    K 4.6 11/28/2023     11/28/2023       Constitutional:       General: NAD  HENT:   Normocephalic.  No other gross abnormality.   No JVD or hepatojugular reflex.  Cardiovascular:      Rate and Rhythm: Normal rate and regular rhythm.      Heart sounds: S1, S2 normal, no murmurs, no S3 or S4    Pulmonary:      Effort: Pulmonary effort is normal.      Breath sounds:  Normal respiratory excursion. No wheezes or rales  Abdominal:      General: Abdomen is softly distended. Bowel sounds are normal.      Palpations: Abdomen is soft.   Musculoskeletal:         General:  DUARTE well. No swelling.   Skin:     General: Skin is warm and dry.      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      CHF:   Feeling improved with increase in Torsemide Emphasized salt restriction.  Encouraged daily monitoring of the patient's weight.  Labs ordered today.  Follow up in  3 months.      2. Atrial fibrillation: persistent.   She is s/p AV node ablation and PPM placement in the past. On ASA.  No other OAC due to hx of recurrent GIB     3. NSVT:  Amiodarone.  No palpitations.      3. CKD 3:  GFR was improved last  labs.   She has appt with Nephrology in In January 4. HTN:  Controlled.       5. ASHD with PCI to LAD 2017:  Secondary prevention medications ASA, Statin, BB

## 2023-12-18 NOTE — PATIENT INSTRUCTIONS
Thank you for coming in today.  If you have any questions you may contact the office Monday through Friday at [phone] or on week ends at [phone].     Same medications.     Please follow  a 2 GM sodium diet and limit fluid intake to 2 liters per day or 8 servings ( serving size = 8 oz. = 1 cup = 240 ml) per day.   Please avoid processed meat products (luncheon meats, sausages, loya, hot dogs for example) eat 4 servings of vegetables and 1-2 whole servings of whole fruits per day.   Please weigh daily and call [phone] for weight gain of 3 pounds in 24 hours or 5 pounds or if you experience increased swelling or shortness of breath.     Lab work with in 7 days    Follow up in 3 months.     Please keep appt with Dr. Latham.

## 2023-12-20 ENCOUNTER — OFFICE VISIT (OUTPATIENT)
Dept: ORTHOPEDIC SURGERY | Facility: CLINIC | Age: 88
End: 2023-12-20
Payer: MEDICARE

## 2023-12-20 VITALS — HEIGHT: 62 IN | BODY MASS INDEX: 18.22 KG/M2 | WEIGHT: 99 LBS

## 2023-12-20 DIAGNOSIS — L97.515 ULCER OF RIGHT FOOT WITH MUSCLE INVOLVEMENT WITHOUT EVIDENCE OF NECROSIS (MULTI): Primary | ICD-10-CM

## 2023-12-20 PROCEDURE — 1159F MED LIST DOCD IN RCRD: CPT | Performed by: SPECIALIST

## 2023-12-20 PROCEDURE — 1160F RVW MEDS BY RX/DR IN RCRD: CPT | Performed by: SPECIALIST

## 2023-12-20 PROCEDURE — 1036F TOBACCO NON-USER: CPT | Performed by: SPECIALIST

## 2023-12-20 PROCEDURE — 1125F AMNT PAIN NOTED PAIN PRSNT: CPT | Performed by: SPECIALIST

## 2023-12-20 PROCEDURE — 99213 OFFICE O/P EST LOW 20 MIN: CPT | Performed by: SPECIALIST

## 2023-12-20 NOTE — PROGRESS NOTES
Follow up ORIF right calcaneus 7/19/2023  Still seeing wound care, feeling better.   Walking in a normal shoe.    Exam: Still with dressing over heel but did take down partially to review appropriate healing of the ulcer and no evidence of infection.  She has intact plantarflexion with good strength no pain.  Much decrease in swelling good alignment no pain with compression of the calcaneus neurovascular status intact.    Assessment/plan: Status post ORIF posterior process right calcaneus fracture with subsequent delayed wound healing.  She is healing her wound we will continue in wound care.  She can weight-bear as tolerated from an orthopedic standpoint but told her care must be taken in preventing shoe breakdown of her ulcer.  May consider backless shoes.  I will see her back as needed if any issues arise.

## 2023-12-21 ENCOUNTER — HOME CARE VISIT (OUTPATIENT)
Dept: HOME HEALTH SERVICES | Facility: HOME HEALTH | Age: 88
End: 2023-12-21
Payer: MEDICARE

## 2023-12-21 ENCOUNTER — LAB REQUISITION (OUTPATIENT)
Dept: LAB | Facility: HOSPITAL | Age: 88
End: 2023-12-21
Payer: MEDICARE

## 2023-12-21 VITALS
WEIGHT: 98 LBS | BODY MASS INDEX: 17.92 KG/M2 | DIASTOLIC BLOOD PRESSURE: 62 MMHG | OXYGEN SATURATION: 100 % | TEMPERATURE: 98.2 F | RESPIRATION RATE: 20 BRPM | SYSTOLIC BLOOD PRESSURE: 120 MMHG | HEART RATE: 80 BPM

## 2023-12-21 DIAGNOSIS — I50.43 ACUTE ON CHRONIC COMBINED SYSTOLIC (CONGESTIVE) AND DIASTOLIC (CONGESTIVE) HEART FAILURE (MULTI): ICD-10-CM

## 2023-12-21 DIAGNOSIS — I13.0 HYPERTENSIVE HEART AND CHRONIC KIDNEY DISEASE WITH HEART FAILURE AND STAGE 1 THROUGH STAGE 4 CHRONIC KIDNEY DISEASE, OR UNSPECIFIED CHRONIC KIDNEY DISEASE (MULTI): ICD-10-CM

## 2023-12-21 LAB
ANION GAP SERPL CALC-SCNC: 13 MMOL/L (ref 10–20)
BUN SERPL-MCNC: 54 MG/DL (ref 6–23)
CALCIUM SERPL-MCNC: 9.2 MG/DL (ref 8.6–10.3)
CHLORIDE SERPL-SCNC: 100 MMOL/L (ref 98–107)
CO2 SERPL-SCNC: 31 MMOL/L (ref 21–32)
CREAT SERPL-MCNC: 1.67 MG/DL (ref 0.5–1.05)
GFR SERPL CREATININE-BSD FRML MDRD: 30 ML/MIN/1.73M*2
GLUCOSE SERPL-MCNC: 73 MG/DL (ref 74–99)
MAGNESIUM SERPL-MCNC: 2.55 MG/DL (ref 1.6–2.4)
POTASSIUM SERPL-SCNC: 4.7 MMOL/L (ref 3.5–5.3)
SODIUM SERPL-SCNC: 139 MMOL/L (ref 136–145)

## 2023-12-21 PROCEDURE — 83735 ASSAY OF MAGNESIUM: CPT

## 2023-12-21 PROCEDURE — 80048 BASIC METABOLIC PNL TOTAL CA: CPT

## 2023-12-21 PROCEDURE — G0162 HHC RN E&M PLAN SVS, 15 MIN: HCPCS

## 2023-12-21 ASSESSMENT — PAIN SCALES - PAIN ASSESSMENT IN ADVANCED DEMENTIA (PAINAD)
NEGVOCALIZATION: 0
BREATHING: 0
FACIALEXPRESSION: 0
BODYLANGUAGE: 0
FACIALEXPRESSION: 0 - SMILING OR INEXPRESSIVE.
BODYLANGUAGE: 0 - RELAXED.
NEGVOCALIZATION: 0 - NONE.

## 2023-12-21 ASSESSMENT — ENCOUNTER SYMPTOMS
OCCASIONAL FEELINGS OF UNSTEADINESS: 0
DEPRESSION: 1
LOSS OF SENSATION IN FEET: 0
DENIES PAIN: 1
CHANGE IN APPETITE: UNCHANGED
APPETITE LEVEL: FAIR

## 2023-12-21 ASSESSMENT — ACTIVITIES OF DAILY LIVING (ADL): ENTERING_EXITING_HOME: SUPERVISION

## 2023-12-21 NOTE — HOME HEALTH
Pt had recent medication change increasing her dirutetic,  labs ordered.  sn bry labs on right antecubital, labs delivered to [facility].    sn reviewed meds.    sn did wound care and friend assisitfidelg x 1 day a week.  pt continues to go to wound center.

## 2023-12-22 ENCOUNTER — OFFICE VISIT (OUTPATIENT)
Dept: WOUND CARE | Facility: CLINIC | Age: 88
End: 2023-12-22
Payer: MEDICARE

## 2023-12-22 PROCEDURE — 11042 DBRDMT SUBQ TIS 1ST 20SQCM/<: CPT

## 2023-12-26 ENCOUNTER — HOME CARE VISIT (OUTPATIENT)
Dept: HOME HEALTH SERVICES | Facility: HOME HEALTH | Age: 88
End: 2023-12-26
Payer: MEDICARE

## 2023-12-26 VITALS — SYSTOLIC BLOOD PRESSURE: 130 MMHG | DIASTOLIC BLOOD PRESSURE: 60 MMHG

## 2023-12-26 PROCEDURE — 400014 HH F/U

## 2023-12-26 PROCEDURE — G0300 HHS/HOSPICE OF LPN EA 15 MIN: HCPCS

## 2023-12-26 ASSESSMENT — ENCOUNTER SYMPTOMS
CHANGE IN APPETITE: UNCHANGED
DENIES PAIN: 1
APPETITE LEVEL: GOOD

## 2023-12-26 ASSESSMENT — PAIN SCALES - PAIN ASSESSMENT IN ADVANCED DEMENTIA (PAINAD)
BREATHING: 0
CONSOLABILITY: 0
FACIALEXPRESSION: 0
NEGVOCALIZATION: 0
FACIALEXPRESSION: 0 - SMILING OR INEXPRESSIVE.
CONSOLABILITY: 0 - NO NEED TO CONSOLE.
BODYLANGUAGE: 0
TOTALSCORE: 0
BODYLANGUAGE: 0 - RELAXED.
NEGVOCALIZATION: 0 - NONE.

## 2023-12-27 ENCOUNTER — APPOINTMENT (OUTPATIENT)
Dept: ORTHOPEDIC SURGERY | Facility: CLINIC | Age: 88
End: 2023-12-27
Payer: MEDICARE

## 2023-12-28 ENCOUNTER — APPOINTMENT (OUTPATIENT)
Dept: CARDIOLOGY | Facility: HOSPITAL | Age: 88
End: 2023-12-28
Payer: MEDICARE

## 2023-12-30 ASSESSMENT — ACTIVITIES OF DAILY LIVING (ADL): OASIS_M1830: 03

## 2024-01-03 ENCOUNTER — HOME CARE VISIT (OUTPATIENT)
Dept: HOME HEALTH SERVICES | Facility: HOME HEALTH | Age: 89
End: 2024-01-03
Payer: MEDICARE

## 2024-01-03 ENCOUNTER — HOSPITAL ENCOUNTER (OUTPATIENT)
Dept: CARDIOLOGY | Facility: HOSPITAL | Age: 89
Discharge: HOME | End: 2024-01-03
Payer: MEDICARE

## 2024-01-03 VITALS
TEMPERATURE: 98 F | OXYGEN SATURATION: 96 % | DIASTOLIC BLOOD PRESSURE: 70 MMHG | RESPIRATION RATE: 16 BRPM | SYSTOLIC BLOOD PRESSURE: 128 MMHG | HEART RATE: 70 BPM

## 2024-01-03 DIAGNOSIS — I25.10 CORONARY ARTERY DISEASE, UNSPECIFIED VESSEL OR LESION TYPE, UNSPECIFIED WHETHER ANGINA PRESENT, UNSPECIFIED WHETHER NATIVE OR TRANSPLANTED HEART: ICD-10-CM

## 2024-01-03 DIAGNOSIS — I48.91 ATRIAL FIBRILLATION, UNSPECIFIED TYPE (MULTI): ICD-10-CM

## 2024-01-03 DIAGNOSIS — I50.42 CHRONIC COMBINED SYSTOLIC AND DIASTOLIC CONGESTIVE HEART FAILURE (MULTI): ICD-10-CM

## 2024-01-03 PROCEDURE — G0300 HHS/HOSPICE OF LPN EA 15 MIN: HCPCS

## 2024-01-03 PROCEDURE — G0179 MD RECERTIFICATION HHA PT: HCPCS | Performed by: FAMILY MEDICINE

## 2024-01-03 PROCEDURE — 93294 REM INTERROG EVL PM/LDLS PM: CPT | Performed by: INTERNAL MEDICINE

## 2024-01-03 PROCEDURE — 93296 REM INTERROG EVL PM/IDS: CPT

## 2024-01-03 ASSESSMENT — PAIN SCALES - PAIN ASSESSMENT IN ADVANCED DEMENTIA (PAINAD)
BODYLANGUAGE: 0 - RELAXED.
CONSOLABILITY: 0
NEGVOCALIZATION: 0
TOTALSCORE: 0
FACIALEXPRESSION: 0 - SMILING OR INEXPRESSIVE.
BREATHING: 0
NEGVOCALIZATION: 0 - NONE.
BODYLANGUAGE: 0
CONSOLABILITY: 0 - NO NEED TO CONSOLE.
FACIALEXPRESSION: 0

## 2024-01-03 ASSESSMENT — ENCOUNTER SYMPTOMS
PERSON REPORTING PAIN: PATIENT
PAIN SEVERITY GOAL: 0/10
HIGHEST PAIN SEVERITY IN PAST 24 HOURS: 0/10
SUBJECTIVE PAIN PROGRESSION: UNCHANGED
DENIES PAIN: 1
CHANGE IN APPETITE: UNCHANGED
LOWEST PAIN SEVERITY IN PAST 24 HOURS: 0/10
APPETITE LEVEL: GOOD

## 2024-01-05 ENCOUNTER — OFFICE VISIT (OUTPATIENT)
Dept: WOUND CARE | Facility: CLINIC | Age: 89
End: 2024-01-05
Payer: MEDICARE

## 2024-01-05 PROBLEM — I49.5 SICK SINUS SYNDROME (MULTI): Status: ACTIVE | Noted: 2018-02-27

## 2024-01-05 PROBLEM — I70.209 ATHEROSCLEROSIS OF ARTERY OF LOWER EXTREMITY (CMS-HCC): Status: ACTIVE | Noted: 2018-12-13

## 2024-01-05 PROBLEM — I13.10 HYPERTENSIVE HEART AND CHRONIC KIDNEY DISEASE STAGE 3 (MULTI): Status: ACTIVE | Noted: 2023-07-22

## 2024-01-05 PROBLEM — N17.9 ACUTE KIDNEY FAILURE, UNSPECIFIED (CMS-HCC): Status: ACTIVE | Noted: 2023-07-22

## 2024-01-05 PROBLEM — R25.2 SPASM: Status: ACTIVE | Noted: 2024-01-05

## 2024-01-05 PROBLEM — I11.0 HYPERTENSIVE HEART DISEASE WITH HEART FAILURE (MULTI): Status: ACTIVE | Noted: 2018-02-28

## 2024-01-05 PROBLEM — R00.2 PALPITATIONS: Status: ACTIVE | Noted: 2023-02-17

## 2024-01-05 PROBLEM — I50.9 ACUTE ON CHRONIC CONGESTIVE HEART FAILURE, UNSPECIFIED HEART FAILURE TYPE (MULTI): Status: RESOLVED | Noted: 2023-10-02 | Resolved: 2024-01-05

## 2024-01-05 PROBLEM — I25.2 OLD MI (MYOCARDIAL INFARCTION): Status: ACTIVE | Noted: 2018-02-28

## 2024-01-05 PROBLEM — S81.809A OPEN WOUND OF LOWER EXTREMITY: Status: ACTIVE | Noted: 2023-10-02

## 2024-01-05 PROBLEM — Z86.73 PERSONAL HISTORY OF TRANSIENT ISCHEMIC ATTACK (TIA), AND CEREBRAL INFARCTION WITHOUT RESIDUAL DEFICITS: Status: ACTIVE | Noted: 2023-07-22

## 2024-01-05 PROBLEM — I87.2 VENOUS INSUFFICIENCY (CHRONIC) (PERIPHERAL): Status: ACTIVE | Noted: 2023-09-22

## 2024-01-05 PROBLEM — N18.30 STAGE 3 CHRONIC KIDNEY DISEASE (MULTI): Status: ACTIVE | Noted: 2023-09-25
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
## 2024-01-05 PROBLEM — F39 MOOD DISORDER (CMS-HCC): Status: ACTIVE | Noted: 2018-02-28

## 2024-01-05 PROBLEM — K57.20 PERFORATION OF SIGMOID COLON DUE TO DIVERTICULITIS: Status: ACTIVE | Noted: 2018-12-31

## 2024-01-05 PROBLEM — Z87.898 HISTORY OF PRECORDIAL CHEST PAIN: Status: RESOLVED | Noted: 2023-10-08 | Resolved: 2024-01-05

## 2024-01-05 PROBLEM — I21.4 ACUTE NON-ST ELEVATION MYOCARDIAL INFARCTION (NSTEMI) (MULTI): Status: ACTIVE | Noted: 2023-02-17

## 2024-01-05 PROBLEM — R07.2 PRECORDIAL PAIN: Status: ACTIVE | Noted: 2024-01-05

## 2024-01-05 PROBLEM — M79.671 PAIN IN RIGHT FOOT: Status: ACTIVE | Noted: 2023-07-16

## 2024-01-05 PROBLEM — L97.519 ULCER OF RIGHT FOOT (MULTI): Status: ACTIVE | Noted: 2023-08-25

## 2024-01-05 PROBLEM — J96.00 ACUTE RESPIRATORY FAILURE (MULTI): Status: ACTIVE | Noted: 2023-10-04

## 2024-01-05 PROBLEM — Z79.01 LONG TERM CURRENT USE OF ANTICOAGULANT THERAPY: Status: ACTIVE | Noted: 2018-02-28

## 2024-01-05 PROBLEM — L08.9 FOOT INFECTION: Status: ACTIVE | Noted: 2023-08-25

## 2024-01-05 PROBLEM — E78.2 MIXED HYPERCHOLESTEROLEMIA AND HYPERTRIGLYCERIDEMIA: Status: ACTIVE | Noted: 2018-12-31

## 2024-01-05 PROBLEM — I10 HYPERTENSION: Status: RESOLVED | Noted: 2023-10-02 | Resolved: 2024-01-05

## 2024-01-05 PROBLEM — R19.7 DIARRHEA: Status: ACTIVE | Noted: 2024-01-05

## 2024-01-05 PROBLEM — N18.30 HYPERTENSIVE HEART AND CHRONIC KIDNEY DISEASE STAGE 3 (MULTI): Status: ACTIVE | Noted: 2023-07-22

## 2024-01-05 PROBLEM — R10.9 ABDOMINAL PAIN: Status: ACTIVE | Noted: 2024-01-05

## 2024-01-05 PROBLEM — I70.203 UNSPECIFIED ATHEROSCLEROSIS OF NATIVE ARTERIES OF EXTREMITIES, BILATERAL LEGS (CMS-HCC): Status: RESOLVED | Noted: 2023-06-16 | Resolved: 2024-01-05

## 2024-01-05 PROBLEM — E03.2 HYPOTHYROIDISM DUE TO DRUGS: Status: ACTIVE | Noted: 2023-05-09

## 2024-01-05 PROBLEM — F41.0 PANIC ATTACK: Status: ACTIVE | Noted: 2023-07-21

## 2024-01-05 PROBLEM — S92.053A: Status: ACTIVE | Noted: 2023-07-18

## 2024-01-05 PROBLEM — I49.9 CARDIAC DYSRHYTHMIA: Status: ACTIVE | Noted: 2018-02-27

## 2024-01-05 PROBLEM — I11.0 HYPERTENSIVE HEART DISEASE WITH HEART FAILURE (MULTI): Status: RESOLVED | Noted: 2018-02-28 | Resolved: 2024-01-05

## 2024-01-05 PROBLEM — Z20.822 CONTACT WITH AND (SUSPECTED) EXPOSURE TO COVID-19: Status: ACTIVE | Noted: 2023-07-22

## 2024-01-05 PROBLEM — Z95.5 PRESENCE OF CORONARY ANGIOPLASTY IMPLANT AND GRAFT: Status: ACTIVE | Noted: 2018-02-28

## 2024-01-05 PROBLEM — I89.0 LYMPHEDEMA: Status: ACTIVE | Noted: 2023-08-25

## 2024-01-05 PROBLEM — B37.31 CANDIDIASIS OF VAGINA: Status: ACTIVE | Noted: 2023-06-16

## 2024-01-05 PROBLEM — R55 SYNCOPE AND COLLAPSE: Status: ACTIVE | Noted: 2023-02-17

## 2024-01-05 PROCEDURE — 11042 DBRDMT SUBQ TIS 1ST 20SQCM/<: CPT | Performed by: CLINICAL NURSE SPECIALIST

## 2024-01-05 PROCEDURE — 11042 DBRDMT SUBQ TIS 1ST 20SQCM/<: CPT

## 2024-01-05 RX ORDER — OMEPRAZOLE 20 MG/1
20 CAPSULE, DELAYED RELEASE ORAL
COMMUNITY
Start: 2015-10-12 | End: 2024-01-30 | Stop reason: ALTCHOICE

## 2024-01-05 RX ORDER — HEPARIN SODIUM 5000 [USP'U]/ML
5000 INJECTION, SOLUTION INTRAVENOUS; SUBCUTANEOUS EVERY 8 HOURS
COMMUNITY
Start: 2023-11-03 | End: 2024-01-30 | Stop reason: ALTCHOICE

## 2024-01-05 RX ORDER — SPIRONOLACTONE 25 MG/1
25 TABLET ORAL DAILY
COMMUNITY
Start: 2023-10-13 | End: 2024-01-30 | Stop reason: ALTCHOICE

## 2024-01-05 RX ORDER — DOCUSATE SODIUM 100 MG/1
1 CAPSULE, LIQUID FILLED ORAL EVERY 12 HOURS
COMMUNITY
Start: 2023-10-13

## 2024-01-05 RX ORDER — TALC
3 POWDER (GRAM) TOPICAL NIGHTLY
COMMUNITY
End: 2024-04-30 | Stop reason: ALTCHOICE

## 2024-01-08 ENCOUNTER — HOME CARE VISIT (OUTPATIENT)
Dept: HOME HEALTH SERVICES | Facility: HOME HEALTH | Age: 89
End: 2024-01-08
Payer: MEDICARE

## 2024-01-08 VITALS
BODY MASS INDEX: 17.92 KG/M2 | SYSTOLIC BLOOD PRESSURE: 110 MMHG | WEIGHT: 98 LBS | DIASTOLIC BLOOD PRESSURE: 70 MMHG | HEART RATE: 68 BPM | OXYGEN SATURATION: 100 % | TEMPERATURE: 96.6 F

## 2024-01-08 PROCEDURE — G0300 HHS/HOSPICE OF LPN EA 15 MIN: HCPCS

## 2024-01-08 ASSESSMENT — ENCOUNTER SYMPTOMS
APPETITE LEVEL: GOOD
CHANGE IN APPETITE: UNCHANGED
DENIES PAIN: 1

## 2024-01-10 ENCOUNTER — APPOINTMENT (OUTPATIENT)
Dept: CARDIOLOGY | Facility: CLINIC | Age: 89
End: 2024-01-10
Payer: MEDICARE

## 2024-01-16 ENCOUNTER — HOME CARE VISIT (OUTPATIENT)
Dept: HOME HEALTH SERVICES | Facility: HOME HEALTH | Age: 89
End: 2024-01-16
Payer: MEDICARE

## 2024-01-16 VITALS
SYSTOLIC BLOOD PRESSURE: 128 MMHG | OXYGEN SATURATION: 99 % | RESPIRATION RATE: 18 BRPM | HEART RATE: 66 BPM | TEMPERATURE: 97.8 F | DIASTOLIC BLOOD PRESSURE: 70 MMHG

## 2024-01-16 PROCEDURE — G0300 HHS/HOSPICE OF LPN EA 15 MIN: HCPCS

## 2024-01-16 ASSESSMENT — ENCOUNTER SYMPTOMS
PERSON REPORTING PAIN: PATIENT
OCCASIONAL FEELINGS OF UNSTEADINESS: 0
DEPRESSION: 0
APPETITE LEVEL: GOOD
DENIES PAIN: 1
CHANGE IN APPETITE: UNCHANGED
LOSS OF SENSATION IN FEET: 0

## 2024-01-16 NOTE — HOME HEALTH
Patient is alert and oriented times three, vital signs are stable, lungs are clear, patient denies pain, SOB or dizziness.Patient continues to eat foods that are needed for healing, goes to wound clinic once per week, wound care provided today patient tolerated well.

## 2024-01-19 ENCOUNTER — OFFICE VISIT (OUTPATIENT)
Dept: WOUND CARE | Facility: CLINIC | Age: 89
End: 2024-01-19
Payer: MEDICARE

## 2024-01-19 PROCEDURE — 11042 DBRDMT SUBQ TIS 1ST 20SQCM/<: CPT

## 2024-01-22 ENCOUNTER — HOME CARE VISIT (OUTPATIENT)
Dept: HOME HEALTH SERVICES | Facility: HOME HEALTH | Age: 89
End: 2024-01-22
Payer: MEDICARE

## 2024-01-22 VITALS
SYSTOLIC BLOOD PRESSURE: 132 MMHG | TEMPERATURE: 98 F | OXYGEN SATURATION: 99 % | RESPIRATION RATE: 18 BRPM | DIASTOLIC BLOOD PRESSURE: 72 MMHG | HEART RATE: 66 BPM

## 2024-01-22 DIAGNOSIS — I50.32 CHRONIC HEART FAILURE WITH PRESERVED EJECTION FRACTION (MULTI): ICD-10-CM

## 2024-01-22 PROCEDURE — G0300 HHS/HOSPICE OF LPN EA 15 MIN: HCPCS

## 2024-01-22 ASSESSMENT — ACTIVITIES OF DAILY LIVING (ADL)
AMBULATION ASSISTANCE: INDEPENDENT
AMBULATION ASSISTANCE: 1

## 2024-01-22 ASSESSMENT — ENCOUNTER SYMPTOMS
OCCASIONAL FEELINGS OF UNSTEADINESS: 0
CHANGE IN APPETITE: UNCHANGED
APPETITE LEVEL: GOOD
HIGHEST PAIN SEVERITY IN PAST 24 HOURS: 0/10
LOWEST PAIN SEVERITY IN PAST 24 HOURS: 0/10
SUBJECTIVE PAIN PROGRESSION: UNCHANGED
PAIN SEVERITY GOAL: 0/10
PERSON REPORTING PAIN: PATIENT
DENIES PAIN: 1

## 2024-01-22 NOTE — HOME HEALTH
Patient is alert and oriented times three, vital signs are stable, lungs are clear, patient denies pain, SOB or dizziness.Nutrition and Hydration education provided.Patient verbalized understanding.Wound care provided today, patient tolerated well.

## 2024-01-25 ENCOUNTER — HOSPITAL ENCOUNTER (OUTPATIENT)
Dept: CARDIOLOGY | Facility: HOSPITAL | Age: 89
Discharge: HOME | End: 2024-01-25
Payer: MEDICARE

## 2024-01-25 DIAGNOSIS — I48.91 ATRIAL FIBRILLATION, UNSPECIFIED TYPE (MULTI): ICD-10-CM

## 2024-01-25 DIAGNOSIS — I50.42 CHRONIC COMBINED SYSTOLIC AND DIASTOLIC CONGESTIVE HEART FAILURE (MULTI): Primary | ICD-10-CM

## 2024-01-25 DIAGNOSIS — I25.10 CORONARY ARTERY DISEASE, UNSPECIFIED VESSEL OR LESION TYPE, UNSPECIFIED WHETHER ANGINA PRESENT, UNSPECIFIED WHETHER NATIVE OR TRANSPLANTED HEART: ICD-10-CM

## 2024-01-25 DIAGNOSIS — I50.42 CHRONIC COMBINED SYSTOLIC AND DIASTOLIC CONGESTIVE HEART FAILURE (MULTI): ICD-10-CM

## 2024-01-25 DIAGNOSIS — E78.2 ELEVATED TRIGLYCERIDES WITH HIGH CHOLESTEROL: Primary | ICD-10-CM

## 2024-01-25 RX ORDER — TORSEMIDE 20 MG/1
30 TABLET ORAL DAILY
Qty: 135 TABLET | Refills: 3 | Status: SHIPPED | OUTPATIENT
Start: 2024-01-25 | End: 2024-02-07 | Stop reason: SDUPTHER

## 2024-01-25 RX ORDER — ATORVASTATIN CALCIUM 80 MG/1
80 TABLET, FILM COATED ORAL NIGHTLY
Qty: 90 TABLET | Refills: 3 | Status: CANCELLED | OUTPATIENT
Start: 2024-01-25 | End: 2025-01-24

## 2024-01-26 DIAGNOSIS — E78.2 MIXED HYPERCHOLESTEROLEMIA AND HYPERTRIGLYCERIDEMIA: Primary | ICD-10-CM

## 2024-01-26 RX ORDER — ATORVASTATIN CALCIUM 80 MG/1
80 TABLET, FILM COATED ORAL NIGHTLY
Qty: 30 TABLET | Refills: 0 | Status: SHIPPED | OUTPATIENT
Start: 2024-01-26 | End: 2024-01-29 | Stop reason: SDUPTHER

## 2024-01-26 NOTE — TELEPHONE ENCOUNTER
----- Message from Tisha Paulino sent at 1/26/2024 10:01 AM EST -----  Regarding: med refill  Needs atorvastatin 80 mg sent to [facility] in [place].

## 2024-01-29 ENCOUNTER — HOME CARE VISIT (OUTPATIENT)
Dept: HOME HEALTH SERVICES | Facility: HOME HEALTH | Age: 89
End: 2024-01-29
Payer: MEDICARE

## 2024-01-29 VITALS
HEART RATE: 65 BPM | OXYGEN SATURATION: 99 % | DIASTOLIC BLOOD PRESSURE: 74 MMHG | SYSTOLIC BLOOD PRESSURE: 129 MMHG | RESPIRATION RATE: 18 BRPM | TEMPERATURE: 98.5 F

## 2024-01-29 PROCEDURE — 400014 HH F/U

## 2024-01-29 PROCEDURE — G0300 HHS/HOSPICE OF LPN EA 15 MIN: HCPCS

## 2024-01-29 ASSESSMENT — ENCOUNTER SYMPTOMS
HIGHEST PAIN SEVERITY IN PAST 24 HOURS: 0/10
APPETITE LEVEL: GOOD
DENIES PAIN: 1
LOWEST PAIN SEVERITY IN PAST 24 HOURS: 0/10
CHANGE IN APPETITE: UNCHANGED
OCCASIONAL FEELINGS OF UNSTEADINESS: 0
SUBJECTIVE PAIN PROGRESSION: UNCHANGED
PERSON REPORTING PAIN: PATIENT
PAIN SEVERITY GOAL: 0/10

## 2024-01-29 NOTE — HOME HEALTH
Patient is alert and oriented times three, vital signs are stable, lungs are clear, patient denies pain, SOB or dizziness.Wound care provided today, patient tolerated well. Wound is healing, pt goes to wound clinic regularly and has a frient that assist with dressing changes as she is unable to do care due to the placement of the wound, right heal.

## 2024-01-30 ENCOUNTER — OFFICE VISIT (OUTPATIENT)
Dept: PRIMARY CARE | Facility: CLINIC | Age: 89
End: 2024-01-30
Payer: MEDICARE

## 2024-01-30 VITALS
SYSTOLIC BLOOD PRESSURE: 130 MMHG | HEIGHT: 62 IN | WEIGHT: 100 LBS | TEMPERATURE: 97.8 F | BODY MASS INDEX: 18.4 KG/M2 | OXYGEN SATURATION: 98 % | DIASTOLIC BLOOD PRESSURE: 74 MMHG | HEART RATE: 64 BPM

## 2024-01-30 DIAGNOSIS — I50.42 CHRONIC COMBINED SYSTOLIC AND DIASTOLIC HEART FAILURE (MULTI): ICD-10-CM

## 2024-01-30 DIAGNOSIS — R53.83 OTHER FATIGUE: ICD-10-CM

## 2024-01-30 DIAGNOSIS — I10 BENIGN ESSENTIAL HYPERTENSION: ICD-10-CM

## 2024-01-30 DIAGNOSIS — F41.0 PANIC ATTACK: ICD-10-CM

## 2024-01-30 DIAGNOSIS — I43 CARDIOMYOPATHY IN DISEASES CLASSIFIED ELSEWHERE (MULTI): ICD-10-CM

## 2024-01-30 DIAGNOSIS — N18.32 STAGE 3B CHRONIC KIDNEY DISEASE (MULTI): Primary | ICD-10-CM

## 2024-01-30 DIAGNOSIS — I25.10 ASHD (ARTERIOSCLEROTIC HEART DISEASE): ICD-10-CM

## 2024-01-30 DIAGNOSIS — I49.5 SICK SINUS SYNDROME (MULTI): ICD-10-CM

## 2024-01-30 DIAGNOSIS — E55.9 VITAMIN D DEFICIENCY: ICD-10-CM

## 2024-01-30 DIAGNOSIS — E03.9 HYPOTHYROIDISM, UNSPECIFIED TYPE: ICD-10-CM

## 2024-01-30 DIAGNOSIS — I48.20 CHRONIC ATRIAL FIBRILLATION, UNSPECIFIED (MULTI): ICD-10-CM

## 2024-01-30 DIAGNOSIS — E03.8 OTHER SPECIFIED HYPOTHYROIDISM: ICD-10-CM

## 2024-01-30 DIAGNOSIS — F39 MOOD DISORDER (CMS-HCC): ICD-10-CM

## 2024-01-30 DIAGNOSIS — F51.04 CHRONIC INSOMNIA: ICD-10-CM

## 2024-01-30 PROCEDURE — 1160F RVW MEDS BY RX/DR IN RCRD: CPT | Performed by: FAMILY MEDICINE

## 2024-01-30 PROCEDURE — G0439 PPPS, SUBSEQ VISIT: HCPCS | Performed by: FAMILY MEDICINE

## 2024-01-30 PROCEDURE — 1123F ACP DISCUSS/DSCN MKR DOCD: CPT | Performed by: FAMILY MEDICINE

## 2024-01-30 PROCEDURE — 1036F TOBACCO NON-USER: CPT | Performed by: FAMILY MEDICINE

## 2024-01-30 PROCEDURE — 1170F FXNL STATUS ASSESSED: CPT | Performed by: FAMILY MEDICINE

## 2024-01-30 PROCEDURE — 3075F SYST BP GE 130 - 139MM HG: CPT | Performed by: FAMILY MEDICINE

## 2024-01-30 PROCEDURE — 1157F ADVNC CARE PLAN IN RCRD: CPT | Performed by: FAMILY MEDICINE

## 2024-01-30 PROCEDURE — 1125F AMNT PAIN NOTED PAIN PRSNT: CPT | Performed by: FAMILY MEDICINE

## 2024-01-30 PROCEDURE — 3078F DIAST BP <80 MM HG: CPT | Performed by: FAMILY MEDICINE

## 2024-01-30 PROCEDURE — 1159F MED LIST DOCD IN RCRD: CPT | Performed by: FAMILY MEDICINE

## 2024-01-30 PROCEDURE — 99214 OFFICE O/P EST MOD 30 MIN: CPT | Performed by: FAMILY MEDICINE

## 2024-01-30 RX ORDER — ALPRAZOLAM 0.5 MG/1
0.5 TABLET ORAL NIGHTLY
Qty: 30 TABLET | Refills: 3 | Status: SHIPPED | OUTPATIENT
Start: 2024-01-30 | End: 2024-04-30 | Stop reason: SDUPTHER

## 2024-01-30 RX ORDER — TRAZODONE HYDROCHLORIDE 50 MG/1
50 TABLET ORAL NIGHTLY PRN
Qty: 30 TABLET | Refills: 11 | Status: SHIPPED | OUTPATIENT
Start: 2024-01-30 | End: 2024-02-10 | Stop reason: ALTCHOICE

## 2024-01-30 ASSESSMENT — ACTIVITIES OF DAILY LIVING (ADL)
TAKING_MEDICATION: INDEPENDENT
GROCERY_SHOPPING: NEEDS ASSISTANCE
BATHING: INDEPENDENT
MANAGING_FINANCES: INDEPENDENT
DOING_HOUSEWORK: INDEPENDENT
DRESSING: INDEPENDENT

## 2024-01-30 NOTE — PROGRESS NOTES
"Subjective   Reason for Visit: Adwoa Forrest is an 88 y.o. female here for a Medicare Wellness visit.     Past Medical, Surgical, and Family History reviewed and updated in chart.         HPI  Reviewed chronic medical problems    OARRS:  Minh aFrmer DO on 1/30/2024  5:04 PM  I have personally reviewed the OARRS report for Adwoa Forrest. I have considered the risks of abuse, dependence, addiction and diversion    Is the patient prescribed a combination of a benzodiazepine and opioid? No    Last Urine Drug Screen / ordered today: No  No results found for this or any previous visit (from the past 8760 hour(s)).  Results are as expected.         Controlled Substance Agreement:  Date of the Last Agreement: 2023  Reviewed Controlled Substance Agreement including but not limited to the benefits, risks, and alternatives to treatment with a Controlled Substance medication(s).    Benzodiazepines:  What is the patient's goal of therapy? insomnia  Is this being achieved with current treatment? yes    KIZZY-7:  No data recorded    Activities of Daily Living:   Is your overall impression that this patient is benefiting (symptom reduction outweighs side effects) from benzodiazepine therapy? Yes     1. Physical Functioning: Same  2. Family Relationship: Same  3. Social Relationship: Same  4. Mood: Same  5. Sleep Patterns: Better  6. Overall Function: Same  Patient Care Team:  Minh Farmer DO as PCP - General  Minh Farmer DO as PCP - [employer] PCP     Review of Systems    Objective   Vitals:  /74 (BP Location: Left arm, Patient Position: Sitting, BP Cuff Size: Small adult)   Pulse 64   Temp 36.6 °C (97.8 °F)   Ht 1.575 m (5' 2\")   Wt 45.4 kg (100 lb)   SpO2 98%   BMI 18.29 kg/m²       Physical Exam    Assessment/Plan   Problem List Items Addressed This Visit       Coronary artery disease    Benign essential hypertension    Relevant Orders    CBC and Auto Differential (Completed)    Comprehensive " Metabolic Panel (Completed)    Vitamin B12 (Completed)    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)    Chronic atrial fibrillation, unspecified (CMS/HCC)    Relevant Orders    Magnesium (Completed)    CBC and Auto Differential (Completed)    Comprehensive Metabolic Panel (Completed)    Chronic combined systolic and diastolic heart failure (CMS/HCC)    Relevant Orders    B-type natriuretic peptide (Completed)    Chronic insomnia    Relevant Medications    ALPRAZolam (Xanax) 0.5 mg tablet    Hypothyroidism    Relevant Orders    Thyroid Stimulating Hormone (Completed)    Triiodothyronine, Free (Completed)    Thyroxine, Free (Completed)    Vitamin D deficiency    Relevant Orders    Vitamin D 1,25 Dihydroxy (for eval of hypercalcemia) (Completed)    Benign hypertensive kidney disease with chronic kidney disease - Primary    Mood disorder (CMS/HCC)    Panic attack    Sick sinus syndrome (CMS/HCC)     Other Visit Diagnoses       Other fatigue            Reviewed in for the patient's past medical history, surgical history, family history, social history  Depression screening was done in the office today using PHQ-2  We reviewed the patient's activities of daily living and possible risk for falling.  Patient is stable.  Discussed preventative screenings  Vaccinations were discussed in the office.  We did review the patient's status on influenza vaccination, pneumonia vaccination, tetanus vaccination, and up-to-date on vaccination  Patient was instructed to follow-up with dentist regularly and also with eye doctor regularly  We discussed advanced directives including power of , living well and DO NOT RESUSCITATE orders    Echo done 10/23 CONCLUSIONS:   1. Left ventricular systolic function is normal with a 70-75% estimated ejection fraction.   2. There is moderate left ventricular hypertrophy.   3. There is mildly reduced right ventricular systolic function.   4. The right atrium is moderately  dilated.   5. Moderately elevated right ventricular systolic pressure.   6. Mild to moderate aortic valve regurgitation.     Acute on chronic combined systolic and diastolic heart failure/ASHD/chronic atrial fibrillation-followed by electrophysiologist Dr Bell and Dr Latham. Currently stable. Pace Maker is working well. nonsustained VT and PVCs highly symptomatic and remains on amiodarone with resolution of symptoms.  Outpatient monitoring.     CKD 3B- Controlling cholesterol/Hypertension/ and sugars- stable. Increase water to 40 oz daily. Farxiga 10 mg daily     Benign essential hypertension-controlled- metoprolol ER 50 mg daily     Gastroesophageal reflux disease-controlled. Stopped pantoprazole     Hyperlipidemia-controlled on atorvastatin 80 mg daily     Hypothyroidism- controlled on current dose of levothyroxine 25 µg daily     Elevated LFT's- No risk except statin medication. Continue to monitor     Stop Iron. Anemia and iron deficiency improved.      Polycythemia/Elevated HBG and HCT- Has been stable     Chronic insomnia/anxiety-we reviewed the patient's OARRS and it was normal with no aberrant behavior. The medication is working well for her and she is only taking it at nighttime. Currently taking alprazolam 0.5 mg 1 p.o. nightly or taking 1 as needed for the insomnia.  We are going to try Trazodone 50 mg daily to see if we can get away from alprazolam    Falling   She stepped on a scale and it tipped over causing her to fall  She brought [employer] package in from the rain. She opned a package and it ripped open causing her to loose her balance and fall back.    Has not been using her cane but uses walker in the bedroom at night.

## 2024-01-31 ENCOUNTER — APPOINTMENT (OUTPATIENT)
Dept: CARDIOLOGY | Facility: HOSPITAL | Age: 89
End: 2024-01-31
Payer: MEDICARE

## 2024-01-31 ENCOUNTER — HOSPITAL ENCOUNTER (OUTPATIENT)
Dept: CARDIOLOGY | Facility: HOSPITAL | Age: 89
Discharge: HOME | End: 2024-01-31
Payer: MEDICARE

## 2024-01-31 DIAGNOSIS — Z95.0 PRESENCE OF CARDIAC PACEMAKER: ICD-10-CM

## 2024-01-31 DIAGNOSIS — I48.91 ATRIAL FIBRILLATION, UNSPECIFIED TYPE (MULTI): ICD-10-CM

## 2024-02-01 ENCOUNTER — TELEPHONE (OUTPATIENT)
Dept: PRIMARY CARE | Facility: CLINIC | Age: 89
End: 2024-02-01

## 2024-02-01 NOTE — TELEPHONE ENCOUNTER
Regarding new medication: patient called today and she said that she has been having weird side effects. Not sleeping and felt like she was hallucinating. Please advise

## 2024-02-02 ENCOUNTER — OFFICE VISIT (OUTPATIENT)
Dept: WOUND CARE | Facility: CLINIC | Age: 89
End: 2024-02-02
Payer: MEDICARE

## 2024-02-02 PROCEDURE — 11043 DBRDMT MUSC&/FSCA 1ST 20/<: CPT

## 2024-02-05 ENCOUNTER — HOME CARE VISIT (OUTPATIENT)
Dept: HOME HEALTH SERVICES | Facility: HOME HEALTH | Age: 89
End: 2024-02-05
Payer: MEDICARE

## 2024-02-05 PROCEDURE — 82570 ASSAY OF URINE CREATININE: CPT

## 2024-02-05 PROCEDURE — 81001 URINALYSIS AUTO W/SCOPE: CPT

## 2024-02-05 PROCEDURE — 82043 UR ALBUMIN QUANTITATIVE: CPT

## 2024-02-05 PROCEDURE — G0300 HHS/HOSPICE OF LPN EA 15 MIN: HCPCS

## 2024-02-05 ASSESSMENT — ENCOUNTER SYMPTOMS
DENIES PAIN: 1
PERSON REPORTING PAIN: PATIENT
PAIN SEVERITY GOAL: 0/10
APPETITE LEVEL: GOOD
SUBJECTIVE PAIN PROGRESSION: UNCHANGED
LOWEST PAIN SEVERITY IN PAST 24 HOURS: 0/10
CHANGE IN APPETITE: UNCHANGED
OCCASIONAL FEELINGS OF UNSTEADINESS: 0
HIGHEST PAIN SEVERITY IN PAST 24 HOURS: 0/10

## 2024-02-05 NOTE — HOME HEALTH
Patient is alert and oriented times three, vital signs are stable, lungs are clear, patient denies pain, SOB or dizziness.Wound care provided today, patient tolerated well.Unable to draw labs due to insufficient blood flow  Lab orders entered, urine dropped off at  Lab

## 2024-02-06 ENCOUNTER — LAB REQUISITION (OUTPATIENT)
Dept: LAB | Facility: HOSPITAL | Age: 89
End: 2024-02-06
Payer: MEDICARE

## 2024-02-06 DIAGNOSIS — N18.32 CHRONIC KIDNEY DISEASE, STAGE 3B (MULTI): ICD-10-CM

## 2024-02-06 DIAGNOSIS — I50.32 CHRONIC DIASTOLIC (CONGESTIVE) HEART FAILURE (MULTI): ICD-10-CM

## 2024-02-06 PROBLEM — I12.9 HYPERTENSIVE CHRONIC KIDNEY DISEASE WITH STAGE 1 THROUGH STAGE 4 CHRONIC KIDNEY DISEASE, OR UNSPECIFIED CHRONIC KIDNEY DISEASE: Status: ACTIVE | Noted: 2023-09-25

## 2024-02-06 LAB
APPEARANCE UR: CLEAR
BILIRUB UR STRIP.AUTO-MCNC: NEGATIVE MG/DL
CAOX CRY #/AREA UR COMP ASSIST: ABNORMAL /HPF
COLOR UR: YELLOW
CREAT UR-MCNC: 55.5 MG/DL (ref 20–320)
GLUCOSE UR STRIP.AUTO-MCNC: ABNORMAL MG/DL
HYALINE CASTS #/AREA URNS AUTO: ABNORMAL /LPF
KETONES UR STRIP.AUTO-MCNC: NEGATIVE MG/DL
LEUKOCYTE ESTERASE UR QL STRIP.AUTO: ABNORMAL
MICROALBUMIN UR-MCNC: 16.9 MG/L
MICROALBUMIN/CREAT UR: 30.5 UG/MG CREAT
MUCOUS THREADS #/AREA URNS AUTO: ABNORMAL /LPF
NITRITE UR QL STRIP.AUTO: NEGATIVE
PH UR STRIP.AUTO: 5 [PH]
PROT UR STRIP.AUTO-MCNC: NEGATIVE MG/DL
RBC # UR STRIP.AUTO: NEGATIVE /UL
RBC #/AREA URNS AUTO: ABNORMAL /HPF
SP GR UR STRIP.AUTO: 1.01
SQUAMOUS #/AREA URNS AUTO: ABNORMAL /HPF
UROBILINOGEN UR STRIP.AUTO-MCNC: <2 MG/DL
WBC #/AREA URNS AUTO: ABNORMAL /HPF

## 2024-02-07 ENCOUNTER — LAB (OUTPATIENT)
Dept: LAB | Facility: LAB | Age: 89
End: 2024-02-07
Payer: MEDICARE

## 2024-02-07 ENCOUNTER — OFFICE VISIT (OUTPATIENT)
Dept: CARDIOLOGY | Facility: CLINIC | Age: 89
End: 2024-02-07
Payer: MEDICARE

## 2024-02-07 ENCOUNTER — TELEPHONE (OUTPATIENT)
Dept: CARDIOLOGY | Facility: CLINIC | Age: 89
End: 2024-02-07

## 2024-02-07 VITALS
BODY MASS INDEX: 18.4 KG/M2 | HEART RATE: 65 BPM | DIASTOLIC BLOOD PRESSURE: 74 MMHG | WEIGHT: 100 LBS | SYSTOLIC BLOOD PRESSURE: 122 MMHG | HEIGHT: 62 IN

## 2024-02-07 DIAGNOSIS — Z95.5 PRESENCE OF STENT IN CORONARY ARTERY: ICD-10-CM

## 2024-02-07 DIAGNOSIS — I10 BENIGN ESSENTIAL HYPERTENSION: ICD-10-CM

## 2024-02-07 DIAGNOSIS — Z95.0 PRESENCE OF CARDIAC PACEMAKER: ICD-10-CM

## 2024-02-07 DIAGNOSIS — E78.2 MIXED HYPERCHOLESTEROLEMIA AND HYPERTRIGLYCERIDEMIA: ICD-10-CM

## 2024-02-07 DIAGNOSIS — I50.42 CHRONIC COMBINED SYSTOLIC AND DIASTOLIC HEART FAILURE (MULTI): ICD-10-CM

## 2024-02-07 DIAGNOSIS — N18.30 HYPERTENSIVE HEART AND CHRONIC KIDNEY DISEASE STAGE 3 (MULTI): ICD-10-CM

## 2024-02-07 DIAGNOSIS — I43 CARDIOMYOPATHY IN DISEASES CLASSIFIED ELSEWHERE (MULTI): ICD-10-CM

## 2024-02-07 DIAGNOSIS — I12.0 BENIGN HYPERTENSIVE KIDNEY DISEASE WITH CHRONIC KIDNEY DISEASE STAGE V OR END STAGE RENAL DISEASE (MULTI): ICD-10-CM

## 2024-02-07 DIAGNOSIS — I50.32 CHRONIC HEART FAILURE WITH PRESERVED EJECTION FRACTION (MULTI): ICD-10-CM

## 2024-02-07 DIAGNOSIS — I51.7 LEFT VENTRICULAR HYPERTROPHY: ICD-10-CM

## 2024-02-07 DIAGNOSIS — I25.10 CORONARY ARTERY DISEASE, UNSPECIFIED VESSEL OR LESION TYPE, UNSPECIFIED WHETHER ANGINA PRESENT, UNSPECIFIED WHETHER NATIVE OR TRANSPLANTED HEART: ICD-10-CM

## 2024-02-07 DIAGNOSIS — E55.9 VITAMIN D DEFICIENCY: ICD-10-CM

## 2024-02-07 DIAGNOSIS — E03.9 HYPOTHYROIDISM, UNSPECIFIED TYPE: ICD-10-CM

## 2024-02-07 DIAGNOSIS — I13.10 HYPERTENSIVE HEART AND CHRONIC KIDNEY DISEASE STAGE 3 (MULTI): ICD-10-CM

## 2024-02-07 DIAGNOSIS — I48.20 CHRONIC ATRIAL FIBRILLATION, UNSPECIFIED (MULTI): ICD-10-CM

## 2024-02-07 DIAGNOSIS — I50.33 ACUTE ON CHRONIC DIASTOLIC HEART FAILURE (MULTI): ICD-10-CM

## 2024-02-07 DIAGNOSIS — I25.10 CORONARY ARTERY DISEASE, UNSPECIFIED VESSEL OR LESION TYPE, UNSPECIFIED WHETHER ANGINA PRESENT, UNSPECIFIED WHETHER NATIVE OR TRANSPLANTED HEART: Primary | ICD-10-CM

## 2024-02-07 LAB
ALBUMIN SERPL BCP-MCNC: 4.3 G/DL (ref 3.4–5)
ALP SERPL-CCNC: 157 U/L (ref 33–136)
ALT SERPL W P-5'-P-CCNC: 39 U/L (ref 7–45)
ANION GAP SERPL CALC-SCNC: 15 MMOL/L (ref 10–20)
AST SERPL W P-5'-P-CCNC: 27 U/L (ref 9–39)
BASOPHILS # BLD AUTO: 0.06 X10*3/UL (ref 0–0.1)
BASOPHILS NFR BLD AUTO: 1.1 %
BILIRUB SERPL-MCNC: 0.6 MG/DL (ref 0–1.2)
BNP SERPL-MCNC: 998 PG/ML (ref 0–99)
BUN SERPL-MCNC: 61 MG/DL (ref 6–23)
CALCIUM SERPL-MCNC: 9.2 MG/DL (ref 8.6–10.3)
CHLORIDE SERPL-SCNC: 100 MMOL/L (ref 98–107)
CHOLEST SERPL-MCNC: 124 MG/DL (ref 0–199)
CHOLESTEROL/HDL RATIO: 1.8
CO2 SERPL-SCNC: 31 MMOL/L (ref 21–32)
CREAT SERPL-MCNC: 2.08 MG/DL (ref 0.5–1.05)
EGFRCR SERPLBLD CKD-EPI 2021: 23 ML/MIN/1.73M*2
EOSINOPHIL # BLD AUTO: 0.04 X10*3/UL (ref 0–0.4)
EOSINOPHIL NFR BLD AUTO: 0.7 %
ERYTHROCYTE [DISTWIDTH] IN BLOOD BY AUTOMATED COUNT: 19 % (ref 11.5–14.5)
GLUCOSE SERPL-MCNC: 95 MG/DL (ref 74–99)
HCT VFR BLD AUTO: 40.6 % (ref 36–46)
HDLC SERPL-MCNC: 70.5 MG/DL
HGB BLD-MCNC: 11.6 G/DL (ref 12–16)
IMM GRANULOCYTES # BLD AUTO: 0.02 X10*3/UL (ref 0–0.5)
IMM GRANULOCYTES NFR BLD AUTO: 0.4 % (ref 0–0.9)
LDLC SERPL CALC-MCNC: 35 MG/DL
LYMPHOCYTES # BLD AUTO: 0.75 X10*3/UL (ref 0.8–3)
LYMPHOCYTES NFR BLD AUTO: 14 %
MAGNESIUM SERPL-MCNC: 2.38 MG/DL (ref 1.6–2.4)
MCH RBC QN AUTO: 22.4 PG (ref 26–34)
MCHC RBC AUTO-ENTMCNC: 28.6 G/DL (ref 32–36)
MCV RBC AUTO: 78 FL (ref 80–100)
MONOCYTES # BLD AUTO: 0.47 X10*3/UL (ref 0.05–0.8)
MONOCYTES NFR BLD AUTO: 8.8 %
NEUTROPHILS # BLD AUTO: 4.01 X10*3/UL (ref 1.6–5.5)
NEUTROPHILS NFR BLD AUTO: 75 %
NON HDL CHOLESTEROL: 54 MG/DL (ref 0–149)
NRBC BLD-RTO: 0 /100 WBCS (ref 0–0)
PLATELET # BLD AUTO: 302 X10*3/UL (ref 150–450)
POTASSIUM SERPL-SCNC: 4.7 MMOL/L (ref 3.5–5.3)
PROT SERPL-MCNC: 6.2 G/DL (ref 6.4–8.2)
RBC # BLD AUTO: 5.18 X10*6/UL (ref 4–5.2)
SODIUM SERPL-SCNC: 141 MMOL/L (ref 136–145)
T4 FREE SERPL-MCNC: 0.99 NG/DL (ref 0.61–1.12)
TRIGL SERPL-MCNC: 93 MG/DL (ref 0–149)
TSH SERPL-ACNC: 11.57 MIU/L (ref 0.44–3.98)
VIT B12 SERPL-MCNC: 2196 PG/ML (ref 211–911)
VLDL: 19 MG/DL (ref 0–40)
WBC # BLD AUTO: 5.4 X10*3/UL (ref 4.4–11.3)

## 2024-02-07 PROCEDURE — 1160F RVW MEDS BY RX/DR IN RCRD: CPT | Performed by: INTERNAL MEDICINE

## 2024-02-07 PROCEDURE — 84481 FREE ASSAY (FT-3): CPT

## 2024-02-07 PROCEDURE — 83521 IG LIGHT CHAINS FREE EACH: CPT

## 2024-02-07 PROCEDURE — 82607 VITAMIN B-12: CPT

## 2024-02-07 PROCEDURE — 84165 PROTEIN E-PHORESIS SERUM: CPT

## 2024-02-07 PROCEDURE — 84156 ASSAY OF PROTEIN URINE: CPT

## 2024-02-07 PROCEDURE — 93000 ELECTROCARDIOGRAM COMPLETE: CPT | Performed by: INTERNAL MEDICINE

## 2024-02-07 PROCEDURE — 3074F SYST BP LT 130 MM HG: CPT | Performed by: INTERNAL MEDICINE

## 2024-02-07 PROCEDURE — 99215 OFFICE O/P EST HI 40 MIN: CPT | Performed by: INTERNAL MEDICINE

## 2024-02-07 PROCEDURE — 84443 ASSAY THYROID STIM HORMONE: CPT

## 2024-02-07 PROCEDURE — 1157F ADVNC CARE PLAN IN RCRD: CPT | Performed by: INTERNAL MEDICINE

## 2024-02-07 PROCEDURE — 86334 IMMUNOFIX E-PHORESIS SERUM: CPT

## 2024-02-07 PROCEDURE — 1125F AMNT PAIN NOTED PAIN PRSNT: CPT | Performed by: INTERNAL MEDICINE

## 2024-02-07 PROCEDURE — 83880 ASSAY OF NATRIURETIC PEPTIDE: CPT

## 2024-02-07 PROCEDURE — 1159F MED LIST DOCD IN RCRD: CPT | Performed by: INTERNAL MEDICINE

## 2024-02-07 PROCEDURE — 1036F TOBACCO NON-USER: CPT | Performed by: INTERNAL MEDICINE

## 2024-02-07 PROCEDURE — 82652 VIT D 1 25-DIHYDROXY: CPT

## 2024-02-07 PROCEDURE — 1123F ACP DISCUSS/DSCN MKR DOCD: CPT | Performed by: INTERNAL MEDICINE

## 2024-02-07 PROCEDURE — 36415 COLL VENOUS BLD VENIPUNCTURE: CPT

## 2024-02-07 PROCEDURE — 3078F DIAST BP <80 MM HG: CPT | Performed by: INTERNAL MEDICINE

## 2024-02-07 PROCEDURE — 84155 ASSAY OF PROTEIN SERUM: CPT

## 2024-02-07 PROCEDURE — 85025 COMPLETE CBC W/AUTO DIFF WBC: CPT

## 2024-02-07 PROCEDURE — 86320 SERUM IMMUNOELECTROPHORESIS: CPT | Performed by: INTERNAL MEDICINE

## 2024-02-07 PROCEDURE — 84439 ASSAY OF FREE THYROXINE: CPT

## 2024-02-07 PROCEDURE — 80061 LIPID PANEL: CPT

## 2024-02-07 PROCEDURE — 83735 ASSAY OF MAGNESIUM: CPT

## 2024-02-07 PROCEDURE — 80053 COMPREHEN METABOLIC PANEL: CPT

## 2024-02-07 PROCEDURE — 84165 PROTEIN E-PHORESIS SERUM: CPT | Performed by: INTERNAL MEDICINE

## 2024-02-07 RX ORDER — TORSEMIDE 20 MG/1
60 TABLET ORAL DAILY
Qty: 270 TABLET | Refills: 3 | Status: SHIPPED | OUTPATIENT
Start: 2024-02-07 | End: 2025-02-06

## 2024-02-07 RX ORDER — ATORVASTATIN CALCIUM 80 MG/1
80 TABLET, FILM COATED ORAL NIGHTLY
Qty: 90 TABLET | Refills: 3 | Status: SHIPPED | OUTPATIENT
Start: 2024-02-07 | End: 2024-05-17 | Stop reason: ALTCHOICE

## 2024-02-07 NOTE — TELEPHONE ENCOUNTER
She called to confirm instructions on her increase in torsemide dosing.  She wanted to know if she could take all 3 tablets at once in the morning.  Dr. Latham did write for 20 mg, 3 tablets, once daily for a total dose of 60  mg daily.  She can take at once or divide as long as she is taking 60 mg each day.  She verbalized understanding.

## 2024-02-07 NOTE — PROGRESS NOTES
"Chief Complaint:   No chief complaint on file.     History Of Present Illness:    Adwoa Forrest is a 88 y.o. female with recent hospitalization for acute on chronic diastolic heart failure, moderate LVH here for follow-up.  She has history of persistent atrial fibrillation status post AV derek ablation and permanent pacemaker placement, failed Watchman device placement in 2018 requiring urgent surgery and left atrial appendage ligation, hypertension recurrent GI bleed not on oral anticoagulants, on amiodarone for nonsustained VT and symptomatic PVCs.  She also has chronic diastolic heart failure and follows regularly in the CHF clinic.  She had the heart failure exacerbation after her diuretics were cut back due to worsening renal function.  She also had a fracture and nonhealing wound.  We started her back on high-dose diuretics and added Aldactone unfortunately with that she developed hyperkalemia and Aldactone had to be stopped.  Denies any use of nonsteroidal inflammatories or other symptoms otherwise.  Interestingly, since starting on amiodarone she has converted to sinus rhythm.     At one time she was having deranged LFTs that improved after discontinuation of the fish oil she was taking.  Her EKG shows atrial paced ventricular paced rhythm.   .     Last Recorded Vitals:  Vitals:    02/07/24 1359   BP: 122/74   Pulse: 65   Weight: 45.4 kg (100 lb)   Height: 1.575 m (5' 2\")       Past Medical History:  She has a past medical history of Abnormal findings on diagnostic imaging of heart and coronary circulation (06/13/2017), Acute on chronic combined systolic (congestive) and diastolic (congestive) heart failure (CMS/HCC) (09/28/2017), Acute on chronic combined systolic (congestive) and diastolic (congestive) heart failure (CMS/HCC) (02/05/2020), Anemia, unspecified (11/18/2020), Chronic diastolic (congestive) heart failure (CMS/HCC) (09/15/2020), Disorder of the skin and subcutaneous tissue, unspecified " (02/16/2020), Diverticulitis of large intestine with perforation and abscess without bleeding, Diverticulosis of intestine, part unspecified, without perforation or abscess without bleeding, Dorsalgia, unspecified (08/16/2019), Encounter for immunization (11/13/2019), Encounter for surgical aftercare following surgery on the circulatory system (02/18/2019), Essential (primary) hypertension (11/02/2022), Gastro-esophageal reflux disease without esophagitis (02/05/2020), 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), Long term (current) use of anticoagulants (10/22/2018), Melena (04/10/2020), Old myocardial infarction (10/19/2022), Other forms of dyspnea (01/24/2020), Other long term (current) drug therapy (05/10/2018), Other specified cardiac arrhythmias (02/26/2020), Paroxysmal atrial fibrillation (CMS/HCC) (02/01/2018), Permanent atrial fibrillation (CMS/HCC) (01/24/2020), Personal history of diseases of the blood and blood-forming organs and certain disorders involving the immune mechanism (04/10/2020), Personal history of other diseases of the circulatory system (11/04/2018), Personal history of other diseases of the digestive system (10/22/2018), Personal history of other diseases of the musculoskeletal system and connective tissue, Personal history of other diseases of the musculoskeletal system and connective tissue (08/16/2019), Personal history of other endocrine, nutritional and metabolic disease (02/18/2019), Personal history of other specified conditions (08/13/2020), Personal history of other specified conditions, Personal history of transient ischemic attack (TIA), and cerebral infarction without residual deficits (02/01/2018), Psychophysiologic insomnia (11/18/2020), Segmental and somatic dysfunction of rib cage (08/16/2019), Segmental and somatic dysfunction of thoracic region (08/16/2019), and Segmental and somatic dysfunction of upper extremity (08/16/2019).    Past  Surgical History:  She has a past surgical history that includes Cataract extraction (05/04/2016); Appendectomy (05/04/2016); Hysterectomy (05/04/2016); Tonsillectomy (05/04/2016); Cholecystectomy (05/04/2016); Other surgical history (02/18/2019); Other surgical history (04/26/2018); Cardiac pacemaker placement (04/26/2018); Coronary angioplasty (02/01/2018); and Colectomy partial / total (08/17/2018).      Social History:  She reports that she quit smoking about 34 years ago. Her smoking use included cigarettes. She has never been exposed to tobacco smoke. She has never used smokeless tobacco. She reports that she does not drink alcohol and does not use drugs.    Family History:  Family History   Problem Relation Name Age of Onset    Coronary artery disease Mother      Coronary artery disease Father          Allergies:  Codeine, Hydrocodone, Sotalol, and Tetracyclines    Outpatient Medications:  Current Outpatient Medications   Medication Instructions    ALPRAZolam (XANAX) 0.5 mg, oral, Nightly    amiodarone (Pacerone) 200 mg tablet 1 tablet, oral, Daily    aspirin 81 mg EC tablet 1 tablet, oral, Daily    atorvastatin (LIPITOR) 80 mg, oral, Nightly    cyanocobalamin (Vitamin B-12) 1,000 mcg tablet 2 tablets, oral, Daily    docusate sodium (Colace) 100 mg capsule 1 capsule, oral, Every 12 hours    Farxiga 10 mg, oral, Daily    glucagon (GLUCAGEN) 1 mg, intramuscular, Once as needed    levothyroxine (Synthroid, Levoxyl) 25 mcg tablet TAKE 1 TABLET BY MOUTH EVERY DAY    melatonin 3 mg, oral, Nightly    metoprolol succinate XL (Toprol-XL) 50 mg 24 hr tablet 1 tablet, oral, Daily    torsemide (DEMADEX) 30 mg, oral, Daily    traZODone (DESYREL) 50 mg, oral, Nightly PRN       Physical Exam:  Physical Exam  Vitals reviewed.   Constitutional:       Appearance: Normal appearance.   Neck:      Vascular: No carotid bruit or JVD.   Cardiovascular:      Rate and Rhythm: Normal rate and regular rhythm.      Pulses: Normal pulses.  "     Heart sounds: Normal heart sounds, S1 normal and S2 normal.   Pulmonary:      Effort: Pulmonary effort is normal. No respiratory distress.      Breath sounds: No wheezing or rales.   Abdominal:      General: Abdomen is flat.      Palpations: Abdomen is soft.   Musculoskeletal:      Right lower le+ Pitting Edema present.      Left lower le+ Pitting Edema present.   Skin:     General: Skin is warm.   Neurological:      Mental Status: She is alert and oriented to person, place, and time. Mental status is at baseline.   Psychiatric:         Mood and Affect: Mood normal.         Behavior: Behavior normal.           Last Labs:  CBC -  Lab Results   Component Value Date    WBC 6.3 2023    HGB 12.5 2023    HCT 41.8 2023    MCV 86 2023     2023       CMP -  Lab Results   Component Value Date    CALCIUM 9.2 2023    PHOS 4.1 2023    PROT 6.6 2023    ALBUMIN 4.5 2023    AST 31 2023    ALT 31 2023    ALKPHOS 103 2023    BILITOT 0.6 2023       LIPID PANEL -   Lab Results   Component Value Date    CHOL 106 2023    TRIG 77 2023    HDL 59.4 2023    CHHDL 1.8 2023    LDLF 31 2023    VLDL 15 2023       RENAL FUNCTION PANEL -   Lab Results   Component Value Date    GLUCOSE 73 (L) 2023     2023    K 4.7 2023     2023    CO2 31 2023    ANIONGAP 13 2023    BUN 54 (H) 2023    CREATININE 1.67 (H) 2023    CALCIUM 9.2 2023    PHOS 4.1 2023    ALBUMIN 4.5 2023        Lab Results   Component Value Date    BNP 1,309 (H) 2023       Last Cardiology Tests:  ECG:  ECG 12 lead (Clinic Performed) 2023      Echo:  Transthoracic echo (TTE) complete 10/03/2023      Ejection Fractions:  No results found for: \"EF\"    Cath:  No results found for this or any previous visit from the past 1095 days.      Stress Test:  No results found for " this or any previous visit from the past 1095 days.      Cardiac Imaging:  No results found for this or any previous visit from the past 1095 days.          Assessment/Plan   1-acute on chronic diastolic heart failure-she is having recurrent heart failure, increase torsemide close follow-up in CHF clinic.     Unable to tolerate Aldactone due to hyperkalemia continue SGLT2 inhibitors.  Need to evaluate for amyloid cardiomyopathy see below.    2-persistent atrial fibrillation-she seems to be maintaining sinus rhythm since being on amiodarone.  She is not anticoagulated but had a left atrial appendage ligation.  She has history of GI bleed.     3-nonsustained VT and PVCs highly symptomatic and remains on amiodarone with resolution of symptoms.  Outpatient monitoring.    4-LVH, CKD, chronic diastolic heart failure-need to evaluate for amyloid cardiomyopathy.      Michael Latham MD

## 2024-02-08 LAB
KAPPA LC SERPL-MCNC: 3.13 MG/DL (ref 0.33–1.94)
KAPPA LC/LAMBDA SER: 2.07 {RATIO} (ref 0.26–1.65)
LAMBDA LC SERPL-MCNC: 1.51 MG/DL (ref 0.57–2.63)
PROT SERPL-MCNC: 6.2 G/DL (ref 6.4–8.2)
PROT UR-ACNC: 11 MG/DL (ref 5–25)
T3FREE SERPL-MCNC: 2.3 PG/ML (ref 2.3–4.2)

## 2024-02-10 LAB — 1,25(OH)2D3 SERPL-MCNC: 34 PG/ML (ref 19.9–79.3)

## 2024-02-12 ENCOUNTER — HOME CARE VISIT (OUTPATIENT)
Dept: HOME HEALTH SERVICES | Facility: HOME HEALTH | Age: 89
End: 2024-02-12
Payer: MEDICARE

## 2024-02-12 VITALS
WEIGHT: 101 LBS | OXYGEN SATURATION: 97 % | DIASTOLIC BLOOD PRESSURE: 60 MMHG | BODY MASS INDEX: 18.47 KG/M2 | HEART RATE: 73 BPM | TEMPERATURE: 97.4 F | RESPIRATION RATE: 18 BRPM | SYSTOLIC BLOOD PRESSURE: 90 MMHG

## 2024-02-12 LAB
ALBUMIN: 3.9 G/DL (ref 3.4–5)
ALPHA 1 GLOBULIN: 0.4 G/DL (ref 0.2–0.6)
ALPHA 2 GLOBULIN: 0.6 G/DL (ref 0.4–1.1)
BETA GLOBULIN: 0.7 G/DL (ref 0.5–1.2)
GAMMA GLOBULIN: 0.6 G/DL (ref 0.5–1.4)
IMMUNOFIXATION COMMENT: NORMAL
PATH REVIEW - SERUM IMMUNOFIXATION: NORMAL
PATH REVIEW-SERUM PROTEIN ELECTROPHORESIS: NORMAL
PROTEIN ELECTROPHORESIS COMMENT: NORMAL

## 2024-02-12 PROCEDURE — G0299 HHS/HOSPICE OF RN EA 15 MIN: HCPCS

## 2024-02-12 SDOH — ECONOMIC STABILITY: GENERAL

## 2024-02-12 ASSESSMENT — ENCOUNTER SYMPTOMS
FATIGUES EASILY: 1
PERSON REPORTING PAIN: PATIENT
EYE WATERING: 1
HIGHEST PAIN SEVERITY IN PAST 24 HOURS: 0/10
LOWEST PAIN SEVERITY IN PAST 24 HOURS: 0/10
CHANGE IN APPETITE: UNCHANGED
PAIN: PATIENT STATES THAT SHE HAS NOT HAD ANY PAIN IN THE LAST 24 HOURS.
EYE REDNESS: 1
DYSPNEA ON EXERTION: 1
APPETITE LEVEL: GOOD
DENIES PAIN: 1
MUSCLE WEAKNESS: 1
PAIN SEVERITY GOAL: 0/10
EYE ITCHING: 1

## 2024-02-12 ASSESSMENT — ACTIVITIES OF DAILY LIVING (ADL)
CURRENT_FUNCTION: ONE PERSON
MONEY MANAGEMENT (EXPENSES/BILLS): NEEDS ASSISTANCE
AMBULATION ASSISTANCE: ONE PERSON

## 2024-02-16 ENCOUNTER — TELEPHONE (OUTPATIENT)
Dept: CARDIOLOGY | Facility: CLINIC | Age: 89
End: 2024-02-16
Payer: MEDICARE

## 2024-02-16 ENCOUNTER — OFFICE VISIT (OUTPATIENT)
Dept: WOUND CARE | Facility: CLINIC | Age: 89
End: 2024-02-16
Payer: MEDICARE

## 2024-02-16 DIAGNOSIS — I25.10 CORONARY ARTERY DISEASE, UNSPECIFIED VESSEL OR LESION TYPE, UNSPECIFIED WHETHER ANGINA PRESENT, UNSPECIFIED WHETHER NATIVE OR TRANSPLANTED HEART: ICD-10-CM

## 2024-02-16 DIAGNOSIS — I25.2 HX OF NON-ST ELEVATION MYOCARDIAL INFARCTION (NSTEMI): ICD-10-CM

## 2024-02-16 DIAGNOSIS — I51.7 LEFT VENTRICULAR HYPERTROPHY: ICD-10-CM

## 2024-02-16 DIAGNOSIS — I50.32 CHRONIC HEART FAILURE WITH PRESERVED EJECTION FRACTION (MULTI): Primary | ICD-10-CM

## 2024-02-16 DIAGNOSIS — I21.4 NON-ST ELEVATION (NSTEMI) MYOCARDIAL INFARCTION (MULTI): ICD-10-CM

## 2024-02-16 DIAGNOSIS — N18.30 HYPERTENSIVE HEART AND CHRONIC KIDNEY DISEASE STAGE 3 (MULTI): ICD-10-CM

## 2024-02-16 DIAGNOSIS — D89.89 KAPPA LIGHT CHAIN DISEASE (MULTI): ICD-10-CM

## 2024-02-16 DIAGNOSIS — I13.0 HYPERTENSIVE HEART AND KIDNEY DISEASE WITH CHRONIC DIASTOLIC CONGESTIVE HEART FAILURE AND STAGE 3B CHRONIC KIDNEY DISEASE (MULTI): ICD-10-CM

## 2024-02-16 DIAGNOSIS — N18.32 HYPERTENSIVE HEART AND KIDNEY DISEASE WITH CHRONIC DIASTOLIC CONGESTIVE HEART FAILURE AND STAGE 3B CHRONIC KIDNEY DISEASE (MULTI): ICD-10-CM

## 2024-02-16 DIAGNOSIS — I43 CARDIOMYOPATHY IN DISEASES CLASSIFIED ELSEWHERE (MULTI): ICD-10-CM

## 2024-02-16 DIAGNOSIS — Z95.5 PRESENCE OF CORONARY ANGIOPLASTY IMPLANT AND GRAFT: ICD-10-CM

## 2024-02-16 DIAGNOSIS — I50.32 HYPERTENSIVE HEART AND KIDNEY DISEASE WITH CHRONIC DIASTOLIC CONGESTIVE HEART FAILURE AND STAGE 3B CHRONIC KIDNEY DISEASE (MULTI): ICD-10-CM

## 2024-02-16 DIAGNOSIS — I13.10 HYPERTENSIVE HEART AND CHRONIC KIDNEY DISEASE STAGE 3 (MULTI): ICD-10-CM

## 2024-02-16 DIAGNOSIS — I50.42 CHRONIC COMBINED SYSTOLIC AND DIASTOLIC CONGESTIVE HEART FAILURE (MULTI): ICD-10-CM

## 2024-02-16 PROCEDURE — 11042 DBRDMT SUBQ TIS 1ST 20SQCM/<: CPT

## 2024-02-16 NOTE — TELEPHONE ENCOUNTER
----- Message from Michael Latham MD sent at 2/8/2024 10:56 AM EST -----  Please refer to PCP to consider hemonc eval for m myeloma/amyloid

## 2024-02-16 NOTE — TELEPHONE ENCOUNTER
Left a message on her voice mail that I was trying to call her with the results of her blood work along with Dr. Latham's recommendations.  She is recommending she follow up with her primary care doctor for a few, no urgent, abnormal findings.      She has a follow up appointment with Rosangela next week.

## 2024-02-16 NOTE — TELEPHONE ENCOUNTER
She called back and I was able to give her results with recommendations.  She confirmed her visit with Rosangela next week and as follow up scheduled with Dr. Farmer in April.

## 2024-02-19 ENCOUNTER — APPOINTMENT (OUTPATIENT)
Dept: RADIOLOGY | Facility: HOSPITAL | Age: 89
End: 2024-02-19
Payer: MEDICARE

## 2024-02-19 ENCOUNTER — HOME CARE VISIT (OUTPATIENT)
Dept: HOME HEALTH SERVICES | Facility: HOME HEALTH | Age: 89
End: 2024-02-19
Payer: MEDICARE

## 2024-02-19 VITALS
TEMPERATURE: 97.3 F | WEIGHT: 100 LBS | DIASTOLIC BLOOD PRESSURE: 58 MMHG | RESPIRATION RATE: 20 BRPM | SYSTOLIC BLOOD PRESSURE: 102 MMHG | BODY MASS INDEX: 18.29 KG/M2 | HEART RATE: 78 BPM | OXYGEN SATURATION: 98 %

## 2024-02-19 PROCEDURE — G0299 HHS/HOSPICE OF RN EA 15 MIN: HCPCS

## 2024-02-19 ASSESSMENT — ENCOUNTER SYMPTOMS
CHANGE IN APPETITE: UNCHANGED
APPETITE LEVEL: FAIR

## 2024-02-19 ASSESSMENT — ACTIVITIES OF DAILY LIVING (ADL)
ENTERING_EXITING_HOME: SUPERVISION
OASIS_M1830: 02

## 2024-02-19 NOTE — HOME HEALTH
Wound care done, adriel noted at [facility] caregiver can assist weekly and everyother week to wound center,  recert done

## 2024-02-20 ENCOUNTER — HOSPITAL ENCOUNTER (OUTPATIENT)
Dept: CARDIOLOGY | Facility: HOSPITAL | Age: 89
Discharge: HOME | End: 2024-02-20
Payer: MEDICARE

## 2024-02-20 DIAGNOSIS — I51.7 LEFT VENTRICULAR HYPERTROPHY: ICD-10-CM

## 2024-02-20 DIAGNOSIS — I47.29 OTHER VENTRICULAR TACHYCARDIA (MULTI): ICD-10-CM

## 2024-02-20 PROCEDURE — 2500000004 HC RX 250 GENERAL PHARMACY W/ HCPCS (ALT 636 FOR OP/ED): Performed by: INTERNAL MEDICINE

## 2024-02-20 PROCEDURE — 93308 TTE F-UP OR LMTD: CPT | Performed by: INTERNAL MEDICINE

## 2024-02-20 PROCEDURE — 93308 TTE F-UP OR LMTD: CPT

## 2024-02-20 RX ADMIN — PERFLUTREN 2 ML OF DILUTION: 6.52 INJECTION, SUSPENSION INTRAVENOUS at 14:14

## 2024-02-21 LAB
EJECTION FRACTION APICAL 4 CHAMBER: 33.5
EJECTION FRACTION: 39 %
LEFT VENTRICLE INTERNAL DIMENSION DIASTOLE: 3.62 CM (ref 3.5–6)

## 2024-02-21 RX ORDER — AMIODARONE HYDROCHLORIDE 200 MG/1
200 TABLET ORAL DAILY
Qty: 90 TABLET | Refills: 3 | Status: SHIPPED | OUTPATIENT
Start: 2024-02-21 | End: 2024-06-11 | Stop reason: SDUPTHER

## 2024-02-21 NOTE — RESULT ENCOUNTER NOTE
Abnormal results, recommending follow up in 1-2 weeks. RN will be calling to facilitate.   Her LV function has declined.  Please follow-up with Rosangela to maximize medical therapy arrange stress test and follow-up with Dr. Gonzalez to consider CRT upgrade.  I do not believe she has a BiV pacer.

## 2024-02-22 ENCOUNTER — OFFICE VISIT (OUTPATIENT)
Dept: CARDIOLOGY | Facility: HOSPITAL | Age: 89
End: 2024-02-22
Payer: MEDICARE

## 2024-02-22 VITALS
SYSTOLIC BLOOD PRESSURE: 144 MMHG | OXYGEN SATURATION: 93 % | BODY MASS INDEX: 18.56 KG/M2 | WEIGHT: 101.5 LBS | RESPIRATION RATE: 20 BRPM | HEART RATE: 78 BPM | DIASTOLIC BLOOD PRESSURE: 75 MMHG

## 2024-02-22 DIAGNOSIS — I50.32 CHRONIC HEART FAILURE WITH PRESERVED EJECTION FRACTION (MULTI): Primary | ICD-10-CM

## 2024-02-22 DIAGNOSIS — R07.2 PRECORDIAL PAIN: ICD-10-CM

## 2024-02-22 DIAGNOSIS — R93.1 ABNORMAL ECHOCARDIOGRAM: ICD-10-CM

## 2024-02-22 PROCEDURE — 3077F SYST BP >= 140 MM HG: CPT | Performed by: NURSE PRACTITIONER

## 2024-02-22 PROCEDURE — 1157F ADVNC CARE PLAN IN RCRD: CPT | Performed by: NURSE PRACTITIONER

## 2024-02-22 PROCEDURE — 1125F AMNT PAIN NOTED PAIN PRSNT: CPT | Performed by: NURSE PRACTITIONER

## 2024-02-22 PROCEDURE — 1159F MED LIST DOCD IN RCRD: CPT | Performed by: NURSE PRACTITIONER

## 2024-02-22 PROCEDURE — 99213 OFFICE O/P EST LOW 20 MIN: CPT | Mod: ZK | Performed by: NURSE PRACTITIONER

## 2024-02-22 PROCEDURE — 99214 OFFICE O/P EST MOD 30 MIN: CPT | Performed by: NURSE PRACTITIONER

## 2024-02-22 PROCEDURE — 1160F RVW MEDS BY RX/DR IN RCRD: CPT | Performed by: NURSE PRACTITIONER

## 2024-02-22 PROCEDURE — 1123F ACP DISCUSS/DSCN MKR DOCD: CPT | Performed by: NURSE PRACTITIONER

## 2024-02-22 PROCEDURE — 1036F TOBACCO NON-USER: CPT | Performed by: NURSE PRACTITIONER

## 2024-02-22 PROCEDURE — 3078F DIAST BP <80 MM HG: CPT | Performed by: NURSE PRACTITIONER

## 2024-02-22 RX ORDER — REGADENOSON 0.08 MG/ML
0.4 INJECTION, SOLUTION INTRAVENOUS
Status: CANCELLED | OUTPATIENT
Start: 2024-02-22

## 2024-02-22 ASSESSMENT — ENCOUNTER SYMPTOMS
ABDOMINAL DISTENTION: 0
PALPITATIONS: 0
FATIGUE: 1
LIGHT-HEADEDNESS: 0
BLOOD IN STOOL: 0
SHORTNESS OF BREATH: 1
DEPRESSION: 0
OCCASIONAL FEELINGS OF UNSTEADINESS: 0
WEAKNESS: 0
COUGH: 0
EYES NEGATIVE: 1
WHEEZING: 0
CHEST TIGHTNESS: 0
HEMATURIA: 0
FEVER: 0
CHILLS: 0
ACTIVITY CHANGE: 0
CONFUSION: 0
LOSS OF SENSATION IN FEET: 0

## 2024-02-22 ASSESSMENT — PAIN SCALES - GENERAL: PAINLEVEL: 3

## 2024-02-22 ASSESSMENT — PATIENT HEALTH QUESTIONNAIRE - PHQ9
2. FEELING DOWN, DEPRESSED OR HOPELESS: NOT AT ALL
1. LITTLE INTEREST OR PLEASURE IN DOING THINGS: NOT AT ALL
SUM OF ALL RESPONSES TO PHQ9 QUESTIONS 1 AND 2: 0

## 2024-02-22 NOTE — PATIENT INSTRUCTIONS
Thank you for coming in today.  If you have any questions you may contact the office Monday through Friday at [phone] or on week ends at [phone].    Please continue current medications.     Lab work this week as ordered.     Please follow  a 2 GM sodium diet and limit fluid intake to 2 liters per day or 8 servings ( serving size = 8 oz. = 1 cup = 240 ml) per day.   Please avoid processed meat products (luncheon meats, sausages, loya, hot dogs for example) eat 4 servings of vegetables and 1-2 whole servings of whole fruits per day.   Please weigh daily and call [phone] for weight gain of 3 pounds in 24 hours or 5 pounds or if you experience increased swelling or shortness of breath.    Will order stress MPI scan.    Keep appt with nephrology        Follow up:   Follow up in 4 weeks.     Referral to advanced HF for evaluation.     Please be sure to follow up with your cardiologist at [facility] once every year. Call [phone] to schedule appointment if you do not have a follow up appointment scheduled already.

## 2024-02-22 NOTE — PROGRESS NOTES
Subjective   Patient ID: Adwoa Forrest is a 88 y.o. female who presents for follow-up of congestive heart failure.     Current Outpatient Medications:     ALPRAZolam (Xanax) 0.5 mg tablet, Take 1 tablet (0.5 mg) by mouth once daily at bedtime., Disp: 30 tablet, Rfl: 3    amiodarone (Pacerone) 200 mg tablet, Take 1 tablet (200 mg) by mouth once daily., Disp: 90 tablet, Rfl: 3    aspirin 81 mg EC tablet, Take 1 tablet (81 mg) by mouth once daily., Disp: , Rfl:     atorvastatin (Lipitor) 80 mg tablet, Take 1 tablet (80 mg) by mouth once daily at bedtime., Disp: 90 tablet, Rfl: 3    docusate sodium (Colace) 100 mg capsule, Take 1 capsule (100 mg) by mouth every 12 hours., Disp: , Rfl:     Farxiga 10 mg, TAKE 1 TABLET BY MOUTH EVERY DAY, Disp: 90 tablet, Rfl: 3    levothyroxine (Synthroid, Levoxyl) 25 mcg tablet, TAKE 1 TABLET BY MOUTH EVERY DAY, Disp: 90 tablet, Rfl: 3    melatonin 3 mg tablet, Take 1 tablet (3 mg) by mouth once daily at bedtime., Disp: , Rfl:     metoprolol succinate XL (Toprol-XL) 50 mg 24 hr tablet, Take 1 tablet (50 mg) by mouth once daily., Disp: , Rfl:     torsemide (Demadex) 20 mg tablet, Take 3 tablets (60 mg) by mouth once daily., Disp: 270 tablet, Rfl: 3     HPI   Past medical history of ischemic heart disease with PCI in the past.  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  She has had nonsustained ventricular tachycardia in the past from which she was symptomatic.  She has been pretty well-controlled with that on amiodarone.  More recently she had worsening congestion.  Echocardiogram has showed a significant drop in her ejection fraction from 70% to 40%.  During the same time she has had worsening of her renal insufficiency as well.  At last visit with Dr. Saenz of already the diuretic was doubled and she has had improvement in her congestive symptoms.  However she still remains quite fatigued.  She has had atypical precordial symptoms but  also some symptoms in her left arm.  See assessment and plan and orders.      Review of Systems   Constitutional:  Positive for fatigue. Negative for activity change, chills and fever.   HENT:  Negative for hearing loss.    Eyes: Negative.    Respiratory:  Positive for shortness of breath. Negative for cough, chest tightness and wheezing.    Cardiovascular:  Positive for chest pain. Negative for palpitations and leg swelling.        She has some precordial pain that is reproducible with palpation at the lower edge of the sternum.  However she is also having some paresthesia of the left upper arm intermittently as well.  She has had increased fatigue and shortness of breath.   Gastrointestinal:  Negative for abdominal distention and blood in stool.   Genitourinary:  Negative for hematuria.   Neurological:  Negative for syncope, weakness and light-headedness.   Psychiatric/Behavioral:  Negative for confusion.        Objective     /75 (BP Location: Right arm, Patient Position: Sitting)   Pulse 78   Resp 20   Wt 46 kg (101 lb 8 oz)   LMP  (LMP Unknown)   SpO2 93%   BMI 18.56 kg/m²         Transthoracic Echo Limited    Result Date: 2/21/2024              [place] [id number]      Phone [phone] Fax [phone] TRANSTHORACIC ECHOCARDIOGRAM REPORT  Patient Name:      CHERRY Noel Physician:    [id number] Darrel Hampton MD Study Date:        2/20/2024            Ordering Provider:    [id number] BROOKE FOFANA MRN/PID:           [id number]             Fellow: Accession#:        [id number]         Nurse:                Nette Ott RN Date of Birth/Age: 1935 / 88 years  Sonographer:          Estelita Brown RDCS Gender:            F                    Additional  Staff: Height:            157.48 cm            Admit Date: Weight:            45.36 kg             Admission Status:     Outpatient BSA / BMI:         1.42 m2 / 18.29      Department Location:                    kg/m2 Blood Pressure: 102 /58 mmHg Study Type:    TRANSTHORACIC ECHO (TTE) LIMITED Diagnosis/ICD: Ventricular hypertrophy or dilatation-I51.7 Indication:    Left Ventricular hypertrophy CPT Codes:     Echo Limited-08717 Patient History: Pertinent History: LVH. Study Detail: Technically challenging study due to small rib spaces and               prominent lung artifact. Definity used as a contrast agent for               endocardial border definition. Unable to obtain suprasternal notch               and subcostal view.  PHYSICIAN INTERPRETATION: Left Ventricle: Left ventricular systolic function is moderately decreased, with an estimated ejection fraction of 35-40%. There is global hypokinesis of the left ventricle with minor regional variations. The left ventricular cavity size was not assessed. The left ventricular septal wall thickness is moderately increased. There is moderately increased left ventricular posterior wall thickness. There is moderate to severe left ventricular hypertrophy. Left ventricular diastolic filling was not assessed. Left Atrium: The left atrium was not assessed. Right Ventricle: The right ventricle was not assessed. Right ventricular systolic function not assessed. Right Atrium: The right atrium was not assessed. Aortic Valve: The aortic valve was not assessed. Aortic valve regurgitation was not assessed. Mitral Valve: The mitral valve was not assessed. Mitral valve regurgitation was not assessed. Tricuspid Valve: The tricuspid valve was not assessed. Tricuspid regurgitation was not assessed. Pulmonic Valve: The pulmonic valve was not assessed. The pulmonic valve regurgitation was not assessed. Pericardium: Pericardial effusion was not assessed. Aorta: The aortic root was not  assessed.  CONCLUSIONS:  1. Left ventricular systolic function is moderately decreased with a 35-40% estimated ejection fraction.  2. Moderately increased left ventricular septal thickness.  3. The left ventricular posterior wall thickness is moderately increased.  4. There is moderate to severe left ventricular hypertrophy.  5. There is global hypokinesis of the left ventricle with minor regional variations. QUANTITATIVE DATA SUMMARY: 2D MEASUREMENTS:                           Normal Ranges: LAs:           3.00 cm    (2.7-4.0cm) IVSd:          1.62 cm    (0.6-1.1cm) LVPWd:         1.44 cm    (0.6-1.1cm) LVIDd:         3.62 cm    (3.9-5.9cm) LVIDs:         2.69 cm LV Mass Index: 146.9 g/m2 LV % FS        25.6 % LV SYSTOLIC FUNCTION BY 2D PLANIMETRY (MOD):                     Normal Ranges: EF-A4C View: 33.5 % (>=55%) EF-A2C View: 39.2 % EF-Biplane:  39.0 %  [id number] Darrel Hampton MD Electronically signed on 2/21/2024 at 9:38:31 AM  ** Final **     Transthoracic echo (TTE) complete    Result Date: 10/3/2023              [place] [address]      Phone [phone] Fax [phone] TRANSTHORACIC ECHOCARDIOGRAM REPORT  Patient Name:      CHERRY ROJAS JESUS Noel Physician:   [id number] Darrel Hampton MD Study Date:        10/3/2023          Ordering Provider:   [id number] STEWART FERREIRA MRN/PID:           [id number]           Fellow: Accession#:        [id number]       Nurse:               Amairani Zafar RN Date of Birth/Age: 1935 / 88      Sonographer:         Thea Espinoza RDCS                    years Gender:            F                  Additional Staff: Height:            157.48 cm          Admit Date:          10/2/2023 Weight:            50.35 kg           Admission Status:    Inpatient - Routine BSA:               1.49 m2            Department Location: [facility] Blood Pressure: 118 /71 mmHg Study Type:     TRANSTHORACIC ECHO (TTE) COMPLETE Diagnosis/ICD: Heart failure, unspecified-I50.9; Cardiomyopathy in diseases                classified elsewhere-I43 Indication:    CHF, Cardiomyopathy  Study Detail: The following Echo studies were performed: 2D, M-Mode, Doppler and               color flow. Technically challenging study due to body habitus,               prominent lung artifact and poor acoustic windows. Agitated saline               used as a contrast agent for intraseptal flow evaluation and               Optison used as a contrast agent for endocardial border               definition.  PHYSICIAN INTERPRETATION: Left Ventricle: Left ventricular systolic function is normal, with an estimated ejection fraction of 70-75%. There are no regional wall motion abnormalities. The left ventricular cavity size is normal. There is moderate left ventricular hypertrophy. Spectral Doppler shows a normal pattern of left ventricular diastolic filling. Left Atrium: The left atrium is normal in size. A bubble study using agitated saline was performed. Bubble study is negative. Right Ventricle: The right ventricle is normal in size. There is mildly reduced right ventricular systolic function. A device is visualized in the right ventricle. Right Atrium: The right atrium is moderately dilated. There is a device visualized in the right atrium. Aortic Valve: The aortic valve appears structurally normal. There is mild aortic valve cusp calcification. There is mild to moderate aortic valve thickening. There is mild to moderate aortic valve regurgitation. The peak instantaneous gradient of the aortic valve is 4.2 mmHg. The mean gradient of the aortic valve is 2.0 mmHg. Mitral Valve: The mitral valve is normal in structure. There is trace mitral valve regurgitation. Tricuspid Valve: The tricuspid valve is structurally normal. There is mild tricuspid regurgitation. The Doppler estimated RVSP is moderately elevated at 51.7 mmHg. Pulmonic  Valve: The pulmonic valve is not well visualized. There is no indication of pulmonic valve regurgitation. Pericardium: There is no pericardial effusion noted. Aorta: The aortic root is normal.  CONCLUSIONS:  1. Left ventricular systolic function is normal with a 70-75% estimated ejection fraction.  2. There is moderate left ventricular hypertrophy.  3. There is mildly reduced right ventricular systolic function.  4. The right atrium is moderately dilated.  5. Moderately elevated right ventricular systolic pressure.  6. Mild to moderate aortic valve regurgitation. QUANTITATIVE DATA SUMMARY: 2D MEASUREMENTS:                           Normal Ranges: Ao Root d:     3.00 cm    (2.0-3.7cm) LAs:           3.50 cm    (2.7-4.0cm) IVSd:          1.60 cm    (0.6-1.1cm) LVPWd:         1.42 cm    (0.6-1.1cm) LVIDd:         3.91 cm    (3.9-5.9cm) LVIDs:         2.40 cm LV Mass Index: 153.0 g/m2 LV % FS        38.6 % LA VOLUME:                               Normal Ranges: LA Vol A4C:        39.2 ml    (22+/-6mL/m2) LA Vol A2C:        50.4 ml LA Vol BP:         49.6 ml LA Vol Index A4C:  26.3ml/m2 LA Vol Index A2C:  33.8 ml/m2 LA Vol Index BP:   33.3 ml/m2 LA Area A4C:       14.7 cm2 LA Area A2C:       18.6 cm2 LA Major Axis A4C: 4.7 cm LA Major Axis A2C: 5.8 cm RA VOLUME BY A/L METHOD:                       Normal Ranges: RA Area A4C: 24.5 cm2 AORTA MEASUREMENTS:                      Normal Ranges: Ao Sinus, d: 3.00 cm (2.1-3.5cm) Ao STJ, d:   1.89 cm (1.7-3.4cm) Asc Ao, d:   3.60 cm (2.1-3.4cm) LV SYSTOLIC FUNCTION BY 2D PLANIMETRY (MOD):                     Normal Ranges: EF-A4C View: 76.2 % (>=55%) EF-A2C View: 73.7 % EF-Biplane:  74.5 % LV DIASTOLIC FUNCTION:                           Normal Ranges: MV Peak E:    0.58 m/s    (0.7-1.2 m/s) MV Peak A:    0.23 m/s    (0.42-0.7 m/s) E/A Ratio:    2.57        (1.0-2.2) MV e'         0.04 m/s    (>8.0) MV lateral e' 0.06 m/s MV medial e'  0.03 m/s MV A Dur:     103.00 msec E/e'  Ratio:   12.96       (<8.0) MITRAL VALVE:                 Normal Ranges: MV DT: 237 msec (150-240msec) AORTIC VALVE:                                   Normal Ranges: AoV Vmax:                1.03 m/s (<=1.7m/s) AoV Peak P.2 mmHg (<20mmHg) AoV Mean P.0 mmHg (1.7-11.5mmHg) LVOT Max Henry:            0.70 m/s (<=1.1m/s) AoV VTI:                 20.70 cm (18-25cm) LVOT VTI:                14.40 cm LVOT Diameter:           2.00 cm  (1.8-2.4cm) AoV Area, VTI:           2.19 cm2 (2.5-5.5cm2) AoV Area,Vmax:           2.13 cm2 (2.5-4.5cm2) AoV Dimensionless Index: 0.70 AORTIC INSUFFICIENCY: AI Vmax:       4.19 m/s AI Half-time:  438 msec AI Decel Rate: 335.50 cm/s2  RIGHT VENTRICLE: RV Basal 3.64 cm RV Mid   3.19 cm RV Major 7.5 cm TAPSE:   20.8 mm RV s'    0.07 m/s TRICUSPID VALVE/RVSP:                             Normal Ranges: Peak TR Velocity: 3.49 m/s RV Syst Pressure: 51.7 mmHg (< 30mmHg) IVC Diam:         1.50 cm  [id number] Darrel Hampton MD Electronically signed on 10/3/2023 at 4:11:07 PM  ** Final **       Lab Results   Component Value Date    BUN 61 (H) 2024    CREATININE 2.08 (H) 2024     (H) 2024    MG 2.38 2024    K 4.7 2024     2024         Constitutional:       General: He is not in acute distress.  HENT:      Head: Normocephalic and atraumatic.      Mouth: Mucous membranes are moist.      Neck:  No JVD or HJR      [place]  Eyes:      Extraocular Movements: .      Conjunctiva/sclera: Conjunctivae normal.    Cardiovascular:      Rate and Rhythm: Normal rate and regular rhythm.      Heart sounds:  S1 S2 normal, no murmur, no S3 or S4      She does have palpable tenderness at midstenal line of chest   Pulmonary:      Effort: Pulmonary effort is normal. No respiratory distress.      Breath sounds: Normal breath sounds. No stridor. No wheezing or rales.   Abdominal:      General: Bowel sounds are normal. There is no distension.      Tenderness:  There is no abdominal tenderness. There is no guarding or rebound.   Musculoskeletal:         General: trace edema at sock line, tenderness or deformity. Normal range of motion.      Comments:   Skin:     General: Skin is warm and dry.   Neurological:      General: No focal deficit present.      Mental Status: alert and oriented to person, place, and time. Mental status is at baseline.     Psychiatric:         Mood and Affect: Mood normal.     Assessment/Plan     Problem List Items Addressed This Visit       Chronic heart failure with preserved ejection fraction (CMS/HCC)       Chronic diastolic heart failure   Etiology: HTN/ASHD/ CKD  AHA Stage: C   NYHA class: 3   Volume Status: reasonably compensated today   GFR: 23     GDMT:  BB-Metoprolol XL 50 mg daily   ARB/ACEI/ARNI - GFR less than 30   MRA -  GFR less than 30   SGLT2i - Farxiga 10 mg once a day   Diuretic -  Torsemide 60 mg  1 tablet daily    Device Therapy: PPM - 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      CHF:  No overt congestion today but is having more fatigue and sob.  EF 40% per recent echocardiogram.  She has had significant rise in Cr. Will recheck BMP/mag today.   GFR is less than 30 which limits GDMT.   I am going to refer her for advanced HF evaluation as well.  She has had a significant change in her overall clinical status.       2. Atrial fibrillation: persistent.   She is s/p AV node ablation and PPM placement in the past. On ASA.  No other OAC due to hx of recurrent GIB     3. NSVT:  Amiodarone.  No palpitations.       3. CKD 3:  Worsening GFR.  Will have patient follow up with Scheduled April appt.      4. HTN:  Controlled.       5. ASHD with PCI to LAD 2017:  Secondary prevention medications ASA, Statin, BB.   She has had significant drop in EF% from 70% to 40%   Will check EKG today.  She is having chest discomfort intermittently that is atypical for CAD but also has had at least 1 episode of numbness is left arm.    EKG today  shows paced rhythm.      Will order stress MPI.  If abnormal she is going to need [facility].

## 2024-02-23 ENCOUNTER — LAB REQUISITION (OUTPATIENT)
Dept: LAB | Facility: HOSPITAL | Age: 89
End: 2024-02-23
Payer: MEDICARE

## 2024-02-23 ENCOUNTER — HOME CARE VISIT (OUTPATIENT)
Dept: HOME HEALTH SERVICES | Facility: HOME HEALTH | Age: 89
End: 2024-02-23
Payer: MEDICARE

## 2024-02-23 VITALS
RESPIRATION RATE: 16 BRPM | DIASTOLIC BLOOD PRESSURE: 58 MMHG | TEMPERATURE: 98 F | SYSTOLIC BLOOD PRESSURE: 105 MMHG | HEART RATE: 55 BPM | OXYGEN SATURATION: 99 %

## 2024-02-23 DIAGNOSIS — I48.21 PERMANENT ATRIAL FIBRILLATION (MULTI): ICD-10-CM

## 2024-02-23 DIAGNOSIS — I50.43 ACUTE ON CHRONIC COMBINED SYSTOLIC (CONGESTIVE) AND DIASTOLIC (CONGESTIVE) HEART FAILURE (MULTI): ICD-10-CM

## 2024-02-23 DIAGNOSIS — N18.32 CHRONIC KIDNEY DISEASE, STAGE 3B (MULTI): ICD-10-CM

## 2024-02-23 LAB
ANION GAP SERPL CALC-SCNC: 13 MMOL/L (ref 10–20)
BUN SERPL-MCNC: 49 MG/DL (ref 6–23)
CALCIUM SERPL-MCNC: 9 MG/DL (ref 8.6–10.3)
CHLORIDE SERPL-SCNC: 99 MMOL/L (ref 98–107)
CO2 SERPL-SCNC: 33 MMOL/L (ref 21–32)
CREAT SERPL-MCNC: 1.86 MG/DL (ref 0.5–1.05)
EGFRCR SERPLBLD CKD-EPI 2021: 26 ML/MIN/1.73M*2
GLUCOSE SERPL-MCNC: 154 MG/DL (ref 74–99)
HOLD SPECIMEN: NORMAL
MAGNESIUM SERPL-MCNC: 2.53 MG/DL (ref 1.6–2.4)
POTASSIUM SERPL-SCNC: 4.3 MMOL/L (ref 3.5–5.3)
SODIUM SERPL-SCNC: 141 MMOL/L (ref 136–145)

## 2024-02-23 PROCEDURE — 400014 HH F/U

## 2024-02-23 PROCEDURE — 80048 BASIC METABOLIC PNL TOTAL CA: CPT

## 2024-02-23 PROCEDURE — 83735 ASSAY OF MAGNESIUM: CPT

## 2024-02-23 PROCEDURE — G0300 HHS/HOSPICE OF LPN EA 15 MIN: HCPCS

## 2024-02-23 ASSESSMENT — ENCOUNTER SYMPTOMS
DENIES PAIN: 1
HIGHEST PAIN SEVERITY IN PAST 24 HOURS: 0/10
CONSTIPATION: 1
SUBJECTIVE PAIN PROGRESSION: UNCHANGED
LAST BOWEL MOVEMENT: 66892
PERSON REPORTING PAIN: PATIENT
STOOL FREQUENCY: LESS THAN DAILY
LOWEST PAIN SEVERITY IN PAST 24 HOURS: 0/10
CHANGE IN APPETITE: UNCHANGED
PAIN SEVERITY GOAL: 0/10
APPETITE LEVEL: GOOD

## 2024-02-23 ASSESSMENT — PAIN SCALES - PAIN ASSESSMENT IN ADVANCED DEMENTIA (PAINAD)
BODYLANGUAGE: 0 - RELAXED.
FACIALEXPRESSION: 0 - SMILING OR INEXPRESSIVE.
CONSOLABILITY: 0
TOTALSCORE: 0
CONSOLABILITY: 0 - NO NEED TO CONSOLE.
FACIALEXPRESSION: 0
NEGVOCALIZATION: 0 - NONE.
BODYLANGUAGE: 0
NEGVOCALIZATION: 0
BREATHING: 0

## 2024-02-26 ENCOUNTER — HOME CARE VISIT (OUTPATIENT)
Dept: HOME HEALTH SERVICES | Facility: HOME HEALTH | Age: 89
End: 2024-02-26
Payer: MEDICARE

## 2024-02-26 VITALS
HEART RATE: 58 BPM | SYSTOLIC BLOOD PRESSURE: 137 MMHG | RESPIRATION RATE: 18 BRPM | DIASTOLIC BLOOD PRESSURE: 60 MMHG | TEMPERATURE: 98.2 F | OXYGEN SATURATION: 99 %

## 2024-02-26 PROCEDURE — G0300 HHS/HOSPICE OF LPN EA 15 MIN: HCPCS

## 2024-02-26 ASSESSMENT — ENCOUNTER SYMPTOMS
APPETITE LEVEL: GOOD
CHANGE IN APPETITE: UNCHANGED
DENIES PAIN: 1
PERSON REPORTING PAIN: PATIENT
SUBJECTIVE PAIN PROGRESSION: UNCHANGED
HIGHEST PAIN SEVERITY IN PAST 24 HOURS: 0/10
LOWEST PAIN SEVERITY IN PAST 24 HOURS: 0/10

## 2024-02-26 NOTE — HOME HEALTH
Patient is alert and oriented times three, vital signs are stable, lungs are clear, patient denies pain, SOB or dizziness.Wound care provided today, patient tolerated well. Ordered 4x4 foam dressing and saline for patient.

## 2024-02-28 NOTE — PROGRESS NOTES
In an effort to ensure that our patients LiveWell, a Team Member has reviewed your chart and identified an opportunity to provide the best care possible. An attempt was made to discuss or schedule overdue Preventive or Disease Management screening.     The Outcome was Contact was made, care gap was discussed - see further documentation. Care Gaps include Colorectal Cancer Screening.   Spoke with patient stated iFOB test was sent out on 4/23, but no result in ACL. Offered patient  sent new iFOBT kit but patient declined will wait to April visit with Dr. You   Pt called into clinic stating she has been very short of breath and is having difficult time getting getting around. Rosangela made aware and advises patient to be seen in Emergency Department. Pt verbalizes understanding of instructions.

## 2024-02-29 ENCOUNTER — HOSPITAL ENCOUNTER (OUTPATIENT)
Dept: CARDIOLOGY | Facility: HOSPITAL | Age: 89
Discharge: HOME | End: 2024-02-29
Payer: MEDICARE

## 2024-02-29 DIAGNOSIS — I48.91 ATRIAL FIBRILLATION, UNSPECIFIED TYPE (MULTI): ICD-10-CM

## 2024-02-29 DIAGNOSIS — I50.42 CHRONIC COMBINED SYSTOLIC AND DIASTOLIC CONGESTIVE HEART FAILURE (MULTI): ICD-10-CM

## 2024-02-29 PROCEDURE — 93280 PM DEVICE PROGR EVAL DUAL: CPT | Performed by: INTERNAL MEDICINE

## 2024-02-29 PROCEDURE — 93280 PM DEVICE PROGR EVAL DUAL: CPT

## 2024-03-01 ENCOUNTER — OFFICE VISIT (OUTPATIENT)
Dept: WOUND CARE | Facility: CLINIC | Age: 89
End: 2024-03-01
Payer: MEDICARE

## 2024-03-01 ENCOUNTER — TELEPHONE (OUTPATIENT)
Dept: CARDIOLOGY | Facility: CLINIC | Age: 89
End: 2024-03-01
Payer: MEDICARE

## 2024-03-01 PROCEDURE — 11042 DBRDMT SUBQ TIS 1ST 20SQCM/<: CPT

## 2024-03-01 NOTE — TELEPHONE ENCOUNTER
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    Adwoa had a pacemaker evalation today which revealed NSVT on 2/19 at 1103 lasting 10 beats with max V 171 bpm.    Also noted 27% AT/AF burden. Pt had 13 episodes since 1/31 with a couple episodes lasting as long as 1.25 days. Pt reports feeling like she had more AFib recently and c/o sweaty, clammy, weakness, dizzy, headaches and just \"not feeling well\" when she is in AFib. I am going to let Dr. Gonzalez know (she saw him in the past) and I will CC you on the email so that you are in the loop.    "

## 2024-03-01 NOTE — TELEPHONE ENCOUNTER
Dr. Latham and Dr. Gonzalez discussed via secure chat.      She had av derek ablation meaning EP gave up on trying to control rhythm.  She is also on amiodarone.  Nothing to do.  Continue current medications and follow up as scheduled.    Called her with update.  She verbalized understanding.

## 2024-03-04 ENCOUNTER — HOME CARE VISIT (OUTPATIENT)
Dept: HOME HEALTH SERVICES | Facility: HOME HEALTH | Age: 89
End: 2024-03-04
Payer: MEDICARE

## 2024-03-04 ENCOUNTER — APPOINTMENT (OUTPATIENT)
Dept: RADIOLOGY | Facility: HOSPITAL | Age: 89
End: 2024-03-04
Payer: MEDICARE

## 2024-03-04 ENCOUNTER — HOSPITAL ENCOUNTER (OUTPATIENT)
Dept: CARDIOLOGY | Facility: HOSPITAL | Age: 89
Discharge: HOME | End: 2024-03-04
Payer: MEDICARE

## 2024-03-04 VITALS
OXYGEN SATURATION: 98 % | TEMPERATURE: 98.3 F | RESPIRATION RATE: 18 BRPM | HEART RATE: 60 BPM | DIASTOLIC BLOOD PRESSURE: 67 MMHG | SYSTOLIC BLOOD PRESSURE: 124 MMHG

## 2024-03-04 LAB
ALBUMIN SERPL BCP-MCNC: 4.3 G/DL (ref 3.4–5)
ALP SERPL-CCNC: 135 U/L (ref 33–136)
ALT SERPL W P-5'-P-CCNC: 30 U/L (ref 7–45)
ANION GAP SERPL CALC-SCNC: 15 MMOL/L (ref 10–20)
AST SERPL W P-5'-P-CCNC: 44 U/L (ref 9–39)
BILIRUB SERPL-MCNC: 0.5 MG/DL (ref 0–1.2)
BUN SERPL-MCNC: 44 MG/DL (ref 6–23)
CALCIUM SERPL-MCNC: 8.8 MG/DL (ref 8.6–10.3)
CARDIAC TROPONIN I PNL SERPL HS: 41 NG/L (ref 0–13)
CHLORIDE SERPL-SCNC: 100 MMOL/L (ref 98–107)
CO2 SERPL-SCNC: 27 MMOL/L (ref 21–32)
CREAT SERPL-MCNC: 2.07 MG/DL (ref 0.5–1.05)
EGFRCR SERPLBLD CKD-EPI 2021: 23 ML/MIN/1.73M*2
GLUCOSE SERPL-MCNC: 85 MG/DL (ref 74–99)
LACTATE SERPL-SCNC: 1.2 MMOL/L (ref 0.4–2)
POTASSIUM SERPL-SCNC: 4.9 MMOL/L (ref 3.5–5.3)
PROT SERPL-MCNC: 6.6 G/DL (ref 6.4–8.2)
SODIUM SERPL-SCNC: 137 MMOL/L (ref 136–145)

## 2024-03-04 PROCEDURE — 84484 ASSAY OF TROPONIN QUANT: CPT | Performed by: EMERGENCY MEDICINE

## 2024-03-04 PROCEDURE — 83605 ASSAY OF LACTIC ACID: CPT | Performed by: EMERGENCY MEDICINE

## 2024-03-04 PROCEDURE — 80053 COMPREHEN METABOLIC PANEL: CPT | Performed by: EMERGENCY MEDICINE

## 2024-03-04 PROCEDURE — G0179 MD RECERTIFICATION HHA PT: HCPCS | Performed by: FAMILY MEDICINE

## 2024-03-04 PROCEDURE — G0300 HHS/HOSPICE OF LPN EA 15 MIN: HCPCS

## 2024-03-04 PROCEDURE — 36415 COLL VENOUS BLD VENIPUNCTURE: CPT | Performed by: EMERGENCY MEDICINE

## 2024-03-04 PROCEDURE — 93005 ELECTROCARDIOGRAM TRACING: CPT

## 2024-03-04 PROCEDURE — 71045 X-RAY EXAM CHEST 1 VIEW: CPT | Performed by: RADIOLOGY

## 2024-03-04 PROCEDURE — 99283 EMERGENCY DEPT VISIT LOW MDM: CPT | Mod: 25

## 2024-03-04 PROCEDURE — 71045 X-RAY EXAM CHEST 1 VIEW: CPT

## 2024-03-04 ASSESSMENT — PAIN DESCRIPTION - FREQUENCY: FREQUENCY: CONSTANT/CONTINUOUS

## 2024-03-04 ASSESSMENT — ENCOUNTER SYMPTOMS
DENIES PAIN: 1
SUBJECTIVE PAIN PROGRESSION: UNCHANGED
HIGHEST PAIN SEVERITY IN PAST 24 HOURS: 0/10
APPETITE LEVEL: GOOD
LOWEST PAIN SEVERITY IN PAST 24 HOURS: 0/10
CHANGE IN APPETITE: UNCHANGED
PAIN SEVERITY GOAL: 0/10

## 2024-03-04 ASSESSMENT — PAIN DESCRIPTION - DESCRIPTORS
DESCRIPTORS_2: ACHING
DESCRIPTORS: TINGLING

## 2024-03-04 ASSESSMENT — PAIN DESCRIPTION - ORIENTATION: ORIENTATION: LEFT

## 2024-03-04 ASSESSMENT — PAIN DESCRIPTION - PAIN TYPE: TYPE: ACUTE PAIN

## 2024-03-04 ASSESSMENT — PAIN SCALES - GENERAL
PAINLEVEL_OUTOF10: 5 - MODERATE PAIN
PAINLEVEL_OUTOF10: 4

## 2024-03-04 ASSESSMENT — PAIN DESCRIPTION - LOCATION
LOCATION_2: CHEST
LOCATION: ARM

## 2024-03-04 ASSESSMENT — PAIN - FUNCTIONAL ASSESSMENT: PAIN_FUNCTIONAL_ASSESSMENT: 0-10

## 2024-03-04 NOTE — HOME HEALTH
Patient is alert and oriented times three, vital signs are stable, lungs are clear, patient denies pain, SOB or dizziness.Wound care provided today, patient tolerated well.

## 2024-03-05 ENCOUNTER — APPOINTMENT (OUTPATIENT)
Dept: CARDIOLOGY | Facility: HOSPITAL | Age: 89
End: 2024-03-05
Payer: MEDICARE

## 2024-03-05 ENCOUNTER — HOSPITAL ENCOUNTER (EMERGENCY)
Facility: HOSPITAL | Age: 89
Discharge: HOME | End: 2024-03-05
Attending: EMERGENCY MEDICINE
Payer: MEDICARE

## 2024-03-05 VITALS
TEMPERATURE: 98 F | WEIGHT: 100 LBS | HEART RATE: 68 BPM | DIASTOLIC BLOOD PRESSURE: 66 MMHG | HEIGHT: 61 IN | BODY MASS INDEX: 18.88 KG/M2 | SYSTOLIC BLOOD PRESSURE: 148 MMHG | RESPIRATION RATE: 16 BRPM | OXYGEN SATURATION: 97 %

## 2024-03-05 DIAGNOSIS — R07.9 ACUTE CHEST PAIN: Primary | ICD-10-CM

## 2024-03-05 DIAGNOSIS — J18.9 PNEUMONIA DUE TO INFECTIOUS ORGANISM, UNSPECIFIED LATERALITY, UNSPECIFIED PART OF LUNG: ICD-10-CM

## 2024-03-05 LAB
BASOPHILS # BLD AUTO: 0.07 X10*3/UL (ref 0–0.1)
BASOPHILS NFR BLD AUTO: 1.1 %
BNP SERPL-MCNC: 1008 PG/ML (ref 0–99)
CARDIAC TROPONIN I PNL SERPL HS: 45 NG/L (ref 0–13)
CARDIAC TROPONIN I PNL SERPL HS: 46 NG/L (ref 0–13)
DACRYOCYTES BLD QL SMEAR: NORMAL
EOSINOPHIL # BLD AUTO: 0.03 X10*3/UL (ref 0–0.4)
EOSINOPHIL NFR BLD AUTO: 0.5 %
ERYTHROCYTE [DISTWIDTH] IN BLOOD BY AUTOMATED COUNT: 20.7 % (ref 11.5–14.5)
HCT VFR BLD AUTO: 39.1 % (ref 36–46)
HGB BLD-MCNC: 11.3 G/DL (ref 12–16)
HYPOCHROMIA BLD QL SMEAR: NORMAL
IMM GRANULOCYTES # BLD AUTO: 0.02 X10*3/UL (ref 0–0.5)
IMM GRANULOCYTES NFR BLD AUTO: 0.3 % (ref 0–0.9)
LYMPHOCYTES # BLD AUTO: 0.89 X10*3/UL (ref 0.8–3)
LYMPHOCYTES NFR BLD AUTO: 13.7 %
MCH RBC QN AUTO: 21.7 PG (ref 26–34)
MCHC RBC AUTO-ENTMCNC: 28.9 G/DL (ref 32–36)
MCV RBC AUTO: 75 FL (ref 80–100)
MONOCYTES # BLD AUTO: 0.61 X10*3/UL (ref 0.05–0.8)
MONOCYTES NFR BLD AUTO: 9.4 %
NEUTROPHILS # BLD AUTO: 4.88 X10*3/UL (ref 1.6–5.5)
NEUTROPHILS NFR BLD AUTO: 75 %
NRBC BLD-RTO: 0 /100 WBCS (ref 0–0)
OVALOCYTES BLD QL SMEAR: NORMAL
PLATELET # BLD AUTO: 375 X10*3/UL (ref 150–450)
RBC # BLD AUTO: 5.21 X10*6/UL (ref 4–5.2)
RBC MORPH BLD: NORMAL
TARGETS BLD QL SMEAR: NORMAL
WBC # BLD AUTO: 6.5 X10*3/UL (ref 4.4–11.3)

## 2024-03-05 PROCEDURE — 84484 ASSAY OF TROPONIN QUANT: CPT | Performed by: EMERGENCY MEDICINE

## 2024-03-05 PROCEDURE — 36415 COLL VENOUS BLD VENIPUNCTURE: CPT | Performed by: EMERGENCY MEDICINE

## 2024-03-05 PROCEDURE — 2500000001 HC RX 250 WO HCPCS SELF ADMINISTERED DRUGS (ALT 637 FOR MEDICARE OP): Performed by: EMERGENCY MEDICINE

## 2024-03-05 PROCEDURE — 85025 COMPLETE CBC W/AUTO DIFF WBC: CPT | Performed by: EMERGENCY MEDICINE

## 2024-03-05 PROCEDURE — 93005 ELECTROCARDIOGRAM TRACING: CPT

## 2024-03-05 PROCEDURE — 83880 ASSAY OF NATRIURETIC PEPTIDE: CPT | Performed by: EMERGENCY MEDICINE

## 2024-03-05 RX ORDER — AMOXICILLIN AND CLAVULANATE POTASSIUM 875; 125 MG/1; MG/1
1 TABLET, FILM COATED ORAL ONCE
Status: DISCONTINUED | OUTPATIENT
Start: 2024-03-05 | End: 2024-03-05

## 2024-03-05 RX ORDER — AMOXICILLIN AND CLAVULANATE POTASSIUM 500; 125 MG/1; MG/1
1 TABLET, FILM COATED ORAL ONCE
Status: COMPLETED | OUTPATIENT
Start: 2024-03-05 | End: 2024-03-05

## 2024-03-05 RX ORDER — AMOXICILLIN AND CLAVULANATE POTASSIUM 500; 125 MG/1; MG/1
1 TABLET, FILM COATED ORAL 2 TIMES DAILY
Qty: 20 TABLET | Refills: 0 | Status: SHIPPED | OUTPATIENT
Start: 2024-03-05 | End: 2024-03-15

## 2024-03-05 RX ORDER — AMOXICILLIN 500 MG/1
CAPSULE ORAL
Status: DISCONTINUED
Start: 2024-03-05 | End: 2024-03-05 | Stop reason: WASHOUT

## 2024-03-05 RX ADMIN — AMOXICILLIN AND CLAVULANATE POTASSIUM 1 TABLET: 500; 125 TABLET, FILM COATED ORAL at 03:52

## 2024-03-05 NOTE — ED PROVIDER NOTES
HPI   Chief Complaint   Patient presents with    Chest Pain    Dizziness    diaphoretic      Pt states c/o intermittent chest pain and lt arm pain for 2-3 days states pain got worse 1900 with diaphoresis  some nausea and feels SOB         Chief complaint: Chest pain    History of present illness: Patient is an 88-year-old female with history of congestive heart failure A-fib hypertension depression presenting to the emergency department with complaints of chest pain and shortness of breath.  According to the patient, she has been having chest pain over the past several days.  Patient states that initially chest discomfort.  The patient denies any shortness of breath with this.  Patient states that has been ongoing for the past 2 weeks.  Concern, the patient presents to the emergency department for further evaluation she has no other complaints at this time.      History provided by:  Patient   used: No                        Kim Coma Scale Score: 15                     Patient History   Past Medical History:   Diagnosis Date    Abnormal findings on diagnostic imaging of heart and coronary circulation 06/13/2017    Abnormal computed tomography angiography of heart    Acute on chronic combined systolic (congestive) and diastolic (congestive) heart failure (CMS/[facility]) 09/28/2017    Acute on chronic combined systolic and diastolic heart failure    Acute on chronic combined systolic (congestive) and diastolic (congestive) heart failure (CMS/[facility]) 02/05/2020    Acute on chronic combined systolic and diastolic CHF, NYHA class 2    Anemia, unspecified 11/18/2020    Anemia, mild    Chronic diastolic (congestive) heart failure (CMS/[facility]) 09/15/2020    Chronic diastolic congestive heart failure    Disorder of the skin and subcutaneous tissue, unspecified 02/16/2020    Skin lesion of face    Diverticulitis of large intestine with perforation and abscess without bleeding     Perforation of sigmoid colon due  to diverticulitis    Diverticulosis of intestine, part unspecified, without perforation or abscess without bleeding     Diverticulosis    Dorsalgia, unspecified 08/16/2019    Upper back pain    Encounter for immunization 11/13/2019    Need for vaccination    Encounter for surgical aftercare following surgery on the circulatory system 02/18/2019    Aftercare following surgery of the circulatory system    Essential (primary) hypertension 11/02/2022    Benign essential hypertension    Gastro-esophageal reflux disease without esophagitis 02/05/2020    Gastroesophageal reflux disease without esophagitis    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    Hypothyroidism due to medication    Long term (current) use of anticoagulants 10/22/2018    Long term (current) use of anticoagulants    Melena 04/10/2020    Blood in stool    Old myocardial infarction 10/19/2022    History of non-ST elevation myocardial infarction (NSTEMI)    Other forms of dyspnea 01/24/2020    Dyspnea on exertion    Other long term (current) drug therapy 05/10/2018    Long term current use of antiarrhythmic drug    Other specified cardiac arrhythmias 02/26/2020    Arrhythmia, atrial    Paroxysmal atrial fibrillation (CMS/HCC) 02/01/2018    Paroxysmal atrial fibrillation    Permanent atrial fibrillation (CMS/HCC) 01/24/2020    Permanent atrial fibrillation    Personal history of diseases of the blood and blood-forming organs and certain disorders involving the immune mechanism 04/10/2020    History of anemia    Personal history of other diseases of the circulatory system 11/04/2018    History of abnormal electrocardiography    Personal history of other diseases of the digestive system 10/22/2018    History of lower GI bleeding    Personal history of other diseases of the musculoskeletal system and connective tissue     History of osteoporosis    Personal history of other diseases of the musculoskeletal system and  connective tissue 2019    History of muscle spasm    Personal history of other endocrine, nutritional and metabolic disease 2019    History of hyperlipidemia    Personal history of other specified conditions 2020    History of diarrhea    Personal history of other specified conditions     History of precordial chest pain    Personal history of transient ischemic attack (TIA), and cerebral infarction without residual deficits 2018    Cerebrovascular accident (CVA) within last 3 months    Psychophysiologic insomnia 2020    Chronic insomnia    Segmental and somatic dysfunction of rib cage 2019    Segmental and somatic dysfunction of rib cage    Segmental and somatic dysfunction of thoracic region 2019    Segmental and somatic dysfunction of thoracic region    Segmental and somatic dysfunction of upper extremity 2019    Segmental and somatic dysfunction of upper extremity     Past Surgical History:   Procedure Laterality Date    APPENDECTOMY  2016    Appendectomy    CARDIAC PACEMAKER PLACEMENT  2018    Pacemaker Permanent Placement    CATARACT EXTRACTION  2016    Cataract Surgery    CHOLECYSTECTOMY  2016    Cholecystectomy    COLECTOMY PARTIAL / TOTAL  2018    Partial Colectomy - Sigmoid    CORONARY ANGIOPLASTY  2018    PTCA    HYSTERECTOMY  2016    Hysterectomy    OTHER SURGICAL HISTORY  2019    Atrial appendage closure    OTHER SURGICAL HISTORY  2018    Catheter Ablation Atrioventricular Node    TONSILLECTOMY  2016    Tonsillectomy     Family History   Problem Relation Name Age of Onset    Coronary artery disease Mother      Coronary artery disease Father       Social History     Tobacco Use    Smoking status: Former     Types: Cigarettes     Quit date:      Years since quittin.1     Passive exposure: Never    Smokeless tobacco: Never   Substance Use Topics    Alcohol use: Never    Drug use: Never        Physical Exam   ED Triage Vitals [03/04/24 2022]   Temperature Heart Rate Respirations BP   36.7 °C (98 °F) 65 18 144/78      Pulse Ox Temp Source Heart Rate Source Patient Position   96 % Tympanic Monitor Sitting      BP Location FiO2 (%)     Left arm --       Physical Exam  Vitals and nursing note reviewed.   Constitutional:       General: She is not in acute distress.     Appearance: She is well-developed.   HENT:      Head: Normocephalic and atraumatic.   Eyes:      Conjunctiva/sclera: Conjunctivae normal.   Cardiovascular:      Rate and Rhythm: Normal rate and regular rhythm.      Heart sounds: No murmur heard.  Pulmonary:      Effort: Pulmonary effort is normal. No respiratory distress.      Breath sounds: Examination of the right-middle field reveals rhonchi. Examination of the left-middle field reveals rhonchi. Rhonchi present.   Abdominal:      Palpations: Abdomen is soft.      Tenderness: There is no abdominal tenderness.   Musculoskeletal:         General: No swelling.      Cervical back: Neck supple.   Skin:     General: Skin is warm and dry.      Capillary Refill: Capillary refill takes less than 2 seconds.   Neurological:      Mental Status: She is alert.   Psychiatric:         Mood and Affect: Mood normal.         ED Course & MDM   ED Course as of 03/10/24 1323   Tue Mar 05, 2024   0243 Troponin I, High Sensitivity(!): 41 [BK]   0243 Troponin I, High Sensitivity(!): 45 [BK]   0244 BNP(!): 1,008 [BK]   0244 B-Type Natriuretic Peptide(!) [BK]      ED Course User Index  [BK] Yusuf Wood MD         Diagnoses as of 03/10/24 1323   Acute chest pain   Pneumonia due to infectious organism, unspecified laterality, unspecified part of lung       Medical Decision Making  Medical decision making: Patient remained stable throughout her time in the emergency department.  CBC demonstrated a hemoglobin of 11.3 with no other significant acute abnormalities the patient's Chem-7 was significant for creatinine  of 2 but no other significant abnormalities LFTs are within normal limits.  Patient's troponin and delta troponin was 4145.  BNP was 1000 the patient's third troponin was 46.  Patient's chest x-ray demonstrated a left basilar airspace disease suggestive of pneumonia.  Finally, the patient's EKG demonstrated.  The patient's EKG demonstrates a s her symptoms started with diaphoresis.  Patient states that the diaphoresis is now resolved however she continues to have on and off normal sinus rhythm with a rate of 65 bpm isoelectric ST segments narrow QRS complexes and a QTc of 524.    Patient presents to the emergency department with complaints of shortness of breath and chest pain.  Workup was performed as above and demonstrated no significant acute abnormalities.  The patient was reassured.  Given the patient's troponin is elevated but stable, I do not feel the patient requires further inpatient admission at this time.  The patient was reassured she was instructed to follow-up with primary care physician and to return immediately to the emergency department if she has worsening symptoms patient expressed understanding and agreement with this.  The patient was then discharged home in otherwise stable condition.    Amount and/or Complexity of Data Reviewed  Independent Historian: parent  External Data Reviewed: labs and notes.  Labs: ordered. Decision-making details documented in ED Course.  Radiology: ordered and independent interpretation performed.        Procedure  Procedures     Yusuf Wood MD  03/10/24 [id number]

## 2024-03-11 ENCOUNTER — HOME CARE VISIT (OUTPATIENT)
Dept: HOME HEALTH SERVICES | Facility: HOME HEALTH | Age: 89
End: 2024-03-11
Payer: MEDICARE

## 2024-03-11 PROCEDURE — G0300 HHS/HOSPICE OF LPN EA 15 MIN: HCPCS

## 2024-03-11 ASSESSMENT — ENCOUNTER SYMPTOMS
DENIES PAIN: 1
LOWEST PAIN SEVERITY IN PAST 24 HOURS: 0/10
CHANGE IN APPETITE: UNCHANGED
PAIN SEVERITY GOAL: 0/10
SUBJECTIVE PAIN PROGRESSION: UNCHANGED
HIGHEST PAIN SEVERITY IN PAST 24 HOURS: 0/10
APPETITE LEVEL: GOOD

## 2024-03-15 ENCOUNTER — OFFICE VISIT (OUTPATIENT)
Dept: WOUND CARE | Facility: CLINIC | Age: 89
End: 2024-03-15
Payer: MEDICARE

## 2024-03-15 PROCEDURE — 11042 DBRDMT SUBQ TIS 1ST 20SQCM/<: CPT

## 2024-03-16 LAB
ATRIAL RATE: 64 BPM
ATRIAL RATE: 64 BPM
P AXIS: 82 DEGREES
P AXIS: 91 DEGREES
PR INTERVAL: 55 MS
PR INTERVAL: 64 MS
Q ONSET: 251 MS
Q ONSET: 251 MS
QRS COUNT: 10 BEATS
QRS COUNT: 10 BEATS
QRS DURATION: 144 MS
QRS DURATION: 148 MS
QT INTERVAL: 503 MS
QT INTERVAL: 503 MS
QTC CALCULATION(BAZETT): 524 MS
QTC CALCULATION(BAZETT): 524 MS
QTC FREDERICIA: 516 MS
QTC FREDERICIA: 516 MS
R AXIS: 254 DEGREES
R AXIS: 262 DEGREES
T AXIS: 72 DEGREES
T AXIS: 82 DEGREES
T OFFSET: 503 MS
T OFFSET: 503 MS
VENTRICULAR RATE: 65 BPM
VENTRICULAR RATE: 65 BPM

## 2024-03-18 ENCOUNTER — LAB REQUISITION (OUTPATIENT)
Dept: LAB | Facility: HOSPITAL | Age: 89
End: 2024-03-18
Payer: MEDICARE

## 2024-03-18 ENCOUNTER — TELEPHONE (OUTPATIENT)
Dept: CARDIOLOGY | Facility: HOSPITAL | Age: 89
End: 2024-03-18

## 2024-03-18 ENCOUNTER — HOME CARE VISIT (OUTPATIENT)
Dept: HOME HEALTH SERVICES | Facility: HOME HEALTH | Age: 89
End: 2024-03-18
Payer: MEDICARE

## 2024-03-18 VITALS
RESPIRATION RATE: 20 BRPM | HEART RATE: 78 BPM | SYSTOLIC BLOOD PRESSURE: 122 MMHG | DIASTOLIC BLOOD PRESSURE: 68 MMHG | TEMPERATURE: 98.1 F | OXYGEN SATURATION: 98 %

## 2024-03-18 DIAGNOSIS — I13.0 HYPERTENSIVE HEART AND CHRONIC KIDNEY DISEASE WITH HEART FAILURE AND STAGE 1 THROUGH STAGE 4 CHRONIC KIDNEY DISEASE, OR UNSPECIFIED CHRONIC KIDNEY DISEASE (MULTI): ICD-10-CM

## 2024-03-18 DIAGNOSIS — N18.32 CHRONIC KIDNEY DISEASE, STAGE 3B (MULTI): ICD-10-CM

## 2024-03-18 DIAGNOSIS — I50.43 ACUTE ON CHRONIC COMBINED SYSTOLIC (CONGESTIVE) AND DIASTOLIC (CONGESTIVE) HEART FAILURE (MULTI): ICD-10-CM

## 2024-03-18 LAB
25(OH)D3 SERPL-MCNC: 35 NG/ML (ref 30–100)
APPEARANCE UR: CLEAR
BILIRUB UR STRIP.AUTO-MCNC: NEGATIVE MG/DL
COLOR UR: ABNORMAL
CREAT UR-MCNC: 24.1 MG/DL (ref 20–320)
GLUCOSE UR STRIP.AUTO-MCNC: ABNORMAL MG/DL
HOLD SPECIMEN: NORMAL
KETONES UR STRIP.AUTO-MCNC: NEGATIVE MG/DL
LEUKOCYTE ESTERASE UR QL STRIP.AUTO: NEGATIVE
MICROALBUMIN UR-MCNC: 18.5 MG/L
MICROALBUMIN/CREAT UR: 76.8 UG/MG CREAT
NITRITE UR QL STRIP.AUTO: NEGATIVE
PH UR STRIP.AUTO: 6 [PH]
PROT UR STRIP.AUTO-MCNC: NEGATIVE MG/DL
PTH-INTACT SERPL-MCNC: 196.2 PG/ML (ref 18.5–88)
RBC # UR STRIP.AUTO: NEGATIVE /UL
SP GR UR STRIP.AUTO: 1.01
UROBILINOGEN UR STRIP.AUTO-MCNC: <2 MG/DL

## 2024-03-18 PROCEDURE — 81003 URINALYSIS AUTO W/O SCOPE: CPT

## 2024-03-18 PROCEDURE — 83970 ASSAY OF PARATHORMONE: CPT

## 2024-03-18 PROCEDURE — 82043 UR ALBUMIN QUANTITATIVE: CPT

## 2024-03-18 PROCEDURE — G0299 HHS/HOSPICE OF RN EA 15 MIN: HCPCS

## 2024-03-18 PROCEDURE — 82306 VITAMIN D 25 HYDROXY: CPT

## 2024-03-18 PROCEDURE — 82570 ASSAY OF URINE CREATININE: CPT

## 2024-03-18 SDOH — ECONOMIC STABILITY: GENERAL

## 2024-03-18 ASSESSMENT — ACTIVITIES OF DAILY LIVING (ADL): MONEY MANAGEMENT (EXPENSES/BILLS): INDEPENDENT

## 2024-03-18 ASSESSMENT — ENCOUNTER SYMPTOMS
APPETITE LEVEL: FAIR
CHANGE IN APPETITE: UNCHANGED
DENIES PAIN: 1

## 2024-03-18 NOTE — TELEPHONE ENCOUNTER
Patient called the CHF clinic and reports that she has finished a 10 day course of PO Augmentin after being diagnosed with pneumonia.  She reports that she gets short of breath quickly with walking.     Adwoa had a visiting nurse complete wound care this morning. Patient's VS are stable and her weight remains consistent at 100 lbs.  Adwoa does not think that she is swollen.  She is not short of breath at rest or while talking on the phone with me.     Patient is  scheduled for a Stress test next week and will have a CHF clinic visit. On 3/26/24.  Patient is encouraged  to maintain a low sodium diet with the 2 liter fluid restriction and to take  medications as prescribed. Patient is encouraged keep all appointments and to complete the stress test.

## 2024-03-18 NOTE — HOME HEALTH
SN reviewd pts recent er visit with pneumomina, pt coimpleted antibtioci,  lungs are clear but still tired and fatigue with moving around.  pt has follow up wiht cardiology and nephrology in upcoming weeks.  pt had lab order from nephroolvy,  labs and urine done to [facility] [place], pt tolerated.    wound care.

## 2024-03-19 ENCOUNTER — APPOINTMENT (OUTPATIENT)
Dept: CARDIOLOGY | Facility: HOSPITAL | Age: 89
End: 2024-03-19
Payer: MEDICARE

## 2024-03-20 ENCOUNTER — HOSPITAL ENCOUNTER (OUTPATIENT)
Dept: RADIOLOGY | Facility: HOSPITAL | Age: 89
Discharge: HOME | End: 2024-03-20
Payer: MEDICARE

## 2024-03-20 ENCOUNTER — HOSPITAL ENCOUNTER (OUTPATIENT)
Dept: CARDIOLOGY | Facility: HOSPITAL | Age: 89
Discharge: HOME | End: 2024-03-20
Payer: MEDICARE

## 2024-03-20 DIAGNOSIS — I50.32 CHRONIC HEART FAILURE WITH PRESERVED EJECTION FRACTION (MULTI): ICD-10-CM

## 2024-03-20 PROCEDURE — 93017 CV STRESS TEST TRACING ONLY: CPT

## 2024-03-20 PROCEDURE — 2500000004 HC RX 250 GENERAL PHARMACY W/ HCPCS (ALT 636 FOR OP/ED): Performed by: NURSE PRACTITIONER

## 2024-03-20 PROCEDURE — 3430000001 HC RX 343 DIAGNOSTIC RADIOPHARMACEUTICALS: Performed by: INTERNAL MEDICINE

## 2024-03-20 PROCEDURE — 78452 HT MUSCLE IMAGE SPECT MULT: CPT | Performed by: INTERNAL MEDICINE

## 2024-03-20 PROCEDURE — 78452 HT MUSCLE IMAGE SPECT MULT: CPT

## 2024-03-20 PROCEDURE — A9502 TC99M TETROFOSMIN: HCPCS | Performed by: INTERNAL MEDICINE

## 2024-03-20 RX ORDER — REGADENOSON 0.08 MG/ML
0.4 INJECTION, SOLUTION INTRAVENOUS
Status: COMPLETED | OUTPATIENT
Start: 2024-03-20 | End: 2024-03-20

## 2024-03-20 RX ADMIN — TETROFOSMIN 30 MILLICURIE: 0.23 INJECTION, POWDER, LYOPHILIZED, FOR SOLUTION INTRAVENOUS at 11:00

## 2024-03-20 RX ADMIN — REGADENOSON 0.4 MG: 0.08 INJECTION, SOLUTION INTRAVENOUS at 11:05

## 2024-03-20 RX ADMIN — TETROFOSMIN 10 MILLICURIE: 0.23 INJECTION, POWDER, LYOPHILIZED, FOR SOLUTION INTRAVENOUS at 09:40

## 2024-03-25 ENCOUNTER — HOME CARE VISIT (OUTPATIENT)
Dept: HOME HEALTH SERVICES | Facility: HOME HEALTH | Age: 89
End: 2024-03-25
Payer: MEDICARE

## 2024-03-25 PROCEDURE — G0300 HHS/HOSPICE OF LPN EA 15 MIN: HCPCS

## 2024-03-25 PROCEDURE — 400014 HH F/U

## 2024-03-26 ENCOUNTER — OFFICE VISIT (OUTPATIENT)
Dept: CARDIOLOGY | Facility: HOSPITAL | Age: 89
End: 2024-03-26
Payer: MEDICARE

## 2024-03-26 ENCOUNTER — APPOINTMENT (OUTPATIENT)
Dept: CARDIOLOGY | Facility: HOSPITAL | Age: 89
End: 2024-03-26
Payer: MEDICARE

## 2024-03-26 VITALS
HEART RATE: 65 BPM | WEIGHT: 101.8 LBS | OXYGEN SATURATION: 96 % | DIASTOLIC BLOOD PRESSURE: 65 MMHG | RESPIRATION RATE: 18 BRPM | SYSTOLIC BLOOD PRESSURE: 115 MMHG | BODY MASS INDEX: 19.23 KG/M2

## 2024-03-26 DIAGNOSIS — I50.32 CHRONIC HEART FAILURE WITH PRESERVED EJECTION FRACTION (MULTI): Primary | ICD-10-CM

## 2024-03-26 DIAGNOSIS — I50.32 HYPERTENSIVE HEART AND KIDNEY DISEASE WITH CHRONIC DIASTOLIC CONGESTIVE HEART FAILURE AND STAGE 3B CHRONIC KIDNEY DISEASE (MULTI): ICD-10-CM

## 2024-03-26 DIAGNOSIS — I25.10 ASHD (ARTERIOSCLEROTIC HEART DISEASE): ICD-10-CM

## 2024-03-26 DIAGNOSIS — I10 BENIGN ESSENTIAL HYPERTENSION: ICD-10-CM

## 2024-03-26 DIAGNOSIS — N18.32 HYPERTENSIVE HEART AND KIDNEY DISEASE WITH CHRONIC DIASTOLIC CONGESTIVE HEART FAILURE AND STAGE 3B CHRONIC KIDNEY DISEASE (MULTI): ICD-10-CM

## 2024-03-26 DIAGNOSIS — Z95.5 PRESENCE OF CORONARY ANGIOPLASTY IMPLANT AND GRAFT: ICD-10-CM

## 2024-03-26 DIAGNOSIS — I13.0 HYPERTENSIVE HEART AND KIDNEY DISEASE WITH CHRONIC DIASTOLIC CONGESTIVE HEART FAILURE AND STAGE 3B CHRONIC KIDNEY DISEASE (MULTI): ICD-10-CM

## 2024-03-26 PROCEDURE — 1123F ACP DISCUSS/DSCN MKR DOCD: CPT | Performed by: NURSE PRACTITIONER

## 2024-03-26 PROCEDURE — 99213 OFFICE O/P EST LOW 20 MIN: CPT | Mod: ZK | Performed by: NURSE PRACTITIONER

## 2024-03-26 PROCEDURE — 99213 OFFICE O/P EST LOW 20 MIN: CPT | Performed by: NURSE PRACTITIONER

## 2024-03-26 PROCEDURE — 1036F TOBACCO NON-USER: CPT | Performed by: NURSE PRACTITIONER

## 2024-03-26 PROCEDURE — 1157F ADVNC CARE PLAN IN RCRD: CPT | Performed by: NURSE PRACTITIONER

## 2024-03-26 PROCEDURE — 1160F RVW MEDS BY RX/DR IN RCRD: CPT | Performed by: NURSE PRACTITIONER

## 2024-03-26 PROCEDURE — 1159F MED LIST DOCD IN RCRD: CPT | Performed by: NURSE PRACTITIONER

## 2024-03-26 PROCEDURE — 3074F SYST BP LT 130 MM HG: CPT | Performed by: NURSE PRACTITIONER

## 2024-03-26 PROCEDURE — 3078F DIAST BP <80 MM HG: CPT | Performed by: NURSE PRACTITIONER

## 2024-03-26 RX ORDER — CALCITRIOL 0.25 UG/1
0.25 CAPSULE ORAL DAILY
COMMUNITY

## 2024-03-26 SDOH — ECONOMIC STABILITY: FOOD INSECURITY: WITHIN THE PAST 12 MONTHS, YOU WORRIED THAT YOUR FOOD WOULD RUN OUT BEFORE YOU GOT MONEY TO BUY MORE.: NEVER TRUE

## 2024-03-26 SDOH — ECONOMIC STABILITY: FOOD INSECURITY: WITHIN THE PAST 12 MONTHS, THE FOOD YOU BOUGHT JUST DIDN'T LAST AND YOU DIDN'T HAVE MONEY TO GET MORE.: NEVER TRUE

## 2024-03-26 ASSESSMENT — ENCOUNTER SYMPTOMS
APPETITE LEVEL: GOOD
FEVER: 0
PAIN SEVERITY GOAL: 0/10
BLOOD IN STOOL: 0
WHEEZING: 0
CONFUSION: 0
LOWEST PAIN SEVERITY IN PAST 24 HOURS: 0/10
LIGHT-HEADEDNESS: 0
CHILLS: 0
DEPRESSION: 0
HEMATURIA: 0
CHEST TIGHTNESS: 0
ARTHRALGIAS: 1
PERSON REPORTING PAIN: PATIENT
ABDOMINAL DISTENTION: 0
CHANGE IN APPETITE: UNCHANGED
WEAKNESS: 0
HIGHEST PAIN SEVERITY IN PAST 24 HOURS: 0/10
OCCASIONAL FEELINGS OF UNSTEADINESS: 1
DENIES PAIN: 1
WOUND: 1
COUGH: 0
EYES NEGATIVE: 1
ACTIVITY CHANGE: 0
PALPITATIONS: 0
SHORTNESS OF BREATH: 0
SUBJECTIVE PAIN PROGRESSION: UNCHANGED
LOSS OF SENSATION IN FEET: 0

## 2024-03-26 NOTE — PROGRESS NOTES
Subjective   Patient ID: Adwoa Forrest is a 88 y.o. female who presents for follow-up of congestive heart failure.     Current Outpatient Medications:     ALPRAZolam (Xanax) 0.5 mg tablet, Take 1 tablet (0.5 mg) by mouth once daily at bedtime., Disp: 30 tablet, Rfl: 3    amiodarone (Pacerone) 200 mg tablet, Take 1 tablet (200 mg) by mouth once daily., Disp: 90 tablet, Rfl: 3    aspirin 81 mg EC tablet, Take 1 tablet (81 mg) by mouth once daily., Disp: , Rfl:     atorvastatin (Lipitor) 80 mg tablet, Take 1 tablet (80 mg) by mouth once daily at bedtime., Disp: 90 tablet, Rfl: 3    calcitriol (Rocaltrol) 0.25 mcg capsule, Take 1 capsule (0.25 mcg) by mouth once daily. Three times a week., Disp: , Rfl:     docusate sodium (Colace) 100 mg capsule, Take 1 capsule (100 mg) by mouth every 12 hours., Disp: , Rfl:     Farxiga 10 mg, TAKE 1 TABLET BY MOUTH EVERY DAY, Disp: 90 tablet, Rfl: 3    levothyroxine (Synthroid, Levoxyl) 25 mcg tablet, TAKE 1 TABLET BY MOUTH EVERY DAY, Disp: 90 tablet, Rfl: 3    metoprolol succinate XL (Toprol-XL) 50 mg 24 hr tablet, Take 1 tablet (50 mg) by mouth once daily., Disp: , Rfl:     torsemide (Demadex) 20 mg tablet, Take 3 tablets (60 mg) by mouth once daily. (Patient taking differently: Take 3 tablets (60 mg) by mouth once daily. Pt is to start taking 20mg orally once a day if weight is , 40mg daily if weight between 100-105, take 60mg daily if weight is over 105lbs and hold if weight is less than 95lbs.), Disp: 270 tablet, Rfl: 3    melatonin 3 mg tablet, Take 1 tablet (3 mg) by mouth once daily at bedtime., Disp: , Rfl:      HPI   Past medical history of ischemic heart disease with PCI in the past.  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  She has had nonsustained ventricular tachycardia in the past from which she was symptomatic.  She has been pretty well-controlled with that on amiodarone.  More recently she had worsening  congestion.  Echocardiogram has showed a significant drop in her ejection fraction from 70% to 40%.  During the same time she has had worsening of her renal insufficiency as well.  At last visit with Dr. Saenz of already the diuretic was doubled and she has had improvement in her congestive symptoms.  However she still remains quite fatigued.  Still with atypical chest discomfort but not angina and muscle pain in right arm.  Recent stress MPI normal.   She had hospitalization on March 4, 2024 with pneumonia and comorbid CHF.    Review of Systems   Constitutional:  Negative for activity change, chills and fever.   HENT:  Negative for hearing loss.    Eyes: Negative.    Respiratory:  Negative for cough, chest tightness, shortness of breath and wheezing.    Cardiovascular:  Negative for chest pain, palpitations and leg swelling.        Atypical chest discomfort.    Gastrointestinal:  Negative for abdominal distention and blood in stool.   Genitourinary:  Negative for hematuria.   Musculoskeletal:  Positive for arthralgias.   Skin:  Positive for wound.        Skin wound of the left chin ... Healing.    Neurological:  Negative for syncope, weakness and light-headedness.   Psychiatric/Behavioral:  Negative for confusion.        Objective     /65 (BP Location: Right arm, Patient Position: Sitting, BP Cuff Size: Adult)   Pulse 65   Resp 18   Wt 46.2 kg (101 lb 12.8 oz)   LMP  (LMP Unknown)   SpO2 96%   BMI 19.23 kg/m²         Transthoracic Echo Limited    Result Date: 2/21/2024              [place] [address]      Phone [phone] Fax [phone] TRANSTHORACIC ECHOCARDIOGRAM REPORT  Patient Name:      CHERRY Noel Physician:    [id number] Darrel Hampton MD Study Date:        2/20/2024            Ordering Provider:    Hermelinad COX                                                                BRIGIDO MRN/PID:           [id number]             Fellow: Accession#:        [id number]         Nurse:                Nette Ott RN Date of Birth/Age: 1935 / 88 years  Sonographer:          Estelita Brown RDCS Gender:            F                    Additional Staff: Height:            157.48 cm            Admit Date: Weight:            45.36 kg             Admission Status:     Outpatient BSA / BMI:         1.42 m2 / 18.29      Department Location:                    kg/m2 Blood Pressure: 102 /58 mmHg Study Type:    TRANSTHORACIC ECHO (TTE) LIMITED Diagnosis/ICD: Ventricular hypertrophy or dilatation-I51.7 Indication:    Left Ventricular hypertrophy CPT Codes:     Echo Limited-34266 Patient History: Pertinent History: LVH. Study Detail: Technically challenging study due to small rib spaces and               prominent lung artifact. Definity used as a contrast agent for               endocardial border definition. Unable to obtain suprasternal notch               and subcostal view.  PHYSICIAN INTERPRETATION: Left Ventricle: Left ventricular systolic function is moderately decreased, with an estimated ejection fraction of 35-40%. There is global hypokinesis of the left ventricle with minor regional variations. The left ventricular cavity size was not assessed. The left ventricular septal wall thickness is moderately increased. There is moderately increased left ventricular posterior wall thickness. There is moderate to severe left ventricular hypertrophy. Left ventricular diastolic filling was not assessed. Left Atrium: The left atrium was not assessed. Right Ventricle: The right ventricle was not assessed. Right ventricular systolic function not assessed. Right Atrium: The right atrium was not assessed. Aortic Valve: The aortic valve was not assessed. Aortic valve regurgitation was not assessed. Mitral Valve: The mitral valve was not  assessed. Mitral valve regurgitation was not assessed. Tricuspid Valve: The tricuspid valve was not assessed. Tricuspid regurgitation was not assessed. Pulmonic Valve: The pulmonic valve was not assessed. The pulmonic valve regurgitation was not assessed. Pericardium: Pericardial effusion was not assessed. Aorta: The aortic root was not assessed.  CONCLUSIONS:  1. Left ventricular systolic function is moderately decreased with a 35-40% estimated ejection fraction.  2. Moderately increased left ventricular septal thickness.  3. The left ventricular posterior wall thickness is moderately increased.  4. There is moderate to severe left ventricular hypertrophy.  5. There is global hypokinesis of the left ventricle with minor regional variations. QUANTITATIVE DATA SUMMARY: 2D MEASUREMENTS:                           Normal Ranges: LAs:           3.00 cm    (2.7-4.0cm) IVSd:          1.62 cm    (0.6-1.1cm) LVPWd:         1.44 cm    (0.6-1.1cm) LVIDd:         3.62 cm    (3.9-5.9cm) LVIDs:         2.69 cm LV Mass Index: 146.9 g/m2 LV % FS        25.6 % LV SYSTOLIC FUNCTION BY 2D PLANIMETRY (MOD):                     Normal Ranges: EF-A4C View: 33.5 % (>=55%) EF-A2C View: 39.2 % EF-Biplane:  39.0 %  [id number] Darrel Hampton MD Electronically signed on 2/21/2024 at 9:38:31 AM  ** Final **     Transthoracic echo (TTE) complete    Result Date: 10/3/2023              [place] [address]      Phone [phone] Fax [phone] TRANSTHORACIC ECHOCARDIOGRAM REPORT  Patient Name:      CHERRY BOB JESUS Noel Physician:   [id number] Darrel Hampton MD Study Date:        10/3/2023          Ordering Provider:   [id number] STEWART FERREIRA MRN/PID:           [id number]           Fellow: Accession#:        [id number]       Nurse:               Amairani Zafar RN Date of Birth/Age: 1935 / 88      Sonographer:         Thea Espinoza CHER                     years Gender:            F                  Additional Staff: Height:            157.48 cm          Admit Date:          10/2/2023 Weight:            50.35 kg           Admission Status:    Inpatient - Routine BSA:               1.49 m2            Department Location: [facility] Blood Pressure: 118 /71 mmHg Study Type:    TRANSTHORACIC ECHO (TTE) COMPLETE Diagnosis/ICD: Heart failure, unspecified-I50.9; Cardiomyopathy in diseases                classified elsewhere-I43 Indication:    CHF, Cardiomyopathy  Study Detail: The following Echo studies were performed: 2D, M-Mode, Doppler and               color flow. Technically challenging study due to body habitus,               prominent lung artifact and poor acoustic windows. Agitated saline               used as a contrast agent for intraseptal flow evaluation and               Optison used as a contrast agent for endocardial border               definition.  PHYSICIAN INTERPRETATION: Left Ventricle: Left ventricular systolic function is normal, with an estimated ejection fraction of 70-75%. There are no regional wall motion abnormalities. The left ventricular cavity size is normal. There is moderate left ventricular hypertrophy. Spectral Doppler shows a normal pattern of left ventricular diastolic filling. Left Atrium: The left atrium is normal in size. A bubble study using agitated saline was performed. Bubble study is negative. Right Ventricle: The right ventricle is normal in size. There is mildly reduced right ventricular systolic function. A device is visualized in the right ventricle. Right Atrium: The right atrium is moderately dilated. There is a device visualized in the right atrium. Aortic Valve: The aortic valve appears structurally normal. There is mild aortic valve cusp calcification. There is mild to moderate aortic valve thickening. There is mild to moderate aortic valve regurgitation. The peak instantaneous gradient of the aortic valve  is 4.2 mmHg. The mean gradient of the aortic valve is 2.0 mmHg. Mitral Valve: The mitral valve is normal in structure. There is trace mitral valve regurgitation. Tricuspid Valve: The tricuspid valve is structurally normal. There is mild tricuspid regurgitation. The Doppler estimated RVSP is moderately elevated at 51.7 mmHg. Pulmonic Valve: The pulmonic valve is not well visualized. There is no indication of pulmonic valve regurgitation. Pericardium: There is no pericardial effusion noted. Aorta: The aortic root is normal.  CONCLUSIONS:  1. Left ventricular systolic function is normal with a 70-75% estimated ejection fraction.  2. There is moderate left ventricular hypertrophy.  3. There is mildly reduced right ventricular systolic function.  4. The right atrium is moderately dilated.  5. Moderately elevated right ventricular systolic pressure.  6. Mild to moderate aortic valve regurgitation. QUANTITATIVE DATA SUMMARY: 2D MEASUREMENTS:                           Normal Ranges: Ao Root d:     3.00 cm    (2.0-3.7cm) LAs:           3.50 cm    (2.7-4.0cm) IVSd:          1.60 cm    (0.6-1.1cm) LVPWd:         1.42 cm    (0.6-1.1cm) LVIDd:         3.91 cm    (3.9-5.9cm) LVIDs:         2.40 cm LV Mass Index: 153.0 g/m2 LV % FS        38.6 % LA VOLUME:                               Normal Ranges: LA Vol A4C:        39.2 ml    (22+/-6mL/m2) LA Vol A2C:        50.4 ml LA Vol BP:         49.6 ml LA Vol Index A4C:  26.3ml/m2 LA Vol Index A2C:  33.8 ml/m2 LA Vol Index BP:   33.3 ml/m2 LA Area A4C:       14.7 cm2 LA Area A2C:       18.6 cm2 LA Major Axis A4C: 4.7 cm LA Major Axis A2C: 5.8 cm RA VOLUME BY A/L METHOD:                       Normal Ranges: RA Area A4C: 24.5 cm2 AORTA MEASUREMENTS:                      Normal Ranges: Ao Sinus, d: 3.00 cm (2.1-3.5cm) Ao STJ, d:   1.89 cm (1.7-3.4cm) Asc Ao, d:   3.60 cm (2.1-3.4cm) LV SYSTOLIC FUNCTION BY 2D PLANIMETRY (MOD):                     Normal Ranges: EF-A4C View: 76.2 % (>=55%)  EF-A2C View: 73.7 % EF-Biplane:  74.5 % LV DIASTOLIC FUNCTION:                           Normal Ranges: MV Peak E:    0.58 m/s    (0.7-1.2 m/s) MV Peak A:    0.23 m/s    (0.42-0.7 m/s) E/A Ratio:    2.57        (1.0-2.2) MV e'         0.04 m/s    (>8.0) MV lateral e' 0.06 m/s MV medial e'  0.03 m/s MV A Dur:     103.00 msec E/e' Ratio:   12.96       (<8.0) MITRAL VALVE:                 Normal Ranges: MV DT: 237 msec (150-240msec) AORTIC VALVE:                                   Normal Ranges: AoV Vmax:                1.03 m/s (<=1.7m/s) AoV Peak P.2 mmHg (<20mmHg) AoV Mean P.0 mmHg (1.7-11.5mmHg) LVOT Max Henry:            0.70 m/s (<=1.1m/s) AoV VTI:                 20.70 cm (18-25cm) LVOT VTI:                14.40 cm LVOT Diameter:           2.00 cm  (1.8-2.4cm) AoV Area, VTI:           2.19 cm2 (2.5-5.5cm2) AoV Area,Vmax:           2.13 cm2 (2.5-4.5cm2) AoV Dimensionless Index: 0.70 AORTIC INSUFFICIENCY: AI Vmax:       4.19 m/s AI Half-time:  438 msec AI Decel Rate: 335.50 cm/s2  RIGHT VENTRICLE: RV Basal 3.64 cm RV Mid   3.19 cm RV Major 7.5 cm TAPSE:   20.8 mm RV s'    0.07 m/s TRICUSPID VALVE/RVSP:                             Normal Ranges: Peak TR Velocity: 3.49 m/s RV Syst Pressure: 51.7 mmHg (< 30mmHg) IVC Diam:         1.50 cm  [id number] Darrel Hampton MD Electronically signed on 10/3/2023 at 4:11:07 PM  ** Final **       3/20/24 Stress  Summary:   1. Indeterminate Stress Test.   2. Indeterminate due to a- v-pacing.   3. Adequate level of stress achieved.   4. Correlate with myocardial perfusion imaging results.   5. Nuclear image results are reported separately.  MPI  IMPRESSION:  1. Normal stress myocardial perfusion imaging in response to  pharmacologic stress.  2. Well-maintained left ventricular function.    Lab Results   Component Value Date    BUN 44 (H) 2024    CREATININE 2.07 (H) 2024    BNP 1,008 (H) 2024    MG 2.53 (H) 2024    K 4.9 2024    NA  137 03/04/2024         Constitutional:       General: He is not in acute distress.  HENT:      Head: Normocephalic and atraumatic.      Mouth: Mucous membranes are moist.      Neck:  No JVD or HJR   Eyes:      Extraocular Movements: .      Conjunctiva/sclera: Conjunctivae normal.    Cardiovascular:      Rate and Rhythm: Normal rate and regular rhythm.      Heart sounds:  S1 S2 normal, + systolic murmur,  no S3 or S4   Pulmonary:      Effort: Pulmonary effort is normal. No respiratory distress.      Breath sounds: Normal breath sounds. No stridor. No wheezing or rales.   Abdominal:      General: Bowel sounds are normal. There is no distension.      Tenderness: There is no abdominal tenderness. There is no guarding or rebound.   Musculoskeletal:         General: No swelling, tenderness or deformity. Normal range of motion.      Comments:   Skin:     General: Skin is warm and dry.  Dressing to wound left lower leg. VNS following.   Neurological:      General: No focal deficit present.      Mental Status: alert and oriented to person, place, and time. Mental status is at baseline.     Psychiatric:         Mood and Affect: Mood normal.     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   Device Therapy: PPM - pacer dependent due to previous AV node  ablation.      CHF:  No overt congestion today. She will follow nephrology instructions for the diuretic therapy.  BMP in 4 weeks. No ARB or spironolactone  with low GFR.  Continue current medications.  Follow up in 2 months in clinic      2. Atrial fibrillation: persistent.   She is s/p AV node ablation and PPM placement in the past. On ASA.  No other OAC due to hx of recurrent GIB     3. NSVT:  Amiodarone.  No palpitations.       3. CKD 3: GFR as noted. She is following with nephrology.  Just had adjustment to diuretic instructions today.      4. HTN:  Controlled.       5. ASHD with PCI to LAD 2017:  Secondary prevention medications ASA, Statin, BB.   Recent stress/MPI scan negative for ischemia.   No angina.  Stable.             Rosangela Lane, APRN-CNP

## 2024-03-26 NOTE — PATIENT INSTRUCTIONS
Thank you for coming in today.  If you have any questions you may contact the office Monday through Friday at [phone] or on week ends at [phone].    Same medications.    Lab work in June.     Please follow  a 2 GM sodium diet and limit fluid intake to 2 liters per day or 8 servings ( serving size = 8 oz. = 1 cup = 240 ml) per day.   Please avoid processed meat products (luncheon meats, sausages, loya, hot dogs for example) eat 4 servings of vegetables and 1-2 whole servings of whole fruits per day.   Please weigh daily and call [phone] for weight gain of 3 pounds in 24 hours or 5 pounds or if you experience increased swelling or shortness of breath.         Follow up:  2 months.     Please be sure to follow up with your cardiologist at [facility] once every year. Call [phone] to schedule appointment if you do not have a follow up appointment scheduled already.

## 2024-03-29 ENCOUNTER — OFFICE VISIT (OUTPATIENT)
Dept: WOUND CARE | Facility: CLINIC | Age: 89
End: 2024-03-29
Payer: MEDICARE

## 2024-03-29 PROCEDURE — 11042 DBRDMT SUBQ TIS 1ST 20SQCM/<: CPT

## 2024-04-02 ENCOUNTER — HOME CARE VISIT (OUTPATIENT)
Dept: HOME HEALTH SERVICES | Facility: HOME HEALTH | Age: 89
End: 2024-04-02
Payer: MEDICARE

## 2024-04-02 VITALS
DIASTOLIC BLOOD PRESSURE: 72 MMHG | SYSTOLIC BLOOD PRESSURE: 125 MMHG | HEART RATE: 74 BPM | OXYGEN SATURATION: 99 % | RESPIRATION RATE: 18 BRPM | TEMPERATURE: 98.2 F

## 2024-04-02 PROCEDURE — G0300 HHS/HOSPICE OF LPN EA 15 MIN: HCPCS

## 2024-04-02 ASSESSMENT — ENCOUNTER SYMPTOMS
HIGHEST PAIN SEVERITY IN PAST 24 HOURS: 0/10
APPETITE LEVEL: GOOD
LOWEST PAIN SEVERITY IN PAST 24 HOURS: 0/10
DENIES PAIN: 1
CHANGE IN APPETITE: UNCHANGED
PAIN SEVERITY GOAL: 0/10
SUBJECTIVE PAIN PROGRESSION: UNCHANGED
PERSON REPORTING PAIN: PATIENT

## 2024-04-03 ENCOUNTER — HOSPITAL ENCOUNTER (OUTPATIENT)
Dept: CARDIOLOGY | Facility: HOSPITAL | Age: 89
Discharge: HOME | End: 2024-04-03
Payer: MEDICARE

## 2024-04-03 DIAGNOSIS — Z95.0 PRESENCE OF CARDIAC PACEMAKER: ICD-10-CM

## 2024-04-03 DIAGNOSIS — I48.91 ATRIAL FIBRILLATION, UNSPECIFIED TYPE (MULTI): ICD-10-CM

## 2024-04-09 ENCOUNTER — HOME CARE VISIT (OUTPATIENT)
Dept: HOME HEALTH SERVICES | Facility: HOME HEALTH | Age: 89
End: 2024-04-09
Payer: MEDICARE

## 2024-04-09 VITALS
HEART RATE: 72 BPM | OXYGEN SATURATION: 95 % | DIASTOLIC BLOOD PRESSURE: 80 MMHG | SYSTOLIC BLOOD PRESSURE: 126 MMHG | TEMPERATURE: 98.2 F | RESPIRATION RATE: 18 BRPM

## 2024-04-09 PROCEDURE — G0300 HHS/HOSPICE OF LPN EA 15 MIN: HCPCS

## 2024-04-09 ASSESSMENT — ENCOUNTER SYMPTOMS
PAIN SEVERITY GOAL: 0/10
PERSON REPORTING PAIN: PATIENT
DENIES PAIN: 1
SUBJECTIVE PAIN PROGRESSION: UNCHANGED
CHANGE IN APPETITE: UNCHANGED
APPETITE LEVEL: GOOD
HIGHEST PAIN SEVERITY IN PAST 24 HOURS: 0/10
LOWEST PAIN SEVERITY IN PAST 24 HOURS: 0/10

## 2024-04-12 ENCOUNTER — OFFICE VISIT (OUTPATIENT)
Dept: WOUND CARE | Facility: CLINIC | Age: 89
End: 2024-04-12
Payer: MEDICARE

## 2024-04-12 PROCEDURE — 11042 DBRDMT SUBQ TIS 1ST 20SQCM/<: CPT

## 2024-04-17 ENCOUNTER — HOME CARE VISIT (OUTPATIENT)
Dept: HOME HEALTH SERVICES | Facility: HOME HEALTH | Age: 89
End: 2024-04-17
Payer: MEDICARE

## 2024-04-17 VITALS
DIASTOLIC BLOOD PRESSURE: 64 MMHG | SYSTOLIC BLOOD PRESSURE: 142 MMHG | RESPIRATION RATE: 20 BRPM | TEMPERATURE: 97.3 F | HEART RATE: 84 BPM

## 2024-04-17 PROCEDURE — G0299 HHS/HOSPICE OF RN EA 15 MIN: HCPCS

## 2024-04-17 ASSESSMENT — PAIN SCALES - PAIN ASSESSMENT IN ADVANCED DEMENTIA (PAINAD)
CONSOLABILITY: 0 - NO NEED TO CONSOLE.
BODYLANGUAGE: 0 - RELAXED.
CONSOLABILITY: 0
BODYLANGUAGE: 0
TOTALSCORE: 0
NEGVOCALIZATION: 0
NEGVOCALIZATION: 0 - NONE.
BREATHING: 0
FACIALEXPRESSION: 0
FACIALEXPRESSION: 0 - SMILING OR INEXPRESSIVE.

## 2024-04-17 ASSESSMENT — ACTIVITIES OF DAILY LIVING (ADL)
OASIS_M1830: 01
HOME_HEALTH_OASIS: 00

## 2024-04-17 ASSESSMENT — ENCOUNTER SYMPTOMS: DENIES PAIN: 1

## 2024-04-17 NOTE — HOME HEALTH
Pt in good spirts today. wound care done as oredered.  pts friend it is able ot do dressign change and pt has returned to her baseline.  aggreabel to discharge.  sn  called and notifed [facility] of discharge

## 2024-04-19 ENCOUNTER — CONSULT (OUTPATIENT)
Dept: CARDIOLOGY | Facility: HOSPITAL | Age: 89
End: 2024-04-19
Payer: MEDICARE

## 2024-04-19 VITALS
BODY MASS INDEX: 19.5 KG/M2 | HEART RATE: 73 BPM | WEIGHT: 103.2 LBS | DIASTOLIC BLOOD PRESSURE: 72 MMHG | OXYGEN SATURATION: 93 % | RESPIRATION RATE: 20 BRPM | SYSTOLIC BLOOD PRESSURE: 146 MMHG

## 2024-04-19 DIAGNOSIS — I48.20 CHRONIC ATRIAL FIBRILLATION, UNSPECIFIED (MULTI): Primary | ICD-10-CM

## 2024-04-19 DIAGNOSIS — I50.32 CHRONIC HEART FAILURE WITH PRESERVED EJECTION FRACTION (MULTI): ICD-10-CM

## 2024-04-19 DIAGNOSIS — I50.42 CHRONIC COMBINED SYSTOLIC AND DIASTOLIC HEART FAILURE (MULTI): ICD-10-CM

## 2024-04-19 PROCEDURE — 93010 ELECTROCARDIOGRAM REPORT: CPT | Performed by: INTERNAL MEDICINE

## 2024-04-19 PROCEDURE — 99205 OFFICE O/P NEW HI 60 MIN: CPT | Performed by: STUDENT IN AN ORGANIZED HEALTH CARE EDUCATION/TRAINING PROGRAM

## 2024-04-19 PROCEDURE — 99215 OFFICE O/P EST HI 40 MIN: CPT | Performed by: STUDENT IN AN ORGANIZED HEALTH CARE EDUCATION/TRAINING PROGRAM

## 2024-04-19 SDOH — ECONOMIC STABILITY: FOOD INSECURITY: WITHIN THE PAST 12 MONTHS, THE FOOD YOU BOUGHT JUST DIDN'T LAST AND YOU DIDN'T HAVE MONEY TO GET MORE.: NEVER TRUE

## 2024-04-19 SDOH — ECONOMIC STABILITY: FOOD INSECURITY: WITHIN THE PAST 12 MONTHS, YOU WORRIED THAT YOUR FOOD WOULD RUN OUT BEFORE YOU GOT MONEY TO BUY MORE.: NEVER TRUE

## 2024-04-19 ASSESSMENT — COLUMBIA-SUICIDE SEVERITY RATING SCALE - C-SSRS
2. HAVE YOU ACTUALLY HAD ANY THOUGHTS OF KILLING YOURSELF?: NO
6. HAVE YOU EVER DONE ANYTHING, STARTED TO DO ANYTHING, OR PREPARED TO DO ANYTHING TO END YOUR LIFE?: NO
1. IN THE PAST MONTH, HAVE YOU WISHED YOU WERE DEAD OR WISHED YOU COULD GO TO SLEEP AND NOT WAKE UP?: NO

## 2024-04-19 ASSESSMENT — ENCOUNTER SYMPTOMS
DEPRESSION: 0
LOSS OF SENSATION IN FEET: 0
OCCASIONAL FEELINGS OF UNSTEADINESS: 1

## 2024-04-19 NOTE — PROGRESS NOTES
Referring Clinician: MOSES Lane  Primary Cardiologist: Dr LISANDRO Hagen  Accompanied by: Clyde, friend    HPI:     88 y.o. retired [employer]  who presents for advanced heart failure care.  My final recommendations will be communicated back to the requesting clinician  by way of shared Medical record.   Review of the electronic medical record shows a past medical history significant for  declining LV systolic function, LVEF 70-75% ( 10/2023) and on TTE 2/2024 LVEF 35-40% with significant left ventricular thickening.  She also has had worsening renal impairment that has limited heart failure pharmacotherapy advancement.  She is maintained beta-blocker, and SGLT2 inhibition.  She was previously tried on MRA but became hypokalemic.  RRASi is also withheld.  She underwent nuclear pharmacological stress testing 3/2024 and this was negative for inducible ischemia.  Her other comorbidities include atrial fibrillation status post ablation, PPM, failed Watchman procedure which necessitated transition to surgical left atrial appendage ligation, she is not maintained on anticoagulation due to history of GI bleeding, hypertension, CAD status post LAD PCI 2017, nonsustained VT maintained on amiodarone, PVCs.  Her most recent BNP 3/2024: 1008    Symptomatically, she has been struggling recently with diminished exercise tolerance and this is her predominant complaint.  Normally she is very active and able to keep up with her chores at home.  She is still able to perform her chores but close more slowly.  She also endorses exertional dyspnea, but can climb a flight of stairs.  She denies leg swelling.  She has not had chest pain per se, syncope, presyncope    She is fully adherent with all prescribed medications.    She has never been hospitalized for heart failure.    Surgical Hx:  -Surgical STEFANIE ligation  -Failed WATCHMAN procedure    Past Obstetric Hx:  -No past cardiac complications of pregnancy    Social Hx:  -  Smoking-never  - ETOH-none  - Illicit drugs-never  - Lives alone    Family Hx:  Specifically, there is no known family history of  CAD, heart failure, ICD, PPM, LVAD, OHT, arrhythmias, CVA, or sudden cardiac death.    Medication reconciliation completed, see below.     Medication Documentation Review Audit       Reviewed by Karlene Pemberton RN (Registered Nurse) on 04/19/24 at 1421      Medication Order Taking? Sig Documenting Provider Last Dose Status   ALPRAZolam (Xanax) 0.5 mg tablet [id number] Yes Take 1 tablet (0.5 mg) by mouth once daily at bedtime. Minh Farmer DO Taking Active   amiodarone (Pacerone) 200 mg tablet [id number] Yes Take 1 tablet (200 mg) by mouth once daily. INDIGO Staley-CNP Taking Active   aspirin 81 mg EC tablet [id number] Yes Take 1 tablet (81 mg) by mouth once daily. Historical Provider, MD Taking Active   atorvastatin (Lipitor) 80 mg tablet [id number] No Take 1 tablet (80 mg) by mouth once daily at bedtime.   Patient not taking: Reported on 4/19/2024    Michael Latham MD Not Taking Active   calcitriol (Rocaltrol) 0.25 mcg capsule [id number] Yes Take 1 capsule (0.25 mcg) by mouth once daily. Three times a week. Historical Provider, MD Taking Active   docusate sodium (Colace) 100 mg capsule [id number] Yes Take 1 capsule (100 mg) by mouth every 12 hours. Historical Provider, MD Taking Active   Farxiga 10 mg [id number] Yes TAKE 1 TABLET BY MOUTH EVERY DAY Minh Farmer DO Taking Active   levothyroxine (Synthroid, Levoxyl) 25 mcg tablet [id number] Yes TAKE 1 TABLET BY MOUTH EVERY DAY Minh Farmer DO Taking Active   melatonin 3 mg tablet [id number] No Take 1 tablet (3 mg) by mouth once daily at bedtime. Oren Provider, MD Not Taking Active   metoprolol succinate XL (Toprol-XL) 50 mg 24 hr tablet [id number] Yes Take 1 tablet (50 mg) by mouth once daily. Historical Provider, MD Taking Active   torsemide (Demadex) 20 mg tablet [id number] Yes Take 3 tablets (60 mg) by mouth once daily.    Patient taking differently: Take 3 tablets (60 mg) by mouth once daily. If between  take 1 tablet, if between 101-105 take 2 tablets and if above 105 take 3 tablets.    Michael Latham MD Taking Differently Active                   Allergies   Allergen Reactions    Codeine Nausea Only and Other     nausea    Hydrocodone Nausea Only and Swelling     nausea    Sotalol Other     Bradycardia     Tetracyclines Itching        Review of Systems   Constitutional: Positive for malaise/fatigue. Negative for decreased appetite, weight gain and weight loss.   Eyes:  Negative for visual disturbance.   Cardiovascular:  Positive for dyspnea on exertion. Negative for chest pain, cyanosis, irregular heartbeat, leg swelling, near-syncope, orthopnea, palpitations, paroxysmal nocturnal dyspnea and syncope.   Respiratory:  Negative for cough and shortness of breath.    Skin:  Negative for rash.   Gastrointestinal:  Negative for hematochezia and melena.   Genitourinary:  Negative for hematuria.   Neurological:  Negative for loss of balance.   Psychiatric/Behavioral:  Negative for altered mental status and substance abuse.       Investigations:    The electronic medical record has been reviewed by me for salient history. All cardiovascular imaging and testing available in the electronic medical record, and [facility] has been reviewed. The most recent ECG (4/19/2024) has been reviewed independently by me.    4/19/2024 ECG: AV paced    Vitals:    04/19/24 1421   BP: 146/72   BP Location: Left arm   Patient Position: Sitting   BP Cuff Size: Adult   Pulse: 73   Resp: 20   SpO2: 93%   Weight: 46.8 kg (103 lb 3.2 oz)      On examination:    Very pleasant petite elderly  woman in no apparent CP or painful distress.  Looks younger than chronological age  Immaculately groomed   Neck: No appreciable JVD or HJR  CVS: HS 1,2.  Prominent second heart sound.  No added sounds  Resp: CTA bilaterally. Percussion note resonant  Abdomen:  Flat, SNT, BS wnl  Extremities: No pedal oedema  Skin: warm and dry  CNS: AO x 4, no gross deficits    Lab Results   Component Value Date    WBC 6.5 03/05/2024    HGB 11.3 (L) 03/05/2024    HCT 39.1 03/05/2024    MCV 75 (L) 03/05/2024     03/05/2024       Chemistry    Lab Results   Component Value Date/Time     03/04/2024 2218    K 4.9 03/04/2024 2218     03/04/2024 2218    CO2 27 03/04/2024 2218    BUN 44 (H) 03/04/2024 2218    CREATININE 2.07 (H) 03/04/2024 2218    Lab Results   Component Value Date/Time    CALCIUM 8.8 03/04/2024 2218    ALKPHOS 135 03/04/2024 2218    AST 44 (H) 03/04/2024 2218    ALT 30 03/04/2024 2218    BILITOT 0.5 03/04/2024 2218        BNP 3/2024: 1008    IMPRESSION:    88 y.o. with HF**EF with LVEF **%, **ICM who presents for advanced heart failure care.    NYHA Functional Class: 2-3  ACC/AHA Stage B heart failure  Volume status: Euvolemic  Perfusion status: Warm to touch  Aetiology: TBD    PLAN:  #HFrEF (decline in LVEF    Potential mechanisms include pacer mediated cardiomyopathy versus cardiac amyloidosis given presence of significant LV thickening on recent echocardiograms  No medication changes today, but evaluation as below    -We will arrange for technetium pyrophosphate scan, she tells me that this is already being arranged by her primary cardiology team and we will assist with scheduling  -Note made negative for UPEP, SPEP, serum free light chains  -Pending technetium pyrophosphate scan results, low threshold for right heart catheterization  -She was referred to electrophysiology cardiology today for consideration of CRT  -Will refer to cardiac rehabilitation at next visit    Follow-up appointment in 4 weeks     This note was transcribed using the Dragon Dictation system. There may be grammatical, punctuation, or verbiage errors that can occur with voice recognition programs.    Sushma Collazo MD PhD'   Yes

## 2024-04-19 NOTE — PATIENT INSTRUCTIONS
Thank you for coming in today. If you have any questions or concerns, you may call the Heart Failure Office at [phone] option [phone], or [phone].  You may also contact our heart failure nursing team via email on [email or web address].    For quicker results set-up your  [facility] account to receive results and other correspondence directly to your phone.    Please bring all your pills/medications to your Cardiology appointments.    **  - No new medication changes today    -  Please CALL Tel  [phone] to schedule the technetium pyrophosphate ( PYP)  scan. This is already ordered for you. If you have ANY trouble scheduling this, CALL me on Tel [phone] and we will assist    You will be referred to the following teams, CALL [phone]  to schedule your appointments with:  1.  [facility]rophys[facility] cardiology ( Dr Gonzalez) for consideration of PPM adjustment    - Please make an appointment to be seen in 1 month

## 2024-04-20 ASSESSMENT — ENCOUNTER SYMPTOMS
IRREGULAR HEARTBEAT: 0
DYSPNEA ON EXERTION: 1
LOSS OF BALANCE: 0
HEMATURIA: 0
WEIGHT LOSS: 0
SHORTNESS OF BREATH: 0
HEMATOCHEZIA: 0
COUGH: 0
ALTERED MENTAL STATUS: 0
ORTHOPNEA: 0
SYNCOPE: 0
PND: 0
NEAR-SYNCOPE: 0
WEIGHT GAIN: 0
DECREASED APPETITE: 0
PALPITATIONS: 0

## 2024-04-20 ASSESSMENT — LIFESTYLE VARIABLES: SUBSTANCE_ABUSE: 0

## 2024-04-26 ENCOUNTER — OFFICE VISIT (OUTPATIENT)
Dept: WOUND CARE | Facility: CLINIC | Age: 89
End: 2024-04-26
Payer: MEDICARE

## 2024-04-26 PROCEDURE — 11043 DBRDMT MUSC&/FSCA 1ST 20/<: CPT

## 2024-04-29 ENCOUNTER — HOSPITAL ENCOUNTER (OUTPATIENT)
Dept: CARDIOLOGY | Facility: HOSPITAL | Age: 89
Discharge: HOME | End: 2024-04-29
Payer: MEDICARE

## 2024-04-29 ENCOUNTER — OFFICE VISIT (OUTPATIENT)
Dept: CARDIOLOGY | Facility: CLINIC | Age: 89
End: 2024-04-29
Payer: MEDICARE

## 2024-04-29 DIAGNOSIS — I50.32 CHRONIC HEART FAILURE WITH PRESERVED EJECTION FRACTION (MULTI): ICD-10-CM

## 2024-04-29 DIAGNOSIS — I50.42 CHRONIC COMBINED SYSTOLIC AND DIASTOLIC HEART FAILURE (MULTI): Primary | ICD-10-CM

## 2024-04-29 DIAGNOSIS — I48.20 CHRONIC ATRIAL FIBRILLATION, UNSPECIFIED (MULTI): ICD-10-CM

## 2024-04-29 LAB
ATRIAL RATE: 130 BPM
Q ONSET: 192 MS
QRS COUNT: 21 BEATS
QRS DURATION: 148 MS
QT INTERVAL: 256 MS
QTC CALCULATION(BAZETT): 376 MS
QTC FREDERICIA: 331 MS
R AXIS: 211 DEGREES
T AXIS: 21 DEGREES
T OFFSET: 320 MS
VENTRICULAR RATE: 130 BPM

## 2024-04-29 PROCEDURE — 93005 ELECTROCARDIOGRAM TRACING: CPT

## 2024-04-29 PROCEDURE — 93000 ELECTROCARDIOGRAM COMPLETE: CPT | Performed by: INTERNAL MEDICINE

## 2024-04-29 PROCEDURE — 1036F TOBACCO NON-USER: CPT | Performed by: INTERNAL MEDICINE

## 2024-04-29 PROCEDURE — 1157F ADVNC CARE PLAN IN RCRD: CPT | Performed by: INTERNAL MEDICINE

## 2024-04-29 PROCEDURE — 1123F ACP DISCUSS/DSCN MKR DOCD: CPT | Performed by: INTERNAL MEDICINE

## 2024-04-29 PROCEDURE — 99214 OFFICE O/P EST MOD 30 MIN: CPT | Performed by: INTERNAL MEDICINE

## 2024-04-29 PROCEDURE — 1160F RVW MEDS BY RX/DR IN RCRD: CPT | Performed by: INTERNAL MEDICINE

## 2024-04-29 PROCEDURE — 1159F MED LIST DOCD IN RCRD: CPT | Performed by: INTERNAL MEDICINE

## 2024-04-29 NOTE — PROGRESS NOTES
Electrophysiology Follow up Visit    Adwoa Forrest is a 88 y.o. year old female patient with    1. Atrial fibrillation: Status post AV node ablation. She has a dual-chamber pacemaker was set VVIR though after she was started on amiodarone for NSVT, she spontaneously converted to sinus rhythm. She was noted to have underlying sinus rhythm with CHB and she was changed to DDDR  mode. She did have failed Watchman procedure which transitioned to surgical left atrial appendage ligation     2. Diabetes     3. Hypertension    4. CAD s/p PCI to LAD in 2017     5. PVCs/nonsustained VT: Noted to have frequent PVCs as well as a short episode of symptomatic VT with PVC morphology at least via pacemaker with similar EGM characteristics. Arrhythmia appears to be automatic and mechanism with a period of ramping up prior to termination. Given palpitations and lightheadedness from episode she was hospitalized in February 2023 and was started on amiodarone. Echocardiogram with normal LV function.     We saw patient in November for follow-up for atrial fibrillation, NSVT, and amiodarone monitoring.  She reports she has been feeling well and is continuing to heal from an infection in her foot.  She was continued on amiodarone.    Patient has been noticing further SOB with minimal activity over past 6-8 months including fatigue. Echocardiogram in February noted LV function of 35-40%    Medical History  She has a past medical history of Abnormal findings on diagnostic imaging of heart and coronary circulation (06/13/2017), Acute on chronic combined systolic (congestive) and diastolic (congestive) heart failure (Multi) (09/28/2017), Acute on chronic combined systolic (congestive) and diastolic (congestive) heart failure (Multi) (02/05/2020), Anemia, unspecified (11/18/2020), Chronic diastolic (congestive) heart failure (Multi) (09/15/2020), Disorder of the skin and subcutaneous tissue, unspecified (02/16/2020), Diverticulitis of large  intestine with perforation and abscess without bleeding, Diverticulosis of intestine, part unspecified, without perforation or abscess without bleeding, Dorsalgia, unspecified (08/16/2019), Encounter for immunization (11/13/2019), Encounter for surgical aftercare following surgery on the circulatory system (02/18/2019), Essential (primary) hypertension (11/02/2022), Gastro-esophageal reflux disease without esophagitis (02/05/2020), 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), Long term (current) use of anticoagulants (10/22/2018), Melena (04/10/2020), Old myocardial infarction (10/19/2022), Other forms of dyspnea (01/24/2020), Other long term (current) drug therapy (05/10/2018), Other specified cardiac arrhythmias (02/26/2020), Paroxysmal atrial fibrillation (Multi) (02/01/2018), Permanent atrial fibrillation (Multi) (01/24/2020), Personal history of diseases of the blood and blood-forming organs and certain disorders involving the immune mechanism (04/10/2020), Personal history of other diseases of the circulatory system (11/04/2018), Personal history of other diseases of the digestive system (10/22/2018), Personal history of other diseases of the musculoskeletal system and connective tissue, Personal history of other diseases of the musculoskeletal system and connective tissue (08/16/2019), Personal history of other endocrine, nutritional and metabolic disease (02/18/2019), Personal history of other specified conditions (08/13/2020), Personal history of other specified conditions, Personal history of transient ischemic attack (TIA), and cerebral infarction without residual deficits (02/01/2018), Psychophysiologic insomnia (11/18/2020), Segmental and somatic dysfunction of rib cage (08/16/2019), Segmental and somatic dysfunction of thoracic region (08/16/2019), and Segmental and somatic dysfunction of upper extremity (08/16/2019).    Social History  She reports that she quit  smoking about 34 years ago. Her smoking use included cigarettes. She has never been exposed to tobacco smoke. She has never used smokeless tobacco. She reports that she does not drink alcohol and does not use drugs.      FamHx:   Family History   Problem Relation Name Age of Onset    Coronary artery disease Mother      Coronary artery disease Father         Allergies   Allergen Reactions    Codeine Nausea Only and Other     nausea    Hydrocodone Nausea Only and Swelling     nausea    Sotalol Other     Bradycardia     Tetracyclines Itching        Outpatient Medications:  Current Outpatient Medications   Medication Instructions    ALPRAZolam (XANAX) 0.5 mg, oral, Nightly    amiodarone (PACERONE) 200 mg, oral, Daily    aspirin 81 mg EC tablet 1 tablet, oral, Daily    atorvastatin (LIPITOR) 80 mg, oral, Nightly    calcitriol (ROCALTROL) 0.25 mcg, oral, Daily, Three times a week.    docusate sodium (Colace) 100 mg capsule 1 capsule, oral, Every 12 hours    Farxiga 10 mg, oral, Daily    levothyroxine (Synthroid, Levoxyl) 25 mcg tablet TAKE 1 TABLET BY MOUTH EVERY DAY    melatonin 3 mg, oral, Nightly    metoprolol succinate XL (Toprol-XL) 50 mg 24 hr tablet 1 tablet, oral, Daily    torsemide (DEMADEX) 60 mg, oral, Daily         Last Recorded Vitals: .vital      3/5/2024     4:13 AM 3/18/2024     9:35 AM 3/26/2024     3:13 PM 4/2/2024    12:54 PM 4/9/2024    10:46 AM 4/17/2024    10:19 AM 4/19/2024     2:21 PM   Vitals   Systolic 148 122 115 125 126 142 146   Diastolic 66 68 65 72 80 64 72   Heart Rate 68 78 65 74 72 84 73   Temp  36.7 °C (98.1 °F)  36.8 °C (98.2 °F) 36.8 °C (98.2 °F) 36.3 °C (97.3 °F)    Resp 16 20 18 18 18 20 20   Weight (lb)   101.8    103.2   BMI   19.23 kg/m2    19.5 kg/m2   BSA (m2)   1.41 m2    1.42 m2   Visit Report   Report        Visit Vitals  LMP  (LMP Unknown)   OB Status Postmenopausal   Smoking Status Former        Physical Exam  Constitutional:       Appearance: Normal appearance.    Cardiovascular:      Rate and Rhythm: Normal rate and regular rhythm.      Pulses: Normal pulses.      Heart sounds: Normal heart sounds.   Pulmonary:      Effort: Pulmonary effort is normal.      Breath sounds: Normal breath sounds.   Musculoskeletal:         General: Normal range of motion.      Right lower leg: No edema.      Left lower leg: No edema.   Skin:     General: Skin is warm and dry.   Neurological:      Mental Status: She is alert and oriented to person, place, and time.   Psychiatric:         Mood and Affect: Mood normal.        Cardiac Testing Reviewed Study(s):    Echo(October/2023):   CONCLUSIONS:   1. Left ventricular systolic function is normal with a 70-75% estimated ejection fraction.   2. There is moderate left ventricular hypertrophy.   3. There is mildly reduced right ventricular systolic function.   4. The right atrium is moderately dilated.   5. Moderately elevated right ventricular systolic pressure.   6. Mild to moderate aortic valve regurgitation.     11/17/2023 ECG today AV paced at 65 bpm with QTc of 509 ms     Lab Results   Component Value Date    WBC 6.5 03/05/2024    HGB 11.3 (L) 03/05/2024    HCT 39.1 03/05/2024     03/05/2024    ALT 30 03/04/2024    AST 44 (H) 03/04/2024     03/04/2024    K 4.9 03/04/2024     03/04/2024    CREATININE 2.07 (H) 03/04/2024    BUN 44 (H) 03/04/2024    CO2 27 03/04/2024    TSH 11.57 (H) 02/07/2024    INR 1.0 07/18/2023       Assessment  Patient noticing worsening fatigue and SOB with minimal activity since last July. LV function 35-40% on last echo. ECG shows paced rhythm with wide QRS. Device check showed no VT/VF. She may benefit upgrading her pacemaker to BiV. Agreeable to upgrade to CRTP    Plan    Schedule for CRTP upgrade, [facility]

## 2024-04-30 ENCOUNTER — OFFICE VISIT (OUTPATIENT)
Dept: PRIMARY CARE | Facility: CLINIC | Age: 89
End: 2024-04-30
Payer: MEDICARE

## 2024-04-30 VITALS
BODY MASS INDEX: 19.45 KG/M2 | TEMPERATURE: 97.3 F | OXYGEN SATURATION: 98 % | SYSTOLIC BLOOD PRESSURE: 128 MMHG | DIASTOLIC BLOOD PRESSURE: 64 MMHG | HEIGHT: 61 IN | HEART RATE: 65 BPM | WEIGHT: 103 LBS

## 2024-04-30 DIAGNOSIS — D58.2 ELEVATED HEMOGLOBIN (CMS-HCC): ICD-10-CM

## 2024-04-30 DIAGNOSIS — Z78.0 POSTMENOPAUSAL: ICD-10-CM

## 2024-04-30 DIAGNOSIS — D50.9 IRON DEFICIENCY ANEMIA, UNSPECIFIED IRON DEFICIENCY ANEMIA TYPE: ICD-10-CM

## 2024-04-30 DIAGNOSIS — E46 PROTEIN-CALORIE MALNUTRITION, UNSPECIFIED SEVERITY (MULTI): Primary | ICD-10-CM

## 2024-04-30 DIAGNOSIS — L97.515 ULCER OF RIGHT FOOT WITH MUSCLE INVOLVEMENT WITHOUT EVIDENCE OF NECROSIS (MULTI): ICD-10-CM

## 2024-04-30 DIAGNOSIS — F51.04 CHRONIC INSOMNIA: ICD-10-CM

## 2024-04-30 DIAGNOSIS — I70.209 ATHEROSCLEROSIS OF ARTERY OF LOWER EXTREMITY (CMS-HCC): ICD-10-CM

## 2024-04-30 PROBLEM — J96.00 ACUTE RESPIRATORY FAILURE (MULTI): Status: RESOLVED | Noted: 2023-10-04 | Resolved: 2024-04-30

## 2024-04-30 PROCEDURE — 3074F SYST BP LT 130 MM HG: CPT | Performed by: FAMILY MEDICINE

## 2024-04-30 PROCEDURE — 1157F ADVNC CARE PLAN IN RCRD: CPT | Performed by: FAMILY MEDICINE

## 2024-04-30 PROCEDURE — 1160F RVW MEDS BY RX/DR IN RCRD: CPT | Performed by: FAMILY MEDICINE

## 2024-04-30 PROCEDURE — 1159F MED LIST DOCD IN RCRD: CPT | Performed by: FAMILY MEDICINE

## 2024-04-30 PROCEDURE — 3078F DIAST BP <80 MM HG: CPT | Performed by: FAMILY MEDICINE

## 2024-04-30 PROCEDURE — 99214 OFFICE O/P EST MOD 30 MIN: CPT | Performed by: FAMILY MEDICINE

## 2024-04-30 PROCEDURE — 1123F ACP DISCUSS/DSCN MKR DOCD: CPT | Performed by: FAMILY MEDICINE

## 2024-04-30 RX ORDER — ALPRAZOLAM 0.5 MG/1
0.5 TABLET ORAL 2 TIMES DAILY
Qty: 180 TABLET | Refills: 0 | Status: SHIPPED | OUTPATIENT
Start: 2024-04-30 | End: 2024-07-29

## 2024-04-30 NOTE — PROGRESS NOTES
"Subjective   Reason for Visit: Adwoa Forrest is an 88 y.o. female here for a follow up on chronic conditions and insomnia         Reviewed all medications by prescribing practitioner or clinical pharmacist (such as prescriptions, OTCs, herbal therapies and supplements) and documented in the medical record.    HPI  Reviewed chronic medical problems    OARRS:  Minh Farmer DO on 4/30/2024  3:39 PM  I have personally reviewed the OARRS report for Adwoa Forrest. I have considered the risks of abuse, dependence, addiction and diversion    Is the patient prescribed a combination of a benzodiazepine and opioid? No    Last Urine Drug Screen / ordered today: No  No results found for this or any previous visit (from the past 8760 hour(s)).  Results are as expected.         Controlled Substance Agreement:  Date of the Last Agreement: 2023  Reviewed Controlled Substance Agreement including but not limited to the benefits, risks, and alternatives to treatment with a Controlled Substance medication(s).    Benzodiazepines:  What is the patient's goal of therapy? insomnia  Is this being achieved with current treatment? yes    KIZZY-7:  No data recorded    Activities of Daily Living:   Is your overall impression that this patient is benefiting (symptom reduction outweighs side effects) from benzodiazepine therapy? Yes     1. Physical Functioning: Same  2. Family Relationship: Same  3. Social Relationship: Same  4. Mood: Same  5. Sleep Patterns: Better  6. 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 Left ventricular systolic function is normal with a 70-75% estimated ejection fraction.   2. There is moderate left ventricular hypertrophy.   3. There is mildly reduced right ventricular systolic function.   4. The right atrium is moderately dilated.   5. Moderately elevated right ventricular systolic pressure.   6. Mild to moderate aortic valve regurgitation.     Acute on chronic combined systolic and diastolic heart failure/ASHD/chronic atrial fibrillation-followed by electrophysiologist Dr Bell and Dr Latham. Currently stable. Pace Maker. nonsustained VT and PVCs highly symptomatic and remains on amiodarone with resolution of symptoms.  Outpatient monitoring.     CKD 4- Controlling cholesterol/Hypertension/ and sugars- stable. Farxiga 10 mg daily.  Seeing Nephrology      Benign essential hypertension-controlled- metoprolol ER 50 mg daily     Gastroesophageal reflux disease-controlled. Stopped pantoprazole     Hyperlipidemia-controlled on atorvastatin 80 mg daily     Hypothyroidism- controlled on current dose of levothyroxine 25 µg daily     Elevated LFT's- No risk except statin medication. Continue to monitor.  Has been stable     Anemia and iron deficiency-= repeat labs to look at the iron level     Polycythemia/Elevated HBG and HCT- Has been stable     Chronic insomnia/anxiety-we reviewed the patient's OARRS and it was normal with no aberrant behavior. The medication is working well for her and she is only taking it at  nighttime. Currently taking alprazolam 0.5 mg 1 p.o. nightly and 1 as needed for the insomnia. Tried Trazodone 50 mg and caused her confusion.  She does not want to take it

## 2024-05-01 ENCOUNTER — HOSPITAL ENCOUNTER (OUTPATIENT)
Dept: CARDIOLOGY | Facility: HOSPITAL | Age: 89
Discharge: HOME | End: 2024-05-01
Payer: MEDICARE

## 2024-05-01 DIAGNOSIS — Z95.0 PRESENCE OF CARDIAC PACEMAKER: ICD-10-CM

## 2024-05-01 DIAGNOSIS — I48.91 ATRIAL FIBRILLATION, UNSPECIFIED TYPE (MULTI): ICD-10-CM

## 2024-05-06 ENCOUNTER — HOSPITAL ENCOUNTER (OUTPATIENT)
Dept: RADIOLOGY | Facility: HOSPITAL | Age: 89
Discharge: HOME | End: 2024-05-06
Payer: MEDICARE

## 2024-05-06 DIAGNOSIS — I51.7 LEFT VENTRICULAR HYPERTROPHY: ICD-10-CM

## 2024-05-06 DIAGNOSIS — I50.32 HYPERTENSIVE HEART AND KIDNEY DISEASE WITH CHRONIC DIASTOLIC CONGESTIVE HEART FAILURE AND STAGE 3B CHRONIC KIDNEY DISEASE (MULTI): ICD-10-CM

## 2024-05-06 DIAGNOSIS — I50.42 CHRONIC COMBINED SYSTOLIC AND DIASTOLIC CONGESTIVE HEART FAILURE (MULTI): ICD-10-CM

## 2024-05-06 DIAGNOSIS — I21.4 NON-ST ELEVATION (NSTEMI) MYOCARDIAL INFARCTION (MULTI): ICD-10-CM

## 2024-05-06 DIAGNOSIS — I25.2 HX OF NON-ST ELEVATION MYOCARDIAL INFARCTION (NSTEMI): ICD-10-CM

## 2024-05-06 DIAGNOSIS — I13.0 HYPERTENSIVE HEART AND KIDNEY DISEASE WITH CHRONIC DIASTOLIC CONGESTIVE HEART FAILURE AND STAGE 3B CHRONIC KIDNEY DISEASE (MULTI): ICD-10-CM

## 2024-05-06 DIAGNOSIS — Z95.5 PRESENCE OF CORONARY ANGIOPLASTY IMPLANT AND GRAFT: ICD-10-CM

## 2024-05-06 DIAGNOSIS — D89.89 KAPPA LIGHT CHAIN DISEASE (MULTI): ICD-10-CM

## 2024-05-06 DIAGNOSIS — N18.32 HYPERTENSIVE HEART AND KIDNEY DISEASE WITH CHRONIC DIASTOLIC CONGESTIVE HEART FAILURE AND STAGE 3B CHRONIC KIDNEY DISEASE (MULTI): ICD-10-CM

## 2024-05-06 PROCEDURE — 78469 HEART INFARCT IMAGE (3D): CPT

## 2024-05-06 PROCEDURE — A9538 TC99M PYROPHOSPHATE: HCPCS | Performed by: INTERNAL MEDICINE

## 2024-05-06 PROCEDURE — 78803 RP LOCLZJ TUM SPECT 1 AREA: CPT | Performed by: INTERNAL MEDICINE

## 2024-05-06 PROCEDURE — 3430000001 HC RX 343 DIAGNOSTIC RADIOPHARMACEUTICALS: Performed by: INTERNAL MEDICINE

## 2024-05-06 RX ADMIN — TECHNETIUM TC 99M PYROPHOSPHATE 20 MILLICURIE: 11.9; 3.2 INJECTION, POWDER, LYOPHILIZED, FOR SOLUTION INTRAVENOUS at 10:30

## 2024-05-07 NOTE — RESULT ENCOUNTER NOTE
Results need further discussion, nothing urgent. Follow up as usual to discuss. Please fu with PCP or hematology for abn kappa lambda ratio. This test was largely ok.

## 2024-05-10 ENCOUNTER — TELEPHONE (OUTPATIENT)
Dept: CARDIOLOGY | Facility: CLINIC | Age: 89
End: 2024-05-10
Payer: MEDICARE

## 2024-05-10 ENCOUNTER — OFFICE VISIT (OUTPATIENT)
Dept: WOUND CARE | Facility: CLINIC | Age: 89
End: 2024-05-10
Payer: MEDICARE

## 2024-05-10 ENCOUNTER — LAB (OUTPATIENT)
Dept: LAB | Facility: LAB | Age: 89
End: 2024-05-10
Payer: MEDICARE

## 2024-05-10 DIAGNOSIS — R93.1 ABNORMAL ECHOCARDIOGRAM: ICD-10-CM

## 2024-05-10 DIAGNOSIS — I50.32 CHRONIC HEART FAILURE WITH PRESERVED EJECTION FRACTION (MULTI): ICD-10-CM

## 2024-05-10 DIAGNOSIS — R07.2 PRECORDIAL PAIN: ICD-10-CM

## 2024-05-10 DIAGNOSIS — D50.9 IRON DEFICIENCY ANEMIA, UNSPECIFIED IRON DEFICIENCY ANEMIA TYPE: ICD-10-CM

## 2024-05-10 LAB
ANION GAP SERPL CALC-SCNC: 11 MMOL/L (ref 10–20)
BNP SERPL-MCNC: 1058 PG/ML (ref 0–99)
BUN SERPL-MCNC: 59 MG/DL (ref 6–23)
CALCIUM SERPL-MCNC: 9.6 MG/DL (ref 8.6–10.3)
CHLORIDE SERPL-SCNC: 99 MMOL/L (ref 98–107)
CO2 SERPL-SCNC: 36 MMOL/L (ref 21–32)
CREAT SERPL-MCNC: 2.25 MG/DL (ref 0.5–1.05)
EGFRCR SERPLBLD CKD-EPI 2021: 21 ML/MIN/1.73M*2
FERRITIN SERPL-MCNC: 41 NG/ML (ref 8–150)
GLUCOSE SERPL-MCNC: 99 MG/DL (ref 74–99)
IRON SATN MFR SERPL: ABNORMAL %
IRON SERPL-MCNC: 24 UG/DL (ref 35–150)
MAGNESIUM SERPL-MCNC: 2.64 MG/DL (ref 1.6–2.4)
POTASSIUM SERPL-SCNC: 4.5 MMOL/L (ref 3.5–5.3)
SODIUM SERPL-SCNC: 141 MMOL/L (ref 136–145)
TIBC SERPL-MCNC: ABNORMAL UG/DL
UIBC SERPL-MCNC: >450 UG/DL (ref 110–370)

## 2024-05-10 PROCEDURE — 36415 COLL VENOUS BLD VENIPUNCTURE: CPT

## 2024-05-10 PROCEDURE — 83550 IRON BINDING TEST: CPT

## 2024-05-10 PROCEDURE — 83880 ASSAY OF NATRIURETIC PEPTIDE: CPT

## 2024-05-10 PROCEDURE — 11042 DBRDMT SUBQ TIS 1ST 20SQCM/<: CPT

## 2024-05-10 PROCEDURE — 83735 ASSAY OF MAGNESIUM: CPT

## 2024-05-10 PROCEDURE — 80048 BASIC METABOLIC PNL TOTAL CA: CPT

## 2024-05-10 PROCEDURE — 83540 ASSAY OF IRON: CPT

## 2024-05-10 PROCEDURE — 82728 ASSAY OF FERRITIN: CPT

## 2024-05-10 NOTE — TELEPHONE ENCOUNTER
Attempted to call with results of PYP scan but no answer.  Dr. Latham reviewed your results.  No urgent findings or changes to treatment plan recommended at this time.  She would like you to follow up with PCP or hematology.  Follow up as scheduled.  She is seeing Dr. Collazo next week.      She called back and we reviewed her results along with Dr. Latham's recommendation to follow up with PCP.  She correctly repeated instructions, verbalized understanding and is agreeable to the plan.

## 2024-05-10 NOTE — TELEPHONE ENCOUNTER
----- Message from Michael Latham MD sent at 5/7/2024 12:05 PM EDT -----  Results need further discussion, nothing urgent. Follow up as usual to discuss. Please fu with PCP or hematology for abn kappa lambda ratio. This test was largely ok.

## 2024-05-14 ENCOUNTER — TELEPHONE (OUTPATIENT)
Dept: PRIMARY CARE | Facility: CLINIC | Age: 89
End: 2024-05-14
Payer: MEDICARE

## 2024-05-14 NOTE — TELEPHONE ENCOUNTER
----- Message from Minh Farmer, DO sent at 5/14/2024  8:20 AM EDT -----  Please let the patient know that she should start back on her iron tablets.  If she needs a prescription sent and we can send a prescription in.  Her iron was low.Magnesium was on the high end and I would suggest that she stop any magnesium at this point in time.  Kidney function was staying stable from where it had been.

## 2024-05-14 NOTE — TELEPHONE ENCOUNTER
I called Adwoa and let her know. She wants it sent to [facility], and she says she is taking a multi vitamin with magnesium in it. She's gonna call back and let me know how much is in it.

## 2024-05-14 NOTE — RESULT ENCOUNTER NOTE
Please let the patient know that she should start back on her iron tablets.  If she needs a prescription sent and we can send a prescription in.  Her iron was low.Magnesium was on the high end and I would suggest that she stop any magnesium at this point in time.  Kidney function was staying stable from where it had been.

## 2024-05-15 ENCOUNTER — TELEPHONE (OUTPATIENT)
Dept: PRIMARY CARE | Facility: CLINIC | Age: 89
End: 2024-05-15
Payer: MEDICARE

## 2024-05-15 DIAGNOSIS — D50.9 IRON DEFICIENCY ANEMIA, UNSPECIFIED IRON DEFICIENCY ANEMIA TYPE: Primary | ICD-10-CM

## 2024-05-15 DIAGNOSIS — D50.9 IRON DEFICIENCY ANEMIA, UNSPECIFIED IRON DEFICIENCY ANEMIA TYPE: ICD-10-CM

## 2024-05-15 RX ORDER — QUINIDINE GLUCONATE 324 MG
TABLET, EXTENDED RELEASE ORAL
Qty: 90 TABLET | Refills: 1 | Status: SHIPPED | OUTPATIENT
Start: 2024-05-15

## 2024-05-15 RX ORDER — FERROUS GLUCONATE 256(28)MG
28 TABLET ORAL DAILY
Qty: 90 TABLET | Refills: 1 | Status: SHIPPED | OUTPATIENT
Start: 2024-05-15 | End: 2024-05-15

## 2024-05-17 ENCOUNTER — OFFICE VISIT (OUTPATIENT)
Dept: CARDIOLOGY | Facility: HOSPITAL | Age: 89
End: 2024-05-17
Payer: MEDICARE

## 2024-05-17 ENCOUNTER — TELEPHONE (OUTPATIENT)
Dept: CARDIOLOGY | Facility: CLINIC | Age: 89
End: 2024-05-17
Payer: MEDICARE

## 2024-05-17 VITALS
OXYGEN SATURATION: 93 % | RESPIRATION RATE: 18 BRPM | WEIGHT: 105.2 LBS | DIASTOLIC BLOOD PRESSURE: 79 MMHG | HEART RATE: 72 BPM | SYSTOLIC BLOOD PRESSURE: 140 MMHG | BODY MASS INDEX: 19.88 KG/M2

## 2024-05-17 DIAGNOSIS — I48.20 CHRONIC ATRIAL FIBRILLATION, UNSPECIFIED (MULTI): ICD-10-CM

## 2024-05-17 DIAGNOSIS — I50.42 CHRONIC COMBINED SYSTOLIC AND DIASTOLIC HEART FAILURE (MULTI): Primary | ICD-10-CM

## 2024-05-17 DIAGNOSIS — I50.32 CHRONIC HEART FAILURE WITH PRESERVED EJECTION FRACTION (MULTI): ICD-10-CM

## 2024-05-17 DIAGNOSIS — I50.42 CHRONIC COMBINED SYSTOLIC AND DIASTOLIC HEART FAILURE (MULTI): ICD-10-CM

## 2024-05-17 PROCEDURE — 3078F DIAST BP <80 MM HG: CPT | Performed by: STUDENT IN AN ORGANIZED HEALTH CARE EDUCATION/TRAINING PROGRAM

## 2024-05-17 PROCEDURE — 99215 OFFICE O/P EST HI 40 MIN: CPT | Performed by: STUDENT IN AN ORGANIZED HEALTH CARE EDUCATION/TRAINING PROGRAM

## 2024-05-17 PROCEDURE — 1157F ADVNC CARE PLAN IN RCRD: CPT | Performed by: STUDENT IN AN ORGANIZED HEALTH CARE EDUCATION/TRAINING PROGRAM

## 2024-05-17 PROCEDURE — 1123F ACP DISCUSS/DSCN MKR DOCD: CPT | Performed by: STUDENT IN AN ORGANIZED HEALTH CARE EDUCATION/TRAINING PROGRAM

## 2024-05-17 PROCEDURE — 3077F SYST BP >= 140 MM HG: CPT | Performed by: STUDENT IN AN ORGANIZED HEALTH CARE EDUCATION/TRAINING PROGRAM

## 2024-05-17 PROCEDURE — 1160F RVW MEDS BY RX/DR IN RCRD: CPT | Performed by: STUDENT IN AN ORGANIZED HEALTH CARE EDUCATION/TRAINING PROGRAM

## 2024-05-17 PROCEDURE — 1036F TOBACCO NON-USER: CPT | Performed by: STUDENT IN AN ORGANIZED HEALTH CARE EDUCATION/TRAINING PROGRAM

## 2024-05-17 PROCEDURE — 1159F MED LIST DOCD IN RCRD: CPT | Performed by: STUDENT IN AN ORGANIZED HEALTH CARE EDUCATION/TRAINING PROGRAM

## 2024-05-17 RX ORDER — ATORVASTATIN CALCIUM 80 MG/1
80 TABLET, FILM COATED ORAL DAILY
COMMUNITY
End: 2024-06-11 | Stop reason: SDUPTHER

## 2024-05-17 RX ORDER — LOSARTAN POTASSIUM 25 MG/1
12.5 TABLET ORAL DAILY
Qty: 15 TABLET | Refills: 11 | Status: SHIPPED | OUTPATIENT
Start: 2024-05-17 | End: 2025-05-17

## 2024-05-17 SDOH — ECONOMIC STABILITY: FOOD INSECURITY: WITHIN THE PAST 12 MONTHS, YOU WORRIED THAT YOUR FOOD WOULD RUN OUT BEFORE YOU GOT MONEY TO BUY MORE.: NEVER TRUE

## 2024-05-17 SDOH — ECONOMIC STABILITY: FOOD INSECURITY: WITHIN THE PAST 12 MONTHS, THE FOOD YOU BOUGHT JUST DIDN'T LAST AND YOU DIDN'T HAVE MONEY TO GET MORE.: NEVER TRUE

## 2024-05-17 ASSESSMENT — ENCOUNTER SYMPTOMS
ALTERED MENTAL STATUS: 0
PALPITATIONS: 0
SYNCOPE: 0
NEAR-SYNCOPE: 0
WEIGHT GAIN: 0
SHORTNESS OF BREATH: 0
LOSS OF SENSATION IN FEET: 0
LOSS OF BALANCE: 0
COUGH: 0
OCCASIONAL FEELINGS OF UNSTEADINESS: 1
DYSPNEA ON EXERTION: 1
IRREGULAR HEARTBEAT: 0
ORTHOPNEA: 0
PND: 0
DECREASED APPETITE: 0
HEMATOCHEZIA: 0
DEPRESSION: 0
WEIGHT LOSS: 0
HEMATURIA: 0

## 2024-05-17 ASSESSMENT — LIFESTYLE VARIABLES: SUBSTANCE_ABUSE: 0

## 2024-05-17 NOTE — TELEPHONE ENCOUNTER
5/17 4:35 pm - Called patient to discuss dates/times for CRTP upgrade w/ Dr. Gonzalez. Patient agreeable to 6/17 12:30 pm Clark. Will confirm with cardiology on Monday - Sammy

## 2024-05-17 NOTE — PROGRESS NOTES
Referring Clinician: MOSES ARANGO Lane  Primary Cardiologist: Dr LISANDRO Hagen  Accompanied by: Clyde, friend    HPI:     88 y.o. retired [employer]  who presents for advanced heart failure care.  My final recommendations will be communicated back to the requesting clinician  by way of shared Medical record.   Review of the electronic medical record shows a past medical history significant for  declining LV systolic function, LVEF 70-75% ( 10/2023) and on TTE 2/2024 LVEF 35-40% with significant left ventricular thickening.  She also has had worsening renal impairment that has limited heart failure pharmacotherapy advancement.  She is maintained beta-blocker, and SGLT2 inhibition.  She was previously tried on MRA but became hypokalemic.  RRASi is also withheld.  She underwent nuclear pharmacological stress testing 3/2024 and this was negative for inducible ischemia.  Her other comorbidities include atrial fibrillation status post ablation, PPM, failed Watchman procedure which necessitated transition to surgical left atrial appendage ligation, she is not maintained on anticoagulation due to history of GI bleeding, hypertension, CAD status post LAD PCI 2017, nonsustained VT maintained on amiodarone, PVCs.  Ms. Forrest has completed technetium pyrophosphate scan 5/2024 but this was grade 1 for cardiac amyloidosis (equivocal/ negative).  The possibility still remains with cardiac amyloidosis, but will require endomyocardial biopsy versus cardiac MRI for more definitive diagnosis.  I have reached out to our MRI team and her pacemaker device is MRI conditional  The other differential includes pacemaker induced cardiomyopathy.  At last visit she was referred to our electrophysiology cardiology colleagues and is planned for CRT upgrade.    Symptomatically, she has been struggling recently with diminished exercise tolerance and this is her predominant complaint.  Normally she is very active and able to keep up with her chores at  home.  She is still able to perform her chores but close more slowly.  She also endorses exertional dyspnea, but can climb a flight of stairs.  She denies leg swelling.  She has not had chest pain per se, syncope, presyncope    She is fully adherent with all prescribed medications.    She has never been hospitalized for heart failure.    Surgical Hx:  -Surgical STEFANIE ligation  -Failed WATCHMAN procedure    Past Obstetric Hx:  -No past cardiac complications of pregnancy    Social Hx:  - Smoking-never  - ETOH-none  - Illicit drugs-never  - Lives alone    Family Hx:  Specifically, there is no known family history of  CAD, heart failure, ICD, PPM, LVAD, OHT, arrhythmias, CVA, or sudden cardiac death.    Medication reconciliation completed, see below.     Medication Documentation Review Audit       Reviewed by Karlene Pemberton RN (Registered Nurse) on 05/17/24 at 1121      Medication Order Taking? Sig Documenting Provider Last Dose Status   ALPRAZolam (Xanax) 0.5 mg tablet [id number] Yes Take 1 tablet (0.5 mg) by mouth 2 times a day. Minh Farmer,  Taking Active   amiodarone (Pacerone) 200 mg tablet [id number] Yes Take 1 tablet (200 mg) by mouth once daily. INDIGO Staley-CNP Taking Active   aspirin 81 mg EC tablet [id number] Yes Take 1 tablet (81 mg) by mouth once daily. Historical Provider, MD Taking Active   Discontinued 05/17/24 1118   atorvastatin (Lipitor) 80 mg tablet [id number] Yes Take 1 tablet (80 mg) by mouth once daily. At bedtime Oren Monet MD Taking Active   calcitriol (Rocaltrol) 0.25 mcg capsule [id number] Yes Take 1 capsule (0.25 mcg) by mouth once daily. Three times a week. Oren Monet MD Taking Active   docusate sodium (Colace) 100 mg capsule [id number] Yes Take 1 capsule (100 mg) by mouth every 12 hours. Oren Monet MD Taking Active   Farxiga 10 mg [id number] Yes TAKE 1 TABLET BY MOUTH EVERY DAY Minh Farmer,  Taking Active   ferrous gluconate (Fergon) 240 (27 Fe) MG  tablet [id number] Yes 1T;PO;QD Minh Farmer, DO Taking Active     Discontinued 05/15/24 1334   levothyroxine (Synthroid, Levoxyl) 25 mcg tablet [id number] Yes TAKE 1 TABLET BY MOUTH EVERY DAY Minh Farmer, DO Taking Active   metoprolol succinate XL (Toprol-XL) 50 mg 24 hr tablet [id number] Yes Take 1 tablet (50 mg) by mouth once daily. Historical Provider, MD Taking Active   torsemide (Demadex) 20 mg tablet [id number] Yes Take 3 tablets (60 mg) by mouth once daily.   Patient taking differently: Take 3 tablets (60 mg) by mouth once daily. If between  take 1 tablet, if between 101-105 take 2 tablets and if above 105 take 3 tablets.    Michael Latham MD Taking Active                      Allergies   Allergen Reactions    Codeine Nausea Only and Other     nausea    Hydrocodone Nausea Only and Swelling     nausea    Sotalol Other     Bradycardia     Tetracyclines Itching        Review of Systems   Constitutional: Positive for malaise/fatigue. Negative for decreased appetite, weight gain and weight loss.   Eyes:  Negative for visual disturbance.   Cardiovascular:  Positive for dyspnea on exertion. Negative for chest pain, cyanosis, irregular heartbeat, leg swelling, near-syncope, orthopnea, palpitations, paroxysmal nocturnal dyspnea and syncope.   Respiratory:  Negative for cough and shortness of breath.    Skin:  Negative for rash.   Gastrointestinal:  Negative for hematochezia and melena.   Genitourinary:  Negative for hematuria.   Neurological:  Negative for loss of balance.   Psychiatric/Behavioral:  Negative for altered mental status and substance abuse.       Investigations:    The electronic medical record has been reviewed by me for salient history. All cardiovascular imaging and testing available in the electronic medical record, and [facility] has been reviewed.     Visit Vitals  /79 (BP Location: Right arm, Patient Position: Sitting, BP Cuff Size: Small adult)   Pulse 72   Resp 18   Wt 47.7 kg (105 lb  3.2 oz)   LMP  (LMP Unknown)   SpO2 93%   BMI 19.88 kg/m²   OB Status Postmenopausal   Smoking Status Former   BSA 1.43 m²       On examination:    Very pleasant petite elderly  woman in no apparent CP or painful distress.  Looks younger than chronological age  Immaculately groomed   Neck: No appreciable JVD or HJR  CVS: HS 1,2.  Prominent second heart sound.  No added sounds  Resp: CTA bilaterally. Percussion note resonant  Abdomen: Flat, SNT, BS wnl  Extremities: No pedal oedema  Skin: warm and dry  CNS: AO x 4, no gross deficits    Lab Results   Component Value Date    WBC 6.5 03/05/2024    HGB 11.3 (L) 03/05/2024    HCT 39.1 03/05/2024    MCV 75 (L) 03/05/2024     03/05/2024       Chemistry    Lab Results   Component Value Date/Time     03/04/2024 2218    K 4.9 03/04/2024 2218     03/04/2024 2218    CO2 27 03/04/2024 2218    BUN 44 (H) 03/04/2024 2218    CREATININE 2.07 (H) 03/04/2024 2218    Lab Results   Component Value Date/Time    CALCIUM 8.8 03/04/2024 2218    ALKPHOS 135 03/04/2024 2218    AST 44 (H) 03/04/2024 2218    ALT 30 03/04/2024 2218    BILITOT 0.5 03/04/2024 2218        BNP 3/2024: 1008    IMPRESSION:    88 y.o. retired [employer]  who presents for advanced heart failure care.  My final recommendations will be communicated back to the requesting clinician  by way of shared Medical record.   Review of the electronic medical record shows a past medical history significant for  declining LV systolic function, LVEF 70-75% ( 10/2023) and on TTE 2/2024 LVEF 35-40% with significant left ventricular thickening.  She also has had worsening renal impairment that has limited heart failure pharmacotherapy advancement.  She is maintained beta-blocker, and SGLT2 inhibition.  She was previously tried on MRA but became hypokalemic.  RRASi is also withheld.  She underwent nuclear pharmacological stress testing 3/2024 and this was negative for inducible ischemia.  Her other  comorbidities include atrial fibrillation status post ablation, PPM, failed Watchman procedure which necessitated transition to surgical left atrial appendage ligation, she is not maintained on anticoagulation due to history of GI bleeding, hypertension, CAD status post LAD PCI 2017, nonsustained VT maintained on amiodarone, PVCs.  Ms. Forrest has completed technetium pyrophosphate scan 5/2024 but this was grade 1 for cardiac amyloidosis (equivocal/ negative).  The possibility still remains with cardiac amyloidosis, but will require endomyocardial biopsy versus cardiac MRI for more definitive diagnosis.  I have reached out to our MRI team and her pacemaker device is MRI conditional  The other differential includes pacemaker induced cardiomyopathy.  At last visit she was referred to our electrophysiology cardiology colleagues and is planned for CRT upgrade.    NYHA Functional Class: 2-3  ACC/AHA Stage B heart failure  Volume status: Euvolemic  Perfusion status: Warm to touch  Aetiology: TBD    PLAN:  #HFrEF (decline in LVEF)    Potential mechanisms include pacer mediated cardiomyopathy versus cardiac amyloidosis given presence of significant LV thickening on recent echocardiograms, negative PYP 5/2024  No medication changes today, but evaluation as below    -CRT  upgrade is pending  - We may check cMRI for amyloid but will first assess response to CRT  - Challenge with low dose Losartan, close RFP monitoring (weekly). If tolerated will consider ARNi challenge  -Will refer to cardiac rehabilitation at next visit    Follow-up appointment in 4 weeks     This note was transcribed using the Dragon Dictation system. There may be grammatical, punctuation, or verbiage errors that can occur with voice recognition programs.         Sushma Collazo MD PhD      This note was transcribed using the Dragon Dictation system. There may be grammatical, punctuation, or verbiage errors that can occur with voice recognition  programs.    Sushma Collazo MD PhD'

## 2024-05-17 NOTE — PATIENT INSTRUCTIONS
Thank you for coming in today. If you have any questions or concerns, you may call the Heart Failure Office at [phone] option 6, or [phone].  You may also contact our heart failure nursing team via email on [email or web address].    For quicker results set-up your  Pango account to receive results and other correspondence directly to your phone.    Please bring all your pills/medications to your Cardiology appointments.    **  - Please make the following medication changes:  1. START Losartan 12.5 mg once a day    -Please have blood tests done in:  One week ( RFP)  2 weeks ( RFP)  4 weeks  ( RFP, BNP)    - I will ask Dr Gonzalez's office to call you to set up your procedure    - Please make an appointment to be seen in 1 month      Thank you for coming in today. If you have any questions or concerns, you may call the Heart Failure Office at [phone] option 6, or [phone].  You may also contact our heart failure nursing team via email on [email or web address].    For quicker results set-up your  Pango account to receive results and other correspondence directly to your phone.    Please bring all your pills/medications to your Cardiology appointments.    **    - Our nurses will contact you with details on scheduling your tests    - From a cardiac perspective you are OK to proceed with kidney transplantation    - No new medication changes today    -We will renew your heart medications today    - Please make the following medication changes:  1.     - Please have the following tests done:  1.Blood tests just before your next visit (RFP, BNP, CBC, iron, TIBC, ferritin)    2.    -  Please get an echocardiogram, we can schedule this for you today before you leave, or you may CALL  Tel [phone]   OR    [phone] to schedule      You will be referred to the following teams, CALL  [phone] to schedule your appointments with:  1.     - You will be referred for consideration for  the optimizer device.  Please call Dr. Adrian Ventura's office on [phone] to make an appointment    - Please make an appointment to be seen in    (VIRTUAL)    (IN OFFICE)      - Call [phone] ([phone]) to  schedule follow up with Sleep Medicine    -You have been referred to the bariatric surgery (weight loss) team. Please call [phone] or visit [email or web address] for new patient information sessions.     - We have referred you for cardiac rehab. If you are not contacted soon please call:  [facility] [phone]  [place] [phone]  [place] [phone]  [place] [phone]  [place] [phone]  [place] [phone]  [facility]) [phone]  [facility]) [phone]  [facility] [phone]  [place] [phone]     Please call to schedule with the device clinic on Tel [phone]    We discussed the [facility] procedure in detail. Provided with [email or web address]  Read this information for more details about a heart cath procedure     We will arrange for you to be seen at the [facility] on **.  The clinic is located at [address] [place] [address], [facility] [address], [place] [address].  Their number is [phone].  They will call you with the time of your appointment  To  refer to a specialty outside [facility] should call [phone].    [facility], call [phone]  For radiology scheduling (Cardiac calcium score, CTA with HeartFlow, Cardiac MRI, NM cardiac stress test, PET viability study) the phone number is [phone].  [facility]  Nuclear pharm stress test arrange  by calling Tel   Please get an echocardiogram done at [facility], please call  Tel  to arrange  To arrange Psychiatry follow up, CALL   The Alcoholics Anonymous team can help.  You can call them on [phone]   OR find them  on the Internet at [email or web address]  You can get more information on LVADs online at [email or web address]

## 2024-05-21 ENCOUNTER — APPOINTMENT (OUTPATIENT)
Dept: CARDIOLOGY | Facility: HOSPITAL | Age: 89
End: 2024-05-21
Payer: MEDICARE

## 2024-05-21 DIAGNOSIS — I50.40 COMBINED SYSTOLIC AND DIASTOLIC HEART FAILURE, UNSPECIFIED HF CHRONICITY (MULTI): Primary | ICD-10-CM

## 2024-05-24 ENCOUNTER — OFFICE VISIT (OUTPATIENT)
Dept: WOUND CARE | Facility: CLINIC | Age: 89
End: 2024-05-24
Payer: MEDICARE

## 2024-05-24 ENCOUNTER — LAB (OUTPATIENT)
Dept: LAB | Facility: LAB | Age: 89
End: 2024-05-24
Payer: MEDICARE

## 2024-05-24 DIAGNOSIS — I50.32 CHRONIC HEART FAILURE WITH PRESERVED EJECTION FRACTION (MULTI): ICD-10-CM

## 2024-05-24 DIAGNOSIS — N18.32 HYPERTENSIVE HEART AND KIDNEY DISEASE WITH CHRONIC DIASTOLIC CONGESTIVE HEART FAILURE AND STAGE 3B CHRONIC KIDNEY DISEASE (MULTI): ICD-10-CM

## 2024-05-24 DIAGNOSIS — I13.0 HYPERTENSIVE HEART AND KIDNEY DISEASE WITH CHRONIC DIASTOLIC CONGESTIVE HEART FAILURE AND STAGE 3B CHRONIC KIDNEY DISEASE (MULTI): ICD-10-CM

## 2024-05-24 DIAGNOSIS — I50.32 HYPERTENSIVE HEART AND KIDNEY DISEASE WITH CHRONIC DIASTOLIC CONGESTIVE HEART FAILURE AND STAGE 3B CHRONIC KIDNEY DISEASE (MULTI): ICD-10-CM

## 2024-05-24 DIAGNOSIS — I50.42 CHRONIC COMBINED SYSTOLIC AND DIASTOLIC HEART FAILURE (MULTI): ICD-10-CM

## 2024-05-24 LAB
ALBUMIN SERPL BCP-MCNC: 4.1 G/DL (ref 3.4–5)
ANION GAP SERPL CALC-SCNC: 10 MMOL/L (ref 10–20)
BUN SERPL-MCNC: 53 MG/DL (ref 6–23)
CALCIUM SERPL-MCNC: 9.2 MG/DL (ref 8.6–10.3)
CHLORIDE SERPL-SCNC: 101 MMOL/L (ref 98–107)
CO2 SERPL-SCNC: 31 MMOL/L (ref 21–32)
CREAT SERPL-MCNC: 2.04 MG/DL (ref 0.5–1.05)
EGFRCR SERPLBLD CKD-EPI 2021: 23 ML/MIN/1.73M*2
ERYTHROCYTE [DISTWIDTH] IN BLOOD BY AUTOMATED COUNT: 24.2 % (ref 11.5–14.5)
GLUCOSE SERPL-MCNC: 155 MG/DL (ref 74–99)
HCT VFR BLD AUTO: 42.2 % (ref 36–46)
HGB BLD-MCNC: 11.8 G/DL (ref 12–16)
INR PPP: 1 (ref 0.9–1.1)
MAGNESIUM SERPL-MCNC: 2.53 MG/DL (ref 1.6–2.4)
MCH RBC QN AUTO: 21.2 PG (ref 26–34)
MCHC RBC AUTO-ENTMCNC: 28 G/DL (ref 32–36)
MCV RBC AUTO: 76 FL (ref 80–100)
NRBC BLD-RTO: 0 /100 WBCS (ref 0–0)
PHOSPHATE SERPL-MCNC: 4.3 MG/DL (ref 2.5–4.9)
PLATELET # BLD AUTO: 336 X10*3/UL (ref 150–450)
POTASSIUM SERPL-SCNC: 4.2 MMOL/L (ref 3.5–5.3)
PROTHROMBIN TIME: 11.5 SECONDS (ref 9.8–12.8)
RBC # BLD AUTO: 5.57 X10*6/UL (ref 4–5.2)
SODIUM SERPL-SCNC: 138 MMOL/L (ref 136–145)
WBC # BLD AUTO: 5.2 X10*3/UL (ref 4.4–11.3)

## 2024-05-24 PROCEDURE — 11042 DBRDMT SUBQ TIS 1ST 20SQCM/<: CPT

## 2024-05-24 PROCEDURE — 83880 ASSAY OF NATRIURETIC PEPTIDE: CPT

## 2024-05-24 PROCEDURE — 85027 COMPLETE CBC AUTOMATED: CPT

## 2024-05-24 PROCEDURE — 83735 ASSAY OF MAGNESIUM: CPT

## 2024-05-24 PROCEDURE — 80069 RENAL FUNCTION PANEL: CPT

## 2024-05-24 PROCEDURE — 85610 PROTHROMBIN TIME: CPT

## 2024-05-24 PROCEDURE — 36415 COLL VENOUS BLD VENIPUNCTURE: CPT

## 2024-05-25 LAB — BNP SERPL-MCNC: 1054 PG/ML (ref 0–99)

## 2024-05-28 ENCOUNTER — HOSPITAL ENCOUNTER (OUTPATIENT)
Dept: CARDIOLOGY | Facility: HOSPITAL | Age: 89
Discharge: HOME | End: 2024-05-28
Payer: MEDICARE

## 2024-05-28 DIAGNOSIS — I48.91 ATRIAL FIBRILLATION, UNSPECIFIED TYPE (MULTI): ICD-10-CM

## 2024-05-28 DIAGNOSIS — Z95.0 PRESENCE OF CARDIAC PACEMAKER: ICD-10-CM

## 2024-05-31 ENCOUNTER — LAB (OUTPATIENT)
Dept: LAB | Facility: LAB | Age: 89
End: 2024-05-31
Payer: MEDICARE

## 2024-05-31 DIAGNOSIS — I50.32 CHRONIC HEART FAILURE WITH PRESERVED EJECTION FRACTION (MULTI): ICD-10-CM

## 2024-05-31 DIAGNOSIS — N18.32 HYPERTENSIVE HEART AND KIDNEY DISEASE WITH CHRONIC DIASTOLIC CONGESTIVE HEART FAILURE AND STAGE 3B CHRONIC KIDNEY DISEASE (MULTI): ICD-10-CM

## 2024-05-31 DIAGNOSIS — I13.0 HYPERTENSIVE HEART AND KIDNEY DISEASE WITH CHRONIC DIASTOLIC CONGESTIVE HEART FAILURE AND STAGE 3B CHRONIC KIDNEY DISEASE (MULTI): ICD-10-CM

## 2024-05-31 DIAGNOSIS — I50.42 CHRONIC COMBINED SYSTOLIC AND DIASTOLIC HEART FAILURE (MULTI): ICD-10-CM

## 2024-05-31 DIAGNOSIS — I50.32 HYPERTENSIVE HEART AND KIDNEY DISEASE WITH CHRONIC DIASTOLIC CONGESTIVE HEART FAILURE AND STAGE 3B CHRONIC KIDNEY DISEASE (MULTI): ICD-10-CM

## 2024-05-31 LAB
ALBUMIN SERPL BCP-MCNC: 4.3 G/DL (ref 3.4–5)
ANION GAP SERPL CALC-SCNC: 8 MMOL/L (ref 10–20)
BNP SERPL-MCNC: 1497 PG/ML (ref 0–99)
BUN SERPL-MCNC: 48 MG/DL (ref 6–23)
CALCIUM SERPL-MCNC: 9.3 MG/DL (ref 8.6–10.3)
CHLORIDE SERPL-SCNC: 101 MMOL/L (ref 98–107)
CO2 SERPL-SCNC: 34 MMOL/L (ref 21–32)
CREAT SERPL-MCNC: 2.17 MG/DL (ref 0.5–1.05)
EGFRCR SERPLBLD CKD-EPI 2021: 22 ML/MIN/1.73M*2
GLUCOSE SERPL-MCNC: 67 MG/DL (ref 74–99)
MAGNESIUM SERPL-MCNC: 2.63 MG/DL (ref 1.6–2.4)
PHOSPHATE SERPL-MCNC: 4.7 MG/DL (ref 2.5–4.9)
POTASSIUM SERPL-SCNC: 4.1 MMOL/L (ref 3.5–5.3)
SODIUM SERPL-SCNC: 139 MMOL/L (ref 136–145)

## 2024-05-31 PROCEDURE — 83880 ASSAY OF NATRIURETIC PEPTIDE: CPT

## 2024-05-31 PROCEDURE — 80069 RENAL FUNCTION PANEL: CPT

## 2024-05-31 PROCEDURE — 36415 COLL VENOUS BLD VENIPUNCTURE: CPT

## 2024-05-31 PROCEDURE — 83735 ASSAY OF MAGNESIUM: CPT

## 2024-06-07 ENCOUNTER — OFFICE VISIT (OUTPATIENT)
Dept: WOUND CARE | Facility: CLINIC | Age: 89
End: 2024-06-07
Payer: MEDICARE

## 2024-06-07 ENCOUNTER — LAB (OUTPATIENT)
Dept: LAB | Facility: LAB | Age: 89
End: 2024-06-07
Payer: MEDICARE

## 2024-06-07 DIAGNOSIS — N18.32 CHRONIC KIDNEY DISEASE, STAGE 3B (MULTI): Primary | ICD-10-CM

## 2024-06-07 PROBLEM — Z79.84 LONG TERM (CURRENT) USE OF ORAL HYPOGLYCEMIC DRUGS: Status: ACTIVE | Noted: 2023-10-10

## 2024-06-07 PROBLEM — N18.30 STAGE 3 CHRONIC KIDNEY DISEASE (MULTI): Status: ACTIVE | Noted: 2024-06-07

## 2024-06-07 PROBLEM — I35.1 NONRHEUMATIC AORTIC (VALVE) INSUFFICIENCY: Status: ACTIVE | Noted: 2023-02-17

## 2024-06-07 PROBLEM — F41.9 ANXIETY DISORDER, UNSPECIFIED: Status: ACTIVE | Noted: 2018-02-28

## 2024-06-07 PROBLEM — I50.40 COMBINED SYSTOLIC AND DIASTOLIC HEART FAILURE (MULTI): Status: RESOLVED | Noted: 2024-05-21 | Resolved: 2024-06-07

## 2024-06-07 PROBLEM — L03.115 CELLULITIS OF RIGHT LOWER LIMB: Status: ACTIVE | Noted: 2023-08-25

## 2024-06-07 LAB
25(OH)D3 SERPL-MCNC: 39 NG/ML (ref 30–100)
ALBUMIN SERPL BCP-MCNC: 4.1 G/DL (ref 3.4–5)
ANION GAP SERPL CALC-SCNC: 13 MMOL/L (ref 10–20)
APPEARANCE UR: CLEAR
BILIRUB UR STRIP.AUTO-MCNC: NEGATIVE MG/DL
BUN SERPL-MCNC: 48 MG/DL (ref 6–23)
CALCIUM SERPL-MCNC: 9.1 MG/DL (ref 8.6–10.3)
CHLORIDE SERPL-SCNC: 102 MMOL/L (ref 98–107)
CO2 SERPL-SCNC: 30 MMOL/L (ref 21–32)
COLOR UR: ABNORMAL
CREAT SERPL-MCNC: 2.03 MG/DL (ref 0.5–1.05)
CREAT UR-MCNC: 32.1 MG/DL (ref 20–320)
EGFRCR SERPLBLD CKD-EPI 2021: 23 ML/MIN/1.73M*2
GLUCOSE SERPL-MCNC: 153 MG/DL (ref 74–99)
GLUCOSE UR STRIP.AUTO-MCNC: ABNORMAL MG/DL
HYALINE CASTS #/AREA URNS AUTO: ABNORMAL /LPF
KETONES UR STRIP.AUTO-MCNC: NEGATIVE MG/DL
LEUKOCYTE ESTERASE UR QL STRIP.AUTO: ABNORMAL
MICROALBUMIN UR-MCNC: <7 MG/L
MICROALBUMIN/CREAT UR: NORMAL MG/G{CREAT}
NITRITE UR QL STRIP.AUTO: NEGATIVE
PH UR STRIP.AUTO: 6.5 [PH]
PHOSPHATE SERPL-MCNC: 4.3 MG/DL (ref 2.5–4.9)
POTASSIUM SERPL-SCNC: 4.3 MMOL/L (ref 3.5–5.3)
PROT UR STRIP.AUTO-MCNC: NEGATIVE MG/DL
RBC # UR STRIP.AUTO: NEGATIVE /UL
RBC #/AREA URNS AUTO: ABNORMAL /HPF
SODIUM SERPL-SCNC: 141 MMOL/L (ref 136–145)
SP GR UR STRIP.AUTO: 1.01
UROBILINOGEN UR STRIP.AUTO-MCNC: NORMAL MG/DL
WBC #/AREA URNS AUTO: ABNORMAL /HPF

## 2024-06-07 PROCEDURE — 80069 RENAL FUNCTION PANEL: CPT

## 2024-06-07 PROCEDURE — 82043 UR ALBUMIN QUANTITATIVE: CPT

## 2024-06-07 PROCEDURE — 36415 COLL VENOUS BLD VENIPUNCTURE: CPT

## 2024-06-07 PROCEDURE — 11042 DBRDMT SUBQ TIS 1ST 20SQCM/<: CPT

## 2024-06-07 PROCEDURE — 82306 VITAMIN D 25 HYDROXY: CPT

## 2024-06-07 PROCEDURE — 81001 URINALYSIS AUTO W/SCOPE: CPT

## 2024-06-07 PROCEDURE — 82570 ASSAY OF URINE CREATININE: CPT

## 2024-06-07 PROCEDURE — 83970 ASSAY OF PARATHORMONE: CPT

## 2024-06-08 LAB — PTH-INTACT SERPL-MCNC: 230.7 PG/ML (ref 18.5–88)

## 2024-06-10 ENCOUNTER — TELEPHONE (OUTPATIENT)
Dept: CARDIOLOGY | Facility: CLINIC | Age: 89
End: 2024-06-10
Payer: MEDICARE

## 2024-06-10 ENCOUNTER — TELEPHONE (OUTPATIENT)
Dept: CARDIOLOGY | Facility: HOSPITAL | Age: 89
End: 2024-06-10
Payer: MEDICARE

## 2024-06-10 DIAGNOSIS — I50.42 CHRONIC COMBINED SYSTOLIC AND DIASTOLIC HEART FAILURE (MULTI): Primary | ICD-10-CM

## 2024-06-10 NOTE — TELEPHONE ENCOUNTER
Patient is scheduled for a device upgrade with Dr. Gonzalez on 6/17/2024 at [place] with arrival time of 11:30. Labs are up to date. NPO after midnight the night before the procedure. Take morning medications with a sip of water. Patient will stay overnight for observation. She will need a ride home. The patient verbalized understanding of instructions including appointment date, time, and location. All questions answered.

## 2024-06-10 NOTE — TELEPHONE ENCOUNTER
Pt went to lab to have 3rd set of labs done for Dr Collazo, but lab didn't have an order. Pt said when she was here the doctor stressed to do it 3 times. Does she need the 3rd set? Please call and advise either way.

## 2024-06-11 ENCOUNTER — OFFICE VISIT (OUTPATIENT)
Dept: CARDIOLOGY | Facility: CLINIC | Age: 89
End: 2024-06-11
Payer: MEDICARE

## 2024-06-11 VITALS
BODY MASS INDEX: 19.29 KG/M2 | HEART RATE: 65 BPM | WEIGHT: 104.8 LBS | SYSTOLIC BLOOD PRESSURE: 104 MMHG | HEIGHT: 62 IN | DIASTOLIC BLOOD PRESSURE: 78 MMHG

## 2024-06-11 DIAGNOSIS — I12.0 BENIGN HYPERTENSIVE KIDNEY DISEASE WITH CHRONIC KIDNEY DISEASE STAGE V OR END STAGE RENAL DISEASE (MULTI): ICD-10-CM

## 2024-06-11 DIAGNOSIS — I25.10 CORONARY ARTERY DISEASE, UNSPECIFIED VESSEL OR LESION TYPE, UNSPECIFIED WHETHER ANGINA PRESENT, UNSPECIFIED WHETHER NATIVE OR TRANSPLANTED HEART: ICD-10-CM

## 2024-06-11 DIAGNOSIS — N18.30 HYPERTENSIVE HEART AND CHRONIC KIDNEY DISEASE STAGE 3 (MULTI): ICD-10-CM

## 2024-06-11 DIAGNOSIS — Z95.5 PRESENCE OF STENT IN CORONARY ARTERY: ICD-10-CM

## 2024-06-11 DIAGNOSIS — Z95.0 PRESENCE OF CARDIAC PACEMAKER: ICD-10-CM

## 2024-06-11 DIAGNOSIS — I50.32 CHRONIC HEART FAILURE WITH PRESERVED EJECTION FRACTION (MULTI): ICD-10-CM

## 2024-06-11 DIAGNOSIS — Z79.899 LONG TERM CURRENT USE OF AMIODARONE: ICD-10-CM

## 2024-06-11 DIAGNOSIS — I47.29 OTHER VENTRICULAR TACHYCARDIA (MULTI): ICD-10-CM

## 2024-06-11 DIAGNOSIS — Z95.5 PRESENCE OF CORONARY ANGIOPLASTY IMPLANT AND GRAFT: ICD-10-CM

## 2024-06-11 DIAGNOSIS — E78.2 MIXED HYPERCHOLESTEROLEMIA AND HYPERTRIGLYCERIDEMIA: ICD-10-CM

## 2024-06-11 DIAGNOSIS — I50.42 CHRONIC COMBINED SYSTOLIC AND DIASTOLIC HEART FAILURE (MULTI): ICD-10-CM

## 2024-06-11 DIAGNOSIS — I13.10 HYPERTENSIVE HEART AND CHRONIC KIDNEY DISEASE STAGE 3 (MULTI): ICD-10-CM

## 2024-06-11 DIAGNOSIS — I10 BENIGN ESSENTIAL HYPERTENSION: Primary | ICD-10-CM

## 2024-06-11 DIAGNOSIS — I43 CARDIOMYOPATHY IN DISEASES CLASSIFIED ELSEWHERE (MULTI): ICD-10-CM

## 2024-06-11 DIAGNOSIS — I51.7 LEFT VENTRICULAR HYPERTROPHY: ICD-10-CM

## 2024-06-11 PROBLEM — R07.2 PRECORDIAL PAIN: Status: RESOLVED | Noted: 2024-01-05 | Resolved: 2024-06-11

## 2024-06-11 PROBLEM — I25.2 HISTORY OF NON-ST ELEVATION MYOCARDIAL INFARCTION (NSTEMI): Status: RESOLVED | Noted: 2023-10-08 | Resolved: 2024-06-11

## 2024-06-11 PROBLEM — I21.4 ACUTE NON-ST ELEVATION MYOCARDIAL INFARCTION (NSTEMI) (MULTI): Status: RESOLVED | Noted: 2023-02-17 | Resolved: 2024-06-11

## 2024-06-11 PROBLEM — I48.20 CHRONIC ATRIAL FIBRILLATION, UNSPECIFIED (MULTI): Status: RESOLVED | Noted: 2023-05-09 | Resolved: 2024-06-11

## 2024-06-11 PROBLEM — R79.89 ELEVATED TROPONIN I LEVEL: Status: RESOLVED | Noted: 2023-10-02 | Resolved: 2024-06-11

## 2024-06-11 PROBLEM — I49.5 SICK SINUS SYNDROME (MULTI): Status: RESOLVED | Noted: 2018-02-27 | Resolved: 2024-06-11

## 2024-06-11 PROCEDURE — 93000 ELECTROCARDIOGRAM COMPLETE: CPT | Performed by: INTERNAL MEDICINE

## 2024-06-11 PROCEDURE — 3074F SYST BP LT 130 MM HG: CPT | Performed by: INTERNAL MEDICINE

## 2024-06-11 PROCEDURE — 99215 OFFICE O/P EST HI 40 MIN: CPT | Performed by: INTERNAL MEDICINE

## 2024-06-11 PROCEDURE — 3078F DIAST BP <80 MM HG: CPT | Performed by: INTERNAL MEDICINE

## 2024-06-11 PROCEDURE — 1160F RVW MEDS BY RX/DR IN RCRD: CPT | Performed by: INTERNAL MEDICINE

## 2024-06-11 PROCEDURE — 1123F ACP DISCUSS/DSCN MKR DOCD: CPT | Performed by: INTERNAL MEDICINE

## 2024-06-11 PROCEDURE — 1159F MED LIST DOCD IN RCRD: CPT | Performed by: INTERNAL MEDICINE

## 2024-06-11 PROCEDURE — 1157F ADVNC CARE PLAN IN RCRD: CPT | Performed by: INTERNAL MEDICINE

## 2024-06-11 PROCEDURE — 1036F TOBACCO NON-USER: CPT | Performed by: INTERNAL MEDICINE

## 2024-06-11 RX ORDER — AMIODARONE HYDROCHLORIDE 200 MG/1
200 TABLET ORAL DAILY
Qty: 90 TABLET | Refills: 3 | Status: SHIPPED | OUTPATIENT
Start: 2024-06-11

## 2024-06-11 RX ORDER — ATORVASTATIN CALCIUM 80 MG/1
80 TABLET, FILM COATED ORAL DAILY
Qty: 90 TABLET | Refills: 3 | Status: SHIPPED | OUTPATIENT
Start: 2024-06-11

## 2024-06-11 NOTE — H&P (VIEW-ONLY)
"Chief Complaint:   Follow-up     History Of Present Illness:    Adwoa Forrest is a 89 y.o. female presenting for annual follow-up.    She has prior history of chronic diastolic heart failure, moderate LVH with equivocal PYP, recent declining LV function to 35 to 40%, 99% RV pacing, persistent atrial fibrillation maintaining sinus rhythm, after AV derek ablation.     She has history of persistent atrial fibrillation status post AV derek ablation and permanent pacemaker placement, failed Watchman device placement in 2018 requiring urgent surgery and left atrial appendage ligation, hypertension recurrent GI bleed not on oral anticoagulants, on amiodarone for nonsustained VT and symptomatic PVCs.  She tells me she had stents placed in her coronary artery in 2017(Dr Hampton, unknown settings and I don't have the records available anywhere).  She also has chronic diastolic heart failure and follows regularly in the CHF clinic.  She had the heart failure exacerbation after her diuretics were cut back due to worsening renal function.   We started her back on high-dose diuretics and added Aldactone unfortunately with that she developed hyperkalemia and Aldactone had to be stopped.  Interestingly, since starting on amiodarone (for NSVT), she has converted to sinus rhythm.     At one time she was having deranged LFTs that improved after discontinuation of the fish oil she was taking.  Her EKG shows atrial paced ventricular paced rhythm.   ..     Last Recorded Vitals:  Vitals:    06/11/24 1253   BP: 104/78   Pulse: 65   Weight: 47.5 kg (104 lb 12.8 oz)   Height: 1.575 m (5' 2\")       Past Medical History:  She has a past medical history of Abnormal findings on diagnostic imaging of heart and coronary circulation (06/13/2017), Acute on chronic combined systolic (congestive) and diastolic (congestive) heart failure (Multi) (09/28/2017), Acute on chronic combined systolic (congestive) and diastolic (congestive) heart failure (Multi) " (02/05/2020), Anemia, unspecified (11/18/2020), Chronic diastolic (congestive) heart failure (Multi) (09/15/2020), Disorder of the skin and subcutaneous tissue, unspecified (02/16/2020), Diverticulitis of large intestine with perforation and abscess without bleeding, Diverticulosis of intestine, part unspecified, without perforation or abscess without bleeding, Dorsalgia, unspecified (08/16/2019), Encounter for immunization (11/13/2019), Encounter for surgical aftercare following surgery on the circulatory system (02/18/2019), Essential (primary) hypertension (11/02/2022), Gastro-esophageal reflux disease without esophagitis (02/05/2020), 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), Long term (current) use of anticoagulants (10/22/2018), Melena (04/10/2020), Old myocardial infarction (10/19/2022), Other forms of dyspnea (01/24/2020), Other long term (current) drug therapy (05/10/2018), Other specified cardiac arrhythmias (02/26/2020), Paroxysmal atrial fibrillation (Multi) (02/01/2018), Permanent atrial fibrillation (Multi) (01/24/2020), Personal history of diseases of the blood and blood-forming organs and certain disorders involving the immune mechanism (04/10/2020), Personal history of other diseases of the circulatory system (11/04/2018), Personal history of other diseases of the digestive system (10/22/2018), Personal history of other diseases of the musculoskeletal system and connective tissue, Personal history of other diseases of the musculoskeletal system and connective tissue (08/16/2019), Personal history of other endocrine, nutritional and metabolic disease (02/18/2019), Personal history of other specified conditions (08/13/2020), Personal history of other specified conditions, Personal history of transient ischemic attack (TIA), and cerebral infarction without residual deficits (02/01/2018), Psychophysiologic insomnia (11/18/2020), Segmental and somatic dysfunction of  rib cage (08/16/2019), Segmental and somatic dysfunction of thoracic region (08/16/2019), and Segmental and somatic dysfunction of upper extremity (08/16/2019).    Past Surgical History:  She has a past surgical history that includes Cataract extraction (05/04/2016); Appendectomy (05/04/2016); Hysterectomy (05/04/2016); Tonsillectomy (05/04/2016); Cholecystectomy (05/04/2016); Other surgical history (02/18/2019); Other surgical history (04/26/2018); Cardiac pacemaker placement (04/26/2018); Coronary angioplasty (02/01/2018); and Colectomy partial / total (08/17/2018).      Social History:  She reports that she quit smoking about 34 years ago. Her smoking use included cigarettes. She has never been exposed to tobacco smoke. She has never used smokeless tobacco. She reports that she does not drink alcohol and does not use drugs.    Family History:  Family History   Problem Relation Name Age of Onset    Coronary artery disease Mother      Coronary artery disease Father          Allergies:  Codeine, Hydrocodone, Sotalol, and Tetracyclines    Outpatient Medications:  Current Outpatient Medications   Medication Instructions    ALPRAZolam (XANAX) 0.5 mg, oral, 2 times daily    amiodarone (PACERONE) 200 mg, oral, Daily    aspirin 81 mg EC tablet 1 tablet, oral, Daily    atorvastatin (LIPITOR) 80 mg, oral, Daily, At bedtime<BR>    calcitriol (ROCALTROL) 0.25 mcg, oral, Daily, Three times a week.    docusate sodium (Colace) 100 mg capsule 1 capsule, oral, Every 12 hours    Farxiga 10 mg, oral, Daily    ferrous gluconate (Fergon) 240 (27 Fe) MG tablet 1T;PO;QD    levothyroxine (Synthroid, Levoxyl) 25 mcg tablet TAKE 1 TABLET BY MOUTH EVERY DAY    losartan (COZAAR) 12.5 mg, oral, Daily    metoprolol succinate XL (Toprol-XL) 50 mg 24 hr tablet 1 tablet, oral, Daily    torsemide (DEMADEX) 60 mg, oral, Daily       Physical Exam:  Physical Exam  Vitals reviewed.   Constitutional:       Appearance: Normal appearance.   Neck:       Vascular: No carotid bruit or JVD.   Cardiovascular:      Rate and Rhythm: Normal rate and regular rhythm.      Pulses: Normal pulses.      Heart sounds: Normal heart sounds, S1 normal and S2 normal.   Pulmonary:      Effort: Pulmonary effort is normal. No respiratory distress.      Breath sounds: No wheezing or rales.   Abdominal:      General: Abdomen is flat.      Palpations: Abdomen is soft.   Musculoskeletal:      Right lower leg: No edema.      Left lower leg: No edema.   Skin:     General: Skin is warm.   Neurological:      Mental Status: She is alert and oriented to person, place, and time. Mental status is at baseline.   Psychiatric:         Mood and Affect: Mood normal.         Behavior: Behavior normal.           Last Labs:  CBC -  Lab Results   Component Value Date    WBC 5.2 05/24/2024    HGB 11.8 (L) 05/24/2024    HCT 42.2 05/24/2024    MCV 76 (L) 05/24/2024     05/24/2024       CMP -  Lab Results   Component Value Date    CALCIUM 9.1 06/07/2024    PHOS 4.3 06/07/2024    PROT 6.6 03/04/2024    ALBUMIN 4.1 06/07/2024    AST 44 (H) 03/04/2024    ALT 30 03/04/2024    ALKPHOS 135 03/04/2024    BILITOT 0.5 03/04/2024       LIPID PANEL -   Lab Results   Component Value Date    CHOL 124 02/07/2024    TRIG 93 02/07/2024    HDL 70.5 02/07/2024    CHHDL 1.8 02/07/2024    LDLF 31 02/23/2023    VLDL 19 02/07/2024    NHDL 54 02/07/2024       RENAL FUNCTION PANEL -   Lab Results   Component Value Date    GLUCOSE 153 (H) 06/07/2024     06/07/2024    K 4.3 06/07/2024     06/07/2024    CO2 30 06/07/2024    ANIONGAP 13 06/07/2024    BUN 48 (H) 06/07/2024    CREATININE 2.03 (H) 06/07/2024    CALCIUM 9.1 06/07/2024    PHOS 4.3 06/07/2024    ALBUMIN 4.1 06/07/2024        Lab Results   Component Value Date    BNP 1,497 (H) 05/31/2024       Last Cardiology Tests:  ECG:  ECG 12 lead (Clinic Performed) 04/29/2024      Echo:  Transthoracic Echo Limited 02/20/2024      Ejection Fractions:  EF   Date/Time Value  Ref Range Status   02/20/2024 02:44 PM 39 %        Cath:  No results found for this or any previous visit from the past 1095 days.      Stress Test:  Nuclear Stress Test 03/20/2024      Cardiac Imaging:  No results found for this or any previous visit from the past 1095 days.          Assessment/Plan     1-chronic systolic and diastolic heart failure-previously with diastolic heart failure but recently LV function declined to 35 to 40%.  Is on appropriate medical therapy and euvolemic on examination.  The decline may have been secondary to RV pacing currently waiting for the pacer upgrade.  Stress test normal from March 20, 2024.     2-persistent atrial fibrillation-she seems to be maintaining sinus rhythm since being on amiodarone(which was started for nonsustained VT).  Status post AV derek ablation and has a pacemaker in place.  She is not anticoagulated but had a left atrial appendage ligation.  She has history of GI bleed.     3-nonsustained VT and PVCs highly symptomatic and remains on amiodarone with resolution of symptoms.  Outpatient monitoring.     4-LVH, CKD, chronic diastolic heart failure-we performed PYP scan which was equivocal.  Currently following up with Dr. Collazo who is considering MRI versus biopsy.    5-long-term use of amiodarone-monitor for toxicities follow-up with EP CHERELLE in 6 months.    6-coronary artery disease-remote history of PCI unknown territory.  Records are not available patient told me about this.  Continue high-dose potent statins check lipid profile target LDL less than 70.      Michael Latham MD

## 2024-06-11 NOTE — PATIENT INSTRUCTIONS
You are on amiodarone . This medicine is helping you stay in normal rhythm.  This is an important medication for you. However, when used over a long period of time can deposit in your liver, thyroid gland, lungs, eyes and cause problems.  It is important that we monitor you periodically for these above-mentioned side effects.  We will be doing periodic blood work and chest x-rays.  It is important that you see your eye doctor at least once a year and mention to him/her that you take amiodarone.

## 2024-06-13 PROBLEM — I50.40 COMBINED SYSTOLIC AND DIASTOLIC HEART FAILURE (MULTI): Status: ACTIVE | Noted: 2024-05-21

## 2024-06-17 ENCOUNTER — HOSPITAL ENCOUNTER (OUTPATIENT)
Facility: HOSPITAL | Age: 89
Discharge: HOME | End: 2024-06-18
Attending: INTERNAL MEDICINE | Admitting: INTERNAL MEDICINE
Payer: MEDICARE

## 2024-06-17 DIAGNOSIS — I50.40 COMBINED SYSTOLIC AND DIASTOLIC HEART FAILURE, UNSPECIFIED HF CHRONICITY (MULTI): Primary | ICD-10-CM

## 2024-06-17 DIAGNOSIS — I50.42 CHRONIC COMBINED SYSTOLIC AND DIASTOLIC HEART FAILURE (MULTI): ICD-10-CM

## 2024-06-17 DIAGNOSIS — I42.9 CARDIOMYOPATHY (MULTI): ICD-10-CM

## 2024-06-17 PROCEDURE — 2500000001 HC RX 250 WO HCPCS SELF ADMINISTERED DRUGS (ALT 637 FOR MEDICARE OP): Performed by: NURSE PRACTITIONER

## 2024-06-17 PROCEDURE — C1769 GUIDE WIRE: HCPCS | Performed by: INTERNAL MEDICINE

## 2024-06-17 PROCEDURE — 7100000009 HC PHASE TWO TIME - INITIAL BASE CHARGE: Performed by: INTERNAL MEDICINE

## 2024-06-17 PROCEDURE — 99153 MOD SED SAME PHYS/QHP EA: CPT | Performed by: INTERNAL MEDICINE

## 2024-06-17 PROCEDURE — C1887 CATHETER, GUIDING: HCPCS | Performed by: INTERNAL MEDICINE

## 2024-06-17 PROCEDURE — 99152 MOD SED SAME PHYS/QHP 5/>YRS: CPT | Performed by: INTERNAL MEDICINE

## 2024-06-17 PROCEDURE — C1900 LEAD, CORONARY VENOUS: HCPCS | Performed by: INTERNAL MEDICINE

## 2024-06-17 PROCEDURE — 2500000005 HC RX 250 GENERAL PHARMACY W/O HCPCS: Performed by: INTERNAL MEDICINE

## 2024-06-17 PROCEDURE — 7100000011 HC EXTENDED STAY RECOVERY HOURLY - NURSING UNIT

## 2024-06-17 PROCEDURE — C1730 CATH, EP, 19 OR FEW ELECT: HCPCS | Performed by: INTERNAL MEDICINE

## 2024-06-17 PROCEDURE — 2720000007 HC OR 272 NO HCPCS: Performed by: INTERNAL MEDICINE

## 2024-06-17 PROCEDURE — C1894 INTRO/SHEATH, NON-LASER: HCPCS | Performed by: INTERNAL MEDICINE

## 2024-06-17 PROCEDURE — 2780000003 HC OR 278 NO HCPCS: Performed by: INTERNAL MEDICINE

## 2024-06-17 PROCEDURE — 33229 REMV&REPLC PM GEN MULT LEADS: CPT | Performed by: INTERNAL MEDICINE

## 2024-06-17 PROCEDURE — C1893 INTRO/SHEATH, FIXED,NON-PEEL: HCPCS | Performed by: INTERNAL MEDICINE

## 2024-06-17 PROCEDURE — C1781 MESH (IMPLANTABLE): HCPCS | Performed by: INTERNAL MEDICINE

## 2024-06-17 PROCEDURE — C1892 INTRO/SHEATH,FIXED,PEEL-AWAY: HCPCS | Performed by: INTERNAL MEDICINE

## 2024-06-17 PROCEDURE — C1751 CATH, INF, PER/CENT/MIDLINE: HCPCS | Performed by: INTERNAL MEDICINE

## 2024-06-17 PROCEDURE — 7100000010 HC PHASE TWO TIME - EACH INCREMENTAL 1 MINUTE: Performed by: INTERNAL MEDICINE

## 2024-06-17 PROCEDURE — 33225 L VENTRIC PACING LEAD ADD-ON: CPT | Performed by: INTERNAL MEDICINE

## 2024-06-17 PROCEDURE — 2500000004 HC RX 250 GENERAL PHARMACY W/ HCPCS (ALT 636 FOR OP/ED): Performed by: INTERNAL MEDICINE

## 2024-06-17 PROCEDURE — 33264 RMVL & RPLCMT DFB GEN MLT LD: CPT | Performed by: INTERNAL MEDICINE

## 2024-06-17 DEVICE — LEFT HEART LEAD
Type: IMPLANTABLE DEVICE | Site: CORONARY | Status: NON-FUNCTIONAL
Brand: QUARTET™

## 2024-06-17 RX ORDER — ASPIRIN 81 MG/1
81 TABLET ORAL DAILY
Status: DISCONTINUED | OUTPATIENT
Start: 2024-06-17 | End: 2024-06-18 | Stop reason: HOSPADM

## 2024-06-17 RX ORDER — CEFAZOLIN 1 G/1
INJECTION, POWDER, FOR SOLUTION INTRAVENOUS AS NEEDED
Status: DISCONTINUED | OUTPATIENT
Start: 2024-06-17 | End: 2024-06-17 | Stop reason: HOSPADM

## 2024-06-17 RX ORDER — CEFAZOLIN SODIUM 1 G/50ML
1 SOLUTION INTRAVENOUS ONCE
Status: DISCONTINUED | OUTPATIENT
Start: 2024-06-17 | End: 2024-06-17

## 2024-06-17 RX ORDER — CEPHALEXIN 500 MG/1
500 CAPSULE ORAL EVERY 8 HOURS SCHEDULED
Status: DISCONTINUED | OUTPATIENT
Start: 2024-06-17 | End: 2024-06-18 | Stop reason: HOSPADM

## 2024-06-17 RX ORDER — TORSEMIDE 20 MG/1
60 TABLET ORAL DAILY
Status: DISCONTINUED | OUTPATIENT
Start: 2024-06-18 | End: 2024-06-18 | Stop reason: HOSPADM

## 2024-06-17 RX ORDER — FENTANYL CITRATE 50 UG/ML
INJECTION, SOLUTION INTRAMUSCULAR; INTRAVENOUS AS NEEDED
Status: DISCONTINUED | OUTPATIENT
Start: 2024-06-17 | End: 2024-06-17 | Stop reason: HOSPADM

## 2024-06-17 RX ORDER — LOSARTAN POTASSIUM 25 MG/1
12.5 TABLET ORAL DAILY
Status: DISCONTINUED | OUTPATIENT
Start: 2024-06-17 | End: 2024-06-18 | Stop reason: HOSPADM

## 2024-06-17 RX ORDER — MIDAZOLAM HYDROCHLORIDE 1 MG/ML
INJECTION, SOLUTION INTRAMUSCULAR; INTRAVENOUS AS NEEDED
Status: DISCONTINUED | OUTPATIENT
Start: 2024-06-17 | End: 2024-06-17 | Stop reason: HOSPADM

## 2024-06-17 RX ORDER — DAPAGLIFLOZIN 5 MG/1
10 TABLET, FILM COATED ORAL DAILY
Status: DISCONTINUED | OUTPATIENT
Start: 2024-06-17 | End: 2024-06-18 | Stop reason: HOSPADM

## 2024-06-17 RX ORDER — METOPROLOL SUCCINATE 50 MG/1
50 TABLET, EXTENDED RELEASE ORAL DAILY
Status: DISCONTINUED | OUTPATIENT
Start: 2024-06-17 | End: 2024-06-18 | Stop reason: HOSPADM

## 2024-06-17 RX ORDER — AMIODARONE HYDROCHLORIDE 200 MG/1
200 TABLET ORAL DAILY
Status: DISCONTINUED | OUTPATIENT
Start: 2024-06-17 | End: 2024-06-18 | Stop reason: HOSPADM

## 2024-06-17 RX ORDER — ATORVASTATIN CALCIUM 40 MG/1
80 TABLET, FILM COATED ORAL DAILY
Status: DISCONTINUED | OUTPATIENT
Start: 2024-06-17 | End: 2024-06-18 | Stop reason: HOSPADM

## 2024-06-17 RX ORDER — ONDANSETRON 4 MG/1
4 TABLET, FILM COATED ORAL EVERY 8 HOURS PRN
Status: DISCONTINUED | OUTPATIENT
Start: 2024-06-17 | End: 2024-06-18 | Stop reason: HOSPADM

## 2024-06-17 RX ORDER — SODIUM CHLORIDE 9 MG/ML
30 INJECTION, SOLUTION INTRAVENOUS CONTINUOUS
Status: DISCONTINUED | OUTPATIENT
Start: 2024-06-17 | End: 2024-06-17

## 2024-06-17 RX ORDER — ALPRAZOLAM 0.5 MG/1
0.5 TABLET ORAL 2 TIMES DAILY PRN
Status: DISCONTINUED | OUTPATIENT
Start: 2024-06-17 | End: 2024-06-18 | Stop reason: HOSPADM

## 2024-06-17 RX ORDER — ONDANSETRON HYDROCHLORIDE 2 MG/ML
4 INJECTION, SOLUTION INTRAVENOUS EVERY 8 HOURS PRN
Status: DISCONTINUED | OUTPATIENT
Start: 2024-06-17 | End: 2024-06-18 | Stop reason: HOSPADM

## 2024-06-17 RX ORDER — LIDOCAINE HYDROCHLORIDE 20 MG/ML
INJECTION, SOLUTION INFILTRATION; PERINEURAL AS NEEDED
Status: DISCONTINUED | OUTPATIENT
Start: 2024-06-17 | End: 2024-06-17 | Stop reason: HOSPADM

## 2024-06-17 RX ORDER — ACETAMINOPHEN 325 MG/1
650 TABLET ORAL EVERY 4 HOURS PRN
Status: DISCONTINUED | OUTPATIENT
Start: 2024-06-17 | End: 2024-06-18 | Stop reason: HOSPADM

## 2024-06-17 RX ORDER — CHLORHEXIDINE GLUCONATE 40 MG/ML
SOLUTION TOPICAL ONCE
Status: DISCONTINUED | OUTPATIENT
Start: 2024-06-17 | End: 2024-06-17

## 2024-06-17 RX ORDER — DOCUSATE SODIUM 100 MG/1
100 CAPSULE, LIQUID FILLED ORAL EVERY 12 HOURS
Status: DISCONTINUED | OUTPATIENT
Start: 2024-06-17 | End: 2024-06-18 | Stop reason: HOSPADM

## 2024-06-17 RX ORDER — LEVOTHYROXINE SODIUM 25 UG/1
25 TABLET ORAL DAILY
Status: DISCONTINUED | OUTPATIENT
Start: 2024-06-17 | End: 2024-06-18 | Stop reason: HOSPADM

## 2024-06-17 SDOH — HEALTH STABILITY: MENTAL HEALTH: HOW MANY STANDARD DRINKS CONTAINING ALCOHOL DO YOU HAVE ON A TYPICAL DAY?: PATIENT DOES NOT DRINK

## 2024-06-17 SDOH — SOCIAL STABILITY: SOCIAL INSECURITY: DOES ANYONE TRY TO KEEP YOU FROM HAVING/CONTACTING OTHER FRIENDS OR DOING THINGS OUTSIDE YOUR HOME?: NO

## 2024-06-17 SDOH — HEALTH STABILITY: MENTAL HEALTH
HOW OFTEN DO YOU NEED TO HAVE SOMEONE HELP YOU WHEN YOU READ INSTRUCTIONS, PAMPHLETS, OR OTHER WRITTEN MATERIAL FROM YOUR DOCTOR OR PHARMACY?: NEVER

## 2024-06-17 SDOH — SOCIAL STABILITY: SOCIAL INSECURITY: HAVE YOU HAD THOUGHTS OF HARMING ANYONE ELSE?: NO

## 2024-06-17 SDOH — SOCIAL STABILITY: SOCIAL INSECURITY: WERE YOU ABLE TO COMPLETE ALL THE BEHAVIORAL HEALTH SCREENINGS?: YES

## 2024-06-17 SDOH — SOCIAL STABILITY: SOCIAL INSECURITY: HAS ANYONE EVER THREATENED TO HURT YOUR FAMILY OR YOUR PETS?: NO

## 2024-06-17 SDOH — HEALTH STABILITY: MENTAL HEALTH: HOW OFTEN DO YOU HAVE A DRINK CONTAINING ALCOHOL?: NEVER

## 2024-06-17 SDOH — SOCIAL STABILITY: SOCIAL INSECURITY: DO YOU FEEL ANYONE HAS EXPLOITED OR TAKEN ADVANTAGE OF YOU FINANCIALLY OR OF YOUR PERSONAL PROPERTY?: NO

## 2024-06-17 SDOH — SOCIAL STABILITY: SOCIAL INSECURITY: ARE YOU OR HAVE YOU BEEN THREATENED OR ABUSED PHYSICALLY, EMOTIONALLY, OR SEXUALLY BY ANYONE?: NO

## 2024-06-17 SDOH — HEALTH STABILITY: MENTAL HEALTH: HOW OFTEN DO YOU HAVE 6 OR MORE DRINKS ON ONE OCCASION?: NEVER

## 2024-06-17 SDOH — SOCIAL STABILITY: SOCIAL INSECURITY: ARE THERE ANY APPARENT SIGNS OF INJURIES/BEHAVIORS THAT COULD BE RELATED TO ABUSE/NEGLECT?: NO

## 2024-06-17 SDOH — ECONOMIC STABILITY: INCOME INSECURITY: IN THE PAST 12 MONTHS, HAS THE ELECTRIC, GAS, OIL, OR WATER COMPANY THREATENED TO SHUT OFF SERVICE IN YOUR HOME?: NO

## 2024-06-17 SDOH — SOCIAL STABILITY: SOCIAL INSECURITY: DO YOU FEEL UNSAFE GOING BACK TO THE PLACE WHERE YOU ARE LIVING?: NO

## 2024-06-17 SDOH — SOCIAL STABILITY: SOCIAL INSECURITY: HAVE YOU HAD ANY THOUGHTS OF HARMING ANYONE ELSE?: NO

## 2024-06-17 SDOH — SOCIAL STABILITY: SOCIAL INSECURITY: ABUSE: ADULT

## 2024-06-17 ASSESSMENT — ACTIVITIES OF DAILY LIVING (ADL)
WALKS IN HOME: INDEPENDENT
DRESSING YOURSELF: INDEPENDENT
BATHING: INDEPENDENT
JUDGMENT_ADEQUATE_SAFELY_COMPLETE_DAILY_ACTIVITIES: YES
FEEDING YOURSELF: INDEPENDENT
HEARING - RIGHT EAR: HEARING AID
TOILETING: INDEPENDENT
LACK_OF_TRANSPORTATION: NO
ADEQUATE_TO_COMPLETE_ADL: YES
PATIENT'S MEMORY ADEQUATE TO SAFELY COMPLETE DAILY ACTIVITIES?: YES
LACK_OF_TRANSPORTATION: NO
HEARING - LEFT EAR: HEARING AID
GROOMING: INDEPENDENT

## 2024-06-17 ASSESSMENT — COGNITIVE AND FUNCTIONAL STATUS - GENERAL
CLIMB 3 TO 5 STEPS WITH RAILING: A LOT
DRESSING REGULAR UPPER BODY CLOTHING: A LITTLE
MOBILITY SCORE: 20
PERSONAL GROOMING: A LITTLE
PATIENT BASELINE BEDBOUND: NO
WALKING IN HOSPITAL ROOM: A LITTLE
HELP NEEDED FOR BATHING: A LITTLE
STANDING UP FROM CHAIR USING ARMS: A LITTLE
TOILETING: A LITTLE
DRESSING REGULAR LOWER BODY CLOTHING: A LITTLE
DAILY ACTIVITIY SCORE: 18
EATING MEALS: A LITTLE

## 2024-06-17 ASSESSMENT — LIFESTYLE VARIABLES
HOW OFTEN DO YOU HAVE A DRINK CONTAINING ALCOHOL: NEVER
SKIP TO QUESTIONS 9-10: 1
SKIP TO QUESTIONS 9-10: 1
HOW OFTEN DO YOU HAVE 6 OR MORE DRINKS ON ONE OCCASION: NEVER
AUDIT-C TOTAL SCORE: 0
SKIP TO QUESTIONS 9-10: 1
AUDIT-C TOTAL SCORE: 0
HOW MANY STANDARD DRINKS CONTAINING ALCOHOL DO YOU HAVE ON A TYPICAL DAY: PATIENT DOES NOT DRINK

## 2024-06-17 ASSESSMENT — ENCOUNTER SYMPTOMS
LOSS OF SENSATION IN FEET: 0
OCCASIONAL FEELINGS OF UNSTEADINESS: 1
DEPRESSION: 0

## 2024-06-17 ASSESSMENT — PAIN SCALES - GENERAL
PAINLEVEL_OUTOF10: 5 - MODERATE PAIN
PAINLEVEL_OUTOF10: 3
PAINLEVEL_OUTOF10: 0 - NO PAIN
PAINLEVEL_OUTOF10: 0 - NO PAIN

## 2024-06-17 ASSESSMENT — PAIN - FUNCTIONAL ASSESSMENT
PAIN_FUNCTIONAL_ASSESSMENT: 0-10
PAIN_FUNCTIONAL_ASSESSMENT: 0-10

## 2024-06-17 ASSESSMENT — PATIENT HEALTH QUESTIONNAIRE - PHQ9
SUM OF ALL RESPONSES TO PHQ9 QUESTIONS 1 & 2: 0
1. LITTLE INTEREST OR PLEASURE IN DOING THINGS: NOT AT ALL
2. FEELING DOWN, DEPRESSED OR HOPELESS: NOT AT ALL

## 2024-06-17 ASSESSMENT — PAIN DESCRIPTION - LOCATION: LOCATION: HEAD

## 2024-06-17 NOTE — Clinical Note
Sheath was exchanged in the left subclavian vein with ACCESS KIT, S-ERIC MINI, 4FR 10CM 0.018IN 40CM, NT/PT, ECHO ENHANCE NEEDLE.

## 2024-06-17 NOTE — PRE-SEDATION DOCUMENTATION
EP Preprocedure Note    Adwoa Forrest   Indication for procedure: The encounter diagnosis was Combined systolic and diastolic heart failure, unspecified HF chronicity (Multi).  Patient here for additional coronary sinus lead.   Relevant review of systems: NA      LMP  (LMP Unknown)    Relevant Labs:   Lab Results   Component Value Date    CREATININE 2.03 (H) 06/07/2024    EGFR 23 (L) 06/07/2024    INR 1.0 05/24/2024    PROTIME 11.5 05/24/2024       Planned Sedation/Anesthesia: Moderate    Airway assessment: normal    Physical Exam  Constitutional:       Appearance: Normal appearance.   HENT:      Head: Normocephalic.      Nose: Nose normal.      Mouth/Throat:      Mouth: Mucous membranes are moist.   Cardiovascular:      Rate and Rhythm: Normal rate.      Pulses: Normal pulses.      Heart sounds: Normal heart sounds. No murmur heard.     Comments: Paced rhythm   Pulmonary:      Effort: Pulmonary effort is normal.      Breath sounds: Normal breath sounds.   Neurological:      Mental Status: She is alert.         Mallampati: II (hard and soft palate, upper portion of tonsils and uvula visible)    ASA Score: ASA 3 - Patient with moderate systemic disease with functional limitations    Benefits, risks and alternatives of procedure and planned sedation have been discussed with the patient and/or their representative. All questions answered and they agree to proceed.     Jackie Moran, APRN-CNP

## 2024-06-18 ENCOUNTER — PHARMACY VISIT (OUTPATIENT)
Dept: PHARMACY | Facility: CLINIC | Age: 89
End: 2024-06-18
Payer: COMMERCIAL

## 2024-06-18 ENCOUNTER — APPOINTMENT (OUTPATIENT)
Dept: RADIOLOGY | Facility: HOSPITAL | Age: 89
End: 2024-06-18
Payer: MEDICARE

## 2024-06-18 VITALS
BODY MASS INDEX: 19.35 KG/M2 | HEART RATE: 67 BPM | HEIGHT: 62 IN | OXYGEN SATURATION: 92 % | TEMPERATURE: 96.5 F | RESPIRATION RATE: 18 BRPM | DIASTOLIC BLOOD PRESSURE: 72 MMHG | WEIGHT: 105.16 LBS | SYSTOLIC BLOOD PRESSURE: 133 MMHG

## 2024-06-18 LAB
ALT SERPL W P-5'-P-CCNC: 30 U/L (ref 7–45)
ANION GAP SERPL CALC-SCNC: 12 MMOL/L (ref 10–20)
AST SERPL W P-5'-P-CCNC: 38 U/L (ref 9–39)
BUN SERPL-MCNC: 44 MG/DL (ref 6–23)
CALCIUM SERPL-MCNC: 9.3 MG/DL (ref 8.6–10.3)
CHLORIDE SERPL-SCNC: 102 MMOL/L (ref 98–107)
CHOLEST SERPL-MCNC: 115 MG/DL (ref 0–199)
CHOLESTEROL/HDL RATIO: 1.9
CO2 SERPL-SCNC: 33 MMOL/L (ref 21–32)
CREAT SERPL-MCNC: 2.02 MG/DL (ref 0.5–1.05)
EGFRCR SERPLBLD CKD-EPI 2021: 23 ML/MIN/1.73M*2
GLUCOSE SERPL-MCNC: 83 MG/DL (ref 74–99)
HDLC SERPL-MCNC: 59.2 MG/DL
LDLC SERPL CALC-MCNC: 33 MG/DL
NON HDL CHOLESTEROL: 56 MG/DL (ref 0–149)
POTASSIUM SERPL-SCNC: 3.9 MMOL/L (ref 3.5–5.3)
SODIUM SERPL-SCNC: 143 MMOL/L (ref 136–145)
T4 FREE SERPL-MCNC: 0.85 NG/DL (ref 0.61–1.12)
TRIGL SERPL-MCNC: 113 MG/DL (ref 0–149)
TSH SERPL-ACNC: 12.87 MIU/L (ref 0.44–3.98)
VLDL: 23 MG/DL (ref 0–40)

## 2024-06-18 PROCEDURE — 84443 ASSAY THYROID STIM HORMONE: CPT | Performed by: NURSE PRACTITIONER

## 2024-06-18 PROCEDURE — 80048 BASIC METABOLIC PNL TOTAL CA: CPT | Performed by: NURSE PRACTITIONER

## 2024-06-18 PROCEDURE — 7100000011 HC EXTENDED STAY RECOVERY HOURLY - NURSING UNIT

## 2024-06-18 PROCEDURE — 84450 TRANSFERASE (AST) (SGOT): CPT | Performed by: NURSE PRACTITIONER

## 2024-06-18 PROCEDURE — 36415 COLL VENOUS BLD VENIPUNCTURE: CPT | Performed by: NURSE PRACTITIONER

## 2024-06-18 PROCEDURE — 71046 X-RAY EXAM CHEST 2 VIEWS: CPT

## 2024-06-18 PROCEDURE — 2500000001 HC RX 250 WO HCPCS SELF ADMINISTERED DRUGS (ALT 637 FOR MEDICARE OP): Performed by: NURSE PRACTITIONER

## 2024-06-18 PROCEDURE — 99024 POSTOP FOLLOW-UP VISIT: CPT | Performed by: NURSE PRACTITIONER

## 2024-06-18 PROCEDURE — 71046 X-RAY EXAM CHEST 2 VIEWS: CPT | Performed by: STUDENT IN AN ORGANIZED HEALTH CARE EDUCATION/TRAINING PROGRAM

## 2024-06-18 PROCEDURE — 84460 ALANINE AMINO (ALT) (SGPT): CPT | Performed by: NURSE PRACTITIONER

## 2024-06-18 PROCEDURE — 2500000002 HC RX 250 W HCPCS SELF ADMINISTERED DRUGS (ALT 637 FOR MEDICARE OP, ALT 636 FOR OP/ED): Performed by: NURSE PRACTITIONER

## 2024-06-18 PROCEDURE — 84439 ASSAY OF FREE THYROXINE: CPT | Performed by: NURSE PRACTITIONER

## 2024-06-18 PROCEDURE — RXMED WILLOW AMBULATORY MEDICATION CHARGE

## 2024-06-18 PROCEDURE — 80061 LIPID PANEL: CPT | Performed by: NURSE PRACTITIONER

## 2024-06-18 RX ORDER — CEPHALEXIN 500 MG/1
500 CAPSULE ORAL EVERY 8 HOURS SCHEDULED
Qty: 19 CAPSULE | Refills: 0 | Status: SHIPPED | OUTPATIENT
Start: 2024-06-18 | End: 2024-06-25

## 2024-06-18 ASSESSMENT — COGNITIVE AND FUNCTIONAL STATUS - GENERAL
DRESSING REGULAR UPPER BODY CLOTHING: A LITTLE
CLIMB 3 TO 5 STEPS WITH RAILING: A LITTLE
TOILETING: A LITTLE
DAILY ACTIVITIY SCORE: 18
HELP NEEDED FOR BATHING: A LITTLE
EATING MEALS: A LITTLE
MOBILITY SCORE: 23
DRESSING REGULAR LOWER BODY CLOTHING: A LITTLE
PERSONAL GROOMING: A LITTLE

## 2024-06-18 ASSESSMENT — PAIN SCALES - GENERAL
PAINLEVEL_OUTOF10: 5 - MODERATE PAIN
PAINLEVEL_OUTOF10: 7
PAINLEVEL_OUTOF10: 3
PAINLEVEL_OUTOF10: 0 - NO PAIN

## 2024-06-18 ASSESSMENT — PAIN - FUNCTIONAL ASSESSMENT
PAIN_FUNCTIONAL_ASSESSMENT: 0-10

## 2024-06-18 NOTE — PROGRESS NOTES
06/18/24 1026   Discharge Planning   Living Arrangements Family members   Support Systems Family members   Assistance Needed none   Type of Residence Private residence   Number of Stairs to Enter Residence 5   Home or Post Acute Services None   Patient expects to be discharged to: home     I spoke with patient to introduce discharge planning. Pt states she and her daughter live together at home. She is independent and does not use any DME.  She denies any falls.  She has close [employer] friends that help drive her to appts when her daughter can not.  She does go to wound center for a wound on her heal.  She has PCP, Dr. Farmer. Pt has discharge order entered.  Her son will be her ride home.  No discharge needs identified at this time but [facility] will continue to follow this pt.

## 2024-06-18 NOTE — CARE PLAN
The clinical goals for the shift include Patient will remain hemodynamically stable this shift      Problem: Fall/Injury  Goal: Not fall by end of shift  Outcome: Progressing  Goal: Be free from injury by end of the shift  Outcome: Progressing  Goal: Verbalize understanding of personal risk factors for fall in the hospital  Outcome: Progressing  Goal: Verbalize understanding of risk factor reduction measures to prevent injury from fall in the home  Outcome: Progressing  Goal: Use assistive devices by end of the shift  Outcome: Progressing  Goal: Pace activities to prevent fatigue by end of the shift  Outcome: Progressing     Problem: Pain  Goal: Takes deep breaths with improved pain control throughout the shift  Outcome: Progressing  Goal: Turns in bed with improved pain control throughout the shift  Outcome: Progressing  Goal: Walks with improved pain control throughout the shift  Outcome: Progressing  Goal: Performs ADL's with improved pain control throughout shift  Outcome: Progressing  Goal: Participates in PT with improved pain control throughout the shift  Outcome: Progressing  Goal: Free from opioid side effects throughout the shift  Outcome: Progressing  Goal: Free from acute confusion related to pain meds throughout the shift  Outcome: Progressing

## 2024-06-18 NOTE — SIGNIFICANT EVENT
Patient goes to wound clinic every two weeks. Patient has dressings changed three times weekly as ordered by wound clinic.

## 2024-06-18 NOTE — DISCHARGE INSTRUCTIONS
Your doctor: Dr. Gonzalez and office phone number is [phone]    If you have an Aquacel (flesh-colored bandage), please leave on for 5 days.  Do not shower for 3 days, please sponge bathe only.  Do not remove the Steri-Strips, they will follow-up on their own.  If bandage becomes saturated with water after 3 days, remove bandage.  For removal, stretch adhesive on margins of Aquacel before removing.    After 5 days you may wash the site with soap and fresh water.  Inspect your incision every day.  If you note increased redness, swelling, or drainage, or if you develop a fever, please call your physician immediately or go to the ER.    Call the device clinic at [phone] if you have any questions about your instructions, Monday through Thursday between the hours of 8 AM to 4:30 PM.  After hours please call your physician's office.    Pain: It is normal for the area around the incision to be tender for a few weeks following surgery.  Pain reliever such as Tylenol or Motrin are usually sufficient for pain relief.  The pain should get better each day, if it gets worse, please contact your physician.    Activity restrictions: For 6 weeks avoid gross arm movement such as stretching the arm on the side of your new implant.  Do not raise or stretch arm above the shoulder (keep elbow below shoulder level).  Do not  weights greater than 10 pounds.  Avoid activities such as golf or swimming for 6 weeks.    Do not drive for 1 week after original implant.  Do not drive for 1 day after device replacement.    It is very important that you carry your device ID card with you at all times.  Inform all doctors and healthcare providers that you have a pacemaker or defibrillator.  Until the lead wires are completely healed in your heart, a prophylactic antibiotic may need to be given to you prior to procedure such as deep cleaning or extraction of teeth.    Microwave ovens are safe to use.  Cellular telephones should be  held to the ear that is opposite of your device (6 inches from the device).  If you are scheduled for an MRI, please notify your physician's office to check if you have a compatible device.  Airport security is safe to go through.  Present your device ID card to the airport  as needed.  The wand detector may be used below waist level and to inspect her shoes not near the device.  You may call the device clinic or the patient services department where the device  with questions about specific electrical appliances and interference problems.    If you received a home monitor/transmitter, we will discuss and/or set up your monitor at your follow-up appointment with the device clinic.  Please do not touch any buttons on your monitor.    You will have a follow-up appointment with the device clinic nurse (this is not an appointment with the cardiologist) and you also have a 3-month post implant follow-up with electrophysiology service.

## 2024-06-18 NOTE — DISCHARGE SUMMARY
Discharge Diagnosis  Combined systolic and diastolic heart failure (Multi)    Issues Requiring Follow-Up  HFrEF - has f/u with advanced HF service and HF clinic at [facility]  S/p PPM generator change    Test Results Pending At Discharge  Pending Labs       No current pending labs.            Hospital Course   Adwoa Forrest was admitted after PPM generator change.  LV lead was unable to be inserted for CRT upgrade per verbal report.  Final procedure report is pending in EMR.  She denies any chest pain/pressure.  Has mild soreness to left chest site.  She has had ice to site since procedure was completed.  She has chronic PEÑA that is at baseline.   CXR post implant without pneumothorax.  Educated regarding activity restrictions after device implant.  No driving for 1 day.  Avoiding gross arm movement, no lifting elbow above the level of the shoulder and no lifting over 10 lbs for 6 weeks.  Avoid activities such as swimming or golfing. Verbalized understanding.     She had f/u with advanced HF service next week and [facility] heart failure clinic in July. She should keep these appointments.  She does not appear volume overloaded on exam and will continue her usual home medications.  She will be d/c with a total 7 day course of Keflex per EP recommendations.      Pertinent Physical Exam At Time of Discharge  Vitals reviewed.   Constitutional:       Appearance: Not in distress.   Neck:      Vascular: No carotid bruit.   Pulmonary:      Effort: Pulmonary effort is normal.      Breath sounds: Normal breath sounds.   Cardiovascular:      PMI at left midclavicular line. Normal rate. Regular rhythm. S1 with normal intensity. S2 with normal intensity.       Murmurs: There is no murmur.      Comments: Left chest Aquacell Ag drsg dry and intact. No hematoma at site.  Edema:     Peripheral edema absent.   Abdominal:      General: Bowel sounds are normal.   Neurological:      Mental Status: Alert and oriented to person, place and  time.           Home Medications     Medication List      START taking these medications     cephalexin 500 mg capsule; Commonly known as: Keflex; Take 1 capsule   (500 mg) by mouth every 8 hours for 19 doses.     CONTINUE taking these medications     ALPRAZolam 0.5 mg tablet; Commonly known as: Xanax; Take 1 tablet (0.5   mg) by mouth 2 times a day.   amiodarone 200 mg tablet; Commonly known as: Pacerone; Take 1 tablet   (200 mg) by mouth once daily.   aspirin 81 mg EC tablet   atorvastatin 80 mg tablet; Commonly known as: Lipitor; Take 1 tablet (80   mg) by mouth once daily. At bedtime   calcitriol 0.25 mcg capsule; Commonly known as: Rocaltrol   docusate sodium 100 mg capsule; Commonly known as: Colace   Farxiga 10 mg; Generic drug: dapagliflozin propanediol; TAKE 1 TABLET BY   MOUTH EVERY DAY   ferrous gluconate 240 (27 Fe) MG tablet; Commonly known as: Fergon;   1T;PO;QD   levothyroxine 25 mcg tablet; Commonly known as: Synthroid, Levoxyl; TAKE   1 TABLET BY MOUTH EVERY DAY   losartan 25 mg tablet; Commonly known as: Cozaar; Take 0.5 tablets (12.5   mg) by mouth once daily.   metoprolol succinate XL 50 mg 24 hr tablet; Commonly known as: Toprol-XL   torsemide 20 mg tablet; Commonly known as: Demadex; Take 3 tablets (60   mg) by mouth once daily.       Outpatient Follow-Up  Future Appointments   Date Time Provider Department Center   6/21/2024  1:45 PM [address] 2D WOUND, WOUNDCARE RESOURCE [id number] [facility]   6/25/2024 11:20 AM Sushma Collazo MD PhD [id number] [facility]   7/18/2024  1:00 PM ELIZABETH Freeman [id number] [place]   7/29/2024  3:30 PM [facility] [id number] [place] [facility]   8/1/2024  1:30 PM DO JENNIFER [id number] [facility]   12/18/2024 11:30 AM ELIZABETH Staley [id number] [facility]   6/11/2025  3:00 PM Michael Latham MD [id number] [facility]     35 minutes spent on patient discharge.      Cherri Pittman, APRN-CNP

## 2024-06-21 ENCOUNTER — APPOINTMENT (OUTPATIENT)
Dept: WOUND CARE | Facility: CLINIC | Age: 89
End: 2024-06-21
Payer: MEDICARE

## 2024-06-25 ENCOUNTER — TELEMEDICINE (OUTPATIENT)
Dept: CARDIOLOGY | Facility: CLINIC | Age: 89
End: 2024-06-25
Payer: MEDICARE

## 2024-06-25 DIAGNOSIS — I50.42 CHRONIC COMBINED SYSTOLIC AND DIASTOLIC HEART FAILURE (MULTI): ICD-10-CM

## 2024-06-25 PROCEDURE — G2211 COMPLEX E/M VISIT ADD ON: HCPCS | Performed by: STUDENT IN AN ORGANIZED HEALTH CARE EDUCATION/TRAINING PROGRAM

## 2024-06-25 PROCEDURE — 1159F MED LIST DOCD IN RCRD: CPT | Performed by: STUDENT IN AN ORGANIZED HEALTH CARE EDUCATION/TRAINING PROGRAM

## 2024-06-25 PROCEDURE — 1123F ACP DISCUSS/DSCN MKR DOCD: CPT | Performed by: STUDENT IN AN ORGANIZED HEALTH CARE EDUCATION/TRAINING PROGRAM

## 2024-06-25 PROCEDURE — 1157F ADVNC CARE PLAN IN RCRD: CPT | Performed by: STUDENT IN AN ORGANIZED HEALTH CARE EDUCATION/TRAINING PROGRAM

## 2024-06-25 PROCEDURE — 99214 OFFICE O/P EST MOD 30 MIN: CPT | Performed by: STUDENT IN AN ORGANIZED HEALTH CARE EDUCATION/TRAINING PROGRAM

## 2024-06-25 ASSESSMENT — ENCOUNTER SYMPTOMS
NEAR-SYNCOPE: 0
DYSPNEA ON EXERTION: 1
DECREASED APPETITE: 0
ALTERED MENTAL STATUS: 0
ORTHOPNEA: 0
WEIGHT GAIN: 0
HEMATOCHEZIA: 0
HEMATURIA: 0
PALPITATIONS: 0
SYNCOPE: 0
WEIGHT LOSS: 0
IRREGULAR HEARTBEAT: 0
SHORTNESS OF BREATH: 0
PND: 0
LOSS OF BALANCE: 0
COUGH: 0

## 2024-06-25 ASSESSMENT — LIFESTYLE VARIABLES: SUBSTANCE_ABUSE: 0

## 2024-06-25 NOTE — PROGRESS NOTES
Referring Clinician: MOSES Lane  Primary Cardiologist: Dr LISANDRO Hagen  Accompanied by: joss Tang  Primary Care Provider: Minh Farmer DO   Visit Type:  Follow Up   On Call: Patient       Verbal consent was requested and obtained from patient on this date for a telehealth visit.    HPI:     89 y.o. retired [employer]  who presents for advanced heart failure care.  My final recommendations will be communicated back to the requesting clinician  by way of shared Medical record.   Review of the electronic medical record shows a past medical history significant for  declining LV systolic function, LVEF 70-75% ( 10/2023) and on TTE 2/2024 LVEF 35-40% with significant left ventricular thickening.  She also has had worsening renal impairment that has limited heart failure pharmacotherapy advancement.  She is maintained beta-blocker, and SGLT2 inhibition.  She was previously tried on MRA but became hypokalemic.  RRASi is also withheld.  She underwent nuclear pharmacological stress testing 3/2024 and this was negative for inducible ischemia.  Her other comorbidities include atrial fibrillation status post ablation, PPM, failed Watchman procedure which necessitated transition to surgical left atrial appendage ligation, she is not maintained on anticoagulation due to history of GI bleeding, hypertension, CAD status post LAD PCI 2017, nonsustained VT maintained on amiodarone, PVCs.  Ms. Forrest has completed technetium pyrophosphate scan 5/2024 but this was grade 1 for cardiac amyloidosis (equivocal/ negative).  The possibility still remains with cardiac amyloidosis, but will require endomyocardial biopsy versus cardiac MRI for more definitive diagnosis.  I have reached out to our MRI team and her pacemaker device is MRI conditional  The other differential includes pacemaker induced cardiomyopathy.  At last visit she was referred to our electrophysiology cardiology colleagues and was planned for CRT upgrade.   Unfortunately at the time of generator change 6/2024, the LV lead could not be implanted.    Symptomatically she thinks she has found the optimal regimen of loop diuretics, which she now takes twice a day with stability of her symptoms.  She continues to have some trouble with exertional dyspnea but is still able to climb a flight of stairs.  She no longer has leg swelling and denies bendopnea, PND, chest pain, palpitations.    She has been tracking her blood pressures and today's BP was 120/58 mmHg.    She is fully adherent with all prescribed medications.    She has never been hospitalized for heart failure.    Surgical Hx:  -Surgical STEFANIE ligation  -Failed WATCHMAN procedure    Past Obstetric Hx:  -No past cardiac complications of pregnancy    Social Hx:  - Smoking-never  - ETOH-none  - Illicit drugs-never  - Lives alone    Family Hx:  Specifically, there is no known family history of  CAD, heart failure, ICD, PPM, LVAD, OHT, arrhythmias, CVA, or sudden cardiac death.    Medication reconciliation completed, see below.     Medication Documentation Review Audit       Reviewed by Klaudia Saldana RN (Registered Nurse) on 06/25/24 at 1036      Medication Order Taking? Sig Documenting Provider Last Dose Status   ALPRAZolam (Xanax) 0.5 mg tablet [id number] Yes Take 1 tablet (0.5 mg) by mouth 2 times a day. Minh Farmer,  Taking Active   amiodarone (Pacerone) 200 mg tablet [id number] Yes Take 1 tablet (200 mg) by mouth once daily. Michael Latham MD Taking Active   aspirin 81 mg EC tablet [id number] Yes Take 1 tablet (81 mg) by mouth once daily. Historical Provider, MD Taking Active   atorvastatin (Lipitor) 80 mg tablet [id number] Yes Take 1 tablet (80 mg) by mouth once daily. At bedtime Michael Latham MD Taking Active   calcitriol (Rocaltrol) 0.25 mcg capsule [id number] Yes Take 1 capsule (0.25 mcg) by mouth once daily. Three times a week. Historical Provider, MD Taking Active   cephalexin (Keflex) 500 mg  capsule [id number] Yes Take 1 capsule (500 mg) by mouth every 8 hours for 19 doses. Cherri Pittman, APRN-CNP Taking Active   docusate sodium (Colace) 100 mg capsule [id number] Yes Take 1 capsule (100 mg) by mouth every 12 hours. Historical Provider, MD Taking Active   Farxiga 10 mg [id number] Yes TAKE 1 TABLET BY MOUTH EVERY DAY Minh Farmer, DO Taking Active   ferrous gluconate (Fergon) 240 (27 Fe) MG tablet [id number] Yes 1T;PO;QD Minh Farmer DO Taking Active   levothyroxine (Synthroid, Levoxyl) 25 mcg tablet [id number] Yes TAKE 1 TABLET BY MOUTH EVERY DAY Minh Farmer,  Taking Active   losartan (Cozaar) 25 mg tablet [id number] Yes Take 0.5 tablets (12.5 mg) by mouth once daily. Sushma Collazo MD PhD Taking Active   metoprolol succinate XL (Toprol-XL) 50 mg 24 hr tablet [id number] Yes Take 1 tablet (50 mg) by mouth once daily. Historical Provider, MD Taking Active   torsemide (Demadex) 20 mg tablet [id number] Yes Take 3 tablets (60 mg) by mouth once daily.   Patient taking differently: Take 2 tablets (40 mg) by mouth 2 times a day. Patient states she only takes it twice a week    Michael Latham MD Taking Differently Active                      Allergies   Allergen Reactions    Codeine Nausea Only and Other     nausea    Hydrocodone Nausea Only and Swelling     nausea    Sotalol Other     Bradycardia     Tetracyclines Itching        Review of Systems   Constitutional: Positive for malaise/fatigue. Negative for decreased appetite, weight gain and weight loss.   Eyes:  Negative for visual disturbance.   Cardiovascular:  Positive for dyspnea on exertion. Negative for chest pain, cyanosis, irregular heartbeat, leg swelling, near-syncope, orthopnea, palpitations, paroxysmal nocturnal dyspnea and syncope.   Respiratory:  Negative for cough and shortness of breath.    Skin:  Negative for rash.   Gastrointestinal:  Negative for hematochezia and melena.   Genitourinary:  Negative for hematuria.   Neurological:   Negative for loss of balance.   Psychiatric/Behavioral:  Negative for altered mental status and substance abuse.       Investigations:    The electronic medical record has been reviewed by me for salient history. All cardiovascular imaging and testing available in the electronic medical record, and [facility] has been reviewed.      On examination:    O/E:  No in person physical examination done  AO x 4  Apparent conversational SOB: No    Lab Results   Component Value Date    WBC 6.5 03/05/2024    HGB 11.3 (L) 03/05/2024    HCT 39.1 03/05/2024    MCV 75 (L) 03/05/2024     03/05/2024       Chemistry    Lab Results   Component Value Date/Time     03/04/2024 2218    K 4.9 03/04/2024 2218     03/04/2024 2218    CO2 27 03/04/2024 2218    BUN 44 (H) 03/04/2024 2218    CREATININE 2.07 (H) 03/04/2024 2218    Lab Results   Component Value Date/Time    CALCIUM 8.8 03/04/2024 2218    ALKPHOS 135 03/04/2024 2218    AST 44 (H) 03/04/2024 2218    ALT 30 03/04/2024 2218    BILITOT 0.5 03/04/2024 2218        BNP 3/2024: 1008    IMPRESSION:      89 y.o. retired [employer]  who presents for advanced heart failure care.  My final recommendations will be communicated back to the requesting clinician  by way of shared Medical record.   Review of the electronic medical record shows a past medical history significant for  declining LV systolic function, LVEF 70-75% ( 10/2023) and on TTE 2/2024 LVEF 35-40% with significant left ventricular thickening.  She also has had worsening renal impairment that has limited heart failure pharmacotherapy advancement.  She is maintained beta-blocker, and SGLT2 inhibition.  She was previously tried on MRA but became hypokalemic.  RRASi is also withheld.  She underwent nuclear pharmacological stress testing 3/2024 and this was negative for inducible ischemia.  Her other comorbidities include atrial fibrillation status post ablation, PPM, failed Watchman procedure which necessitated  "transition to surgical left atrial appendage ligation, she is not maintained on anticoagulation due to history of GI bleeding, hypertension, CAD status post LAD PCI 2017, nonsustained VT maintained on amiodarone, PVCs.  Ms. Forrest has completed technetium pyrophosphate scan 5/2024 but this was grade 1 for cardiac amyloidosis (equivocal/ negative).  The possibility still remains with cardiac amyloidosis, but will require endomyocardial biopsy versus cardiac MRI for more definitive diagnosis.  I have reached out to our MRI team and her pacemaker device is MRI conditional  The other differential includes pacemaker induced cardiomyopathy.  At last visit she was referred to our electrophysiology cardiology colleagues and was planned for CRT upgrade.  Unfortunately at the time of generator change 6/2024, the LV lead could not be implanted.  Symptomatically she is stabilized/modestly improved with appropriate diuretic regimen.    NYHA Functional Class: 2-3  ACC/AHA Stage B heart failure  Volume status: Euvolemic per history  Perfusion status: Warm to touch  Aetiology: TBD    PLAN:  #HFrEF (decline in LVEF)    Potential mechanisms include pacer mediated cardiomyopathy versus cardiac amyloidosis given presence of significant LV thickening on recent echocardiograms, negative PYP 5/2024  No medication changes today.    -CRT upgrade was not technically possible 6/2024.  We did discuss the possibility of epicardial lead placement but mutually agreed not to proceed with this, she indicated that \"I am too old for that\"  -She has tolerated low-dose losartan well, with stable renal function.  We will repeat kidney function just before her next visit and plan to transition to sacubitril/valsartan if her kidneys continue to tolerate RAAS inhibition  -If she has progressive symptoms, we may need to consider EMB to definitively rule in/rule out amyloidosis    This note was transcribed using the Dragon Dictation system. There may be " grammatical, punctuation, or verbiage errors that can occur with voice recognition programs.    Sushma Collazo MD PhD'

## 2024-06-27 ENCOUNTER — HOSPITAL ENCOUNTER (OUTPATIENT)
Dept: CARDIOLOGY | Facility: HOSPITAL | Age: 89
Discharge: HOME | End: 2024-06-27
Payer: MEDICARE

## 2024-06-27 ENCOUNTER — APPOINTMENT (OUTPATIENT)
Dept: CARDIOLOGY | Facility: HOSPITAL | Age: 89
End: 2024-06-27
Payer: MEDICARE

## 2024-06-27 DIAGNOSIS — Z95.0 PRESENCE OF CARDIAC PACEMAKER: ICD-10-CM

## 2024-06-27 DIAGNOSIS — I48.91 ATRIAL FIBRILLATION, UNSPECIFIED TYPE (MULTI): ICD-10-CM

## 2024-06-28 ENCOUNTER — OFFICE VISIT (OUTPATIENT)
Dept: WOUND CARE | Facility: CLINIC | Age: 89
End: 2024-06-28
Payer: MEDICARE

## 2024-06-28 PROCEDURE — 11042 DBRDMT SUBQ TIS 1ST 20SQCM/<: CPT

## 2024-07-03 ENCOUNTER — HOSPITAL ENCOUNTER (OUTPATIENT)
Dept: CARDIOLOGY | Facility: HOSPITAL | Age: 89
Discharge: HOME | End: 2024-07-03
Payer: MEDICARE

## 2024-07-03 DIAGNOSIS — Z95.0 PRESENCE OF CARDIAC PACEMAKER: ICD-10-CM

## 2024-07-03 DIAGNOSIS — I48.91 ATRIAL FIBRILLATION, UNSPECIFIED TYPE (MULTI): ICD-10-CM

## 2024-07-09 ENCOUNTER — TELEPHONE (OUTPATIENT)
Dept: CARDIOLOGY | Facility: HOSPITAL | Age: 89
End: 2024-07-09
Payer: MEDICARE

## 2024-07-09 DIAGNOSIS — I50.42 CHRONIC COMBINED SYSTOLIC AND DIASTOLIC HEART FAILURE (MULTI): ICD-10-CM

## 2024-07-09 RX ORDER — METOPROLOL SUCCINATE 50 MG/1
50 TABLET, EXTENDED RELEASE ORAL DAILY
Qty: 90 TABLET | Refills: 3 | Status: SHIPPED | OUTPATIENT
Start: 2024-07-09

## 2024-07-09 NOTE — TELEPHONE ENCOUNTER
metoprolol succinate XL (Toprol-XL) 50 mg 24 hr tablet Take 1 tablet (50 mg) by mouth once daily.     Refill: [facility]

## 2024-07-12 ENCOUNTER — OFFICE VISIT (OUTPATIENT)
Dept: WOUND CARE | Facility: CLINIC | Age: 89
End: 2024-07-12
Payer: MEDICARE

## 2024-07-12 PROCEDURE — 11043 DBRDMT MUSC&/FSCA 1ST 20/<: CPT

## 2024-07-18 ENCOUNTER — APPOINTMENT (OUTPATIENT)
Dept: CARDIOLOGY | Facility: HOSPITAL | Age: 89
End: 2024-07-18
Payer: MEDICARE

## 2024-07-25 ENCOUNTER — OFFICE VISIT (OUTPATIENT)
Dept: CARDIOLOGY | Facility: HOSPITAL | Age: 89
End: 2024-07-25
Payer: MEDICARE

## 2024-07-25 VITALS
DIASTOLIC BLOOD PRESSURE: 75 MMHG | OXYGEN SATURATION: 99 % | BODY MASS INDEX: 19.44 KG/M2 | HEART RATE: 65 BPM | RESPIRATION RATE: 22 BRPM | WEIGHT: 106.3 LBS | SYSTOLIC BLOOD PRESSURE: 126 MMHG

## 2024-07-25 DIAGNOSIS — Z95.5 PRESENCE OF CORONARY ANGIOPLASTY IMPLANT AND GRAFT: ICD-10-CM

## 2024-07-25 DIAGNOSIS — I25.10 ASHD (ARTERIOSCLEROTIC HEART DISEASE): ICD-10-CM

## 2024-07-25 DIAGNOSIS — I50.32 HYPERTENSIVE HEART AND KIDNEY DISEASE WITH CHRONIC DIASTOLIC CONGESTIVE HEART FAILURE AND STAGE 3B CHRONIC KIDNEY DISEASE (MULTI): ICD-10-CM

## 2024-07-25 DIAGNOSIS — N18.32 HYPERTENSIVE HEART AND KIDNEY DISEASE WITH CHRONIC DIASTOLIC CONGESTIVE HEART FAILURE AND STAGE 3B CHRONIC KIDNEY DISEASE (MULTI): ICD-10-CM

## 2024-07-25 DIAGNOSIS — I50.32 CHRONIC HEART FAILURE WITH PRESERVED EJECTION FRACTION (MULTI): Primary | ICD-10-CM

## 2024-07-25 DIAGNOSIS — J44.9 CHRONIC OBSTRUCTIVE PULMONARY DISEASE, UNSPECIFIED COPD TYPE (MULTI): ICD-10-CM

## 2024-07-25 DIAGNOSIS — I13.0 HYPERTENSIVE HEART AND KIDNEY DISEASE WITH CHRONIC DIASTOLIC CONGESTIVE HEART FAILURE AND STAGE 3B CHRONIC KIDNEY DISEASE (MULTI): ICD-10-CM

## 2024-07-25 PROCEDURE — 99213 OFFICE O/P EST LOW 20 MIN: CPT | Performed by: NURSE PRACTITIONER

## 2024-07-25 RX ORDER — TORSEMIDE 20 MG/1
40 TABLET ORAL 2 TIMES DAILY
Qty: 360 TABLET | Refills: 3 | COMMUNITY
Start: 2024-07-25 | End: 2025-07-25

## 2024-07-25 SDOH — ECONOMIC STABILITY: FOOD INSECURITY: WITHIN THE PAST 12 MONTHS, YOU WORRIED THAT YOUR FOOD WOULD RUN OUT BEFORE YOU GOT MONEY TO BUY MORE.: NEVER TRUE

## 2024-07-25 SDOH — ECONOMIC STABILITY: FOOD INSECURITY: WITHIN THE PAST 12 MONTHS, THE FOOD YOU BOUGHT JUST DIDN'T LAST AND YOU DIDN'T HAVE MONEY TO GET MORE.: NEVER TRUE

## 2024-07-25 ASSESSMENT — ENCOUNTER SYMPTOMS
ABDOMINAL DISTENTION: 0
SHORTNESS OF BREATH: 1
CONFUSION: 0
WEAKNESS: 0
EYES NEGATIVE: 1
CHILLS: 0
CHEST TIGHTNESS: 0
LIGHT-HEADEDNESS: 0
LOSS OF SENSATION IN FEET: 1
HEMATURIA: 0
DEPRESSION: 0
BLOOD IN STOOL: 0
FEVER: 0
PALPITATIONS: 0
COUGH: 0
ACTIVITY CHANGE: 0
OCCASIONAL FEELINGS OF UNSTEADINESS: 0
WHEEZING: 0

## 2024-07-25 ASSESSMENT — PATIENT HEALTH QUESTIONNAIRE - PHQ9
SUM OF ALL RESPONSES TO PHQ9 QUESTIONS 1 AND 2: 0
1. LITTLE INTEREST OR PLEASURE IN DOING THINGS: NOT AT ALL
2. FEELING DOWN, DEPRESSED OR HOPELESS: NOT AT ALL

## 2024-07-25 NOTE — PROGRESS NOTES
Subjective   Patient ID: Adwoa Forrest is a 89 y.o. female who presents for follow-up of congestive heart failure.     Current Outpatient Medications:     ALPRAZolam (Xanax) 0.5 mg tablet, Take 1 tablet (0.5 mg) by mouth 2 times a day., Disp: 180 tablet, Rfl: 0    amiodarone (Pacerone) 200 mg tablet, Take 1 tablet (200 mg) by mouth once daily., Disp: 90 tablet, Rfl: 3    aspirin 81 mg EC tablet, Take 1 tablet (81 mg) by mouth once daily., Disp: , Rfl:     atorvastatin (Lipitor) 80 mg tablet, Take 1 tablet (80 mg) by mouth once daily. At bedtime, Disp: 90 tablet, Rfl: 3    calcitriol (Rocaltrol) 0.25 mcg capsule, Take 1 capsule (0.25 mcg) by mouth once daily. Three times a week., Disp: , Rfl:     docusate sodium (Colace) 100 mg capsule, Take 1 capsule (100 mg) by mouth every 12 hours., Disp: , Rfl:     Farxiga 10 mg, TAKE 1 TABLET BY MOUTH EVERY DAY, Disp: 90 tablet, Rfl: 3    ferrous gluconate (Fergon) 240 (27 Fe) MG tablet, 1T;PO;QD, Disp: 90 tablet, Rfl: 1    levothyroxine (Synthroid, Levoxyl) 25 mcg tablet, TAKE 1 TABLET BY MOUTH EVERY DAY, Disp: 90 tablet, Rfl: 3    losartan (Cozaar) 25 mg tablet, Take 0.5 tablets (12.5 mg) by mouth once daily., Disp: 15 tablet, Rfl: 11    metoprolol succinate XL (Toprol-XL) 50 mg 24 hr tablet, Take 1 tablet (50 mg) by mouth once daily., Disp: 90 tablet, Rfl: 3    torsemide (Demadex) 20 mg tablet, Take 3 tablets (60 mg) by mouth once daily. (Patient taking differently: Take 2 tablets (40 mg) by mouth 2 times a day. Patient states takes one tablet twice a day.), Disp: 270 tablet, Rfl: 3     HPI   Past medical history of ischemic heart disease with PCI in the past.  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  She has had nonsustained ventricular tachycardia in the past from which she was symptomatic.  She has been pretty well-controlled with that on amiodarone.  Echocardiogram has showed a significant drop in her ejection fraction  from 70% to 40%.  During the same time she has had worsening of her renal insufficiency as well.  Recent stress MPI normal.   She had hospitalization on March 4, 2024 with pneumonia and comorbid CHF.    The lateral wall lead was unable to be placed to convert to BiV pacer recently.  She remains easily sob.  She does not appear to be congested.  CXR in past shows significant emphysematous changes.      Review of Systems   Constitutional:  Negative for activity change, chills and fever.   HENT:  Negative for hearing loss.    Eyes: Negative.    Respiratory:  Positive for shortness of breath. Negative for cough, chest tightness and wheezing.         Chronic sob   Cardiovascular:  Negative for chest pain, palpitations and leg swelling.   Gastrointestinal:  Negative for abdominal distention and blood in stool.   Genitourinary:  Negative for hematuria.   Neurological:  Negative for syncope, weakness and light-headedness.   Psychiatric/Behavioral:  Negative for confusion.        Objective     /75 (BP Location: Left arm, Patient Position: Sitting, BP Cuff Size: Small adult)   Pulse 65   Resp 22   Wt 48.2 kg (106 lb 4.8 oz)   LMP  (LMP Unknown)   SpO2 99%   BMI 19.44 kg/m²         Transthoracic Echo Limited    Result Date: 2/21/2024              [place] [address]      Phone [phone] Fax [phone] TRANSTHORACIC ECHOCARDIOGRAM REPORT  Patient Name:      CHERRY Noel Physician:    [id number] Darrel Hampton MD Study Date:        2/20/2024            Ordering Provider:    [id number] BROOKE FOFANA MRN/PID:           [id number]             Fellow: Accession#:        [id number]         Nurse:                Nette Ott RN Date of Birth/Age: 1935 / 88 years  Sonographer:          Estelita Brown                                                                [facility] Gender:            F                    Additional Staff: Height:            157.48 cm            Admit Date: Weight:            45.36 kg             Admission Status:     Outpatient BSA / BMI:         1.42 m2 / 18.29      Department Location:                    kg/m2 Blood Pressure: 102 /58 mmHg Study Type:    TRANSTHORACIC ECHO (TTE) LIMITED Diagnosis/ICD: Ventricular hypertrophy or dilatation-I51.7 Indication:    Left Ventricular hypertrophy CPT Codes:     Echo Limited-54428 Patient History: Pertinent History: LVH. Study Detail: Technically challenging study due to small rib spaces and               prominent lung artifact. Definity used as a contrast agent for               endocardial border definition. Unable to obtain suprasternal notch               and subcostal view.  PHYSICIAN INTERPRETATION: Left Ventricle: Left ventricular systolic function is moderately decreased, with an estimated ejection fraction of 35-40%. There is global hypokinesis of the left ventricle with minor regional variations. The left ventricular cavity size was not assessed. The left ventricular septal wall thickness is moderately increased. There is moderately increased left ventricular posterior wall thickness. There is moderate to severe left ventricular hypertrophy. Left ventricular diastolic filling was not assessed. Left Atrium: The left atrium was not assessed. Right Ventricle: The right ventricle was not assessed. Right ventricular systolic function not assessed. Right Atrium: The right atrium was not assessed. Aortic Valve: The aortic valve was not assessed. Aortic valve regurgitation was not assessed. Mitral Valve: The mitral valve was not assessed. Mitral valve regurgitation was not assessed. Tricuspid Valve: The tricuspid valve was not assessed. Tricuspid regurgitation was not assessed. Pulmonic Valve: The pulmonic valve was not assessed. The pulmonic valve regurgitation was not assessed.  Pericardium: Pericardial effusion was not assessed. Aorta: The aortic root was not assessed.  CONCLUSIONS:  1. Left ventricular systolic function is moderately decreased with a 35-40% estimated ejection fraction.  2. Moderately increased left ventricular septal thickness.  3. The left ventricular posterior wall thickness is moderately increased.  4. There is moderate to severe left ventricular hypertrophy.  5. There is global hypokinesis of the left ventricle with minor regional variations. QUANTITATIVE DATA SUMMARY: 2D MEASUREMENTS:                           Normal Ranges: LAs:           3.00 cm    (2.7-4.0cm) IVSd:          1.62 cm    (0.6-1.1cm) LVPWd:         1.44 cm    (0.6-1.1cm) LVIDd:         3.62 cm    (3.9-5.9cm) LVIDs:         2.69 cm LV Mass Index: 146.9 g/m2 LV % FS        25.6 % LV SYSTOLIC FUNCTION BY 2D PLANIMETRY (MOD):                     Normal Ranges: EF-A4C View: 33.5 % (>=55%) EF-A2C View: 39.2 % EF-Biplane:  39.0 %  [id number] Darrel Hampton MD Electronically signed on 2/21/2024 at 9:38:31 AM  ** Final **     Transthoracic echo (TTE) complete    Result Date: 10/3/2023              [place] [address]      Phone [phone] Fax [phone] TRANSTHORACIC ECHOCARDIOGRAM REPORT  Patient Name:      CHERRY BOB JESUS Noel Physician:   [id number] Darrel Hampton MD Study Date:        10/3/2023          Ordering Provider:   [id number] STEWART FERREIRA MRN/PID:           [id number]           Fellow: Accession#:        [id number]       Nurse:               Amairani Zafar RN Date of Birth/Age: 1935 / 88      Sonographer:         Thea Espinoza RDCS                    years Gender:            F                  Additional Staff: Height:            157.48 cm          Admit Date:          10/2/2023 Weight:            50.35 kg           Admission Status:    Inpatient - Routine BSA:               1.49 m2             Department Location: [facility] Echo Lab Blood Pressure: 118 /71 mmHg Study Type:    TRANSTHORACIC ECHO (TTE) COMPLETE Diagnosis/ICD: Heart failure, unspecified-I50.9; Cardiomyopathy in diseases                classified elsewhere-I43 Indication:    CHF, Cardiomyopathy  Study Detail: The following Echo studies were performed: 2D, M-Mode, Doppler and               color flow. Technically challenging study due to body habitus,               prominent lung artifact and poor acoustic windows. Agitated saline               used as a contrast agent for intraseptal flow evaluation and               Optison used as a contrast agent for endocardial border               definition.  PHYSICIAN INTERPRETATION: Left Ventricle: Left ventricular systolic function is normal, with an estimated ejection fraction of 70-75%. There are no regional wall motion abnormalities. The left ventricular cavity size is normal. There is moderate left ventricular hypertrophy. Spectral Doppler shows a normal pattern of left ventricular diastolic filling. Left Atrium: The left atrium is normal in size. A bubble study using agitated saline was performed. Bubble study is negative. Right Ventricle: The right ventricle is normal in size. There is mildly reduced right ventricular systolic function. A device is visualized in the right ventricle. Right Atrium: The right atrium is moderately dilated. There is a device visualized in the right atrium. Aortic Valve: The aortic valve appears structurally normal. There is mild aortic valve cusp calcification. There is mild to moderate aortic valve thickening. There is mild to moderate aortic valve regurgitation. The peak instantaneous gradient of the aortic valve is 4.2 mmHg. The mean gradient of the aortic valve is 2.0 mmHg. Mitral Valve: The mitral valve is normal in structure. There is trace mitral valve regurgitation. Tricuspid Valve: The tricuspid valve is structurally normal. There is mild tricuspid  regurgitation. The Doppler estimated RVSP is moderately elevated at 51.7 mmHg. Pulmonic Valve: The pulmonic valve is not well visualized. There is no indication of pulmonic valve regurgitation. Pericardium: There is no pericardial effusion noted. Aorta: The aortic root is normal.  CONCLUSIONS:  1. Left ventricular systolic function is normal with a 70-75% estimated ejection fraction.  2. There is moderate left ventricular hypertrophy.  3. There is mildly reduced right ventricular systolic function.  4. The right atrium is moderately dilated.  5. Moderately elevated right ventricular systolic pressure.  6. Mild to moderate aortic valve regurgitation. QUANTITATIVE DATA SUMMARY: 2D MEASUREMENTS:                           Normal Ranges: Ao Root d:     3.00 cm    (2.0-3.7cm) LAs:           3.50 cm    (2.7-4.0cm) IVSd:          1.60 cm    (0.6-1.1cm) LVPWd:         1.42 cm    (0.6-1.1cm) LVIDd:         3.91 cm    (3.9-5.9cm) LVIDs:         2.40 cm LV Mass Index: 153.0 g/m2 LV % FS        38.6 % LA VOLUME:                               Normal Ranges: LA Vol A4C:        39.2 ml    (22+/-6mL/m2) LA Vol A2C:        50.4 ml LA Vol BP:         49.6 ml LA Vol Index A4C:  26.3ml/m2 LA Vol Index A2C:  33.8 ml/m2 LA Vol Index BP:   33.3 ml/m2 LA Area A4C:       14.7 cm2 LA Area A2C:       18.6 cm2 LA Major Axis A4C: 4.7 cm LA Major Axis A2C: 5.8 cm RA VOLUME BY A/L METHOD:                       Normal Ranges: RA Area A4C: 24.5 cm2 AORTA MEASUREMENTS:                      Normal Ranges: Ao Sinus, d: 3.00 cm (2.1-3.5cm) Ao STJ, d:   1.89 cm (1.7-3.4cm) Asc Ao, d:   3.60 cm (2.1-3.4cm) LV SYSTOLIC FUNCTION BY 2D PLANIMETRY (MOD):                     Normal Ranges: EF-A4C View: 76.2 % (>=55%) EF-A2C View: 73.7 % EF-Biplane:  74.5 % LV DIASTOLIC FUNCTION:                           Normal Ranges: MV Peak E:    0.58 m/s    (0.7-1.2 m/s) MV Peak A:    0.23 m/s    (0.42-0.7 m/s) E/A Ratio:    2.57        (1.0-2.2) MV e'         0.04 m/s     (>8.0) MV lateral e' 0.06 m/s MV medial e'  0.03 m/s MV A Dur:     103.00 msec E/e' Ratio:   12.96       (<8.0) MITRAL VALVE:                 Normal Ranges: MV DT: 237 msec (150-240msec) AORTIC VALVE:                                   Normal Ranges: AoV Vmax:                1.03 m/s (<=1.7m/s) AoV Peak P.2 mmHg (<20mmHg) AoV Mean P.0 mmHg (1.7-11.5mmHg) LVOT Max Henry:            0.70 m/s (<=1.1m/s) AoV VTI:                 20.70 cm (18-25cm) LVOT VTI:                14.40 cm LVOT Diameter:           2.00 cm  (1.8-2.4cm) AoV Area, VTI:           2.19 cm2 (2.5-5.5cm2) AoV Area,Vmax:           2.13 cm2 (2.5-4.5cm2) AoV Dimensionless Index: 0.70 AORTIC INSUFFICIENCY: AI Vmax:       4.19 m/s AI Half-time:  438 msec AI Decel Rate: 335.50 cm/s2  RIGHT VENTRICLE: RV Basal 3.64 cm RV Mid   3.19 cm RV Major 7.5 cm TAPSE:   20.8 mm RV s'    0.07 m/s TRICUSPID VALVE/RVSP:                             Normal Ranges: Peak TR Velocity: 3.49 m/s RV Syst Pressure: 51.7 mmHg (< 30mmHg) IVC Diam:         1.50 cm  [id number] Darrel Hampton MD Electronically signed on 10/3/2023 at 4:11:07 PM  ** Final **       Lab Results   Component Value Date    BUN 44 (H) 2024    CREATININE 2.02 (H) 2024    BNP 1,497 (H) 2024    MG 2.63 (H) 2024    K 3.9 2024     2024         Constitutional:       General: He is not in acute distress.  HENT:      Head: Normocephalic and atraumatic.      Mouth: Mucous membranes are moist.      Neck:  No JVD or HJR   Eyes:      Extraocular Movements: .      Conjunctiva/sclera: Conjunctivae normal.    Cardiovascular:      Rate and Rhythm: Normal rate and regular rhythm.      Heart sounds:  S1 S2 normal, no murmur, no S3 or S4   Pulmonary:      Effort: Pulmonary effort is normal. No respiratory distress.      Breath sounds: Normal breath sounds. No stridor. No wheezing or rales.   Abdominal:      General: Bowel sounds are normal. There is no distension.       Tenderness: There is no abdominal tenderness. There is no guarding or rebound.   Musculoskeletal:         General: No swelling, tenderness or deformity. Normal range of motion.      Comments:   Skin:     General: Skin is warm and dry.   Neurological:      General: No focal deficit present.      Mental Status: alert and oriented to person, place, and time. Mental status is at baseline.     Psychiatric:         Mood and Affect: Mood normal.     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 ....  was unable to upgrade to Bi V pacer.      CHF:  No overt congestion today. She will follow nephrology instructions for the diuretic therapy.  Patient continues to have easy PEÑA.  Her stress test was negative for ischemia.  She does not have signs or symptoms of congestion on exam today.  On review of the chart it is noted that her chest x-ray is of showed severe emphysema previously.  There could be some component of COPD contributing to the shortness of breath.  I have made a pulmonary referral today for evaluation.  Will continue current medications without change.  She is due for repeat lab work.  Follow-up in 3 months    2. Atrial  fibrillation: persistent.   She is s/p AV node ablation and PPM placement in the past. On ASA.  No other OAC due to hx of recurrent GIB     3. NSVT:  Amiodarone.  No palpitations.       3. CKD 3: GFR as noted. She is following with nephrology.  Just had adjustment to diuretic instructions today.      4. HTN:  Controlled.       5. ASHD with PCI to LAD 2017:  Secondary prevention medications ASA, Statin, BB.   Recent stress/MPI scan negative for ischemia.   No angina.  Stable.     6. Referral to pulmonology  CXR shows advanced emphysematous changes.     Rosangela Lane, APRN-CNP

## 2024-07-25 NOTE — PATIENT INSTRUCTIONS
thank you for coming in today.  If you have any questions you may contact the office Monday through Friday at [phone] or on week ends at [phone].    Please continue same medications.     Get lab work completed within next week.     Please follow  a 2 GM sodium diet and limit fluid intake to 2 liters per day or 8 servings ( serving size = 8 oz. = 1 cup = 240 ml) per day.   Please avoid processed meat products (luncheon meats, sausages, loya, hot dogs for example) eat 4 servings of vegetables and 1-2 whole servings of whole fruits per day.   Please weigh daily and call [phone] for weight gain of 3 pounds in 24 hours or 5 pounds or if you experience increased swelling or shortness of breath.         Follow up:  3 months.     Please be sure to follow up with your cardiologist at [facility] once every year. Call [phone] to schedule appointment if you do not have a follow up appointment scheduled already.     left leg

## 2024-07-26 ENCOUNTER — LAB (OUTPATIENT)
Dept: LAB | Facility: LAB | Age: 89
End: 2024-07-26
Payer: MEDICARE

## 2024-07-26 ENCOUNTER — OFFICE VISIT (OUTPATIENT)
Dept: WOUND CARE | Facility: CLINIC | Age: 89
End: 2024-07-26
Payer: MEDICARE

## 2024-07-26 DIAGNOSIS — I50.32 CHRONIC HEART FAILURE WITH PRESERVED EJECTION FRACTION (MULTI): ICD-10-CM

## 2024-07-26 LAB
ANION GAP SERPL CALC-SCNC: 14 MMOL/L (ref 10–20)
BUN SERPL-MCNC: 43 MG/DL (ref 6–23)
CALCIUM SERPL-MCNC: 9.5 MG/DL (ref 8.6–10.3)
CHLORIDE SERPL-SCNC: 94 MMOL/L (ref 98–107)
CO2 SERPL-SCNC: 36 MMOL/L (ref 21–32)
CREAT SERPL-MCNC: 1.88 MG/DL (ref 0.5–1.05)
EGFRCR SERPLBLD CKD-EPI 2021: 25 ML/MIN/1.73M*2
GLUCOSE SERPL-MCNC: 77 MG/DL (ref 74–99)
MAGNESIUM SERPL-MCNC: 2.42 MG/DL (ref 1.6–2.4)
POTASSIUM SERPL-SCNC: 4.1 MMOL/L (ref 3.5–5.3)
SODIUM SERPL-SCNC: 140 MMOL/L (ref 136–145)

## 2024-07-26 PROCEDURE — 80048 BASIC METABOLIC PNL TOTAL CA: CPT

## 2024-07-26 PROCEDURE — 36415 COLL VENOUS BLD VENIPUNCTURE: CPT

## 2024-07-26 PROCEDURE — 83880 ASSAY OF NATRIURETIC PEPTIDE: CPT

## 2024-07-26 PROCEDURE — 83735 ASSAY OF MAGNESIUM: CPT

## 2024-07-26 PROCEDURE — 11043 DBRDMT MUSC&/FSCA 1ST 20/<: CPT

## 2024-07-27 LAB — BNP SERPL-MCNC: 1361 PG/ML (ref 0–99)

## 2024-07-29 ENCOUNTER — HOSPITAL ENCOUNTER (OUTPATIENT)
Dept: CARDIOLOGY | Facility: HOSPITAL | Age: 89
Discharge: HOME | End: 2024-07-29
Payer: MEDICARE

## 2024-07-29 DIAGNOSIS — I50.42 CHRONIC COMBINED SYSTOLIC AND DIASTOLIC CONGESTIVE HEART FAILURE (MULTI): ICD-10-CM

## 2024-07-29 DIAGNOSIS — I48.91 ATRIAL FIBRILLATION, UNSPECIFIED TYPE (MULTI): ICD-10-CM

## 2024-07-29 DIAGNOSIS — I43 CARDIOMYOPATHY IN DISEASES CLASSIFIED ELSEWHERE (MULTI): ICD-10-CM

## 2024-07-29 DIAGNOSIS — I47.29 NSVT (NONSUSTAINED VENTRICULAR TACHYCARDIA) (MULTI): ICD-10-CM

## 2024-07-29 DIAGNOSIS — I44.2 AV BLOCK, COMPLETE (MULTI): ICD-10-CM

## 2024-07-29 DIAGNOSIS — Z95.0 PRESENCE OF CARDIAC PACEMAKER: Primary | ICD-10-CM

## 2024-07-29 PROCEDURE — 93280 PM DEVICE PROGR EVAL DUAL: CPT

## 2024-08-01 ENCOUNTER — APPOINTMENT (OUTPATIENT)
Dept: PRIMARY CARE | Facility: CLINIC | Age: 89
End: 2024-08-01
Payer: MEDICARE

## 2024-08-01 ENCOUNTER — TELEMEDICINE (OUTPATIENT)
Dept: PRIMARY CARE | Facility: CLINIC | Age: 89
End: 2024-08-01
Payer: MEDICARE

## 2024-08-01 VITALS — SYSTOLIC BLOOD PRESSURE: 131 MMHG | DIASTOLIC BLOOD PRESSURE: 78 MMHG

## 2024-08-01 DIAGNOSIS — D50.9 IRON DEFICIENCY ANEMIA, UNSPECIFIED IRON DEFICIENCY ANEMIA TYPE: ICD-10-CM

## 2024-08-01 DIAGNOSIS — F51.04 CHRONIC INSOMNIA: Primary | ICD-10-CM

## 2024-08-01 PROCEDURE — 99442 PR PHYS/QHP TELEPHONE EVALUATION 11-20 MIN: CPT | Performed by: FAMILY MEDICINE

## 2024-08-01 PROCEDURE — 1123F ACP DISCUSS/DSCN MKR DOCD: CPT | Performed by: FAMILY MEDICINE

## 2024-08-01 PROCEDURE — 3078F DIAST BP <80 MM HG: CPT | Performed by: FAMILY MEDICINE

## 2024-08-01 PROCEDURE — 3075F SYST BP GE 130 - 139MM HG: CPT | Performed by: FAMILY MEDICINE

## 2024-08-01 PROCEDURE — 1159F MED LIST DOCD IN RCRD: CPT | Performed by: FAMILY MEDICINE

## 2024-08-01 PROCEDURE — 1160F RVW MEDS BY RX/DR IN RCRD: CPT | Performed by: FAMILY MEDICINE

## 2024-08-01 PROCEDURE — 1036F TOBACCO NON-USER: CPT | Performed by: FAMILY MEDICINE

## 2024-08-01 PROCEDURE — 1157F ADVNC CARE PLAN IN RCRD: CPT | Performed by: FAMILY MEDICINE

## 2024-08-01 RX ORDER — CALCIUM ACETATE 667 MG
TABLET ORAL
COMMUNITY
Start: 2024-07-03 | End: 2024-08-01 | Stop reason: ALTCHOICE

## 2024-08-01 RX ORDER — ALPRAZOLAM 0.5 MG/1
0.5 TABLET ORAL 2 TIMES DAILY
Qty: 180 TABLET | Refills: 0 | Status: SHIPPED | OUTPATIENT
Start: 2024-08-01 | End: 2024-10-30

## 2024-08-01 NOTE — PROGRESS NOTES
Subjective   Reason for Visit: Adwoa Forrest is an 89 y.o. female here for a follow up on chronic conditions and insomnia    Past Medical, Surgical, and Family History reviewed and updated in chart.    Reviewed all medications by prescribing practitioner or clinical pharmacist (such as prescriptions, OTCs, herbal therapies and supplements) and documented in the medical record.    HPI  Reviewed chronic medical problems    OARRS:  Minh Farmer DO on 8/1/2024  4:54 PM  I have personally reviewed the OARRS report for Adwoa Forrest. I have considered the risks of abuse, dependence, addiction and diversion    Is the patient prescribed a combination of a benzodiazepine and opioid? No    Last Urine Drug Screen / ordered today: No  No results found for this or any previous visit (from the past 8760 hour(s)).  Results are as expected.         Controlled Substance Agreement:  Date of the Last Agreement: 2023  Reviewed Controlled Substance Agreement including but not limited to the benefits, risks, and alternatives to treatment with a Controlled Substance medication(s).    Benzodiazepines:  What is the patient's goal of therapy? insomnia  Is this being achieved with current treatment? yes    KIZZY-7:  No data recorded    Activities of Daily Living:   Is your overall impression that this patient is benefiting (symptom reduction outweighs side effects) from benzodiazepine therapy? Yes     1. Physical Functioning: Same  2. Family Relationship: Same  3. Social Relationship: Same  4. Mood: Same  5. Sleep Patterns: Better  6. Overall Function: Same  Patient Care Team:  Minh Farmer DO as PCP - General     Review of Systems    Objective   Vitals:  /78   LMP  (LMP Unknown)       Physical Exam    Assessment/Plan   Problem List Items Addressed This Visit       Chronic insomnia       Echo done 2/24 CONCLUSIONS:   1. Left ventricular systolic function is moderately decreased with a 35-40% estimated ejection fraction.   2.  Moderately increased left ventricular septal thickness.   3. The left ventricular posterior wall thickness is moderately increased.   4. There is moderate to severe left ventricular hypertrophy.   5. There is global hypokinesis of the left ventricle with minor regional variations.    Acute on chronic combined systolic and diastolic heart failure/ASHD/chronic atrial fibrillation-followed by electrophysiologist Dr Bell and Dr Latham. Currently stable. Pace Maker. nonsustained VT and PVCs highly symptomatic and remains on amiodarone with resolution of symptoms.  Outpatient monitoring.     CKD 4- Controlling cholesterol/Hypertension/ and sugars- stable. Farxiga 10 mg daily.  Seeing Nephrology      Benign essential hypertension-controlled- metoprolol ER 50 mg daily     Gastroesophageal reflux disease-controlled. Stopped pantoprazole     Hyperlipidemia-controlled on atorvastatin 80 mg daily     Hypothyroidism- controlled on current dose of levothyroxine 25 µg daily     Elevated LFT's- No risk except statin medication. Continue to monitor.  Has been stable     Anemia and iron deficiency-= repeat labs to look at the iron level     Polycythemia/Elevated HBG and HCT- Has been stable     Chronic insomnia/anxiety-we reviewed the patient's OARRS and it was normal with no aberrant behavior. The medication is working well for her and she is only taking it at nighttime. Currently taking alprazolam 0.5 mg 1 p.o. nightly and 1 as needed for the insomnia. Tried Trazodone 50 mg and caused her confusion.  She does not want to take it

## 2024-08-02 ENCOUNTER — TELEPHONE (OUTPATIENT)
Dept: PRIMARY CARE | Facility: CLINIC | Age: 89
End: 2024-08-02
Payer: MEDICARE

## 2024-08-02 NOTE — TELEPHONE ENCOUNTER
Patient reported during her Thursday virtual appointment that she no longer takes the calcium acetate 667mg 1 tablet twice daily but she looked over her list and she does still take it. Can you update her medication list and add this back to her list. Thank you

## 2024-08-05 ENCOUNTER — TELEPHONE (OUTPATIENT)
Dept: PRIMARY CARE | Facility: CLINIC | Age: 89
End: 2024-08-05
Payer: MEDICARE

## 2024-08-05 RX ORDER — CALCIUM ACETATE 667 MG
TABLET ORAL
COMMUNITY
Start: 2024-08-05 | End: 2024-08-05

## 2024-08-05 NOTE — TELEPHONE ENCOUNTER
Adwoa called to let us know that she was wrong about her Calcium Acedtate , she does take it. 667mg 1 tab BID.

## 2024-08-09 ENCOUNTER — OFFICE VISIT (OUTPATIENT)
Dept: WOUND CARE | Facility: CLINIC | Age: 89
End: 2024-08-09
Payer: MEDICARE

## 2024-08-09 ENCOUNTER — LAB (OUTPATIENT)
Dept: LAB | Facility: LAB | Age: 89
End: 2024-08-09
Payer: MEDICARE

## 2024-08-09 DIAGNOSIS — D50.9 IRON DEFICIENCY ANEMIA, UNSPECIFIED IRON DEFICIENCY ANEMIA TYPE: ICD-10-CM

## 2024-08-09 LAB
FERRITIN SERPL-MCNC: 90 NG/ML (ref 8–150)
IRON SATN MFR SERPL: 36 % (ref 25–45)
IRON SERPL-MCNC: 158 UG/DL (ref 35–150)
TIBC SERPL-MCNC: 443 UG/DL (ref 240–445)
UIBC SERPL-MCNC: 285 UG/DL (ref 110–370)

## 2024-08-09 PROCEDURE — 11042 DBRDMT SUBQ TIS 1ST 20SQCM/<: CPT

## 2024-08-09 PROCEDURE — 82728 ASSAY OF FERRITIN: CPT

## 2024-08-09 PROCEDURE — 83550 IRON BINDING TEST: CPT

## 2024-08-09 PROCEDURE — 83540 ASSAY OF IRON: CPT

## 2024-08-09 PROCEDURE — 36415 COLL VENOUS BLD VENIPUNCTURE: CPT

## 2024-08-12 ENCOUNTER — APPOINTMENT (OUTPATIENT)
Dept: PULMONOLOGY | Facility: HOSPITAL | Age: 89
End: 2024-08-12
Payer: MEDICARE

## 2024-08-15 DIAGNOSIS — E03.8 OTHER SPECIFIED HYPOTHYROIDISM: ICD-10-CM

## 2024-08-15 RX ORDER — LEVOTHYROXINE SODIUM 25 UG/1
25 TABLET ORAL DAILY
Qty: 90 TABLET | Refills: 3 | Status: SHIPPED | OUTPATIENT
Start: 2024-08-15

## 2024-08-23 ENCOUNTER — OFFICE VISIT (OUTPATIENT)
Dept: WOUND CARE | Facility: CLINIC | Age: 89
End: 2024-08-23
Payer: MEDICARE

## 2024-08-23 PROCEDURE — 11042 DBRDMT SUBQ TIS 1ST 20SQCM/<: CPT

## 2024-09-03 ENCOUNTER — OFFICE VISIT (OUTPATIENT)
Dept: PULMONOLOGY | Facility: HOSPITAL | Age: 89
End: 2024-09-03
Payer: MEDICARE

## 2024-09-03 VITALS
OXYGEN SATURATION: 94 % | BODY MASS INDEX: 18.58 KG/M2 | HEIGHT: 62 IN | WEIGHT: 101 LBS | HEART RATE: 66 BPM | RESPIRATION RATE: 16 BRPM | SYSTOLIC BLOOD PRESSURE: 152 MMHG | DIASTOLIC BLOOD PRESSURE: 77 MMHG

## 2024-09-03 DIAGNOSIS — I50.42 CHRONIC COMBINED SYSTOLIC AND DIASTOLIC HEART FAILURE (MULTI): ICD-10-CM

## 2024-09-03 DIAGNOSIS — Z87.891 FORMER SMOKER: ICD-10-CM

## 2024-09-03 DIAGNOSIS — J44.9 CHRONIC OBSTRUCTIVE PULMONARY DISEASE, UNSPECIFIED COPD TYPE (MULTI): Primary | ICD-10-CM

## 2024-09-03 PROCEDURE — 3078F DIAST BP <80 MM HG: CPT | Performed by: NURSE PRACTITIONER

## 2024-09-03 PROCEDURE — 1159F MED LIST DOCD IN RCRD: CPT | Performed by: NURSE PRACTITIONER

## 2024-09-03 PROCEDURE — 1036F TOBACCO NON-USER: CPT | Performed by: NURSE PRACTITIONER

## 2024-09-03 PROCEDURE — 1123F ACP DISCUSS/DSCN MKR DOCD: CPT | Performed by: NURSE PRACTITIONER

## 2024-09-03 PROCEDURE — 99214 OFFICE O/P EST MOD 30 MIN: CPT | Performed by: NURSE PRACTITIONER

## 2024-09-03 PROCEDURE — 1157F ADVNC CARE PLAN IN RCRD: CPT | Performed by: NURSE PRACTITIONER

## 2024-09-03 PROCEDURE — 3077F SYST BP >= 140 MM HG: CPT | Performed by: NURSE PRACTITIONER

## 2024-09-03 PROCEDURE — 99204 OFFICE O/P NEW MOD 45 MIN: CPT | Performed by: NURSE PRACTITIONER

## 2024-09-03 PROCEDURE — 1160F RVW MEDS BY RX/DR IN RCRD: CPT | Performed by: NURSE PRACTITIONER

## 2024-09-03 RX ORDER — ALBUTEROL SULFATE 90 UG/1
2 INHALANT RESPIRATORY (INHALATION) EVERY 4 HOURS PRN
Qty: 18 G | Refills: 11 | Status: SHIPPED | OUTPATIENT
Start: 2024-09-03

## 2024-09-03 RX ORDER — CALCIUM ACETATE 667 MG/1
667 CAPSULE ORAL
COMMUNITY

## 2024-09-03 RX ORDER — UMECLIDINIUM BROMIDE AND VILANTEROL TRIFENATATE 62.5; 25 UG/1; UG/1
1 POWDER RESPIRATORY (INHALATION) DAILY
Qty: 1 EACH | Refills: 11 | Status: SHIPPED | OUTPATIENT
Start: 2024-09-03

## 2024-09-03 ASSESSMENT — ENCOUNTER SYMPTOMS
UNEXPECTED WEIGHT CHANGE: 0
CHILLS: 0
COUGH: 0
FEVER: 0
WHEEZING: 0
FATIGUE: 0
RHINORRHEA: 0
SHORTNESS OF BREATH: 1

## 2024-09-03 NOTE — PROGRESS NOTES
Subjective   Patient ID: Adwoa Forrest is a 89 y.o. female who presents for NPV shortness of breath.     HPI: Patient has PMH of chronic systolic and diastolic heart failure, atrial fibrillation, GI bleeds, HTN, iron deficiency anemia, HLD, GERD, and anxiety. She was referred for shortness of breath by cardiology. She was hospitalized for CHF exacerbation and pacer. She states that she gets shortness of breath on exertion. She denies any cough or wheezing. She has never been diagnosed with COPD and never been on any inhalers. She is following closely with cardiology. She is a former smoker from 2449-3525 and exposed to second hand smoke. She denies prior occupational exposure.     Review of Systems   Constitutional:  Negative for chills, fatigue, fever and unexpected weight change.   HENT:  Negative for congestion, postnasal drip and rhinorrhea.    Respiratory:  Positive for shortness of breath. Negative for cough (denies hemoptysis.) and wheezing.    Cardiovascular:  Negative for chest pain and leg swelling.   All other systems reviewed and are negative.      Objective   Physical Exam  Vitals reviewed.   Constitutional:       Appearance: Normal appearance.   HENT:      Head: Normocephalic.   Cardiovascular:      Rate and Rhythm: Normal rate and regular rhythm.   Pulmonary:      Effort: Pulmonary effort is normal.      Breath sounds: Decreased breath sounds present.   Skin:     General: Skin is warm and dry.   Neurological:      Mental Status: She is alert.         Assessment/Plan   Shortness of breath  COPD  Former smoker  Chronic systolic heart failure   Atrial fibrillation    Plan:    -Patient had advanced COPD on CXR and CT chest, discussed results at length. We discussed getting PFTs, however she prefers to trial inhalers first to see if she has benefit with this.   -Start Anoro one puff once a day.  -Start albuterol prn. Educated on inhaler use.   -I discussed that she has significant emphysema on CT chest and  CXR and suspect that she will feel improvement with COPD treatment.   -She is following closely with cardiology.     Overall I will optimize COPD management to see if improvement in shortness of breath. We discussed that she has significant cardiac etiologies for her shortness of breath but with the emphysema I suspect this could also be contributing to her shortness of breath. I will bring her back with me in 2 months to assess response to treatment. I instructed patient to call sooner if needed.      Total time:  45 min.

## 2024-09-03 NOTE — PATIENT INSTRUCTIONS
Start on Anoro one puff once a day, everyday.  Start on albuterol 1-2 puffs as needed for shortness of breath, you can use it every 4 hours if needed.  Call with any questions or concerns.   Follow up with me on November 15th at 1:20.

## 2024-09-06 ENCOUNTER — OFFICE VISIT (OUTPATIENT)
Dept: WOUND CARE | Facility: CLINIC | Age: 89
End: 2024-09-06
Payer: MEDICARE

## 2024-09-06 PROCEDURE — 11042 DBRDMT SUBQ TIS 1ST 20SQCM/<: CPT

## 2024-09-12 ENCOUNTER — APPOINTMENT (OUTPATIENT)
Dept: RADIOLOGY | Facility: HOSPITAL | Age: 89
DRG: 291 | End: 2024-09-12
Payer: MEDICARE

## 2024-09-12 ENCOUNTER — HOSPITAL ENCOUNTER (INPATIENT)
Facility: HOSPITAL | Age: 89
LOS: 3 days | Discharge: HOME | End: 2024-09-15
Attending: STUDENT IN AN ORGANIZED HEALTH CARE EDUCATION/TRAINING PROGRAM | Admitting: INTERNAL MEDICINE
Payer: MEDICARE

## 2024-09-12 ENCOUNTER — APPOINTMENT (OUTPATIENT)
Dept: CARDIOLOGY | Facility: HOSPITAL | Age: 89
DRG: 291 | End: 2024-09-12
Payer: MEDICARE

## 2024-09-12 DIAGNOSIS — J96.01 ACUTE HYPOXIC RESPIRATORY FAILURE (MULTI): ICD-10-CM

## 2024-09-12 DIAGNOSIS — I50.9 ACUTE ON CHRONIC CONGESTIVE HEART FAILURE, UNSPECIFIED HEART FAILURE TYPE: Primary | ICD-10-CM

## 2024-09-12 DIAGNOSIS — M79.89 SWELLING OF LOWER EXTREMITY: ICD-10-CM

## 2024-09-12 LAB
ALBUMIN SERPL BCP-MCNC: 4.4 G/DL (ref 3.4–5)
ALP SERPL-CCNC: 142 U/L (ref 33–136)
ALT SERPL W P-5'-P-CCNC: 35 U/L (ref 7–45)
ANION GAP SERPL CALC-SCNC: 13 MMOL/L (ref 10–20)
AST SERPL W P-5'-P-CCNC: 35 U/L (ref 9–39)
BASOPHILS # BLD AUTO: 0.06 X10*3/UL (ref 0–0.1)
BASOPHILS NFR BLD AUTO: 0.9 %
BILIRUB SERPL-MCNC: 1.1 MG/DL (ref 0–1.2)
BNP SERPL-MCNC: 1495 PG/ML (ref 0–99)
BUN SERPL-MCNC: 50 MG/DL (ref 6–23)
CALCIUM SERPL-MCNC: 10.2 MG/DL (ref 8.6–10.3)
CARDIAC TROPONIN I PNL SERPL HS: 39 NG/L (ref 0–13)
CARDIAC TROPONIN I PNL SERPL HS: 47 NG/L (ref 0–13)
CHLORIDE SERPL-SCNC: 99 MMOL/L (ref 98–107)
CO2 SERPL-SCNC: 33 MMOL/L (ref 21–32)
CREAT SERPL-MCNC: 1.83 MG/DL (ref 0.5–1.05)
D DIMER PPP FEU-MCNC: 1191 NG/ML FEU
EGFRCR SERPLBLD CKD-EPI 2021: 26 ML/MIN/1.73M*2
EOSINOPHIL # BLD AUTO: 0.32 X10*3/UL (ref 0–0.4)
EOSINOPHIL NFR BLD AUTO: 4.7 %
ERYTHROCYTE [DISTWIDTH] IN BLOOD BY AUTOMATED COUNT: 19.3 % (ref 11.5–14.5)
GLUCOSE SERPL-MCNC: 119 MG/DL (ref 74–99)
HCT VFR BLD AUTO: 51.7 % (ref 36–46)
HGB BLD-MCNC: 16.3 G/DL (ref 12–16)
IMM GRANULOCYTES # BLD AUTO: 0.04 X10*3/UL (ref 0–0.5)
IMM GRANULOCYTES NFR BLD AUTO: 0.6 % (ref 0–0.9)
LYMPHOCYTES # BLD AUTO: 0.48 X10*3/UL (ref 0.8–3)
LYMPHOCYTES NFR BLD AUTO: 7 %
MCH RBC QN AUTO: 29.6 PG (ref 26–34)
MCHC RBC AUTO-ENTMCNC: 31.5 G/DL (ref 32–36)
MCV RBC AUTO: 94 FL (ref 80–100)
MONOCYTES # BLD AUTO: 0.59 X10*3/UL (ref 0.05–0.8)
MONOCYTES NFR BLD AUTO: 8.6 %
NEUTROPHILS # BLD AUTO: 5.38 X10*3/UL (ref 1.6–5.5)
NEUTROPHILS NFR BLD AUTO: 78.2 %
NRBC BLD-RTO: 0 /100 WBCS (ref 0–0)
PLATELET # BLD AUTO: 265 X10*3/UL (ref 150–450)
POTASSIUM SERPL-SCNC: 4.4 MMOL/L (ref 3.5–5.3)
PROT SERPL-MCNC: 7.7 G/DL (ref 6.4–8.2)
RBC # BLD AUTO: 5.51 X10*6/UL (ref 4–5.2)
SARS-COV-2 RNA RESP QL NAA+PROBE: NOT DETECTED
SODIUM SERPL-SCNC: 141 MMOL/L (ref 136–145)
WBC # BLD AUTO: 6.9 X10*3/UL (ref 4.4–11.3)

## 2024-09-12 PROCEDURE — 2500000005 HC RX 250 GENERAL PHARMACY W/O HCPCS: Performed by: INTERNAL MEDICINE

## 2024-09-12 PROCEDURE — 94664 DEMO&/EVAL PT USE INHALER: CPT

## 2024-09-12 PROCEDURE — 2500000004 HC RX 250 GENERAL PHARMACY W/ HCPCS (ALT 636 FOR OP/ED): Performed by: STUDENT IN AN ORGANIZED HEALTH CARE EDUCATION/TRAINING PROGRAM

## 2024-09-12 PROCEDURE — 85379 FIBRIN DEGRADATION QUANT: CPT | Performed by: NURSE PRACTITIONER

## 2024-09-12 PROCEDURE — 93970 EXTREMITY STUDY: CPT

## 2024-09-12 PROCEDURE — 2500000001 HC RX 250 WO HCPCS SELF ADMINISTERED DRUGS (ALT 637 FOR MEDICARE OP): Performed by: NURSE PRACTITIONER

## 2024-09-12 PROCEDURE — 2500000002 HC RX 250 W HCPCS SELF ADMINISTERED DRUGS (ALT 637 FOR MEDICARE OP, ALT 636 FOR OP/ED): Performed by: NURSE PRACTITIONER

## 2024-09-12 PROCEDURE — 84484 ASSAY OF TROPONIN QUANT: CPT | Performed by: NURSE PRACTITIONER

## 2024-09-12 PROCEDURE — 87070 CULTURE OTHR SPECIMN AEROBIC: CPT | Mod: PORLAB | Performed by: NURSE PRACTITIONER

## 2024-09-12 PROCEDURE — 99222 1ST HOSP IP/OBS MODERATE 55: CPT | Performed by: NURSE PRACTITIONER

## 2024-09-12 PROCEDURE — 36415 COLL VENOUS BLD VENIPUNCTURE: CPT | Performed by: NURSE PRACTITIONER

## 2024-09-12 PROCEDURE — 87635 SARS-COV-2 COVID-19 AMP PRB: CPT | Performed by: NURSE PRACTITIONER

## 2024-09-12 PROCEDURE — 2500000004 HC RX 250 GENERAL PHARMACY W/ HCPCS (ALT 636 FOR OP/ED): Performed by: NURSE PRACTITIONER

## 2024-09-12 PROCEDURE — 85025 COMPLETE CBC W/AUTO DIFF WBC: CPT | Performed by: NURSE PRACTITIONER

## 2024-09-12 PROCEDURE — 99285 EMERGENCY DEPT VISIT HI MDM: CPT | Mod: 25

## 2024-09-12 PROCEDURE — 71046 X-RAY EXAM CHEST 2 VIEWS: CPT | Performed by: RADIOLOGY

## 2024-09-12 PROCEDURE — 2060000001 HC INTERMEDIATE ICU ROOM DAILY

## 2024-09-12 PROCEDURE — 83880 ASSAY OF NATRIURETIC PEPTIDE: CPT | Performed by: NURSE PRACTITIONER

## 2024-09-12 PROCEDURE — 96374 THER/PROPH/DIAG INJ IV PUSH: CPT | Mod: 59

## 2024-09-12 PROCEDURE — 94640 AIRWAY INHALATION TREATMENT: CPT

## 2024-09-12 PROCEDURE — 93005 ELECTROCARDIOGRAM TRACING: CPT

## 2024-09-12 PROCEDURE — 80053 COMPREHEN METABOLIC PANEL: CPT | Performed by: NURSE PRACTITIONER

## 2024-09-12 PROCEDURE — 71046 X-RAY EXAM CHEST 2 VIEWS: CPT

## 2024-09-12 RX ORDER — HEPARIN SODIUM 5000 [USP'U]/ML
5000 INJECTION, SOLUTION INTRAVENOUS; SUBCUTANEOUS EVERY 8 HOURS SCHEDULED
Status: DISCONTINUED | OUTPATIENT
Start: 2024-09-12 | End: 2024-09-15 | Stop reason: HOSPADM

## 2024-09-12 RX ORDER — SENNOSIDES 8.6 MG/1
2 TABLET ORAL 2 TIMES DAILY
Status: DISCONTINUED | OUTPATIENT
Start: 2024-09-12 | End: 2024-09-15 | Stop reason: HOSPADM

## 2024-09-12 RX ORDER — ASPIRIN 81 MG/1
81 TABLET ORAL DAILY
Status: DISCONTINUED | OUTPATIENT
Start: 2024-09-12 | End: 2024-09-15 | Stop reason: HOSPADM

## 2024-09-12 RX ORDER — ALPRAZOLAM 0.5 MG/1
0.5 TABLET ORAL 2 TIMES DAILY
Status: DISCONTINUED | OUTPATIENT
Start: 2024-09-12 | End: 2024-09-15 | Stop reason: HOSPADM

## 2024-09-12 RX ORDER — FORMOTEROL FUMARATE DIHYDRATE 20 UG/2ML
20 SOLUTION RESPIRATORY (INHALATION)
Status: DISCONTINUED | OUTPATIENT
Start: 2024-09-12 | End: 2024-09-15 | Stop reason: HOSPADM

## 2024-09-12 RX ORDER — FERROUS GLUCONATE 325 MG
38 TABLET ORAL
Status: DISCONTINUED | OUTPATIENT
Start: 2024-09-13 | End: 2024-09-15 | Stop reason: HOSPADM

## 2024-09-12 RX ORDER — CEFTRIAXONE 1 G/50ML
1 INJECTION, SOLUTION INTRAVENOUS EVERY 24 HOURS
Status: DISCONTINUED | OUTPATIENT
Start: 2024-09-12 | End: 2024-09-14

## 2024-09-12 RX ORDER — LOSARTAN POTASSIUM 25 MG/1
12.5 TABLET ORAL DAILY
Status: DISCONTINUED | OUTPATIENT
Start: 2024-09-12 | End: 2024-09-13

## 2024-09-12 RX ORDER — ATORVASTATIN CALCIUM 40 MG/1
80 TABLET, FILM COATED ORAL DAILY
Status: DISCONTINUED | OUTPATIENT
Start: 2024-09-12 | End: 2024-09-15 | Stop reason: HOSPADM

## 2024-09-12 RX ORDER — FUROSEMIDE 10 MG/ML
40 INJECTION INTRAMUSCULAR; INTRAVENOUS ONCE
Status: COMPLETED | OUTPATIENT
Start: 2024-09-12 | End: 2024-09-12

## 2024-09-12 RX ORDER — LEVOTHYROXINE SODIUM 25 UG/1
25 TABLET ORAL DAILY
Status: DISCONTINUED | OUTPATIENT
Start: 2024-09-12 | End: 2024-09-15 | Stop reason: HOSPADM

## 2024-09-12 RX ORDER — DAPAGLIFLOZIN 5 MG/1
10 TABLET, FILM COATED ORAL DAILY
Status: DISCONTINUED | OUTPATIENT
Start: 2024-09-12 | End: 2024-09-15 | Stop reason: HOSPADM

## 2024-09-12 RX ORDER — METOPROLOL SUCCINATE 50 MG/1
50 TABLET, EXTENDED RELEASE ORAL DAILY
Status: DISCONTINUED | OUTPATIENT
Start: 2024-09-12 | End: 2024-09-15 | Stop reason: HOSPADM

## 2024-09-12 RX ORDER — FUROSEMIDE 10 MG/ML
40 INJECTION INTRAMUSCULAR; INTRAVENOUS EVERY 12 HOURS
Status: DISCONTINUED | OUTPATIENT
Start: 2024-09-13 | End: 2024-09-13

## 2024-09-12 RX ORDER — ACETAMINOPHEN 325 MG/1
650 TABLET ORAL EVERY 4 HOURS PRN
Status: DISCONTINUED | OUTPATIENT
Start: 2024-09-12 | End: 2024-09-13

## 2024-09-12 RX ORDER — AMIODARONE HYDROCHLORIDE 200 MG/1
200 TABLET ORAL DAILY
Status: DISCONTINUED | OUTPATIENT
Start: 2024-09-12 | End: 2024-09-15 | Stop reason: HOSPADM

## 2024-09-12 RX ADMIN — ACETAMINOPHEN 650 MG: 325 TABLET ORAL at 23:44

## 2024-09-12 RX ADMIN — SENNOSIDES 17.2 MG: 8.6 TABLET, FILM COATED ORAL at 20:01

## 2024-09-12 RX ADMIN — ALPRAZOLAM 0.5 MG: 0.5 TABLET ORAL at 20:02

## 2024-09-12 RX ADMIN — HEPARIN SODIUM 5000 UNITS: 5000 INJECTION, SOLUTION INTRAVENOUS; SUBCUTANEOUS at 17:29

## 2024-09-12 RX ADMIN — Medication 3 L/MIN: at 20:19

## 2024-09-12 RX ADMIN — FORMOTEROL FUMARATE DIHYDRATE 20 MCG: 20 SOLUTION RESPIRATORY (INHALATION) at 20:19

## 2024-09-12 RX ADMIN — CEFTRIAXONE SODIUM 1 G: 1 INJECTION, SOLUTION INTRAVENOUS at 17:49

## 2024-09-12 RX ADMIN — ACETAMINOPHEN 650 MG: 325 TABLET ORAL at 17:29

## 2024-09-12 RX ADMIN — FUROSEMIDE 40 MG: 10 INJECTION, SOLUTION INTRAMUSCULAR; INTRAVENOUS at 16:44

## 2024-09-12 RX ADMIN — ATORVASTATIN CALCIUM 80 MG: 40 TABLET, FILM COATED ORAL at 20:01

## 2024-09-12 SDOH — SOCIAL STABILITY: SOCIAL INSECURITY: WERE YOU ABLE TO COMPLETE ALL THE BEHAVIORAL HEALTH SCREENINGS?: YES

## 2024-09-12 SDOH — SOCIAL STABILITY: SOCIAL INSECURITY: HAVE YOU HAD THOUGHTS OF HARMING ANYONE ELSE?: NO

## 2024-09-12 ASSESSMENT — ACTIVITIES OF DAILY LIVING (ADL)
WALKS IN HOME: INDEPENDENT
HEARING - LEFT EAR: HEARING AID
HEARING - RIGHT EAR: HEARING AID
FEEDING YOURSELF: INDEPENDENT
BATHING: INDEPENDENT
LACK_OF_TRANSPORTATION: NO
DRESSING YOURSELF: INDEPENDENT
JUDGMENT_ADEQUATE_SAFELY_COMPLETE_DAILY_ACTIVITIES: YES
TOILETING: INDEPENDENT
ASSISTIVE_DEVICE: DENTURES LOWER;DENTURES UPPER;EYEGLASSES;HEARING AID - RIGHT;HEARING AID - LEFT
GROOMING: INDEPENDENT
ADEQUATE_TO_COMPLETE_ADL: YES
PATIENT'S MEMORY ADEQUATE TO SAFELY COMPLETE DAILY ACTIVITIES?: YES

## 2024-09-12 ASSESSMENT — COGNITIVE AND FUNCTIONAL STATUS - GENERAL
MOBILITY SCORE: 21
CLIMB 3 TO 5 STEPS WITH RAILING: A LITTLE
DAILY ACTIVITIY SCORE: 24
DAILY ACTIVITIY SCORE: 23
DAILY ACTIVITIY SCORE: 24
WALKING IN HOSPITAL ROOM: A LITTLE
STANDING UP FROM CHAIR USING ARMS: A LITTLE
MOBILITY SCORE: 24
CLIMB 3 TO 5 STEPS WITH RAILING: A LITTLE
PATIENT BASELINE BEDBOUND: NO
MOBILITY SCORE: 21
STANDING UP FROM CHAIR USING ARMS: A LITTLE
DRESSING REGULAR LOWER BODY CLOTHING: A LITTLE
WALKING IN HOSPITAL ROOM: A LITTLE

## 2024-09-12 ASSESSMENT — LIFESTYLE VARIABLES
HAVE YOU EVER FELT YOU SHOULD CUT DOWN ON YOUR DRINKING: NO
AUDIT-C TOTAL SCORE: 0
HOW OFTEN DO YOU HAVE 6 OR MORE DRINKS ON ONE OCCASION: NEVER
HAVE PEOPLE ANNOYED YOU BY CRITICIZING YOUR DRINKING: NO
HOW OFTEN DO YOU HAVE A DRINK CONTAINING ALCOHOL: NEVER
PRESCIPTION_ABUSE_PAST_12_MONTHS: NO
TOTAL SCORE: 0
EVER FELT BAD OR GUILTY ABOUT YOUR DRINKING: NO
SUBSTANCE_ABUSE_PAST_12_MONTHS: NO
HOW MANY STANDARD DRINKS CONTAINING ALCOHOL DO YOU HAVE ON A TYPICAL DAY: PATIENT DOES NOT DRINK
AUDIT-C TOTAL SCORE: 0
SKIP TO QUESTIONS 9-10: 1
EVER HAD A DRINK FIRST THING IN THE MORNING TO STEADY YOUR NERVES TO GET RID OF A HANGOVER: NO

## 2024-09-12 ASSESSMENT — PAIN SCALES - GENERAL
PAINLEVEL_OUTOF10: 0 - NO PAIN
PAINLEVEL_OUTOF10: 3

## 2024-09-12 ASSESSMENT — HEART SCORE
HEART SCORE: 4
TROPONIN: LESS THAN OR EQUAL TO NORMAL LIMIT
AGE: 65+
ECG: NORMAL
HISTORY: SLIGHTLY SUSPICIOUS
RISK FACTORS: >2 RISK FACTORS OR HX OF ATHEROSCLEROTIC DISEASE

## 2024-09-12 ASSESSMENT — PAIN - FUNCTIONAL ASSESSMENT
PAIN_FUNCTIONAL_ASSESSMENT: 0-10
PAIN_FUNCTIONAL_ASSESSMENT: 0-10

## 2024-09-12 ASSESSMENT — PAIN DESCRIPTION - LOCATION
LOCATION: HEAD
LOCATION: BACK

## 2024-09-12 NOTE — H&P
[facility] MEDICINE HISTORY AND PHYSICAL    History Of Present Illness  Adwoa Forrest is a 89 y.o. female with PMHx of HFpEF, NSVT (on amiodarone), persistent atrial fib, COPD (not on home O2), HTN and CKD (creatinine ~2 at baseline) who presented with a 3-4 day history of increasing shortness of breath that is worse with exertion. She denies leg edema and gain in weight (she weighs herself daily). She has chronic chest pain that is no worse. In the ED ambulatory pulse ox was 88% on RA and she was placed on 3 lpm oxygen. She was on home O2 late last year for a couple of months, which was stopped. Her O2 sat readings are skewed by the fact that she has Raynaud and they vacillate between 83-97% at home and have done so here. VSS. Labwork was notable for troponin 47 > 39 (about baseline). BNP unable to be obtained as the machine is down. EKG was nonischemic, LE Doppler was negative for DVT and COVID was negative. CXR suggestive of chronic lung disease w/? superimposed interstitial edema or pneumonitis. Unable to perform PE CTA due to renal function but there is low suspicion given negative LE US. She was given IV Lasix per ED physician. Remainder of ROS reviewed and negative except as indicated in HPI.     Objective:  Past Medical History  She has a past medical history of Anxiety and depression, Bowel perforation (Multi), Chronic atrial fibrillation, unspecified (Multi) (05/09/2023), Chronic heart failure with preserved ejection fraction (Multi) (02/07/2024), CKD (chronic kidney disease), COPD (chronic obstructive pulmonary disease) (Multi), Essential (primary) hypertension, GIB (gastrointestinal bleeding), History of non-ST elevation myocardial infarction (NSTEMI) (10/08/2023), Hypothyroidism, CANDELARIA (iron deficiency anemia), NSVT (nonsustained ventricular tachycardia) (Multi), Raynaud disease, Sick sinus syndrome (Multi) (02/27/2018), Sleep apnea, and Stroke (cerebrum) (Multi).    Surgical History  She has a  past surgical history that includes Cataract extraction; Appendectomy; Hysterectomy; Tonsillectomy; Cholecystectomy; Other surgical history; Other surgical history; Colectomy partial / total; and Cardiac electrophysiology procedure (N/A, 2024).    Social History     Tobacco Use    Smoking status: Former     Current packs/day: 0.00     Average packs/day: 0.3 packs/day for 34.0 years (8.5 ttl pk-yrs)     Types: Cigarettes     Start date:      Quit date:      Years since quittin.7     Passive exposure: Never    Smokeless tobacco: Never   Substance Use Topics    Alcohol use: Never    Drug use: Never       Family History  Family History   Problem Relation Name Age of Onset    Coronary artery disease Mother      Coronary artery disease Father         Allergies  Codeine, Hydrocodone, Sotalol, and Tetracyclines    Vitals:    24 1713   BP: 155/75   Pulse: 65   Resp: 18   Temp: 36 °C (96.8 °F)   SpO2: 96%       Vitals:    24 0909   Weight: 46.7 kg (103 lb)       Scheduled medications  ALPRAZolam, 0.5 mg, oral, BID  amiodarone, 200 mg, oral, Daily  aspirin, 81 mg, oral, Daily  atorvastatin, 80 mg, oral, Daily  cefTRIAXone, 1 g, intravenous, q24h  dapagliflozin propanediol, 10 mg, oral, Daily  [START ON 2024] ferrous gluconate, 38 mg of iron, oral, Daily with breakfast  influenza, 0.5 mL, intramuscular, During hospitalization  tiotropium, 2 puff, inhalation, Daily   And  formoterol, 20 mcg, nebulization, q12h  [START ON 2024] furosemide, 40 mg, intravenous, q12h  heparin (porcine), 5,000 Units, subcutaneous, q8h MACRINA  levothyroxine, 25 mcg, oral, Daily  losartan, 12.5 mg, oral, Daily  metoprolol succinate XL, 50 mg, oral, Daily  sennosides, 2 tablet, oral, BID      Continuous medications     PRN medications  PRN medications: acetaminophen    Results for orders placed or performed during the hospital encounter of 24 (from the past 24 hour(s))   CBC and Auto Differential   Result  Value Ref Range    WBC 6.9 4.4 - 11.3 x10*3/uL    nRBC 0.0 0.0 - 0.0 /100 WBCs    RBC 5.51 (H) 4.00 - 5.20 x10*6/uL    Hemoglobin 16.3 (H) 12.0 - 16.0 g/dL    Hematocrit 51.7 (H) 36.0 - 46.0 %    MCV 94 80 - 100 fL    MCH 29.6 26.0 - 34.0 pg    MCHC 31.5 (L) 32.0 - 36.0 g/dL    RDW 19.3 (H) 11.5 - 14.5 %    Platelets 265 150 - 450 x10*3/uL    Neutrophils % 78.2 40.0 - 80.0 %    Immature Granulocytes %, Automated 0.6 0.0 - 0.9 %    Lymphocytes % 7.0 13.0 - 44.0 %    Monocytes % 8.6 2.0 - 10.0 %    Eosinophils % 4.7 0.0 - 6.0 %    Basophils % 0.9 0.0 - 2.0 %    Neutrophils Absolute 5.38 1.60 - 5.50 x10*3/uL    Immature Granulocytes Absolute, Automated 0.04 0.00 - 0.50 x10*3/uL    Lymphocytes Absolute 0.48 (L) 0.80 - 3.00 x10*3/uL    Monocytes Absolute 0.59 0.05 - 0.80 x10*3/uL    Eosinophils Absolute 0.32 0.00 - 0.40 x10*3/uL    Basophils Absolute 0.06 0.00 - 0.10 x10*3/uL   Comprehensive metabolic panel   Result Value Ref Range    Glucose 119 (H) 74 - 99 mg/dL    Sodium 141 136 - 145 mmol/L    Potassium 4.4 3.5 - 5.3 mmol/L    Chloride 99 98 - 107 mmol/L    Bicarbonate 33 (H) 21 - 32 mmol/L    Anion Gap 13 10 - 20 mmol/L    Urea Nitrogen 50 (H) 6 - 23 mg/dL    Creatinine 1.83 (H) 0.50 - 1.05 mg/dL    eGFR 26 (L) >60 mL/min/1.73m*2    Calcium 10.2 8.6 - 10.3 mg/dL    Albumin 4.4 3.4 - 5.0 g/dL    Alkaline Phosphatase 142 (H) 33 - 136 U/L    Total Protein 7.7 6.4 - 8.2 g/dL    AST 35 9 - 39 U/L    Bilirubin, Total 1.1 0.0 - 1.2 mg/dL    ALT 35 7 - 45 U/L   B-Type Natriuretic Peptide   Result Value Ref Range    BNP 1,495 (H) 0 - 99 pg/mL   Troponin I, High Sensitivity, Initial   Result Value Ref Range    Troponin I, High Sensitivity 47 (H) 0 - 13 ng/L   Troponin, High Sensitivity, 1 Hour   Result Value Ref Range    Troponin I, High Sensitivity 39 (H) 0 - 13 ng/L   Sars-CoV-2 PCR   Result Value Ref Range    Coronavirus 2019, PCR Not Detected Not Detected   D-dimer, VTE Exclusion   Result Value Ref Range    D-Dimer,  Quantitative VTE Exclusion 1,191 (H) <=500 ng/mL FEU       I personally reviewed all pertinent labwork, imaging and vital signs, as well as medications, nursing, therapy, discharge planning and consult notes.     Constitutional: Well developed, awake, alert, calm, oriented x4, no acute distress, cooperative   Eyes: EOMI, clear sclera   ENMT: mucous membranes moist, no lesions seen   Head/Neck: Neck supple, no apparent injury, head atraumatic   Respiratory/Thorax: Fine crackles throughout, good chest expansion, respirations even and unlabored, pt on 2 lpm O2   Cardiovascular: Regular rate and rhythm, no murmurs/rubs/gallops, normal S1 and S 2   Gastrointestinal: Abdomen nondistended, soft, nontender, +BS, no bruits   Musculoskeletal: ROM intact, no joint swelling, normal  strength   Extremities: no cyanosis, edema, contusions or clubbing   Neurological: no focal deficit, pt alert and oriented x4   Psychological: Appropriate affect and behavior, pleasant   Skin: Warm and dry, no lesions, no rashes       Assessment/Plan  Dyspnea on exertion  Presumed exacerbation of HFpEF  Pt denies leg edema and gain in weight (she weighs herself daily)  BNP unable to be obtained as the machine is down  CXR suggestive of chronic lung disease w/? superimposed interstitial edema or pneumonitis  LE Doppler was negative for DVT and COVID was negative  Will check d-dimer as unable to perform PE CTA due to renal function >> d-d is 1191, will order VQ scan to rule out PE in this pt with afib not on anticoagulation  Will treat with IV Lasix and consult cardiology    ?Acute hypoxia  Hx COPD (not on home O2), pt recently referred to pulmonology and says her dyspnea is worse on the new inhalers   She was on home O2 late last year for a couple of months, which was stopped Her O2 sats are skewed by the fact that she has Raynaud and readings vacillate between 83-97% at home and have done so here  I have advised her nurse to place the oximeter on  the pt's ear    Hx NSVT   Continue amiodarone    Hx persistent atrial fib  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

## 2024-09-12 NOTE — ED PROVIDER NOTES
Chief Complaint   Patient presents with    Shortness of Breath     Hx of CHF has been weak for a couple days and SOB worse with exertion       HPI       89 year old female presents to the Emergency Department today complaining of a 3-4 day history of increasing shortness of breath that is worse with exertion. Reports to having a pressure-like pain in the right ribs intermittently during this time, but worse and constant this am. Notes to having a nonproductive cough associated with the above as well. Denies any associated fever, chills, headache, neck pain, abdominal pain, nausea, vomiting, diarrhea, constipation, or urinary symptoms.       History provided by:  Patient             Patient History   Past Medical History:   Diagnosis Date    Abnormal findings on diagnostic imaging of heart and coronary circulation 06/13/2017    Abnormal computed tomography angiography of heart    Acute non-ST elevation myocardial infarction (NSTEMI) (Multi) 02/17/2023    Acute on chronic combined systolic (congestive) and diastolic (congestive) heart failure (Multi) 09/28/2017    Acute on chronic combined systolic and diastolic heart failure    Acute on chronic combined systolic (congestive) and diastolic (congestive) heart failure (Multi) 02/05/2020    Acute on chronic combined systolic and diastolic CHF, NYHA class 2    Anemia, unspecified 11/18/2020    Anemia, mild    Chronic atrial fibrillation, unspecified (Multi) 05/09/2023    Chronic diastolic (congestive) heart failure (Multi) 09/15/2020    Chronic diastolic congestive heart failure    Chronic heart failure with preserved ejection fraction (Multi) 02/07/2024    Disorder of the skin and subcutaneous tissue, unspecified 02/16/2020    Skin lesion of face    Diverticulitis of large intestine with perforation and abscess without bleeding     Perforation of sigmoid colon due to diverticulitis    Diverticulosis of intestine, part unspecified, without perforation or abscess without  bleeding     Diverticulosis    Dorsalgia, unspecified 08/16/2019    Upper back pain    Elevated troponin I level 10/02/2023    Encounter for immunization 11/13/2019    Need for vaccination    Encounter for surgical aftercare following surgery on the circulatory system 02/18/2019    Aftercare following surgery of the circulatory system    Essential (primary) hypertension 11/02/2022    Benign essential hypertension    Gastro-esophageal reflux disease without esophagitis 02/05/2020    Gastroesophageal reflux disease without esophagitis    History of non-ST elevation myocardial infarction (NSTEMI) 10/08/2023    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    Hypothyroidism due to medication    Long term (current) use of anticoagulants 10/22/2018    Long term (current) use of anticoagulants    Melena 04/10/2020    Blood in stool    Old myocardial infarction 10/19/2022    History of non-ST elevation myocardial infarction (NSTEMI)    Other forms of dyspnea 01/24/2020    Dyspnea on exertion    Other long term (current) drug therapy 05/10/2018    Long term current use of antiarrhythmic drug    Other specified cardiac arrhythmias 02/26/2020    Arrhythmia, atrial    Paroxysmal atrial fibrillation (Multi) 02/01/2018    Paroxysmal atrial fibrillation    Permanent atrial fibrillation (Multi) 01/24/2020    Permanent atrial fibrillation    Personal history of diseases of the blood and blood-forming organs and certain disorders involving the immune mechanism 04/10/2020    History of anemia    Personal history of other diseases of the circulatory system 11/04/2018    History of abnormal electrocardiography    Personal history of other diseases of the digestive system 10/22/2018    History of lower GI bleeding    Personal history of other diseases of the musculoskeletal system and connective tissue     History of osteoporosis    Personal history of other diseases of the musculoskeletal system and  connective tissue 08/16/2019    History of muscle spasm    Personal history of other endocrine, nutritional and metabolic disease 02/18/2019    History of hyperlipidemia    Personal history of other specified conditions 08/13/2020    History of diarrhea    Personal history of other specified conditions     History of precordial chest pain    Personal history of transient ischemic attack (TIA), and cerebral infarction without residual deficits 02/01/2018    Cerebrovascular accident (CVA) within last 3 months    Precordial pain 01/05/2024    Psychophysiologic insomnia 11/18/2020    Chronic insomnia    Segmental and somatic dysfunction of rib cage 08/16/2019    Segmental and somatic dysfunction of rib cage    Segmental and somatic dysfunction of thoracic region 08/16/2019    Segmental and somatic dysfunction of thoracic region    Segmental and somatic dysfunction of upper extremity 08/16/2019    Segmental and somatic dysfunction of upper extremity    Sick sinus syndrome (Multi) 02/27/2018     Past Surgical History:   Procedure Laterality Date    APPENDECTOMY  05/04/2016    Appendectomy    CARDIAC ELECTROPHYSIOLOGY PROCEDURE N/A 6/17/2024    Procedure: PPM UPGRADE DUAL TO BIV;  Surgeon: Balta Chaudhari MD;  Location: [facility];  Service: Electrophysiology;  Laterality: N/A;  CRTP upgrade, [facility], [place]  notified [facility] 5/21/24    CARDIAC PACEMAKER PLACEMENT  04/26/2018    Pacemaker Permanent Placement    CATARACT EXTRACTION  05/04/2016    Cataract Surgery    CHOLECYSTECTOMY  05/04/2016    Cholecystectomy    COLECTOMY PARTIAL / TOTAL  08/17/2018    Partial Colectomy - Sigmoid    CORONARY ANGIOPLASTY  02/01/2018    PTCA    HYSTERECTOMY  05/04/2016    Hysterectomy    OTHER SURGICAL HISTORY  02/18/2019    Atrial appendage closure    OTHER SURGICAL HISTORY  04/26/2018    Catheter Ablation Atrioventricular Node    TONSILLECTOMY  05/04/2016    Tonsillectomy     Family History   Problem Relation  Name Age of Onset    Coronary artery disease Mother      Coronary artery disease Father       Social History     Tobacco Use    Smoking status: Former     Current packs/day: 0.00     Average packs/day: 0.3 packs/day for 34.0 years (8.5 ttl pk-yrs)     Types: Cigarettes     Start date:      Quit date:      Years since quittin.7     Passive exposure: Never    Smokeless tobacco: Never   Substance Use Topics    Alcohol use: Never    Drug use: Never           Physical Exam  Constitutional:       Appearance: Normal appearance.   HENT:      Head: Normocephalic.      Right Ear: External ear normal.      Left Ear: External ear normal.      Nose: Nose normal.      Mouth/Throat:      Mouth: Mucous membranes are moist.      Pharynx: Oropharynx is clear. No oropharyngeal exudate or posterior oropharyngeal erythema.   Eyes:      Conjunctiva/sclera: Conjunctivae normal.      Pupils: Pupils are equal, round, and reactive to light.   Cardiovascular:      Rate and Rhythm: Normal rate and regular rhythm.      Pulses:           Radial pulses are 3+ on the right side and 3+ on the left side.        Dorsalis pedis pulses are 3+ on the right side and 3+ on the left side.      Heart sounds: Normal heart sounds. No murmur heard.     No friction rub. No gallop.   Pulmonary:      Effort: Pulmonary effort is normal. No respiratory distress.      Breath sounds: Examination of the right-lower field reveals decreased breath sounds. Examination of the left-lower field reveals decreased breath sounds. Decreased breath sounds present. No wheezing, rhonchi or rales.   Abdominal:      General: Abdomen is flat. Bowel sounds are normal.      Palpations: Abdomen is soft.      Tenderness: There is no abdominal tenderness. There is no right CVA tenderness, left CVA tenderness, guarding or rebound. Negative signs include Corcoran's sign and McBurney's sign.   Musculoskeletal:         General: No swelling or deformity.      Cervical back: Full  passive range of motion without pain.      Right lower leg: No edema.      Left lower leg: No edema.   Lymphadenopathy:      Cervical: No cervical adenopathy.   Skin:     Capillary Refill: Capillary refill takes less than 2 seconds.      Coloration: Skin is not jaundiced.      Findings: No rash.   Neurological:      General: No focal deficit present.      Mental Status: She is alert and oriented to person, place, and time. Mental status is at baseline.      Gait: Gait is intact.   Psychiatric:         Mood and Affect: Mood normal.         Behavior: Behavior is cooperative.         Labs Reviewed   CBC WITH AUTO DIFFERENTIAL - Abnormal       Result Value    WBC 6.9      nRBC 0.0      RBC 5.51 (*)     Hemoglobin 16.3 (*)     Hematocrit 51.7 (*)     MCV 94      MCH 29.6      MCHC 31.5 (*)     RDW 19.3 (*)     Platelets 265      Neutrophils % 78.2      Immature Granulocytes %, Automated 0.6      Lymphocytes % 7.0      Monocytes % 8.6      Eosinophils % 4.7      Basophils % 0.9      Neutrophils Absolute 5.38      Immature Granulocytes Absolute, Automated 0.04      Lymphocytes Absolute 0.48 (*)     Monocytes Absolute 0.59      Eosinophils Absolute 0.32      Basophils Absolute 0.06     COMPREHENSIVE METABOLIC PANEL - Abnormal    Glucose 119 (*)     Sodium 141      Potassium 4.4      Chloride 99      Bicarbonate 33 (*)     Anion Gap 13      Urea Nitrogen 50 (*)     Creatinine 1.83 (*)     eGFR 26 (*)     Calcium 10.2      Albumin 4.4      Alkaline Phosphatase 142 (*)     Total Protein 7.7      AST 35      Bilirubin, Total 1.1      ALT 35     SERIAL TROPONIN-INITIAL - Abnormal    Troponin I, High Sensitivity 47 (*)     Narrative:     Less than 99th percentile of normal range cutoff-  Female and children under 18 years old <14 ng/L; Male <21 ng/L: Negative  Repeat testing should be performed if clinically indicated.     Female and children under 18 years old 14-50 ng/L; Male 21-50 ng/L:  Consistent with possible cardiac damage  and possible increased clinical   risk. Serial measurements may help to assess extent of myocardial damage.     >50 ng/L: Consistent with cardiac damage, increased clinical risk and  myocardial infarction. Serial measurements may help assess extent of   myocardial damage.      NOTE: Children less than 1 year old may have higher baseline troponin   levels and results should be interpreted in conjunction with the overall   clinical context.     NOTE: Troponin I testing is performed using a different   testing methodology at [facility] than at other   [facility]. Direct result comparisons should only   be made within the same method.   SERIAL TROPONIN, 1 HOUR - Abnormal    Troponin I, High Sensitivity 39 (*)     Narrative:     Less than 99th percentile of normal range cutoff-  Female and children under 18 years old <14 ng/L; Male <21 ng/L: Negative  Repeat testing should be performed if clinically indicated.     Female and children under 18 years old 14-50 ng/L; Male 21-50 ng/L:  Consistent with possible cardiac damage and possible increased clinical   risk. Serial measurements may help to assess extent of myocardial damage.     >50 ng/L: Consistent with cardiac damage, increased clinical risk and  myocardial infarction. Serial measurements may help assess extent of   myocardial damage.      NOTE: Children less than 1 year old may have higher baseline troponin   levels and results should be interpreted in conjunction with the overall   clinical context.     NOTE: Troponin I testing is performed using a different   testing methodology at [facility] than at other   [facility]. Direct result comparisons should only   be made within the same method.   SARS-COV-2 PCR - Normal    Coronavirus 2019, PCR Not Detected      Narrative:     This assay has received FDA Emergency Use Authorization (EUA) and is only authorized for the duration of time that circumstances exist to justify the  authorization of the emergency use of in vitro diagnostic tests for the detection of SARS-CoV-2 virus and/or diagnosis of COVID-19 infection under section 564(b)(1) of the Act, 21 U.S.C. 360bbb-3(b)(1). This assay is an in vitro diagnostic nucleic acid amplification test for the qualitative detection of SARS-CoV-2 from nasopharyngeal specimens and has been validated for use at [facility]. Negative results do not preclude COVID-19 infections and should not be used as the sole basis for diagnosis, treatment, or other management decisions.     TROPONIN SERIES- (INITIAL, 1 HR)    Narrative:     The following orders were created for panel order Troponin I Series, High Sensitivity (0, 1 HR).  Procedure                               Abnormality         Status                     ---------                               -----------         ------                     Troponin I, High Sensiti...[[id number]]  Abnormal            Final result               Troponin, High Sensitivi...[[id number]]  Abnormal            Final result                 Please view results for these tests on the individual orders.   B-TYPE NATRIURETIC PEPTIDE       Vascular US Lower Extremity Venous Duplex Bilateral         XR chest 2 views   Final Result   1.  Bibasilar predominant reticular changes are favored to be related   to chronic lung disease. Clinically exclude superimposed interstitial   edema or pneumonitis..             Signed by: Yifan Donis 9/12/2024 10:25 AM   Dictation workstation:   [id number]               ED Course & MDM            Medical Decision Making  EKG interpreted by Dr. Martinez. Indication: shortness of breath. Findings: paced rhythm with a ventricular rate of 65, left axis, normal intervals, and no acute ischemic or injury pattern. Impression: No acute pathology.       Patient was seen and evaluated by Dr. Kelly. Placed on a cardiac monitor which showed normal sinus rhythm without ectopy  throughout ED stay. Rhythm strip obtained showed normal sinus rhythm. Impression: No acute pathology. Continuous pulse oximetry monitoring showed no signs of hypoxia while on oxygen which is a new requirement. Ambulatory pulse ox was 88% on RA. Saline lock was established with labs drawn and results as above. Kidney function is at baseline. Blood counts, electrolytes, and liver function were unremarkable. Heart Score- 4 with normal EKG and mildly elevated troponin with negative delta troponin. At this time, we feel that the chest pain is atypical for acute coronary syndrome. We find no underlying evidence of an acute infectious process or pneumothorax on CXR. Clinically, we do not feel they are exhibiting signs of pulmonary embolism or thoracic aortic dissection (no connective tissue disorder, no tachycardia, tachypnea, hypoxia, and mediastinum normal in size on CXR). COVID was negative.            Your medication list        ASK your doctor about these medications        Instructions Last Dose Given Next Dose Due   albuterol 90 mcg/actuation inhaler      Inhale 2 puffs every 4 hours if needed for shortness of breath.       ALPRAZolam 0.5 mg tablet  Commonly known as: Xanax      Take 1 tablet (0.5 mg) by mouth 2 times a day.       amiodarone 200 mg tablet  Commonly known as: Pacerone      Take 1 tablet (200 mg) by mouth once daily.       Anoro Ellipta 62.5-25 mcg/actuation blister with device  Generic drug: umeclidinium-vilanteroL      Inhale 1 puff once daily.       aspirin 81 mg EC tablet           atorvastatin 80 mg tablet  Commonly known as: Lipitor      Take 1 tablet (80 mg) by mouth once daily. At bedtime       calcitriol 0.25 mcg capsule  Commonly known as: Rocaltrol           calcium acetate 667 mg capsule  Commonly known as: Phoslo           Farxiga 10 mg  Generic drug: dapagliflozin propanediol      TAKE 1 TABLET BY MOUTH EVERY DAY       ferrous gluconate 240 (27 Fe) MG tablet  Commonly known as: Fergon       1T;PO;QD       levothyroxine 25 mcg tablet  Commonly known as: Synthroid, Levoxyl      Take 1 tablet (25 mcg) by mouth once daily.       losartan 25 mg tablet  Commonly known as: Cozaar      Take 0.5 tablets (12.5 mg) by mouth once daily.       metoprolol succinate XL 50 mg 24 hr tablet  Commonly known as: Toprol-XL      Take 1 tablet (50 mg) by mouth once daily.       torsemide 20 mg tablet  Commonly known as: Demadex      Take 2 tablets (40 mg) by mouth 2 times a day. Patient states takes one tablet twice a day.                  Procedure  Procedures     Jeffrey Toscano, INDIGO[facility]CATHERINE  09/12/24 [id number]

## 2024-09-13 ENCOUNTER — APPOINTMENT (OUTPATIENT)
Dept: RADIOLOGY | Facility: HOSPITAL | Age: 89
DRG: 291 | End: 2024-09-13
Payer: MEDICARE

## 2024-09-13 LAB
ANION GAP SERPL CALC-SCNC: 10 MMOL/L (ref 10–20)
BUN SERPL-MCNC: 56 MG/DL (ref 6–23)
CALCIUM SERPL-MCNC: 8.9 MG/DL (ref 8.6–10.3)
CHLORIDE SERPL-SCNC: 99 MMOL/L (ref 98–107)
CO2 SERPL-SCNC: 34 MMOL/L (ref 21–32)
CREAT SERPL-MCNC: 1.88 MG/DL (ref 0.5–1.05)
EGFRCR SERPLBLD CKD-EPI 2021: 25 ML/MIN/1.73M*2
ERYTHROCYTE [DISTWIDTH] IN BLOOD BY AUTOMATED COUNT: 18.9 % (ref 11.5–14.5)
GLUCOSE SERPL-MCNC: 89 MG/DL (ref 74–99)
HCT VFR BLD AUTO: 44 % (ref 36–46)
HGB BLD-MCNC: 13.7 G/DL (ref 12–16)
MCH RBC QN AUTO: 29.8 PG (ref 26–34)
MCHC RBC AUTO-ENTMCNC: 31.1 G/DL (ref 32–36)
MCV RBC AUTO: 96 FL (ref 80–100)
NRBC BLD-RTO: 0 /100 WBCS (ref 0–0)
PLATELET # BLD AUTO: 227 X10*3/UL (ref 150–450)
POTASSIUM SERPL-SCNC: 4.3 MMOL/L (ref 3.5–5.3)
RBC # BLD AUTO: 4.6 X10*6/UL (ref 4–5.2)
SODIUM SERPL-SCNC: 139 MMOL/L (ref 136–145)
WBC # BLD AUTO: 5.9 X10*3/UL (ref 4.4–11.3)

## 2024-09-13 PROCEDURE — 2500000004 HC RX 250 GENERAL PHARMACY W/ HCPCS (ALT 636 FOR OP/ED): Performed by: NURSE PRACTITIONER

## 2024-09-13 PROCEDURE — 3430000001 HC RX 343 DIAGNOSTIC RADIOPHARMACEUTICALS: Performed by: INTERNAL MEDICINE

## 2024-09-13 PROCEDURE — 71250 CT THORAX DX C-: CPT

## 2024-09-13 PROCEDURE — 36415 COLL VENOUS BLD VENIPUNCTURE: CPT | Performed by: NURSE PRACTITIONER

## 2024-09-13 PROCEDURE — 2500000001 HC RX 250 WO HCPCS SELF ADMINISTERED DRUGS (ALT 637 FOR MEDICARE OP): Performed by: NURSE PRACTITIONER

## 2024-09-13 PROCEDURE — 94640 AIRWAY INHALATION TREATMENT: CPT

## 2024-09-13 PROCEDURE — 78830 RP LOCLZJ TUM SPECT W/CT 1: CPT | Performed by: STUDENT IN AN ORGANIZED HEALTH CARE EDUCATION/TRAINING PROGRAM

## 2024-09-13 PROCEDURE — 2060000001 HC INTERMEDIATE ICU ROOM DAILY

## 2024-09-13 PROCEDURE — 99223 1ST HOSP IP/OBS HIGH 75: CPT | Performed by: INTERNAL MEDICINE

## 2024-09-13 PROCEDURE — 80048 BASIC METABOLIC PNL TOTAL CA: CPT | Performed by: NURSE PRACTITIONER

## 2024-09-13 PROCEDURE — 99233 SBSQ HOSP IP/OBS HIGH 50: CPT | Performed by: INTERNAL MEDICINE

## 2024-09-13 PROCEDURE — 78830 RP LOCLZJ TUM SPECT W/CT 1: CPT

## 2024-09-13 PROCEDURE — 97161 PT EVAL LOW COMPLEX 20 MIN: CPT | Mod: GP | Performed by: PHYSICAL THERAPIST

## 2024-09-13 PROCEDURE — 97165 OT EVAL LOW COMPLEX 30 MIN: CPT | Mod: GO

## 2024-09-13 PROCEDURE — 2500000002 HC RX 250 W HCPCS SELF ADMINISTERED DRUGS (ALT 637 FOR MEDICARE OP, ALT 636 FOR OP/ED): Performed by: NURSE PRACTITIONER

## 2024-09-13 PROCEDURE — 85027 COMPLETE CBC AUTOMATED: CPT | Performed by: NURSE PRACTITIONER

## 2024-09-13 PROCEDURE — 2500000001 HC RX 250 WO HCPCS SELF ADMINISTERED DRUGS (ALT 637 FOR MEDICARE OP): Performed by: INTERNAL MEDICINE

## 2024-09-13 PROCEDURE — A9540 TC99M MAA: HCPCS | Performed by: INTERNAL MEDICINE

## 2024-09-13 RX ORDER — DEXTROMETHORPHAN HYDROBROMIDE, GUAIFENESIN 5; 100 MG/5ML; MG/5ML
650 LIQUID ORAL EVERY 8 HOURS PRN
COMMUNITY

## 2024-09-13 RX ORDER — BISMUTH SUBSALICYLATE 262 MG
1 TABLET,CHEWABLE ORAL DAILY
COMMUNITY

## 2024-09-13 RX ORDER — ACETAMINOPHEN 325 MG/1
650 TABLET ORAL EVERY 4 HOURS PRN
Status: DISCONTINUED | OUTPATIENT
Start: 2024-09-13 | End: 2024-09-15 | Stop reason: HOSPADM

## 2024-09-13 RX ORDER — FUROSEMIDE 40 MG/1
40 TABLET ORAL
Status: DISCONTINUED | OUTPATIENT
Start: 2024-09-13 | End: 2024-09-15 | Stop reason: HOSPADM

## 2024-09-13 RX ADMIN — ASPIRIN 81 MG: 81 TABLET, COATED ORAL at 08:30

## 2024-09-13 RX ADMIN — ACETAMINOPHEN 650 MG: 325 TABLET ORAL at 08:30

## 2024-09-13 RX ADMIN — DAPAGLIFLOZIN 10 MG: 5 TABLET, FILM COATED ORAL at 08:30

## 2024-09-13 RX ADMIN — LOSARTAN POTASSIUM 12.5 MG: 25 TABLET, FILM COATED ORAL at 08:30

## 2024-09-13 RX ADMIN — FORMOTEROL FUMARATE DIHYDRATE 20 MCG: 20 SOLUTION RESPIRATORY (INHALATION) at 19:48

## 2024-09-13 RX ADMIN — HEPARIN SODIUM 5000 UNITS: 5000 INJECTION, SOLUTION INTRAVENOUS; SUBCUTANEOUS at 01:21

## 2024-09-13 RX ADMIN — CEFTRIAXONE SODIUM 1 G: 1 INJECTION, SOLUTION INTRAVENOUS at 17:21

## 2024-09-13 RX ADMIN — AMIODARONE HYDROCHLORIDE 200 MG: 200 TABLET ORAL at 08:30

## 2024-09-13 RX ADMIN — SENNOSIDES 17.2 MG: 8.6 TABLET, FILM COATED ORAL at 20:51

## 2024-09-13 RX ADMIN — ACETAMINOPHEN 650 MG: 325 TABLET ORAL at 15:18

## 2024-09-13 RX ADMIN — ATORVASTATIN CALCIUM 80 MG: 40 TABLET, FILM COATED ORAL at 20:51

## 2024-09-13 RX ADMIN — SACUBITRIL AND VALSARTAN 1 TABLET: 24; 26 TABLET, FILM COATED ORAL at 20:51

## 2024-09-13 RX ADMIN — LEVOTHYROXINE SODIUM 25 MCG: 0.03 TABLET ORAL at 08:30

## 2024-09-13 RX ADMIN — KIT FOR THE PREPARATION OF TECHNETIUM TC 99M ALBUMIN AGGREGATED 4 MILLICURIE: 2.5 INJECTION, POWDER, FOR SOLUTION INTRAVENOUS at 10:52

## 2024-09-13 RX ADMIN — HEPARIN SODIUM 5000 UNITS: 5000 INJECTION, SOLUTION INTRAVENOUS; SUBCUTANEOUS at 08:30

## 2024-09-13 RX ADMIN — HEPARIN SODIUM 5000 UNITS: 5000 INJECTION, SOLUTION INTRAVENOUS; SUBCUTANEOUS at 17:21

## 2024-09-13 RX ADMIN — SENNOSIDES 17.2 MG: 8.6 TABLET, FILM COATED ORAL at 08:30

## 2024-09-13 RX ADMIN — FUROSEMIDE 40 MG: 10 INJECTION, SOLUTION INTRAMUSCULAR; INTRAVENOUS at 08:30

## 2024-09-13 RX ADMIN — FORMOTEROL FUMARATE DIHYDRATE 20 MCG: 20 SOLUTION RESPIRATORY (INHALATION) at 07:53

## 2024-09-13 RX ADMIN — ACETAMINOPHEN 650 MG: 325 TABLET ORAL at 23:13

## 2024-09-13 RX ADMIN — ALPRAZOLAM 0.5 MG: 0.5 TABLET ORAL at 20:51

## 2024-09-13 RX ADMIN — METOPROLOL SUCCINATE 50 MG: 50 TABLET, EXTENDED RELEASE ORAL at 08:30

## 2024-09-13 ASSESSMENT — PAIN SCALES - GENERAL
PAINLEVEL_OUTOF10: 3
PAINLEVEL_OUTOF10: 0 - NO PAIN
PAINLEVEL_OUTOF10: 3
PAINLEVEL_OUTOF10: 0 - NO PAIN
PAINLEVEL_OUTOF10: 1
PAINLEVEL_OUTOF10: 0 - NO PAIN
PAINLEVEL_OUTOF10: 5 - MODERATE PAIN

## 2024-09-13 ASSESSMENT — PAIN - FUNCTIONAL ASSESSMENT
PAIN_FUNCTIONAL_ASSESSMENT: 0-10

## 2024-09-13 ASSESSMENT — COGNITIVE AND FUNCTIONAL STATUS - GENERAL
MOVING TO AND FROM BED TO CHAIR: A LITTLE
STANDING UP FROM CHAIR USING ARMS: A LITTLE
CLIMB 3 TO 5 STEPS WITH RAILING: A LOT
DAILY ACTIVITIY SCORE: 23
MOBILITY SCORE: 19
DRESSING REGULAR LOWER BODY CLOTHING: A LITTLE
STANDING UP FROM CHAIR USING ARMS: A LITTLE
PERSONAL GROOMING: A LITTLE
CLIMB 3 TO 5 STEPS WITH RAILING: A LITTLE
WALKING IN HOSPITAL ROOM: A LITTLE
MOBILITY SCORE: 20
DRESSING REGULAR LOWER BODY CLOTHING: A LITTLE
WALKING IN HOSPITAL ROOM: A LITTLE
HELP NEEDED FOR BATHING: A LITTLE
DAILY ACTIVITIY SCORE: 18
EATING MEALS: A LITTLE
TOILETING: A LITTLE
MOVING TO AND FROM BED TO CHAIR: A LITTLE
DRESSING REGULAR UPPER BODY CLOTHING: A LITTLE

## 2024-09-13 ASSESSMENT — ACTIVITIES OF DAILY LIVING (ADL)
ADL_ASSISTANCE: INDEPENDENT
LACK_OF_TRANSPORTATION: NO
BATHING_ASSISTANCE: MINIMAL

## 2024-09-13 ASSESSMENT — PAIN DESCRIPTION - LOCATION
LOCATION: HEAD

## 2024-09-13 NOTE — CONSULTS
Wound Care Consult     Visit Date: 9/13/2024      Patient Name: Adwoa Forrest         MRN: [id number]           YOB: 1935       Pertinent Labs:   Albumin   Date Value Ref Range Status   09/12/2024 4.4 3.4 - 5.0 g/dL Final   02/07/2024 3.9 3.4 - 5.0 g/dL Final     Albumin, Urine Random   Date Value Ref Range Status   06/07/2024 <7.0 Not established mg/L Final       Wound Assessment:  Wound 09/12/24 Other (comment) Heel Dorsal;Right (Active)   Wound Image   09/13/24 0952   Site Assessment Yellow;Sloughing;Red 09/13/24 0952   Samina-Wound Assessment Pink 09/13/24 0952   Non-staged Wound Description Full thickness 09/13/24 0952   Shape linear 09/13/24 0952   Wound Length (cm) 2 cm 09/13/24 0952   Wound Width (cm) 0.5 cm 09/13/24 0952   Wound Surface Area (cm^2) 1 cm^2 09/13/24 0952   Wound Depth (cm) 0.2 cm 09/13/24 0952   Wound Volume (cm^3) 0.2 cm^3 09/13/24 0952   Margins Poorly defined 09/13/24 0952   Drainage Description Yellow 09/13/24 0952   Drainage Amount Small 09/13/24 0952   Dressing Other (Comment) 09/13/24 0952   Dressing Changed Changed 09/13/24 0952   Dressing Status Clean;Dry 09/13/24 0400     Patient seen for 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 Patient follows with wound center outpatient for chronic wound to right heel resulting after surgical procedure to foot. Patient last seen by wound center 9/6, chart reviewed. Cultured of wound obtained and pending. Skin hygiene and dressing care provided. See detailed assessment above from flowsheet. Recommendations below, reviewed with Dr. Esparza.     Wound location: Right posterior heel   Treatment protocol recommended:  Cleanse with vashe, pat dry, apply small amount of wound get to aquacel ag cut to size, cover with mepilex foam every other day/prn. Offload heel.   Continue to off load, turning at least every 2  hours. Offload heels.      Therapeutic surface: Patient sitting up in chair post walk with therapy. [place] Dream Air pressure relieving mattress in place. Patient demonstrates ability to independently turn/reposition. Encouraged to do so atleast every 2 hours and offload heel.     Nursing updated, continue pressure injury preventions, wound care to be completed by nursing per orders. and re-consult wound RN if needed.    See above recommendations for treatment. I would recommend follow up in [facility] upon discharge as scheduled.     Please contact me with questions or changes in patient condition.  Najma Mccarty RN  [facility]   [phone]

## 2024-09-13 NOTE — CONSULTS
Inpatient consult to Cardiology  Consult performed by: Michael Latham MD  Consult ordered by: INDIGO Tavares-CNP  Reason for consult: CHF exacerbation        History Of Present Illness:    Adwoa Forrest is a 89 y.o. female presenting with shortness of breath for 1 week associated with low oxygen saturations at home and a cough.  She states that this started after she started on some kind of inhaler.  She does not have any ankle swelling and denies any weight gain.    She has prior history of chronic diastolic heart failure, moderate LVH with equivocal PYP, recent declining LV function to 35 to 40%, 99% RV pacing, persistent atrial fibrillation maintaining sinus rhythm, after AV derek ablation.       She has history of persistent atrial fibrillation status post AV derek ablation and permanent pacemaker placement, failed Watchman device placement in 2018 requiring urgent surgery and left atrial appendage ligation, hypertension recurrent GI bleed not on oral anticoagulants, on amiodarone for nonsustained VT and symptomatic PVCs.  She tells me she had stents placed in her coronary artery in 2017(Dr Hampton, unknown settings and I don't have the records available anywhere).    Recent attempt to CRT upgrade was unsuccessful.  Patient is also following up with advanced heart failure for possible cardiac MRI to rule out infiltrative cardiomyopathy.  Reviewed EKG personally, AV paced rhythm noted.    Reviewed chest x-ray-there is evidence of prominent reticular changes bilaterally which is either related to chronic lung disease or pneumonia.  Compared to the prior chest x-rays there seems to be worsening of radiographic abnormalities.  Her BNP remains elevated without significant change from her baseline.     Last Recorded Vitals:  Vitals:    09/13/24 0720 09/13/24 0753 09/13/24 0846 09/13/24 0849   BP: 128/66      BP Location: Right arm      Patient Position: Lying      Pulse: 65      Resp: 18      Temp: 35.9 °C  (96.7 °F)      TempSrc: Temporal      SpO2: 92% 95% (!) 79% 94%   Weight:  46.8 kg (103 lb 3.2 oz)     Height:           Last Labs:  CBC - 9/13/2024:  4:35 AM  5.9 13.7 227    44.0      CMP - 9/13/2024:  4:35 AM  8.9 7.7 35 --- 1.1   4.3 4.4 35 142      PTT - No results in last year.  1.0   11.5 _     Troponin I, High Sensitivity   Date/Time Value Ref Range Status   09/12/2024 10:58 AM 39 (H) 0 - 13 ng/L Final   09/12/2024 09:49 AM 47 (H) 0 - 13 ng/L Final   03/05/2024 02:56 AM 46 (H) 0 - 13 ng/L Final     BNP   Date/Time Value Ref Range Status   09/12/2024 09:49 AM 1,495 (H) 0 - 99 pg/mL Final   07/26/2024 02:18 PM 1,361 (H) 0 - 99 pg/mL Final     LDL Calculated   Date/Time Value Ref Range Status   06/18/2024 04:20 AM 33 <=99 mg/dL Final     Comment:                                 Near   Borderline      AGE      Desirable  Optimal    High     High     Very High     0-19 Y     0 - 109     ---    110-129   >/= 130     ----    20-24 Y     0 - 119     ---    120-159   >/= 160     ----      >24 Y     0 -  99   100-129  130-159   160-189     >/=190     02/07/2024 03:17 PM 35 <=99 mg/dL Final     Comment:                                 Near   Borderline      AGE      Desirable  Optimal    High     High     Very High     0-19 Y     0 - 109     ---    110-129   >/= 130     ----    20-24 Y     0 - 119     ---    120-159   >/= 160     ----      >24 Y     0 -  99   100-129  130-159   160-189     >/=190       VLDL   Date/Time Value Ref Range Status   06/18/2024 04:20 AM 23 0 - 40 mg/dL Final   02/07/2024 03:17 PM 19 0 - 40 mg/dL Final   02/23/2023 10:08 AM 15 0 - 40 mg/dL Final   01/12/2022 09:53 AM 16 0 - 40 mg/dL Final   08/03/2021 09:45 AM 28 0 - 40 mg/dL Final      Last I/O:  I/O last 3 completed shifts:  In: 287 (6.1 mL/kg) [P.O.:237; IV Piggyback:50]  Out: - (0 mL/kg)   Weight: 46.7 kg     Past Cardiology Tests (Last 3 Years):  EKG:  ECG 12 Lead 06/11/2024      ECG 12 lead (Clinic Performed) 04/29/2024      ECG 12 lead  (Clinic Performed) 04/29/2024      ECG 12 lead 03/05/2024      ECG 12 lead 03/04/2024      ECG 12 Lead 02/07/2024      ECG 12 lead (Clinic Performed) 11/17/2023      ECG 12 Lead       ECG 12 Lead 10/09/2023    Echo:  Transthoracic Echo Limited 02/20/2024      Transthoracic echo (TTE) complete 10/03/2023    Ejection Fractions:  EF   Date/Time Value Ref Range Status   02/20/2024 02:44 PM 39 %      Cath:  No results found for this or any previous visit from the past 1095 days.    Stress Test:  Nuclear Stress Test 03/20/2024    Cardiac Imaging:  No results found for this or any previous visit from the past 1095 days.      Past Medical History:  She has a past medical history of Anxiety and depression, Bowel perforation (Multi), Chronic atrial fibrillation, unspecified (Multi) (05/09/2023), Chronic heart failure with preserved ejection fraction (Multi) (02/07/2024), CKD (chronic kidney disease), COPD (chronic obstructive pulmonary disease) (Multi), Essential (primary) hypertension, GIB (gastrointestinal bleeding), History of non-ST elevation myocardial infarction (NSTEMI) (10/08/2023), Hypothyroidism, CANDELARIA (iron deficiency anemia), NSVT (nonsustained ventricular tachycardia) (Multi), Raynaud disease, Sick sinus syndrome (Multi) (02/27/2018), Sleep apnea, and Stroke (cerebrum) (Multi).    Past Surgical History:  She has a past surgical history that includes Cataract extraction; Appendectomy; Hysterectomy; Tonsillectomy; Cholecystectomy; Other surgical history; Other surgical history; Colectomy partial / total; and Cardiac electrophysiology procedure (N/A, 06/17/2024).      Social History:  She reports that she quit smoking about 34 years ago. Her smoking use included cigarettes. She started smoking about 68 years ago. She has a 8.5 pack-year smoking history. She has never been exposed to tobacco smoke. She has never used smokeless tobacco. She reports that she does not drink alcohol and does not use drugs.    Family  History:  Family History   Problem Relation Name Age of Onset    Coronary artery disease Mother      Coronary artery disease Father          Allergies:  Codeine, Hydrocodone, Sotalol, and Tetracyclines    Inpatient Medications:  Scheduled medications   Medication Dose Route Frequency    ALPRAZolam  0.5 mg oral BID    amiodarone  200 mg oral Daily    aspirin  81 mg oral Daily    atorvastatin  80 mg oral Daily    cefTRIAXone  1 g intravenous q24h    dapagliflozin propanediol  10 mg oral Daily    ferrous gluconate  38 mg of iron oral Daily with breakfast    influenza  0.5 mL intramuscular During hospitalization    tiotropium  2 puff inhalation Daily    And    formoterol  20 mcg nebulization q12h    furosemide  40 mg intravenous q12h    heparin (porcine)  5,000 Units subcutaneous q8h MACRINA    levothyroxine  25 mcg oral Daily    losartan  12.5 mg oral Daily    metoprolol succinate XL  50 mg oral Daily    sennosides  2 tablet oral BID     PRN medications   Medication    acetaminophen    oxygen     Continuous Medications   Medication Dose Last Rate     Outpatient Medications:  Current Outpatient Medications   Medication Instructions    albuterol 90 mcg/actuation inhaler 2 puffs, inhalation, Every 4 hours PRN    ALPRAZolam (XANAX) 0.5 mg, oral, 2 times daily    amiodarone (PACERONE) 200 mg, oral, Daily    aspirin 81 mg EC tablet 1 tablet, oral, Daily    atorvastatin (LIPITOR) 80 mg, oral, Daily, At bedtime    calcitriol (ROCALTROL) 0.25 mcg, oral, Daily, Three times a week.    calcium acetate (PHOSLO) 667 mg, oral, 3 times daily (morning, midday, late afternoon)    Farxiga 10 mg, oral, Daily    ferrous gluconate (Fergon) 240 (27 Fe) MG tablet 1T;PO;QD    levothyroxine (SYNTHROID, LEVOXYL) 25 mcg, oral, Daily    losartan (COZAAR) 12.5 mg, oral, Daily    metoprolol succinate XL (TOPROL-XL) 50 mg, oral, Daily    torsemide (DEMADEX) 40 mg, oral, 2 times daily, Patient states takes one tablet twice a day.     umeclidinium-vilanteroL (Anoro Ellipta) 62.5-25 mcg/actuation blister with device 1 puff, inhalation, Daily       Physical Exam:  Physical Exam  Vitals reviewed.   Constitutional:       Appearance: Normal appearance.   Neck:      Vascular: Hepatojugular reflux present. No carotid bruit or JVD.      Comments: JVP around 10 cm with prominent hepatojugular reflux  Cardiovascular:      Rate and Rhythm: Normal rate and regular rhythm.      Pulses: Normal pulses.      Heart sounds: Normal heart sounds, S1 normal and S2 normal.   Pulmonary:      Effort: Pulmonary effort is normal. No respiratory distress.      Breath sounds: Examination of the right-lower field reveals rales. Examination of the left-lower field reveals rales. Rales present. No wheezing.   Abdominal:      General: Abdomen is flat.      Palpations: Abdomen is soft.   Musculoskeletal:      Right lower leg: No edema.      Left lower leg: No edema.   Skin:     General: Skin is warm.   Neurological:      Mental Status: She is alert and oriented to person, place, and time. Mental status is at baseline.   Psychiatric:         Mood and Affect: Mood normal.         Behavior: Behavior normal.           Assessment/Plan   1-acute respiratory failure-differential diagnosis acute on chronic systolic heart failure versus due to interstitial lung disease/amiodarone toxicity.    Recommendations-CT scan of the chest.  After review of CT chest, if it appears to be pulmonary edema rather than an inflammatory process, would consider switching the Lasix to oral and losartan to low-dose Entresto.  She did not tolerate Aldactone in the past.    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  We should also notify Dr. Collazo in future- it appears she intended to get the cardiac MRI but patient is unaware of this and I do not see any active orders.    2-persistent atrial fibrillation-she seems to be maintaining sinus rhythm since being  on amiodarone(which was started for nonsustained VT).  Status post AV derek ablation and has a pacemaker in place.  She is not anticoagulated but had a left atrial appendage ligation.  She has history of GI bleed.     3-nonsustained VT and PVCs highly symptomatic and remains on amiodarone with resolution of symptoms.  Outpatient monitoring.     4-LVH, CKD, chronic diastolic heart failure-we performed PYP scan which was equivocal.  Currently following up with Dr. Collazo who is considering MRI versus biopsy.     5-coronary artery disease-remote history of PCI unknown territory.  Records are not available patient told me about this.  Continue high-dose potent statins check lipid profile target LDL less than 70.    D/W Dr Esparza.   Addendum-reviewed CT chest with radiologist.  There is some emphysematous changes at the bases.  No clear-cut parenchymal abnormality otherwise.  Finding would be most consistent with pulmonary vascular congestion.  Peripheral IV 09/12/24 22 G Distal;Right;Anterior Forearm (Active)   Site Assessment Clean;Dry;Intact 09/13/24 0832   Dressing Status Clean;Dry 09/13/24 0832   Number of days: 1       Code Status:  Full Code          Michael Latham MD

## 2024-09-13 NOTE — PROGRESS NOTES
09/13/24 1256   Discharge Planning   Living Arrangements Children   Support Systems Children   Assistance Needed Independent   Type of Residence Private residence   Home or Post Acute Services None   Financial Resource Strain   How hard is it for you to pay for the very basics like food, housing, medical care, and heating? Not hard   Housing Stability   In the last 12 months, was there a time when you were not able to pay the mortgage or rent on time? N   At any time in the past 12 months, were you homeless or living in a shelter (including now)? N   Transportation Needs   In the past 12 months, has lack of transportation kept you from medical appointments or from getting medications? no   In the past 12 months, has lack of transportation kept you from meetings, work, or from getting things needed for daily living? No     PCP is Minh Farmer DO. Patient is from home with her daughter. Patient is independent with ambulation, self care, shopping, and meals. Patients friends provide transportation for patient. Patient has a walker, wheelchair, and cane at home but does not use currently. Patient is on 3L oxygen, none at baseline, if unable to wean to room air, patient will need O2 evaluation prior to discharge. PT/ OT Pending. Patient is declining HHC or outpatient PT/OT if recommended. Patient prefers to return home with no needs upon discharge. [facility] will continue to follow for needs if they arise.

## 2024-09-13 NOTE — PROGRESS NOTES
Adwoa Forrest is a 89 y.o. female on day 1 of admission presenting with Acute on chronic congestive heart failure, unspecified heart failure type (Multi).    Subjective   Adwoa Forrest is a 89 y.o. female with PMHx of HFpEF, NSVT (on amiodarone), persistent atrial fib, COPD (not on home O2), HTN and CKD (creatinine ~2 at baseline) who presented with a 3-4 day history of increasing shortness of breath that is worse with exertion. She denies leg edema and gain in weight (she weighs herself daily). She has chronic chest pain that is no worse. In the ED ambulatory pulse ox was 88% on RA and she was placed on 3 lpm oxygen. She was on home O2 late last year for a couple of months, which was stopped. Her O2 sat readings are skewed by the fact that she has Raynaud and they vacillate between 83-97% at home and have done so here. VSS. Labwork was notable for troponin 47 > 39 (about baseline). BNP unable to be obtained as the machine is down. EKG was nonischemic, LE Doppler was negative for DVT and COVID was negative. CXR suggestive of chronic lung disease w/? superimposed interstitial edema or pneumonitis. Unable to perform PE CTA due to renal function but there is low suspicion given negative LE US. She was given IV Lasix per ED physician. Remainder of ROS reviewed and negative except as indicated in HPI.     9/13: SOB essentially unchanged.            Objective     Constitutional: Well developed, awake, alert, calm, oriented x4, no acute distress, cooperative  Eyes: EOMI, clear sclera  ENMT: mucous membranes moist, no lesions seen  Head/Neck: Neck supple, no apparent injury, head atraumatic  Respiratory/Thorax: Fine crackles throughout, good chest expansion, respirations even and unlabored, pt on 2 lpm O2  Cardiovascular: Regular rate and rhythm, no murmurs/rubs/gallops, normal S1 and S 2  Gastrointestinal: Abdomen nondistended, soft, nontender, +BS, no bruits  Musculoskeletal: ROM intact, no joint swelling, normal   "strength  Extremities: no cyanosis, edema, contusions or clubbing  Neurological: no focal deficit, pt alert and oriented x4  Psychological: Appropriate affect and behavior, pleasant  Skin: Warm and dry, no lesions, no rashes    Last Recorded Vitals  Blood pressure 97/56, pulse 64, temperature 36.3 °C (97.4 °F), temperature source Temporal, resp. rate 18, height 1.575 m (5' 2\"), weight 46.8 kg (103 lb 3.2 oz), SpO2 91%.  Intake/Output last 3 Shifts:  I/O last 3 completed shifts:  In: 287 (6.1 mL/kg) [P.O.:237; IV Piggyback:50]  Out: - (0 mL/kg)   Weight: 46.7 kg     Relevant Results            This patient currently has cardiac telemetry ordered; if you would like to modify or discontinue the telemetry order, click here to go to the orders activity to modify/discontinue the order.                 Assessment/Plan   Dyspnea on exertion  Exacerbation of HFpEF/pHTN  Pt denies leg edema and gain in weight (she weighs herself daily)  BNP is elevated  CXR/CT chest with mostly chronic findings  LE Doppler was negative for DVT and COVID was negative  VQ scan with low probability of PE  Will treat with IV Lasix and consult cardiology     Acute hypoxemic Respiratory Failure  Hx COPD (not on home O2), pt recently referred to pulmonology and says her dyspnea is worse on the new inhalers   She was on home O2 late last year for a couple of months, which was stopped Her O2 sats are skewed by the fact that she has Raynaud and readings vacillate between 83-97% at home and have done so here  I have advised her nurse to place the oximeter on the pt's ear     Hx NSVT   Continue amiodarone     Hx persistent atrial fib  Pt is pacer dependent due to previous AV node ablation, unable to upgrade to Bi V pacer)   Not on OAC due to hx of recurrent GIB     HTN   BP is controlled, continue home meds     CKD   At baseline (creatinine ~2 at baseline)      Right dorsal ankle wound  Will culture and consult ID, empiric ceftriaxone D2     Sleep " apnea  Pt is on CPAP but declines to use it here     DVT ppx: SubQ heparin     Discharge disposition  Pending PT/OT dick Esparza MD PhD

## 2024-09-13 NOTE — PROGRESS NOTES
[Adwoa Forrest] was referred to the Clinical Pharmacy Team to complete a Transitions of Care encounter for discharge medication optimization. The patient was referred for their [COPD, HF]. The primary goal of this encounter is to ensure the patient's medications are both clinically appropriate and financially feasible for the patient. Pharmacy clinical interventions were provided as noted below.   ______________________________________________________________________  PHARMACY ASSESSMENT    Meds to beds? [yes]   Home Pharmacy: [facility] ([phone])  Allergies Reviewed? [yes] spironolactone, codeine, hydrocodone, sotalol, tetracyclines    Affordability/Accessibility:  Adherence/Organization: endorses weight herself daily and appears euvolemic  Adverse Effects: Recurrent GI bleeds (contraindicated for anticoagulants)  _______________________________________________________________________  CHRONIC HEART FAILURE ASSESSMENT  -HTN present/diagnosed: [yes]    LVEF: Recent declining LV function 35-40%  eGFR:   Beta blocker: metoprolol succinate  ACEi/ARB: losartan (Cardiology considering switching to Entresto)  MRA: none (allergy to spironolactone)  SGLT2i: Farxiga  Diuretic: Torsemide    Afib: recurrent GI bleeds (not on anticoagulants), on amiodarone  _______________________________________________________________________  ASTHMA/COPD ASSESSMENT    Currently taking Anoro Ellipta and albuterol prn, CPAP machine  Demonstrated proper inhaler technique at bedside.  Re-educated patient on rinsing mouth after use.      SMOKING CESSATION    Patient is not currently using tobacco products  - Quit Date: [34 years ago]  - Years Smoking: [8.5 pack year smoking history]  ___________________________________________________________________  PATIENT EDUCATION/GOALS  - LDL Goal: < 70mg/dL  - Answered all patient questions and concerns  -Salt and Fluid restriction per Cardiology  -Daily weights per Cardiology  - Your blood pressure  goal is less than 130/80 mmHg  - [Continue to] integrate exercise into your weekly routine. The recommended exercise regimen is 30-40 minutes of moderate-intensity exercise (such as jogging, biking, swimming, etc.) for 5 days a week.  - [Try/Continue] eating healthy, balanced, appropriately portioned meals 3 times a day. The DASH diet is the recommended diet plan for patients with high blood pressure.  -Limit salt intake and avoid foods high in sodium such as processed meats, salty snacks, and fast food. The recommended daily maximum sodium intake is less than 2,300mg (2 teaspoons) per day.  -Drink alcohol in moderation and avoid tobacco products.   _______________________________________________________________________  RECOMMENDATIONS/PLAN  Please send prescriptions to [facility] for assistance on insurance prior authorization and copay. Prescriptions will be delivered to the patient's bedside prior to discharge with the Meds to Beds program.   Patient is aware and welcoming of follow up call with ambulatory pharmacist after discharge   Continuation of care will be provided by the outpatient clinical pharmacy team in collaboration with the patient's  Primary Care Provider     Verbal consent to manage patient's drug therapy was obtained from the patient. They were informed they may decline to participate or withdraw from participation in pharmacy services at any time.

## 2024-09-13 NOTE — NURSING NOTE
Report given to oncoming nurse veronica.    The patient has been re-examined and I agree with the above assessment or I updated with my findings. The patient has been re-examined and I agree with the above assessment or I updated with my findings. The patient has been re-examined and I agree with the above assessment or I updated with my findings.

## 2024-09-13 NOTE — PROGRESS NOTES
Physical Therapy    Physical Therapy Evaluation    Patient Name: Adwoa Forrest  MRN: [id number]  Today's Date: 9/13/2024   Time Calculation  Start Time: 0943  Stop Time: 0955  Time Calculation (min): 12 min  2025/2025-A    Assessment/Plan   PT Assessment  PT Assessment Results: Impaired balance, Decreased mobility  Rehab Prognosis: Good  Barriers to Discharge: none  Evaluation/Treatment Tolerance: Patient tolerated treatment well (mild fatigue over last quarter of amb)  Medical Staff Made Aware: Yes  End of Session Communication: Bedside nurse  Assessment Comment: Patient requires CGA prn for balance and safety. Anticipate good improvement over course of admit. Try to wean from O2 and FWW as able.  End of Session Patient Position: Up in chair, Alarm on  IP OR SWING BED PT PLAN  Inpatient or Swing Bed: Inpatient  PT Plan  Treatment/Interventions: Transfer training, Gait training, Stair training, Balance training, Strengthening, Endurance training, Therapeutic exercise, Therapeutic activity, Home exercise program (energy conservation technique education prn)  PT Plan: Ongoing PT  PT Frequency: 3 times per week  PT Discharge Recommendations: Low intensity level of continued care  PT - OK to Discharge: Yes (when medically cleared)      General Visit Information:  General  Reason for Referral: Dx: SOB  Referred By: Vilma  Past Medical History Relevant to Rehab: HF, Afib, COPD, HTN, CKD, MI, CVA, anxiety/depression  Prior to Session Communication: Bedside nurse  Patient Position Received: Bed, 3 rail up, Alarm on  General Comment: Seen in room 2025, tele, O2 on 3L/min    Home Living:  Home Living  Type of Home:  (Lives with family in 1 level home with 5 steps to enter. Patient normally independent, amb without AD, did not have O2 prior to this admit but reports has had in past. Has FWW if needed.)    Prior Level of Function:       Precautions:  Precautions  Precautions Comment: falls, O2    Vital Signs:     Objective      Pain:  Pain Assessment  0-10 (Numeric) Pain Score: 0 - No pain    Cognition:  Cognition  Overall Cognitive Status: Within Functional Limits    General Assessments:      Activity Tolerance  Endurance: Tolerates 10 - 20 min exercise with multiple rests     Strength  Strength Comments: OLGA LE quads grossly 4+/5        Postural Control  Postural Control:  (sitting balance fair/fair+; standing balance fair with AD statically, fair initially dynamically but as patient tires, noted 1 LOB needing CGA/Min assist x 1 to recover.)          Functional Assessments:     Bed Mobility  Bed Mobility:  (Supine to sit with SBA x1)  Transfers  Transfer:  (Sit to stand with SBA x1 and FWW; cues for safety)  Ambulation/Gait Training  Ambulation/Gait Training Performed:  (Amb 160 feet with FWW and SBA x1 for 3/4 of distance, CGA for last quarter after LOB - cues for safety, balance, posture, AD use, energy conservation/pacing)          Extremity/Trunk Assessments:                Outcome Measures:     [facility] Basic Mobility  Turning from your back to your side while in a flat bed without using bedrails: None  Moving from lying on your back to sitting on the side of a flat bed without using bedrails: None  Moving to and from bed to chair (including a wheelchair): A little  Standing up from a chair using your arms (e.g. wheelchair or bedside chair): A little  To walk in hospital room: A little  Climbing 3-5 steps with railing: A lot  Basic Mobility - Total Score: 19                                                             Goals:  Encounter Problems       Encounter Problems (Active)       PT Problem       Amb       Start:  09/13/24    Expected End:  09/27/24       Patient will amb 200+ feet with SBA x1, FWW prn, utilizing rest breaks/energy conservation techniques as needed/appropriate; with NO LOB          stairs       Start:  09/13/24    Expected End:  09/27/24       Patient will ascend/descend 3+ stairs with rail/device prn and  SBA/CGA x1          strengthening        Start:  09/13/24    Expected End:  09/27/24       Patient will perform 20+ reps of AROM/RROM for OLGA LE's to improve safety and functional independence              Pain - Adult            Education Documentation  Precautions, taught by Alina Ladd, PT at 9/13/2024 10:53 AM.  Learner: Patient  Readiness: Acceptance  Method: Explanation  Response: Needs Reinforcement    Mobility Training, taught by Alina Ladd, PT at 9/13/2024 10:53 AM.  Learner: Patient  Readiness: Acceptance  Method: Explanation  Response: Needs Reinforcement    Education Comments  No comments found.

## 2024-09-13 NOTE — CARE PLAN
The patient's goals for the shift include      The clinical goals for the shift include pt. spo2 will remain greater than 92% throughout shift      Problem: Skin  Goal: Decreased wound size/increased tissue granulation at next dressing change  Outcome: Progressing  Flowsheets (Taken 9/13/2024 1355)  Decreased wound size/increased tissue granulation at next dressing change:   Promote sleep for wound healing   Protective dressings over bony prominences  Goal: Participates in plan/prevention/treatment measures  Outcome: Progressing  Flowsheets (Taken 9/13/2024 1355)  Participates in plan/prevention/treatment measures:   Discuss with provider PT/OT consult   Elevate heels  Goal: Prevent/manage excess moisture  Outcome: Progressing  Flowsheets (Taken 9/13/2024 1355)  Prevent/manage excess moisture: Cleanse incontinence/protect with barrier cream  Goal: Prevent/minimize sheer/friction injuries  Outcome: Progressing  Flowsheets (Taken 9/13/2024 1355)  Prevent/minimize sheer/friction injuries: Turn/reposition every 2 hours/use positioning/transfer devices  Goal: Promote/optimize nutrition  Outcome: Progressing  Flowsheets (Taken 9/13/2024 1355)  Promote/optimize nutrition: Monitor/record intake including meals  Goal: Promote skin healing  Outcome: Progressing  Flowsheets (Taken 9/13/2024 1355)  Promote skin healing: Assess skin/pad under line(s)/device(s)     Problem: Fall/Injury  Goal: Not fall by end of shift  Outcome: Progressing  Goal: Be free from injury by end of the shift  Outcome: Progressing  Goal: Verbalize understanding of personal risk factors for fall in the hospital  Outcome: Progressing  Goal: Verbalize understanding of risk factor reduction measures to prevent injury from fall in the home  Outcome: Progressing  Goal: Use assistive devices by end of the shift  Outcome: Progressing  Goal: Pace activities to prevent fatigue by end of the shift  Outcome: Progressing     Problem: Pain - Adult  Goal:  Verbalizes/displays adequate comfort level or baseline comfort level  Outcome: Progressing     Problem: Safety - Adult  Goal: Free from fall injury  Outcome: Progressing     Problem: Discharge Planning  Goal: Discharge to home or other facility with appropriate resources  Outcome: Progressing     Problem: Chronic Conditions and Co-morbidities  Goal: Patient's chronic conditions and co-morbidity symptoms are monitored and maintained or improved  Outcome: Progressing

## 2024-09-13 NOTE — CARE PLAN
Problem: Fall/Injury  Goal: Not fall by end of shift  Outcome: Progressing  Goal: Be free from injury by end of the shift  Outcome: Progressing     Problem: Safety - Adult  Goal: Free from fall injury  Outcome: Progressing

## 2024-09-13 NOTE — PROGRESS NOTES
Adwoa Forrest is a 89 y.o. female admitted for Acute on chronic congestive heart failure, unspecified heart failure type (Multi). Pharmacy reviewed the patient's [id number]-[id number] medications and allergies for accuracy.    The list below reflects the PTA list prior to pharmacy medication history. A summary a changes to the PTA medication list has been listed below. Please review each medication in order reconciliation for additional clarification and justification.    Source of information:  PATIENT    Medications added:  ACETAMINOPHEN 8 HOUR ER 1 Q8 PRN  MULTIVITAMIN 1 QD    Medications modified:    Medications to be removed:    Medications of concern:      Prior to Admission Medications   Prescriptions Last Dose Informant Patient Reported? Taking?   ALPRAZolam (Xanax) 0.5 mg tablet   No No   Sig: Take 1 tablet (0.5 mg) by mouth 2 times a day.   Farxiga 10 mg   No No   Sig: TAKE 1 TABLET BY MOUTH EVERY DAY   albuterol 90 mcg/actuation inhaler   No No   Sig: Inhale 2 puffs every 4 hours if needed for shortness of breath.   amiodarone (Pacerone) 200 mg tablet   No No   Sig: Take 1 tablet (200 mg) by mouth once daily.   aspirin 81 mg EC tablet   Yes No   Sig: Take 1 tablet (81 mg) by mouth once daily.   atorvastatin (Lipitor) 80 mg tablet   No No   Sig: Take 1 tablet (80 mg) by mouth once daily. At bedtime   calcitriol (Rocaltrol) 0.25 mcg capsule   Yes No   Sig: Take 1 capsule (0.25 mcg) by mouth once daily. Three times a week.   calcium acetate (Phoslo) 667 mg capsule   Yes No   Sig: Take 1 capsule (667 mg) by mouth 3 times daily (morning, midday, late afternoon).   ferrous gluconate (Fergon) 240 (27 Fe) MG tablet   No No   SiT;PO;QD   levothyroxine (Synthroid, Levoxyl) 25 mcg tablet   No No   Sig: Take 1 tablet (25 mcg) by mouth once daily.   losartan (Cozaar) 25 mg tablet   No No   Sig: Take 0.5 tablets (12.5 mg) by mouth once daily.   metoprolol succinate XL (Toprol-XL) 50 mg 24 hr tablet   No No   Sig: Take  1 tablet (50 mg) by mouth once daily.   torsemide (Demadex) 20 mg tablet   No No   Sig: Take 2 tablets (40 mg) by mouth 2 times a day. Patient states takes one tablet twice a day.   umeclidinium-vilanteroL (Anoro Ellipta) 62.5-25 mcg/actuation blister with device   No No   Sig: Inhale 1 puff once daily.      Facility-Administered Medications: None       Roseline Wisdom

## 2024-09-13 NOTE — PROGRESS NOTES
Occupational Therapy  Evaluation    Patient Name: Adwoa Frorest  MRN: [id number]  Today's Date: 9/13/2024  Time Calculation  Start Time: 0944  Stop Time: 0954  Time Calculation (min): 10 min    Current Problem:   1. Acute on chronic congestive heart failure, unspecified heart failure type (Multi)    2. Swelling of lower extremity    3. Acute hypoxic respiratory failure (Multi)        OT order: OT eval and treat   Referred by: Vilma  Reason for referral: ADLs, safety assessment  Past medical history related to rehab:  has a past medical history of Anxiety and depression, Bowel perforation (Multi), Chronic atrial fibrillation, unspecified (Multi) (05/09/2023), Chronic heart failure with preserved ejection fraction (Multi) (02/07/2024), CKD (chronic kidney disease), COPD (chronic obstructive pulmonary disease) (Multi), Essential (primary) hypertension, GIB (gastrointestinal bleeding), History of non-ST elevation myocardial infarction (NSTEMI) (10/08/2023), Hypothyroidism, CANDELARIA (iron deficiency anemia), NSVT (nonsustained ventricular tachycardia) (Multi), Raynaud disease, Sick sinus syndrome (Multi) (02/27/2018), Sleep apnea, and Stroke (cerebrum) (Multi).     Precautions:   Hearing/Visual Limitations: intact  Medical Precautions: Fall precautions, Oxygen therapy device and L/min  Precautions Comment: 3L NC    ASSESSMENT  OT Assessment: OT eval completed. The patient is functioning below baseline for ADLs and mobility. can benefit from continued OT. Pt with Decreased ADL status, Decreased upper extremity strength, Decreased safe judgment during ADL, Decreased cognition, Decreased endurance, Decreased gross motor control, Decreased functional mobility, Decreased IADLs  Prognosis:    Barriers to discharge: None  Tolerance:      PLAN  Frequency: 2 times per week  Treatment Interventions: ADL retraining, Functional transfer training, UE strengthening/ROM, Endurance training  Discharge Recommendations: No OT needed after  discharge  OT OK to discharge: Yes    GENERAL VISIT INFORMATION   Start of session communication: Bedside nurse  End of session communication: Bedside nurse  Family/caregiver present: No  Caregiver feedback:    Co-Treatment: PT  Reason for co-treatment: to optimize safety and mobility, while focusing on discipline specific goals   Position Pt Received:  Bed, 3 rail up, Alarm off, caregiver present (PT present)  End of session position: Up in chair, Alarm on    SUBJECTIVE  Home Living:  Type of Home: House  Lives With: Adult children (daughter)  Home Adaptive Equipment: Walker rolling or standard, Cane  Home Layout: One level, Laundry in basement, Able to live on main level with bedroom/bathroom  Home Access: Stairs to enter with rails  Entrance Stairs-Number of Steps: 5  Home Living Comments: pt does not access basement anymore. family assists with laundry     Prior Level of Function:  Receives Help From: Family (daughter and other close friends who are like family. daughter works and pt is typically self sufficient when daughter is at work)  ADL Assistance: Independent  Homemaking Assistance: Needs assistance  Ambulatory Assistance: Independent (no AD)  Prior Function Comments: (-) drives. needs assist for community mobility    IADL History:       Pain:  Assessment: 0-10  Score: 0 - No pain  Type:    Location:    Interventions:    Response to pain interventions:      OBJECTIVE  Vital Signs:       Cognition:  Overall Cognitive Status: Within Functional Limits  Arousal/Alertness: Appropriate responses to stimuli  Orientation Level: Oriented X4  Following Commands: Follows all commands and directions without difficulty  Problem Solving: Assistance required to generate solutions             Current ADL function:   EATING:  Stand by     GROOMING: Stand by Denture care (standing at sink in bathroom, pt completed denture care. SBA needed for safety)   BATHING: Minimal     UB DRESSING: Minimal     LB DRESSING: Minimal      TOILETING: Minimal    ADL comments:       Activity Tolerance:  Endurance: Endurance does not limit participation in activity  Activity Tolerance Comments: mild fatigue    Bed Mobility/Transfers:   Bed Mobility  Bed Mobility: Yes  Bed Mobility 1  Bed Mobility 1: Supine to sitting  Level of Assistance 1: Close supervision  Transfers  Transfer:  (sit<>stand CGAx1)    Ambulation/Gait Training:  Functional Mobility  Functional Mobility Performed:  (pt performed functional mobility in room and hallway with CGA x1 FWW. one LOB, required min A to correct)    Sitting Balance:  Static Sitting Balance  Static Sitting-Level of Assistance: Close supervision  Dynamic Sitting Balance  Dynamic Sitting-Level of Assistance: Close supervision    Standing Balance:  Static Standing Balance  Static Standing-Level of Assistance: Contact guard  Dynamic Standing Balance  Dynamic Standing-Level of Assistance: Contact guard    Vision: Vision - Basic Assessment  Current Vision: No visual deficits   and      Sensation:  Light Touch: No apparent deficits    Strength:  Strength Comments: GISELLE 4/5    Perception:  Inattention/Neglect: Appears intact    Coordination:  Movements are Fluid and Coordinated: Yes     Hand Function:  Hand Function  Gross Grasp: Functional    Extremities: RUE   RUE : Within Functional Limits and LUE   LUE: Within Functional Limits    Outcome Measures: [facility] Daily Activity   Putting on and taking off regular lower body clothing: A little  Bathing (including washing, rinsing, drying): A little  Putting on and taking off regular upper body clothing: A little  Toileting, which includes using toilet, bedpan or urinal: A little  Taking care of personal grooming such as brushing teeth: A little   Eating Meals: A little   Daily Activity - Total Score: 18    EDUCATION:     Education Documentation  ADL Training, taught by Cara Zafar OT at 9/13/2024 10:40 AM.  Learner: Patient  Readiness: Acceptance  Method:  Explanation  Response: Needs Reinforcement    Education Comments  No comments found.        Goals:   Encounter Problems       Encounter Problems (Active)       ADLs       Patient will tolerate 25 minutes of ADL/activity with no significant fatigue or SOB. (Progressing)       Start:  09/13/24    Expected End:  09/27/24               BALANCE       Patient will tolerate standing for 15 minutes to independent level of assistance with least restrictive device in order to improve functional activity tolerance for ADL tasks. (Progressing)       Start:  09/13/24    Expected End:  09/27/24

## 2024-09-14 ENCOUNTER — APPOINTMENT (OUTPATIENT)
Dept: RADIOLOGY | Facility: HOSPITAL | Age: 89
DRG: 291 | End: 2024-09-14
Payer: MEDICARE

## 2024-09-14 LAB
ANION GAP SERPL CALC-SCNC: 9 MMOL/L (ref 10–20)
BUN SERPL-MCNC: 63 MG/DL (ref 6–23)
CALCIUM SERPL-MCNC: 8.7 MG/DL (ref 8.6–10.3)
CHLORIDE SERPL-SCNC: 99 MMOL/L (ref 98–107)
CO2 SERPL-SCNC: 35 MMOL/L (ref 21–32)
CREAT SERPL-MCNC: 1.98 MG/DL (ref 0.5–1.05)
EGFRCR SERPLBLD CKD-EPI 2021: 24 ML/MIN/1.73M*2
GLUCOSE SERPL-MCNC: 108 MG/DL (ref 74–99)
MAGNESIUM SERPL-MCNC: 2.51 MG/DL (ref 1.6–2.4)
POTASSIUM SERPL-SCNC: 4.8 MMOL/L (ref 3.5–5.3)
SODIUM SERPL-SCNC: 138 MMOL/L (ref 136–145)

## 2024-09-14 PROCEDURE — 2060000001 HC INTERMEDIATE ICU ROOM DAILY

## 2024-09-14 PROCEDURE — 97116 GAIT TRAINING THERAPY: CPT | Mod: GP,CQ

## 2024-09-14 PROCEDURE — 2500000002 HC RX 250 W HCPCS SELF ADMINISTERED DRUGS (ALT 637 FOR MEDICARE OP, ALT 636 FOR OP/ED): Performed by: NURSE PRACTITIONER

## 2024-09-14 PROCEDURE — 99233 SBSQ HOSP IP/OBS HIGH 50: CPT | Performed by: INTERNAL MEDICINE

## 2024-09-14 PROCEDURE — 2500000004 HC RX 250 GENERAL PHARMACY W/ HCPCS (ALT 636 FOR OP/ED): Performed by: INTERNAL MEDICINE

## 2024-09-14 PROCEDURE — 80048 BASIC METABOLIC PNL TOTAL CA: CPT | Performed by: INTERNAL MEDICINE

## 2024-09-14 PROCEDURE — 2500000005 HC RX 250 GENERAL PHARMACY W/O HCPCS: Performed by: INTERNAL MEDICINE

## 2024-09-14 PROCEDURE — 36415 COLL VENOUS BLD VENIPUNCTURE: CPT | Performed by: INTERNAL MEDICINE

## 2024-09-14 PROCEDURE — 99232 SBSQ HOSP IP/OBS MODERATE 35: CPT | Performed by: NURSE PRACTITIONER

## 2024-09-14 PROCEDURE — 73610 X-RAY EXAM OF ANKLE: CPT | Mod: RIGHT SIDE | Performed by: RADIOLOGY

## 2024-09-14 PROCEDURE — 2500000004 HC RX 250 GENERAL PHARMACY W/ HCPCS (ALT 636 FOR OP/ED): Performed by: NURSE PRACTITIONER

## 2024-09-14 PROCEDURE — 2500000001 HC RX 250 WO HCPCS SELF ADMINISTERED DRUGS (ALT 637 FOR MEDICARE OP): Performed by: NURSE PRACTITIONER

## 2024-09-14 PROCEDURE — 83735 ASSAY OF MAGNESIUM: CPT | Performed by: INTERNAL MEDICINE

## 2024-09-14 PROCEDURE — 73610 X-RAY EXAM OF ANKLE: CPT | Mod: RT

## 2024-09-14 PROCEDURE — 94640 AIRWAY INHALATION TREATMENT: CPT

## 2024-09-14 PROCEDURE — 2500000001 HC RX 250 WO HCPCS SELF ADMINISTERED DRUGS (ALT 637 FOR MEDICARE OP): Performed by: INTERNAL MEDICINE

## 2024-09-14 PROCEDURE — 97530 THERAPEUTIC ACTIVITIES: CPT | Mod: GP,CQ

## 2024-09-14 RX ORDER — POLYETHYLENE GLYCOL 3350 17 G/17G
17 POWDER, FOR SOLUTION ORAL DAILY PRN
Status: DISCONTINUED | OUTPATIENT
Start: 2024-09-14 | End: 2024-09-15 | Stop reason: HOSPADM

## 2024-09-14 RX ADMIN — ACETAMINOPHEN 650 MG: 325 TABLET ORAL at 11:17

## 2024-09-14 RX ADMIN — POLYETHYLENE GLYCOL 3350 17 G: 17 POWDER, FOR SOLUTION ORAL at 11:58

## 2024-09-14 RX ADMIN — HEPARIN SODIUM 5000 UNITS: 5000 INJECTION, SOLUTION INTRAVENOUS; SUBCUTANEOUS at 16:09

## 2024-09-14 RX ADMIN — METOPROLOL SUCCINATE 50 MG: 50 TABLET, EXTENDED RELEASE ORAL at 08:27

## 2024-09-14 RX ADMIN — HEPARIN SODIUM 5000 UNITS: 5000 INJECTION, SOLUTION INTRAVENOUS; SUBCUTANEOUS at 01:14

## 2024-09-14 RX ADMIN — FORMOTEROL FUMARATE DIHYDRATE 20 MCG: 20 SOLUTION RESPIRATORY (INHALATION) at 07:43

## 2024-09-14 RX ADMIN — SACUBITRIL AND VALSARTAN 1 TABLET: 24; 26 TABLET, FILM COATED ORAL at 08:26

## 2024-09-14 RX ADMIN — SENNOSIDES 17.2 MG: 8.6 TABLET, FILM COATED ORAL at 20:34

## 2024-09-14 RX ADMIN — HEPARIN SODIUM 5000 UNITS: 5000 INJECTION, SOLUTION INTRAVENOUS; SUBCUTANEOUS at 08:32

## 2024-09-14 RX ADMIN — ACETAMINOPHEN 650 MG: 325 TABLET ORAL at 16:12

## 2024-09-14 RX ADMIN — ACETAMINOPHEN 650 MG: 325 TABLET ORAL at 06:52

## 2024-09-14 RX ADMIN — DAPAGLIFLOZIN 10 MG: 5 TABLET, FILM COATED ORAL at 08:29

## 2024-09-14 RX ADMIN — ATORVASTATIN CALCIUM 80 MG: 40 TABLET, FILM COATED ORAL at 20:37

## 2024-09-14 RX ADMIN — LEVOTHYROXINE SODIUM 25 MCG: 0.03 TABLET ORAL at 06:54

## 2024-09-14 RX ADMIN — FUROSEMIDE 40 MG: 40 TABLET ORAL at 16:09

## 2024-09-14 RX ADMIN — SACUBITRIL AND VALSARTAN 1 TABLET: 24; 26 TABLET, FILM COATED ORAL at 20:34

## 2024-09-14 RX ADMIN — ASPIRIN 81 MG: 81 TABLET, COATED ORAL at 08:27

## 2024-09-14 RX ADMIN — SENNOSIDES 17.2 MG: 8.6 TABLET, FILM COATED ORAL at 08:27

## 2024-09-14 RX ADMIN — FUROSEMIDE 40 MG: 40 TABLET ORAL at 08:28

## 2024-09-14 RX ADMIN — TIOTROPIUM BROMIDE INHALATION SPRAY 2 PUFF: 3.12 SPRAY, METERED RESPIRATORY (INHALATION) at 06:37

## 2024-09-14 RX ADMIN — Medication 3 L/MIN: at 19:51

## 2024-09-14 RX ADMIN — FORMOTEROL FUMARATE DIHYDRATE 20 MCG: 20 SOLUTION RESPIRATORY (INHALATION) at 19:50

## 2024-09-14 RX ADMIN — AMIODARONE HYDROCHLORIDE 200 MG: 200 TABLET ORAL at 08:27

## 2024-09-14 RX ADMIN — ALPRAZOLAM 0.5 MG: 0.5 TABLET ORAL at 20:34

## 2024-09-14 ASSESSMENT — COGNITIVE AND FUNCTIONAL STATUS - GENERAL
WALKING IN HOSPITAL ROOM: A LITTLE
DRESSING REGULAR LOWER BODY CLOTHING: A LITTLE
MOBILITY SCORE: 20
CLIMB 3 TO 5 STEPS WITH RAILING: A LOT
MOBILITY SCORE: 19
WALKING IN HOSPITAL ROOM: A LITTLE
DAILY ACTIVITIY SCORE: 23
STANDING UP FROM CHAIR USING ARMS: A LITTLE
DAILY ACTIVITIY SCORE: 23
WALKING IN HOSPITAL ROOM: A LITTLE
MOBILITY SCORE: 23
MOBILITY SCORE: 20
CLIMB 3 TO 5 STEPS WITH RAILING: A LITTLE
STANDING UP FROM CHAIR USING ARMS: A LITTLE
TURNING FROM BACK TO SIDE WHILE IN FLAT BAD: A LITTLE
MOVING TO AND FROM BED TO CHAIR: A LITTLE
DAILY ACTIVITIY SCORE: 24
DRESSING REGULAR LOWER BODY CLOTHING: A LITTLE
STANDING UP FROM CHAIR USING ARMS: A LITTLE
CLIMB 3 TO 5 STEPS WITH RAILING: A LITTLE

## 2024-09-14 ASSESSMENT — PAIN - FUNCTIONAL ASSESSMENT
PAIN_FUNCTIONAL_ASSESSMENT: 0-10

## 2024-09-14 ASSESSMENT — PAIN SCALES - GENERAL
PAINLEVEL_OUTOF10: 0 - NO PAIN
PAINLEVEL_OUTOF10: 3
PAINLEVEL_OUTOF10: 0 - NO PAIN
PAINLEVEL_OUTOF10: 3
PAINLEVEL_OUTOF10: 3
PAINLEVEL_OUTOF10: 0 - NO PAIN

## 2024-09-14 ASSESSMENT — ENCOUNTER SYMPTOMS
MEMORY LOSS: 0
DYSPNEA ON EXERTION: 1
DECREASED APPETITE: 0
RESPIRATORY NEGATIVE: 1
SHORTNESS OF BREATH: 0
SYNCOPE: 0
IRREGULAR HEARTBEAT: 0
LIGHT-HEADEDNESS: 0
PALPITATIONS: 0
FOCAL WEAKNESS: 0
ORTHOPNEA: 0
CONSTIPATION: 1
DEPRESSION: 0
BLURRED VISION: 0
COUGH: 0

## 2024-09-14 ASSESSMENT — PAIN DESCRIPTION - LOCATION
LOCATION: HEAD

## 2024-09-14 NOTE — CARE PLAN
Problem: Fall/Injury  Goal: Not fall by end of shift  9/14/2024 0803 by Jenniffer Bishop RN  Outcome: Progressing  9/14/2024 0708 by Jenniffer Bishop RN  Outcome: Progressing  Goal: Be free from injury by end of the shift  9/14/2024 0803 by Jenniffer Bishop RN  Outcome: Progressing  9/14/2024 0708 by Jenniffer Bishop RN  Outcome: Progressing  Goal: Verbalize understanding of personal risk factors for fall in the hospital  9/14/2024 0803 by Jenniffer Bishop RN  Outcome: Progressing  9/14/2024 0708 by Jenniffer Bishop RN  Outcome: Progressing  Goal: Verbalize understanding of risk factor reduction measures to prevent injury from fall in the home  9/14/2024 0803 by Jenniffer Bishop RN  Outcome: Progressing  9/14/2024 0708 by Jenniffer Bishop RN  Outcome: Progressing  Goal: Use assistive devices by end of the shift  9/14/2024 0803 by Jenniffer Bishop RN  Outcome: Progressing  9/14/2024 0708 by Jenniffer Bishop RN  Outcome: Progressing  Goal: Pace activities to prevent fatigue by end of the shift  9/14/2024 0803 by Jenniffer Bishop RN  Outcome: Progressing  9/14/2024 0708 by Jenniffer Bishop RN  Outcome: Progressing   The patient's goals for the shift include      The clinical goals for the shift include pt will tolerate a o2 sturation of 92% or above this shift.

## 2024-09-14 NOTE — PROGRESS NOTES
Physical Therapy    Physical Therapy Treatment    Patient Name: Adwoa Forrest  MRN: [id number]  Department: [address]  Room: 2025/2025-A  Today's Date: 9/14/2024  Time Calculation  Start Time: 1326  Stop Time: 1349  Time Calculation (min): 23 min         Assessment/Plan   PT Assessment  PT Assessment Results: Decreased strength, Decreased endurance, Impaired balance, Decreased mobility, Decreased safety awareness (pt. increased gait to a total of 90' with FWW and CGA. pt. needs constant vc's for safety with transfers and gait. pt. Sp02  with no 02 dropped to 85% so moved to 2.5 L of 02 NC during gait, pt. 88% post gait recovering to 95% with instructed pursed)  Rehab Prognosis:  (lipped breathing. pt. returned to 1.5 L of 02 NC maintaining 92% at rest. Call light in reach, bed alarmed. All needs met.)     PT Plan  Treatment/Interventions: Transfer training, Gait training, Stair training, Balance training, Strengthening, Endurance training, Therapeutic exercise, Therapeutic activity, Home exercise program (energy conservation technique education prn)  PT Plan: Ongoing PT  PT Frequency: 3 times per week  PT Discharge Recommendations: Low intensity level of continued care  PT - OK to Discharge: Yes (when medically cleared)      General Visit Information:      General  Patient Position Received: Bed, 3 rail up, Alarm on  General Comment: pt. pleasantly agreeabe for tx. 1.5 L of 02 NC.    Subjective   Precautions:       Vital Signs (Past 2hrs)                 Objective   Pain:  Pain Assessment  Pain Assessment: 0-10  0-10 (Numeric) Pain Score: 0 - No pain  Cognition:  Cognition  Orientation Level: Oriented X4  Coordination:     Postural Control:     Extremity/Trunk Assessments    Activity Tolerance:  Activity Tolerance  Endurance: Tolerates less than 10 min exercise with changes in vital signs  Treatments:  Therapeutic Activity  Therapeutic Activity Performed: Yes (Toilet transfer training with SBA of 1 and vc's for safe  "hand placment and to utilize grab bars and toilet seat vs. pulling at walker to stand. pt. performs own hygiene and briggs dhygiene.)    Bed Mobility  Bed Mobility: Yes (Supine<---> Sit SBA with HOB slightly elevated.)    Ambulation/Gait Training  Ambulation/Gait Training Performed: Yes (Gait training with FWW and CGA x's 10'+80' on 2.5 L of 02 NC with vc's to push FWW vs. picking it up.)  Transfers  Transfer: Yes (Sit<-->Stand transfers from EOB, and low toile with SBA and vc's for safe hand placement as patient pulls at walker to stand. Pt. reports \"Iknow, I just forget\")    Outcome Measures:  [facility] Basic Mobility  Turning from your back to your side while in a flat bed without using bedrails: None  Moving from lying on your back to sitting on the side of a flat bed without using bedrails: None  Moving to and from bed to chair (including a wheelchair): A little  Standing up from a chair using your arms (e.g. wheelchair or bedside chair): A little  To walk in hospital room: A little  Climbing 3-5 steps with railing: A lot  Basic Mobility - Total Score: 19    Education Documentation  Precautions, taught by Ada Hugo PTA at 9/14/2024  1:56 PM.  Learner: Patient  Readiness: Acceptance  Method: Explanation, Demonstration  Response: Verbalizes Understanding, Needs Reinforcement    Mobility Training, taught by Ada Hugo PTA at 9/14/2024  1:56 PM.  Learner: Patient  Readiness: Acceptance  Method: Explanation, Demonstration  Response: Verbalizes Understanding, Needs Reinforcement    Education Comments  No comments found.        OP EDUCATION:       Encounter Problems       Encounter Problems (Active)       PT Problem       Amb (Progressing)       Start:  09/13/24    Expected End:  09/27/24       Patient will amb 200+ feet with SBA x1, FWW prn, utilizing rest breaks/energy conservation techniques as needed/appropriate; with NO LOB          stairs (Progressing)       Start:  09/13/24    Expected End:  09/27/24    "    Patient will ascend/descend 3+ stairs with rail/device prn and SBA/CGA x1          strengthening  (Progressing)       Start:  09/13/24    Expected End:  09/27/24       Patient will perform 20+ reps of AROM/RROM for OLGA LE's to improve safety and functional independence              Pain - Adult

## 2024-09-14 NOTE — CARE PLAN
Problem: Heart Failure  Goal: Improved gas exchange this shift  Outcome: Progressing  Goal: Improved urinary output this shift  Outcome: Progressing  Goal: Reduction in peripheral edema within 24 hours  Outcome: Progressing  Goal: Report improvement of dyspnea/breathlessness this shift  Outcome: Progressing  Goal: Weight from fluid excess reduced over 2-3 days, then stabilize  Outcome: Progressing  Goal: Increase self care and/or family involvement in 24 hours  Outcome: Progressing   The patient's goals for the shift include      The clinical goals for the shift include pt will tolerate a o2 sturation of 92% or above this shift.

## 2024-09-14 NOTE — PROGRESS NOTES
HPI:  Adwoa Forrest is a 89 y.o. female presenting with shortness of breath for 1 week associated with low oxygen saturations at home and a cough.  She states that this started after she started on some kind of inhaler.  She does not have any ankle swelling and denies any weight gain.     She has prior history of chronic diastolic heart failure, moderate LVH with equivocal PYP, recent declining LV function to 35 to 40%, 99% RV pacing, persistent atrial fibrillation maintaining sinus rhythm, after AV derek ablation.       She has history of persistent atrial fibrillation status post AV derek ablation and permanent pacemaker placement, failed Watchman device placement in 2018 requiring urgent surgery and left atrial appendage ligation, hypertension recurrent GI bleed not on oral anticoagulants, on amiodarone for nonsustained VT and symptomatic PVCs.  She tells me she had stents placed in her coronary artery in 2017(Dr Hampton, unknown settings and I don't have the records available anywhere).     Recent attempt to CRT upgrade was unsuccessful.  Patient is also following up with advanced heart failure for possible cardiac MRI to rule out infiltrative cardiomyopathy.  Reviewed EKG personally, AV paced rhythm noted.     Reviewed chest x-ray-there is evidence of prominent reticular changes bilaterally which is either related to chronic lung disease or pneumonia.  Compared to the prior chest x-rays there seems to be worsening of radiographic abnormalities.  Her BNP remains elevated without significant change from her baseline.       Subjective Data:  Patient feels well today.  She denies any chest pain or dyspnea.  She complains of feeling weak.  She is laying low in bed, no orthopnea noted.Heart rate and blood pressure are reasonable.  Potassium 4.8, creatinine 1.98.  CT of the chest yesterday was reviewed by Dr. Latham and the radiologist and was noted to have some emphysematous changes at the bases no clear-cut  parenchymal abnormality.  Findings were most consistent with pulmonary vascular congestion.  She was changed from losartan to low-dose Entresto.    Overnight Events:    None     Objective Data:  Last Recorded Vitals:  Vitals:    09/14/24 0303 09/14/24 0703 09/14/24 0743 09/14/24 0745   BP: 116/58 104/58     BP Location: Left arm Left arm     Patient Position: Lying Lying     Pulse: 59 64     Resp: 18 17     Temp: 36.8 °C (98.2 °F) 36.1 °C (96.9 °F)     TempSrc: Temporal Temporal     SpO2: 94% 95% 94% 94%   Weight:       Height:           Last Labs:    Results from last 7 days   Lab Units 09/14/24  0422 09/13/24  0435 09/12/24  0949   SODIUM mmol/L 138 139 141   POTASSIUM mmol/L 4.8 4.3 4.4   CHLORIDE mmol/L 99 99 99   CO2 mmol/L 35* 34* 33*   BUN mg/dL 63* 56* 50*   CREATININE mg/dL 1.98* 1.88* 1.83*   GLUCOSE mg/dL 108* 89 119*   CALCIUM mg/dL 8.7 8.9 10.2        Results from last 7 days   Lab Units 09/13/24  0435 09/12/24  0949   WBC AUTO x10*3/uL 5.9 6.9   HEMOGLOBIN g/dL 13.7 16.3*   HEMATOCRIT % 44.0 51.7*   PLATELETS AUTO x10*3/uL 227 265               Last I/O:  I/O last 3 completed shifts:  In: 597 (12.8 mL/kg) [P.O.:597]  Out: - (0 mL/kg)   Weight: 46.8 kg     Past Cardiology Tests (Last 3 Years):    CT chest:  IMPRESSION:  1.  Cystic spaces are scattered throughout both lungs and appear  similar to the prior study, probably related to bronchiolectasis.  There is also mild proximal bronchiectasis.  2. No acute pneumonia are noted currently representing improvement  from the prior exam. There is mild atelectasis in the left lower lobe  along with a small left pleural effusion.  3. Right atrium is dilated in the right pulmonary artery is dilated  at 3.6 cm diameter. These findings may indicate pulmonary  hypertension.    Inpatient Medications:  Scheduled medications   Medication Dose Route Frequency    ALPRAZolam  0.5 mg oral BID    amiodarone  200 mg oral Daily    aspirin  81 mg oral Daily    atorvastatin   80 mg oral Daily    dapagliflozin propanediol  10 mg oral Daily    ferrous gluconate  38 mg of iron oral Daily with breakfast    influenza  0.5 mL intramuscular During hospitalization    tiotropium  2 puff inhalation Daily    And    formoterol  20 mcg nebulization q12h    furosemide  40 mg oral BID    heparin (porcine)  5,000 Units subcutaneous q8h MACRINA    levothyroxine  25 mcg oral Daily    metoprolol succinate XL  50 mg oral Daily    sacubitriL-valsartan  1 tablet oral BID    sennosides  2 tablet oral BID     PRN medications   Medication    acetaminophen    oxygen     Continuous Medications   Medication Dose Last Rate       ROS:  Review of Systems   Constitutional: Positive for malaise/fatigue. Negative for decreased appetite.   HENT: Negative.     Eyes:  Negative for blurred vision.   Cardiovascular:  Positive for dyspnea on exertion. Negative for chest pain, irregular heartbeat, leg swelling, orthopnea, palpitations and syncope.   Respiratory: Negative.  Negative for cough and shortness of breath.    Musculoskeletal:  Positive for arthritis.   Gastrointestinal:  Positive for constipation.   Neurological:  Negative for focal weakness and light-headedness.   Psychiatric/Behavioral:  Negative for depression and memory loss.         Physical Exam:  Vitals reviewed.   Constitutional:       Appearance: Normal appearance.   Neck:      Vascular:Mild JVD  Cardiovascular:      Rate and Rhythm: Normal rate and regular rhythm.      Pulses: Normal pulses.      Heart sounds: Normal heart sounds, S1 normal and S2 normal.   Pulmonary:      Effort: Pulmonary effort is normal. No respiratory distress.      Breath sounds: Lungs with fine bibasilar crackles.  Abdominal:      General: Abdomen is flat.      Palpations: Abdomen is soft.   Musculoskeletal:      Right lower leg: No edema.      Left lower leg: No edema.   Skin:     General: Skin is warm.   Neurological:      Mental Status: She is alert and oriented to person, place, and  time. Mental status is at baseline.   Psychiatric:         Mood and Affect: Mood normal.         Behavior: Behavior normal.     Assessment/Plan   1-acute respiratory failure-differential diagnosis acute on chronic systolic heart failure versus due to interstitial lung disease/amiodarone toxicity.     Recommendations-CT scan of the chest.  After review of CT chest, if it appears to be pulmonary edema rather than an inflammatory process, would consider switching the Lasix to oral and losartan to low-dose Entresto.  She did not tolerate Aldactone in the past.     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  We should also notify Dr. Collazo in future- it appears she intended to get the cardiac MRI but patient is unaware of this and I do not see any active orders.  D/W Dr Esparza.   Addendum-reviewed CT chest with radiologist.  There is some emphysematous changes at the bases.  No clear-cut parenchymal abnormality otherwise.  Finding would be most consistent with pulmonary vascular congestion.   9-14-24: Patient is now on oral Lasix and ARB has been changed to Entresto.  Heart rate and blood pressure are reasonable. Potassium 4.8, creatinine 1.98 (stable).      2-persistent atrial fibrillation-she seems to be maintaining sinus rhythm since being on amiodarone(which was started for nonsustained VT).  Status post AV derek ablation and has a pacemaker in place.  She is not anticoagulated but had a left atrial appendage ligation.  She has history of GI bleed.  9-14-24:Patient is maintaining sinus rhythm on amiodarone.  She is also on a beta-blocker for rate control.  She had a left atrial appendage ligation and is not on anticoagulation due to history of GI bleeding.  Continue on amiodarone.     3-nonsustained VT and PVCs highly symptomatic and remains on amiodarone with resolution of symptoms.  Outpatient monitoring.  9-14-24:Patient has a history of NSVT and PVCs.  She has  had no arrhythmias while here on telemetry.  Patient should continue on amiodarone and metoprolol.  Electrolytes are reasonable.     4-LVH, CKD, chronic diastolic heart failure-we performed PYP scan which was equivocal.  Currently following up with Dr. Collazo who is considering MRI versus biopsy.     5-coronary artery disease-remote history of PCI unknown territory.  Records are not available patient told me about this.  Continue high-dose potent statins check lipid profile target LDL less than 70.  9-14-24: Patient denies any chest pain/pressure.  Her cardiac regimen consists of aspirin, atorvastatin, Farxiga, and metoprolol.  Patient will continue on her current regimen.    Patient currently is scheduled to see the heart failure clinic on October 28.  She sees Dr. Collazo (advanced heart failure) on November 15.  I will send a message to the CHF clinic to move up her heart failure clinic appointment.      Code Status:  Full Code          Shefali Ordaz, APRN-CNP

## 2024-09-14 NOTE — CARE PLAN
The patient's goals for the shift include      The clinical goals for the shift include pt. spo2 will remain greater than 92% throughout shift

## 2024-09-14 NOTE — PROGRESS NOTES
Adwoa Forrest is a 89 y.o. female on day 2 of admission presenting with Acute on chronic congestive heart failure, unspecified heart failure type (Multi).    Subjective   Adwoa Forrest is a 89 y.o. female with PMHx of HFpEF, NSVT (on amiodarone), persistent atrial fib, COPD (not on home O2), HTN and CKD (creatinine ~2 at baseline) who presented with a 3-4 day history of increasing shortness of breath that is worse with exertion. She denies leg edema and gain in weight (she weighs herself daily). She has chronic chest pain that is no worse. In the ED ambulatory pulse ox was 88% on RA and she was placed on 3 lpm oxygen. She was on home O2 late last year for a couple of months, which was stopped. Her O2 sat readings are skewed by the fact that she has Raynaud and they vacillate between 83-97% at home and have done so here. VSS. Labwork was notable for troponin 47 > 39 (about baseline). BNP unable to be obtained as the machine is down. EKG was nonischemic, LE Doppler was negative for DVT and COVID was negative. CXR suggestive of chronic lung disease w/? superimposed interstitial edema or pneumonitis. Unable to perform PE CTA due to renal function but there is low suspicion given negative LE US. She was given IV Lasix per ED physician. Remainder of ROS reviewed and negative except as indicated in HPI.     9/13: SOB essentially unchanged.     9/14: Modest improve ment in SOB overnight.            Objective     Constitutional: Well developed, awake, alert, calm, oriented x4, no acute distress, cooperative  Eyes: EOMI, clear sclera  ENMT: mucous membranes moist, no lesions seen  Head/Neck: Neck supple, no apparent injury, head atraumatic  Respiratory/Thorax: Fine crackles throughout, good chest expansion, respirations even and unlabored, pt on 2 lpm O2  Cardiovascular: Regular rate and rhythm, no murmurs/rubs/gallops, normal S1 and S 2  Gastrointestinal: Abdomen nondistended, soft, nontender, +BS, no  "bruits  Musculoskeletal: ROM intact, no joint swelling, normal  strength  Extremities: no cyanosis, edema, contusions or clubbing  Neurological: no focal deficit, pt alert and oriented x4  Psychological: Appropriate affect and behavior, pleasant  Skin: Warm and dry, no lesions, no rashes    Last Recorded Vitals  Blood pressure 104/58, pulse 64, temperature 36.1 °C (96.9 °F), temperature source Temporal, resp. rate 17, height 1.575 m (5' 2\"), weight 46.8 kg (103 lb 3.2 oz), SpO2 94%.  Intake/Output last 3 Shifts:  I/O last 3 completed shifts:  In: 597 (12.8 mL/kg) [P.O.:597]  Out: - (0 mL/kg)   Weight: 46.8 kg     Relevant Results            This patient currently has cardiac telemetry ordered; if you would like to modify or discontinue the telemetry order, click here to go to the orders activity to modify/discontinue the order.                 Assessment/Plan   Dyspnea on exertion  Exacerbation of HFpEF/pHTN  Pt denies leg edema and gain in weight (she weighs herself daily)  BNP is elevated  CXR/CT chest with mostly chronic findings  LE Doppler was negative for DVT and COVID was negative  VQ scan with low probability of PE  Cardiology started PO Lasix on 9/13      Acute hypoxemic Respiratory Failure  Hx COPD (not on home O2), pt recently referred to pulmonology and says her dyspnea is worse on the new inhalers   She was on home O2 late last year for a couple of months, which was stopped Her O2 sats are skewed by the fact that she has Raynaud and readings vacillate between 83-97% at home and have done so here  I have advised her nurse to place the oximeter on the pt's ear     Hx NSVT   Continue amiodarone     Hx persistent atrial fib  Pt is pacer dependent due to previous AV node ablation, unable to upgrade to Bi V pacer)   Not on OAC due to hx of recurrent GIB     HTN   BP is controlled, continue home meds     CKD   At baseline (creatinine ~2 at baseline)   Scr abdullahi with Lasix but is still within her normal range    "   Right dorsal ankle wound  Wound Cx pending, GS with no bacteria or polys  empiric ceftriaxone D3     Sleep apnea  Pt is on CPAP but declines to use it here     DVT ppx: SubQ heparin     Discharge disposition  Pending PT/OT dick Esparza MD PhD

## 2024-09-14 NOTE — CONSULTS
Infectious Disease Inpatient Consult    Consults  Referred by Dr Vilma Rivers MD: Minh Farmer DO    Reason For Consult  R heel wound      Assessment/Plan:    #R Heel chronic ulcer  #Hx of calcaneal hardware  Present near Achilles.  Now chronic from past many months which as per patient is slowly healing well.  No surrounding erythema or drainage.  Given wound is healing well, recommend to continue her follow-up with wound care.  If the wound opened again, patient will need further imaging either with MRI or CT to evaluate for hardware infection    HFrEF  Afib  HTN  COPD  CAD  Hypothyroidism  Sick sinus syndrome s/p pacemaker  CVA    Recommendations:    -DC ceftriaxone  -Obtain right foot x-ray  -Wound care consulted for recommendations  -Thank you for consult.  Will continue to follow    Tushar Aguirre MD  Date of service: 9/14/2024  Time of service: 11:53 AM      History Of Present Illness  Adwoa Forrest is a 89 y.o. female with PMHx of HF, A-fib, COPD, HTN, CAD, hypothyroidism, sick sinus syndrome s/p pacemaker, CVA who current to the hospital with complaint of shortness of breath.  Patient states her symptoms have been going on from past few days and have progressively been worsening having low oxygen saturation at home along with cough.    On admission, patient had stable vital signs except requiring oxygen to maintain saturations.  Labs on admission show WBC count 6.9, Hb 16.3, platelet 265, potassium 4.4, creatinine 1.83, BNP 4095.  Patient tested negative for COVID.    Patient does have history of calcaneal hardware which was placed a while ago.  Patient reports having a chronic wound from past many months which was initially bigger than the size of a quarter and has been slowly healing.  Patient also follows with wound care.  Denies any fever, chills, nausea, vomiting, diarrhea, abdominal pain.    Superficial wound cultures were taken and patient was started on ceftriaxone.  Infectious  disease consulted for further recommendations     Past Medical History  She has a past medical history of Anxiety and depression, Bowel perforation (Multi), Chronic atrial fibrillation, unspecified (Multi) (2023), Chronic heart failure with preserved ejection fraction (Multi) (2024), CKD (chronic kidney disease), COPD (chronic obstructive pulmonary disease) (Multi), Essential (primary) hypertension, GIB (gastrointestinal bleeding), History of non-ST elevation myocardial infarction (NSTEMI) (10/08/2023), Hypothyroidism, CANDELARIA (iron deficiency anemia), NSVT (nonsustained ventricular tachycardia) (Multi), Raynaud disease, Sick sinus syndrome (Multi) (2018), Sleep apnea, and Stroke (cerebrum) (Multi).    Surgical History  She has a past surgical history that includes Cataract extraction; Appendectomy; Hysterectomy; Tonsillectomy; Cholecystectomy; Other surgical history; Other surgical history; Colectomy partial / total; and Cardiac electrophysiology procedure (N/A, 2024).     Social History     Occupational History    Not on file   Tobacco Use    Smoking status: Former     Current packs/day: 0.00     Average packs/day: 0.3 packs/day for 34.0 years (8.5 ttl pk-yrs)     Types: Cigarettes     Start date:      Quit date:      Years since quittin.7     Passive exposure: Never    Smokeless tobacco: Never   Substance and Sexual Activity    Alcohol use: Never    Drug use: Never    Sexual activity: Not on file     Travel History   Travel since 25    No documented travel since 24              Family History  Family History   Problem Relation Name Age of Onset    Coronary artery disease Mother      Coronary artery disease Father       Allergies  Aldactone [spironolactone], Codeine, Hydrocodone, Sotalol, and Tetracyclines     Immunization History   Administered Date(s) Administered    Flu vaccine (IIV4), preservative free *Check age/dose* 2019    Flu vaccine, quadrivalent,  high-dose, preservative free, age 65y+ (FLUZONE) 09/06/2022, 10/25/2023    Flu vaccine, trivalent, preservative free, HIGH-DOSE, age 65y+ (Fluzone) 11/03/2014, 10/12/2015, 10/10/2016, 10/12/2017, 10/03/2018    Flu vaccine, trivalent, preservative free, age 6 months and greater (Fluarix/Fluzone/Flulaval) 10/05/2012, 09/03/2020    Influenza, Seasonal, Quadrivalent, Adjuvanted 09/14/2021    PPD Test 12/31/2018, 01/06/2019    Pfizer COVID-19 vaccine, bivalent, age 12 years and older (30 mcg/0.3 mL) 10/03/2022    Pfizer Purple Cap SARS-CoV-2 01/28/2021, 02/25/2021, 11/02/2021    Pneumococcal conjugate vaccine, 13-valent (PREVNAR 13) 01/07/2016    Pneumococcal polysaccharide vaccine, 23-valent, age 2 years and older (PNEUMOVAX 23) 10/03/2018, 02/06/2023    Zoster, live 01/12/2015     Medications  Home medications:  Medications Prior to Admission   Medication Sig Dispense Refill Last Dose    acetaminophen (Tylenol 8 HOUR) 650 mg ER tablet Take 1 tablet (650 mg) by mouth every 8 hours if needed for mild pain (1 - 3). Do not crush, chew, or split.       albuterol 90 mcg/actuation inhaler Inhale 2 puffs every 4 hours if needed for shortness of breath. 18 g 11     ALPRAZolam (Xanax) 0.5 mg tablet Take 1 tablet (0.5 mg) by mouth 2 times a day. 180 tablet 0     amiodarone (Pacerone) 200 mg tablet Take 1 tablet (200 mg) by mouth once daily. 90 tablet 3     aspirin 81 mg EC tablet Take 1 tablet (81 mg) by mouth once daily.       atorvastatin (Lipitor) 80 mg tablet Take 1 tablet (80 mg) by mouth once daily. At bedtime 90 tablet 3     calcitriol (Rocaltrol) 0.25 mcg capsule Take 1 capsule (0.25 mcg) by mouth once daily. Three times a week.       calcium acetate (Phoslo) 667 mg capsule Take 1 capsule (667 mg) by mouth 3 times daily (morning, midday, late afternoon).       Farxiga 10 mg TAKE 1 TABLET BY MOUTH EVERY DAY 90 tablet 3     ferrous gluconate (Fergon) 240 (27 Fe) MG tablet 1T;PO;QD 90 tablet 1     levothyroxine (Synthroid,  Levoxyl) 25 mcg tablet Take 1 tablet (25 mcg) by mouth once daily. 90 tablet 3     losartan (Cozaar) 25 mg tablet Take 0.5 tablets (12.5 mg) by mouth once daily. 15 tablet 11     metoprolol succinate XL (Toprol-XL) 50 mg 24 hr tablet Take 1 tablet (50 mg) by mouth once daily. 90 tablet 3     multivitamin tablet Take 1 tablet by mouth once daily.       torsemide (Demadex) 20 mg tablet Take 2 tablets (40 mg) by mouth 2 times a day. Patient states takes one tablet twice a day. 360 tablet 3     umeclidinium-vilanteroL (Anoro Ellipta) 62.5-25 mcg/actuation blister with device Inhale 1 puff once daily. 1 each 11      Current medications:  Scheduled medications  ALPRAZolam, 0.5 mg, oral, BID  amiodarone, 200 mg, oral, Daily  aspirin, 81 mg, oral, Daily  atorvastatin, 80 mg, oral, Daily  dapagliflozin propanediol, 10 mg, oral, Daily  ferrous gluconate, 38 mg of iron, oral, Daily with breakfast  influenza, 0.5 mL, intramuscular, During hospitalization  tiotropium, 2 puff, inhalation, Daily   And  formoterol, 20 mcg, nebulization, q12h  furosemide, 40 mg, oral, BID  heparin (porcine), 5,000 Units, subcutaneous, q8h MACRINA  levothyroxine, 25 mcg, oral, Daily  metoprolol succinate XL, 50 mg, oral, Daily  sacubitriL-valsartan, 1 tablet, oral, BID  sennosides, 2 tablet, oral, BID      Continuous medications     PRN medications  PRN medications: acetaminophen, oxygen, polyethylene glycol    Review of Systems     Constitutional:  Denies appetite change, chills, fatigue, fever.  HENT:  Denies ear discharge, ear pain, sore throat    Eyes:  Denies photophobia, eye drainage  Respiratory: Reports shortness of breath, cough  Cardiovascular:  Denies chest pain, palpitations  Gastrointestinal:  Denies abdominal pain, diarrhea, nausea and vomiting.   Genitourinary:  Denies dysuria, flank pain, frequency  Musculoskeletal:  Denies joint pain  Skin: Reports right ankle ulcer  Neurological:  Denies light-headedness, numbness    Objective  Range of  "Vitals (last 24 hours)  Heart Rate:  [59-66]   Temp:  [35.8 °C (96.5 °F)-36.8 °C (98.3 °F)]   Resp:  [16-18]   BP: ()/(50-67)   SpO2:  [90 %-99 %]   Daily Weight  24 : 46.8 kg (103 lb 3.2 oz)    Body mass index is 18.88 kg/m².     Physical Exam  BP 92/50 (BP Location: Right arm, Patient Position: Sitting)   Pulse 66   Temp 35.8 °C (96.5 °F) (Temporal)   Resp 16   Ht 1.575 m (5' 2\")   Wt 46.8 kg (103 lb 3.2 oz)   LMP  (LMP Unknown)   SpO2 99%   BMI 18.88 kg/m²   Temp (24hrs), Av.3 °C (97.4 °F), Min:35.8 °C (96.5 °F), Max:36.8 °C (98.3 °F)      General: alert, oriented, NAD  HEENT: No conjunctival pallor, no scleral icterus  Neck: No LAD, No JVD  Heart: Irregular rhythm  Abdomen: soft, non tender, non distended, BS+  Neuro: AAO x 3, PERRL, CN grossly intact  Extremities: no edema, no cyanosis  Skin: R foot ulcer close to achilles without any surrounding erythema or drainage  MSK: No joint inflammation     Relevant Results    Labs  Results from last 72 hours   Lab Units 24  0435 24  0949   WBC AUTO x10*3/uL 5.9 6.9   HEMOGLOBIN g/dL 13.7 16.3*   HEMATOCRIT % 44.0 51.7*   PLATELETS AUTO x10*3/uL 227 265   NEUTROS PCT AUTO %  --  78.2   LYMPHS PCT AUTO %  --  7.0   MONOS PCT AUTO %  --  8.6   EOS PCT AUTO %  --  4.7     Results from last 72 hours   Lab Units 24  0422 24  0435 24  0949   SODIUM mmol/L 138 139 141   POTASSIUM mmol/L 4.8 4.3 4.4   CHLORIDE mmol/L 99 99 99   CO2 mmol/L 35* 34* 33*   BUN mg/dL 63* 56* 50*   CREATININE mg/dL 1.98* 1.88* 1.83*   GLUCOSE mg/dL 108* 89 119*   CALCIUM mg/dL 8.7 8.9 10.2   ANION GAP mmol/L 9* 10 13   EGFR mL/min/1.73m*2 24* 25* 26*     Results from last 72 hours   Lab Units 24  0949   ALK PHOS U/L 142*   BILIRUBIN TOTAL mg/dL 1.1   PROTEIN TOTAL g/dL 7.7   ALT U/L 35   AST U/L 35   ALBUMIN g/dL 4.4     Estimated Creatinine Clearance: 14.5 mL/min (A) (by C-G formula based on SCr of 1.98 mg/dL (H)).  C-Reactive Protein " "  Date Value Ref Range Status   10/02/2023 6.22 (H) <1.00 mg/dL Final     Sedimentation Rate   Date Value Ref Range Status   10/02/2023 41 (H) 0 - 30 mm/h Final     No results found for: \"HIV1X2\", \"HIVCONF\", \"ZAXVGK2AG\"  No results found for: \"HEPCABINIT\", \"HEPCAB\", \"HCVPCRQUANT\"    Microbiology  Susceptibility data from last 14 days.  Collected Specimen Info Organism   09/12/24 Tissue/Biopsy from Skin/Superficial Abscess Mixed Gram-Positive and Gram-Negative Bacteria       Imaging  NM Lung perfusion with spect/ct    Result Date: 9/13/2024  1. No wedge-shaped subsegmental or segmental perfusion defect in lungs to suggest  acute pulmonary embolism (low probability).   The interpretation above is based on modified PIOPED II and PISAPED criteria.   I personally reviewed the images/study and I agree with the findings as stated by Andrez Del Real MD.  This study was interpreted at [facility], [place], [place].   MACRO: None   Signed by: Waldo Valero 9/13/2024 11:59 AM Dictation workstation:   [id number]    CT chest wo IV contrast    Result Date: 9/13/2024  1.  Cystic spaces are scattered throughout both lungs and appear similar to the prior study, probably related to bronchiolectasis. There is also mild proximal bronchiectasis. 2. No acute pneumonia are noted currently representing improvement from the prior exam. There is mild atelectasis in the left lower lobe along with a small left pleural effusion. 3. Right atrium is dilated in the right pulmonary artery is dilated at 3.6 cm diameter. These findings may indicate pulmonary hypertension.   MACRO: None   Signed by: Jeny Troy 9/13/2024 11:08 AM Dictation workstation:   [id number]    Vascular US Lower Extremity Venous Duplex Bilateral    Result Date: 9/12/2024  No evidence for DVT within the lower extremities bilaterally.  Signed by Polo Seo MD    XR chest 2 views    Result Date: 9/12/2024  1.  Bibasilar predominant reticular " changes are favored to be related to chronic lung disease. Clinically exclude superimposed interstitial edema or pneumonitis..     Signed by: Yifan Donis 9/12/2024 10:25 AM Dictation workstation:   [id number]

## 2024-09-14 NOTE — CARE PLAN
Problem: Skin  Goal: Decreased wound size/increased tissue granulation at next dressing change  Outcome: Progressing  Flowsheets (Taken 9/14/2024 0708)  Decreased wound size/increased tissue granulation at next dressing change:   Promote sleep for wound healing   Protective dressings over bony prominences   Utilize specialty bed per algorithm  Goal: Participates in plan/prevention/treatment measures  Outcome: Progressing  Flowsheets (Taken 9/14/2024 0708)  Participates in plan/prevention/treatment measures:   Discuss with provider PT/OT consult   Elevate heels   Increase activity/out of bed for meals  Goal: Prevent/manage excess moisture  Outcome: Progressing  Flowsheets (Taken 9/14/2024 0708)  Prevent/manage excess moisture:   Monitor for/manage infection if present   Cleanse incontinence/protect with barrier cream   Follow provider orders for dressing changes  Goal: Prevent/minimize sheer/friction injuries  Outcome: Progressing  Flowsheets (Taken 9/14/2024 0708)  Prevent/minimize sheer/friction injuries:   Increase activity/out of bed for meals   Complete micro-shifts as needed if patient unable. Adjust patient position to relieve pressure points, not a full turn   Turn/reposition every 2 hours/use positioning/transfer devices  Goal: Promote/optimize nutrition  Outcome: Progressing  Flowsheets (Taken 9/14/2024 0708)  Promote/optimize nutrition:   Assist with feeding   Monitor/record intake including meals   Offer water/supplements/favorite foods  Goal: Promote skin healing  Outcome: Progressing  Flowsheets (Taken 9/14/2024 0708)  Promote skin healing:   Assess skin/pad under line(s)/device(s)   Protective dressings over bony prominences   Rotate device position/do not position patient on device     Problem: Fall/Injury  Goal: Not fall by end of shift  Outcome: Progressing  Goal: Be free from injury by end of the shift  Outcome: Progressing  Goal: Verbalize understanding of personal risk factors for fall in the  hospital  Outcome: Progressing  Goal: Verbalize understanding of risk factor reduction measures to prevent injury from fall in the home  Outcome: Progressing  Goal: Use assistive devices by end of the shift  Outcome: Progressing  Goal: Pace activities to prevent fatigue by end of the shift  Outcome: Progressing     Problem: Pain - Adult  Goal: Verbalizes/displays adequate comfort level or baseline comfort level  Outcome: Progressing     Problem: Safety - Adult  Goal: Free from fall injury  Outcome: Progressing     Problem: Discharge Planning  Goal: Discharge to home or other facility with appropriate resources  Outcome: Progressing     Problem: Chronic Conditions and Co-morbidities  Goal: Patient's chronic conditions and co-morbidity symptoms are monitored and maintained or improved  Outcome: Progressing   The patient's goals for the shift include      The clinical goals for the shift include pt will tolerate a o2 sturation of 92% or above this shift.

## 2024-09-15 VITALS
HEIGHT: 62 IN | OXYGEN SATURATION: 99 % | BODY MASS INDEX: 18.99 KG/M2 | TEMPERATURE: 98.6 F | HEART RATE: 66 BPM | WEIGHT: 103.2 LBS | SYSTOLIC BLOOD PRESSURE: 117 MMHG | RESPIRATION RATE: 16 BRPM | DIASTOLIC BLOOD PRESSURE: 68 MMHG

## 2024-09-15 PROBLEM — I50.9 ACUTE ON CHRONIC CONGESTIVE HEART FAILURE, UNSPECIFIED HEART FAILURE TYPE: Status: RESOLVED | Noted: 2024-09-12 | Resolved: 2024-09-15

## 2024-09-15 LAB
ANION GAP SERPL CALC-SCNC: 11 MMOL/L (ref 10–20)
BACTERIA SPEC CULT: NORMAL
BUN SERPL-MCNC: 58 MG/DL (ref 6–23)
CALCIUM SERPL-MCNC: 9.3 MG/DL (ref 8.6–10.3)
CHLORIDE SERPL-SCNC: 98 MMOL/L (ref 98–107)
CO2 SERPL-SCNC: 34 MMOL/L (ref 21–32)
CREAT SERPL-MCNC: 1.94 MG/DL (ref 0.5–1.05)
EGFRCR SERPLBLD CKD-EPI 2021: 25 ML/MIN/1.73M*2
GLUCOSE SERPL-MCNC: 92 MG/DL (ref 74–99)
GRAM STN SPEC: NORMAL
GRAM STN SPEC: NORMAL
MAGNESIUM SERPL-MCNC: 2.57 MG/DL (ref 1.6–2.4)
POTASSIUM SERPL-SCNC: 4.8 MMOL/L (ref 3.5–5.3)
SODIUM SERPL-SCNC: 138 MMOL/L (ref 136–145)

## 2024-09-15 PROCEDURE — 83735 ASSAY OF MAGNESIUM: CPT | Performed by: INTERNAL MEDICINE

## 2024-09-15 PROCEDURE — 99239 HOSP IP/OBS DSCHRG MGMT >30: CPT | Performed by: INTERNAL MEDICINE

## 2024-09-15 PROCEDURE — 94640 AIRWAY INHALATION TREATMENT: CPT

## 2024-09-15 PROCEDURE — 2500000004 HC RX 250 GENERAL PHARMACY W/ HCPCS (ALT 636 FOR OP/ED): Performed by: NURSE PRACTITIONER

## 2024-09-15 PROCEDURE — 99232 SBSQ HOSP IP/OBS MODERATE 35: CPT | Performed by: NURSE PRACTITIONER

## 2024-09-15 PROCEDURE — 2500000001 HC RX 250 WO HCPCS SELF ADMINISTERED DRUGS (ALT 637 FOR MEDICARE OP): Performed by: INTERNAL MEDICINE

## 2024-09-15 PROCEDURE — 2500000002 HC RX 250 W HCPCS SELF ADMINISTERED DRUGS (ALT 637 FOR MEDICARE OP, ALT 636 FOR OP/ED): Performed by: NURSE PRACTITIONER

## 2024-09-15 PROCEDURE — 36415 COLL VENOUS BLD VENIPUNCTURE: CPT | Performed by: INTERNAL MEDICINE

## 2024-09-15 PROCEDURE — 2500000005 HC RX 250 GENERAL PHARMACY W/O HCPCS: Performed by: INTERNAL MEDICINE

## 2024-09-15 PROCEDURE — 94761 N-INVAS EAR/PLS OXIMETRY MLT: CPT

## 2024-09-15 PROCEDURE — 80048 BASIC METABOLIC PNL TOTAL CA: CPT | Performed by: INTERNAL MEDICINE

## 2024-09-15 PROCEDURE — 2500000001 HC RX 250 WO HCPCS SELF ADMINISTERED DRUGS (ALT 637 FOR MEDICARE OP): Performed by: NURSE PRACTITIONER

## 2024-09-15 RX ADMIN — FUROSEMIDE 40 MG: 40 TABLET ORAL at 09:08

## 2024-09-15 RX ADMIN — SACUBITRIL AND VALSARTAN 1 TABLET: 24; 26 TABLET, FILM COATED ORAL at 09:09

## 2024-09-15 RX ADMIN — AMIODARONE HYDROCHLORIDE 200 MG: 200 TABLET ORAL at 09:09

## 2024-09-15 RX ADMIN — LEVOTHYROXINE SODIUM 25 MCG: 0.03 TABLET ORAL at 07:48

## 2024-09-15 RX ADMIN — SENNOSIDES 17.2 MG: 8.6 TABLET, FILM COATED ORAL at 09:14

## 2024-09-15 RX ADMIN — TIOTROPIUM BROMIDE INHALATION SPRAY 2 PUFF: 3.12 SPRAY, METERED RESPIRATORY (INHALATION) at 09:14

## 2024-09-15 RX ADMIN — DAPAGLIFLOZIN 10 MG: 5 TABLET, FILM COATED ORAL at 09:08

## 2024-09-15 RX ADMIN — ATORVASTATIN CALCIUM 80 MG: 40 TABLET, FILM COATED ORAL at 09:08

## 2024-09-15 RX ADMIN — HEPARIN SODIUM 5000 UNITS: 5000 INJECTION, SOLUTION INTRAVENOUS; SUBCUTANEOUS at 09:09

## 2024-09-15 RX ADMIN — Medication 3 L/MIN: at 07:57

## 2024-09-15 RX ADMIN — HEPARIN SODIUM 5000 UNITS: 5000 INJECTION, SOLUTION INTRAVENOUS; SUBCUTANEOUS at 01:02

## 2024-09-15 RX ADMIN — METOPROLOL SUCCINATE 50 MG: 50 TABLET, EXTENDED RELEASE ORAL at 09:08

## 2024-09-15 RX ADMIN — ASPIRIN 81 MG: 81 TABLET, COATED ORAL at 09:08

## 2024-09-15 RX ADMIN — FORMOTEROL FUMARATE DIHYDRATE 20 MCG: 20 SOLUTION RESPIRATORY (INHALATION) at 07:57

## 2024-09-15 RX ADMIN — ACETAMINOPHEN 650 MG: 325 TABLET ORAL at 06:04

## 2024-09-15 ASSESSMENT — COGNITIVE AND FUNCTIONAL STATUS - GENERAL
MOBILITY SCORE: 20
CLIMB 3 TO 5 STEPS WITH RAILING: A LITTLE
DAILY ACTIVITIY SCORE: 24
MOVING TO AND FROM BED TO CHAIR: A LITTLE
WALKING IN HOSPITAL ROOM: A LITTLE
STANDING UP FROM CHAIR USING ARMS: A LITTLE

## 2024-09-15 ASSESSMENT — ENCOUNTER SYMPTOMS
CONSTIPATION: 1
MEMORY LOSS: 0
DECREASED APPETITE: 0
SHORTNESS OF BREATH: 0
DYSPNEA ON EXERTION: 1
ORTHOPNEA: 0
COUGH: 0
SYNCOPE: 0
DEPRESSION: 0
FOCAL WEAKNESS: 0
RESPIRATORY NEGATIVE: 1
PALPITATIONS: 0
IRREGULAR HEARTBEAT: 0
LIGHT-HEADEDNESS: 0
BLURRED VISION: 0

## 2024-09-15 ASSESSMENT — PAIN SCALES - GENERAL: PAINLEVEL_OUTOF10: 0 - NO PAIN

## 2024-09-15 ASSESSMENT — PAIN - FUNCTIONAL ASSESSMENT: PAIN_FUNCTIONAL_ASSESSMENT: 0-10

## 2024-09-15 NOTE — NURSING NOTE
Discharge instructions reviewed with PT. Pt verbs understanding. IV removed, tip intact. Covered with DSD. Telemetry removed.

## 2024-09-15 NOTE — PROGRESS NOTES
Home O2 Evaluation: Completed    SpO2 on room air at rest:    85 %    SpO2 on room air with exertion:    71 %    SpO2 on oxygen at rest:   92 % on 2L NC    SpO2 on oxygen with exertion:  2L NC 82%, 3L NC  85%,  4L NC 90%    SpO2 on 4L/min O2 with exertion:    90% on 4L NC    Ambulation distance:   278   ft    Recommended O2 device: Nasal Cannula    Recommended O2 L/min: Recommend 2L NC at rest and 4L NC on exertion    Recommended FiO2 %:

## 2024-09-15 NOTE — PROGRESS NOTES
9/15/24 1050   09/15/24 1048   Discharge Planning   Home or Post Acute Services In home services   Type of Home Care Services DME or oxygen     Called and spoke with Chanel Main with [employer] about setting up oxygen. Sent secure chat to Chanel and bedside RN to coordinate oxygen setup. Pt being recommend for 2L at rest and 4L with exertion.   Estela Granger RN, BSN, [facility]/ [facility] CT Supervisor .

## 2024-09-15 NOTE — DISCHARGE SUMMARY
DISCHARGE SUMMARY     Discharge Diagnosis  Acute on chronic congestive heart failure, unspecified heart failure type (Multi)    Issues Requiring Follow-Up  -It is unclear at this point if she will continue to require supplemental O2 in the long term.    This discharge took greater than 35 minutes.    Test Results Pending At Discharge  Pending Labs       No current pending labs.            Hospital Course   Adwoa Forrest is a 89 y.o. female with PMHx of HFpEF, NSVT (on amiodarone), persistent atrial fib, COPD (not on home O2), HTN and CKD (creatinine ~2 at baseline) who presented with a 3-4 day history of increasing shortness of breath that is worse with exertion. She denies leg edema and gain in weight (she weighs herself daily). She has chronic chest pain that is no worse. In the ED ambulatory pulse ox was 88% on RA and she was placed on 3 lpm oxygen. She was on home O2 late last year for a couple of months, which was stopped. Her O2 sat readings are skewed by the fact that she has Raynaud and they vacillate between 83-97% at home and have done so here. VSS. Labwork was notable for troponin 47 > 39 (about baseline). BNP unable to be obtained as the machine is down. EKG was nonischemic, LE Doppler was negative for DVT and COVID was negative. CXR suggestive of chronic lung disease w/? superimposed interstitial edema or pneumonitis. Unable to perform PE CTA due to renal function but there is low suspicion given negative LE US. She was given IV Lasix per ED physician. Remainder of ROS reviewed and negative except as indicated in HPI.      9/13: SOB essentially unchanged.      9/14: Modest improve ment in SOB overnight.     Dyspnea on exertion  Exacerbation of HFpEF/pHTN  Pt denies leg edema and gain in weight (she weighs herself daily)  BNP was elevated on admit  CXR/CT chest with mostly chronic findings  LE Doppler was negative for DVT and COVID was negative  VQ scan with low probability of PE  Cardiology started PO  Lasix on 9/13      Acute hypoxemic Respiratory Failure  Hx COPD (not on home O2), pt recently referred to pulmonology and says her dyspnea is worse on the new inhalers   She was on home O2 late last year for a couple of months, which was stopped  She again is requiring supplemental O2, 2L NC at rest and 4L NC with ambulation     Hx NSVT   Continue amiodarone     Hx persistent atrial fib  Pt is pacer dependent due to previous AV node ablation, unable to upgrade to Bi V pacer)   Not on OAC due to hx of recurrent GIB     HTN   BP is controlled, continue home meds     CKD   At baseline (creatinine ~2 at baseline)   Scr abdullahi with Lasix but is still within her normal range      Right dorsal ankle wound  Wound Cx pending, GS with no bacteria or polys  Abx stopped by ID   XR foot read is still pending     Sleep apnea  Pt is on CPAP     Pertinent Physical Exam At Time of Discharge  Constitutional: Well developed, awake, alert, calm, oriented x4, no acute distress, cooperative  Eyes: EOMI, clear sclera  ENMT: mucous membranes moist, no lesions seen  Head/Neck: Neck supple, no apparent injury, head atraumatic  Respiratory/Thorax: CTAB with poor air movement , good chest expansion, respirations even and unlabored, pt on 2 lpm O2  Cardiovascular: Regular rate and rhythm, no murmurs/rubs/gallops, normal S1 and S 2  Gastrointestinal: Abdomen nondistended, soft, nontender, +BS, no bruits  Musculoskeletal: ROM intact, no joint swelling, normal  strength  Extremities: no cyanosis, edema, contusions or clubbing  Neurological: no focal deficit, pt alert and oriented x4  Psychological: Appropriate affect and behavior, pleasant  Skin: Warm and dry, no lesions, no rashes    Home Medications     Medication List      CONTINUE taking these medications     acetaminophen 650 mg ER tablet; Commonly known as: Tylenol 8 HOUR   albuterol 90 mcg/actuation inhaler; Inhale 2 puffs every 4 hours if   needed for shortness of breath.   ALPRAZolam 0.5 mg  tablet; Commonly known as: Xanax; Take 1 tablet (0.5   mg) by mouth 2 times a day.   amiodarone 200 mg tablet; Commonly known as: Pacerone; Take 1 tablet   (200 mg) by mouth once daily.   Anoro Ellipta 62.5-25 mcg/actuation blister with device; Generic drug:   umeclidinium-vilanteroL; Inhale 1 puff once daily.   aspirin 81 mg EC tablet   atorvastatin 80 mg tablet; Commonly known as: Lipitor; Take 1 tablet (80   mg) by mouth once daily. At bedtime   calcitriol 0.25 mcg capsule; Commonly known as: Rocaltrol   calcium acetate 667 mg capsule; Commonly known as: Phoslo   Farxiga 10 mg; Generic drug: dapagliflozin propanediol; TAKE 1 TABLET BY   MOUTH EVERY DAY   ferrous gluconate 240 (27 Fe) MG tablet; Commonly known as: Fergon;   1T;PO;QD   levothyroxine 25 mcg tablet; Commonly known as: Synthroid, Levoxyl; Take   1 tablet (25 mcg) by mouth once daily.   losartan 25 mg tablet; Commonly known as: Cozaar; Take 0.5 tablets (12.5   mg) by mouth once daily.   metoprolol succinate XL 50 mg 24 hr tablet; Commonly known as:   Toprol-XL; Take 1 tablet (50 mg) by mouth once daily.   multivitamin tablet   torsemide 20 mg tablet; Commonly known as: Demadex; Take 2 tablets (40   mg) by mouth 2 times a day. Patient states takes one tablet twice a day.       Outpatient Follow-Up  No follow-ups on file.     Hunter Esparza MD PhD  9/15/2024  10:42 AM

## 2024-09-15 NOTE — PROGRESS NOTES
HPI:  Adwoa Forrest is a 89 y.o. female presenting with shortness of breath for 1 week associated with low oxygen saturations at home and a cough.  She states that this started after she started on some kind of inhaler.  She does not have any ankle swelling and denies any weight gain.     She has prior history of chronic diastolic heart failure, moderate LVH with equivocal PYP, recent declining LV function to 35 to 40%, 99% RV pacing, persistent atrial fibrillation maintaining sinus rhythm, after AV derek ablation.       She has history of persistent atrial fibrillation status post AV derek ablation and permanent pacemaker placement, failed Watchman device placement in 2018 requiring urgent surgery and left atrial appendage ligation, hypertension recurrent GI bleed not on oral anticoagulants, on amiodarone for nonsustained VT and symptomatic PVCs.  She tells me she had stents placed in her coronary artery in 2017(Dr Hampton, unknown settings and I don't have the records available anywhere).     Recent attempt to CRT upgrade was unsuccessful.  Patient is also following up with advanced heart failure for possible cardiac MRI to rule out infiltrative cardiomyopathy.  Reviewed EKG personally, AV paced rhythm noted.     Reviewed chest x-ray-there is evidence of prominent reticular changes bilaterally which is either related to chronic lung disease or pneumonia.  Compared to the prior chest x-rays there seems to be worsening of radiographic abnormalities.  Her BNP remains elevated without significant change from her baseline.       Subjective Data:  Patient feels well today.  She denies any chest pain or dyspnea.  She complains of feeling weak.  She is laying low in bed, no orthopnea noted.Heart rate and blood pressure are reasonable.  Potassium 4.8, creatinine 1.98.  CT of the chest yesterday was reviewed by Dr. Latham and the radiologist and was noted to have some emphysematous changes at the bases no clear-cut  parenchymal abnormality.  Findings were most consistent with pulmonary vascular congestion.  She was changed from losartan to low-dose Entresto.  9-15-24: Did well overnight. No CP/dyspnea/orthopnea.  Still on O2, IMS deciding on  whether patient needs home O2.  HR/BP stable. Creatinine 1.94 today.  Patient noted some head congestion and pounding sensation in ears last night but not today.    Overnight Events:    None     Objective Data:  Last Recorded Vitals:  Vitals:    09/14/24 2321 09/15/24 0318 09/15/24 0756 09/15/24 0757   BP: 107/56 124/51  117/68   BP Location: Right arm Right arm  Right arm   Patient Position: Lying Lying  Lying   Pulse: 65 65  66   Resp: 16 17  16   Temp: 36.5 °C (97.7 °F) 36.9 °C (98.4 °F)  37 °C (98.6 °F)   TempSrc: Temporal Temporal  Temporal   SpO2: 90% 96% (!) 71% 99%   Weight:       Height:           Last Labs:    Results from last 7 days   Lab Units 09/15/24  0455 09/14/24  0422 09/13/24  0435   SODIUM mmol/L 138 138 139   POTASSIUM mmol/L 4.8 4.8 4.3   CHLORIDE mmol/L 98 99 99   CO2 mmol/L 34* 35* 34*   BUN mg/dL 58* 63* 56*   CREATININE mg/dL 1.94* 1.98* 1.88*   GLUCOSE mg/dL 92 108* 89   CALCIUM mg/dL 9.3 8.7 8.9        Results from last 7 days   Lab Units 09/13/24  0435 09/12/24  0949   WBC AUTO x10*3/uL 5.9 6.9   HEMOGLOBIN g/dL 13.7 16.3*   HEMATOCRIT % 44.0 51.7*   PLATELETS AUTO x10*3/uL 227 265               Last I/O:  I/O last 3 completed shifts:  In: 400 (8.5 mL/kg) [P.O.:400]  Out: - (0 mL/kg)   Weight: 46.8 kg     Past Cardiology Tests (Last 3 Years):    CT chest:  IMPRESSION:  1.  Cystic spaces are scattered throughout both lungs and appear  similar to the prior study, probably related to bronchiolectasis.  There is also mild proximal bronchiectasis.  2. No acute pneumonia are noted currently representing improvement  from the prior exam. There is mild atelectasis in the left lower lobe  along with a small left pleural effusion.  3. Right atrium is dilated in the right  pulmonary artery is dilated  at 3.6 cm diameter. These findings may indicate pulmonary  hypertension.    Inpatient Medications:  Scheduled medications   Medication Dose Route Frequency    ALPRAZolam  0.5 mg oral BID    amiodarone  200 mg oral Daily    aspirin  81 mg oral Daily    atorvastatin  80 mg oral Daily    dapagliflozin propanediol  10 mg oral Daily    ferrous gluconate  38 mg of iron oral Daily with breakfast    influenza  0.5 mL intramuscular During hospitalization    tiotropium  2 puff inhalation Daily    And    formoterol  20 mcg nebulization q12h    furosemide  40 mg oral BID    heparin (porcine)  5,000 Units subcutaneous q8h MACRINA    levothyroxine  25 mcg oral Daily    metoprolol succinate XL  50 mg oral Daily    sacubitriL-valsartan  1 tablet oral BID    sennosides  2 tablet oral BID     PRN medications   Medication    acetaminophen    oxygen    polyethylene glycol     Continuous Medications   Medication Dose Last Rate       ROS:  Review of Systems   Constitutional: Positive for malaise/fatigue. Negative for decreased appetite.   HENT:  Positive for congestion.    Eyes:  Negative for blurred vision.   Cardiovascular:  Positive for dyspnea on exertion. Negative for chest pain, irregular heartbeat, leg swelling, orthopnea, palpitations and syncope.   Respiratory: Negative.  Negative for cough and shortness of breath.    Musculoskeletal:  Positive for arthritis.   Gastrointestinal:  Positive for constipation.   Neurological:  Negative for focal weakness and light-headedness.   Psychiatric/Behavioral:  Negative for depression and memory loss.         Physical Exam:  Vitals reviewed.   Constitutional:       Appearance: Normal appearance.   Neck:      Vascular: no JVD  Cardiovascular:      Rate and Rhythm: Normal rate and regular rhythm.      Pulses: Normal pulses.      Heart sounds: Normal heart sounds, S1 normal and S2 normal.   Pulmonary:      Effort: Pulmonary effort is normal. No respiratory distress.       Breath sounds: Lungs with fine bibasilar crackles and diminished bases.   Abdominal:      General: Abdomen is flat.      Palpations: Abdomen is soft.   Musculoskeletal:      Right lower leg: No edema.      Left lower leg: No edema.   Skin:     General: Skin is warm.   Neurological:      Mental Status: She is alert and oriented to person, place, and time. Mental status is at baseline.   Psychiatric:         Mood and Affect: Mood normal.         Behavior: Behavior normal.     Assessment/Plan   1-acute respiratory failure-differential diagnosis acute on chronic systolic heart failure versus due to interstitial lung disease/amiodarone toxicity.     Recommendations-CT scan of the chest.  After review of CT chest, if it appears to be pulmonary edema rather than an inflammatory process, would consider switching the Lasix to oral and losartan to low-dose Entresto.  She did not tolerate Aldactone in the past.     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  We should also notify Dr. Collazo in future- it appears she intended to get the cardiac MRI but patient is unaware of this and I do not see any active orders.  D/W Dr Esparza.   Addendum-reviewed CT chest with radiologist.  There is some emphysematous changes at the bases.  No clear-cut parenchymal abnormality otherwise.  Finding would be most consistent with pulmonary vascular congestion.   9-14-24: Patient is now on oral Lasix and ARB has been changed to Entresto.  Heart rate and blood pressure are reasonable. Potassium 4.8, creatinine 1.98 (stable).      2-persistent atrial fibrillation-she seems to be maintaining sinus rhythm since being on amiodarone(which was started for nonsustained VT).  Status post AV derek ablation and has a pacemaker in place.  She is not anticoagulated but had a left atrial appendage ligation.  She has history of GI bleed.  9-14-24:Patient is maintaining sinus rhythm on amiodarone.  She is also  on a beta-blocker for rate control.  She had a left atrial appendage ligation and is not on anticoagulation due to history of GI bleeding.  Continue on amiodarone.  9-15-24: Currently AV paced on tele.  Continue on amiodarone and BB.      3-nonsustained VT and PVCs highly symptomatic and remains on amiodarone with resolution of symptoms.  Outpatient monitoring.  9-14-24:Patient has a history of NSVT and PVCs.  She has had no arrhythmias while here on telemetry.  Patient should continue on amiodarone and metoprolol.  Electrolytes are reasonable.  9-15-24: No NSVT overnight. Continue on amiodarone and BB.  K 4.8 today.     4-LVH, CKD, chronic diastolic heart failure-we performed PYP scan which was equivocal.  Currently following up with Dr. Collazo who is considering MRI versus biopsy.  9-15-24: Looks reasonably compensated.  Continue on Farxiga, Lasix, metoprolol succinate, and Entresto.  Will arrange earlier follow up with CHF clinic and has appointment with Dr Collazo.  Consideration for outpt cardiac MRI     5-coronary artery disease-remote history of PCI unknown territory.  Records are not available patient told me about this.  Continue high-dose potent statins check lipid profile target LDL less than 70.  9-14-24: Patient denies any chest pain/pressure.  Her cardiac regimen consists of aspirin, atorvastatin, Farxiga, and metoprolol.  Patient will continue on her current regimen.  9-15-24: No CP/pressure.    Patient currently is scheduled to see the heart failure clinic on October 28.  She sees Dr. Collazo (advanced heart failure) on November 15.  I will send a message to the CHF clinic to move up her heart failure clinic appointment.     DISPOSITION:  Continue on current cardiac meds  discharge when OK with [facility]  Follow up arranged  Will sign off    Code Status:  Full Code        Shefali Ordaz, APRN-CNP

## 2024-09-16 ENCOUNTER — PATIENT OUTREACH (OUTPATIENT)
Dept: PRIMARY CARE | Facility: CLINIC | Age: 89
End: 2024-09-16
Payer: MEDICARE

## 2024-09-16 NOTE — PROGRESS NOTES
Discharge Facility: [facility]  Discharge Diagnosis: Acute on Chronic CHF  Admission Date: 12 Sep 24  Discharge Date: 15 Sep 24    PCP Appointment Date: Pt wishing to make her own appt  Specialist Appointment Date: 20 Sep 24 ([facility], [place])   Hospital Encounter and Summary Linked: Yes    See discharge assessment below for further details     Engagement  Call Start Time: 1039 (9/16/2024 10:49 AM)    Medications  Medications reviewed with patient/caregiver?: Yes (9/16/2024 10:49 AM)  Is the patient having any side effects they believe may be caused by any medication additions or changes?: No (9/16/2024 10:49 AM)  Does the patient have all medications ordered at discharge?: Yes (9/16/2024 10:49 AM)  Prescription Comments: None (9/16/2024 10:49 AM)  Is the patient taking all medications as directed (includes completed medication regime)?: Yes (9/16/2024 10:49 AM)  Medication Comments: None (9/16/2024 10:49 AM)    Appointments  Does the patient have a primary care provider?: Yes (pt stating she will make own appt once she speaks with her son regarding transportation) (9/16/2024 10:49 AM)  Has the patient kept scheduled appointments due by today?: Yes (9/16/2024 10:49 AM)    Self Management  What is the home health agency?: N/A (9/16/2024 10:49 AM)  Has home health visited the patient within 72 hours of discharge?: Not applicable (9/16/2024 10:49 AM)  What Durable Medical Equipment (DME) was ordered?: Oxygen Therapy, equipment has been dropped off per patient. (9/16/2024 10:49 AM)  Has all Durable Medical Equipment (DME) been delivered?: Yes (9/16/2024 10:49 AM)    Patient Teaching  Does the patient have access to their discharge instructions?: Yes (9/16/2024 10:49 AM)  Care Management Interventions: Reviewed instructions with patient (9/16/2024 10:49 AM)  What is the patient's perception of their health status since discharge?: Improving (9/16/2024 10:49 AM)  Is the patient/caregiver able to teach back the hierarchy of who  to call/visit for symptoms/problems? PCP, Specialist, Home Health nurse, Urgent Care, ED, 911: Yes (9/16/2024 10:49 AM)  Patient/Caregiver Education Comments: None (9/16/2024 10:49 AM)    Wrap Up  Wrap Up Additional Comments: None (9/16/2024 10:49 AM)  Call End Time: 1049 (9/16/2024 10:49 AM)    Pt wishing to make her own follow up appt with PCP at this time. Pt has no questions regarding medications or discharge instructions. Pt states equipment for oxygen therapy as been dropped off

## 2024-09-18 ENCOUNTER — TELEPHONE (OUTPATIENT)
Dept: CARDIOLOGY | Facility: HOSPITAL | Age: 89
End: 2024-09-18
Payer: MEDICARE

## 2024-09-18 DIAGNOSIS — I50.42 CHRONIC COMBINED SYSTOLIC AND DIASTOLIC HEART FAILURE: Primary | ICD-10-CM

## 2024-09-18 NOTE — TELEPHONE ENCOUNTER
Reviewed with Dr. Latham.  She reviewed her chart/discharge instructions.  She was not sent home on Entresto.  Losartan was stopped and replaced with Entresto.  Will make the change for home.  Patient is feeling better this afternoon but her weight is up to 106 pounds.

## 2024-09-18 NOTE — TELEPHONE ENCOUNTER
Patient was discharged from Hospital  Sunday, reviewed medication list notices the Torsemide has changed. Since she has been home is twitching, and oxygen level is lower than normal, gained some weight.

## 2024-09-20 ENCOUNTER — OFFICE VISIT (OUTPATIENT)
Dept: WOUND CARE | Facility: CLINIC | Age: 89
End: 2024-09-20
Payer: MEDICARE

## 2024-09-20 PROCEDURE — 11042 DBRDMT SUBQ TIS 1ST 20SQCM/<: CPT

## 2024-09-23 ENCOUNTER — OFFICE VISIT (OUTPATIENT)
Dept: CARDIOLOGY | Facility: HOSPITAL | Age: 89
End: 2024-09-23
Payer: MEDICARE

## 2024-09-23 VITALS
HEART RATE: 66 BPM | DIASTOLIC BLOOD PRESSURE: 70 MMHG | SYSTOLIC BLOOD PRESSURE: 126 MMHG | RESPIRATION RATE: 18 BRPM | WEIGHT: 108.9 LBS | OXYGEN SATURATION: 95 % | BODY MASS INDEX: 19.92 KG/M2

## 2024-09-23 DIAGNOSIS — I50.42 CHRONIC COMBINED SYSTOLIC AND DIASTOLIC HEART FAILURE: Primary | ICD-10-CM

## 2024-09-23 DIAGNOSIS — I50.32 CHRONIC HEART FAILURE WITH PRESERVED EJECTION FRACTION: ICD-10-CM

## 2024-09-23 PROCEDURE — 1157F ADVNC CARE PLAN IN RCRD: CPT | Performed by: NURSE PRACTITIONER

## 2024-09-23 PROCEDURE — 1159F MED LIST DOCD IN RCRD: CPT | Performed by: NURSE PRACTITIONER

## 2024-09-23 PROCEDURE — 1111F DSCHRG MED/CURRENT MED MERGE: CPT | Performed by: NURSE PRACTITIONER

## 2024-09-23 PROCEDURE — 99213 OFFICE O/P EST LOW 20 MIN: CPT | Performed by: NURSE PRACTITIONER

## 2024-09-23 PROCEDURE — 1036F TOBACCO NON-USER: CPT | Performed by: NURSE PRACTITIONER

## 2024-09-23 PROCEDURE — 3074F SYST BP LT 130 MM HG: CPT | Performed by: NURSE PRACTITIONER

## 2024-09-23 PROCEDURE — 1126F AMNT PAIN NOTED NONE PRSNT: CPT | Performed by: NURSE PRACTITIONER

## 2024-09-23 PROCEDURE — 1123F ACP DISCUSS/DSCN MKR DOCD: CPT | Performed by: NURSE PRACTITIONER

## 2024-09-23 PROCEDURE — 3078F DIAST BP <80 MM HG: CPT | Performed by: NURSE PRACTITIONER

## 2024-09-23 RX ORDER — TORSEMIDE 20 MG/1
60 TABLET ORAL DAILY
Qty: 270 TABLET | Refills: 3 | Status: SHIPPED | OUTPATIENT
Start: 2024-09-23 | End: 2025-09-23

## 2024-09-23 ASSESSMENT — ENCOUNTER SYMPTOMS
COUGH: 0
CONFUSION: 0
WHEEZING: 0
LIGHT-HEADEDNESS: 0
EYES NEGATIVE: 1
WEAKNESS: 0
ACTIVITY CHANGE: 0
BLOOD IN STOOL: 0
FEVER: 0
DEPRESSION: 0
ABDOMINAL DISTENTION: 0
SHORTNESS OF BREATH: 1
PALPITATIONS: 0
OCCASIONAL FEELINGS OF UNSTEADINESS: 0
CHILLS: 0
LOSS OF SENSATION IN FEET: 0
HEMATURIA: 0
CHEST TIGHTNESS: 0

## 2024-09-23 ASSESSMENT — PAIN SCALES - GENERAL: PAINLEVEL: 0-NO PAIN

## 2024-09-23 NOTE — PROGRESS NOTES
Subjective   Patient ID: Adwoa Forrest is a 89 y.o. female who presents for follow-up of congestive heart failure.     Current Outpatient Medications:     acetaminophen (Tylenol 8 HOUR) 650 mg ER tablet, Take 1 tablet (650 mg) by mouth every 8 hours if needed for mild pain (1 - 3). Do not crush, chew, or split., Disp: , Rfl:     albuterol 90 mcg/actuation inhaler, Inhale 2 puffs every 4 hours if needed for shortness of breath., Disp: 18 g, Rfl: 11    ALPRAZolam (Xanax) 0.5 mg tablet, Take 1 tablet (0.5 mg) by mouth 2 times a day., Disp: 180 tablet, Rfl: 0    amiodarone (Pacerone) 200 mg tablet, Take 1 tablet (200 mg) by mouth once daily., Disp: 90 tablet, Rfl: 3    aspirin 81 mg EC tablet, Take 1 tablet (81 mg) by mouth once daily., Disp: , Rfl:     atorvastatin (Lipitor) 80 mg tablet, Take 1 tablet (80 mg) by mouth once daily. At bedtime, Disp: 90 tablet, Rfl: 3    calcitriol (Rocaltrol) 0.25 mcg capsule, Take 1 capsule (0.25 mcg) by mouth once daily. Three times a week., Disp: , Rfl:     calcium acetate (Phoslo) 667 mg capsule, Take 1 capsule (667 mg) by mouth 2 times a day before meals., Disp: , Rfl:     Farxiga 10 mg, TAKE 1 TABLET BY MOUTH EVERY DAY, Disp: 90 tablet, Rfl: 3    levothyroxine (Synthroid, Levoxyl) 25 mcg tablet, Take 1 tablet (25 mcg) by mouth once daily., Disp: 90 tablet, Rfl: 3    metoprolol succinate XL (Toprol-XL) 50 mg 24 hr tablet, Take 1 tablet (50 mg) by mouth once daily., Disp: 90 tablet, Rfl: 3    multivitamin tablet, Take 1 tablet by mouth once daily., Disp: , Rfl:     sacubitriL-valsartan (Entresto) 24-26 mg tablet, Take 1 tablet by mouth 2 times a day., Disp: 180 tablet, Rfl: 0    torsemide (Demadex) 20 mg tablet, Take 2 tablets (40 mg) by mouth 2 times a day. Patient states takes one tablet twice a day. (Patient taking differently: Take 3 tablets (60 mg) by mouth once daily.), Disp: 360 tablet, Rfl: 3    umeclidinium-vilanteroL (Anoro Ellipta) 62.5-25 mcg/actuation blister with  device, Inhale 1 puff once daily., Disp: 1 each, Rfl: 11    ferrous gluconate (Fergon) 240 (27 Fe) MG tablet, 1T;PO;QD (Patient not taking: Reported on 9/23/2024), Disp: 90 tablet, Rfl: 1     HPI   Past medical history of ischemic heart disease with PCI in the past. 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 She has had nonsustained ventricular tachycardia in the past from which she was symptomatic. She has been pretty well-controlled with that on amiodarone. Echocardiogram has showed a significant drop in her ejection fraction from 70% to 40%. During the same time she has had worsening of her renal insufficiency as well. Recent stress MPI normal. She had hospitalization on March 4, 2024 with pneumonia and comorbid CHF. The lateral wall lead was unable to be placed to convert to BiV pacer recently. She remains easily sob. She does not appear to be congested. CXR in past shows significant emphysematous changes.     Patient had recent hospitalization for acute on chronic heart failure.  Weight is stable.  She is not complaining of worsening shortness of breath since discharge.  No chest pain no edema.  She actually denies orthopnea.  No PND.  No near travis syncope.  Review of systems    Review of Systems   Constitutional:  Negative for activity change, chills and fever.   HENT:  Negative for hearing loss.    Eyes: Negative.    Respiratory:  Positive for shortness of breath. Negative for cough, chest tightness and wheezing.         Chronic COPD.  O2 dependent on 3 liters.  Denies orthopnea,  no PND.    Cardiovascular:  Negative for chest pain, palpitations and leg swelling.   Gastrointestinal:  Negative for abdominal distention and blood in stool.   Genitourinary:  Negative for hematuria.   Neurological:  Negative for syncope, weakness and light-headedness.   Psychiatric/Behavioral:  Negative for confusion.        Objective     /70 (BP Location: Left arm, Patient  Position: Sitting)   Pulse 66   Resp 18   Wt 49.4 kg (108 lb 14.4 oz)   LMP  (LMP Unknown)   SpO2 95% Comment: 3 LPM  BMI 19.92 kg/m²         Transthoracic Echo Limited    Result Date: 2/21/2024              [place] [address]      Phone [phone] Fax [phone] TRANSTHORACIC ECHOCARDIOGRAM REPORT  Patient Name:      CHERRY Noel Physician:    [id number] Darrel Hampton MD Study Date:        2/20/2024            Ordering Provider:    [id number] BROOKE FOFANA MRN/PID:           [id number]             Fellow: Accession#:        [id number]         Nurse:                Nette Ott RN Date of Birth/Age: 1935 / 88 years  Sonographer:          Estelita Brown RDCS Gender:            F                    Additional Staff: Height:            157.48 cm            Admit Date: Weight:            45.36 kg             Admission Status:     Outpatient BSA / BMI:         1.42 m2 / 18.29      Department Location:                    kg/m2 Blood Pressure: 102 /58 mmHg Study Type:    TRANSTHORACIC ECHO (TTE) LIMITED Diagnosis/ICD: Ventricular hypertrophy or dilatation-I51.7 Indication:    Left Ventricular hypertrophy CPT Codes:     Echo Limited-32535 Patient History: Pertinent History: LVH. Study Detail: Technically challenging study due to small rib spaces and               prominent lung artifact. Definity used as a contrast agent for               endocardial border definition. Unable to obtain suprasternal notch               and subcostal view.  PHYSICIAN INTERPRETATION: Left Ventricle: Left ventricular systolic function is moderately decreased, with an estimated ejection fraction of 35-40%. There is global hypokinesis of the left ventricle with minor regional variations.  The left ventricular cavity size was not assessed. The left ventricular septal wall thickness is moderately increased. There is moderately increased left ventricular posterior wall thickness. There is moderate to severe left ventricular hypertrophy. Left ventricular diastolic filling was not assessed. Left Atrium: The left atrium was not assessed. Right Ventricle: The right ventricle was not assessed. Right ventricular systolic function not assessed. Right Atrium: The right atrium was not assessed. Aortic Valve: The aortic valve was not assessed. Aortic valve regurgitation was not assessed. Mitral Valve: The mitral valve was not assessed. Mitral valve regurgitation was not assessed. Tricuspid Valve: The tricuspid valve was not assessed. Tricuspid regurgitation was not assessed. Pulmonic Valve: The pulmonic valve was not assessed. The pulmonic valve regurgitation was not assessed. Pericardium: Pericardial effusion was not assessed. Aorta: The aortic root was not assessed.  CONCLUSIONS:  1. Left ventricular systolic function is moderately decreased with a 35-40% estimated ejection fraction.  2. Moderately increased left ventricular septal thickness.  3. The left ventricular posterior wall thickness is moderately increased.  4. There is moderate to severe left ventricular hypertrophy.  5. There is global hypokinesis of the left ventricle with minor regional variations. QUANTITATIVE DATA SUMMARY: 2D MEASUREMENTS:                           Normal Ranges: LAs:           3.00 cm    (2.7-4.0cm) IVSd:          1.62 cm    (0.6-1.1cm) LVPWd:         1.44 cm    (0.6-1.1cm) LVIDd:         3.62 cm    (3.9-5.9cm) LVIDs:         2.69 cm LV Mass Index: 146.9 g/m2 LV % FS        25.6 % LV SYSTOLIC FUNCTION BY 2D PLANIMETRY (MOD):                     Normal Ranges: EF-A4C View: 33.5 % (>=55%) EF-A2C View: 39.2 % EF-Biplane:  39.0 %  [id number] Darrel Hampton MD Electronically signed on 2/21/2024 at 9:38:31 AM  ** Final **     Transthoracic  echo (TTE) complete    Result Date: 10/3/2023              [place] [id number]      Phone [phone] Fax [phone] TRANSTHORACIC ECHOCARDIOGRAM REPORT  Patient Name:      CHERRY ROJAS JESUS Noel Physician:   [id number] Darrel Hampton MD Study Date:        10/3/2023          Ordering Provider:   [id number] STEWART FERREIRA MRN/PID:           [id number]           Fellow: Accession#:        [id number]       Nurse:               Amairani Zafar RN Date of Birth/Age: 1935 / 88      Sonographer:         Thea Espinoza RDCS                    years Gender:            F                  Additional Staff: Height:            157.48 cm          Admit Date:          10/2/2023 Weight:            50.35 kg           Admission Status:    Inpatient - Routine BSA:               1.49 m2            Department Location: [facility] Echo Lab Blood Pressure: 118 /71 mmHg Study Type:    TRANSTHORACIC ECHO (TTE) COMPLETE Diagnosis/ICD: Heart failure, unspecified-I50.9; Cardiomyopathy in diseases                classified elsewhere-I43 Indication:    CHF, Cardiomyopathy  Study Detail: The following Echo studies were performed: 2D, M-Mode, Doppler and               color flow. Technically challenging study due to body habitus,               prominent lung artifact and poor acoustic windows. Agitated saline               used as a contrast agent for intraseptal flow evaluation and               Optison used as a contrast agent for endocardial border               definition.  PHYSICIAN INTERPRETATION: Left Ventricle: Left ventricular systolic function is normal, with an estimated ejection fraction of 70-75%. There are no regional wall motion abnormalities. The left ventricular cavity size is normal. There is moderate left ventricular hypertrophy. Spectral Doppler shows a normal pattern of left ventricular diastolic filling. Left Atrium: The  left atrium is normal in size. A bubble study using agitated saline was performed. Bubble study is negative. Right Ventricle: The right ventricle is normal in size. There is mildly reduced right ventricular systolic function. A device is visualized in the right ventricle. Right Atrium: The right atrium is moderately dilated. There is a device visualized in the right atrium. Aortic Valve: The aortic valve appears structurally normal. There is mild aortic valve cusp calcification. There is mild to moderate aortic valve thickening. There is mild to moderate aortic valve regurgitation. The peak instantaneous gradient of the aortic valve is 4.2 mmHg. The mean gradient of the aortic valve is 2.0 mmHg. Mitral Valve: The mitral valve is normal in structure. There is trace mitral valve regurgitation. Tricuspid Valve: The tricuspid valve is structurally normal. There is mild tricuspid regurgitation. The Doppler estimated RVSP is moderately elevated at 51.7 mmHg. Pulmonic Valve: The pulmonic valve is not well visualized. There is no indication of pulmonic valve regurgitation. Pericardium: There is no pericardial effusion noted. Aorta: The aortic root is normal.  CONCLUSIONS:  1. Left ventricular systolic function is normal with a 70-75% estimated ejection fraction.  2. There is moderate left ventricular hypertrophy.  3. There is mildly reduced right ventricular systolic function.  4. The right atrium is moderately dilated.  5. Moderately elevated right ventricular systolic pressure.  6. Mild to moderate aortic valve regurgitation. QUANTITATIVE DATA SUMMARY: 2D MEASUREMENTS:                           Normal Ranges: Ao Root d:     3.00 cm    (2.0-3.7cm) LAs:           3.50 cm    (2.7-4.0cm) IVSd:          1.60 cm    (0.6-1.1cm) LVPWd:         1.42 cm    (0.6-1.1cm) LVIDd:         3.91 cm    (3.9-5.9cm) LVIDs:         2.40 cm LV Mass Index: 153.0 g/m2 LV % FS        38.6 % LA VOLUME:                               Normal Ranges:  LA Vol A4C:        39.2 ml    (22+/-6mL/m2) LA Vol A2C:        50.4 ml LA Vol BP:         49.6 ml LA Vol Index A4C:  26.3ml/m2 LA Vol Index A2C:  33.8 ml/m2 LA Vol Index BP:   33.3 ml/m2 LA Area A4C:       14.7 cm2 LA Area A2C:       18.6 cm2 LA Major Axis A4C: 4.7 cm LA Major Axis A2C: 5.8 cm RA VOLUME BY A/L METHOD:                       Normal Ranges: RA Area A4C: 24.5 cm2 AORTA MEASUREMENTS:                      Normal Ranges: Ao Sinus, d: 3.00 cm (2.1-3.5cm) Ao STJ, d:   1.89 cm (1.7-3.4cm) Asc Ao, d:   3.60 cm (2.1-3.4cm) LV SYSTOLIC FUNCTION BY 2D PLANIMETRY (MOD):                     Normal Ranges: EF-A4C View: 76.2 % (>=55%) EF-A2C View: 73.7 % EF-Biplane:  74.5 % LV DIASTOLIC FUNCTION:                           Normal Ranges: MV Peak E:    0.58 m/s    (0.7-1.2 m/s) MV Peak A:    0.23 m/s    (0.42-0.7 m/s) E/A Ratio:    2.57        (1.0-2.2) MV e'         0.04 m/s    (>8.0) MV lateral e' 0.06 m/s MV medial e'  0.03 m/s MV A Dur:     103.00 msec E/e' Ratio:   12.96       (<8.0) MITRAL VALVE:                 Normal Ranges: MV DT: 237 msec (150-240msec) AORTIC VALVE:                                   Normal Ranges: AoV Vmax:                1.03 m/s (<=1.7m/s) AoV Peak P.2 mmHg (<20mmHg) AoV Mean P.0 mmHg (1.7-11.5mmHg) LVOT Max Henry:            0.70 m/s (<=1.1m/s) AoV VTI:                 20.70 cm (18-25cm) LVOT VTI:                14.40 cm LVOT Diameter:           2.00 cm  (1.8-2.4cm) AoV Area, VTI:           2.19 cm2 (2.5-5.5cm2) AoV Area,Vmax:           2.13 cm2 (2.5-4.5cm2) AoV Dimensionless Index: 0.70 AORTIC INSUFFICIENCY: AI Vmax:       4.19 m/s AI Half-time:  438 msec AI Decel Rate: 335.50 cm/s2  RIGHT VENTRICLE: RV Basal 3.64 cm RV Mid   3.19 cm RV Major 7.5 cm TAPSE:   20.8 mm RV s'    0.07 m/s TRICUSPID VALVE/RVSP:                             Normal Ranges: Peak TR Velocity: 3.49 m/s RV Syst Pressure: 51.7 mmHg (< 30mmHg) IVC Diam:         1.50 cm  [id number] Darrel Hampton MD  Electronically signed on 10/3/2023 at 4:11:07 PM  ** Final **       Lab Results   Component Value Date    BUN 58 (H) 09/15/2024    CREATININE 1.94 (H) 09/15/2024    BNP 1,495 (H) 09/12/2024    MG 2.57 (H) 09/15/2024    K 4.8 09/15/2024     09/15/2024       Constitutional:       General:  NAD   HENT:      Head: Normocephalic     Mouth: Mucous membranes are moist.      Neck:  No JVD or HJR   Eyes:      Conjunctiva/sclera: Conjunctivae normal.    Cardiovascular:      Rate and Rhythm: Normal rate and regular rhythm      Heart sounds:  S1 S2 normal, + systolic murmur, no S3 or S4   Pulmonary:      Pulmonary effort is normal.  Normal breath sounds. No wheezing or rales.   Abdominal:      General: Bowel sounds are normal, non- tender to palpitations. Liver is non palpable at  right costal margin.   Musculoskeletal:         General: No swelling/ bilateral lower edema.  DUARTE well.   Skin:     General: Skin is warm and dry   Neurological:      General: No focal deficit present.      Mental Status: alert and oriented to person, place, and time. Mental status is at baseline.     Psychiatric:         Mood and Affect: Normal Affect.     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   GFR: 25     GDMT:  BB-Metoprolol XL 50 mg daily   ARB/ACEI/ARNI - Entresto 24/26 mg 1 tablet twice a day.   MRA -  GFR less than 30   SGLT2i - Farxiga 10 mg once a day   Diuretic -  Torsemide 20 mg  3 tablet daily    Device Therapy: PPM - 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 ....  was unable to upgrade to Bi V pacer.      CHF:  No overt congestion today.   Medications clarified.       2. Atrial fibrillation: persistent.   She is s/p AV node ablation and PPM placement in the past. On ASA.  No other OAC due  to hx of recurrent GIB     3. NSVT:  Amiodarone.  No palpitations.       3. CKD 3: GFR as noted. She is following with nephrology.  Just had adjustment to diuretic instructions today.      4. HTN:  Controlled.       5. ASHD with PCI to LAD 2017:  Secondary prevention medications ASA, Statin, BB.   Recent stress/MPI scan negative for ischemia.   No angina.  Stable.      6. Chronic COPD:  Following with pulmonology.       Rosangela Lane, APRN-CNP

## 2024-09-23 NOTE — PATIENT INSTRUCTIONS
Thank you for coming in today.  If you have any questions you may contact the office Monday through Friday at [phone] or on week ends at [phone].    Please take the Entresto 24/26 mg 1 tablet twice a day.     Please take the Torsemide 20 mg tablet 3 tablets every day  (Total of 60 mg per day total)     Please follow  a 2 GM sodium diet and limit fluid intake to 2 liters per day or 8 servings ( serving size = 8 oz. = 1 cup = 240 ml) per day.   Please avoid processed meat products (luncheon meats, sausages, loya, hot dogs for example) eat 4 servings of vegetables and 1-2 whole servings of whole fruits per day.   Please weigh daily and call [phone] for weight gain of 3 pounds in 24 hours or 5 pounds or if you experience increased swelling or shortness of breath.    Lab work 9/30/24.      Follow up:  4 weeks.     Please be sure to follow up with your cardiologist at [facility] once every year. Call [phone] to schedule appointment if you do not have a follow up appointment scheduled already.

## 2024-09-23 NOTE — TELEPHONE ENCOUNTER
Called to check on her.  She still is not feeling well.  She is seeing Rosangela this afternoon.  Her friend will be picking her up soon.  I asked her to take all her medications she is taking with her to the visit.  She is agreeable.

## 2024-09-24 DIAGNOSIS — I50.42 CHRONIC COMBINED SYSTOLIC AND DIASTOLIC HEART FAILURE: ICD-10-CM

## 2024-09-24 DIAGNOSIS — I50.42 CHRONIC COMBINED SYSTOLIC AND DIASTOLIC HEART FAILURE: Primary | ICD-10-CM

## 2024-09-24 DIAGNOSIS — Z86.73 PERSONAL HISTORY OF TRANSIENT ISCHEMIC ATTACK (TIA), AND CEREBRAL INFARCTION WITHOUT RESIDUAL DEFICITS: ICD-10-CM

## 2024-09-25 ENCOUNTER — TELEPHONE (OUTPATIENT)
Dept: CARDIOLOGY | Facility: HOSPITAL | Age: 89
End: 2024-09-25
Payer: MEDICARE

## 2024-09-25 NOTE — TELEPHONE ENCOUNTER
"She called to report her blood pressure is 98/50.  She thought that was low.  She rechecked it a few minutes later and it was above 130/80.  She is asymptomatic.  She denies having increased shortness of breath, dizziness, near or travis syncope.  \"Actually I feel pretty good today.\"  She can dink a small glass of water when she gets a low reading.  If she is having symptoms and multiple low readings, she should let us know.  She verbalized understanding.  She is also having \"shaky/twitchy\" arms and legs.  This is not new.  Encouraged her to talk with Dr. Farmer about that.  "

## 2024-09-28 ENCOUNTER — APPOINTMENT (OUTPATIENT)
Dept: RADIOLOGY | Facility: HOSPITAL | Age: 89
End: 2024-09-28
Payer: MEDICARE

## 2024-09-28 ENCOUNTER — APPOINTMENT (OUTPATIENT)
Dept: CARDIOLOGY | Facility: HOSPITAL | Age: 89
End: 2024-09-28
Payer: MEDICARE

## 2024-09-28 ENCOUNTER — HOSPITAL ENCOUNTER (EMERGENCY)
Facility: HOSPITAL | Age: 89
Discharge: HOME | End: 2024-09-28
Attending: STUDENT IN AN ORGANIZED HEALTH CARE EDUCATION/TRAINING PROGRAM
Payer: MEDICARE

## 2024-09-28 VITALS
RESPIRATION RATE: 16 BRPM | DIASTOLIC BLOOD PRESSURE: 73 MMHG | HEART RATE: 64 BPM | HEIGHT: 62 IN | WEIGHT: 106 LBS | SYSTOLIC BLOOD PRESSURE: 149 MMHG | TEMPERATURE: 98.2 F | OXYGEN SATURATION: 98 % | BODY MASS INDEX: 19.51 KG/M2

## 2024-09-28 DIAGNOSIS — I50.9 ACUTE ON CHRONIC CONGESTIVE HEART FAILURE, UNSPECIFIED HEART FAILURE TYPE: Primary | ICD-10-CM

## 2024-09-28 LAB
ALBUMIN SERPL BCP-MCNC: 3.5 G/DL (ref 3.4–5)
ALP SERPL-CCNC: 103 U/L (ref 33–136)
ALT SERPL W P-5'-P-CCNC: 17 U/L (ref 7–45)
ANION GAP SERPL CALC-SCNC: 12 MMOL/L (ref 10–20)
AST SERPL W P-5'-P-CCNC: 22 U/L (ref 9–39)
BASOPHILS # BLD AUTO: 0.07 X10*3/UL (ref 0–0.1)
BASOPHILS NFR BLD AUTO: 1.4 %
BILIRUB SERPL-MCNC: 0.5 MG/DL (ref 0–1.2)
BNP SERPL-MCNC: 1750 PG/ML (ref 0–99)
BUN SERPL-MCNC: 47 MG/DL (ref 6–23)
CALCIUM SERPL-MCNC: 8.2 MG/DL (ref 8.6–10.3)
CARDIAC TROPONIN I PNL SERPL HS: 38 NG/L (ref 0–13)
CARDIAC TROPONIN I PNL SERPL HS: 43 NG/L (ref 0–13)
CHLORIDE SERPL-SCNC: 93 MMOL/L (ref 98–107)
CO2 SERPL-SCNC: 33 MMOL/L (ref 21–32)
CREAT SERPL-MCNC: 1.88 MG/DL (ref 0.5–1.05)
EGFRCR SERPLBLD CKD-EPI 2021: 25 ML/MIN/1.73M*2
EOSINOPHIL # BLD AUTO: 0.24 X10*3/UL (ref 0–0.4)
EOSINOPHIL NFR BLD AUTO: 4.7 %
ERYTHROCYTE [DISTWIDTH] IN BLOOD BY AUTOMATED COUNT: 17.3 % (ref 11.5–14.5)
GLUCOSE SERPL-MCNC: 220 MG/DL (ref 74–99)
HCT VFR BLD AUTO: 42.3 % (ref 36–46)
HGB BLD-MCNC: 13.1 G/DL (ref 12–16)
IMM GRANULOCYTES # BLD AUTO: 0.02 X10*3/UL (ref 0–0.5)
IMM GRANULOCYTES NFR BLD AUTO: 0.4 % (ref 0–0.9)
LYMPHOCYTES # BLD AUTO: 0.4 X10*3/UL (ref 0.8–3)
LYMPHOCYTES NFR BLD AUTO: 7.8 %
MCH RBC QN AUTO: 29.6 PG (ref 26–34)
MCHC RBC AUTO-ENTMCNC: 31 G/DL (ref 32–36)
MCV RBC AUTO: 96 FL (ref 80–100)
MONOCYTES # BLD AUTO: 0.52 X10*3/UL (ref 0.05–0.8)
MONOCYTES NFR BLD AUTO: 10.1 %
NEUTROPHILS # BLD AUTO: 3.89 X10*3/UL (ref 1.6–5.5)
NEUTROPHILS NFR BLD AUTO: 75.6 %
NRBC BLD-RTO: 0 /100 WBCS (ref 0–0)
PLATELET # BLD AUTO: 311 X10*3/UL (ref 150–450)
POTASSIUM SERPL-SCNC: 4.3 MMOL/L (ref 3.5–5.3)
PROT SERPL-MCNC: 6 G/DL (ref 6.4–8.2)
RBC # BLD AUTO: 4.42 X10*6/UL (ref 4–5.2)
SODIUM SERPL-SCNC: 134 MMOL/L (ref 136–145)
WBC # BLD AUTO: 5.1 X10*3/UL (ref 4.4–11.3)

## 2024-09-28 PROCEDURE — 70450 CT HEAD/BRAIN W/O DYE: CPT | Performed by: RADIOLOGY

## 2024-09-28 PROCEDURE — 85025 COMPLETE CBC W/AUTO DIFF WBC: CPT | Performed by: STUDENT IN AN ORGANIZED HEALTH CARE EDUCATION/TRAINING PROGRAM

## 2024-09-28 PROCEDURE — 71045 X-RAY EXAM CHEST 1 VIEW: CPT | Mod: FOREIGN READ | Performed by: RADIOLOGY

## 2024-09-28 PROCEDURE — 93005 ELECTROCARDIOGRAM TRACING: CPT

## 2024-09-28 PROCEDURE — 2500000004 HC RX 250 GENERAL PHARMACY W/ HCPCS (ALT 636 FOR OP/ED): Performed by: STUDENT IN AN ORGANIZED HEALTH CARE EDUCATION/TRAINING PROGRAM

## 2024-09-28 PROCEDURE — 83880 ASSAY OF NATRIURETIC PEPTIDE: CPT | Performed by: STUDENT IN AN ORGANIZED HEALTH CARE EDUCATION/TRAINING PROGRAM

## 2024-09-28 PROCEDURE — 36415 COLL VENOUS BLD VENIPUNCTURE: CPT | Performed by: STUDENT IN AN ORGANIZED HEALTH CARE EDUCATION/TRAINING PROGRAM

## 2024-09-28 PROCEDURE — 93971 EXTREMITY STUDY: CPT | Mod: FOREIGN READ | Performed by: RADIOLOGY

## 2024-09-28 PROCEDURE — 93970 EXTREMITY STUDY: CPT

## 2024-09-28 PROCEDURE — 84484 ASSAY OF TROPONIN QUANT: CPT | Performed by: STUDENT IN AN ORGANIZED HEALTH CARE EDUCATION/TRAINING PROGRAM

## 2024-09-28 PROCEDURE — 70450 CT HEAD/BRAIN W/O DYE: CPT

## 2024-09-28 PROCEDURE — 99285 EMERGENCY DEPT VISIT HI MDM: CPT | Mod: 25

## 2024-09-28 PROCEDURE — 71045 X-RAY EXAM CHEST 1 VIEW: CPT

## 2024-09-28 PROCEDURE — 80053 COMPREHEN METABOLIC PANEL: CPT | Performed by: STUDENT IN AN ORGANIZED HEALTH CARE EDUCATION/TRAINING PROGRAM

## 2024-09-28 PROCEDURE — 96374 THER/PROPH/DIAG INJ IV PUSH: CPT

## 2024-09-28 RX ORDER — FUROSEMIDE 10 MG/ML
60 INJECTION INTRAMUSCULAR; INTRAVENOUS ONCE
Status: COMPLETED | OUTPATIENT
Start: 2024-09-28 | End: 2024-09-28

## 2024-09-28 RX ADMIN — FUROSEMIDE 60 MG: 10 INJECTION, SOLUTION INTRAMUSCULAR; INTRAVENOUS at 18:41

## 2024-09-28 ASSESSMENT — LIFESTYLE VARIABLES
EVER FELT BAD OR GUILTY ABOUT YOUR DRINKING: NO
EVER HAD A DRINK FIRST THING IN THE MORNING TO STEADY YOUR NERVES TO GET RID OF A HANGOVER: NO
HAVE YOU EVER FELT YOU SHOULD CUT DOWN ON YOUR DRINKING: NO
TOTAL SCORE: 0
HAVE PEOPLE ANNOYED YOU BY CRITICIZING YOUR DRINKING: NO

## 2024-09-28 ASSESSMENT — PAIN SCALES - GENERAL: PAINLEVEL_OUTOF10: 4

## 2024-09-28 ASSESSMENT — PAIN DESCRIPTION - PAIN TYPE: TYPE: ACUTE PAIN

## 2024-09-28 ASSESSMENT — PAIN DESCRIPTION - ORIENTATION: ORIENTATION: MID

## 2024-09-28 ASSESSMENT — PAIN DESCRIPTION - LOCATION: LOCATION: CHEST

## 2024-09-28 ASSESSMENT — PAIN - FUNCTIONAL ASSESSMENT: PAIN_FUNCTIONAL_ASSESSMENT: 0-10

## 2024-09-29 NOTE — DISCHARGE INSTRUCTIONS
Please follow up with cardiologist as soon as possible.  Come back to the ED for any new or worsening symptoms such as worsening shortness of breath or chest pain.

## 2024-09-30 ENCOUNTER — TELEPHONE (OUTPATIENT)
Dept: CARDIOLOGY | Facility: HOSPITAL | Age: 89
End: 2024-09-30
Payer: MEDICARE

## 2024-09-30 LAB
ATRIAL RATE: 65 BPM
P AXIS: 37 DEGREES
PR INTERVAL: 188 MS
Q ONSET: 251 MS
QRS COUNT: 10 BEATS
QRS DURATION: 143 MS
QT INTERVAL: 470 MS
QTC CALCULATION(BAZETT): 489 MS
QTC FREDERICIA: 482 MS
R AXIS: 238 DEGREES
T AXIS: 84 DEGREES
T OFFSET: 486 MS
VENTRICULAR RATE: 65 BPM

## 2024-09-30 NOTE — TELEPHONE ENCOUNTER
She called to report she was in ER over the weekend and they recommended she be seen in the next few days by her cardiologist.  They had her hold her Entresto because she was reporting low blood pressure readings.  This morning she was 108/53 and 93/52.  She has not taken Entresto since before she was in ER. Scheduled her in CHF clinic tomorrow, 10/1 at 2:20 pm.  She was instructed to go to ER if she felt worse before then.  She correctly repeated instructions, verbalized understanding and is agreeable to the plan.

## 2024-10-01 ENCOUNTER — DOCUMENTATION (OUTPATIENT)
Dept: PRIMARY CARE | Facility: CLINIC | Age: 89
End: 2024-10-01
Payer: MEDICARE

## 2024-10-01 ENCOUNTER — OFFICE VISIT (OUTPATIENT)
Dept: CARDIOLOGY | Facility: HOSPITAL | Age: 89
End: 2024-10-01
Payer: MEDICARE

## 2024-10-01 ENCOUNTER — INFUSION (OUTPATIENT)
Dept: INFUSION THERAPY | Facility: HOSPITAL | Age: 89
End: 2024-10-01
Payer: MEDICARE

## 2024-10-01 VITALS
DIASTOLIC BLOOD PRESSURE: 63 MMHG | HEART RATE: 67 BPM | RESPIRATION RATE: 26 BRPM | SYSTOLIC BLOOD PRESSURE: 109 MMHG | WEIGHT: 107.2 LBS | BODY MASS INDEX: 19.61 KG/M2 | OXYGEN SATURATION: 90 %

## 2024-10-01 VITALS — DIASTOLIC BLOOD PRESSURE: 66 MMHG | SYSTOLIC BLOOD PRESSURE: 110 MMHG | HEART RATE: 62 BPM

## 2024-10-01 DIAGNOSIS — I50.43 ACUTE ON CHRONIC COMBINED SYSTOLIC AND DIASTOLIC HEART FAILURE: ICD-10-CM

## 2024-10-01 DIAGNOSIS — N18.30 STAGE 3 CHRONIC KIDNEY DISEASE, UNSPECIFIED WHETHER STAGE 3A OR 3B CKD (MULTI): ICD-10-CM

## 2024-10-01 DIAGNOSIS — N18.30 HYPERTENSIVE HEART AND CHRONIC KIDNEY DISEASE STAGE 3: ICD-10-CM

## 2024-10-01 DIAGNOSIS — I50.42 CHRONIC COMBINED SYSTOLIC AND DIASTOLIC HEART FAILURE: Primary | ICD-10-CM

## 2024-10-01 DIAGNOSIS — I13.10 HYPERTENSIVE HEART AND CHRONIC KIDNEY DISEASE STAGE 3: ICD-10-CM

## 2024-10-01 DIAGNOSIS — I50.42 CHRONIC COMBINED SYSTOLIC AND DIASTOLIC HEART FAILURE: ICD-10-CM

## 2024-10-01 LAB
ANION GAP SERPL CALC-SCNC: 10 MMOL/L (ref 10–20)
BUN SERPL-MCNC: 48 MG/DL (ref 6–23)
CALCIUM SERPL-MCNC: 8.9 MG/DL (ref 8.6–10.3)
CHLORIDE SERPL-SCNC: 95 MMOL/L (ref 98–107)
CO2 SERPL-SCNC: 38 MMOL/L (ref 21–32)
CREAT SERPL-MCNC: 1.83 MG/DL (ref 0.5–1.05)
EGFRCR SERPLBLD CKD-EPI 2021: 26 ML/MIN/1.73M*2
GLUCOSE SERPL-MCNC: 210 MG/DL (ref 74–99)
MAGNESIUM SERPL-MCNC: 2.13 MG/DL (ref 1.6–2.4)
POTASSIUM SERPL-SCNC: 4.1 MMOL/L (ref 3.5–5.3)
SODIUM SERPL-SCNC: 139 MMOL/L (ref 136–145)

## 2024-10-01 PROCEDURE — 3074F SYST BP LT 130 MM HG: CPT | Performed by: NURSE PRACTITIONER

## 2024-10-01 PROCEDURE — 36415 COLL VENOUS BLD VENIPUNCTURE: CPT

## 2024-10-01 PROCEDURE — 1157F ADVNC CARE PLAN IN RCRD: CPT | Performed by: NURSE PRACTITIONER

## 2024-10-01 PROCEDURE — 99212 OFFICE O/P EST SF 10 MIN: CPT | Performed by: NURSE PRACTITIONER

## 2024-10-01 PROCEDURE — 80048 BASIC METABOLIC PNL TOTAL CA: CPT

## 2024-10-01 PROCEDURE — 3078F DIAST BP <80 MM HG: CPT | Performed by: NURSE PRACTITIONER

## 2024-10-01 PROCEDURE — 1123F ACP DISCUSS/DSCN MKR DOCD: CPT | Performed by: NURSE PRACTITIONER

## 2024-10-01 PROCEDURE — 1036F TOBACCO NON-USER: CPT | Performed by: NURSE PRACTITIONER

## 2024-10-01 PROCEDURE — 2500000004 HC RX 250 GENERAL PHARMACY W/ HCPCS (ALT 636 FOR OP/ED): Performed by: NURSE PRACTITIONER

## 2024-10-01 PROCEDURE — 83735 ASSAY OF MAGNESIUM: CPT

## 2024-10-01 PROCEDURE — 1111F DSCHRG MED/CURRENT MED MERGE: CPT | Performed by: NURSE PRACTITIONER

## 2024-10-01 PROCEDURE — 96374 THER/PROPH/DIAG INJ IV PUSH: CPT | Mod: INF

## 2024-10-01 PROCEDURE — 1159F MED LIST DOCD IN RCRD: CPT | Performed by: NURSE PRACTITIONER

## 2024-10-01 RX ORDER — ALBUTEROL SULFATE 0.83 MG/ML
3 SOLUTION RESPIRATORY (INHALATION) AS NEEDED
Status: CANCELLED | OUTPATIENT
Start: 2024-10-01

## 2024-10-01 RX ORDER — DIPHENHYDRAMINE HYDROCHLORIDE 50 MG/ML
50 INJECTION INTRAMUSCULAR; INTRAVENOUS AS NEEDED
OUTPATIENT
Start: 2024-10-01

## 2024-10-01 RX ORDER — FAMOTIDINE 10 MG/ML
20 INJECTION INTRAVENOUS ONCE AS NEEDED
Status: CANCELLED | OUTPATIENT
Start: 2024-10-01

## 2024-10-01 RX ORDER — ALBUTEROL SULFATE 0.83 MG/ML
3 SOLUTION RESPIRATORY (INHALATION) AS NEEDED
OUTPATIENT
Start: 2024-10-01

## 2024-10-01 RX ORDER — FUROSEMIDE 10 MG/ML
30 INJECTION INTRAMUSCULAR; INTRAVENOUS ONCE
Status: CANCELLED | OUTPATIENT
Start: 2024-10-01 | End: 2024-10-01

## 2024-10-01 RX ORDER — FUROSEMIDE 10 MG/ML
30 INJECTION INTRAMUSCULAR; INTRAVENOUS ONCE
Status: COMPLETED | OUTPATIENT
Start: 2024-10-01 | End: 2024-10-01

## 2024-10-01 RX ORDER — FAMOTIDINE 10 MG/ML
20 INJECTION INTRAVENOUS ONCE AS NEEDED
OUTPATIENT
Start: 2024-10-01

## 2024-10-01 RX ORDER — EPINEPHRINE 0.3 MG/.3ML
0.3 INJECTION SUBCUTANEOUS EVERY 5 MIN PRN
OUTPATIENT
Start: 2024-10-01

## 2024-10-01 RX ORDER — EPINEPHRINE 0.3 MG/.3ML
0.3 INJECTION SUBCUTANEOUS EVERY 5 MIN PRN
Status: CANCELLED | OUTPATIENT
Start: 2024-10-01

## 2024-10-01 RX ORDER — FUROSEMIDE 10 MG/ML
30 INJECTION INTRAMUSCULAR; INTRAVENOUS ONCE
Status: CANCELLED | OUTPATIENT
Start: 2024-10-01

## 2024-10-01 RX ORDER — DIPHENHYDRAMINE HYDROCHLORIDE 50 MG/ML
50 INJECTION INTRAMUSCULAR; INTRAVENOUS AS NEEDED
Status: CANCELLED | OUTPATIENT
Start: 2024-10-01

## 2024-10-01 ASSESSMENT — ENCOUNTER SYMPTOMS
PALPITATIONS: 0
BLOOD IN STOOL: 0
CONFUSION: 0
HEMATURIA: 0
COUGH: 0
ABDOMINAL DISTENTION: 0
LIGHT-HEADEDNESS: 0
CHILLS: 0
CHEST TIGHTNESS: 0
WEAKNESS: 0
FEVER: 0
SHORTNESS OF BREATH: 1
EYES NEGATIVE: 1
ACTIVITY CHANGE: 0
WHEEZING: 0

## 2024-10-01 NOTE — PATIENT INSTRUCTIONS
Thank you for coming in today.  If you have any questions you may contact the office Monday through Friday at [phone] or on week ends at [phone].    Please take the Torsemide 20 mg tablet  20 mg orally once a day if weight is , 60mg  daily if weight between 100-105, take 40 mg twice a day mg if weight is over 105lbs and hold if weight is less than 95lbs.       We will plan to do IV Lasix every other week in clinic as needed.      Hold the Entresto for now.     Please follow  a 2 GM sodium diet and limit fluid intake to 2 liters per day or 8 servings ( serving size = 8 oz. = 1 cup = 240 ml) per day.   Please avoid processed meat products (luncheon meats, sausages, loya, hot dogs for example) eat 4 servings of vegetables and 1-2 whole servings of whole fruits per day.   Please weigh daily and call [phone] for weight gain of 3 pounds in 24 hours or 5 pounds or if you experience increased swelling or shortness of breath.         Follow up:  1 week.     Please be sure to follow up with your cardiologist at [facility] once every year. Call [phone] to schedule appointment if you do not have a follow up appointment scheduled already.

## 2024-10-01 NOTE — PROGRESS NOTES
Subjective   Patient ID: Adwoa Forrest is a 89 y.o. female who presents for follow-up of congestive heart failure.     Current Outpatient Medications:     acetaminophen (Tylenol 8 HOUR) 650 mg ER tablet, Take 1 tablet (650 mg) by mouth every 8 hours if needed for mild pain (1 - 3). Do not crush, chew, or split., Disp: , Rfl:     albuterol 90 mcg/actuation inhaler, Inhale 2 puffs every 4 hours if needed for shortness of breath., Disp: 18 g, Rfl: 11    ALPRAZolam (Xanax) 0.5 mg tablet, Take 1 tablet (0.5 mg) by mouth 2 times a day., Disp: 180 tablet, Rfl: 0    amiodarone (Pacerone) 200 mg tablet, Take 1 tablet (200 mg) by mouth once daily., Disp: 90 tablet, Rfl: 3    aspirin 81 mg EC tablet, Take 1 tablet (81 mg) by mouth once daily., Disp: , Rfl:     atorvastatin (Lipitor) 80 mg tablet, Take 1 tablet (80 mg) by mouth once daily. At bedtime, Disp: 90 tablet, Rfl: 3    calcitriol (Rocaltrol) 0.25 mcg capsule, Take 1 capsule (0.25 mcg) by mouth once daily. Three times a week., Disp: , Rfl:     calcium acetate (Phoslo) 667 mg capsule, Take 1 capsule (667 mg) by mouth 2 times a day before meals., Disp: , Rfl:     Farxiga 10 mg, TAKE 1 TABLET BY MOUTH EVERY DAY, Disp: 90 tablet, Rfl: 3    levothyroxine (Synthroid, Levoxyl) 25 mcg tablet, Take 1 tablet (25 mcg) by mouth once daily., Disp: 90 tablet, Rfl: 3    metoprolol succinate XL (Toprol-XL) 50 mg 24 hr tablet, Take 1 tablet (50 mg) by mouth once daily., Disp: 90 tablet, Rfl: 3    multivitamin tablet, Take 1 tablet by mouth once daily., Disp: , Rfl:     sacubitriL-valsartan (Entresto) 24-26 mg tablet, Take 1 tablet by mouth 2 times a day., Disp: 180 tablet, Rfl: 0    torsemide (Demadex) 20 mg tablet, Take 3 tablets (60 mg) by mouth once daily., Disp: 270 tablet, Rfl: 3    umeclidinium-vilanteroL (Anoro Ellipta) 62.5-25 mcg/actuation blister with device, Inhale 1 puff once daily., Disp: 1 each, Rfl: 11     HPI     Past medical history of ischemic heart disease with PCI in  the past. 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 She has had nonsustained ventricular tachycardia in the past from which she was symptomatic. She has been pretty well-controlled with that on amiodarone. Echocardiogram has showed a significant drop in her ejection fraction from 70% to 40%. During the same time she has had worsening of her renal insufficiency as well. Recent stress MPI normal. She had hospitalization on March 4, 2024 with pneumonia and comorbid CHF. The lateral wall lead was unable to be placed to convert to BiV pacer recently. She remains easily sob. She does not appear to be congested. CXR in past shows significant emphysematous changes.      Last hospitalization was 9/12/24 for acute heart failure.  She was diuresed and discharged home 9/15/2024.  She was evaluated on 9/23/2024 in clinic and seem to be doing well and feeling improved. However she ended up presenting back to the emergency room with low pulse ox readings and shortness of breath on 9/28/2024.  Chest x-ray showed chronic pulmonary disease and probable exacerbation of CHF.  Lower extremity Doppler study was unremarkable.  CT of the head was unremarkable.  She was discharged with oxygen to home after having IV Lasix and staying overnight.  She presents today for follow-up in heart failure clinic.    Review of Systems   Constitutional:  Negative for activity change, chills and fever.   HENT:  Negative for hearing loss.    Eyes: Negative.    Respiratory:  Positive for shortness of breath. Negative for cough, chest tightness and wheezing.         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   Positive orthopnea.  Denies PND.    Cardiovascular:  Negative for chest pain, palpitations and leg swelling.   Gastrointestinal:  Negative for abdominal distention and blood in stool.   Genitourinary:  Negative for hematuria.   Neurological:  Negative for syncope, weakness and light-headedness.    Psychiatric/Behavioral:  Negative for confusion.        Objective     /63 (BP Location: Right arm, Patient Position: Sitting, BP Cuff Size: Adult)   Pulse 67   Resp 26   Wt 48.6 kg (107 lb 3.2 oz)   LMP  (LMP Unknown)   SpO2 90% Comment: 4LPM oxygen  BMI 19.61 kg/m²          Transthoracic Echo Limited    Result Date: 2/21/2024              [place] [address]      Phone [phone] Fax [phone] TRANSTHORACIC ECHOCARDIOGRAM REPORT  Patient Name:      CHERRY BOB JESUS Noel Physician:    [id number] Darrel Hampton MD Study Date:        2/20/2024            Ordering Provider:    [id number] BROOKE FOFANA MRN/PID:           [id number]             Fellow: Accession#:        [id number]         Nurse:                Nette Ott RN Date of Birth/Age: 1935 / 88 years  Sonographer:          Estelita Brown RDCS Gender:            F                    Additional Staff: Height:            157.48 cm            Admit Date: Weight:            45.36 kg             Admission Status:     Outpatient BSA / BMI:         1.42 m2 / 18.29      Department Location:                    kg/m2 Blood Pressure: 102 /58 mmHg Study Type:    TRANSTHORACIC ECHO (TTE) LIMITED Diagnosis/ICD: Ventricular hypertrophy or dilatation-I51.7 Indication:    Left Ventricular hypertrophy CPT Codes:     Echo Limited-11826 Patient History: Pertinent History: LVH. Study Detail: Technically challenging study due to small rib spaces and               prominent lung artifact. Definity used as a contrast agent for               endocardial border definition. Unable to obtain suprasternal notch               and subcostal view.  PHYSICIAN INTERPRETATION: Left Ventricle: Left ventricular systolic function is moderately  decreased, with an estimated ejection fraction of 35-40%. There is global hypokinesis of the left ventricle with minor regional variations. The left ventricular cavity size was not assessed. The left ventricular septal wall thickness is moderately increased. There is moderately increased left ventricular posterior wall thickness. There is moderate to severe left ventricular hypertrophy. Left ventricular diastolic filling was not assessed. Left Atrium: The left atrium was not assessed. Right Ventricle: The right ventricle was not assessed. Right ventricular systolic function not assessed. Right Atrium: The right atrium was not assessed. Aortic Valve: The aortic valve was not assessed. Aortic valve regurgitation was not assessed. Mitral Valve: The mitral valve was not assessed. Mitral valve regurgitation was not assessed. Tricuspid Valve: The tricuspid valve was not assessed. Tricuspid regurgitation was not assessed. Pulmonic Valve: The pulmonic valve was not assessed. The pulmonic valve regurgitation was not assessed. Pericardium: Pericardial effusion was not assessed. Aorta: The aortic root was not assessed.  CONCLUSIONS:  1. Left ventricular systolic function is moderately decreased with a 35-40% estimated ejection fraction.  2. Moderately increased left ventricular septal thickness.  3. The left ventricular posterior wall thickness is moderately increased.  4. There is moderate to severe left ventricular hypertrophy.  5. There is global hypokinesis of the left ventricle with minor regional variations. QUANTITATIVE DATA SUMMARY: 2D MEASUREMENTS:                           Normal Ranges: LAs:           3.00 cm    (2.7-4.0cm) IVSd:          1.62 cm    (0.6-1.1cm) LVPWd:         1.44 cm    (0.6-1.1cm) LVIDd:         3.62 cm    (3.9-5.9cm) LVIDs:         2.69 cm LV Mass Index: 146.9 g/m2 LV % FS        25.6 % LV SYSTOLIC FUNCTION BY 2D PLANIMETRY (MOD):                     Normal Ranges: EF-A4C View: 33.5 % (>=55%)  EF-A2C View: 39.2 % EF-Biplane:  39.0 %  [id number] Darrel Hampton MD Electronically signed on 2/21/2024 at 9:38:31 AM  ** Final **     Transthoracic echo (TTE) complete    Result Date: 10/3/2023              [place] [address]      Phone [phone] Fax [phone] TRANSTHORACIC ECHOCARDIOGRAM REPORT  Patient Name:      CHERRY LEE      Sadie Physician:   [id number] Darrel Hampton MD Study Date:        10/3/2023          Ordering Provider:   [id number] STEWART FERREIRA MRN/PID:           [id number]           Fellow: Accession#:        [id number]       Nurse:               Amairani Zafar RN Date of Birth/Age: 1935 / 88      Sonographer:         Thea Espinoza RDCS                    years Gender:            F                  Additional Staff: Height:            157.48 cm          Admit Date:          10/2/2023 Weight:            50.35 kg           Admission Status:    Inpatient - Routine BSA:               1.49 m2            Department Location: [facility] Blood Pressure: 118 /71 mmHg Study Type:    TRANSTHORACIC ECHO (TTE) COMPLETE Diagnosis/ICD: Heart failure, unspecified-I50.9; Cardiomyopathy in diseases                classified elsewhere-I43 Indication:    CHF, Cardiomyopathy  Study Detail: The following Echo studies were performed: 2D, M-Mode, Doppler and               color flow. Technically challenging study due to body habitus,               prominent lung artifact and poor acoustic windows. Agitated saline               used as a contrast agent for intraseptal flow evaluation and               Optison used as a contrast agent for endocardial border               definition.  PHYSICIAN INTERPRETATION: Left Ventricle: Left ventricular systolic function is normal, with an estimated ejection fraction of 70-75%. There are no regional wall motion abnormalities. The left ventricular cavity size is normal.  There is moderate left ventricular hypertrophy. Spectral Doppler shows a normal pattern of left ventricular diastolic filling. Left Atrium: The left atrium is normal in size. A bubble study using agitated saline was performed. Bubble study is negative. Right Ventricle: The right ventricle is normal in size. There is mildly reduced right ventricular systolic function. A device is visualized in the right ventricle. Right Atrium: The right atrium is moderately dilated. There is a device visualized in the right atrium. Aortic Valve: The aortic valve appears structurally normal. There is mild aortic valve cusp calcification. There is mild to moderate aortic valve thickening. There is mild to moderate aortic valve regurgitation. The peak instantaneous gradient of the aortic valve is 4.2 mmHg. The mean gradient of the aortic valve is 2.0 mmHg. Mitral Valve: The mitral valve is normal in structure. There is trace mitral valve regurgitation. Tricuspid Valve: The tricuspid valve is structurally normal. There is mild tricuspid regurgitation. The Doppler estimated RVSP is moderately elevated at 51.7 mmHg. Pulmonic Valve: The pulmonic valve is not well visualized. There is no indication of pulmonic valve regurgitation. Pericardium: There is no pericardial effusion noted. Aorta: The aortic root is normal.  CONCLUSIONS:  1. Left ventricular systolic function is normal with a 70-75% estimated ejection fraction.  2. There is moderate left ventricular hypertrophy.  3. There is mildly reduced right ventricular systolic function.  4. The right atrium is moderately dilated.  5. Moderately elevated right ventricular systolic pressure.  6. Mild to moderate aortic valve regurgitation. QUANTITATIVE DATA SUMMARY: 2D MEASUREMENTS:                           Normal Ranges: Ao Root d:     3.00 cm    (2.0-3.7cm) LAs:           3.50 cm    (2.7-4.0cm) IVSd:          1.60 cm    (0.6-1.1cm) LVPWd:         1.42 cm    (0.6-1.1cm) LVIDd:         3.91 cm     (3.9-5.9cm) LVIDs:         2.40 cm LV Mass Index: 153.0 g/m2 LV % FS        38.6 % LA VOLUME:                               Normal Ranges: LA Vol A4C:        39.2 ml    (22+/-6mL/m2) LA Vol A2C:        50.4 ml LA Vol BP:         49.6 ml LA Vol Index A4C:  26.3ml/m2 LA Vol Index A2C:  33.8 ml/m2 LA Vol Index BP:   33.3 ml/m2 LA Area A4C:       14.7 cm2 LA Area A2C:       18.6 cm2 LA Major Axis A4C: 4.7 cm LA Major Axis A2C: 5.8 cm RA VOLUME BY A/L METHOD:                       Normal Ranges: RA Area A4C: 24.5 cm2 AORTA MEASUREMENTS:                      Normal Ranges: Ao Sinus, d: 3.00 cm (2.1-3.5cm) Ao STJ, d:   1.89 cm (1.7-3.4cm) Asc Ao, d:   3.60 cm (2.1-3.4cm) LV SYSTOLIC FUNCTION BY 2D PLANIMETRY (MOD):                     Normal Ranges: EF-A4C View: 76.2 % (>=55%) EF-A2C View: 73.7 % EF-Biplane:  74.5 % LV DIASTOLIC FUNCTION:                           Normal Ranges: MV Peak E:    0.58 m/s    (0.7-1.2 m/s) MV Peak A:    0.23 m/s    (0.42-0.7 m/s) E/A Ratio:    2.57        (1.0-2.2) MV e'         0.04 m/s    (>8.0) MV lateral e' 0.06 m/s MV medial e'  0.03 m/s MV A Dur:     103.00 msec E/e' Ratio:   12.96       (<8.0) MITRAL VALVE:                 Normal Ranges: MV DT: 237 msec (150-240msec) AORTIC VALVE:                                   Normal Ranges: AoV Vmax:                1.03 m/s (<=1.7m/s) AoV Peak P.2 mmHg (<20mmHg) AoV Mean P.0 mmHg (1.7-11.5mmHg) LVOT Max Henry:            0.70 m/s (<=1.1m/s) AoV VTI:                 20.70 cm (18-25cm) LVOT VTI:                14.40 cm LVOT Diameter:           2.00 cm  (1.8-2.4cm) AoV Area, VTI:           2.19 cm2 (2.5-5.5cm2) AoV Area,Vmax:           2.13 cm2 (2.5-4.5cm2) AoV Dimensionless Index: 0.70 AORTIC INSUFFICIENCY: AI Vmax:       4.19 m/s AI Half-time:  438 msec AI Decel Rate: 335.50 cm/s2  RIGHT VENTRICLE: RV Basal 3.64 cm RV Mid   3.19 cm RV Major 7.5 cm TAPSE:   20.8 mm RV s'    0.07 m/s TRICUSPID VALVE/RVSP:                              Normal Ranges: Peak TR Velocity: 3.49 m/s RV Syst Pressure: 51.7 mmHg (< 30mmHg) IVC Diam:         1.50 cm  [id number] Darrel Hampton MD Electronically signed on 10/3/2023 at 4:11:07 PM  ** Final **       Lab Results   Component Value Date    BUN 47 (H) 09/28/2024    CREATININE 1.88 (H) 09/28/2024    BNP 1,750 (H) 09/28/2024    MG 2.57 (H) 09/15/2024    K 4.3 09/28/2024     (L) 09/28/2024       Constitutional:       General:  NAD   HENT:      Head: Normocephalic     Mouth: Mucous membranes are moist.      Neck:  + JVD   Eyes:      Conjunctiva/sclera: Conjunctivae normal.    Cardiovascular:      Rate and Rhythm: Normal rate and regular rhythm      Heart sounds:  S1 S2 normal, no murmur, no S3 or S4   Pulmonary:      Pulmonary effort is normal.  Poor air exchange and diminished breath sounds. Few fibrotic breath sounds but no travis rales.   Abdominal:      General: Bowel sounds are normal, non- tender to palpitations. Liver is non palpable at  right costal margin.   Musculoskeletal:         General:  Perhaps trace at the ankles  DUARTE well.   Skin:     General: Skin is warm and dry   Neurological:      General: No focal deficit present.      Mental Status: alert and oriented to person, place, and time. Mental status is at baseline.     Psychiatric:         Mood and Affect: Normal Affect.     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   GFR: 25     GDMT:  BB-Metoprolol XL 50 mg daily   ARB/ACEI/ARNI - Entresto 24/26 mg 1 tablet twice a day.  --- HOLD   MRA -  GFR less than 30   SGLT2i - Farxiga 10 mg once a day   Diuretic -  Torsemide 20 mg Pt is to start taking 20 mg orally once a day  if weight is , 60mg  daily if weight between 100-105, take 40 mg twice a day mg if weight is over 105lbs and hold if weight is less than 95lbs.     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 ....  was unable to upgrade to Bi V pacer.      CHF:  perhaps trace edema of the LE today.  Pulse ox on O2 3-4 liters is 90-91%..   Will  check BMP/BNP today.   Will give 30 mg IV Lasix in clinic today.   Will continue Torsemide with sliding scale dosing as noted above.   Will hold the Entresto for now.  Can consider restarting low dose ACEI/ARB as tolerated.   Will continue Farxiga for now.     Patient is to use the O2 continuously.   Will plan for IV Lasix every other week in HF clinic for now.      Will begin discussion concerning her expectations given the severity of lung disease and co morbid CHF/heart disease over the next few weeks.  I think it would be reasonable to consider palliative care /hospice in the near future.      2. Atrial fibrillation: persistent.   She is s/p AV node ablation and PPM placement in the past. On ASA.  No other OAC due to hx of recurrent GIB     3. NSVT:  Amiodarone.  No palpitations.       3. CKD 3: GFR as noted. She is following with nephrology.       4. HTN:  Controlled.       5. ASHD with PCI to LAD 2017:  Secondary prevention medications ASA, Statin, BB.   Recent stress/MPI scan negative for ischemia.   No angina.  Stable.      6. Chronic COPD:  Following with pulmonology.        Rosangela Lane, APRN-CNP

## 2024-10-04 ENCOUNTER — OFFICE VISIT (OUTPATIENT)
Dept: WOUND CARE | Facility: CLINIC | Age: 89
End: 2024-10-04
Payer: MEDICARE

## 2024-10-04 PROCEDURE — 11042 DBRDMT SUBQ TIS 1ST 20SQCM/<: CPT

## 2024-10-08 ENCOUNTER — LAB (OUTPATIENT)
Dept: LAB | Facility: LAB | Age: 89
End: 2024-10-08
Payer: MEDICARE

## 2024-10-08 ENCOUNTER — DOCUMENTATION (OUTPATIENT)
Facility: HOSPITAL | Age: 89
End: 2024-10-08

## 2024-10-08 ENCOUNTER — OFFICE VISIT (OUTPATIENT)
Dept: CARDIOLOGY | Facility: HOSPITAL | Age: 89
End: 2024-10-08
Payer: MEDICARE

## 2024-10-08 VITALS
DIASTOLIC BLOOD PRESSURE: 74 MMHG | RESPIRATION RATE: 18 BRPM | BODY MASS INDEX: 19.37 KG/M2 | SYSTOLIC BLOOD PRESSURE: 122 MMHG | HEART RATE: 65 BPM | OXYGEN SATURATION: 92 % | WEIGHT: 105.9 LBS

## 2024-10-08 DIAGNOSIS — I50.42 CHRONIC COMBINED SYSTOLIC AND DIASTOLIC HEART FAILURE: ICD-10-CM

## 2024-10-08 DIAGNOSIS — I13.10 HYPERTENSIVE HEART AND CHRONIC KIDNEY DISEASE STAGE 3: ICD-10-CM

## 2024-10-08 DIAGNOSIS — I50.32 CHRONIC HEART FAILURE WITH PRESERVED EJECTION FRACTION: Primary | ICD-10-CM

## 2024-10-08 DIAGNOSIS — N18.30 STAGE 3 CHRONIC KIDNEY DISEASE, UNSPECIFIED WHETHER STAGE 3A OR 3B CKD (MULTI): ICD-10-CM

## 2024-10-08 DIAGNOSIS — N18.30 HYPERTENSIVE HEART AND CHRONIC KIDNEY DISEASE STAGE 3: ICD-10-CM

## 2024-10-08 DIAGNOSIS — I50.32 CHRONIC HEART FAILURE WITH PRESERVED EJECTION FRACTION: ICD-10-CM

## 2024-10-08 LAB
ANION GAP SERPL CALC-SCNC: 13 MMOL/L (ref 10–20)
BNP SERPL-MCNC: 1567 PG/ML (ref 0–99)
BUN SERPL-MCNC: 42 MG/DL (ref 6–23)
CALCIUM SERPL-MCNC: 9.4 MG/DL (ref 8.6–10.3)
CHLORIDE SERPL-SCNC: 91 MMOL/L (ref 98–107)
CO2 SERPL-SCNC: 43 MMOL/L (ref 21–32)
CREAT SERPL-MCNC: 1.65 MG/DL (ref 0.5–1.05)
EGFRCR SERPLBLD CKD-EPI 2021: 30 ML/MIN/1.73M*2
GLUCOSE SERPL-MCNC: 79 MG/DL (ref 74–99)
POTASSIUM SERPL-SCNC: 4.5 MMOL/L (ref 3.5–5.3)
SODIUM SERPL-SCNC: 142 MMOL/L (ref 136–145)

## 2024-10-08 PROCEDURE — 80048 BASIC METABOLIC PNL TOTAL CA: CPT

## 2024-10-08 PROCEDURE — 1126F AMNT PAIN NOTED NONE PRSNT: CPT | Performed by: NURSE PRACTITIONER

## 2024-10-08 PROCEDURE — 1111F DSCHRG MED/CURRENT MED MERGE: CPT | Performed by: NURSE PRACTITIONER

## 2024-10-08 PROCEDURE — 1036F TOBACCO NON-USER: CPT | Performed by: NURSE PRACTITIONER

## 2024-10-08 PROCEDURE — 1157F ADVNC CARE PLAN IN RCRD: CPT | Performed by: NURSE PRACTITIONER

## 2024-10-08 PROCEDURE — 83880 ASSAY OF NATRIURETIC PEPTIDE: CPT

## 2024-10-08 PROCEDURE — 99213 OFFICE O/P EST LOW 20 MIN: CPT | Performed by: NURSE PRACTITIONER

## 2024-10-08 PROCEDURE — 3074F SYST BP LT 130 MM HG: CPT | Performed by: NURSE PRACTITIONER

## 2024-10-08 PROCEDURE — 36415 COLL VENOUS BLD VENIPUNCTURE: CPT

## 2024-10-08 PROCEDURE — 1123F ACP DISCUSS/DSCN MKR DOCD: CPT | Performed by: NURSE PRACTITIONER

## 2024-10-08 PROCEDURE — 1159F MED LIST DOCD IN RCRD: CPT | Performed by: NURSE PRACTITIONER

## 2024-10-08 PROCEDURE — 3078F DIAST BP <80 MM HG: CPT | Performed by: NURSE PRACTITIONER

## 2024-10-08 ASSESSMENT — ENCOUNTER SYMPTOMS
WEAKNESS: 0
LOSS OF SENSATION IN FEET: 0
WHEEZING: 0
PALPITATIONS: 0
CHEST TIGHTNESS: 0
CHILLS: 0
SHORTNESS OF BREATH: 1
DEPRESSION: 0
LIGHT-HEADEDNESS: 0
BLOOD IN STOOL: 0
ACTIVITY CHANGE: 0
OCCASIONAL FEELINGS OF UNSTEADINESS: 0
ABDOMINAL DISTENTION: 0
COUGH: 0
CONFUSION: 0
HEMATURIA: 0
FEVER: 0
EYES NEGATIVE: 1

## 2024-10-08 ASSESSMENT — PAIN SCALES - GENERAL: PAINLEVEL: 0-NO PAIN

## 2024-10-08 NOTE — PATIENT INSTRUCTIONS
Thank you for coming in today.  If you have any questions you may contact the office Monday through Friday at [phone] or on week ends at [phone].    Same medications.     Lab work today.     Please follow  a 2 GM sodium diet and limit fluid intake to 2 liters per day or 8 servings ( serving size = 8 oz. = 1 cup = 240 ml) per day.   Please avoid processed meat products (luncheon meats, sausages, loya, hot dogs for example) eat 4 servings of vegetables and 1-2 whole servings of whole fruits per day.   Please weigh daily and call [phone] for weight gain of 3 pounds in 24 hours or 5 pounds or if you experience increased swelling or shortness of breath.         Follow up:  7-10 days.     Please be sure to follow up with your cardiologist at [facility] once every year. Call [phone] to schedule appointment if you do not have a follow up appointment scheduled already.

## 2024-10-08 NOTE — PROGRESS NOTES
Lab called with critical lab value of Bicarb 43, Rosangela made aware with no further orders at this time.

## 2024-10-08 NOTE — PROGRESS NOTES
Subjective   Patient ID: Adwoa Forrest is a 89 y.o. female who presents for follow-up of congestive heart failure.     Current Outpatient Medications:     acetaminophen (Tylenol 8 HOUR) 650 mg ER tablet, Take 1 tablet (650 mg) by mouth every 8 hours if needed for mild pain (1 - 3). Do not crush, chew, or split., Disp: , Rfl:     albuterol 90 mcg/actuation inhaler, Inhale 2 puffs every 4 hours if needed for shortness of breath., Disp: 18 g, Rfl: 11    ALPRAZolam (Xanax) 0.5 mg tablet, Take 1 tablet (0.5 mg) by mouth 2 times a day., Disp: 180 tablet, Rfl: 0    amiodarone (Pacerone) 200 mg tablet, Take 1 tablet (200 mg) by mouth once daily., Disp: 90 tablet, Rfl: 3    aspirin 81 mg EC tablet, Take 1 tablet (81 mg) by mouth once daily., Disp: , Rfl:     atorvastatin (Lipitor) 80 mg tablet, Take 1 tablet (80 mg) by mouth once daily. At bedtime, Disp: 90 tablet, Rfl: 3    calcitriol (Rocaltrol) 0.25 mcg capsule, Take 1 capsule (0.25 mcg) by mouth once daily. Three times a week., Disp: , Rfl:     calcium acetate (Phoslo) 667 mg capsule, Take 1 capsule (667 mg) by mouth 2 times a day before meals., Disp: , Rfl:     Farxiga 10 mg, TAKE 1 TABLET BY MOUTH EVERY DAY, Disp: 90 tablet, Rfl: 3    levothyroxine (Synthroid, Levoxyl) 25 mcg tablet, Take 1 tablet (25 mcg) by mouth once daily., Disp: 90 tablet, Rfl: 3    metoprolol succinate XL (Toprol-XL) 50 mg 24 hr tablet, Take 1 tablet (50 mg) by mouth once daily., Disp: 90 tablet, Rfl: 3    multivitamin tablet, Take 1 tablet by mouth once daily., Disp: , Rfl:     torsemide (Demadex) 20 mg tablet, Take 3 tablets (60 mg) by mouth once daily., Disp: 270 tablet, Rfl: 3    umeclidinium-vilanteroL (Anoro Ellipta) 62.5-25 mcg/actuation blister with device, Inhale 1 puff once daily., Disp: 1 each, Rfl: 11    sacubitriL-valsartan (Entresto) 24-26 mg tablet, Take 1 tablet by mouth 2 times a day. (Patient not taking: Reported on 10/1/2024), Disp: 180 tablet, Rfl: 0     HPI     Past medical  history of ischemic heart disease with PCI in the past. 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 She has had nonsustained ventricular tachycardia in the past from which she was symptomatic. She has been pretty well-controlled with that on amiodarone. Echocardiogram has showed a significant drop in her ejection fraction from 70% to 40%. During the same time she has had worsening of her renal insufficiency as well. Recent stress MPI normal. She had hospitalization on March 4, 2024 with pneumonia and comorbid CHF. The lateral wall lead was unable to be placed to convert to BiV pacer recently. She remains easily sob. She does not appear to be congested. CXR in past shows significant emphysematous changes.      Last hospitalization was 9/12/24 for acute heart failure.  She was diuresed and discharged home 9/15/2024.  She was evaluated on 9/23/2024 in clinic and seem to be doing well and feeling improved. However she ended up presenting back to the emergency room with low pulse ox readings and shortness of breath on 9/28/2024.  Chest x-ray showed chronic pulmonary disease and probable exacerbation of CHF.  Lower extremity Doppler study was unremarkable.  CT of the head was unremarkable.  She was discharged with oxygen to home after having IV Lasix and staying overnight.  She presents today for follow-up in heart failure clinic.    Last visit she had IV Lasix and has had overall improvement in sx.  Entresto is currently on hold due to low BP readings.       Review of Systems   Constitutional:  Negative for activity change, chills and fever.   HENT:  Negative for hearing loss.    Eyes: Negative.    Respiratory:  Positive for shortness of breath. Negative for cough, chest tightness and wheezing.         Severe O2 dependent COPD with chronic sob at baseline.  Overall stable since last visit.    Cardiovascular:  Positive for leg swelling. Negative for chest pain and  palpitations.        She has had very little leg swelling last few days.  Weight at home is improved.    Gastrointestinal:  Negative for abdominal distention and blood in stool.   Genitourinary:  Negative for hematuria.   Neurological:  Negative for syncope, weakness and light-headedness.   Psychiatric/Behavioral:  Negative for confusion.        Objective     /74 (BP Location: Left arm, Patient Position: Sitting)   Pulse 65   Resp 18   Wt 48 kg (105 lb 14.4 oz)   LMP  (LMP Unknown)   SpO2 92% Comment: 4 LPM  BMI 19.37 kg/m²         Transthoracic Echo Limited    Result Date: 2/21/2024              [place] [address]      Phone [phone] Fax [phone] TRANSTHORACIC ECHOCARDIOGRAM REPORT  Patient Name:      CHERRY Noel Physician:    [id number] Darrel Hampton MD Study Date:        2/20/2024            Ordering Provider:    [id number] BROOKE FOFANA MRN/PID:           [id number]             Fellow: Accession#:        [id number]         Nurse:                Nette Ott RN Date of Birth/Age: 1935 / 88 years  Sonographer:          Estelita Brown RDCS Gender:            F                    Additional Staff: Height:            157.48 cm            Admit Date: Weight:            45.36 kg             Admission Status:     Outpatient BSA / BMI:         1.42 m2 / 18.29      Department Location:                    kg/m2 Blood Pressure: 102 /58 mmHg Study Type:    TRANSTHORACIC ECHO (TTE) LIMITED Diagnosis/ICD: Ventricular hypertrophy or dilatation-I51.7 Indication:    Left Ventricular hypertrophy CPT Codes:     Echo Limited-62679 Patient History: Pertinent History: LVH. Study Detail: Technically challenging study due to small rib spaces and               prominent  lung artifact. Definity used as a contrast agent for               endocardial border definition. Unable to obtain suprasternal notch               and subcostal view.  PHYSICIAN INTERPRETATION: Left Ventricle: Left ventricular systolic function is moderately decreased, with an estimated ejection fraction of 35-40%. There is global hypokinesis of the left ventricle with minor regional variations. The left ventricular cavity size was not assessed. The left ventricular septal wall thickness is moderately increased. There is moderately increased left ventricular posterior wall thickness. There is moderate to severe left ventricular hypertrophy. Left ventricular diastolic filling was not assessed. Left Atrium: The left atrium was not assessed. Right Ventricle: The right ventricle was not assessed. Right ventricular systolic function not assessed. Right Atrium: The right atrium was not assessed. Aortic Valve: The aortic valve was not assessed. Aortic valve regurgitation was not assessed. Mitral Valve: The mitral valve was not assessed. Mitral valve regurgitation was not assessed. Tricuspid Valve: The tricuspid valve was not assessed. Tricuspid regurgitation was not assessed. Pulmonic Valve: The pulmonic valve was not assessed. The pulmonic valve regurgitation was not assessed. Pericardium: Pericardial effusion was not assessed. Aorta: The aortic root was not assessed.  CONCLUSIONS:  1. Left ventricular systolic function is moderately decreased with a 35-40% estimated ejection fraction.  2. Moderately increased left ventricular septal thickness.  3. The left ventricular posterior wall thickness is moderately increased.  4. There is moderate to severe left ventricular hypertrophy.  5. There is global hypokinesis of the left ventricle with minor regional variations. QUANTITATIVE DATA SUMMARY: 2D MEASUREMENTS:                           Normal Ranges: LAs:           3.00 cm    (2.7-4.0cm) IVSd:          1.62 cm    (0.6-1.1cm)  LVPWd:         1.44 cm    (0.6-1.1cm) LVIDd:         3.62 cm    (3.9-5.9cm) LVIDs:         2.69 cm LV Mass Index: 146.9 g/m2 LV % FS        25.6 % LV SYSTOLIC FUNCTION BY 2D PLANIMETRY (MOD):                     Normal Ranges: EF-A4C View: 33.5 % (>=55%) EF-A2C View: 39.2 % EF-Biplane:  39.0 %  [id number] Darrel Hampton MD Electronically signed on 2/21/2024 at 9:38:31 AM  ** Final **       Lab Results   Component Value Date    BUN 48 (H) 10/01/2024    CREATININE 1.83 (H) 10/01/2024    BNP 1,750 (H) 09/28/2024    MG 2.13 10/01/2024    K 4.1 10/01/2024     10/01/2024       Constitutional:       General:  NAD   HENT:      Head: Normocephalic     Mouth: Mucous membranes are moist.      Neck:  No JVD or HJR   Eyes:      Conjunctiva/sclera: Conjunctivae normal.    Cardiovascular:      Rate and Rhythm: Normal rate and regular rhythm/ irregularly irregular/ occasional premature beat.      Heart sounds:  S1 S2 normal, no murmur, no S3 or S4   Pulmonary:      Pulmonary effort is normal.  Generally diminished, prolonged expiratory phase. No wheezing or rales.   Abdominal:      General: Bowel sounds are normal, non- tender to palpitations. Liver is non palpable at  right costal margin.   Musculoskeletal:         General: No swelling.  DUARTE well.   Skin:     General: Skin is warm and dry   Neurological:      General: No focal deficit present.      Mental Status: alert and oriented to person, place, and time. Mental status is at baseline.     Psychiatric:         Mood and Affect: Normal Affect.     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   GFR: 25     GDMT:  BB-Metoprolol XL 50 mg daily   ARB/ACEI/ARNI - Entresto 24/26 mg 1 tablet twice a day.  --- HOLD   MRA -  GFR less than 30   SGLT2i - Farxiga 10 mg once a day   Diuretic -  Torsemide 20 mg Pt is to start taking 20 mg orally once a day if weight is , 60mg  daily if weight between 100-105, take 40 mg twice a day mg if weight is over 105lbs and hold if weight is less than 95lbs.     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 ....  was unable to upgrade to Bi V pacer.       CHF: Weight is improved.  She does not have any pitting edema today in clinic.  Lungs sound clearer.  Will continue current diuretic dosing schedule.  Continue the remainder of medications I am planning to see her back in 7 to 10 days she will have a basic metabolic panel and repeat BMP today.     Will begin discussion concerning her expectations given the severity of lung disease and co morbid CHF/heart disease over the next few weeks.  Consider palliative care /hospice in the near future.      2. Atrial fibrillation: persistent.   She is s/p AV node ablation and PPM placement in the past. On ASA.  No other OAC due to hx of recurrent GIB     3. NSVT:  Amiodarone.  No palpitations.       3. CKD 3: GFR as noted. She is following with nephrology.       4. HTN:  Controlled.       5. ASHD with PCI to LAD 2017:  Secondary prevention medications ASA, Statin, BB.   Recent stress/MPI scan negative for ischemia.   No angina.  Stable.      6. Chronic COPD:  Following with pulmonology.          Rosangela Lane, APRN-CNP

## 2024-10-08 NOTE — PROGRESS NOTES
Labs reviewed. Chronic COPD.  She has had the O2 up to 3 liters most of the time.  Will trial 2.5 liters so long as pulse ox is above 90%  Will decrease the torsemide for tomorrow to 40 mg.   She has follow up with Lesly in November.   Recheck lab at next follow up.

## 2024-10-17 ENCOUNTER — OFFICE VISIT (OUTPATIENT)
Dept: CARDIOLOGY | Facility: HOSPITAL | Age: 89
End: 2024-10-17
Payer: MEDICARE

## 2024-10-17 ENCOUNTER — LAB (OUTPATIENT)
Dept: LAB | Facility: LAB | Age: 89
End: 2024-10-17

## 2024-10-17 VITALS
RESPIRATION RATE: 24 BRPM | SYSTOLIC BLOOD PRESSURE: 121 MMHG | BODY MASS INDEX: 19.28 KG/M2 | WEIGHT: 105.4 LBS | HEART RATE: 64 BPM | OXYGEN SATURATION: 98 % | DIASTOLIC BLOOD PRESSURE: 67 MMHG

## 2024-10-17 DIAGNOSIS — I70.209 ATHEROSCLEROSIS OF ARTERY OF LOWER EXTREMITY (CMS-HCC): ICD-10-CM

## 2024-10-17 DIAGNOSIS — I50.32 CHRONIC HEART FAILURE WITH PRESERVED EJECTION FRACTION: Primary | ICD-10-CM

## 2024-10-17 DIAGNOSIS — D50.9 IRON DEFICIENCY ANEMIA, UNSPECIFIED IRON DEFICIENCY ANEMIA TYPE: ICD-10-CM

## 2024-10-17 DIAGNOSIS — N18.30 STAGE 3 CHRONIC KIDNEY DISEASE, UNSPECIFIED WHETHER STAGE 3A OR 3B CKD (MULTI): ICD-10-CM

## 2024-10-17 DIAGNOSIS — I50.42 CHRONIC COMBINED SYSTOLIC AND DIASTOLIC HEART FAILURE: ICD-10-CM

## 2024-10-17 DIAGNOSIS — I13.10 HYPERTENSIVE HEART AND CHRONIC KIDNEY DISEASE STAGE 3: ICD-10-CM

## 2024-10-17 DIAGNOSIS — N18.30 HYPERTENSIVE HEART AND CHRONIC KIDNEY DISEASE STAGE 3: ICD-10-CM

## 2024-10-17 DIAGNOSIS — I50.32 CHRONIC HEART FAILURE WITH PRESERVED EJECTION FRACTION: ICD-10-CM

## 2024-10-17 LAB
ALBUMIN SERPL BCP-MCNC: 4 G/DL (ref 3.4–5)
ANION GAP SERPL CALC-SCNC: 11 MMOL/L (ref 10–20)
BASOPHILS # BLD AUTO: 0.05 X10*3/UL (ref 0–0.1)
BASOPHILS NFR BLD AUTO: 1.1 %
BNP SERPL-MCNC: 1451 PG/ML (ref 0–99)
BUN SERPL-MCNC: 43 MG/DL (ref 6–23)
CALCIUM SERPL-MCNC: 9.6 MG/DL (ref 8.6–10.3)
CHLORIDE SERPL-SCNC: 93 MMOL/L (ref 98–107)
CO2 SERPL-SCNC: 41 MMOL/L (ref 21–32)
CREAT SERPL-MCNC: 1.63 MG/DL (ref 0.5–1.05)
EGFRCR SERPLBLD CKD-EPI 2021: 30 ML/MIN/1.73M*2
EOSINOPHIL # BLD AUTO: 0.12 X10*3/UL (ref 0–0.4)
EOSINOPHIL NFR BLD AUTO: 2.7 %
ERYTHROCYTE [DISTWIDTH] IN BLOOD BY AUTOMATED COUNT: 17.5 % (ref 11.5–14.5)
GLUCOSE SERPL-MCNC: 75 MG/DL (ref 74–99)
HCT VFR BLD AUTO: 41.8 % (ref 36–46)
HGB BLD-MCNC: 12.6 G/DL (ref 12–16)
IMM GRANULOCYTES # BLD AUTO: 0 X10*3/UL (ref 0–0.5)
IMM GRANULOCYTES NFR BLD AUTO: 0 % (ref 0–0.9)
LYMPHOCYTES # BLD AUTO: 0.38 X10*3/UL (ref 0.8–3)
LYMPHOCYTES NFR BLD AUTO: 8.6 %
MCH RBC QN AUTO: 30.6 PG (ref 26–34)
MCHC RBC AUTO-ENTMCNC: 30.1 G/DL (ref 32–36)
MCV RBC AUTO: 102 FL (ref 80–100)
MONOCYTES # BLD AUTO: 0.49 X10*3/UL (ref 0.05–0.8)
MONOCYTES NFR BLD AUTO: 11.1 %
NEUTROPHILS # BLD AUTO: 3.37 X10*3/UL (ref 1.6–5.5)
NEUTROPHILS NFR BLD AUTO: 76.5 %
NRBC BLD-RTO: 0 /100 WBCS (ref 0–0)
PHOSPHATE SERPL-MCNC: 3.7 MG/DL (ref 2.5–4.9)
PLATELET # BLD AUTO: 231 X10*3/UL (ref 150–450)
POTASSIUM SERPL-SCNC: 4.8 MMOL/L (ref 3.5–5.3)
RBC # BLD AUTO: 4.12 X10*6/UL (ref 4–5.2)
SODIUM SERPL-SCNC: 140 MMOL/L (ref 136–145)
WBC # BLD AUTO: 4.4 X10*3/UL (ref 4.4–11.3)

## 2024-10-17 PROCEDURE — 85025 COMPLETE CBC W/AUTO DIFF WBC: CPT

## 2024-10-17 PROCEDURE — 36415 COLL VENOUS BLD VENIPUNCTURE: CPT

## 2024-10-17 PROCEDURE — 99213 OFFICE O/P EST LOW 20 MIN: CPT | Performed by: NURSE PRACTITIONER

## 2024-10-17 PROCEDURE — 83880 ASSAY OF NATRIURETIC PEPTIDE: CPT

## 2024-10-17 PROCEDURE — 80069 RENAL FUNCTION PANEL: CPT

## 2024-10-17 ASSESSMENT — ENCOUNTER SYMPTOMS
HEMATURIA: 0
CHILLS: 0
FEVER: 0
EYES NEGATIVE: 1
LIGHT-HEADEDNESS: 0
BLOOD IN STOOL: 0
COUGH: 0
SHORTNESS OF BREATH: 0
PALPITATIONS: 0
WHEEZING: 0
WEAKNESS: 0
ABDOMINAL DISTENTION: 0
CONFUSION: 0
CHEST TIGHTNESS: 0
ACTIVITY CHANGE: 0

## 2024-10-17 NOTE — PATIENT INSTRUCTIONS
Thank you for coming in today.  If you have any questions you may contact the office Monday through Friday at [phone] or on week ends at [phone].    Please continue the diuretic  per following orders: Torsemide 20 mg Pt is to start taking 20 mg orally once a day if weight is , 60mg  daily if weight between 100-105, take 40 mg twice a day mg if weight is over 105lbs and hold if weight is less than 95lbs.       Please follow  a 2 GM sodium diet and limit fluid intake to 2 liters per day or 8 servings ( serving size = 8 oz. = 1 cup = 240 ml) per day.   Please avoid processed meat products (luncheon meats, sausages, loya, hot dogs for example) eat 4 servings of vegetables and 1-2 whole servings of whole fruits per day.   Please weigh daily and call [phone] for weight gain of 3 pounds in 24 hours or 5 pounds or if you experience increased swelling or shortness of breath.         Follow up:  4 weeks.     Please be sure to follow up with your cardiologist at [facility] once every year. Call [phone] to schedule appointment if you do not have a follow up appointment scheduled already.

## 2024-10-17 NOTE — PROGRESS NOTES
Subjective   Patient ID: Adwoa Forrest is a 89 y.o. female who presents for follow-up of congestive heart failure.     Current Outpatient Medications:     acetaminophen (Tylenol 8 HOUR) 650 mg ER tablet, Take 1 tablet (650 mg) by mouth every 8 hours if needed for mild pain (1 - 3). Do not crush, chew, or split., Disp: , Rfl:     albuterol 90 mcg/actuation inhaler, Inhale 2 puffs every 4 hours if needed for shortness of breath., Disp: 18 g, Rfl: 11    ALPRAZolam (Xanax) 0.5 mg tablet, Take 1 tablet (0.5 mg) by mouth 2 times a day., Disp: 180 tablet, Rfl: 0    amiodarone (Pacerone) 200 mg tablet, Take 1 tablet (200 mg) by mouth once daily., Disp: 90 tablet, Rfl: 3    aspirin 81 mg EC tablet, Take 1 tablet (81 mg) by mouth once daily., Disp: , Rfl:     atorvastatin (Lipitor) 80 mg tablet, Take 1 tablet (80 mg) by mouth once daily. At bedtime, Disp: 90 tablet, Rfl: 3    calcitriol (Rocaltrol) 0.25 mcg capsule, Take 1 capsule (0.25 mcg) by mouth once daily. Three times a week., Disp: , Rfl:     calcium acetate (Phoslo) 667 mg capsule, Take 1 capsule (667 mg) by mouth 2 times a day before meals., Disp: , Rfl:     Farxiga 10 mg, TAKE 1 TABLET BY MOUTH EVERY DAY, Disp: 90 tablet, Rfl: 3    levothyroxine (Synthroid, Levoxyl) 25 mcg tablet, Take 1 tablet (25 mcg) by mouth once daily., Disp: 90 tablet, Rfl: 3    metoprolol succinate XL (Toprol-XL) 50 mg 24 hr tablet, Take 1 tablet (50 mg) by mouth once daily., Disp: 90 tablet, Rfl: 3    multivitamin tablet, Take 1 tablet by mouth once daily., Disp: , Rfl:     sacubitriL-valsartan (Entresto) 24-26 mg tablet, Take 1 tablet by mouth 2 times a day. (Patient not taking: Reported on 10/1/2024), Disp: 180 tablet, Rfl: 0    torsemide (Demadex) 20 mg tablet, Take 3 tablets (60 mg) by mouth once daily., Disp: 270 tablet, Rfl: 3    umeclidinium-vilanteroL (Anoro Ellipta) 62.5-25 mcg/actuation blister with device, Inhale 1 puff once daily., Disp: 1 each, Rfl: 11     HPI   Past medical  history of ischemic heart disease with PCI in the past. 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 She has had nonsustained ventricular tachycardia in the past from which she was symptomatic. She has been pretty well-controlled with that on amiodarone. Echocardiogram has showed a significant drop in her ejection fraction from 70% to 40%. During the same time she has had worsening of her renal insufficiency as well. Recent stress MPI normal. She had hospitalization on March 4, 2024 with pneumonia and comorbid CHF. The lateral wall lead was unable to be placed to convert to BiV pacer recently. She remains easily sob. She does not appear to be congested. CXR in past shows significant emphysematous changes.      Last hospitalization was 9/12/24 for acute heart failure.  She was diuresed and discharged home 9/15/2024.  She was evaluated on 9/23/2024 in clinic and seem to be doing well and feeling improved. However she ended up presenting back to the emergency room with low pulse ox readings and shortness of breath on 9/28/2024.  Chest x-ray showed chronic pulmonary disease and probable exacerbation of CHF.  Lower extremity Doppler study was unremarkable.  CT of the head was unremarkable.  She was discharged with oxygen to home after having IV Lasix and staying overnight.  She presents today for follow-up in heart failure clinic.    She seems to be managing well over the last couple of weeks with the sliding scale weight-based diuretic instructions.  It seems as though she is taking 60 mg of Lasix most days though.  Lungs sound clear today.  Other than fatigue she is not in any acute distress nor does she have signs of congestion.       Review of Systems   Constitutional:  Negative for activity change, chills and fever.   HENT:  Negative for hearing loss.    Eyes: Negative.    Respiratory:  Negative for cough, chest tightness, shortness of breath and wheezing.     Cardiovascular:  Negative for chest pain, palpitations and leg swelling.   Gastrointestinal:  Negative for abdominal distention and blood in stool.   Genitourinary:  Negative for hematuria.   Neurological:  Negative for syncope, weakness and light-headedness.   Psychiatric/Behavioral:  Negative for confusion.        Objective     /67 (BP Location: Right arm, Patient Position: Sitting, BP Cuff Size: Adult)   Pulse 64   Resp 24   Wt 47.8 kg (105 lb 6.4 oz)   LMP  (LMP Unknown)   SpO2 98% Comment: 4 LPM NC  BMI 19.28 kg/m²     Transthoracic Echo Limited    Result Date: 2/21/2024              [place] [address]      Phone [phone] Fax [phone] TRANSTHORACIC ECHOCARDIOGRAM REPORT  Patient Name:      CHERRY Noel Physician:    [id number] Darrel Hampton MD Study Date:        2/20/2024            Ordering Provider:    [id number] BROOKE FOFANA MRN/PID:           [id number]             Fellow: Accession#:        [id number]         Nurse:                Nette Ott RN Date of Birth/Age: 1935 / 88 years  Sonographer:          Estelita Brown RDCS Gender:            F                    Additional Staff: Height:            157.48 cm            Admit Date: Weight:            45.36 kg             Admission Status:     Outpatient BSA / BMI:         1.42 m2 / 18.29      Department Location:                    kg/m2 Blood Pressure: 102 /58 mmHg Study Type:    TRANSTHORACIC ECHO (TTE) LIMITED Diagnosis/ICD: Ventricular hypertrophy or dilatation-I51.7 Indication:    Left Ventricular hypertrophy CPT Codes:     Echo Limited-57492 Patient History: Pertinent History: LVH. Study Detail: Technically challenging study due to small rib spaces and               prominent lung  artifact. Definity used as a contrast agent for               endocardial border definition. Unable to obtain suprasternal notch               and subcostal view.  PHYSICIAN INTERPRETATION: Left Ventricle: Left ventricular systolic function is moderately decreased, with an estimated ejection fraction of 35-40%. There is global hypokinesis of the left ventricle with minor regional variations. The left ventricular cavity size was not assessed. The left ventricular septal wall thickness is moderately increased. There is moderately increased left ventricular posterior wall thickness. There is moderate to severe left ventricular hypertrophy. Left ventricular diastolic filling was not assessed. Left Atrium: The left atrium was not assessed. Right Ventricle: The right ventricle was not assessed. Right ventricular systolic function not assessed. Right Atrium: The right atrium was not assessed. Aortic Valve: The aortic valve was not assessed. Aortic valve regurgitation was not assessed. Mitral Valve: The mitral valve was not assessed. Mitral valve regurgitation was not assessed. Tricuspid Valve: The tricuspid valve was not assessed. Tricuspid regurgitation was not assessed. Pulmonic Valve: The pulmonic valve was not assessed. The pulmonic valve regurgitation was not assessed. Pericardium: Pericardial effusion was not assessed. Aorta: The aortic root was not assessed.  CONCLUSIONS:  1. Left ventricular systolic function is moderately decreased with a 35-40% estimated ejection fraction.  2. Moderately increased left ventricular septal thickness.  3. The left ventricular posterior wall thickness is moderately increased.  4. There is moderate to severe left ventricular hypertrophy.  5. There is global hypokinesis of the left ventricle with minor regional variations. QUANTITATIVE DATA SUMMARY: 2D MEASUREMENTS:                           Normal Ranges: LAs:           3.00 cm    (2.7-4.0cm) IVSd:          1.62 cm    (0.6-1.1cm)  LVPWd:         1.44 cm    (0.6-1.1cm) LVIDd:         3.62 cm    (3.9-5.9cm) LVIDs:         2.69 cm LV Mass Index: 146.9 g/m2 LV % FS        25.6 % LV SYSTOLIC FUNCTION BY 2D PLANIMETRY (MOD):                     Normal Ranges: EF-A4C View: 33.5 % (>=55%) EF-A2C View: 39.2 % EF-Biplane:  39.0 %  [id number] Darrel Hampton MD Electronically signed on 2/21/2024 at 9:38:31 AM  ** Final **       Lab Results   Component Value Date    BUN 42 (H) 10/08/2024    CREATININE 1.65 (H) 10/08/2024    BNP 1,567 (H) 10/08/2024    MG 2.13 10/01/2024    K 4.5 10/08/2024     10/08/2024       Constitutional:       General:  NAD   HENT:      Head: Normocephalic     Mouth: Mucous membranes are moist.      Neck:  No JVD or HJR   Eyes:      Conjunctiva/sclera: Conjunctivae normal.    Cardiovascular:      Rate and Rhythm: Normal rate and regular rhythm     Heart sounds:  S1 S2 normal, no systolic murmur , no S3 or S4   Pulmonary:      Pulmonary effort is normal.   Abdominal:      General: Bowel sounds are normal, non- tender to palpitations. Liver is non palpable at  right costal margin.   Musculoskeletal:         General: No swelling.   DUARTE well.   Skin:     General: Skin is warm and dry   Neurological:      General: No focal deficit present.      Mental Status: alert and oriented to person, place, and time. Mental status is at baseline.     Psychiatric:         Mood and Affect: Normal Affect.     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   GFR: 25      GDMT:  BB-Metoprolol XL 50 mg daily   ARB/ACEI/ARNI - Entresto 24/26 mg 1 tablet twice a day.  HOLD   MRA -  GFR less than 30   SGLT2i - Farxiga 10 mg once a day   Diuretic -  Torsemide 20 mg Pt is to start taking 20 mg orally once a day if weight is , 60mg  daily if weight between 100-105, take 40 mg twice a day mg if weight is over 105lbs and hold if weight is less than 95lbs.     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 ....  was unable to upgrade to Bi V pacer.        CHF:   Continue current medications.  Diuretic is on weight based sliding scale but she has been taking the Torsemide 60 mg most days.   Follow up in 4 weeks.    Mrs. Forrest has chronic end-stage COPD and heart failure.  She has renal insufficiency.  We are managing to keep her symptoms relatively controlled with medical therapy.  I discussed with her today that should she worsen or should her heart stop or should she stop breathing would she want to be intubated or have CPR or be defibrillated and she told me no.  We discussed that if these are her true wishes she will need to have DNR paperwork in order and her DURABLE POWER OF  will need to be aware of these wishes.  She states she will speak with her family concerning those matters.      Consider palliative care /hospice in the near future.      2. Atrial fibrillation: persistent.   She is s/p AV node ablation and PPM placement in the past. On ASA.  No other OAC due to hx of recurrent GIB     3. NSVT:  Amiodarone.  No palpitations.       3. CKD 3: GFR as noted. She is following with nephrology.       4. HTN:  Controlled.       5. ASHD with PCI to LAD 2017:  Secondary prevention medications ASA, Statin, BB.   Recent stress/MPI scan negative for ischemia.   No angina.  Stable.      6. Chronic COPD:  Following with pulmonology.         Rosangela Lane, INDIGO-CNP

## 2024-10-18 ENCOUNTER — OFFICE VISIT (OUTPATIENT)
Dept: WOUND CARE | Facility: CLINIC | Age: 89
End: 2024-10-18
Payer: MEDICARE

## 2024-10-18 PROCEDURE — 11043 DBRDMT MUSC&/FSCA 1ST 20/<: CPT

## 2024-10-28 ENCOUNTER — APPOINTMENT (OUTPATIENT)
Dept: CARDIOLOGY | Facility: HOSPITAL | Age: 89
End: 2024-10-28
Payer: MEDICARE

## 2024-10-29 ENCOUNTER — APPOINTMENT (OUTPATIENT)
Dept: PRIMARY CARE | Facility: CLINIC | Age: 89
End: 2024-10-29
Payer: MEDICARE

## 2024-10-29 VITALS
OXYGEN SATURATION: 87 % | DIASTOLIC BLOOD PRESSURE: 62 MMHG | HEIGHT: 62 IN | BODY MASS INDEX: 18.95 KG/M2 | SYSTOLIC BLOOD PRESSURE: 142 MMHG | WEIGHT: 103 LBS | HEART RATE: 65 BPM

## 2024-10-29 DIAGNOSIS — K59.04 CHRONIC IDIOPATHIC CONSTIPATION: ICD-10-CM

## 2024-10-29 DIAGNOSIS — N18.32 STAGE 3B CHRONIC KIDNEY DISEASE (MULTI): ICD-10-CM

## 2024-10-29 DIAGNOSIS — I42.9 CARDIOMYOPATHY, UNSPECIFIED TYPE (MULTI): ICD-10-CM

## 2024-10-29 DIAGNOSIS — J96.01 ACUTE RESPIRATORY FAILURE WITH HYPOXIA (MULTI): ICD-10-CM

## 2024-10-29 DIAGNOSIS — Z00.00 ANNUAL PHYSICAL EXAM: Primary | ICD-10-CM

## 2024-10-29 DIAGNOSIS — I50.42 CHRONIC COMBINED SYSTOLIC AND DIASTOLIC HEART FAILURE: ICD-10-CM

## 2024-10-29 DIAGNOSIS — F39 MOOD DISORDER (CMS-HCC): ICD-10-CM

## 2024-10-29 DIAGNOSIS — D58.2 ELEVATED HEMOGLOBIN (CMS-HCC): ICD-10-CM

## 2024-10-29 DIAGNOSIS — I43 CARDIOMYOPATHY IN DISEASES CLASSIFIED ELSEWHERE (MULTI): ICD-10-CM

## 2024-10-29 DIAGNOSIS — Z23 ENCOUNTER FOR IMMUNIZATION: ICD-10-CM

## 2024-10-29 DIAGNOSIS — E78.2 MIXED HYPERCHOLESTEROLEMIA AND HYPERTRIGLYCERIDEMIA: ICD-10-CM

## 2024-10-29 DIAGNOSIS — F51.04 CHRONIC INSOMNIA: ICD-10-CM

## 2024-10-29 DIAGNOSIS — E03.9 HYPOTHYROIDISM, UNSPECIFIED TYPE: ICD-10-CM

## 2024-10-29 DIAGNOSIS — J44.9 END STAGE COPD (MULTI): ICD-10-CM

## 2024-10-29 DIAGNOSIS — I70.209 ATHEROSCLEROSIS OF ARTERY OF LOWER EXTREMITY (CMS-HCC): ICD-10-CM

## 2024-10-29 PROBLEM — J43.9 EMPHYSEMA, UNSPECIFIED: Status: ACTIVE | Noted: 2024-10-29

## 2024-10-29 PROBLEM — L89.614: Status: ACTIVE | Noted: 2024-10-29

## 2024-10-29 PROCEDURE — 3077F SYST BP >= 140 MM HG: CPT | Performed by: FAMILY MEDICINE

## 2024-10-29 PROCEDURE — 90662 IIV NO PRSV INCREASED AG IM: CPT | Performed by: FAMILY MEDICINE

## 2024-10-29 PROCEDURE — 1123F ACP DISCUSS/DSCN MKR DOCD: CPT | Performed by: FAMILY MEDICINE

## 2024-10-29 PROCEDURE — 99397 PER PM REEVAL EST PAT 65+ YR: CPT | Performed by: FAMILY MEDICINE

## 2024-10-29 PROCEDURE — G0008 ADMIN INFLUENZA VIRUS VAC: HCPCS | Performed by: FAMILY MEDICINE

## 2024-10-29 PROCEDURE — 3078F DIAST BP <80 MM HG: CPT | Performed by: FAMILY MEDICINE

## 2024-10-29 PROCEDURE — 1157F ADVNC CARE PLAN IN RCRD: CPT | Performed by: FAMILY MEDICINE

## 2024-10-29 PROCEDURE — 99214 OFFICE O/P EST MOD 30 MIN: CPT | Performed by: FAMILY MEDICINE

## 2024-10-29 RX ORDER — ALPRAZOLAM 0.5 MG/1
0.5 TABLET ORAL 2 TIMES DAILY
Qty: 180 TABLET | Refills: 0 | Status: SHIPPED | OUTPATIENT
Start: 2024-10-29 | End: 2025-01-27

## 2024-10-31 ENCOUNTER — HOSPITAL ENCOUNTER (OUTPATIENT)
Dept: CARDIOLOGY | Facility: HOSPITAL | Age: 89
Discharge: HOME | End: 2024-10-31
Payer: MEDICARE

## 2024-10-31 DIAGNOSIS — I25.10 CORONARY ARTERY DISEASE, UNSPECIFIED VESSEL OR LESION TYPE, UNSPECIFIED WHETHER ANGINA PRESENT, UNSPECIFIED WHETHER NATIVE OR TRANSPLANTED HEART: ICD-10-CM

## 2024-10-31 DIAGNOSIS — I50.42 CHRONIC COMBINED SYSTOLIC AND DIASTOLIC HEART FAILURE: ICD-10-CM

## 2024-10-31 DIAGNOSIS — Z95.0 PRESENCE OF CARDIAC PACEMAKER: ICD-10-CM

## 2024-10-31 DIAGNOSIS — I48.91 ATRIAL FIBRILLATION, UNSPECIFIED TYPE (MULTI): Primary | ICD-10-CM

## 2024-10-31 DIAGNOSIS — I48.91 ATRIAL FIBRILLATION, UNSPECIFIED TYPE (MULTI): ICD-10-CM

## 2024-10-31 PROCEDURE — 93296 REM INTERROG EVL PM/IDS: CPT

## 2024-11-01 ENCOUNTER — OFFICE VISIT (OUTPATIENT)
Dept: WOUND CARE | Facility: CLINIC | Age: 89
End: 2024-11-01
Payer: MEDICARE

## 2024-11-01 ENCOUNTER — LAB (OUTPATIENT)
Dept: LAB | Facility: LAB | Age: 89
End: 2024-11-01
Payer: MEDICARE

## 2024-11-01 DIAGNOSIS — N18.32 CHRONIC KIDNEY DISEASE, STAGE 3B (MULTI): Primary | ICD-10-CM

## 2024-11-01 LAB
25(OH)D3 SERPL-MCNC: 34 NG/ML (ref 30–100)
APPEARANCE UR: CLEAR
BILIRUB UR STRIP.AUTO-MCNC: NEGATIVE MG/DL
COLOR UR: ABNORMAL
CREAT UR-MCNC: 36 MG/DL (ref 20–320)
GLUCOSE UR STRIP.AUTO-MCNC: ABNORMAL MG/DL
KETONES UR STRIP.AUTO-MCNC: NEGATIVE MG/DL
LEUKOCYTE ESTERASE UR QL STRIP.AUTO: NEGATIVE
MICROALBUMIN UR-MCNC: <7 MG/L
MICROALBUMIN/CREAT UR: NORMAL MG/G{CREAT}
NITRITE UR QL STRIP.AUTO: NEGATIVE
PH UR STRIP.AUTO: 6.5 [PH]
PROT UR STRIP.AUTO-MCNC: NEGATIVE MG/DL
RBC # UR STRIP.AUTO: NEGATIVE /UL
SP GR UR STRIP.AUTO: 1.01
UROBILINOGEN UR STRIP.AUTO-MCNC: NORMAL MG/DL

## 2024-11-01 PROCEDURE — 82306 VITAMIN D 25 HYDROXY: CPT

## 2024-11-01 PROCEDURE — 11043 DBRDMT MUSC&/FSCA 1ST 20/<: CPT

## 2024-11-01 PROCEDURE — 84165 PROTEIN E-PHORESIS SERUM: CPT

## 2024-11-01 PROCEDURE — 82570 ASSAY OF URINE CREATININE: CPT

## 2024-11-01 PROCEDURE — 36415 COLL VENOUS BLD VENIPUNCTURE: CPT

## 2024-11-01 PROCEDURE — 82043 UR ALBUMIN QUANTITATIVE: CPT

## 2024-11-01 PROCEDURE — 86334 IMMUNOFIX E-PHORESIS SERUM: CPT

## 2024-11-01 PROCEDURE — 83970 ASSAY OF PARATHORMONE: CPT

## 2024-11-01 PROCEDURE — 81003 URINALYSIS AUTO W/O SCOPE: CPT

## 2024-11-01 PROCEDURE — 84155 ASSAY OF PROTEIN SERUM: CPT

## 2024-11-02 LAB
PROT SERPL-MCNC: 6.4 G/DL (ref 6.4–8.2)
PTH-INTACT SERPL-MCNC: 172.7 PG/ML (ref 18.5–88)

## 2024-11-06 LAB
ALBUMIN: 4.3 G/DL (ref 3.4–5)
ALPHA 1 GLOBULIN: 0.3 G/DL (ref 0.2–0.6)
ALPHA 2 GLOBULIN: 0.6 G/DL (ref 0.4–1.1)
BETA GLOBULIN: 0.6 G/DL (ref 0.5–1.2)
GAMMA GLOBULIN: 0.6 G/DL (ref 0.5–1.4)
IMMUNOFIXATION COMMENT: NORMAL
PATH REVIEW - SERUM IMMUNOFIXATION: NORMAL
PATH REVIEW-SERUM PROTEIN ELECTROPHORESIS: NORMAL
PROTEIN ELECTROPHORESIS COMMENT: NORMAL

## 2024-11-14 ENCOUNTER — APPOINTMENT (OUTPATIENT)
Dept: CARDIOLOGY | Facility: HOSPITAL | Age: 89
End: 2024-11-14
Payer: MEDICARE

## 2024-11-14 ENCOUNTER — APPOINTMENT (OUTPATIENT)
Dept: RADIOLOGY | Facility: HOSPITAL | Age: 89
End: 2024-11-14
Payer: MEDICARE

## 2024-11-14 ENCOUNTER — HOSPITAL ENCOUNTER (EMERGENCY)
Facility: HOSPITAL | Age: 89
Discharge: HOME | End: 2024-11-15
Attending: EMERGENCY MEDICINE
Payer: MEDICARE

## 2024-11-14 DIAGNOSIS — J90 SMALL PLEURAL EFFUSION: ICD-10-CM

## 2024-11-14 DIAGNOSIS — R10.84 ABDOMINAL PAIN, GENERALIZED: Primary | ICD-10-CM

## 2024-11-14 LAB
ALBUMIN SERPL BCP-MCNC: 4.1 G/DL (ref 3.4–5)
ALP SERPL-CCNC: 88 U/L (ref 33–136)
ALT SERPL W P-5'-P-CCNC: 24 U/L (ref 7–45)
ANION GAP SERPL CALC-SCNC: 7 MMOL/L (ref 10–20)
AST SERPL W P-5'-P-CCNC: 25 U/L (ref 9–39)
BASOPHILS # BLD AUTO: 0.03 X10*3/UL (ref 0–0.1)
BASOPHILS NFR BLD AUTO: 0.7 %
BILIRUB SERPL-MCNC: 0.8 MG/DL (ref 0–1.2)
BNP SERPL-MCNC: 1854 PG/ML (ref 0–99)
BUN SERPL-MCNC: 59 MG/DL (ref 6–23)
CALCIUM SERPL-MCNC: 9.3 MG/DL (ref 8.6–10.3)
CARDIAC TROPONIN I PNL SERPL HS: 56 NG/L (ref 0–13)
CHLORIDE SERPL-SCNC: 95 MMOL/L (ref 98–107)
CO2 SERPL-SCNC: 42 MMOL/L (ref 21–32)
CREAT SERPL-MCNC: 1.72 MG/DL (ref 0.5–1.05)
EGFRCR SERPLBLD CKD-EPI 2021: 28 ML/MIN/1.73M*2
EOSINOPHIL # BLD AUTO: 0.03 X10*3/UL (ref 0–0.4)
EOSINOPHIL NFR BLD AUTO: 0.7 %
ERYTHROCYTE [DISTWIDTH] IN BLOOD BY AUTOMATED COUNT: 15.5 % (ref 11.5–14.5)
FLUAV RNA RESP QL NAA+PROBE: NOT DETECTED
FLUBV RNA RESP QL NAA+PROBE: NOT DETECTED
GLUCOSE SERPL-MCNC: 134 MG/DL (ref 74–99)
HCT VFR BLD AUTO: 41 % (ref 36–46)
HGB BLD-MCNC: 12.7 G/DL (ref 12–16)
IMM GRANULOCYTES # BLD AUTO: 0.01 X10*3/UL (ref 0–0.5)
IMM GRANULOCYTES NFR BLD AUTO: 0.2 % (ref 0–0.9)
LACTATE SERPL-SCNC: 1.2 MMOL/L (ref 0.4–2)
LIPASE SERPL-CCNC: 46 U/L (ref 9–82)
LYMPHOCYTES # BLD AUTO: 0.48 X10*3/UL (ref 0.8–3)
LYMPHOCYTES NFR BLD AUTO: 11.2 %
MCH RBC QN AUTO: 31.2 PG (ref 26–34)
MCHC RBC AUTO-ENTMCNC: 31 G/DL (ref 32–36)
MCV RBC AUTO: 101 FL (ref 80–100)
MONOCYTES # BLD AUTO: 0.51 X10*3/UL (ref 0.05–0.8)
MONOCYTES NFR BLD AUTO: 11.9 %
NEUTROPHILS # BLD AUTO: 3.24 X10*3/UL (ref 1.6–5.5)
NEUTROPHILS NFR BLD AUTO: 75.3 %
NRBC BLD-RTO: 0 /100 WBCS (ref 0–0)
PLATELET # BLD AUTO: 211 X10*3/UL (ref 150–450)
POTASSIUM SERPL-SCNC: 3.9 MMOL/L (ref 3.5–5.3)
PROT SERPL-MCNC: 6.1 G/DL (ref 6.4–8.2)
RBC # BLD AUTO: 4.07 X10*6/UL (ref 4–5.2)
RSV RNA RESP QL NAA+PROBE: NOT DETECTED
SARS-COV-2 RNA RESP QL NAA+PROBE: NOT DETECTED
SODIUM SERPL-SCNC: 140 MMOL/L (ref 136–145)
WBC # BLD AUTO: 4.3 X10*3/UL (ref 4.4–11.3)

## 2024-11-14 PROCEDURE — 74018 RADEX ABDOMEN 1 VIEW: CPT | Mod: FOREIGN READ | Performed by: RADIOLOGY

## 2024-11-14 PROCEDURE — 87637 SARSCOV2&INF A&B&RSV AMP PRB: CPT | Performed by: EMERGENCY MEDICINE

## 2024-11-14 PROCEDURE — 74176 CT ABD & PELVIS W/O CONTRAST: CPT

## 2024-11-14 PROCEDURE — 80053 COMPREHEN METABOLIC PANEL: CPT

## 2024-11-14 PROCEDURE — 84484 ASSAY OF TROPONIN QUANT: CPT | Performed by: EMERGENCY MEDICINE

## 2024-11-14 PROCEDURE — 99285 EMERGENCY DEPT VISIT HI MDM: CPT | Mod: 25

## 2024-11-14 PROCEDURE — 83605 ASSAY OF LACTIC ACID: CPT

## 2024-11-14 PROCEDURE — 83880 ASSAY OF NATRIURETIC PEPTIDE: CPT

## 2024-11-14 PROCEDURE — 36415 COLL VENOUS BLD VENIPUNCTURE: CPT

## 2024-11-14 PROCEDURE — 36415 COLL VENOUS BLD VENIPUNCTURE: CPT | Performed by: EMERGENCY MEDICINE

## 2024-11-14 PROCEDURE — 93005 ELECTROCARDIOGRAM TRACING: CPT

## 2024-11-14 PROCEDURE — 74018 RADEX ABDOMEN 1 VIEW: CPT

## 2024-11-14 PROCEDURE — 71046 X-RAY EXAM CHEST 2 VIEWS: CPT

## 2024-11-14 PROCEDURE — 74176 CT ABD & PELVIS W/O CONTRAST: CPT | Mod: FOREIGN READ | Performed by: RADIOLOGY

## 2024-11-14 PROCEDURE — 83690 ASSAY OF LIPASE: CPT

## 2024-11-14 PROCEDURE — 71046 X-RAY EXAM CHEST 2 VIEWS: CPT | Mod: FOREIGN READ | Performed by: RADIOLOGY

## 2024-11-14 PROCEDURE — 85025 COMPLETE CBC W/AUTO DIFF WBC: CPT

## 2024-11-14 ASSESSMENT — LIFESTYLE VARIABLES
EVER FELT BAD OR GUILTY ABOUT YOUR DRINKING: NO
TOTAL SCORE: 0
HAVE YOU EVER FELT YOU SHOULD CUT DOWN ON YOUR DRINKING: NO
HAVE PEOPLE ANNOYED YOU BY CRITICIZING YOUR DRINKING: NO
EVER HAD A DRINK FIRST THING IN THE MORNING TO STEADY YOUR NERVES TO GET RID OF A HANGOVER: NO

## 2024-11-14 ASSESSMENT — PAIN - FUNCTIONAL ASSESSMENT: PAIN_FUNCTIONAL_ASSESSMENT: 0-10

## 2024-11-14 ASSESSMENT — PAIN DESCRIPTION - DESCRIPTORS: DESCRIPTORS: ACHING

## 2024-11-14 ASSESSMENT — PAIN DESCRIPTION - LOCATION: LOCATION: ABDOMEN

## 2024-11-14 ASSESSMENT — PAIN DESCRIPTION - PAIN TYPE: TYPE: ACUTE PAIN

## 2024-11-14 ASSESSMENT — PAIN SCALES - GENERAL: PAINLEVEL_OUTOF10: 8

## 2024-11-14 NOTE — ED TRIAGE NOTES
TRIAGE NOTE   I saw the patient as the Clinician in Triage and performed a brief history and physical exam, established acuity, and ordered appropriate tests to develop basic plan of care. Patient will be seen by an CHERELLE, resident and/or physician who will independently evaluate the patient. Please see subsequent provider notes for further details and disposition.     Brief HPI: In brief, Adwoa Forrest is a 89 y.o. female that presents for  89-year-old female arrives to the emergency department with a chief complaint of diffuse upper abdominal pain, diffuse frontal headache, and an overall feeling of being  unwell.  the patient has an extensive medical history, states that she often times wakes up with multiple aches and pains including this same specific abdominal pain as well as headaches, however this abdominal pain and headache has not gone away.  The patient has been suffering from chronic constipation and 1 week ago was started on Linzess.  Patient is alert and orient x 4, answers questions appropriately, patient wears supplemental oxygen via nasal cannula 3 L 24/7.    Focused Physical exam:    on examination she appeared in   No apparent distress. Vital signs as documented. Skin warm and dry and without overt rashes. Neck without JVD. Lungs clear yet chronically diminished. Heart exam notable for regular rhythm, normal sounds and absence of murmurs, rubs or gallops.  diffuse bilateral upper abdominal pain worse with palpation, otherwise without evidence of organomegaly, masses, or abdominal aortic enlargement. Extremities nonedematous.    Plan/MDM:    EKG, diagnostic blood work, x-ray of the chest, KUB will be used to further evaluate, many considerations for the patient given her extensive medical history, however  specific considerations given her  presentation would be constipation, electrolyte abnormality,  worsening kidney failure,recurrent headache, exacerbation of  1 of patient's multiple  comorbidities    Please see subsequent provider note for further details and disposition

## 2024-11-15 ENCOUNTER — APPOINTMENT (OUTPATIENT)
Dept: WOUND CARE | Facility: CLINIC | Age: 89
End: 2024-11-15
Payer: MEDICARE

## 2024-11-15 ENCOUNTER — OFFICE VISIT (OUTPATIENT)
Dept: PULMONOLOGY | Facility: HOSPITAL | Age: 89
End: 2024-11-15
Payer: MEDICARE

## 2024-11-15 ENCOUNTER — OFFICE VISIT (OUTPATIENT)
Dept: CARDIOLOGY | Facility: HOSPITAL | Age: 89
End: 2024-11-15
Payer: MEDICARE

## 2024-11-15 VITALS
TEMPERATURE: 95.1 F | HEIGHT: 62 IN | RESPIRATION RATE: 16 BRPM | BODY MASS INDEX: 19.14 KG/M2 | SYSTOLIC BLOOD PRESSURE: 111 MMHG | WEIGHT: 104 LBS | DIASTOLIC BLOOD PRESSURE: 66 MMHG | OXYGEN SATURATION: 90 % | HEART RATE: 65 BPM

## 2024-11-15 VITALS
WEIGHT: 104 LBS | HEART RATE: 66 BPM | BODY MASS INDEX: 19.14 KG/M2 | HEIGHT: 62 IN | DIASTOLIC BLOOD PRESSURE: 64 MMHG | OXYGEN SATURATION: 98 % | RESPIRATION RATE: 16 BRPM | SYSTOLIC BLOOD PRESSURE: 148 MMHG | TEMPERATURE: 98.3 F

## 2024-11-15 VITALS
OXYGEN SATURATION: 96 % | DIASTOLIC BLOOD PRESSURE: 67 MMHG | RESPIRATION RATE: 20 BRPM | WEIGHT: 104.5 LBS | BODY MASS INDEX: 19.11 KG/M2 | SYSTOLIC BLOOD PRESSURE: 121 MMHG | HEART RATE: 66 BPM

## 2024-11-15 DIAGNOSIS — I50.42 CHRONIC COMBINED SYSTOLIC AND DIASTOLIC HEART FAILURE: ICD-10-CM

## 2024-11-15 DIAGNOSIS — J44.9 CHRONIC OBSTRUCTIVE PULMONARY DISEASE, UNSPECIFIED COPD TYPE (MULTI): Primary | ICD-10-CM

## 2024-11-15 DIAGNOSIS — Z87.891 FORMER SMOKER: ICD-10-CM

## 2024-11-15 DIAGNOSIS — J43.9 PULMONARY EMPHYSEMA, UNSPECIFIED EMPHYSEMA TYPE (MULTI): Primary | ICD-10-CM

## 2024-11-15 LAB
ATRIAL RATE: 66 BPM
CARDIAC TROPONIN I PNL SERPL HS: 55 NG/L (ref 0–13)
PR INTERVAL: 60 MS
Q ONSET: 249 MS
QRS COUNT: 10 BEATS
QRS DURATION: 127 MS
QT INTERVAL: 459 MS
QTC CALCULATION(BAZETT): 478 MS
QTC FREDERICIA: 471 MS
R AXIS: -88 DEGREES
T AXIS: 82 DEGREES
T OFFSET: 479 MS
VENTRICULAR RATE: 65 BPM

## 2024-11-15 PROCEDURE — 3078F DIAST BP <80 MM HG: CPT | Performed by: STUDENT IN AN ORGANIZED HEALTH CARE EDUCATION/TRAINING PROGRAM

## 2024-11-15 PROCEDURE — 1159F MED LIST DOCD IN RCRD: CPT | Performed by: NURSE PRACTITIONER

## 2024-11-15 PROCEDURE — 99215 OFFICE O/P EST HI 40 MIN: CPT | Performed by: STUDENT IN AN ORGANIZED HEALTH CARE EDUCATION/TRAINING PROGRAM

## 2024-11-15 PROCEDURE — 1157F ADVNC CARE PLAN IN RCRD: CPT | Performed by: NURSE PRACTITIONER

## 2024-11-15 PROCEDURE — 1123F ACP DISCUSS/DSCN MKR DOCD: CPT | Performed by: STUDENT IN AN ORGANIZED HEALTH CARE EDUCATION/TRAINING PROGRAM

## 2024-11-15 PROCEDURE — 99213 OFFICE O/P EST LOW 20 MIN: CPT | Performed by: NURSE PRACTITIONER

## 2024-11-15 PROCEDURE — 1036F TOBACCO NON-USER: CPT | Performed by: STUDENT IN AN ORGANIZED HEALTH CARE EDUCATION/TRAINING PROGRAM

## 2024-11-15 PROCEDURE — 1036F TOBACCO NON-USER: CPT | Performed by: NURSE PRACTITIONER

## 2024-11-15 PROCEDURE — 1157F ADVNC CARE PLAN IN RCRD: CPT | Performed by: STUDENT IN AN ORGANIZED HEALTH CARE EDUCATION/TRAINING PROGRAM

## 2024-11-15 PROCEDURE — 1159F MED LIST DOCD IN RCRD: CPT | Performed by: STUDENT IN AN ORGANIZED HEALTH CARE EDUCATION/TRAINING PROGRAM

## 2024-11-15 PROCEDURE — 3078F DIAST BP <80 MM HG: CPT | Performed by: NURSE PRACTITIONER

## 2024-11-15 PROCEDURE — 1123F ACP DISCUSS/DSCN MKR DOCD: CPT | Performed by: NURSE PRACTITIONER

## 2024-11-15 PROCEDURE — 3074F SYST BP LT 130 MM HG: CPT | Performed by: STUDENT IN AN ORGANIZED HEALTH CARE EDUCATION/TRAINING PROGRAM

## 2024-11-15 PROCEDURE — 3074F SYST BP LT 130 MM HG: CPT | Performed by: NURSE PRACTITIONER

## 2024-11-15 PROCEDURE — 1126F AMNT PAIN NOTED NONE PRSNT: CPT | Performed by: STUDENT IN AN ORGANIZED HEALTH CARE EDUCATION/TRAINING PROGRAM

## 2024-11-15 RX ORDER — METOPROLOL SUCCINATE 25 MG/1
25 TABLET, EXTENDED RELEASE ORAL DAILY
Qty: 30 TABLET | Refills: 11 | Status: SHIPPED | OUTPATIENT
Start: 2024-11-15 | End: 2025-11-15

## 2024-11-15 ASSESSMENT — LIFESTYLE VARIABLES: SUBSTANCE_ABUSE: 0

## 2024-11-15 ASSESSMENT — ENCOUNTER SYMPTOMS
HEMATURIA: 0
LOSS OF BALANCE: 0
PND: 0
WEIGHT LOSS: 0
DECREASED APPETITE: 0
WHEEZING: 0
FEVER: 0
DYSPNEA ON EXERTION: 1
COUGH: 0
SHORTNESS OF BREATH: 0
LOSS OF SENSATION IN FEET: 0
ALTERED MENTAL STATUS: 0
PALPITATIONS: 0
COUGH: 0
CHILLS: 0
SYNCOPE: 0
ORTHOPNEA: 0
IRREGULAR HEARTBEAT: 0
FATIGUE: 0
HEMATOCHEZIA: 0
UNEXPECTED WEIGHT CHANGE: 0
RHINORRHEA: 0
WEIGHT GAIN: 0
NEAR-SYNCOPE: 0
SHORTNESS OF BREATH: 1
DEPRESSION: 0
OCCASIONAL FEELINGS OF UNSTEADINESS: 1

## 2024-11-15 ASSESSMENT — PAIN SCALES - GENERAL: PAINLEVEL_OUTOF10: 0-NO PAIN

## 2024-11-15 NOTE — PATIENT INSTRUCTIONS
Thank you for coming in today. If you have any questions or concerns, you may call the Heart Failure Office at [phone] option [phone], or [phone].  You may also contact our heart failure nursing team via email on [email or web address].    For quicker results set-up your  [employer] account to receive results and other correspondence directly to your phone.    Please bring all your pills/medications to your Cardiology appointments.    **  - Please make the following medication changes:  1. DECREASE metoprolol succinate to 25 mg once daily    2. START Entresto 1/2 pill of the 24/25 mg preparation twice daily    -  Please get an echocardiogram, CALL  Tel  [phone] to schedule this test     You will be referred to the following teams, CALL [phone]  to schedule your appointments with:  1.  Pulmonology ( Dr. Logan for COPD and oxygen management)     -Please make an appointment to see our heart failure nurse practitioner Rosangela Lane in 1 week.  She will continue to work with you on your medication doses and assessing your fluid levels.    - Please make an appointment to be seen by Dr. Collazo in January 2025

## 2024-11-15 NOTE — ED PROVIDER NOTES
HPI   Chief Complaint   Patient presents with    Abdominal Pain     C/O abdominal pain and headache.       HPI    89-year-old female who presents with multiple complaints including generally not feeling well specifically upper abdominal pain, headache, and shaking.  Patient has a history of A-fib, heart failure, COPD, prior stroke and sick sinus syndrome.  She wears oxygen at baseline.  Patient was initially seen in triage by Frederick LOPES and labs were obtained.  Patient states she is feeling overall better since her initial evaluation.  She also has mild upper abdominal pain but is not as severe as it was before.  Located throughout her upper abdomen noted mild nausea but no vomiting.  Has a history of constipation, recently started Linzess.    Patient History   Past Medical History:   Diagnosis Date    Anxiety and depression     Bowel perforation (Multi)     Perforation of sigmoid colon due to diverticulitis    Chronic atrial fibrillation, unspecified (Multi) 05/09/2023    Chronic heart failure with preserved ejection fraction 02/07/2024    CKD (chronic kidney disease)     COPD (chronic obstructive pulmonary disease) (Multi)     Essential (primary) hypertension     GIB (gastrointestinal bleeding)     recurrent    History of non-ST elevation myocardial infarction (NSTEMI) 10/08/2023    Hypothyroidism     CANDELARIA (iron deficiency anemia)     NSVT (nonsustained ventricular tachycardia) (Multi)     on amiodarone    Raynaud disease     Sick sinus syndrome (Multi) 02/27/2018    Sleep apnea     Stroke (cerebrum) (Multi)      Past Surgical History:   Procedure Laterality Date    APPENDECTOMY      CARDIAC ELECTROPHYSIOLOGY PROCEDURE N/A 06/17/2024    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 PPM UPGRADE DUAL TO BIV;  Surgeon: Balta Chaudhari MD;  Location: [facility];  CRTP upgrade, [facility], [place] [facility] 5/21/24    CATARACT EXTRACTION      CHOLECYSTECTOMY      COLECTOMY PARTIAL / TOTAL       Sigmoid    HYSTERECTOMY      OTHER SURGICAL HISTORY      Atrial appendage closure    OTHER SURGICAL HISTORY      Catheter Ablation Atrioventricular Node    TONSILLECTOMY       Family History   Problem Relation Name Age of Onset    Coronary artery disease Mother      Coronary artery disease Father       Social History     Tobacco Use    Smoking status: Former     Current packs/day: 0.00     Average packs/day: 0.3 packs/day for 34.0 years (8.5 ttl pk-yrs)     Types: Cigarettes     Start date:      Quit date:      Years since quittin.8     Passive exposure: Never    Smokeless tobacco: Never   Substance Use Topics    Alcohol use: Never    Drug use: Never       Physical Exam   ED Triage Vitals   Temperature Heart Rate Respirations BP   24 1625 24 1625 24 1625 24 1625   36.8 °C (98.3 °F) 65 20 145/70      Pulse Ox Temp Source Heart Rate Source Patient Position   24 1625 24 1625 24 2054 24 1625   96 % Tympanic Monitor Sitting      BP Location FiO2 (%)     24 1625 --     Left arm        Physical Exam  Vitals reviewed.   Constitutional:       Appearance: Normal appearance. She is normal weight.   HENT:      Head: Normocephalic.      Mouth/Throat:      Mouth: Mucous membranes are moist.   Cardiovascular:      Rate and Rhythm: Normal rate and regular rhythm.      Pulses: Normal pulses.   Pulmonary:      Effort: Pulmonary effort is normal.      Breath sounds: Normal breath sounds.   Abdominal:      General: Abdomen is flat. There is no distension.      Palpations: Abdomen is soft.      Tenderness: There is no abdominal tenderness.   Musculoskeletal:         General: Swelling present.      Cervical back: Normal range of motion and neck supple. No rigidity or tenderness.      Comments: Mild b/l LE pitting edema   Skin:     General: Skin is warm and dry.      Capillary Refill: Capillary refill takes less than 2 seconds.   Neurological:      General: No focal  deficit present.      Mental Status: She is alert and oriented to person, place, and time. Mental status is at baseline.           ED Course & MDM   Diagnoses as of 11/21/24 2234   Abdominal pain, generalized   Small pleural effusion                 No data recorded                                 Medical Decision Making  EKG interpreted by myself (ED attending physician). I independently interpreted the (Study: EKG), which showed EKG at 1718 showed AV paced rhythm rate of 65, left axis deviation, VT interval 60 ms, , QTc 478, no acute ST or T wave changes, unchanged from prior.    External Records Reviewed: I reviewed recent and relevant outside records including: PCP and specialist notes    Independent Interpretation of Studies:  I independently interpreted the [CXR], which showed small b/l pleural effusions    Escalation of Care:  Appropriate for [outpatient management - Patient initially presented with generally not feeling well, shaking, upper abdominal pain and headache.  She was initially seen in triage.  Labs ordered at that time.  Patient was roomed in the ED - on my evaluation, her headache and abdominal pain improved.  Patient has a history of advanced COPD and heart failure, wears O2 at baseline.  She did not report feeling more short of breath than her baseline had mild lower extremity edema.  Chest x-ray showed small pleural effusions.  Troponin elevated to 55 but this is consistent with her baseline, as is her BNP.  Offered additional diuresis here but patient declined, states she has been taking her medications - does not want to get diuresis this late at night.  States she will will continue to take all of her home meds.  She has an appointment tomorrow with pulmonology and cardiology that she would like to attend.  Remaining labs and imaging specifically CT abdomen pelvis were negative for any acute findings.  Patient was able to tolerate p.o. here.  Strong return precautions provided and  she is agreeable to discharge plan.    Social Determinants Affecting Care: none- able to get meds and attend appointments.    Prescription Drug Consideration: diruesis for elevated BNP (although this is her baseline) and small pleural effusion (no new O2 requirements). Patient declined    Diagnostic testing considered: cardio-pulm labs ordered.    Discussion of Management with Other Providers:   I discussed the patient/results with: patient and family/friend at bedside        Procedure  Procedures     Nate Cazares MD MPH  11/21/24 2268

## 2024-11-15 NOTE — DISCHARGE INSTRUCTIONS
Please take all of your medications starting tomorrow morning including your water pill.  Please attend your cardiology appointment on Friday as scheduled.  Return for any worsening shortness of breath, any chest pain or chest pressure, or any other concerning symptoms.

## 2024-11-15 NOTE — PROGRESS NOTES
Referring Clinician: MOSES Lane  Primary Cardiologist: Dr LISANDRO Hagen  Accompanied by: joss Tang  Primary Care Provider: Minh Farmer DO   Visit Type:  Follow Up     HPI:     89 y.o. retired [employer]  who presents for advanced heart failure care.  She has a past medical history significant for  declining LV systolic function, LVEF 70-75% ( 10/2023) and on TTE 2/2024 LVEF 35-40% with significant left ventricular thickening.  She also has had worsening renal impairment that has limited heart failure pharmacotherapy advancement.  She is maintained beta-blocker, and SGLT2 inhibition.  She was previously tried on MRA but became hyperkalemic.    She underwent nuclear pharmacological stress testing 3/2024 and this was negative for inducible ischemia.  Her other comorbidities include former smoking history with COPD, atrial fibrillation status post ablation, PPM, failed Watchman procedure which necessitated transition to surgical left atrial appendage ligation, she is not maintained on anticoagulation due to history of GI bleeding, hypertension, CAD status post LAD PCI 2017, nonsustained VT maintained on amiodarone, PVCs.  Ms. Forrest has completed technetium pyrophosphate scan 5/2024 but this was grade 1 for cardiac amyloidosis (equivocal/ negative).  The possibility still remains with cardiac amyloidosis, but will require endomyocardial biopsy versus cardiac MRI for more definitive diagnosis. The other differential includes pacemaker induced cardiomyopathy.  She has been seen by our electrophysiology cardiology colleagues and was planned for CRT upgrade.  Unfortunately at the time of generator change 6/2024, the LV lead could not be implanted.    Between visits she has been hospitalized, and required visits to the emergency room with progressive dyspnea and generalized malaise.  She was at the emergency room most recently yesterday.  She has been seen by the pulmonology team (MOSES Gómez) and cardiology  "follow-up recommended.  Ms. Forrest has shared that she continues to feel \"awful\".  She had leg swelling previously but with recent IV diuresis this is much improved.  She continues to have mild residual swelling in the right lower limb but she attributes to ulcers on this leg.  She has an unchanged 1 pillow orthopnea and denies syncope, palpitations.    She tells me that sacubitril/valsartan was discontinued because she had diastolic blood pressures in the 50s mmHg.  It appears that losartan was also tried and discontinued for unclear reasons.    Overall she feels as if she i has a few good days, but significantly bad days as well.  She expressed some confusion as to what medication she needs to take.      Surgical Hx:  -Surgical STEFANIE ligation  -Failed WATCHMAN procedure    Past Obstetric Hx:  -No past cardiac complications of pregnancy    Social Hx:  - Smoking-never  - ETOH-none  - Illicit drugs-never  - Lives alone    Family Hx:  Specifically, there is no known family history of  CAD, heart failure, ICD, PPM, LVAD, OHT, arrhythmias, CVA, or sudden cardiac death.    Medication reconciliation completed, see below.     Medication Documentation Review Audit       Reviewed by Edilma Vazquez RN (Registered Nurse) on 11/15/24 at 1412      Medication Order Taking? Sig Documenting Provider Last Dose Status   acetaminophen (Tylenol 8 HOUR) 650 mg ER tablet [id number] Yes Take 1 tablet (650 mg) by mouth every 8 hours if needed for mild pain (1 - 3). Do not crush, chew, or split. Historical Provider, MD  Active   albuterol 90 mcg/actuation inhaler [id number] Yes Inhale 2 puffs every 4 hours if needed for shortness of breath. Lesly Gómez, APRN-CNP  Active   ALPRAZolam (Xanax) 0.5 mg tablet [id number] Yes Take 1 tablet (0.5 mg) by mouth 2 times a day. Minh Farmer, DO  Active   amiodarone (Pacerone) 200 mg tablet [id number] Yes Take 1 tablet (200 mg) by mouth once daily. Michael Latham MD  Active   aspirin 81 mg EC tablet " [id number] Yes Take 1 tablet (81 mg) by mouth once daily. Historical Provider, MD  Active   atorvastatin (Lipitor) 80 mg tablet [id number] Yes Take 1 tablet (80 mg) by mouth once daily. At bedtime Michael Latham MD  Active   calcitriol (Rocaltrol) 0.25 mcg capsule [id number] Yes Take 1 capsule (0.25 mcg) by mouth once daily. Three times a week. Historical Provider, MD  Active   calcium acetate (Phoslo) 667 mg capsule [id number] Yes Take 1 capsule (667 mg) by mouth 2 times a day before meals. Historical Provider, MD  Active   Farxiga 10 mg [id number] Yes TAKE 1 TABLET BY MOUTH EVERY DAY Minh Farmer, DO  Active   levothyroxine (Synthroid, Levoxyl) 25 mcg tablet [id number] Yes Take 1 tablet (25 mcg) by mouth once daily. Minh Farmer,   Active   linaCLOtide (Linzess) 145 mcg capsule [id number] Yes Take 1 capsule (145 mcg) by mouth once daily in the morning. Take before meals. Do not crush or chew. Minh Farmer,   Active   metoprolol succinate XL (Toprol-XL) 50 mg 24 hr tablet [id number] Yes Take 1 tablet (50 mg) by mouth once daily. ELIZABETH Freeman  Active   multivitamin tablet [id number] Yes Take 1 tablet by mouth once daily. Historical Provider, MD  Active   torsemide (Demadex) 20 mg tablet [id number] Yes Take 3 tablets (60 mg) by mouth once daily. ELIZABETH Freeman  Active   umeclidinium-vilanteroL (Anoro Ellipta) 62.5-25 mcg/actuation blister with device [id number] Yes Inhale 1 puff once daily. ELIZABETH Prieto  Active                   Allergies   Allergen Reactions    Aldactone [Spironolactone] Other     hyperkalemia    Codeine Nausea Only    Hydrocodone Nausea Only and Swelling    Sotalol Other     Bradycardia     Tetracyclines Itching        Review of Systems   Constitutional: Positive for malaise/fatigue. Negative for decreased appetite, weight gain and weight loss.   Eyes:  Negative for visual disturbance.   Cardiovascular:  Positive for dyspnea on exertion. Negative for chest  pain, cyanosis, irregular heartbeat, leg swelling, near-syncope, orthopnea, palpitations, paroxysmal nocturnal dyspnea and syncope.   Respiratory:  Negative for cough and shortness of breath.    Skin:  Negative for rash.   Gastrointestinal:  Negative for hematochezia and melena.   Genitourinary:  Negative for hematuria.   Neurological:  Negative for loss of balance.   Psychiatric/Behavioral:  Negative for altered mental status and substance abuse.       Investigations:    The electronic medical record has been reviewed by me for salient history. All cardiovascular imaging and testing available in the electronic medical record, and [facility] has been reviewed.     Visit Vitals  /67   Pulse 66   Resp 20   Wt 47.4 kg (104 lb 8 oz)   LMP  (LMP Unknown)   SpO2 96% Comment: 3l/nc   BMI 19.11 kg/m²   OB Status Hysterectomy   Smoking Status Former   BSA 1.44 m²     On examination:    Very pleasant, chronically ill-appearing elderly  woman sitting in wheelchair.  Using supplemental oxygen  Well groomed   Neck: No appreciable JVD or HJR  CVS: HS 1,2.   No added sounds  Resp: Soft air entry bilaterally, with diminution in the bases.  Hyperresonant percussion note  Abdomen: Flat, SNT, BS wnl  Extremities: Right lower limb wrapped in compression bandages with 1-2+ edema (she tells me she has ulcers under the wraps).  No pedal edema on the left  Skin: Cool and dry  CNS: AO x 4, mild hearing impairment    Lab Results   Component Value Date    WBC 4.3 (L) 11/14/2024    HGB 12.7 11/14/2024    HCT 41.0 11/14/2024     (H) 11/14/2024     11/14/2024       Chemistry    Lab Results   Component Value Date/Time     11/14/2024 1709    K 3.9 11/14/2024 1709    CL 95 (L) 11/14/2024 1709    CO2 42 (HH) 11/14/2024 1709    BUN 59 (H) 11/14/2024 1709    CREATININE 1.72 (H) 11/14/2024 1709    Lab Results   Component Value Date/Time    CALCIUM 9.3 11/14/2024 1709    ALKPHOS 88 11/14/2024 1709    AST 25 11/14/2024 1709     ALT 24 11/14/2024 1709    BILITOT 0.8 11/14/2024 1709          IMPRESSION:    89 y.o. retired [employer]  who presents for advanced heart failure care.  She has a past medical history significant for  declining LV systolic function, LVEF 70-75% ( 10/2023) and on TTE 2/2024 LVEF 35-40% with significant left ventricular thickening.  She also has had worsening renal impairment that has limited heart failure pharmacotherapy advancement.  She is maintained beta-blocker, and SGLT2 inhibition.  She was previously tried on MRA but became hyperkalemic.    She underwent nuclear pharmacological stress testing 3/2024 and this was negative for inducible ischemia.  Her other comorbidities include former smoking history with COPD, atrial fibrillation status post ablation, PPM, failed Watchman procedure which necessitated transition to surgical left atrial appendage ligation, she is not maintained on anticoagulation due to history of GI bleeding, hypertension, CAD status post LAD PCI 2017, nonsustained VT maintained on amiodarone, PVCs.  Ms. Forrest has completed technetium pyrophosphate scan 5/2024 but this was grade 1 for cardiac amyloidosis (equivocal/ negative).  The possibility still remains with cardiac amyloidosis, but will require endomyocardial biopsy versus cardiac MRI for more definitive diagnosis. The other differential includes pacemaker induced cardiomyopathy.  She has been seen by our electrophysiology cardiology colleagues and was planned for CRT upgrade.  Unfortunately at the time of generator change 6/2024, the LV lead could not be implanted.  Between visits she has been hospitalized, and required visits to the emergency room with progressive dyspnea and generalized malaise.      Ms Forrest has declined since I last saw her.  Undoubtedly some of her symptoms are due to heart failure but I suspect there is also an overlap with severe COPD/emphysema.  From a heart failure perspective we will optimize meds as best able  "(see below).  Through our heart failure nurse practitioner clinic we will keep a close tab on HF GDMT and advance them as possible.  Will also discuss with our pulmonology colleagues about possible further optimization of emphysema/COPD.  Moreover she is a supplemental oxygen and this will be under the auspices of our pulmonology colleagues or her primary care team      NYHA Functional Class: 2-3  ACC/AHA Stage B heart failure  Volume status: Essentially euvolemic  Perfusion status: Cool to touch  Aetiology: Nonischemic      Medication adjustments today low threshold for right heart catheterization and see if inotropic therapy is indicated  Weekly visits with heart failure nurse practitioner in the interim  Heart failure cardiologist follow-up in January 2025    PLAN:  #HFrEF     -CRT upgrade was not technically possible 6/2024.  We have previously discussed the possibility of epicardial lead placement but mutually agreed not to proceed with this, she indicated that \"I am too old for that\"  -If she has progressive symptoms, and/or we are unable to titrate HF GDMT low threshold for right heart catheterization and trial of inotropic therapy  Medication optimization:  1.  Halve  metoprolol to 25 mg once daily  2.  Resume sacubitril/valsartan 12/13 mg twice daily  3.  Continue dapagliflozin 10 mg once daily  4.  Hold MRA for now, will introduce as renal function allows    This note was transcribed using the Dragon Dictation system. There may be grammatical, punctuation, or verbiage errors that can occur with voice recognition programs.    Sushma Collazo MD PhD'  "

## 2024-11-15 NOTE — PROGRESS NOTES
Subjective   Patient ID: Adwoa Forrest is a 89 y.o. female who presents for follow up shortness of breath.     HPI: Patient has PMH of chronic systolic and diastolic heart failure, atrial fibrillation, GI bleeds, HTN, iron deficiency anemia, HLD, GERD, and anxiety. She was referred for shortness of breath by cardiology. She was hospitalized for CHF exacerbation and pacer. She states that she gets shortness of breath on exertion. She denies any cough or wheezing. She has never been diagnosed with COPD and never been on any inhalers. She is following closely with cardiology. She is a former smoker from [id number] and exposed to second hand smoke. She denies prior occupational exposure.     Today she is here for follow up. She states that she has been taking Anoro once a day with no improvement in her shortness of breath. She states that she has tried albuterol a few times and this has also not helped. She was in ER yesterday and had a CXR that showed worsening pleural effusions and volume overload. She is going to see cardiology after this. She has no other concerns.     Review of Systems   Constitutional:  Negative for chills, fatigue, fever and unexpected weight change.   HENT:  Negative for congestion, postnasal drip and rhinorrhea.    Respiratory:  Positive for shortness of breath. Negative for cough (denies hemoptysis.) and wheezing.    Cardiovascular:  Negative for chest pain and leg swelling.   All other systems reviewed and are negative.      Objective   Physical Exam  Vitals reviewed.   Constitutional:       Appearance: Normal appearance.   HENT:      Head: Normocephalic.   Cardiovascular:      Rate and Rhythm: Normal rate and regular rhythm.   Pulmonary:      Effort: Pulmonary effort is normal.      Breath sounds: Decreased breath sounds present.   Skin:     General: Skin is warm and dry.   Neurological:      Mental Status: She is alert.         Assessment/Plan   Shortness of breath  COPD  Former  smoker  Chronic systolic heart failure   Atrial fibrillation    Plan:    -Patient had advanced COPD on CXR and CT chest, discussed results at length. We discussed getting PFTs, however she prefers to trial inhalers first to see if she has benefit with this.   -She had no improvement on Anoro, okay to stop this.   -Continue albuterol prn. She states that this is not helping either.   -I discussed that she has significant emphysema on CT chest and CXR.  -She is following closely with cardiology.     Overall she had no improvement despite optimizing COPD management for her the past few months. Will stop Anoro and continue with albuterol prn. She had a CXR yesterday that showed increased pleural effusions and volume overload. Her shortness of breath is worsening from CHF. She has appointment with heart failure clinic today and she will continue to follow up closely with them. I will see her back on an as needed basis. She is agreeable to plan of care.     Total time:  25 min.

## 2024-11-15 NOTE — PATIENT INSTRUCTIONS
Stop Anoro inhaler.   Continue albuterol inhaler if needed.   Call with any questions or concerns.   Follow up with me as needed.

## 2024-11-18 ENCOUNTER — TELEPHONE (OUTPATIENT)
Dept: PULMONOLOGY | Facility: HOSPITAL | Age: 89
End: 2024-11-18
Payer: MEDICARE

## 2024-11-18 NOTE — TELEPHONE ENCOUNTER
Patient to be seen 10/21/2024 at 10:20    ----- Message from Jovon Logan sent at 11/18/2024 10:11 AM EST -----  Regarding: FW: Coordinated care  Can you please bring her in to see me?  ----- Message -----  From: Sushma Collazo MD PhD  Sent: 11/15/2024   8:39 PM EST  To: Rosangela Lane, INDIGO-CNP; #  Subject: Coordinated care                                 Good evening both,    Mrs. Forrest is an elderly lady with HFrEF, she also struggles with the advanced stages of COPD/emphysema.    Recently she has struggled with progressive dyspnea and generalized malaise that I suspect is due to an overlap of both heart failure and COPD syndrome.  For our part, Rosangela I would value your help and monitoring her very closely in terms of GDMT advancement, and volume management.  Clinically today she was relatively euvolemic, but her BNP was markedly elevated.  She may benefit from IV diuretic clinic intervention.  If she continues to decline/feels to improve, please touch base and we will arrange for right heart catheterization and consideration of inotropic therapy for her.    Dr. Logan, she is using supplemental oxygen that was started on her recent hospitalization.  It is my understanding that she has neither had guidance on tapering this, nor been advised whether she will need it long-term.  Moreover, whether there is further therapy for her advanced COPD is an open question.  I have asked for her to be arranged to see you, and also value your input in her care.    Happy to answer any questions.    Thanks both,  MRR  Sushma Collazo MD PhD   No cyanosis, no pallor, no jaundice, no rash

## 2024-11-21 ENCOUNTER — OFFICE VISIT (OUTPATIENT)
Dept: CARDIOLOGY | Facility: HOSPITAL | Age: 89
End: 2024-11-21
Payer: MEDICARE

## 2024-11-21 ENCOUNTER — TELEPHONE (OUTPATIENT)
Dept: PULMONOLOGY | Facility: HOSPITAL | Age: 89
End: 2024-11-21

## 2024-11-21 ENCOUNTER — OFFICE VISIT (OUTPATIENT)
Dept: PULMONOLOGY | Facility: HOSPITAL | Age: 89
End: 2024-11-21
Payer: MEDICARE

## 2024-11-21 ENCOUNTER — TELEPHONE (OUTPATIENT)
Dept: CARDIOLOGY | Facility: HOSPITAL | Age: 89
End: 2024-11-21

## 2024-11-21 VITALS
SYSTOLIC BLOOD PRESSURE: 130 MMHG | DIASTOLIC BLOOD PRESSURE: 73 MMHG | WEIGHT: 104.6 LBS | RESPIRATION RATE: 22 BRPM | HEART RATE: 66 BPM | BODY MASS INDEX: 19.13 KG/M2 | OXYGEN SATURATION: 91 %

## 2024-11-21 VITALS
RESPIRATION RATE: 16 BRPM | OXYGEN SATURATION: 98 % | HEART RATE: 65 BPM | SYSTOLIC BLOOD PRESSURE: 125 MMHG | DIASTOLIC BLOOD PRESSURE: 57 MMHG | TEMPERATURE: 96.8 F

## 2024-11-21 DIAGNOSIS — N18.30 HYPERTENSIVE HEART AND CHRONIC KIDNEY DISEASE STAGE 3: ICD-10-CM

## 2024-11-21 DIAGNOSIS — I50.32 CHRONIC HEART FAILURE WITH PRESERVED EJECTION FRACTION: Primary | ICD-10-CM

## 2024-11-21 DIAGNOSIS — I13.10 HYPERTENSIVE HEART AND CHRONIC KIDNEY DISEASE STAGE 3: ICD-10-CM

## 2024-11-21 DIAGNOSIS — Z87.891 FORMER SMOKER: ICD-10-CM

## 2024-11-21 DIAGNOSIS — I70.209 ATHEROSCLEROSIS OF ARTERY OF LOWER EXTREMITY (CMS-HCC): ICD-10-CM

## 2024-11-21 DIAGNOSIS — N18.30 STAGE 3 CHRONIC KIDNEY DISEASE, UNSPECIFIED WHETHER STAGE 3A OR 3B CKD (MULTI): ICD-10-CM

## 2024-11-21 DIAGNOSIS — J43.9 PULMONARY EMPHYSEMA, UNSPECIFIED EMPHYSEMA TYPE (MULTI): ICD-10-CM

## 2024-11-21 DIAGNOSIS — J44.9 CHRONIC OBSTRUCTIVE PULMONARY DISEASE, UNSPECIFIED COPD TYPE (MULTI): Primary | ICD-10-CM

## 2024-11-21 DIAGNOSIS — I48.20 CHRONIC ATRIAL FIBRILLATION (MULTI): ICD-10-CM

## 2024-11-21 DIAGNOSIS — I50.42 CHRONIC COMBINED SYSTOLIC AND DIASTOLIC HEART FAILURE: ICD-10-CM

## 2024-11-21 PROCEDURE — 1159F MED LIST DOCD IN RCRD: CPT | Performed by: INTERNAL MEDICINE

## 2024-11-21 PROCEDURE — 99214 OFFICE O/P EST MOD 30 MIN: CPT | Performed by: INTERNAL MEDICINE

## 2024-11-21 PROCEDURE — 3078F DIAST BP <80 MM HG: CPT | Performed by: INTERNAL MEDICINE

## 2024-11-21 PROCEDURE — 1159F MED LIST DOCD IN RCRD: CPT | Performed by: NURSE PRACTITIONER

## 2024-11-21 PROCEDURE — 99213 OFFICE O/P EST LOW 20 MIN: CPT | Performed by: NURSE PRACTITIONER

## 2024-11-21 PROCEDURE — 1157F ADVNC CARE PLAN IN RCRD: CPT | Performed by: NURSE PRACTITIONER

## 2024-11-21 PROCEDURE — 1123F ACP DISCUSS/DSCN MKR DOCD: CPT | Performed by: INTERNAL MEDICINE

## 2024-11-21 PROCEDURE — 3078F DIAST BP <80 MM HG: CPT | Performed by: NURSE PRACTITIONER

## 2024-11-21 PROCEDURE — 3074F SYST BP LT 130 MM HG: CPT | Performed by: INTERNAL MEDICINE

## 2024-11-21 PROCEDURE — 1123F ACP DISCUSS/DSCN MKR DOCD: CPT | Performed by: NURSE PRACTITIONER

## 2024-11-21 PROCEDURE — 1036F TOBACCO NON-USER: CPT | Performed by: INTERNAL MEDICINE

## 2024-11-21 PROCEDURE — 1157F ADVNC CARE PLAN IN RCRD: CPT | Performed by: INTERNAL MEDICINE

## 2024-11-21 PROCEDURE — 1036F TOBACCO NON-USER: CPT | Performed by: NURSE PRACTITIONER

## 2024-11-21 PROCEDURE — 3075F SYST BP GE 130 - 139MM HG: CPT | Performed by: NURSE PRACTITIONER

## 2024-11-21 RX ORDER — ALBUTEROL SULFATE 90 UG/1
1 INHALANT RESPIRATORY (INHALATION) ONCE
OUTPATIENT
Start: 2024-11-21

## 2024-11-21 RX ORDER — FLUTICASONE FUROATE, UMECLIDINIUM BROMIDE AND VILANTEROL TRIFENATATE 100; 62.5; 25 UG/1; UG/1; UG/1
1 POWDER RESPIRATORY (INHALATION) DAILY
Qty: 30 EACH | Refills: 11 | Status: SHIPPED | OUTPATIENT
Start: 2024-11-21 | End: 2025-11-21

## 2024-11-21 RX ORDER — FUROSEMIDE 10 MG/ML
40 INJECTION INTRAMUSCULAR; INTRAVENOUS ONCE
Status: CANCELLED | OUTPATIENT
Start: 2024-11-21

## 2024-11-21 RX ORDER — ALBUTEROL SULFATE 0.83 MG/ML
3 SOLUTION RESPIRATORY (INHALATION) ONCE
OUTPATIENT
Start: 2024-11-21 | End: 2024-11-21

## 2024-11-21 ASSESSMENT — ENCOUNTER SYMPTOMS
CHILLS: 0
EYES NEGATIVE: 1
PALPITATIONS: 0
UNEXPECTED WEIGHT CHANGE: 0
WHEEZING: 0
COUGH: 0
OCCASIONAL FEELINGS OF UNSTEADINESS: 0
RHINORRHEA: 0
CHEST TIGHTNESS: 0
SHORTNESS OF BREATH: 1
FATIGUE: 0
ABDOMINAL DISTENTION: 0
SHORTNESS OF BREATH: 1
CHILLS: 0
COUGH: 0
WHEEZING: 0
LIGHT-HEADEDNESS: 0
DEPRESSION: 0
FATIGUE: 1
FEVER: 0
BLOOD IN STOOL: 0
FEVER: 0
ACTIVITY CHANGE: 0
HEMATURIA: 0
CONFUSION: 0
LOSS OF SENSATION IN FEET: 0
WEAKNESS: 0

## 2024-11-21 NOTE — TELEPHONE ENCOUNTER
Patient called and stated at this time in her life she does not feel as if she needs to be put through the PFT and walking test. Patient states her old cardiologist Dr. Lopez has also told her this in the past.    [FreeTextEntry1] : \par Syncope:  I recommend echocardiography and 24-hour Holter monitor as the initial cardiac evaluation for possible syncope; Consider neurology consultation to evaluate for sleep disorder.\par \par Left bundle branch block: Chronic; will assess for other forms of conduction disease on Holter monitor.\par \par Sinus bradycardia:  There is a long history of mild sinus bradycardia; heart rate has increased with decreasing metoprolol dose\par \par  Mother is RH Positive

## 2024-11-21 NOTE — PROGRESS NOTES
Subjective   Patient ID: Adwoa Forrest is a 89 y.o. female who presents for follow-up of congestive heart failure.     Current Outpatient Medications:     acetaminophen (Tylenol 8 HOUR) 650 mg ER tablet, Take 1 tablet (650 mg) by mouth every 8 hours if needed for mild pain (1 - 3). Do not crush, chew, or split., Disp: , Rfl:     albuterol 90 mcg/actuation inhaler, Inhale 2 puffs every 4 hours if needed for shortness of breath., Disp: 18 g, Rfl: 11    ALPRAZolam (Xanax) 0.5 mg tablet, Take 1 tablet (0.5 mg) by mouth 2 times a day., Disp: 180 tablet, Rfl: 0    amiodarone (Pacerone) 200 mg tablet, Take 1 tablet (200 mg) by mouth once daily., Disp: 90 tablet, Rfl: 3    aspirin 81 mg EC tablet, Take 1 tablet (81 mg) by mouth once daily., Disp: , Rfl:     atorvastatin (Lipitor) 80 mg tablet, Take 1 tablet (80 mg) by mouth once daily. At bedtime, Disp: 90 tablet, Rfl: 3    calcitriol (Rocaltrol) 0.25 mcg capsule, Take 1 capsule (0.25 mcg) by mouth once daily. Three times a week., Disp: , Rfl:     calcium acetate (Phoslo) 667 mg capsule, Take 1 capsule (667 mg) by mouth 2 times a day before meals., Disp: , Rfl:     Farxiga 10 mg, TAKE 1 TABLET BY MOUTH EVERY DAY, Disp: 90 tablet, Rfl: 3    fluticasone-umeclidin-vilanter (Trelegy Ellipta) 100-62.5-25 mcg blister with device, Inhale 1 puff once daily., Disp: 30 each, Rfl: 11    levothyroxine (Synthroid, Levoxyl) 25 mcg tablet, Take 1 tablet (25 mcg) by mouth once daily., Disp: 90 tablet, Rfl: 3    linaCLOtide (Linzess) 145 mcg capsule, Take 1 capsule (145 mcg) by mouth once daily in the morning. Take before meals. Do not crush or chew., Disp: 30 capsule, Rfl: 11    metoprolol succinate XL (Toprol-XL) 25 mg 24 hr tablet, Take 1 tablet (25 mg) by mouth once daily. Do not crush or chew., Disp: 30 tablet, Rfl: 11    multivitamin tablet, Take 1 tablet by mouth once daily., Disp: , Rfl:     sacubitriL-valsartan (Entresto) 24-26 mg tablet, Take 0.5 tablets by mouth 2 times a day.,  Disp: 30 tablet, Rfl: 11    torsemide (Demadex) 20 mg tablet, Take 3 tablets (60 mg) by mouth once daily., Disp: 270 tablet, Rfl: 3     HPI     Past medical history of ischemic heart disease with PCI in the past. 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 She has had nonsustained ventricular tachycardia in the past from which she was symptomatic. She has been pretty well-controlled with that on amiodarone. Echocardiogram has showed a significant drop in her ejection fraction from 70% to 40%. During the same time she has had worsening of her renal insufficiency as well. Recent stress MPI normal. She had hospitalization on March 4, 2024 with pneumonia and comorbid CHF. The lateral wall lead was unable to be placed to convert to BiV pacer recently.     Patient presents for follow-up in heart failure clinic today after evaluation with Dr. Collazo.  Low-dose Entresto was restarted Farxiga was restarted and the beta-blocker was decreased.  Dr. Collazo's recommendations were that she should be considered for right heart cath to guide further medical therapy.  Patient is agreeable with that testing today.  She also has echocardiogram that is scheduled and is pending.  GFR is 24 mL/min.  Blood pressure currently stable.  She is very frail.  She does not seem to feel that the shortness of breath has been any worse over the last couple of weeks.  On exam she does not have evidence for congestion and she does appear to be reasonably compensated.  However her cardiac BNP is 1800.    Review of Systems   Constitutional:  Positive for fatigue. Negative for activity change, chills and fever.   HENT:  Negative for hearing loss.    Eyes: Negative.    Respiratory:  Positive for shortness of breath. Negative for cough, chest tightness and wheezing.         Severe O2 dependent COPD/emphysema   Patient feels this has been stable.  No worsening of sx.    Cardiovascular:  Negative for chest  pain, palpitations and leg swelling.   Gastrointestinal:  Negative for abdominal distention and blood in stool.   Genitourinary:  Negative for hematuria.   Neurological:  Negative for syncope, weakness and light-headedness.   Psychiatric/Behavioral:  Negative for confusion.        Objective     /73 (BP Location: Right arm, Patient Position: Sitting, BP Cuff Size: Adult)   Pulse 66   Resp 22   Wt 47.4 kg (104 lb 9.6 oz)   LMP  (LMP Unknown)   SpO2 91% Comment: 3 liter of oxygen  BMI 19.13 kg/m²           Transthoracic Echo Limited    Result Date: 2/21/2024              [place] [address]      Phone [phone] Fax [phone] TRANSTHORACIC ECHOCARDIOGRAM REPORT  Patient Name:      CHERRY Noel Physician:    [id number] Darrel Hampton MD Study Date:        2/20/2024            Ordering Provider:    [id number] BROOKE FOFANA MRN/PID:           [id number]             Fellow: Accession#:        [id number]         Nurse:                Nette Ott RN Date of Birth/Age: 1935 / 88 years  Sonographer:          Estelita Brown RDCS Gender:            F                    Additional Staff: Height:            157.48 cm            Admit Date: Weight:            45.36 kg             Admission Status:     Outpatient BSA / BMI:         1.42 m2 / 18.29      Department Location:                    kg/m2 Blood Pressure: 102 /58 mmHg Study Type:    TRANSTHORACIC ECHO (TTE) LIMITED Diagnosis/ICD: Ventricular hypertrophy or dilatation-I51.7 Indication:    Left Ventricular hypertrophy CPT Codes:     Echo Limited-34399 Patient History: Pertinent History: LVH. Study Detail: Technically challenging study due to small rib spaces and               prominent lung artifact. Definity used  as a contrast agent for               endocardial border definition. Unable to obtain suprasternal notch               and subcostal view.  PHYSICIAN INTERPRETATION: Left Ventricle: Left ventricular systolic function is moderately decreased, with an estimated ejection fraction of 35-40%. There is global hypokinesis of the left ventricle with minor regional variations. The left ventricular cavity size was not assessed. The left ventricular septal wall thickness is moderately increased. There is moderately increased left ventricular posterior wall thickness. There is moderate to severe left ventricular hypertrophy. Left ventricular diastolic filling was not assessed. Left Atrium: The left atrium was not assessed. Right Ventricle: The right ventricle was not assessed. Right ventricular systolic function not assessed. Right Atrium: The right atrium was not assessed. Aortic Valve: The aortic valve was not assessed. Aortic valve regurgitation was not assessed. Mitral Valve: The mitral valve was not assessed. Mitral valve regurgitation was not assessed. Tricuspid Valve: The tricuspid valve was not assessed. Tricuspid regurgitation was not assessed. Pulmonic Valve: The pulmonic valve was not assessed. The pulmonic valve regurgitation was not assessed. Pericardium: Pericardial effusion was not assessed. Aorta: The aortic root was not assessed.  CONCLUSIONS:  1. Left ventricular systolic function is moderately decreased with a 35-40% estimated ejection fraction.  2. Moderately increased left ventricular septal thickness.  3. The left ventricular posterior wall thickness is moderately increased.  4. There is moderate to severe left ventricular hypertrophy.  5. There is global hypokinesis of the left ventricle with minor regional variations. QUANTITATIVE DATA SUMMARY: 2D MEASUREMENTS:                           Normal Ranges: LAs:           3.00 cm    (2.7-4.0cm) IVSd:          1.62 cm    (0.6-1.1cm) LVPWd:         1.44 cm     (0.6-1.1cm) LVIDd:         3.62 cm    (3.9-5.9cm) LVIDs:         2.69 cm LV Mass Index: 146.9 g/m2 LV % FS        25.6 % LV SYSTOLIC FUNCTION BY 2D PLANIMETRY (MOD):                     Normal Ranges: EF-A4C View: 33.5 % (>=55%) EF-A2C View: 39.2 % EF-Biplane:  39.0 %  [id number] Darrel Hampton MD Electronically signed on 2/21/2024 at 9:38:31 AM  ** Final **       Lab Results   Component Value Date    BUN 59 (H) 11/14/2024    CREATININE 1.72 (H) 11/14/2024    BNP 1,854 (H) 11/14/2024    MG 2.13 10/01/2024    K 3.9 11/14/2024     11/14/2024       Constitutional:       General:  NAD   HENT:      Head: Normocephalic     Mouth: Mucous membranes are moist.      Neck:  No JVD or HJR   Eyes:      Conjunctiva/sclera: Conjunctivae normal.    Cardiovascular:      Rate and Rhythm: Normal rate and regular rhythm      Heart sounds:  S1 S2 normal, no murmur, no S3 or S4   Pulmonary:      Pulmonary effort is normal.  Breath sounds are diminished posteriorly.  Prolonged expiratory phase.  I do not appreciate rails today.  Abdominal:      General: Bowel sounds are normal, non- tender to palpitations. Liver is non palpable at  right costal margin.   Musculoskeletal:         General: No swelling  DUARTE well.   Skin:     General: Skin is warm and dry   Neurological:      General: No focal deficit present.      Mental Status: alert and oriented to person, place, and time. Mental status is at baseline.     Psychiatric:         Mood and Affect: Normal Affect.     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   GFR: 24      GDMT:  BB-Metoprolol XL 50 mg 1/2 tablet daily   ARB/ACEI/ARNI - Entresto 24/26 mg 1/2  tablet twice a day.   MRA -  GFR less than 30   SGLT2i - Farxiga 10 mg once a day   Diuretic -  Torsemide 20 mg Pt is to start taking 20 mg orally once a day if weight is , 60mg  daily if weight between 100-105, take 40 mg twice a day mg if weight is over 105lbs and hold if weight is less than 95lbs.     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 ....  was unable to upgrade to Bi V pacer.        CHF:   Weight is down 1 pound from prevous visit.    GDMT :  as noted above.   Has not tolerated spironolactone  in the past and the GFR is 24 ml/min.  Diuretic is on weight based sliding scale but she has been taking the Torsemide 60 mg most days.   She does not appear to be overtly congested but BNP is elevated.      Per Dr. Collazo's last visit and recommendations,  I think we need the right heart cath which I will order today.  Discussed risk benefits of right heart cath and surely is willing to proceed with the test.  She has echocardiogram ordered as well which is scheduled.    This to help guide medical therapy going forward.            If she continues to fail medical therapy - sx continue to worsen then consider palliative care /hospice in the near future.      2. Atrial fibrillation: persistent.   She is s/p AV node ablation and PPM placement in the past. On ASA.  No other OAC due to hx of recurrent GIB     3. NSVT:  Amiodarone.  No palpitations.       3. CKD 3: GFR as noted. She is following with nephrology.       4. HTN:  Controlled.       5. ASHD with PCI to LAD 2017:  Secondary prevention medications ASA, Statin, BB.   Recent stress/MPI scan negative for ischemia.   No angina.  Stable.      6. Chronic COPD:  Following with pulmonology today.       Rosangela Lane, INDIGO-CNP

## 2024-11-21 NOTE — TELEPHONE ENCOUNTER
Per Rosangela,     Hello, I just wanted to give you a heads up on this case. Patient has severe emphysema O2 dependent, Heart failure, and renal insufficiency. She is fragile. She was recently evaluated by Dr. Collazo who recommended RHC to guide further therapy. Patient is willing to do the testing per conversation today. Please see Dr. Collazo's note and my note today. Thanks.       RN called patient and left message for pt to call back. Order placed for right heart cath

## 2024-11-21 NOTE — PROGRESS NOTES
Physical Therapy     Referred by: Machelle Lu DO; Medical Diagnosis (from order):    Diagnosis Information      Diagnosis    726.19 (ICD-9-CM) - M75.41 (ICD-10-CM) - Subacromial impingement of right shoulder    719.51 (ICD-9-CM) - M25.611 (ICD-10-CM) - Decreased right shoulder range of motion    726.5 (ICD-9-CM) - M25.852 (ICD-10-CM) - Left hip impingement syndrome                Daily Treatment Note    Visit:  9     OBJECTIVE                                                                                                                     Range of Motion (ROM)   (degrees unless noted; active unless noted; norms in ( ); negative=lacking to 0, positive=beyond 0)   Shoulder:     - Flexion (180):        • Right: 127     - Scaption:         • Right:  130    - Internal Rotation at 0°:         • Right: 60    - External Rotation at 0°:         • Right: 30      TREATMENT                                                                                                                  Therapeutic Exercise:  Inf capsule stretch x 3 x 30 SH*  Post capsule stretch x 3 x 30 SH  OHP: flexion and scaption x 30, IR x 30(hand behind back)*  MWM post GH glide towards flex/abd/ER in supine x 10 x 5 SH  Pt ed: stages of frozen shoulder    Dry Needling:  Consent signed: yes  Dry needling used to/for: reduction of muscle spasm and improve range of motion  Education about indications, contraindications and potential side effects completed with patient.  Screen Completed    - Precautions: local skin lesions, lyme disease, local lymphedema, severe hyperalgesia/allodynia, metal allergies: nickel and chromium, abnormal bleeding tendency, immunodeficiency and/or compromised immune system, second or third trimester of pregnancy, vascular disease, history of spontaneous pneumothorax   - Contraindications: local or systemic infections including the flu, over implants, active cancer, area of lymphatic compromise, area of lumpectomy/mastectomy,  Subjective   Patient ID: Adwoa Forrest is a 89 y.o. female who presents for follow up shortness of breath.     HPI: Patient has PMH of chronic systolic and diastolic heart failure, atrial fibrillation, GI bleeds, HTN, iron deficiency anemia, HLD, GERD, and anxiety. She was referred for shortness of breath by cardiology. She was hospitalized for CHF exacerbation and pacer. She states that she gets shortness of breath on exertion. She denies any cough or wheezing. She has never been diagnosed with COPD and never been on any inhalers. She is following closely with cardiology. She is a former smoker from [id number] and exposed to second hand smoke. She denies prior occupational exposure.     Today she is here for follow up. She states that she took Anoro once a day with no improvement in her shortness of breath but thinks she did not take it long enough to see the difference. She states that she has tried albuterol a few times and this has also not helped. She was in ER recently had a CXR that showed worsening pleural effusions and volume overload. Her BNP was elevated. She does not have leg swelling. She takes she is taking Torsemide 60 mg daily. She was discharged on O2 from the hospital after CHF exacerbation but states she does not have direction as to how long to wear O2 and how many liters. She is using 3 L O2 currently and has both POC and stationary concentrator. She denies having cough or wheezing. She notes her SOB is also better since her cardiac meds were adjusted last week and is going to see cardiology again today.    Review of Systems   Constitutional:  Negative for chills, fatigue, fever and unexpected weight change.   HENT:  Negative for congestion, postnasal drip and rhinorrhea.    Respiratory:  Positive for shortness of breath. Negative for cough (denies hemoptysis.) and wheezing.    Cardiovascular:  Negative for chest pain and leg swelling.   All other systems reviewed and are negative.      Objective    Physical Exam  Vitals reviewed.   Constitutional:       Appearance: Normal appearance.   HENT:      Head: Normocephalic.   Cardiovascular:      Rate and Rhythm: Normal rate and regular rhythm.   Pulmonary:      Effort: Pulmonary effort is normal.      Breath sounds: Decreased breath sounds present.   Skin:     General: Skin is warm and dry.   Neurological:      Mental Status: She is alert.       Assessment/Plan   Shortness of breath  COPD  Former smoker  Chronic systolic heart failure   Atrial fibrillation    Plan:    -Patient had advanced COPD on CXR and CT chest, discussed results at length. She had no improvement on Anoro,but did not take it for longer than a few weeks. Discussed to change to Trelegy inhaler daily and reassess.  -Check PFTs  -Continue albuterol prn. She states that this is not helping either.   -Discussed that she has significant emphysema on CT chest and CXR.  -She is following closely with cardiology.   -Continue to wear O2 @ 3 L continuous and will get 6MWT and Noc Pox to assess if she needs to continue home O2.     Patient's dyspnea in absence of coughing and wheezing is more likely due to underlying CHF given elevated BNP and chest xray findings. She stated she feels better since her heart meds were optimized. We discussed today that she does have COPD and we would like to evaluate the severity on PFTs. We will also assess her response to Triple Therapy with Trelegy if any improvement in her SOB.      first trimester of pregnancy    The following myofascial trigger points were treated today   - R subscapularis in supine position x 1 using 0.3 x 60 needle   - R teres major in sidelying position x 1 using 0.3 x 50 needle   - R lats in sidelying position x 1 using 0.3 x 50 needle   - R serratus ant in sidelying position x 1 using 0.3 x 50 needle   - R infraspinatus in sidelying position x 1 using 0.3 x 50 mm needle    All 5 needles were accounted for at end of treatment.  Skilled input: posture correction    Writer verbally educated and received verbal consent for hand placement, positioning of patient, and techniques to be performed today from patient for therapist position for techniques as described above and how they are pertinent to the patient's plan of care.    Home Exercise Program/Education Materials: *above indicates provided as part of home exercise program     ASSESSMENT                                                                                                             Flexion ROM improved to 139, scaption was s/q, ER at 0 improved to 53 deg post tx today.  Pt has been performing her ex every other day and was educated on need to perform them every day.  If ROM doesn't improve, pt may still be in the frozen stage and we may have to wait until she gets to the thawing stage to start PT.       GOALS                                                                                                                           Improve shoulder and hip  ROM  Improve shoulder  And hip strength to 4/5 or better  Decrease pain  The above improvements in impairments to assist in obtaining goals listed below  Long Term Goals: to be met by end of plan of care  1. Patient will complete independent upper body dressing with reported manageable/tolerable pain  2. Patient will reach overhead with reported manageable/tolerable pain for tasks for independent living including putting groceries away, doing hair and  retrieving items from cabinets   3. Patient will sleep 6 hours without disruption from pain/difficulty with positional changes.   4. Patient will be independent with progressed and modified home exercise program.  5. Quick DASH: Patient will complete form to reflect an improved calculated score improvement to equal to or greater than 16% change to indicate patient reported improvement in function/disability/impairment. (minimal clinically important difference = 15.91)  6. Improve LEFS score by 9 points are better.       Therapy procedure time and total treatment time can be found documented on the Time Entry flowsheet

## 2024-11-21 NOTE — PATIENT INSTRUCTIONS
Please take Trelegy inhaler 1 puff once daily. Rinse your mouth after you take the inhalation.  Continue albuterol inhaler if needed for shortness of breath.  Call with any questions or concerns.   Complete your lung test and oxygen test while walking and one during sleep at home.  Follow up with me on December 19, 24.

## 2024-11-21 NOTE — PATIENT INSTRUCTIONS
Thank you for coming in today.  If you have any questions you may contact the office Monday through Friday at [phone] or on week ends at [phone].    Please continue current medications.     You will need to have lab work done in 1 week prior to right heart cath.     You should hear from [facility] concerning scheduling the right heart cath.     Please follow  a 2 GM sodium diet and limit fluid intake to 2 liters per day or 8 servings ( serving size = 8 oz. = 1 cup = 240 ml) per day.   Please avoid processed meat products (luncheon meats, sausages, loya, hot dogs for example) eat 4 servings of vegetables and 1-2 whole servings of whole fruits per day.   Please weigh daily and call [phone] for weight gain of 3 pounds in 24 hours or 5 pounds or if you experience increased swelling or shortness of breath.         Follow up:   2 weeks.     Please be sure to follow up with your cardiologist at [facility] once every year. Call [phone] to schedule appointment if you do not have a follow up appointment scheduled already.

## 2024-11-22 ENCOUNTER — OFFICE VISIT (OUTPATIENT)
Dept: WOUND CARE | Facility: CLINIC | Age: 89
End: 2024-11-22
Payer: MEDICARE

## 2024-11-22 PROBLEM — I50.32 CHRONIC HEART FAILURE WITH PRESERVED EJECTION FRACTION: Status: ACTIVE | Noted: 2024-11-21

## 2024-11-22 PROCEDURE — 11043 DBRDMT MUSC&/FSCA 1ST 20/<: CPT

## 2024-11-26 ENCOUNTER — TELEPHONE (OUTPATIENT)
Dept: CARDIOLOGY | Facility: HOSPITAL | Age: 89
End: 2024-11-26
Payer: MEDICARE

## 2024-11-26 ENCOUNTER — TELEPHONE (OUTPATIENT)
Dept: INFUSION THERAPY | Facility: HOSPITAL | Age: 89
End: 2024-11-26

## 2024-11-26 NOTE — TELEPHONE ENCOUNTER
Pt called in asking to go over her hesitations on getting a RHC. She is asking for a call back to see if it is completely necessary and will improve her quality of life.    Keith SUAZO

## 2024-11-27 NOTE — TELEPHONE ENCOUNTER
RN called pt at this time right now. RN went over [facility] with her and Pt verbalized understanding.

## 2024-11-29 ENCOUNTER — LAB (OUTPATIENT)
Dept: LAB | Facility: LAB | Age: 89
End: 2024-11-29
Payer: MEDICARE

## 2024-11-29 DIAGNOSIS — I70.209 ATHEROSCLEROSIS OF ARTERY OF LOWER EXTREMITY (CMS-HCC): ICD-10-CM

## 2024-11-29 DIAGNOSIS — I50.32 CHRONIC HEART FAILURE WITH PRESERVED EJECTION FRACTION: ICD-10-CM

## 2024-11-29 LAB
ERYTHROCYTE [DISTWIDTH] IN BLOOD BY AUTOMATED COUNT: 14.9 % (ref 11.5–14.5)
HCT VFR BLD AUTO: 43.1 % (ref 36–46)
HGB BLD-MCNC: 12.7 G/DL (ref 12–16)
INR PPP: 1 (ref 0.9–1.1)
MCH RBC QN AUTO: 30 PG (ref 26–34)
MCHC RBC AUTO-ENTMCNC: 29.5 G/DL (ref 32–36)
MCV RBC AUTO: 102 FL (ref 80–100)
NRBC BLD-RTO: 0 /100 WBCS (ref 0–0)
PLATELET # BLD AUTO: 226 X10*3/UL (ref 150–450)
PROTHROMBIN TIME: 11.2 SECONDS (ref 9.8–12.8)
RBC # BLD AUTO: 4.24 X10*6/UL (ref 4–5.2)
WBC # BLD AUTO: 4.4 X10*3/UL (ref 4.4–11.3)

## 2024-11-29 PROCEDURE — 85027 COMPLETE CBC AUTOMATED: CPT

## 2024-11-29 PROCEDURE — 36415 COLL VENOUS BLD VENIPUNCTURE: CPT

## 2024-11-29 PROCEDURE — 85610 PROTHROMBIN TIME: CPT

## 2024-12-02 ENCOUNTER — TELEPHONE (OUTPATIENT)
Dept: CARDIOLOGY | Facility: HOSPITAL | Age: 89
End: 2024-12-02
Payer: MEDICARE

## 2024-12-02 NOTE — TELEPHONE ENCOUNTER
12/10  Arrival 0630  Procedure 0730    Called cath instructions to patient including review of date and arrival time.  Labs reviewed.  Nothing to eat or drink after midnight except metoprolol/asa with a sip of water the morning of the cath.  You will need a .  Bring your ID, insurance cards and either a perfect list of the medications you are swallowing or the medications in original bottles.  Wear comfortable clothing.  There is a possibility of staying over night for observation so pack a bag with necessities for that.  Patient correctly repeated instructions, verbalized understanding and is agreeable to the plan.

## 2024-12-03 ENCOUNTER — HOSPITAL ENCOUNTER (OUTPATIENT)
Dept: CARDIOLOGY | Facility: HOSPITAL | Age: 89
Discharge: HOME | End: 2024-12-03
Payer: MEDICARE

## 2024-12-03 DIAGNOSIS — I50.42 CHRONIC COMBINED SYSTOLIC AND DIASTOLIC HEART FAILURE: ICD-10-CM

## 2024-12-03 PROCEDURE — C8929 TTE W OR WO FOL WCON,DOPPLER: HCPCS

## 2024-12-03 PROCEDURE — 2500000004 HC RX 250 GENERAL PHARMACY W/ HCPCS (ALT 636 FOR OP/ED): Performed by: STUDENT IN AN ORGANIZED HEALTH CARE EDUCATION/TRAINING PROGRAM

## 2024-12-03 PROCEDURE — 93306 TTE W/DOPPLER COMPLETE: CPT | Performed by: INTERNAL MEDICINE

## 2024-12-04 LAB
AORTIC VALVE MEAN GRADIENT: 3 MMHG
AORTIC VALVE PEAK VELOCITY: 1.2 M/S
AV PEAK GRADIENT: 6 MMHG
AVA (PEAK VEL): 1.93 CM2
AVA (VTI): 2.07 CM2
EJECTION FRACTION APICAL 4 CHAMBER: 37.2
EJECTION FRACTION: 52 %
LEFT ATRIUM VOLUME AREA LENGTH INDEX BSA: 33.3 ML/M2
LEFT VENTRICLE INTERNAL DIMENSION DIASTOLE: 4.38 CM (ref 3.5–6)
LEFT VENTRICULAR OUTFLOW TRACT DIAMETER: 1.96 CM
LV EJECTION FRACTION BIPLANE: 52 %
MITRAL VALVE E/A RATIO: 2.51
RIGHT VENTRICLE FREE WALL PEAK S': 7.93 CM/S
RIGHT VENTRICLE PEAK SYSTOLIC PRESSURE: 66.2 MMHG
TRICUSPID ANNULAR PLANE SYSTOLIC EXCURSION: 1.5 CM

## 2024-12-05 ENCOUNTER — APPOINTMENT (OUTPATIENT)
Dept: CARDIOLOGY | Facility: HOSPITAL | Age: 89
End: 2024-12-05
Payer: MEDICARE

## 2024-12-05 ENCOUNTER — OFFICE VISIT (OUTPATIENT)
Dept: CARDIOLOGY | Facility: HOSPITAL | Age: 89
End: 2024-12-05
Payer: MEDICARE

## 2024-12-05 VITALS
BODY MASS INDEX: 19.22 KG/M2 | OXYGEN SATURATION: 91 % | RESPIRATION RATE: 20 BRPM | WEIGHT: 105.1 LBS | SYSTOLIC BLOOD PRESSURE: 119 MMHG | DIASTOLIC BLOOD PRESSURE: 56 MMHG | HEART RATE: 64 BPM

## 2024-12-05 DIAGNOSIS — I13.10 HYPERTENSIVE HEART AND CHRONIC KIDNEY DISEASE STAGE 3: ICD-10-CM

## 2024-12-05 DIAGNOSIS — N18.30 HYPERTENSIVE HEART AND CHRONIC KIDNEY DISEASE STAGE 3: ICD-10-CM

## 2024-12-05 DIAGNOSIS — I50.42 CHRONIC COMBINED SYSTOLIC AND DIASTOLIC HEART FAILURE: Primary | ICD-10-CM

## 2024-12-05 DIAGNOSIS — I70.209 ATHEROSCLEROSIS OF ARTERY OF LOWER EXTREMITY (CMS-HCC): ICD-10-CM

## 2024-12-05 DIAGNOSIS — J43.9 PULMONARY EMPHYSEMA, UNSPECIFIED EMPHYSEMA TYPE (MULTI): ICD-10-CM

## 2024-12-05 DIAGNOSIS — I10 BENIGN ESSENTIAL HYPERTENSION: ICD-10-CM

## 2024-12-05 DIAGNOSIS — N18.30 STAGE 3 CHRONIC KIDNEY DISEASE, UNSPECIFIED WHETHER STAGE 3A OR 3B CKD (MULTI): ICD-10-CM

## 2024-12-05 DIAGNOSIS — I50.32 CHRONIC HEART FAILURE WITH PRESERVED EJECTION FRACTION: ICD-10-CM

## 2024-12-05 PROCEDURE — 1036F TOBACCO NON-USER: CPT | Performed by: NURSE PRACTITIONER

## 2024-12-05 PROCEDURE — 1159F MED LIST DOCD IN RCRD: CPT | Performed by: NURSE PRACTITIONER

## 2024-12-05 PROCEDURE — 1123F ACP DISCUSS/DSCN MKR DOCD: CPT | Performed by: NURSE PRACTITIONER

## 2024-12-05 PROCEDURE — 3074F SYST BP LT 130 MM HG: CPT | Performed by: NURSE PRACTITIONER

## 2024-12-05 PROCEDURE — 99213 OFFICE O/P EST LOW 20 MIN: CPT | Performed by: NURSE PRACTITIONER

## 2024-12-05 PROCEDURE — 1157F ADVNC CARE PLAN IN RCRD: CPT | Performed by: NURSE PRACTITIONER

## 2024-12-05 PROCEDURE — 3078F DIAST BP <80 MM HG: CPT | Performed by: NURSE PRACTITIONER

## 2024-12-05 ASSESSMENT — ENCOUNTER SYMPTOMS
CHILLS: 0
CHEST TIGHTNESS: 0
LIGHT-HEADEDNESS: 0
HEMATURIA: 0
CONFUSION: 0
SHORTNESS OF BREATH: 1
BLOOD IN STOOL: 0
FEVER: 0
ACTIVITY CHANGE: 0
COUGH: 0
WHEEZING: 0
EYES NEGATIVE: 1
ABDOMINAL DISTENTION: 0
ARTHRALGIAS: 1
WEAKNESS: 0
PALPITATIONS: 0

## 2024-12-05 NOTE — PATIENT INSTRUCTIONS
Thank you for coming in today.  If you have any questions you may contact the office Monday through Friday at [phone] or on week ends at [phone].    Continue current medications.     Please have lab work in 7-10 days.     Please follow  a 2 GM sodium diet and limit fluid intake to 2 liters per day or 8 servings ( serving size = 8 oz. = 1 cup = 240 ml) per day.   Please avoid processed meat products (luncheon meats, sausages, loya, hot dogs for example) eat 4 servings of vegetables and 1-2 whole servings of whole fruits per day.   Please weigh daily and call [phone] for weight gain of 3 pounds in 24 hours or 5 pounds or if you experience increased swelling or shortness of breath.         Follow up:   4 weeks.     Please be sure to follow up with your cardiologist at [facility] once every year. Call [phone] to schedule appointment if you do not have a follow up appointment scheduled already.

## 2024-12-05 NOTE — PROGRESS NOTES
Subjective   Patient ID: Adwoa Forrest is a 89 y.o. female who presents for follow-up of congestive heart failure.     Current Outpatient Medications:     acetaminophen (Tylenol 8 HOUR) 650 mg ER tablet, Take 1 tablet (650 mg) by mouth every 8 hours if needed for mild pain (1 - 3). Do not crush, chew, or split., Disp: , Rfl:     albuterol 90 mcg/actuation inhaler, Inhale 2 puffs every 4 hours if needed for shortness of breath., Disp: 18 g, Rfl: 11    ALPRAZolam (Xanax) 0.5 mg tablet, Take 1 tablet (0.5 mg) by mouth 2 times a day. (Patient taking differently: Take 1 tablet (0.5 mg) by mouth 2 times a day. At night), Disp: 180 tablet, Rfl: 0    amiodarone (Pacerone) 200 mg tablet, Take 1 tablet (200 mg) by mouth once daily., Disp: 90 tablet, Rfl: 3    aspirin 81 mg EC tablet, Take 1 tablet (81 mg) by mouth once daily., Disp: , Rfl:     atorvastatin (Lipitor) 80 mg tablet, Take 1 tablet (80 mg) by mouth once daily. At bedtime, Disp: 90 tablet, Rfl: 3    calcitriol (Rocaltrol) 0.25 mcg capsule, Take 1 capsule (0.25 mcg) by mouth once daily. Three times a week., Disp: , Rfl:     calcium acetate (Phoslo) 667 mg capsule, Take 1 capsule (667 mg) by mouth 2 times a day before meals., Disp: , Rfl:     Farxiga 10 mg, TAKE 1 TABLET BY MOUTH EVERY DAY, Disp: 90 tablet, Rfl: 3    fluticasone-umeclidin-vilanter (Trelegy Ellipta) 100-62.5-25 mcg blister with device, Inhale 1 puff once daily., Disp: 30 each, Rfl: 11    levothyroxine (Synthroid, Levoxyl) 25 mcg tablet, Take 1 tablet (25 mcg) by mouth once daily., Disp: 90 tablet, Rfl: 3    linaCLOtide (Linzess) 145 mcg capsule, Take 1 capsule (145 mcg) by mouth once daily in the morning. Take before meals. Do not crush or chew., Disp: 30 capsule, Rfl: 11    metoprolol succinate XL (Toprol-XL) 25 mg 24 hr tablet, Take 1 tablet (25 mg) by mouth once daily. Do not crush or chew., Disp: 30 tablet, Rfl: 11    multivitamin tablet, Take 1 tablet by mouth once daily., Disp: , Rfl:      sacubitriL-valsartan (Entresto) 24-26 mg tablet, Take 0.5 tablets by mouth 2 times a day., Disp: 30 tablet, Rfl: 11    torsemide (Demadex) 20 mg tablet, Take 3 tablets (60 mg) by mouth once daily., Disp: 270 tablet, Rfl: 3     HPI     Past medical history of ischemic heart disease with PCI in the past. 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 She has had nonsustained ventricular tachycardia in the past from which she was symptomatic. She has been pretty well-controlled with that on amiodarone. Recent stress MPI normal. She had hospitalization on March 4, 2024 with pneumonia and comorbid CHF. The lateral wall lead was unable to be placed to convert to BiV pacer.  Last ER visit 11/14/24.   Patient has continued to have significant lack of activity tolerance and has had consultation with advanced HF department.   Recommendations  were to continue GMDT as tolerated.  If not tolerated will need palliative efforts including possible right heart cath and trial of dobutamine if needed.      Repeat echocardiogram showed improvement in LVEF to 52% with mild RVD.   She has not yet scheduled the [facility].     Echocardiogram as noted above.  She seems to be relatively stable at this time.  Although I would say she is still a [place] heart functional class IIIb-IV.  No overt congestion on exam.  Weight is stable.       Review of Systems   Constitutional:  Negative for activity change, chills and fever.   HENT:  Negative for hearing loss.    Eyes: Negative.    Respiratory:  Positive for shortness of breath. Negative for cough, chest tightness and wheezing.         Chronic severe COPD O2 dependent.     Cardiovascular:  Negative for chest pain, palpitations and leg swelling.   Gastrointestinal:  Negative for abdominal distention and blood in stool.   Genitourinary:  Negative for hematuria.   Musculoskeletal:  Positive for arthralgias.   Skin:         Follows with wound clinic     Neurological:  Negative for syncope, weakness and light-headedness.   Psychiatric/Behavioral:  Negative for confusion.        Objective     /56 (BP Location: Left arm, Patient Position: Sitting, BP Cuff Size: Small adult)   Pulse 64   Resp 20   Wt 47.7 kg (105 lb 1.6 oz)   LMP  (LMP Unknown)   SpO2 91% Comment: 3 literes oxygen  BMI 19.22 kg/m²           Transthoracic echo (TTE) complete    Result Date: 12/4/2024              [place] [id number]      Phone [phone] Fax [phone] TRANSTHORACIC ECHOCARDIOGRAM REPORT Patient Name:       CHERRY ROJAS JESUS Noel Physician:    [id number] Darrel Hampton MD Study Date:         12/3/2024           Ordering Provider:    [id number] ADRIA JON MRN/PID:            [id number]            Fellow: Accession#:         [id number]        Nurse:                Camilla Robledo RN Date of Birth/Age:  1935 / 89 years Sonographer:          Thea Espinoza RDCS Gender Assigned at  F                   Additional Staff: Birth: Height:             157.48 cm           Admit Date: Weight:             47.17 kg            Admission Status:     Outpatient BSA / BMI:          1.45 m2 / 19.02     Department Location:  [facility] Echo                     kg/m2                                     Lab Blood Pressure: 130 /73 mmHg Study Type:    TRANSTHORACIC ECHO (TTE) COMPLETE Diagnosis/ICD: Chronic combined systolic (congestive) and diastolic (congestive)                heart failure (CHF)-I50.42 Indication:    Heart failure CPT Codes:     Echo Complete w Full Doppler-07899 Patient History: Pertinent History: CAD, HTN and CHF. Study Detail: The following Echo studies were performed: 2D, M-Mode, Doppler and               color flow. Technically  challenging study due to body habitus and               prominent lung artifact. Agitated saline used as a contrast agent               for intraseptal flow evaluation and Definity used as a contrast               agent for endocardial border definition. Total contrast used for               this procedure was 2 mL via IV push.  PHYSICIAN INTERPRETATION: Left Ventricle: The left ventricular systolic function is mildly decreased, with a Lentz's biplane calculated ejection fraction of 52%. There is mild concentric left ventricular hypertrophy. There is global hypokinesis of the left ventricle with minor regional variations. The left ventricular cavity size is normal. There is mild increased septal and mildly increased posterior left ventricular wall thickness. Spectral Doppler shows a normal pattern of left ventricular diastolic filling. Left Atrium: The left atrium is normal in size. A bubble study using agitated saline was performed. Bubble study is negative. Right Ventricle: The right ventricle is normal in size. There is mildly reduced right ventricular systolic function. A device is visualized in the right ventricle. Right Atrium: The right atrium is mildly dilated. There is a device visualized in the right atrium. Aortic Valve: The aortic valve appears structurally normal. There is mild aortic valve cusp calcification. The aortic valve dimensionless index is 0.69. There is mild aortic valve regurgitation. The peak instantaneous gradient of the aortic valve is 6 mmHg. The mean gradient of the aortic valve is 3 mmHg. Mitral Valve: The mitral valve is normal in structure. There is trace mitral valve regurgitation. Tricuspid Valve: The tricuspid valve is structurally normal. There is mild to moderate tricuspid regurgitation. The Doppler estimated RVSP is moderately elevated right ventricular systolic pressure at 66.2 mmHg. Pulmonic Valve: The pulmonic valve is not well visualized. There is physiologic pulmonic  valve regurgitation. Pericardium: No pericardial effusion noted. Aorta: The aortic root is normal.  CONCLUSIONS:  1. The left ventricular systolic function is mildly decreased, with a Lentz's biplane calculated ejection fraction of 52%.  2. There is global hypokinesis of the left ventricle with minor regional variations.  3. There is mildly reduced right ventricular systolic function.  4. Mild to moderate tricuspid regurgitation.  5. Moderately elevated right ventricular systolic pressure.  6. Mild aortic valve regurgitation. QUANTITATIVE DATA SUMMARY:  2D MEASUREMENTS:            Normal Ranges: LAs:             4.30 cm    (2.7-4.0cm) IVSd:            1.07 cm    (0.6-1.1cm) LVPWd:           1.01 cm    (0.6-1.1cm) LVIDd:           4.38 cm    (3.9-5.9cm) LVIDs:           3.30 cm LV Mass Index:   106.7 g/m2 LV % FS          24.7 %  LA VOLUME:                    Normal Ranges: LA Vol A4C:        45.6 ml    (22+/-6mL/m2) LA Vol A2C:        42.2 ml LA Vol BP:         48.2 ml LA Vol Index A4C:  31.5ml/m2 LA Vol Index A2C:  29.2 ml/m2 LA Vol Index BP:   33.3 ml/m2 LA Area A4C:       17.8 cm2 LA Area A2C:       15.6 cm2 LA Major Axis A4C: 5.9 cm LA Major Axis A2C: 4.9 cm LA Vol A4C:        42.1 ml LA Vol A2C:        41.4 ml LA Vol Index BSA:  28.8 ml/m2  RA VOLUME BY A/L METHOD:          Normal Ranges: RA Area A4C:             23.4 cm2  AORTA MEASUREMENTS:         Normal Ranges: Ao Sinus, d:        3.00 cm (2.1-3.5cm) Ao STJ, d:          2.40 cm (1.7-3.4cm) Asc Ao, d:          3.60 cm (2.1-3.4cm)  LV SYSTOLIC FUNCTION BY 2D PLANIMETRY (MOD):                      Normal Ranges: EF-A4C View:    37 % (>=55%) EF-A2C View:    63 % EF-Biplane:     52 % LV EF Reported: 52 %  LV DIASTOLIC FUNCTION:          Normal Ranges: MV Peak E:             0.63 m/s (0.7-1.2 m/s) MV Peak A:             0.25 m/s (0.42-0.7 m/s) E/A Ratio:             2.51     (1.0-2.2) MV lateral e'          0.06 m/s  MITRAL VALVE:          Normal Ranges: MV DT:         300 msec (150-240msec)  AORTIC VALVE:                     Normal Ranges: AoV Vmax:                1.20 m/s (<=1.7m/s) AoV Peak P.7 mmHg (<20mmHg) AoV Mean PG:             3.0 mmHg (1.7-11.5mmHg) LVOT Max Henry:            0.77 m/s (<=1.1m/s) AoV VTI:                 23.22 cm (18-25cm) LVOT VTI:                15.93 cm LVOT Diameter:           1.96 cm  (1.8-2.4cm) AoV Area, VTI:           2.07 cm2 (2.5-5.5cm2) AoV Area,Vmax:           1.93 cm2 (2.5-4.5cm2) AoV Dimensionless Index: 0.69  AORTIC INSUFFICIENCY: AI Vmax:       3.95 m/s AI Half-time:  419 msec AI Decel Time: 1446 msec AI Decel Rate: 273.44 cm/s2  RIGHT VENTRICLE: RV Basal 3.49 cm RV Mid   2.80 cm RV Major 7.8 cm TAPSE:   15.0 mm RV s'    0.08 m/s  TRICUSPID VALVE/RVSP:          Normal Ranges: Peak TR Velocity:     3.97 m/s RV Syst Pressure:     66 mmHg  (< 30mmHg) IVC Diam:             1.65 cm  AORTA: Asc Ao Diam 3.60 cm  [id number] Darrel Hampton MD Electronically signed on 2024 at 8:59:11 AM  ** Final **     Transthoracic Echo Limited    Result Date: 2024              [place] [address]      Phone [phone] Fax [phone] TRANSTHORACIC ECHOCARDIOGRAM REPORT  Patient Name:      CHERRY Noel Physician:    [id number] Darrel Hampton MD Study Date:        2024            Ordering Provider:    [id number] BROOKE FOFANA MRN/PID:           [id number]             Fellow: Accession#:        [id number]         Nurse:                Nette Ott RN Date of Birth/Age: 1935 / 88 years  Sonographer:          Estelita Brown RDCS Gender:            F                    Additional Staff: Height:            157.48 cm            Admit Date: Weight:            45.36 kg             Admission  Status:     Outpatient BSA / BMI:         1.42 m2 / 18.29      Department Location:                    kg/m2 Blood Pressure: 102 /58 mmHg Study Type:    TRANSTHORACIC ECHO (TTE) LIMITED Diagnosis/ICD: Ventricular hypertrophy or dilatation-I51.7 Indication:    Left Ventricular hypertrophy CPT Codes:     Echo Limited-[id number] Patient History: Pertinent History: LVH. Study Detail: Technically challenging study due to small rib spaces and               prominent lung artifact. Definity used as a contrast agent for               endocardial border definition. Unable to obtain suprasternal notch               and subcostal view.  PHYSICIAN INTERPRETATION: Left Ventricle: Left ventricular systolic function is moderately decreased, with an estimated ejection fraction of 35-40%. There is global hypokinesis of the left ventricle with minor regional variations. The left ventricular cavity size was not assessed. The left ventricular septal wall thickness is moderately increased. There is moderately increased left ventricular posterior wall thickness. There is moderate to severe left ventricular hypertrophy. Left ventricular diastolic filling was not assessed. Left Atrium: The left atrium was not assessed. Right Ventricle: The right ventricle was not assessed. Right ventricular systolic function not assessed. Right Atrium: The right atrium was not assessed. Aortic Valve: The aortic valve was not assessed. Aortic valve regurgitation was not assessed. Mitral Valve: The mitral valve was not assessed. Mitral valve regurgitation was not assessed. Tricuspid Valve: The tricuspid valve was not assessed. Tricuspid regurgitation was not assessed. Pulmonic Valve: The pulmonic valve was not assessed. The pulmonic valve regurgitation was not assessed. Pericardium: Pericardial effusion was not assessed. Aorta: The aortic root was not assessed.  CONCLUSIONS:  1. Left ventricular systolic function is moderately decreased with a 35-40% estimated  ejection fraction.  2. Moderately increased left ventricular septal thickness.  3. The left ventricular posterior wall thickness is moderately increased.  4. There is moderate to severe left ventricular hypertrophy.  5. There is global hypokinesis of the left ventricle with minor regional variations. QUANTITATIVE DATA SUMMARY: 2D MEASUREMENTS:                           Normal Ranges: LAs:           3.00 cm    (2.7-4.0cm) IVSd:          1.62 cm    (0.6-1.1cm) LVPWd:         1.44 cm    (0.6-1.1cm) LVIDd:         3.62 cm    (3.9-5.9cm) LVIDs:         2.69 cm LV Mass Index: 146.9 g/m2 LV % FS        25.6 % LV SYSTOLIC FUNCTION BY 2D PLANIMETRY (MOD):                     Normal Ranges: EF-A4C View: 33.5 % (>=55%) EF-A2C View: 39.2 % EF-Biplane:  39.0 %  [id number] Darrel Hampton MD Electronically signed on 2/21/2024 at 9:38:31 AM  ** Final **       Lab Results   Component Value Date    BUN 59 (H) 11/14/2024    CREATININE 1.72 (H) 11/14/2024    BNP 1,854 (H) 11/14/2024    MG 2.13 10/01/2024    K 3.9 11/14/2024     11/14/2024       Constitutional:       General:  NAD   HENT:      Head: Normocephalic     Mouth: Mucous membranes are moist.      Neck:  No JVD or HJR   Eyes:      Conjunctiva/sclera: Conjunctivae normal.    Cardiovascular:      Rate and Rhythm: Normal rate and regular rhythm      Heart sounds:  S1 S2 normal, no murmur, no S3 or S4   Pulmonary:      Pulmonary effort is normal.  Prolonged expiratory phase.  Few crackles in left post base.   Abdominal:      General: Bowel sounds are normal, non- tender to palpitations. Liver is non palpable at  right costal margin.   Musculoskeletal:         General: No swelling.   DUARTE well.   Skin:     General: Skin is warm and dry   Neurological:      General: No focal deficit present.      Mental Status: alert and oriented to person, place, and time. Mental status is at baseline.     Psychiatric:         Mood and Affect: Normal Affect.     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   GFR: 24     GDMT:  BB-Metoprolol XL 50 mg 1/2 tablet daily   ARB/ACEI/ARNI - Entresto 24/26 mg 1/2  tablet twice a day.   MRA -  GFR less than 30   SGLT2i - Farxiga 10 mg once a day   Diuretic -  Torsemide 20 mg Pt is to start taking 20 mg orally once a day if weight is , 60mg  daily if weight between 100-105, take 40 mg twice a day mg if weight is over 105lbs and hold if weight is less than 95lbs.     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 ....  was unable to upgrade to Bi V pacer.        CHF:   Weight is up only 1 pound from last visit.    GDMT :  as noted above.   Has not tolerated spironolactone  in the past and the GFR is 24 ml/min.       Per Dr. Collazo's last visit and recommendations,  I think we need the right heart cath awaiting scheduling and results.  Echocardiogram as noted.  Will continue current medications without change.  Follow-up in 4 weeks.    I did discuss with Mrs. Forrest that her prognosis with the combined heart dysfunction and pulmonary dysfunction is rather poor.  We discussed CODE STATUS.  She tells me today that she would not want to be intubated or have CPR.  She was given DNR papers to take home and review with her children so that her chart can reflect her wishes.      If she continues to fail medical therapy - sx continue to worsen then consider palliative care /hospice in the near future.      2. Atrial fibrillation: persistent.   She is s/p AV node ablation and PPM placement in the past. On ASA.  No other OAC due to hx of recurrent GIB     3. NSVT:  Amiodarone.  No palpitations.       3. CKD 3: GFR as noted. She is following with nephrology.       4. HTN:  Controlled.       5. ASHD  with PCI to LAD 2017:  Secondary prevention medications ASA, Statin, BB.   Recent stress/MPI scan negative for ischemia.   No angina.  Stable.      6. Chronic COPD: Scheduled for pulmonary function evaluation.       Rosangela Lane, APRN-CNP

## 2024-12-05 NOTE — H&P (VIEW-ONLY)
Subjective   Patient ID: Adwoa Forrest is a 89 y.o. female who presents for follow-up of congestive heart failure.     Current Outpatient Medications:     acetaminophen (Tylenol 8 HOUR) 650 mg ER tablet, Take 1 tablet (650 mg) by mouth every 8 hours if needed for mild pain (1 - 3). Do not crush, chew, or split., Disp: , Rfl:     albuterol 90 mcg/actuation inhaler, Inhale 2 puffs every 4 hours if needed for shortness of breath., Disp: 18 g, Rfl: 11    ALPRAZolam (Xanax) 0.5 mg tablet, Take 1 tablet (0.5 mg) by mouth 2 times a day. (Patient taking differently: Take 1 tablet (0.5 mg) by mouth 2 times a day. At night), Disp: 180 tablet, Rfl: 0    amiodarone (Pacerone) 200 mg tablet, Take 1 tablet (200 mg) by mouth once daily., Disp: 90 tablet, Rfl: 3    aspirin 81 mg EC tablet, Take 1 tablet (81 mg) by mouth once daily., Disp: , Rfl:     atorvastatin (Lipitor) 80 mg tablet, Take 1 tablet (80 mg) by mouth once daily. At bedtime, Disp: 90 tablet, Rfl: 3    calcitriol (Rocaltrol) 0.25 mcg capsule, Take 1 capsule (0.25 mcg) by mouth once daily. Three times a week., Disp: , Rfl:     calcium acetate (Phoslo) 667 mg capsule, Take 1 capsule (667 mg) by mouth 2 times a day before meals., Disp: , Rfl:     Farxiga 10 mg, TAKE 1 TABLET BY MOUTH EVERY DAY, Disp: 90 tablet, Rfl: 3    fluticasone-umeclidin-vilanter (Trelegy Ellipta) 100-62.5-25 mcg blister with device, Inhale 1 puff once daily., Disp: 30 each, Rfl: 11    levothyroxine (Synthroid, Levoxyl) 25 mcg tablet, Take 1 tablet (25 mcg) by mouth once daily., Disp: 90 tablet, Rfl: 3    linaCLOtide (Linzess) 145 mcg capsule, Take 1 capsule (145 mcg) by mouth once daily in the morning. Take before meals. Do not crush or chew., Disp: 30 capsule, Rfl: 11    metoprolol succinate XL (Toprol-XL) 25 mg 24 hr tablet, Take 1 tablet (25 mg) by mouth once daily. Do not crush or chew., Disp: 30 tablet, Rfl: 11    multivitamin tablet, Take 1 tablet by mouth once daily., Disp: , Rfl:      sacubitriL-valsartan (Entresto) 24-26 mg tablet, Take 0.5 tablets by mouth 2 times a day., Disp: 30 tablet, Rfl: 11    torsemide (Demadex) 20 mg tablet, Take 3 tablets (60 mg) by mouth once daily., Disp: 270 tablet, Rfl: 3     HPI     Past medical history of ischemic heart disease with PCI in the past. 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 She has had nonsustained ventricular tachycardia in the past from which she was symptomatic. She has been pretty well-controlled with that on amiodarone. Recent stress MPI normal. She had hospitalization on March 4, 2024 with pneumonia and comorbid CHF. The lateral wall lead was unable to be placed to convert to BiV pacer.  Last ER visit 11/14/24.   Patient has continued to have significant lack of activity tolerance and has had consultation with advanced HF department.   Recommendations  were to continue GMDT as tolerated.  If not tolerated will need palliative efforts including possible right heart cath and trial of dobutamine if needed.      Repeat echocardiogram showed improvement in LVEF to 52% with mild RVD.   She has not yet scheduled the [facility].     Echocardiogram as noted above.  She seems to be relatively stable at this time.  Although I would say she is still a [place] heart functional class IIIb-IV.  No overt congestion on exam.  Weight is stable.       Review of Systems   Constitutional:  Negative for activity change, chills and fever.   HENT:  Negative for hearing loss.    Eyes: Negative.    Respiratory:  Positive for shortness of breath. Negative for cough, chest tightness and wheezing.         Chronic severe COPD O2 dependent.     Cardiovascular:  Negative for chest pain, palpitations and leg swelling.   Gastrointestinal:  Negative for abdominal distention and blood in stool.   Genitourinary:  Negative for hematuria.   Musculoskeletal:  Positive for arthralgias.   Skin:         Follows with wound clinic     Neurological:  Negative for syncope, weakness and light-headedness.   Psychiatric/Behavioral:  Negative for confusion.        Objective     /56 (BP Location: Left arm, Patient Position: Sitting, BP Cuff Size: Small adult)   Pulse 64   Resp 20   Wt 47.7 kg (105 lb 1.6 oz)   LMP  (LMP Unknown)   SpO2 91% Comment: 3 literes oxygen  BMI 19.22 kg/m²           Transthoracic echo (TTE) complete    Result Date: 12/4/2024              [place] [id number]      Phone [phone] Fax [phone] TRANSTHORACIC ECHOCARDIOGRAM REPORT Patient Name:       CHERRY ROJAS JESUS Noel Physician:    [id number] Darrel Hampton MD Study Date:         12/3/2024           Ordering Provider:    [id number] ADRIA JON MRN/PID:            [id number]            Fellow: Accession#:         [id number]        Nurse:                Camilla Robledo RN Date of Birth/Age:  1935 / 89 years Sonographer:          Thea Espinoza RDCS Gender Assigned at  F                   Additional Staff: Birth: Height:             157.48 cm           Admit Date: Weight:             47.17 kg            Admission Status:     Outpatient BSA / BMI:          1.45 m2 / 19.02     Department Location:  [facility] Echo                     kg/m2                                     Lab Blood Pressure: 130 /73 mmHg Study Type:    TRANSTHORACIC ECHO (TTE) COMPLETE Diagnosis/ICD: Chronic combined systolic (congestive) and diastolic (congestive)                heart failure (CHF)-I50.42 Indication:    Heart failure CPT Codes:     Echo Complete w Full Doppler-07050 Patient History: Pertinent History: CAD, HTN and CHF. Study Detail: The following Echo studies were performed: 2D, M-Mode, Doppler and               color flow. Technically  challenging study due to body habitus and               prominent lung artifact. Agitated saline used as a contrast agent               for intraseptal flow evaluation and Definity used as a contrast               agent for endocardial border definition. Total contrast used for               this procedure was 2 mL via IV push.  PHYSICIAN INTERPRETATION: Left Ventricle: The left ventricular systolic function is mildly decreased, with a Lentz's biplane calculated ejection fraction of 52%. There is mild concentric left ventricular hypertrophy. There is global hypokinesis of the left ventricle with minor regional variations. The left ventricular cavity size is normal. There is mild increased septal and mildly increased posterior left ventricular wall thickness. Spectral Doppler shows a normal pattern of left ventricular diastolic filling. Left Atrium: The left atrium is normal in size. A bubble study using agitated saline was performed. Bubble study is negative. Right Ventricle: The right ventricle is normal in size. There is mildly reduced right ventricular systolic function. A device is visualized in the right ventricle. Right Atrium: The right atrium is mildly dilated. There is a device visualized in the right atrium. Aortic Valve: The aortic valve appears structurally normal. There is mild aortic valve cusp calcification. The aortic valve dimensionless index is 0.69. There is mild aortic valve regurgitation. The peak instantaneous gradient of the aortic valve is 6 mmHg. The mean gradient of the aortic valve is 3 mmHg. Mitral Valve: The mitral valve is normal in structure. There is trace mitral valve regurgitation. Tricuspid Valve: The tricuspid valve is structurally normal. There is mild to moderate tricuspid regurgitation. The Doppler estimated RVSP is moderately elevated right ventricular systolic pressure at 66.2 mmHg. Pulmonic Valve: The pulmonic valve is not well visualized. There is physiologic pulmonic  valve regurgitation. Pericardium: No pericardial effusion noted. Aorta: The aortic root is normal.  CONCLUSIONS:  1. The left ventricular systolic function is mildly decreased, with a Lentz's biplane calculated ejection fraction of 52%.  2. There is global hypokinesis of the left ventricle with minor regional variations.  3. There is mildly reduced right ventricular systolic function.  4. Mild to moderate tricuspid regurgitation.  5. Moderately elevated right ventricular systolic pressure.  6. Mild aortic valve regurgitation. QUANTITATIVE DATA SUMMARY:  2D MEASUREMENTS:            Normal Ranges: LAs:             4.30 cm    (2.7-4.0cm) IVSd:            1.07 cm    (0.6-1.1cm) LVPWd:           1.01 cm    (0.6-1.1cm) LVIDd:           4.38 cm    (3.9-5.9cm) LVIDs:           3.30 cm LV Mass Index:   106.7 g/m2 LV % FS          24.7 %  LA VOLUME:                    Normal Ranges: LA Vol A4C:        45.6 ml    (22+/-6mL/m2) LA Vol A2C:        42.2 ml LA Vol BP:         48.2 ml LA Vol Index A4C:  31.5ml/m2 LA Vol Index A2C:  29.2 ml/m2 LA Vol Index BP:   33.3 ml/m2 LA Area A4C:       17.8 cm2 LA Area A2C:       15.6 cm2 LA Major Axis A4C: 5.9 cm LA Major Axis A2C: 4.9 cm LA Vol A4C:        42.1 ml LA Vol A2C:        41.4 ml LA Vol Index BSA:  28.8 ml/m2  RA VOLUME BY A/L METHOD:          Normal Ranges: RA Area A4C:             23.4 cm2  AORTA MEASUREMENTS:         Normal Ranges: Ao Sinus, d:        3.00 cm (2.1-3.5cm) Ao STJ, d:          2.40 cm (1.7-3.4cm) Asc Ao, d:          3.60 cm (2.1-3.4cm)  LV SYSTOLIC FUNCTION BY 2D PLANIMETRY (MOD):                      Normal Ranges: EF-A4C View:    37 % (>=55%) EF-A2C View:    63 % EF-Biplane:     52 % LV EF Reported: 52 %  LV DIASTOLIC FUNCTION:          Normal Ranges: MV Peak E:             0.63 m/s (0.7-1.2 m/s) MV Peak A:             0.25 m/s (0.42-0.7 m/s) E/A Ratio:             2.51     (1.0-2.2) MV lateral e'          0.06 m/s  MITRAL VALVE:          Normal Ranges: MV DT:         300 msec (150-240msec)  AORTIC VALVE:                     Normal Ranges: AoV Vmax:                1.20 m/s (<=1.7m/s) AoV Peak P.7 mmHg (<20mmHg) AoV Mean PG:             3.0 mmHg (1.7-11.5mmHg) LVOT Max Henry:            0.77 m/s (<=1.1m/s) AoV VTI:                 23.22 cm (18-25cm) LVOT VTI:                15.93 cm LVOT Diameter:           1.96 cm  (1.8-2.4cm) AoV Area, VTI:           2.07 cm2 (2.5-5.5cm2) AoV Area,Vmax:           1.93 cm2 (2.5-4.5cm2) AoV Dimensionless Index: 0.69  AORTIC INSUFFICIENCY: AI Vmax:       3.95 m/s AI Half-time:  419 msec AI Decel Time: 1446 msec AI Decel Rate: 273.44 cm/s2  RIGHT VENTRICLE: RV Basal 3.49 cm RV Mid   2.80 cm RV Major 7.8 cm TAPSE:   15.0 mm RV s'    0.08 m/s  TRICUSPID VALVE/RVSP:          Normal Ranges: Peak TR Velocity:     3.97 m/s RV Syst Pressure:     66 mmHg  (< 30mmHg) IVC Diam:             1.65 cm  AORTA: Asc Ao Diam 3.60 cm  [id number] Darrel Hampton MD Electronically signed on 2024 at 8:59:11 AM  ** Final **     Transthoracic Echo Limited    Result Date: 2024              [place] [address]      Phone [phone] Fax [phone] TRANSTHORACIC ECHOCARDIOGRAM REPORT  Patient Name:      CHERRY Noel Physician:    [id number] Darrel Hampton MD Study Date:        2024            Ordering Provider:    [id number] BROOKE FOFANA MRN/PID:           [id number]             Fellow: Accession#:        [id number]         Nurse:                Nette Ott RN Date of Birth/Age: 1935 / 88 years  Sonographer:          Estelita Brown RDCS Gender:            F                    Additional Staff: Height:            157.48 cm            Admit Date: Weight:            45.36 kg             Admission  Status:     Outpatient BSA / BMI:         1.42 m2 / 18.29      Department Location:                    kg/m2 Blood Pressure: 102 /58 mmHg Study Type:    TRANSTHORACIC ECHO (TTE) LIMITED Diagnosis/ICD: Ventricular hypertrophy or dilatation-I51.7 Indication:    Left Ventricular hypertrophy CPT Codes:     Echo Limited-62676 Patient History: Pertinent History: LVH. Study Detail: Technically challenging study due to small rib spaces and               prominent lung artifact. Definity used as a contrast agent for               endocardial border definition. Unable to obtain suprasternal notch               and subcostal view.  PHYSICIAN INTERPRETATION: Left Ventricle: Left ventricular systolic function is moderately decreased, with an estimated ejection fraction of 35-40%. There is global hypokinesis of the left ventricle with minor regional variations. The left ventricular cavity size was not assessed. The left ventricular septal wall thickness is moderately increased. There is moderately increased left ventricular posterior wall thickness. There is moderate to severe left ventricular hypertrophy. Left ventricular diastolic filling was not assessed. Left Atrium: The left atrium was not assessed. Right Ventricle: The right ventricle was not assessed. Right ventricular systolic function not assessed. Right Atrium: The right atrium was not assessed. Aortic Valve: The aortic valve was not assessed. Aortic valve regurgitation was not assessed. Mitral Valve: The mitral valve was not assessed. Mitral valve regurgitation was not assessed. Tricuspid Valve: The tricuspid valve was not assessed. Tricuspid regurgitation was not assessed. Pulmonic Valve: The pulmonic valve was not assessed. The pulmonic valve regurgitation was not assessed. Pericardium: Pericardial effusion was not assessed. Aorta: The aortic root was not assessed.  CONCLUSIONS:  1. Left ventricular systolic function is moderately decreased with a 35-40% estimated  ejection fraction.  2. Moderately increased left ventricular septal thickness.  3. The left ventricular posterior wall thickness is moderately increased.  4. There is moderate to severe left ventricular hypertrophy.  5. There is global hypokinesis of the left ventricle with minor regional variations. QUANTITATIVE DATA SUMMARY: 2D MEASUREMENTS:                           Normal Ranges: LAs:           3.00 cm    (2.7-4.0cm) IVSd:          1.62 cm    (0.6-1.1cm) LVPWd:         1.44 cm    (0.6-1.1cm) LVIDd:         3.62 cm    (3.9-5.9cm) LVIDs:         2.69 cm LV Mass Index: 146.9 g/m2 LV % FS        25.6 % LV SYSTOLIC FUNCTION BY 2D PLANIMETRY (MOD):                     Normal Ranges: EF-A4C View: 33.5 % (>=55%) EF-A2C View: 39.2 % EF-Biplane:  39.0 %  [id number] Darrel Hampton MD Electronically signed on 2/21/2024 at 9:38:31 AM  ** Final **       Lab Results   Component Value Date    BUN 59 (H) 11/14/2024    CREATININE 1.72 (H) 11/14/2024    BNP 1,854 (H) 11/14/2024    MG 2.13 10/01/2024    K 3.9 11/14/2024     11/14/2024       Constitutional:       General:  NAD   HENT:      Head: Normocephalic     Mouth: Mucous membranes are moist.      Neck:  No JVD or HJR   Eyes:      Conjunctiva/sclera: Conjunctivae normal.    Cardiovascular:      Rate and Rhythm: Normal rate and regular rhythm      Heart sounds:  S1 S2 normal, no murmur, no S3 or S4   Pulmonary:      Pulmonary effort is normal.  Prolonged expiratory phase.  Few crackles in left post base.   Abdominal:      General: Bowel sounds are normal, non- tender to palpitations. Liver is non palpable at  right costal margin.   Musculoskeletal:         General: No swelling.   DUARTE well.   Skin:     General: Skin is warm and dry   Neurological:      General: No focal deficit present.      Mental Status: alert and oriented to person, place, and time. Mental status is at baseline.     Psychiatric:         Mood and Affect: Normal Affect.     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   GFR: 24     GDMT:  BB-Metoprolol XL 50 mg 1/2 tablet daily   ARB/ACEI/ARNI - Entresto 24/26 mg 1/2  tablet twice a day.   MRA -  GFR less than 30   SGLT2i - Farxiga 10 mg once a day   Diuretic -  Torsemide 20 mg Pt is to start taking 20 mg orally once a day if weight is , 60mg  daily if weight between 100-105, take 40 mg twice a day mg if weight is over 105lbs and hold if weight is less than 95lbs.     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 ....  was unable to upgrade to Bi V pacer.        CHF:   Weight is up only 1 pound from last visit.    GDMT :  as noted above.   Has not tolerated spironolactone  in the past and the GFR is 24 ml/min.       Per Dr. Collazo's last visit and recommendations,  I think we need the right heart cath awaiting scheduling and results.  Echocardiogram as noted.  Will continue current medications without change.  Follow-up in 4 weeks.    I did discuss with Mrs. Forrest that her prognosis with the combined heart dysfunction and pulmonary dysfunction is rather poor.  We discussed CODE STATUS.  She tells me today that she would not want to be intubated or have CPR.  She was given DNR papers to take home and review with her children so that her chart can reflect her wishes.      If she continues to fail medical therapy - sx continue to worsen then consider palliative care /hospice in the near future.      2. Atrial fibrillation: persistent.   She is s/p AV node ablation and PPM placement in the past. On ASA.  No other OAC due to hx of recurrent GIB     3. NSVT:  Amiodarone.  No palpitations.       3. CKD 3: GFR as noted. She is following with nephrology.       4. HTN:  Controlled.       5. ASHD  with PCI to LAD 2017:  Secondary prevention medications ASA, Statin, BB.   Recent stress/MPI scan negative for ischemia.   No angina.  Stable.      6. Chronic COPD: Scheduled for pulmonary function evaluation.       Rosangela Lane, APRN-CNP

## 2024-12-10 ENCOUNTER — HOSPITAL ENCOUNTER (OUTPATIENT)
Facility: HOSPITAL | Age: 89
Setting detail: OUTPATIENT SURGERY
Discharge: HOME | End: 2024-12-10
Attending: INTERNAL MEDICINE | Admitting: INTERNAL MEDICINE
Payer: MEDICARE

## 2024-12-10 VITALS
BODY MASS INDEX: 19.14 KG/M2 | RESPIRATION RATE: 17 BRPM | WEIGHT: 104 LBS | TEMPERATURE: 97.6 F | HEIGHT: 62 IN | HEART RATE: 67 BPM | SYSTOLIC BLOOD PRESSURE: 148 MMHG | OXYGEN SATURATION: 96 % | DIASTOLIC BLOOD PRESSURE: 64 MMHG

## 2024-12-10 DIAGNOSIS — I50.43 ACUTE ON CHRONIC COMBINED SYSTOLIC (CONGESTIVE) AND DIASTOLIC (CONGESTIVE) HEART FAILURE: ICD-10-CM

## 2024-12-10 DIAGNOSIS — N18.30 STAGE 3 CHRONIC KIDNEY DISEASE, UNSPECIFIED WHETHER STAGE 3A OR 3B CKD (MULTI): ICD-10-CM

## 2024-12-10 DIAGNOSIS — I50.32 CHRONIC HEART FAILURE WITH PRESERVED EJECTION FRACTION: Primary | ICD-10-CM

## 2024-12-10 DIAGNOSIS — J43.9 PULMONARY EMPHYSEMA, UNSPECIFIED EMPHYSEMA TYPE (MULTI): ICD-10-CM

## 2024-12-10 LAB
BASE EXCESS BLDV CALC-SCNC: 10 MMOL/L (ref -2–3)
BODY TEMPERATURE: 37 DEGREES CELSIUS
HCO3 BLDV-SCNC: 35 MMOL/L (ref 22–26)
OXYHGB MFR BLDV: 53.1 % (ref 45–75)
PCO2 BLDV: 47 MM HG (ref 41–51)
PH BLDV: 7.48 PH (ref 7.33–7.43)
PO2 BLDV: 33 MM HG (ref 35–45)
SAO2 % BLDV: 54 % (ref 45–75)

## 2024-12-10 PROCEDURE — C1887 CATHETER, GUIDING: HCPCS | Performed by: INTERNAL MEDICINE

## 2024-12-10 PROCEDURE — C1894 INTRO/SHEATH, NON-LASER: HCPCS | Performed by: INTERNAL MEDICINE

## 2024-12-10 PROCEDURE — C1769 GUIDE WIRE: HCPCS | Performed by: INTERNAL MEDICINE

## 2024-12-10 PROCEDURE — 93451 RIGHT HEART CATH: CPT | Performed by: INTERNAL MEDICINE

## 2024-12-10 PROCEDURE — 82805 BLOOD GASES W/O2 SATURATION: CPT

## 2024-12-10 PROCEDURE — 2720000007 HC OR 272 NO HCPCS: Performed by: INTERNAL MEDICINE

## 2024-12-10 RX ORDER — DAPAGLIFLOZIN 5 MG/1
10 TABLET, FILM COATED ORAL DAILY
Status: CANCELLED | OUTPATIENT
Start: 2024-12-10

## 2024-12-10 RX ORDER — ASPIRIN 81 MG/1
81 TABLET ORAL DAILY
Status: CANCELLED | OUTPATIENT
Start: 2024-12-11

## 2024-12-10 RX ORDER — ALBUTEROL SULFATE 90 UG/1
2 INHALANT RESPIRATORY (INHALATION) EVERY 4 HOURS PRN
Status: CANCELLED | OUTPATIENT
Start: 2024-12-10

## 2024-12-10 RX ORDER — CALCITRIOL 0.25 UG/1
0.25 CAPSULE ORAL DAILY
Status: CANCELLED | OUTPATIENT
Start: 2024-12-10

## 2024-12-10 RX ORDER — CALCIUM ACETATE 667 MG/1
667 CAPSULE ORAL
Status: CANCELLED | OUTPATIENT
Start: 2024-12-10

## 2024-12-10 RX ORDER — LEVOTHYROXINE SODIUM 50 UG/1
25 TABLET ORAL DAILY
Status: CANCELLED | OUTPATIENT
Start: 2024-12-10

## 2024-12-10 RX ORDER — AMIODARONE HYDROCHLORIDE 200 MG/1
200 TABLET ORAL DAILY
Status: CANCELLED | OUTPATIENT
Start: 2024-12-10

## 2024-12-10 RX ORDER — DEXTROMETHORPHAN HYDROBROMIDE, GUAIFENESIN 5; 100 MG/5ML; MG/5ML
650 LIQUID ORAL EVERY 8 HOURS PRN
Status: CANCELLED | OUTPATIENT
Start: 2024-12-10

## 2024-12-10 RX ORDER — TORSEMIDE 20 MG/1
60 TABLET ORAL DAILY
Status: CANCELLED | OUTPATIENT
Start: 2024-12-10

## 2024-12-10 RX ORDER — ALPRAZOLAM 0.5 MG/1
0.5 TABLET ORAL 2 TIMES DAILY
Status: CANCELLED | OUTPATIENT
Start: 2024-12-10

## 2024-12-10 RX ORDER — ATORVASTATIN CALCIUM 40 MG/1
80 TABLET, FILM COATED ORAL DAILY
Status: CANCELLED | OUTPATIENT
Start: 2024-12-10

## 2024-12-10 RX ORDER — METOPROLOL SUCCINATE 25 MG/1
25 TABLET, EXTENDED RELEASE ORAL DAILY
Status: CANCELLED | OUTPATIENT
Start: 2024-12-11

## 2024-12-10 ASSESSMENT — COLUMBIA-SUICIDE SEVERITY RATING SCALE - C-SSRS
2. HAVE YOU ACTUALLY HAD ANY THOUGHTS OF KILLING YOURSELF?: NO
1. IN THE PAST MONTH, HAVE YOU WISHED YOU WERE DEAD OR WISHED YOU COULD GO TO SLEEP AND NOT WAKE UP?: NO
6. HAVE YOU EVER DONE ANYTHING, STARTED TO DO ANYTHING, OR PREPARED TO DO ANYTHING TO END YOUR LIFE?: NO

## 2024-12-10 ASSESSMENT — PAIN SCALES - GENERAL
PAINLEVEL_OUTOF10: 0 - NO PAIN

## 2024-12-10 ASSESSMENT — PAIN - FUNCTIONAL ASSESSMENT
PAIN_FUNCTIONAL_ASSESSMENT: 0-10

## 2024-12-10 NOTE — POST-PROCEDURE NOTE
Physician Transition of Care Summary  Invasive Cardiovascular Lab    Procedure Date: 12/10/2024  Attending:    * Darrel Hampton - Primary  Resident/Fellow/Other Assistant: Surgeons and Role:  * No surgeons found with a matching role *    Indications:   Pre-op Diagnosis      * Chronic heart failure with preserved ejection fraction [I50.32]     * Stage 3 chronic kidney disease, unspecified whether stage 3a or 3b CKD (Multi) [N18.30]     * Pulmonary emphysema, unspecified emphysema type (Multi) [J43.9]    Post-procedure diagnosis:   Post-op Diagnosis     * Chronic heart failure with preserved ejection fraction [I50.32]     * Stage 3 chronic kidney disease, unspecified whether stage 3a or 3b CKD (Multi) [N18.30]     * Pulmonary emphysema, unspecified emphysema type (Multi) [J43.9]    Procedure(s):   Right Heart Cath  46558 - OR RIGHT HEART CATH O2 SATURATION & CARDIAC OUTPUT        Procedure Findings:   Increased PA pressures    Description of the Procedure:   Right heart cath    Complications:   None    Stents/Implants:   Implants       No implant documentation for this case.            Anticoagulation/Antiplatelet Plan:   None    Estimated Blood Loss:   1 mL    Anesthesia: Moderate Sedation Anesthesia Staff: No anesthesia staff entered.    Any Specimen(s) Removed:   No specimens collected during this procedure.    Disposition:   Home      Electronically signed by: Darrel Hampton MD, 12/10/2024 8:09 AM

## 2024-12-10 NOTE — DISCHARGE INSTRUCTIONS
If you have any questions, please call the Cath Lab Nurse Practitioner Radhahoward Moran at [phone] and leave a message. She will return your call the same day if calling before 3 PM, M-F. If you call on the weekend you can expect a call back on Monday morning. You may also call the Cath Lab at [phone] M-F, 7-3:30 with any questions. Weekends and after hours please call your cardiologist office number to reach a physician on call. The heart group office number is [phone]           CARDIAC CATHETERIZATION DISCHARGE INSTRUCTIONS     FOR SUDDEN AND SEVERE CHEST PAIN, SHORTNESS OF BREATH, EXCESSIVE BLEEDING, SIGNS OF STROKE, OR CHANGES IN MENTAL STATUS YOU SHOULD CALL 911 IMMEDIATELY.     If your provider has prescribed aspirin and/or clopidogrel (Plavix), or prasugrel (Effient), or ticagrelor (Brilinta), DO NOT STOP THESE MEDICATIONS for any reason without talking to your cardiologist first. If any of these were prescribed, you must take them every day without missing a single dose. If you are getting low on these medications, contact your provider immediately for a refill.     FOR NEXT 24 HOURS  - Upon discharge, you should return home and rest for the remainder of the day and evening. You do not have to stay on bed rest but should not be very active.  It is recommended a responsible adult be with you for the first 24 hours after the procedure.    - No driving for 24 hours after procedure. Please arrange for someone to drive you home from the hospital today.     - Do not drive, operate machinery, or use power tools for 24 hours after your procedure.     - Do not make any legal decisions for 24 hours after your procedure.     - Do not drink alcoholic beverages for 24 hours after your procedure.    WOUND CARE   *FOR FEMORAL (LEG) ACCESS*  ·      Avoid heavy lifting (over 10 pounds) for 7 days, squatting or excessive bending for 2 days, and strenuous exercise for 7 days.  ·      No submerged bathing, swimming, or  hot tubs for the next 7 days, or until fully healed.  ·      Avoid sexual activity for 3-4 days until any groin discomfort has ceased.     *FOR RADIAL (WRIST) ACCESS*  ·      No lifting more than 5 pounds or excessive use of the wrist for 24 hours - for example, treat your wrist as if it is sprained.  ·      Do not engage in vigorous activities (tennis, golf, bowling, weights) for at least 48 hours after the procedure.  ·      Do not submerge the wrist for 7 days after the procedure.  ·      You should expect mild tingling in your hand and tenderness at the puncture site for up to 3 days.    - The transparent dressing should be removed from the site 24 hours after the procedure.  Wash the site gently with soap and water. Rinse well and pat dry. Keep the area clean and dry. You may apply a Band-Aid to the site. Avoid lotions, ointments, or powders until fully healed.     - You may shower the day after your procedure.      - It is normal to notice a small bruise around the puncture site and/or a small grape sized or smaller lump. Any large bruising or large lump warrants a call to the office.     - If bleeding should occur, lay down and apply pressure to the affected area for 10 minutes.  If the bleeding stops notify your physician.  If there is a large amount of bleeding or spurting of blood CALL 911 immediately.  DO NOT drive yourself to the hospital.    - You may experience some tenderness, bruising or minimal inflammation.  If you have any concerns, you may contact the Cath Lab or if any of these symptoms become excessive, contact your cardiologist or go to the emergency room.     OTHER INSTRUCTIONS  - You may take acetaminophen (Tylenol) as directed for discomfort.  If pain is not relieved with acetaminophen (Tylenol), contact your doctor.    - If you notice or experience any of the following, you should notify your doctor or seek medical attention  Chest pain or discomfort  Change in mental status or weakness in  extremities.  Dizziness, light headedness, or feeling faint.  Change in the site where the procedure was performed, such as bleeding or an increased area of bruising or swelling.  Tingling, numbness, pain, or coolness in the leg/arm beyond the site where the procedure was performed.  Signs of infection (i.e. shaking chills, temperature > 100 degrees Fahrenheit, warmth, redness) in the leg/arm area where the procedure was performed.  Changes in urination   Bloody or black stools  Vomiting blood  Severe nose bleeds  Any excessive bleeding    - If you DO NOT have an appointment with your cardiologist within 2-4 weeks following your procedure, please contact their office.      Important ways to reduce your risk of coronary artery disease by healthy diet, maintaining a healthy weight, exercising regularly and stop using tobacco products.     Mediterranean Diet    About this topic  This is a heart healthy diet. It is based on widely used foods and cooking styles from many countries around the [place]. The main pattern for the diet is more plant foods and monounsaturated fats, or good fats, like olive oil. Protein in this diet comes from seafood, legumes, nuts, seeds, and poultry and eggs in lowered amounts. You will also eat more whole grains, vegetables, and fruits and moderate amounts of alcohol are also included. This diet has less red meats, dairy products, and processed foods.    What will the results be?  Your diet will have less saturated fat, cholesterol, calories, sodium, and added sugars. Your diet will be higher in fiber. This will help to keep your blood sugar steady. This diet lowers the chance of heart disease and other health problems.  What lifestyle changes are needed?  If you do not often eat this way, you will need to change your eating habits. Be sure to get regular exercise. It is believed to help the health benefits of this diet.  What changes to diet are needed?  You may need to limit the  "amount of red meat and processed foods in your diet. Ask your dietitian for help planning meals that are right for you.  What foods are good to eat?  Plenty of fish and other seafood  Fresh, frozen, or canned fruits and vegetables  Nuts and nut butters and dried beans, lentils, or peas  Foods high in fiber like whole grains and whole grain products  Olive oil (good fat), peanut or canola oil, margarine, or spreads that list vegetable oil as the first ingredient and do not contain trans fat or partially hydrogenated oil  Small amounts of poultry and eggs  What foods should be limited or avoided?  Red meats  Sweets, desserts, and processed foods  Butter, oils, and fats that contain trans fats or are hydrogenated or partially hydrogenated  Gravies and sauces  What problems could happen?  Your weight may rise because your diet will be higher in fat from olive oil and nuts.  You may have lower iron levels. Be sure to eat foods rich in iron. Also, eat foods rich in vitamin C. This will help your body take in iron.  You may have lower calcium levels because you are eating less dairy products. Ask your doctor if you need to take a calcium supplement.  Wine is often thought of as part of a Mediterranean diet. It is not needed and you may choose not to include it. Avoid wine if you are prone to alcohol abuse or are pregnant. Also, avoid it if you are at risk for breast cancer, have liver problems, or have other illnesses that make it important for you to not have alcohol.  When do I need to call the doctor?  If you have any concerns about your diet     Health risks of obesity    The Basics  Written by the doctors and editors at [facility]  What does it mean to have obesity? -- Doctors use a calculation called \"body mass index,\" or \"BMI,\" to decide whether a person is underweight, at a healthy weight, overweight, or has obesity.  Your BMI will tell you which category you are in based on your weight and height (figure 1):  ?If " "your BMI is between 25 and 29.9, you are overweight.  ?If your BMI is 30 or greater, you have obesity.  Obesity is a problem, because it increases the risks of many different health problems. It can also make it hard for you to move, breathe, and do other things that people who are at a healthy weight can do easily.  What are the health risks of obesity? -- Having obesity increases a person's risk of developing many health problems. Here are just a few examples:  ?Diabetes  ?High blood pressure  ?High cholesterol  ?Heart disease (including heart attacks)  ?Stroke  ?Sleep apnea (a disorder in which you stop breathing for short periods while asleep)  ?Asthma  ?Cancer  Does having obesity shorten a person's life? -- Yes. Studies show that people with obesity die younger than people who are a healthy weight. They also show that the risk of death goes up the heavier a person is. The degree of increased risk depends on how long the person has had obesity, and on what other medical problems they have.  People with \"central obesity\" might also be at risk of dying younger. Central obesity means carrying extra weight in the belly area, even if the BMI is normal.  Should I see an expert? -- Yes. If you are overweight or have obesity, you can talk to your doctor or nurse. They might have suggestions for healthy ways to lose weight. It can also help to work with a dietitian (food and nutrition expert). A dietitian can help you choose healthy foods and plan meals.  Are there medical treatments that can help me lose weight? -- Yes. There are medicines and surgery to help with weight loss. But those treatments are only for people who have not been able to lose weight through lifestyle changes such as diet and exercise. Also, weight loss treatments do not take the place of diet and exercise. People who have those treatments must also change how they eat and how active they are.  What can I do to prevent the problems caused by " obesity? -- The best thing that you can do is lose weight. But even if weight loss is not possible, you can improve your health and lower your risk if you:  ?Become more active - Many types of physical activity can help, including walking. You can start with a few minutes a day and add more as you get stronger and build up your endurance. Anything that gets your body moving is good for you.  ?Improve your diet - It is healthy to have regular meal times, eat smaller portions, and not skip meals. Limit sweets, and avoid processed foods. Try to eat more vegetables and fruits instead.  ?Quit smoking (if you smoke) - Some people start eating more after they stop smoking, so try to make healthy food choices. Even if it increases your appetite, quitting smoking is still one of the best things that you can do to improve your health.  ?Limit alcohol - For females, drink no more than 1 drink a day. For males, drink no more than 2 drinks a day.  What causes obesity? -- The thing that increases a person's risk the most is having an unhealthy lifestyle. Most people develop obesity because they eat too much, eat unhealthy foods, and move too little. That's especially true of people who watch too much TV. But there are also other things that seem to increase the risk of obesity that many people do not know about. Here are some things that might affect a person's chance of developing obesity:  ?Mother's habits during and after pregnancy - People who eat a lot of calories, have diabetes, or smoke during pregnancy have a higher chance of having babies who have obesity as adults. Also, babies who drink formula might be more likely than  babies to develop obesity later in life.  ?Habits and weight gain during childhood - Children or teens who are overweight or have obesity are more likely to become have obesity as an adult.  ?Sleeping too little - People who do not get enough sleep are more likely to develop obesity than  "people who sleep enough.  ?Taking certain medicines - Long-term use of certain medicines, such as some medicines to treat depression, can cause weight gain. If you are concerned that 1 of your medicines might be making you gain weight, talk to your doctor or nurse. They might be able to switch you to a different medicine instead.  ?Certain hormonal conditions - Some hormonal problems can increase the risk of developing obesity. For example, a condition called \"polycystic ovary syndrome\" can cause weight gain, along with other symptoms like irregular periods.  What if I want to get pregnant? -- If you are overweight or have obesity, it might be harder to get pregnant. For males, obesity can also cause sex problems, like having trouble getting or keeping an erection. This is more likely if you also have high blood pressure or diabetes.  What if my child has obesity? -- In children, obesity has many of the same risks as it does in adults. For example, it can increase the risk of diabetes, high blood pressure, asthma, and sleep apnea. It can also cause added problems related to childhood. For example, obesity can make children grow faster than normal and cause girls to go through puberty earlier than usual.  All topics are updated as new evidence becomes available and our peer review process is complete.  This topic retrieved from [employer] on: Jan 11, 2024.  Topic 35368 Version 17.0  Release: 31.6.4 - C32.10  © 2024 [employer], [employer]. and/or its affiliates. All rights reserved.  figure 1: Your body mass index (BMI)        If you use tobacco products please review   Quitting smoking    The Basics  Written by the doctors and editors at [employer]  What are the benefits of quitting smoking? -- Quitting smoking can dramatically improve your health and help you live longer. It lowers your risk of heart disease, lung disease, kidney failure, cancer, infection, stomach problems, and diabetes.  Quitting smoking can also lower your " "chances of getting osteoporosis, a condition that makes your bones weak.  Quitting is not easy for most people, and it might take several tries to completely quit. But help and support are available. Quitting smoking will improve your health no matter how old you are, even if you have smoked for a long time.  What should I do if I want to quit smoking? -- It's a good idea to start by talking with your doctor or nurse. It is possible to quit on your own, without help. But getting help greatly increases your chances of quitting successfully.  When you are ready to quit, you will make a plan to:  ?Set a quit date.  ?Tell your family and friends that you plan to quit.  ?Plan ahead for the challenges you will face, such as cigarette cravings.  ?Remove cigarettes from your home, car, and work.  How can my doctor or nurse help? -- Your doctor or nurse can give you advice on the best way to quit. They can also give you medicines to:  ?Reduce your craving for cigarettes  ?Reduce your \"withdrawal\" symptoms (symptoms that happen when you stop smoking)  Your doctor or nurse can also help you find a counselor to talk to. For most people who are trying to quit smoking, it works best to use both medicines and counseling.  You can also get help from a free phone line (5-378-QUITNOW, or 1-904.741.5377) or go online to www.smokefree.gov.  What are the symptoms of withdrawal? -- When you stop smoking, you might have symptoms such as:  ?Trouble sleeping  ?Feeling irritable, anxious, or restless  ?Getting frustrated or angry  ?Having trouble thinking clearly  These symptoms can be hard to deal with, which is why it can be so hard to quit. But medicines can help.  Some people who stop smoking become temporarily depressed. Some people need treatment for depression, such as counseling or medicines or both. People with depression might:  ?No longer enjoy or care about doing the things they used to like to do  ?Feel sad, down, hopeless, " nervous, or cranky most of the day, almost every day  ?Lose or gain weight  ?Sleep too much or too little  ?Feel tired or like they have no energy  ?Feel guilty or like they are worth nothing  ?Forget things or feel confused  ?Move and speak more slowly than usual  ?Act restless or have trouble staying still  ?Think about death or suicide  If you think you might be depressed, tell your doctor or nurse right away. They can talk to you about your symptoms and recommend treatment if needed.  Get help right away if you are thinking of hurting or killing yourself! -- Sometimes, people with depression think of hurting or killing themselves. If you ever feel like you might hurt yourself, help is available:  ?In the , contact the [employer] Suicide & Crisis Lifeline:  To speak to someone, call or text [employer].  To talk to someone online, go to [email or web address]/chat.  ?Call your doctor or nurse and tell them that it is an emergency.  ?Call for an ambulance (in the [place] and [place], call 9-1-1).  ?Go to the emergency department at your local hospital.  If you think your partner might have depression, or if you are worried that they might hurt themselves, get them help right away.  How does counseling work? -- A counselor can help you figure out:  ?What triggers you to want to smoke, and how to handle these situations  ?How to resist cravings  ?What you can do differently if you have tried to quit before  You can meet with a counselor in 1-on-1 sessions or as part of a group. There are other ways to get counseling, too, such as over the phone, through text messaging, or online.  How do medicines help you stop smoking? -- Different medicines work in different ways:  ?Nicotine replacement therapy - Nicotine is the main drug in cigarettes, and the reason they are addictive. These medicines reduce your body's craving for nicotine. They also help with withdrawal symptoms.  There are different forms of nicotine replacement, including skin  "patches, lozenges, gum, nasal sprays, and inhalers. Most can be bought without a prescription. Also, health insurance might cover some or all of the cost.  It often helps to use 2 forms of nicotine replacement. For example, you might wear a patch all the time, plus use gum or lozenges when you get a craving to smoke.  ?Varenicline - Varenicline (brand names: Chantix, Champix) is a prescription medicine that reduces withdrawal symptoms and cigarette cravings. Varenicline can increase the effects of alcohol in some people. It's a good idea to limit drinking while you're taking it, at least until you know how it affects you.  Even if you are not yet ready to commit to a quit date, varenicline can help reduce cravings. This can make it easier to quit when you are ready.  ?Bupropion - Bupropion (sample brand names: Wellbutrin, Zyban) is a prescription medicine that reduces your desire to smoke. It is also available in a generic version, which is cheaper than the brand name medicines. Doctors do not usually prescribe bupropion for people with seizures or who have had seizures in the past.  It might also be helpful to combine nicotine replacement with bupropion or varenicline. In some cases, a person might even take both bupropion and varenicline. Your doctor or nurse can help you figure out the best combination for you.  What about e-cigarettes? -- Sometimes people wonder if using electronic cigarettes, or \"e-cigarettes,\" can help them quit smoking. Using e-cigarettes is also called \"vaping.\" Doctors do not recommend e-cigarettes in place of using of medicines and counseling. That's because e-cigarettes still contain nicotine as well as other substances that might be harmful. It's also not clear how they can affect a person's health in the long term.  What if I am pregnant and I smoke? -- If you are pregnant, it's really important for the health of your baby that you quit. Ask your doctor what options you have, and what " is safest for your baby.  What if I have already smoked for a long time? -- The longer you have smoked, the higher your chances are of having health problems. But it is never too late to quit smoking. It helps your health even if you are older or have smoked for many years. The best thing you can do to lower your chance of having a health problem caused by smoking is to quit.  Will I gain weight if I quit? -- You might gain a few pounds. This can be frustrating for some people. But it's important to remember that you are improving your health by quitting smoking. You can help prevent gaining a lot of weight by staying active and eating a healthy diet.  What if I am not able to quit? -- If you don't quit on your first try, or if you quit but then start smoking again, don't give up hope. Lots of people have to try more than once before they are able to completely quit.  It might help to try to understand why quitting did not work. There might be something you can do differently when you try again. It can help to figure out which situations make you want to smoke, so you can avoid them.  What else can I do to improve my chances of quitting? -- You can:  ?Get regular exercise. Any type of physical activity, even gentle forms of movement, is good for your health. Physical activity can also help reduce stress.  ?Stay away from people who smoke and places that make you want to smoke. If people close to you smoke, ask them to quit with you or avoid smoking around you.  ?Carry gum, hard candy, or something to put in your mouth. If you get a craving for a cigarette, try 1 of these instead.  ?Don't give up, even if you start smoking again. It takes most people a few tries before they succeed.  All topics are updated as new evidence becomes available and our peer review process is complete.

## 2024-12-11 ENCOUNTER — APPOINTMENT (OUTPATIENT)
Dept: RESPIRATORY THERAPY | Facility: HOSPITAL | Age: 89
End: 2024-12-11
Payer: MEDICARE

## 2024-12-13 ENCOUNTER — LAB (OUTPATIENT)
Dept: LAB | Facility: LAB | Age: 89
End: 2024-12-13
Payer: MEDICARE

## 2024-12-13 ENCOUNTER — OFFICE VISIT (OUTPATIENT)
Dept: WOUND CARE | Facility: CLINIC | Age: 89
End: 2024-12-13
Payer: MEDICARE

## 2024-12-13 DIAGNOSIS — N18.30 STAGE 3 CHRONIC KIDNEY DISEASE, UNSPECIFIED WHETHER STAGE 3A OR 3B CKD (MULTI): ICD-10-CM

## 2024-12-13 DIAGNOSIS — I50.42 CHRONIC COMBINED SYSTOLIC AND DIASTOLIC HEART FAILURE: ICD-10-CM

## 2024-12-13 LAB
ANION GAP SERPL CALC-SCNC: 14 MMOL/L (ref 10–20)
BNP SERPL-MCNC: 1547 PG/ML (ref 0–99)
BUN SERPL-MCNC: 61 MG/DL (ref 6–23)
CALCIUM SERPL-MCNC: 9.6 MG/DL (ref 8.6–10.3)
CHLORIDE SERPL-SCNC: 96 MMOL/L (ref 98–107)
CO2 SERPL-SCNC: 36 MMOL/L (ref 21–32)
CREAT SERPL-MCNC: 1.96 MG/DL (ref 0.5–1.05)
EGFRCR SERPLBLD CKD-EPI 2021: 24 ML/MIN/1.73M*2
GLUCOSE SERPL-MCNC: 69 MG/DL (ref 74–99)
MAGNESIUM SERPL-MCNC: 2.76 MG/DL (ref 1.6–2.4)
POTASSIUM SERPL-SCNC: 4.6 MMOL/L (ref 3.5–5.3)
SODIUM SERPL-SCNC: 141 MMOL/L (ref 136–145)

## 2024-12-13 PROCEDURE — 36415 COLL VENOUS BLD VENIPUNCTURE: CPT

## 2024-12-13 PROCEDURE — 80048 BASIC METABOLIC PNL TOTAL CA: CPT

## 2024-12-13 PROCEDURE — 83880 ASSAY OF NATRIURETIC PEPTIDE: CPT

## 2024-12-13 PROCEDURE — 83735 ASSAY OF MAGNESIUM: CPT

## 2024-12-13 PROCEDURE — 11042 DBRDMT SUBQ TIS 1ST 20SQCM/<: CPT

## 2024-12-18 ENCOUNTER — APPOINTMENT (OUTPATIENT)
Dept: RESPIRATORY THERAPY | Facility: HOSPITAL | Age: 89
End: 2024-12-18
Payer: MEDICARE

## 2024-12-18 ENCOUNTER — APPOINTMENT (OUTPATIENT)
Dept: CARDIOLOGY | Facility: CLINIC | Age: 89
End: 2024-12-18
Payer: MEDICARE

## 2024-12-18 VITALS
HEIGHT: 62 IN | HEART RATE: 65 BPM | BODY MASS INDEX: 19.14 KG/M2 | SYSTOLIC BLOOD PRESSURE: 94 MMHG | WEIGHT: 104 LBS | DIASTOLIC BLOOD PRESSURE: 60 MMHG

## 2024-12-18 DIAGNOSIS — I48.20 CHRONIC ATRIAL FIBRILLATION, UNSPECIFIED (MULTI): ICD-10-CM

## 2024-12-18 DIAGNOSIS — Z51.81 ENCOUNTER FOR MONITORING AMIODARONE THERAPY: ICD-10-CM

## 2024-12-18 DIAGNOSIS — Z79.899 ENCOUNTER FOR MONITORING AMIODARONE THERAPY: ICD-10-CM

## 2024-12-18 DIAGNOSIS — I47.29 NSVT (NONSUSTAINED VENTRICULAR TACHYCARDIA) (MULTI): Primary | ICD-10-CM

## 2024-12-18 LAB
ATRIAL RATE: 65 BPM
P AXIS: 56 DEGREES
P OFFSET: 138 MS
P ONSET: 94 MS
PR INTERVAL: 188 MS
Q ONSET: 190 MS
QRS COUNT: 11 BEATS
QRS DURATION: 158 MS
QT INTERVAL: 486 MS
QTC CALCULATION(BAZETT): 505 MS
QTC FREDERICIA: 498 MS
R AXIS: 160 DEGREES
T AXIS: -14 DEGREES
T OFFSET: 433 MS
VENTRICULAR RATE: 65 BPM

## 2024-12-18 PROCEDURE — 1036F TOBACCO NON-USER: CPT | Performed by: NURSE PRACTITIONER

## 2024-12-18 PROCEDURE — 1157F ADVNC CARE PLAN IN RCRD: CPT | Performed by: NURSE PRACTITIONER

## 2024-12-18 PROCEDURE — 99214 OFFICE O/P EST MOD 30 MIN: CPT | Performed by: NURSE PRACTITIONER

## 2024-12-18 PROCEDURE — 1159F MED LIST DOCD IN RCRD: CPT | Performed by: NURSE PRACTITIONER

## 2024-12-18 PROCEDURE — 3074F SYST BP LT 130 MM HG: CPT | Performed by: NURSE PRACTITIONER

## 2024-12-18 PROCEDURE — 99214 OFFICE O/P EST MOD 30 MIN: CPT | Mod: 25 | Performed by: NURSE PRACTITIONER

## 2024-12-18 PROCEDURE — 93005 ELECTROCARDIOGRAM TRACING: CPT | Performed by: NURSE PRACTITIONER

## 2024-12-18 PROCEDURE — 1123F ACP DISCUSS/DSCN MKR DOCD: CPT | Performed by: NURSE PRACTITIONER

## 2024-12-18 PROCEDURE — 3078F DIAST BP <80 MM HG: CPT | Performed by: NURSE PRACTITIONER

## 2024-12-18 NOTE — PROGRESS NOTES
Electrophysiology Follow up Visit    History of Present Illness:  Adwoa Forrest is a 89 y.o. year old female patient with    1. Atrial fibrillation: Status post AV node ablation. She has a dual-chamber pacemaker was set VVIR though after she was started on amiodarone for NSVT, she spontaneously converted to sinus rhythm. She was noted to have underlying sinus rhythm with CHB and she was changed to DDDR  mode. She did have failed Watchman procedure which transitioned to surgical left atrial appendage ligation     2. Diabetes     3. Hypertension     4. COPD: using supplemental oxygen 3L.    5. CAD s/p PCI to LAD in 2017     6. PVCs/nonsustained VT: Noted to have frequent PVCs as well as a short episode of symptomatic VT with PVC morphology at least via pacemaker with similar EGM characteristics. Arrhythmia appears to be automatic and mechanism with a period of ramping up prior to termination. Given palpitations and lightheadedness from episode she was hospitalized in February 2023 and was started on amiodarone. Echocardiogram with normal LV function.     We saw patient in November 2023 for follow-up for atrial fibrillation, NSVT, and amiodarone monitoring.  She reports she has been feeling well and is continuing to heal from an infection in her foot.  She was continued on amiodarone.    April 2024. Patient continued to feel SOB and fatigued. She was agreeable to CRTP upgrade.    Patient here for follow up she is feeling well with continued SOB. She continues to use supplemental oxygen. She denies any palpitations, lightheadedness or syncope. She is compliant with daily medications.     Medical History:  She has a past medical history of Anxiety and depression, Bowel perforation (Multi), Chronic atrial fibrillation, unspecified (Multi) (05/09/2023), Chronic heart failure with preserved ejection fraction (02/07/2024), CKD (chronic kidney disease), COPD (chronic obstructive pulmonary disease) (Multi), Essential (primary)  hypertension, GIB (gastrointestinal bleeding), History of non-ST elevation myocardial infarction (NSTEMI) (10/08/2023), Hypothyroidism, CANDELARIA (iron deficiency anemia), NSVT (nonsustained ventricular tachycardia) (Multi), Raynaud disease, Sick sinus syndrome (Multi) (02/27/2018), Sleep apnea, and Stroke (cerebrum) (Multi).    Surgical History:  She has a past surgical history that includes Cataract extraction; Appendectomy; Hysterectomy; Tonsillectomy; Cholecystectomy; Other surgical history; Other surgical history; Colectomy partial / total; Cardiac electrophysiology procedure (N/A, 06/17/2024); and Cardiac catheterization (N/A, 12/10/2024).     Social History:  She reports that she quit smoking about 34 years ago. Her smoking use included cigarettes. She started smoking about 69 years ago. She has a 8.5 pack-year smoking history. She has never been exposed to tobacco smoke. She has never used smokeless tobacco. She reports that she does not drink alcohol and does not use drugs.         Family History   Problem Relation Name Age of Onset    Coronary artery disease Mother      Coronary artery disease Father          ROS:  A 14 point review of systems was done and is negative other than as stated in HPI    Physical Exam  Constitutional:       Appearance: Normal appearance.   Cardiovascular:      Rate and Rhythm: Normal rate and regular rhythm.      Pulses: Normal pulses.      Heart sounds: Normal heart sounds.   Pulmonary:      Effort: Pulmonary effort is normal.      Breath sounds: Normal breath sounds.   Musculoskeletal:         General: Normal range of motion.      Right lower leg: No edema.      Left lower leg: No edema.   Skin:     General: Skin is warm and dry.   Neurological:      Mental Status: She is alert and oriented to person, place, and time.   Psychiatric:         Mood and Affect: Mood normal.       Allergies:  Aldactone [spironolactone], Codeine, Hydrocodone, Sotalol, and Tetracyclines    Outpatient  Medications:  Current Outpatient Medications   Medication Instructions    acetaminophen (TYLENOL 8 HOUR) 650 mg, Every 8 hours PRN    albuterol 90 mcg/actuation inhaler 2 puffs, inhalation, Every 4 hours PRN    ALPRAZolam (XANAX) 0.5 mg, oral, 2 times daily    amiodarone (PACERONE) 200 mg, oral, Daily    aspirin 81 mg EC tablet 1 tablet, Daily    atorvastatin (LIPITOR) 80 mg, oral, Daily, At bedtime    calcitriol (ROCALTROL) 0.25 mcg, Daily    calcium acetate (PHOSLO) 667 mg, 2 times daily before meals    Farxiga 10 mg, oral, Daily    fluticasone-umeclidin-vilanter (Trelegy Ellipta) 100-62.5-25 mcg blister with device 1 puff, inhalation, Daily    levothyroxine (SYNTHROID, LEVOXYL) 25 mcg, oral, Daily    linaCLOtide (LINZESS) 145 mcg, oral, Daily before breakfast, Do not crush or chew.    metoprolol succinate XL (TOPROL-XL) 25 mg, oral, Daily, Do not crush or chew.    multivitamin tablet 1 tablet, Daily    sacubitriL-valsartan (Entresto) 24-26 mg tablet 0.5 tablets, oral, 2 times daily    torsemide (DEMADEX) 60 mg, oral, Daily         Reviewed Labs:  CBC  Lab Results   Component Value Date    WBC 4.4 11/29/2024    HGB 12.7 11/29/2024    HCT 43.1 11/29/2024     (H) 11/29/2024     11/29/2024        Renal Function Panel  Lab Results   Component Value Date    GLUCOSE 69 (L) 12/13/2024     12/13/2024    K 4.6 12/13/2024    CL 96 (L) 12/13/2024    CO2 36 (H) 12/13/2024    ANIONGAP 14 12/13/2024    BUN 61 (H) 12/13/2024    CREATININE 1.96 (H) 12/13/2024    GFRF 32 (A) 09/25/2023    CALCIUM 9.6 12/13/2024        CMP  Lab Results   Component Value Date    CALCIUM 9.6 12/13/2024    PHOS 3.7 10/17/2024    PROT 6.1 (L) 11/14/2024    ALBUMIN 4.1 11/14/2024    AST 25 11/14/2024    ALT 24 11/14/2024    ALKPHOS 88 11/14/2024    BILITOT 0.8 11/14/2024        Mg   Lab Results   Component Value Date    MG 2.76 (H) 12/13/2024        Thyroid  Lab Results   Component Value Date    TSH 12.87 (H) 06/18/2024    FREET4  "0.85 06/18/2024    T3FREE 2.3 02/07/2024        Visit Vitals  BP 94/60   Pulse 65   Ht 1.575 m (5' 2\")   Wt 47.2 kg (104 lb)   LMP  (LMP Unknown)   BMI 19.02 kg/m²   OB Status Hysterectomy   Smoking Status Former   BSA 1.44 m²           Cardiac Testing Reviewed Study(s):  Echo (December/2024):  CONCLUSIONS:   1. The left ventricular systolic function is mildly decreased, with a Lentz's biplane calculated ejection fraction of 52%.   2. There is global hypokinesis of the left ventricle with minor regional variations.   3. There is mildly reduced right ventricular systolic function.   4. Mild to moderate tricuspid regurgitation.   5. Moderately elevated right ventricular systolic pressure.   6. Mild aortic valve regurgitation.  Echo (October/2023):   CONCLUSIONS:   1. Left ventricular systolic function is normal with a 70-75% estimated ejection fraction.   2. There is moderate left ventricular hypertrophy.   3. There is mildly reduced right ventricular systolic function.   4. The right atrium is moderately dilated.   5. Moderately elevated right ventricular systolic pressure.   6. Mild to moderate aortic valve regurgitation.   Stress Test (March/2024):   FINDINGS:  Stress and rest images both demonstrate a normal distribution of  perfusion throughout all LV segments with no sign of ischemia.      ECG-gated images demonstrate normal LV size and myocardial  contractility with an LV ejection fraction of   52 % (normal above 50  percent).      IMPRESSION:  1. Normal stress myocardial perfusion imaging in response to  pharmacologic stress.  2. Well-maintained left ventricular function.  ECGs (reviewed and my interpretation):   11/17/2023 ECG AV paced at 65 bpm with QTc of 509 ms   12/18/2024 AV paced at 65 bpm    Assessment  NSVT/PVCs: ECG today personally reviewed which shows AV pacing. Device check in October showed no VT/VF.  Labs reviewed. Patient is continued on amiodarone for NSVT suppression.     Atrial " fibrillation: Pacemaker implant with AV derek ablation. Patient had left atrial appendage ligation after failed Watchman.     Chronic heart failure: Attempt CRTP failed. She is managed by advanced heart failure team    We will continue to monitor patient through device clinic        Exclusive of any other services or procedures performed, Stefanie MURRELL, INDIGO-CNP , spent 30 minutes in duration for this visit today.  This time consisted of chart review, obtaining history, and/or performing the exam as documented above as well as documenting the clinical information for the encounter in the electronic record, discussing treatment options, plans, and/or goals with patient, family, and/or caregiver, refilling medications, updating the electronic record, ordering medicines, lab work, imaging, referrals, and/or procedures as documented above and communicating with other [facility] professionals. I have discussed the results of laboratory, radiology, and cardiology studies with the patient and their family/caregiver.

## 2024-12-19 ENCOUNTER — APPOINTMENT (OUTPATIENT)
Dept: PULMONOLOGY | Facility: HOSPITAL | Age: 89
End: 2024-12-19
Payer: MEDICARE

## 2024-12-20 DIAGNOSIS — I50.42 CHRONIC COMBINED SYSTOLIC AND DIASTOLIC HEART FAILURE: Primary | ICD-10-CM

## 2024-12-20 NOTE — PROGRESS NOTES
RHC reviewed. PAS 62/PAD 23 mm hg.  PCWP 27    RVS 61/RVD 3 mm hg.  Mean 8 mm hg.  CO 2.7/CI 1.8.    BNP 1500.   Cr. 1.9 GFR 24.     Reviewed findings with patient today.   She seems to feel that she is doing a little better the past week or so.   Discussed possible trial palliative dobutamine to improve CO.  But she does not want to do that now. She does not think that she necessarily wants to do that at all.   She currently wants to continue current medications.   She has not had the opportunity to address palliative care/hospice or advanced directives with her family yet.  But she has end stage HF/pulmonary disease and is appropriate for  palliative care/hospice at any time with current status.  She has follow up in clinic next week.   Will continue current treatment plan for now per patient wishes.  Will recheck BMP next week.  May be able to trial increasing the Entresto but she has had lightheadedness with this previously.

## 2024-12-23 ENCOUNTER — HOSPITAL ENCOUNTER (OUTPATIENT)
Dept: RESPIRATORY THERAPY | Facility: HOSPITAL | Age: 89
Discharge: HOME | End: 2024-12-23
Payer: MEDICARE

## 2024-12-23 ENCOUNTER — LAB (OUTPATIENT)
Dept: LAB | Facility: LAB | Age: 89
End: 2024-12-23
Payer: MEDICARE

## 2024-12-23 DIAGNOSIS — I50.42 CHRONIC COMBINED SYSTOLIC AND DIASTOLIC HEART FAILURE: ICD-10-CM

## 2024-12-23 DIAGNOSIS — Z79.899 ENCOUNTER FOR MONITORING AMIODARONE THERAPY: ICD-10-CM

## 2024-12-23 DIAGNOSIS — Z51.81 ENCOUNTER FOR MONITORING AMIODARONE THERAPY: ICD-10-CM

## 2024-12-23 DIAGNOSIS — J44.9 CHRONIC OBSTRUCTIVE PULMONARY DISEASE, UNSPECIFIED COPD TYPE (MULTI): ICD-10-CM

## 2024-12-23 LAB
ANION GAP SERPL CALC-SCNC: 20 MMOL/L (ref 10–20)
BUN SERPL-MCNC: 62 MG/DL (ref 6–23)
CALCIUM SERPL-MCNC: 8.9 MG/DL (ref 8.6–10.3)
CHLORIDE SERPL-SCNC: 96 MMOL/L (ref 98–107)
CO2 SERPL-SCNC: 32 MMOL/L (ref 21–32)
CREAT SERPL-MCNC: 1.71 MG/DL (ref 0.5–1.05)
EGFRCR SERPLBLD CKD-EPI 2021: 29 ML/MIN/1.73M*2
GLUCOSE SERPL-MCNC: 78 MG/DL (ref 74–99)
MGC ASCENT PFT - FEV1 - POST: 1.04
MGC ASCENT PFT - FEV1 - PRE: 1.07
MGC ASCENT PFT - FEV1 - PREDICTED: 1.67
MGC ASCENT PFT - FVC - POST: 1.27
MGC ASCENT PFT - FVC - PRE: 1.13
MGC ASCENT PFT - FVC - PREDICTED: 2.22
POTASSIUM SERPL-SCNC: 4.1 MMOL/L (ref 3.5–5.3)
SODIUM SERPL-SCNC: 144 MMOL/L (ref 136–145)
TSH SERPL-ACNC: <37.5 MIU/L (ref 0.44–3.98)

## 2024-12-23 PROCEDURE — 84439 ASSAY OF FREE THYROXINE: CPT

## 2024-12-23 PROCEDURE — 2500000001 HC RX 250 WO HCPCS SELF ADMINISTERED DRUGS (ALT 637 FOR MEDICARE OP): Performed by: INTERNAL MEDICINE

## 2024-12-23 PROCEDURE — 94640 AIRWAY INHALATION TREATMENT: CPT

## 2024-12-23 PROCEDURE — 84443 ASSAY THYROID STIM HORMONE: CPT

## 2024-12-23 PROCEDURE — 94729 DIFFUSING CAPACITY: CPT

## 2024-12-23 PROCEDURE — 80048 BASIC METABOLIC PNL TOTAL CA: CPT

## 2024-12-23 RX ORDER — ALBUTEROL SULFATE 90 UG/1
2 INHALANT RESPIRATORY (INHALATION) ONCE
Status: DISCONTINUED | OUTPATIENT
Start: 2024-12-23 | End: 2024-12-23

## 2024-12-23 RX ORDER — ALBUTEROL SULFATE 90 UG/1
2 INHALANT RESPIRATORY (INHALATION) ONCE
Status: COMPLETED | OUTPATIENT
Start: 2024-12-23 | End: 2024-12-23

## 2024-12-24 LAB — T4 FREE SERPL-MCNC: 0.51 NG/DL (ref 0.61–1.12)

## 2024-12-26 ENCOUNTER — TELEPHONE (OUTPATIENT)
Dept: CARDIOLOGY | Facility: HOSPITAL | Age: 89
End: 2024-12-26
Payer: MEDICARE

## 2024-12-27 ENCOUNTER — TELEPHONE (OUTPATIENT)
Dept: PULMONOLOGY | Facility: HOSPITAL | Age: 89
End: 2024-12-27
Payer: MEDICARE

## 2024-12-27 NOTE — TELEPHONE ENCOUNTER
Patient acknowledged understanding. All questions answered at this time.     ----- Message from Jovon Logan sent at 12/27/2024  1:01 PM EST -----  Please advise pt to continue to wear 3 L O2 continuously and when does any activity to raise it to 5 L. Will discuss further on her upcoming visit in January 2025.

## 2024-12-27 NOTE — TELEPHONE ENCOUNTER
She needs to continue the prednisone due to the lung disease.   Thanks.     Patient was called and the message above was given to Adwoa. Patient is encouraged to call back with any questions or concerns.

## 2024-12-30 ENCOUNTER — DOCUMENTATION (OUTPATIENT)
Dept: PULMONOLOGY | Facility: HOSPITAL | Age: 89
End: 2024-12-30
Payer: MEDICARE

## 2024-12-30 ENCOUNTER — HOSPITAL ENCOUNTER (OUTPATIENT)
Dept: RADIOLOGY | Facility: HOSPITAL | Age: 89
Discharge: HOME | End: 2024-12-30
Payer: MEDICARE

## 2024-12-30 DIAGNOSIS — J44.9 CHRONIC OBSTRUCTIVE PULMONARY DISEASE, UNSPECIFIED COPD TYPE (MULTI): Primary | ICD-10-CM

## 2024-12-30 DIAGNOSIS — J44.9 CHRONIC OBSTRUCTIVE PULMONARY DISEASE, UNSPECIFIED COPD TYPE (MULTI): ICD-10-CM

## 2024-12-30 DIAGNOSIS — I51.9 HEART DISEASE, UNSPECIFIED: ICD-10-CM

## 2024-12-30 PROCEDURE — 71046 X-RAY EXAM CHEST 2 VIEWS: CPT | Performed by: RADIOLOGY

## 2024-12-30 PROCEDURE — 71046 X-RAY EXAM CHEST 2 VIEWS: CPT

## 2024-12-30 RX ORDER — DAPAGLIFLOZIN 10 MG/1
10 TABLET, FILM COATED ORAL DAILY
Qty: 90 TABLET | Refills: 3 | Status: SHIPPED | OUTPATIENT
Start: 2024-12-30 | End: 2024-12-30 | Stop reason: SDUPTHER

## 2024-12-30 RX ORDER — IPRATROPIUM BROMIDE AND ALBUTEROL SULFATE 2.5; .5 MG/3ML; MG/3ML
3 SOLUTION RESPIRATORY (INHALATION) 4 TIMES DAILY PRN
Qty: 360 ML | Refills: 0 | Status: SHIPPED | OUTPATIENT
Start: 2024-12-30 | End: 2025-12-30

## 2024-12-30 RX ORDER — DAPAGLIFLOZIN 10 MG/1
10 TABLET, FILM COATED ORAL DAILY
Qty: 90 TABLET | Refills: 3 | Status: SHIPPED | OUTPATIENT
Start: 2024-12-30

## 2024-12-30 NOTE — PROGRESS NOTES
Received call from Visiting INDIGO Gupta with [employer] that patient has increased cough and expectoration. She is already on atbx but APRN suspects that patient may have pneumonia. Recommended CXR. She also needs nebulizer prn.

## 2025-01-01 ENCOUNTER — APPOINTMENT (OUTPATIENT)
Dept: CARDIOLOGY | Facility: HOSPITAL | Age: 89
DRG: 682 | End: 2025-01-01
Payer: MEDICARE

## 2025-01-01 ENCOUNTER — HOSPITAL ENCOUNTER (OUTPATIENT)
Dept: CARDIOLOGY | Facility: HOSPITAL | Age: 89
Discharge: HOME | End: 2025-03-11

## 2025-01-01 ENCOUNTER — APPOINTMENT (OUTPATIENT)
Dept: RADIOLOGY | Facility: HOSPITAL | Age: 89
DRG: 682 | End: 2025-01-01
Payer: MEDICARE

## 2025-01-01 ENCOUNTER — TELEPHONE (OUTPATIENT)
Dept: CARDIOLOGY | Facility: HOSPITAL | Age: 89
End: 2025-01-01

## 2025-01-01 ENCOUNTER — HOSPITAL ENCOUNTER (INPATIENT)
Facility: HOSPITAL | Age: 89
LOS: 1 days | DRG: 682 | End: 2025-03-12
Attending: STUDENT IN AN ORGANIZED HEALTH CARE EDUCATION/TRAINING PROGRAM | Admitting: STUDENT IN AN ORGANIZED HEALTH CARE EDUCATION/TRAINING PROGRAM
Payer: MEDICARE

## 2025-01-01 VITALS
DIASTOLIC BLOOD PRESSURE: 84 MMHG | OXYGEN SATURATION: 85 % | WEIGHT: 110.23 LBS | TEMPERATURE: 94.8 F | BODY MASS INDEX: 20.81 KG/M2 | SYSTOLIC BLOOD PRESSURE: 100 MMHG | HEIGHT: 61 IN

## 2025-01-01 DIAGNOSIS — N17.9 ACUTE KIDNEY INJURY SUPERIMPOSED ON CKD (CMS-HCC): Primary | ICD-10-CM

## 2025-01-01 DIAGNOSIS — J81.0 ACUTE PULMONARY EDEMA: ICD-10-CM

## 2025-01-01 DIAGNOSIS — N18.9 ACUTE KIDNEY INJURY SUPERIMPOSED ON CKD (CMS-HCC): Primary | ICD-10-CM

## 2025-01-01 DIAGNOSIS — R26.2 UNABLE TO AMBULATE: ICD-10-CM

## 2025-01-01 LAB
ACANTHOCYTES BLD QL SMEAR: NORMAL
ALBUMIN SERPL BCP-MCNC: 3 G/DL (ref 3.4–5)
ALBUMIN SERPL BCP-MCNC: 3.5 G/DL (ref 3.4–5)
ALP SERPL-CCNC: 274 U/L (ref 33–136)
ALP SERPL-CCNC: 316 U/L (ref 33–136)
ALT SERPL W P-5'-P-CCNC: 151 U/L (ref 7–45)
ALT SERPL W P-5'-P-CCNC: 202 U/L (ref 7–45)
ANION GAP SERPL CALC-SCNC: 19 MMOL/L (ref 10–20)
ANION GAP SERPL CALC-SCNC: 21 MMOL/L (ref 10–20)
AST SERPL W P-5'-P-CCNC: 220 U/L (ref 9–39)
AST SERPL W P-5'-P-CCNC: 258 U/L (ref 9–39)
ATRIAL RATE: 64 BPM
BASE EXCESS BLDV CALC-SCNC: -4.5 MMOL/L (ref -2–3)
BASOPHILS # BLD AUTO: 0.01 X10*3/UL (ref 0–0.1)
BASOPHILS NFR BLD AUTO: 0.2 %
BILIRUB SERPL-MCNC: 1.1 MG/DL (ref 0–1.2)
BILIRUB SERPL-MCNC: 1.7 MG/DL (ref 0–1.2)
BNP SERPL-MCNC: 1164 PG/ML (ref 0–99)
BODY TEMPERATURE: 37 DEGREES CELSIUS
BUN SERPL-MCNC: 103 MG/DL (ref 6–23)
BUN SERPL-MCNC: 111 MG/DL (ref 6–23)
BURR CELLS BLD QL SMEAR: NORMAL
CALCIUM SERPL-MCNC: 7.6 MG/DL (ref 8.6–10.3)
CALCIUM SERPL-MCNC: 7.6 MG/DL (ref 8.6–10.3)
CARDIAC TROPONIN I PNL SERPL HS: 67 NG/L (ref 0–13)
CARDIAC TROPONIN I PNL SERPL HS: 77 NG/L (ref 0–13)
CARDIAC TROPONIN I PNL SERPL HS: 81 NG/L (ref 0–13)
CHLORIDE SERPL-SCNC: 95 MMOL/L (ref 98–107)
CHLORIDE SERPL-SCNC: 95 MMOL/L (ref 98–107)
CO2 SERPL-SCNC: 24 MMOL/L (ref 21–32)
CO2 SERPL-SCNC: 25 MMOL/L (ref 21–32)
CREAT SERPL-MCNC: 3.37 MG/DL (ref 0.5–1.05)
CREAT SERPL-MCNC: 3.8 MG/DL (ref 0.5–1.05)
EGFRCR SERPLBLD CKD-EPI 2021: 11 ML/MIN/1.73M*2
EGFRCR SERPLBLD CKD-EPI 2021: 13 ML/MIN/1.73M*2
EOSINOPHIL # BLD AUTO: 0.02 X10*3/UL (ref 0–0.4)
EOSINOPHIL NFR BLD AUTO: 0.3 %
ERYTHROCYTE [DISTWIDTH] IN BLOOD BY AUTOMATED COUNT: 19.3 % (ref 11.5–14.5)
ERYTHROCYTE [DISTWIDTH] IN BLOOD BY AUTOMATED COUNT: 19.3 % (ref 11.5–14.5)
FERRITIN SERPL-MCNC: 159 NG/ML (ref 8–150)
GLUCOSE BLD MANUAL STRIP-MCNC: 153 MG/DL (ref 74–99)
GLUCOSE BLD MANUAL STRIP-MCNC: 186 MG/DL (ref 74–99)
GLUCOSE BLD MANUAL STRIP-MCNC: 23 MG/DL (ref 74–99)
GLUCOSE SERPL-MCNC: 33 MG/DL (ref 74–99)
GLUCOSE SERPL-MCNC: 82 MG/DL (ref 74–99)
HCO3 BLDV-SCNC: 24.1 MMOL/L (ref 22–26)
HCT VFR BLD AUTO: 35.1 % (ref 36–46)
HCT VFR BLD AUTO: 35.1 % (ref 36–46)
HGB BLD-MCNC: 10.6 G/DL (ref 12–16)
HGB BLD-MCNC: 11 G/DL (ref 12–16)
HYPOCHROMIA BLD QL SMEAR: NORMAL
IMM GRANULOCYTES # BLD AUTO: 0.02 X10*3/UL (ref 0–0.5)
IMM GRANULOCYTES NFR BLD AUTO: 0.3 % (ref 0–0.9)
INHALED O2 CONCENTRATION: 50 %
IRON SATN MFR SERPL: 9 % (ref 25–45)
IRON SERPL-MCNC: 33 UG/DL (ref 35–150)
LACTATE SERPL-SCNC: 6.3 MMOL/L (ref 0.4–2)
LYMPHOCYTES # BLD AUTO: 0.26 X10*3/UL (ref 0.8–3)
LYMPHOCYTES NFR BLD AUTO: 4.5 %
MCH RBC QN AUTO: 27 PG (ref 26–34)
MCH RBC QN AUTO: 27.5 PG (ref 26–34)
MCHC RBC AUTO-ENTMCNC: 30.2 G/DL (ref 32–36)
MCHC RBC AUTO-ENTMCNC: 31.3 G/DL (ref 32–36)
MCV RBC AUTO: 88 FL (ref 80–100)
MCV RBC AUTO: 90 FL (ref 80–100)
MONOCYTES # BLD AUTO: 0.36 X10*3/UL (ref 0.05–0.8)
MONOCYTES NFR BLD AUTO: 6.3 %
NEUTROPHILS # BLD AUTO: 5.08 X10*3/UL (ref 1.6–5.5)
NEUTROPHILS NFR BLD AUTO: 88.4 %
NRBC BLD-RTO: 0.7 /100 WBCS (ref 0–0)
NRBC BLD-RTO: 1.2 /100 WBCS (ref 0–0)
OVALOCYTES BLD QL SMEAR: NORMAL
OXYHGB MFR BLDV: 73.9 % (ref 45–75)
P AXIS: 148 DEGREES
PCO2 BLDV: 59 MM HG (ref 41–51)
PH BLDV: 7.22 PH (ref 7.33–7.43)
PLATELET # BLD AUTO: 68 X10*3/UL (ref 150–450)
PLATELET # BLD AUTO: 73 X10*3/UL (ref 150–450)
PO2 BLDV: 52 MM HG (ref 35–45)
POTASSIUM SERPL-SCNC: 4.7 MMOL/L (ref 3.5–5.3)
POTASSIUM SERPL-SCNC: 5.5 MMOL/L (ref 3.5–5.3)
PR INTERVAL: 49 MS
PROT SERPL-MCNC: 4.8 G/DL (ref 6.4–8.2)
PROT SERPL-MCNC: 5.4 G/DL (ref 6.4–8.2)
Q ONSET: 249 MS
QRS COUNT: 12 BEATS
QRS DURATION: 203 MS
QT INTERVAL: 643 MS
QTC CALCULATION(BAZETT): 801 MS
QTC FREDERICIA: 744 MS
R AXIS: 181 DEGREES
RBC # BLD AUTO: 3.92 X10*6/UL (ref 4–5.2)
RBC # BLD AUTO: 4 X10*6/UL (ref 4–5.2)
RBC MORPH BLD: NORMAL
SAO2 % BLDV: 75 % (ref 45–75)
SODIUM SERPL-SCNC: 134 MMOL/L (ref 136–145)
SODIUM SERPL-SCNC: 134 MMOL/L (ref 136–145)
T AXIS: 28 DEGREES
T OFFSET: 571 MS
TARGETS BLD QL SMEAR: NORMAL
TIBC SERPL-MCNC: 356 UG/DL (ref 240–445)
UIBC SERPL-MCNC: 323 UG/DL (ref 110–370)
VENTRICULAR RATE: 93 BPM
WBC # BLD AUTO: 5.8 X10*3/UL (ref 4.4–11.3)
WBC # BLD AUTO: 7.2 X10*3/UL (ref 4.4–11.3)

## 2025-01-01 PROCEDURE — 93005 ELECTROCARDIOGRAM TRACING: CPT

## 2025-01-01 PROCEDURE — 36415 COLL VENOUS BLD VENIPUNCTURE: CPT | Performed by: STUDENT IN AN ORGANIZED HEALTH CARE EDUCATION/TRAINING PROGRAM

## 2025-01-01 PROCEDURE — 83605 ASSAY OF LACTIC ACID: CPT | Performed by: STUDENT IN AN ORGANIZED HEALTH CARE EDUCATION/TRAINING PROGRAM

## 2025-01-01 PROCEDURE — 82810 BLOOD GASES O2 SAT ONLY: CPT | Performed by: STUDENT IN AN ORGANIZED HEALTH CARE EDUCATION/TRAINING PROGRAM

## 2025-01-01 PROCEDURE — 84484 ASSAY OF TROPONIN QUANT: CPT | Performed by: STUDENT IN AN ORGANIZED HEALTH CARE EDUCATION/TRAINING PROGRAM

## 2025-01-01 PROCEDURE — 99233 SBSQ HOSP IP/OBS HIGH 50: CPT | Performed by: NURSE PRACTITIONER

## 2025-01-01 PROCEDURE — 2500000004 HC RX 250 GENERAL PHARMACY W/ HCPCS (ALT 636 FOR OP/ED): Performed by: STUDENT IN AN ORGANIZED HEALTH CARE EDUCATION/TRAINING PROGRAM

## 2025-01-01 PROCEDURE — 2500000001 HC RX 250 WO HCPCS SELF ADMINISTERED DRUGS (ALT 637 FOR MEDICARE OP): Performed by: REGISTERED NURSE

## 2025-01-01 PROCEDURE — 70450 CT HEAD/BRAIN W/O DYE: CPT

## 2025-01-01 PROCEDURE — 2500000001 HC RX 250 WO HCPCS SELF ADMINISTERED DRUGS (ALT 637 FOR MEDICARE OP): Performed by: STUDENT IN AN ORGANIZED HEALTH CARE EDUCATION/TRAINING PROGRAM

## 2025-01-01 PROCEDURE — 84075 ASSAY ALKALINE PHOSPHATASE: CPT | Performed by: STUDENT IN AN ORGANIZED HEALTH CARE EDUCATION/TRAINING PROGRAM

## 2025-01-01 PROCEDURE — 94799 UNLISTED PULMONARY SVC/PX: CPT

## 2025-01-01 PROCEDURE — 85027 COMPLETE CBC AUTOMATED: CPT | Performed by: STUDENT IN AN ORGANIZED HEALTH CARE EDUCATION/TRAINING PROGRAM

## 2025-01-01 PROCEDURE — 99239 HOSP IP/OBS DSCHRG MGMT >30: CPT | Performed by: INTERNAL MEDICINE

## 2025-01-01 PROCEDURE — 2060000001 HC INTERMEDIATE ICU ROOM DAILY

## 2025-01-01 PROCEDURE — 83540 ASSAY OF IRON: CPT | Performed by: INTERNAL MEDICINE

## 2025-01-01 PROCEDURE — 72128 CT CHEST SPINE W/O DYE: CPT

## 2025-01-01 PROCEDURE — 80053 COMPREHEN METABOLIC PANEL: CPT | Performed by: STUDENT IN AN ORGANIZED HEALTH CARE EDUCATION/TRAINING PROGRAM

## 2025-01-01 PROCEDURE — 96374 THER/PROPH/DIAG INJ IV PUSH: CPT

## 2025-01-01 PROCEDURE — 2500000004 HC RX 250 GENERAL PHARMACY W/ HCPCS (ALT 636 FOR OP/ED): Mod: JZ | Performed by: INTERNAL MEDICINE

## 2025-01-01 PROCEDURE — 82947 ASSAY GLUCOSE BLOOD QUANT: CPT

## 2025-01-01 PROCEDURE — 2500000004 HC RX 250 GENERAL PHARMACY W/ HCPCS (ALT 636 FOR OP/ED)

## 2025-01-01 PROCEDURE — 85025 COMPLETE CBC W/AUTO DIFF WBC: CPT | Performed by: STUDENT IN AN ORGANIZED HEALTH CARE EDUCATION/TRAINING PROGRAM

## 2025-01-01 PROCEDURE — 72131 CT LUMBAR SPINE W/O DYE: CPT

## 2025-01-01 PROCEDURE — 2500000005 HC RX 250 GENERAL PHARMACY W/O HCPCS

## 2025-01-01 PROCEDURE — 99223 1ST HOSP IP/OBS HIGH 75: CPT | Performed by: STUDENT IN AN ORGANIZED HEALTH CARE EDUCATION/TRAINING PROGRAM

## 2025-01-01 PROCEDURE — 70450 CT HEAD/BRAIN W/O DYE: CPT | Performed by: RADIOLOGY

## 2025-01-01 PROCEDURE — 82728 ASSAY OF FERRITIN: CPT | Performed by: INTERNAL MEDICINE

## 2025-01-01 PROCEDURE — 2500000001 HC RX 250 WO HCPCS SELF ADMINISTERED DRUGS (ALT 637 FOR MEDICARE OP): Performed by: INTERNAL MEDICINE

## 2025-01-01 PROCEDURE — 2500000004 HC RX 250 GENERAL PHARMACY W/ HCPCS (ALT 636 FOR OP/ED): Performed by: REGISTERED NURSE

## 2025-01-01 PROCEDURE — 71045 X-RAY EXAM CHEST 1 VIEW: CPT

## 2025-01-01 PROCEDURE — 94640 AIRWAY INHALATION TREATMENT: CPT

## 2025-01-01 PROCEDURE — 83880 ASSAY OF NATRIURETIC PEPTIDE: CPT | Performed by: STUDENT IN AN ORGANIZED HEALTH CARE EDUCATION/TRAINING PROGRAM

## 2025-01-01 PROCEDURE — 76770 US EXAM ABDO BACK WALL COMP: CPT

## 2025-01-01 PROCEDURE — 94660 CPAP INITIATION&MGMT: CPT

## 2025-01-01 PROCEDURE — 2500000004 HC RX 250 GENERAL PHARMACY W/ HCPCS (ALT 636 FOR OP/ED): Performed by: INTERNAL MEDICINE

## 2025-01-01 PROCEDURE — 99223 1ST HOSP IP/OBS HIGH 75: CPT | Performed by: INTERNAL MEDICINE

## 2025-01-01 PROCEDURE — 2500000002 HC RX 250 W HCPCS SELF ADMINISTERED DRUGS (ALT 637 FOR MEDICARE OP, ALT 636 FOR OP/ED): Performed by: STUDENT IN AN ORGANIZED HEALTH CARE EDUCATION/TRAINING PROGRAM

## 2025-01-01 PROCEDURE — 76770 US EXAM ABDO BACK WALL COMP: CPT | Performed by: RADIOLOGY

## 2025-01-01 PROCEDURE — 99285 EMERGENCY DEPT VISIT HI MDM: CPT | Mod: 25 | Performed by: STUDENT IN AN ORGANIZED HEALTH CARE EDUCATION/TRAINING PROGRAM

## 2025-01-01 PROCEDURE — 2500000005 HC RX 250 GENERAL PHARMACY W/O HCPCS: Performed by: STUDENT IN AN ORGANIZED HEALTH CARE EDUCATION/TRAINING PROGRAM

## 2025-01-01 RX ORDER — SODIUM CHLORIDE, SODIUM LACTATE, POTASSIUM CHLORIDE, CALCIUM CHLORIDE 600; 310; 30; 20 MG/100ML; MG/100ML; MG/100ML; MG/100ML
125 INJECTION, SOLUTION INTRAVENOUS CONTINUOUS
Status: DISCONTINUED | OUTPATIENT
Start: 2025-01-01 | End: 2025-01-01

## 2025-01-01 RX ORDER — FENTANYL CITRATE 50 UG/ML
50 INJECTION, SOLUTION INTRAMUSCULAR; INTRAVENOUS ONCE
Status: COMPLETED | OUTPATIENT
Start: 2025-01-01 | End: 2025-01-01

## 2025-01-01 RX ORDER — PANTOPRAZOLE SODIUM 40 MG/1
40 TABLET, DELAYED RELEASE ORAL DAILY
Status: DISCONTINUED | OUTPATIENT
Start: 2025-01-01 | End: 2025-01-01

## 2025-01-01 RX ORDER — PHENYLEPHRINE HCL IN 0.9% NACL 1 MG/10 ML
SYRINGE (ML) INTRAVENOUS
Status: DISCONTINUED
Start: 2025-01-01 | End: 2025-01-01 | Stop reason: HOSPADM

## 2025-01-01 RX ORDER — MORPHINE SULFATE 2 MG/ML
INJECTION, SOLUTION INTRAMUSCULAR; INTRAVENOUS
Status: COMPLETED
Start: 2025-01-01 | End: 2025-01-01

## 2025-01-01 RX ORDER — BISACODYL 5 MG
10 TABLET, DELAYED RELEASE (ENTERIC COATED) ORAL DAILY PRN
Status: DISCONTINUED | OUTPATIENT
Start: 2025-01-01 | End: 2025-01-01 | Stop reason: HOSPADM

## 2025-01-01 RX ORDER — CINACALCET 30 MG/1
30 TABLET, FILM COATED ORAL 3 TIMES WEEKLY
Status: DISCONTINUED | OUTPATIENT
Start: 2025-01-01 | End: 2025-01-01

## 2025-01-01 RX ORDER — LORAZEPAM 2 MG/ML
0.5 INJECTION INTRAMUSCULAR EVERY 4 HOURS PRN
Status: DISCONTINUED | OUTPATIENT
Start: 2025-01-01 | End: 2025-01-01 | Stop reason: HOSPADM

## 2025-01-01 RX ORDER — FLUTICASONE FUROATE AND VILANTEROL 100; 25 UG/1; UG/1
1 POWDER RESPIRATORY (INHALATION) DAILY
Status: DISCONTINUED | OUTPATIENT
Start: 2025-01-01 | End: 2025-01-01

## 2025-01-01 RX ORDER — METOLAZONE 5 MG/1
5 TABLET ORAL DAILY
Status: DISCONTINUED | OUTPATIENT
Start: 2025-01-01 | End: 2025-01-01

## 2025-01-01 RX ORDER — DEXTROSE 50 % IN WATER (D50W) INTRAVENOUS SYRINGE
Status: COMPLETED
Start: 2025-01-01 | End: 2025-01-01

## 2025-01-01 RX ORDER — METOPROLOL SUCCINATE 25 MG/1
25 TABLET, EXTENDED RELEASE ORAL DAILY
Status: DISCONTINUED | OUTPATIENT
Start: 2025-01-01 | End: 2025-01-01

## 2025-01-01 RX ORDER — EPLERENONE 25 MG/1
25 TABLET ORAL DAILY
Status: DISCONTINUED | OUTPATIENT
Start: 2025-01-01 | End: 2025-01-01

## 2025-01-01 RX ORDER — SODIUM CHLORIDE 9 MG/ML
100 INJECTION, SOLUTION INTRAVENOUS CONTINUOUS
Status: DISCONTINUED | OUTPATIENT
Start: 2025-01-01 | End: 2025-01-01

## 2025-01-01 RX ORDER — LEVOTHYROXINE SODIUM 75 UG/1
37.5 TABLET ORAL DAILY
Status: DISCONTINUED | OUTPATIENT
Start: 2025-01-01 | End: 2025-01-01

## 2025-01-01 RX ORDER — PANTOPRAZOLE SODIUM 40 MG/10ML
40 INJECTION, POWDER, LYOPHILIZED, FOR SOLUTION INTRAVENOUS DAILY
Status: DISCONTINUED | OUTPATIENT
Start: 2025-01-01 | End: 2025-01-01

## 2025-01-01 RX ORDER — ACETAMINOPHEN 325 MG/1
975 TABLET ORAL 3 TIMES DAILY
Status: DISCONTINUED | OUTPATIENT
Start: 2025-01-01 | End: 2025-01-01

## 2025-01-01 RX ORDER — MORPHINE SULFATE 4 MG/ML
4 INJECTION INTRAVENOUS EVERY 4 HOURS PRN
Status: DISCONTINUED | OUTPATIENT
Start: 2025-01-01 | End: 2025-01-01 | Stop reason: HOSPADM

## 2025-01-01 RX ORDER — OXYCODONE AND ACETAMINOPHEN 5; 325 MG/1; MG/1
1 TABLET ORAL EVERY 4 HOURS PRN
Status: DISCONTINUED | OUTPATIENT
Start: 2025-01-01 | End: 2025-01-01

## 2025-01-01 RX ORDER — HEPARIN SODIUM 5000 [USP'U]/ML
5000 INJECTION, SOLUTION INTRAVENOUS; SUBCUTANEOUS EVERY 8 HOURS SCHEDULED
Status: DISCONTINUED | OUTPATIENT
Start: 2025-01-01 | End: 2025-01-01

## 2025-01-01 RX ORDER — ALBUTEROL SULFATE 90 UG/1
2 INHALANT RESPIRATORY (INHALATION) EVERY 4 HOURS PRN
Status: DISCONTINUED | OUTPATIENT
Start: 2025-01-01 | End: 2025-01-01 | Stop reason: HOSPADM

## 2025-01-01 RX ORDER — VASOPRESSIN 20 UNIT/100 ML (0.2 UNIT/ML) IN 0.9 % SODIUM CHLORIDE IV
SOLUTION
Status: DISCONTINUED
Start: 2025-01-01 | End: 2025-01-01 | Stop reason: HOSPADM

## 2025-01-01 RX ORDER — TALC
3 POWDER (GRAM) TOPICAL NIGHTLY PRN
Status: DISCONTINUED | OUTPATIENT
Start: 2025-01-01 | End: 2025-01-01 | Stop reason: HOSPADM

## 2025-01-01 RX ORDER — ATORVASTATIN CALCIUM 40 MG/1
80 TABLET, FILM COATED ORAL DAILY
Status: DISCONTINUED | OUTPATIENT
Start: 2025-01-01 | End: 2025-01-01

## 2025-01-01 RX ORDER — GUAIFENESIN 600 MG/1
600 TABLET, EXTENDED RELEASE ORAL EVERY 12 HOURS PRN
Status: DISCONTINUED | OUTPATIENT
Start: 2025-01-01 | End: 2025-01-01 | Stop reason: HOSPADM

## 2025-01-01 RX ORDER — VASOPRESSIN 20 UNIT/100 ML (0.2 UNIT/ML) IN 0.9 % SODIUM CHLORIDE IV
0.03 SOLUTION CONTINUOUS
Status: DISCONTINUED | OUTPATIENT
Start: 2025-01-01 | End: 2025-01-01

## 2025-01-01 RX ORDER — AMIODARONE HYDROCHLORIDE 200 MG/1
200 TABLET ORAL DAILY
Status: DISCONTINUED | OUTPATIENT
Start: 2025-01-01 | End: 2025-01-01

## 2025-01-01 RX ORDER — ONDANSETRON 4 MG/1
4 TABLET, FILM COATED ORAL EVERY 8 HOURS PRN
Status: DISCONTINUED | OUTPATIENT
Start: 2025-01-01 | End: 2025-01-01 | Stop reason: HOSPADM

## 2025-01-01 RX ORDER — NOREPINEPHRINE BITARTRATE/D5W 8 MG/250ML
0-.2 PLASTIC BAG, INJECTION (ML) INTRAVENOUS CONTINUOUS
Status: DISCONTINUED | OUTPATIENT
Start: 2025-01-01 | End: 2025-01-01

## 2025-01-01 RX ORDER — BUMETANIDE 1 MG/1
2 TABLET ORAL
Status: DISCONTINUED | OUTPATIENT
Start: 2025-01-01 | End: 2025-01-01

## 2025-01-01 RX ORDER — GLYCOPYRROLATE 0.2 MG/ML
0.2 INJECTION INTRAMUSCULAR; INTRAVENOUS EVERY 4 HOURS PRN
Status: DISCONTINUED | OUTPATIENT
Start: 2025-01-01 | End: 2025-01-01 | Stop reason: HOSPADM

## 2025-01-01 RX ORDER — MULTIVIT-MIN/IRON FUM/FOLIC AC 7.5 MG-4
1 TABLET ORAL DAILY
Status: DISCONTINUED | OUTPATIENT
Start: 2025-01-01 | End: 2025-01-01

## 2025-01-01 RX ORDER — ONDANSETRON HYDROCHLORIDE 2 MG/ML
4 INJECTION, SOLUTION INTRAVENOUS EVERY 8 HOURS PRN
Status: DISCONTINUED | OUTPATIENT
Start: 2025-01-01 | End: 2025-01-01 | Stop reason: HOSPADM

## 2025-01-01 RX ORDER — HYDROMORPHONE HYDROCHLORIDE 0.2 MG/ML
0.2 INJECTION INTRAMUSCULAR; INTRAVENOUS; SUBCUTANEOUS EVERY 4 HOURS PRN
Status: DISCONTINUED | OUTPATIENT
Start: 2025-01-01 | End: 2025-01-01

## 2025-01-01 RX ORDER — CALCITRIOL 0.25 UG/1
0.25 CAPSULE ORAL 3 TIMES WEEKLY
Status: DISCONTINUED | OUTPATIENT
Start: 2025-01-01 | End: 2025-01-01

## 2025-01-01 RX ORDER — MORPHINE SULFATE 2 MG/ML
2 INJECTION, SOLUTION INTRAMUSCULAR; INTRAVENOUS EVERY 4 HOURS PRN
Status: DISCONTINUED | OUTPATIENT
Start: 2025-01-01 | End: 2025-01-01 | Stop reason: HOSPADM

## 2025-01-01 RX ORDER — ASPIRIN 81 MG/1
81 TABLET ORAL DAILY
Status: DISCONTINUED | OUTPATIENT
Start: 2025-01-01 | End: 2025-01-01

## 2025-01-01 RX ORDER — ALPRAZOLAM 0.5 MG/1
0.5 TABLET ORAL NIGHTLY PRN
Status: DISCONTINUED | OUTPATIENT
Start: 2025-01-01 | End: 2025-01-01

## 2025-01-01 RX ADMIN — Medication 3 MG: at 20:53

## 2025-01-01 RX ADMIN — HYDROMORPHONE HYDROCHLORIDE 0.2 MG: 0.2 INJECTION, SOLUTION INTRAMUSCULAR; INTRAVENOUS; SUBCUTANEOUS at 22:43

## 2025-01-01 RX ADMIN — DEXTROSE MONOHYDRATE 50 ML: 25 INJECTION, SOLUTION INTRAVENOUS at 04:15

## 2025-01-01 RX ADMIN — SODIUM CHLORIDE, POTASSIUM CHLORIDE, SODIUM LACTATE AND CALCIUM CHLORIDE 125 ML/HR: 600; 310; 30; 20 INJECTION, SOLUTION INTRAVENOUS at 16:33

## 2025-01-01 RX ADMIN — FENTANYL CITRATE 50 MCG: 0.05 INJECTION, SOLUTION INTRAMUSCULAR; INTRAVENOUS at 03:25

## 2025-01-01 RX ADMIN — MORPHINE SULFATE 2 MG: 2 INJECTION, SOLUTION INTRAMUSCULAR; INTRAVENOUS at 05:40

## 2025-01-01 RX ADMIN — LEVOTHYROXINE SODIUM 37.5 MCG: 0.07 TABLET ORAL at 07:36

## 2025-01-01 RX ADMIN — ALPRAZOLAM 0.5 MG: 0.5 TABLET ORAL at 21:20

## 2025-01-01 RX ADMIN — ASPIRIN 81 MG: 81 TABLET, COATED ORAL at 10:12

## 2025-01-01 RX ADMIN — HYDROMORPHONE HYDROCHLORIDE 0.2 MG: 0.2 INJECTION, SOLUTION INTRAMUSCULAR; INTRAVENOUS; SUBCUTANEOUS at 17:27

## 2025-01-01 RX ADMIN — SODIUM CHLORIDE, POTASSIUM CHLORIDE, SODIUM LACTATE AND CALCIUM CHLORIDE 500 ML: 600; 310; 30; 20 INJECTION, SOLUTION INTRAVENOUS at 01:12

## 2025-01-01 RX ADMIN — HEPARIN SODIUM 5000 UNITS: 5000 INJECTION, SOLUTION INTRAVENOUS; SUBCUTANEOUS at 16:32

## 2025-01-01 RX ADMIN — METOLAZONE 5 MG: 5 TABLET ORAL at 11:19

## 2025-01-01 RX ADMIN — TIOTROPIUM BROMIDE INHALATION SPRAY 2 PUFF: 3.12 SPRAY, METERED RESPIRATORY (INHALATION) at 10:12

## 2025-01-01 RX ADMIN — ACETAMINOPHEN 975 MG: 325 TABLET ORAL at 17:27

## 2025-01-01 RX ADMIN — FLUTICASONE FUROATE AND VILANTEROL TRIFENATATE 1 PUFF: 100; 25 POWDER RESPIRATORY (INHALATION) at 10:12

## 2025-01-01 RX ADMIN — ACETAMINOPHEN 975 MG: 325 TABLET ORAL at 20:53

## 2025-01-01 RX ADMIN — Medication 1 TABLET: at 16:32

## 2025-01-01 RX ADMIN — BUMETANIDE 2 MG: 1 TABLET ORAL at 11:19

## 2025-01-01 RX ADMIN — HEPARIN SODIUM 5000 UNITS: 5000 INJECTION, SOLUTION INTRAVENOUS; SUBCUTANEOUS at 21:00

## 2025-01-01 RX ADMIN — HEPARIN SODIUM 5000 UNITS: 5000 INJECTION, SOLUTION INTRAVENOUS; SUBCUTANEOUS at 07:36

## 2025-01-01 SDOH — ECONOMIC STABILITY: HOUSING INSECURITY: AT ANY TIME IN THE PAST 12 MONTHS, WERE YOU HOMELESS OR LIVING IN A SHELTER (INCLUDING NOW)?: NO

## 2025-01-01 SDOH — ECONOMIC STABILITY: TRANSPORTATION INSECURITY: IN THE PAST 12 MONTHS, HAS LACK OF TRANSPORTATION KEPT YOU FROM MEDICAL APPOINTMENTS OR FROM GETTING MEDICATIONS?: NO

## 2025-01-01 SDOH — SOCIAL STABILITY: SOCIAL INSECURITY: ARE THERE ANY APPARENT SIGNS OF INJURIES/BEHAVIORS THAT COULD BE RELATED TO ABUSE/NEGLECT?: NO

## 2025-01-01 SDOH — SOCIAL STABILITY: SOCIAL INSECURITY: DOES ANYONE TRY TO KEEP YOU FROM HAVING/CONTACTING OTHER FRIENDS OR DOING THINGS OUTSIDE YOUR HOME?: NO

## 2025-01-01 SDOH — ECONOMIC STABILITY: INCOME INSECURITY: IN THE PAST 12 MONTHS HAS THE ELECTRIC, GAS, OIL, OR WATER COMPANY THREATENED TO SHUT OFF SERVICES IN YOUR HOME?: NO

## 2025-01-01 SDOH — ECONOMIC STABILITY: FOOD INSECURITY: WITHIN THE PAST 12 MONTHS, THE FOOD YOU BOUGHT JUST DIDN'T LAST AND YOU DIDN'T HAVE MONEY TO GET MORE.: NEVER TRUE

## 2025-01-01 SDOH — SOCIAL STABILITY: SOCIAL INSECURITY: WITHIN THE LAST YEAR, HAVE YOU BEEN HUMILIATED OR EMOTIONALLY ABUSED IN OTHER WAYS BY YOUR PARTNER OR EX-PARTNER?: NO

## 2025-01-01 SDOH — SOCIAL STABILITY: SOCIAL INSECURITY: WERE YOU ABLE TO COMPLETE ALL THE BEHAVIORAL HEALTH SCREENINGS?: YES

## 2025-01-01 SDOH — SOCIAL STABILITY: SOCIAL INSECURITY
WITHIN THE LAST YEAR, HAVE YOU BEEN RAPED OR FORCED TO HAVE ANY KIND OF SEXUAL ACTIVITY BY YOUR PARTNER OR EX-PARTNER?: NO

## 2025-01-01 SDOH — ECONOMIC STABILITY: FOOD INSECURITY: WITHIN THE PAST 12 MONTHS, YOU WORRIED THAT YOUR FOOD WOULD RUN OUT BEFORE YOU GOT THE MONEY TO BUY MORE.: NEVER TRUE

## 2025-01-01 SDOH — SOCIAL STABILITY: SOCIAL INSECURITY: DO YOU FEEL UNSAFE GOING BACK TO THE PLACE WHERE YOU ARE LIVING?: NO

## 2025-01-01 SDOH — SOCIAL STABILITY: SOCIAL INSECURITY: WITHIN THE LAST YEAR, HAVE YOU BEEN AFRAID OF YOUR PARTNER OR EX-PARTNER?: NO

## 2025-01-01 SDOH — SOCIAL STABILITY: SOCIAL INSECURITY: ARE YOU OR HAVE YOU BEEN THREATENED OR ABUSED PHYSICALLY, EMOTIONALLY, OR SEXUALLY BY ANYONE?: NO

## 2025-01-01 SDOH — SOCIAL STABILITY: SOCIAL INSECURITY: HAS ANYONE EVER THREATENED TO HURT YOUR FAMILY OR YOUR PETS?: NO

## 2025-01-01 SDOH — ECONOMIC STABILITY: HOUSING INSECURITY: IN THE LAST 12 MONTHS, WAS THERE A TIME WHEN YOU WERE NOT ABLE TO PAY THE MORTGAGE OR RENT ON TIME?: NO

## 2025-01-01 SDOH — SOCIAL STABILITY: SOCIAL INSECURITY
WITHIN THE LAST YEAR, HAVE YOU BEEN KICKED, HIT, SLAPPED, OR OTHERWISE PHYSICALLY HURT BY YOUR PARTNER OR EX-PARTNER?: NO

## 2025-01-01 SDOH — SOCIAL STABILITY: SOCIAL INSECURITY: ABUSE: ADULT

## 2025-01-01 SDOH — SOCIAL STABILITY: SOCIAL INSECURITY: DO YOU FEEL ANYONE HAS EXPLOITED OR TAKEN ADVANTAGE OF YOU FINANCIALLY OR OF YOUR PERSONAL PROPERTY?: NO

## 2025-01-01 SDOH — ECONOMIC STABILITY: FOOD INSECURITY: HOW HARD IS IT FOR YOU TO PAY FOR THE VERY BASICS LIKE FOOD, HOUSING, MEDICAL CARE, AND HEATING?: NOT HARD AT ALL

## 2025-01-01 SDOH — SOCIAL STABILITY: SOCIAL INSECURITY: HAVE YOU HAD THOUGHTS OF HARMING ANYONE ELSE?: YES

## 2025-01-01 SDOH — ECONOMIC STABILITY: HOUSING INSECURITY: IN THE PAST 12 MONTHS, HOW MANY TIMES HAVE YOU MOVED WHERE YOU WERE LIVING?: 0

## 2025-01-01 ASSESSMENT — COGNITIVE AND FUNCTIONAL STATUS - GENERAL
HELP NEEDED FOR BATHING: A LITTLE
STANDING UP FROM CHAIR USING ARMS: A LITTLE
TURNING FROM BACK TO SIDE WHILE IN FLAT BAD: A LITTLE
CLIMB 3 TO 5 STEPS WITH RAILING: A LOT
MOVING TO AND FROM BED TO CHAIR: A LITTLE
DAILY ACTIVITIY SCORE: 18
TOILETING: A LITTLE
MOVING TO AND FROM BED TO CHAIR: A LITTLE
EATING MEALS: A LITTLE
CLIMB 3 TO 5 STEPS WITH RAILING: A LOT
STANDING UP FROM CHAIR USING ARMS: A LITTLE
MOBILITY SCORE: 18
TOILETING: A LITTLE
CLIMB 3 TO 5 STEPS WITH RAILING: A LOT
MOBILITY SCORE: 16
MOBILITY SCORE: 16
EATING MEALS: A LITTLE
MOVING FROM LYING ON BACK TO SITTING ON SIDE OF FLAT BED WITH BEDRAILS: A LITTLE
TOILETING: A LITTLE
DRESSING REGULAR UPPER BODY CLOTHING: A LOT
PERSONAL GROOMING: A LITTLE
PERSONAL GROOMING: A LITTLE
MOVING TO AND FROM BED TO CHAIR: A LITTLE
DAILY ACTIVITIY SCORE: 18
STANDING UP FROM CHAIR USING ARMS: A LITTLE
WALKING IN HOSPITAL ROOM: A LOT
PATIENT BASELINE BEDBOUND: NO
MOVING FROM LYING ON BACK TO SITTING ON SIDE OF FLAT BED WITH BEDRAILS: A LITTLE
DRESSING REGULAR LOWER BODY CLOTHING: A LITTLE
PERSONAL GROOMING: A LITTLE
HELP NEEDED FOR BATHING: A LITTLE
HELP NEEDED FOR BATHING: A LITTLE
DRESSING REGULAR UPPER BODY CLOTHING: A LITTLE
DAILY ACTIVITIY SCORE: 18
DRESSING REGULAR UPPER BODY CLOTHING: A LITTLE
DRESSING REGULAR LOWER BODY CLOTHING: A LITTLE
WALKING IN HOSPITAL ROOM: A LITTLE
WALKING IN HOSPITAL ROOM: A LOT
DRESSING REGULAR LOWER BODY CLOTHING: A LITTLE
TURNING FROM BACK TO SIDE WHILE IN FLAT BAD: A LITTLE
TURNING FROM BACK TO SIDE WHILE IN FLAT BAD: A LITTLE

## 2025-01-01 ASSESSMENT — PAIN - FUNCTIONAL ASSESSMENT
PAIN_FUNCTIONAL_ASSESSMENT: 0-10

## 2025-01-01 ASSESSMENT — ENCOUNTER SYMPTOMS
DECREASED CONCENTRATION: 0
CHILLS: 0
DIARRHEA: 0
STRIDOR: 0
HEMATURIA: 0
PHOTOPHOBIA: 0
ACTIVITY CHANGE: 1
APPETITE CHANGE: 1
APNEA: 0
WHEEZING: 0
COLOR CHANGE: 0
NERVOUS/ANXIOUS: 0
DYSURIA: 0
TREMORS: 0
FREQUENCY: 0
AGITATION: 0
SHORTNESS OF BREATH: 0
HEADACHES: 0
ACTIVITY CHANGE: 0
FLANK PAIN: 0
BLOOD IN STOOL: 0
NUMBNESS: 0
LIGHT-HEADEDNESS: 0
JOINT SWELLING: 0
MYALGIAS: 1
FEVER: 0
SORE THROAT: 0
DIAPHORESIS: 0
CONSTIPATION: 0
NAUSEA: 0
FATIGUE: 0
ARTHRALGIAS: 0
COUGH: 0
WEAKNESS: 1
CHEST TIGHTNESS: 0
POLYPHAGIA: 0
ABDOMINAL DISTENTION: 1
ABDOMINAL PAIN: 0
RHINORRHEA: 0
SINUS PAIN: 0
CONFUSION: 0
WOUND: 0
VOMITING: 0
ABDOMINAL DISTENTION: 0
DIFFICULTY URINATING: 0
BACK PAIN: 0
DIZZINESS: 0
PALPITATIONS: 0

## 2025-01-01 ASSESSMENT — ACTIVITIES OF DAILY LIVING (ADL)
DRESSING YOURSELF: NEEDS ASSISTANCE
PATIENT'S MEMORY ADEQUATE TO SAFELY COMPLETE DAILY ACTIVITIES?: YES
LACK_OF_TRANSPORTATION: NO
WALKS IN HOME: NEEDS ASSISTANCE
TOILETING: NEEDS ASSISTANCE
GROOMING: NEEDS ASSISTANCE
HEARING - RIGHT EAR: HEARING AID
FEEDING YOURSELF: INDEPENDENT
JUDGMENT_ADEQUATE_SAFELY_COMPLETE_DAILY_ACTIVITIES: YES
LACK_OF_TRANSPORTATION: NO
ADEQUATE_TO_COMPLETE_ADL: YES
BATHING: NEEDS ASSISTANCE
HEARING - LEFT EAR: HEARING AID

## 2025-01-01 ASSESSMENT — PAIN SCALES - GENERAL
PAINLEVEL_OUTOF10: 5 - MODERATE PAIN
PAINLEVEL_OUTOF10: 3
PAINLEVEL_OUTOF10: 4
PAINLEVEL_OUTOF10: 8
PAINLEVEL_OUTOF10: 0 - NO PAIN
PAINLEVEL_OUTOF10: 7
PAINLEVEL_OUTOF10: 0 - NO PAIN
PAINLEVEL_OUTOF10: 3

## 2025-01-01 ASSESSMENT — LIFESTYLE VARIABLES
AUDIT-C TOTAL SCORE: 0
AUDIT-C TOTAL SCORE: 0
HOW OFTEN DO YOU HAVE A DRINK CONTAINING ALCOHOL: NEVER
SKIP TO QUESTIONS 9-10: 1
HOW MANY STANDARD DRINKS CONTAINING ALCOHOL DO YOU HAVE ON A TYPICAL DAY: PATIENT DOES NOT DRINK
HOW OFTEN DO YOU HAVE 6 OR MORE DRINKS ON ONE OCCASION: NEVER

## 2025-01-01 ASSESSMENT — PAIN DESCRIPTION - LOCATION
LOCATION: GENERALIZED

## 2025-01-02 ENCOUNTER — INFUSION (OUTPATIENT)
Dept: INFUSION THERAPY | Facility: HOSPITAL | Age: 89
End: 2025-01-02
Payer: MEDICARE

## 2025-01-02 ENCOUNTER — OFFICE VISIT (OUTPATIENT)
Dept: CARDIOLOGY | Facility: HOSPITAL | Age: 89
End: 2025-01-02
Payer: MEDICARE

## 2025-01-02 VITALS
SYSTOLIC BLOOD PRESSURE: 129 MMHG | WEIGHT: 105 LBS | HEART RATE: 64 BPM | RESPIRATION RATE: 24 BRPM | OXYGEN SATURATION: 100 % | BODY MASS INDEX: 19.2 KG/M2 | DIASTOLIC BLOOD PRESSURE: 61 MMHG

## 2025-01-02 DIAGNOSIS — E03.8 OTHER SPECIFIED HYPOTHYROIDISM: ICD-10-CM

## 2025-01-02 DIAGNOSIS — I50.42 CHRONIC COMBINED SYSTOLIC AND DIASTOLIC HEART FAILURE: Primary | ICD-10-CM

## 2025-01-02 DIAGNOSIS — I50.43 ACUTE ON CHRONIC COMBINED SYSTOLIC AND DIASTOLIC HEART FAILURE: ICD-10-CM

## 2025-01-02 DIAGNOSIS — I50.42 CHRONIC COMBINED SYSTOLIC AND DIASTOLIC HEART FAILURE: ICD-10-CM

## 2025-01-02 LAB
ANION GAP SERPL CALC-SCNC: 14 MMOL/L (ref 10–20)
BUN SERPL-MCNC: 80 MG/DL (ref 6–23)
CALCIUM SERPL-MCNC: 9.2 MG/DL (ref 8.6–10.3)
CHLORIDE SERPL-SCNC: 98 MMOL/L (ref 98–107)
CO2 SERPL-SCNC: 33 MMOL/L (ref 21–32)
CREAT SERPL-MCNC: 2.02 MG/DL (ref 0.5–1.05)
EGFRCR SERPLBLD CKD-EPI 2021: 23 ML/MIN/1.73M*2
GLUCOSE SERPL-MCNC: 117 MG/DL (ref 74–99)
POTASSIUM SERPL-SCNC: 4.1 MMOL/L (ref 3.5–5.3)
SODIUM SERPL-SCNC: 141 MMOL/L (ref 136–145)

## 2025-01-02 PROCEDURE — 2500000004 HC RX 250 GENERAL PHARMACY W/ HCPCS (ALT 636 FOR OP/ED): Performed by: NURSE PRACTITIONER

## 2025-01-02 PROCEDURE — 3078F DIAST BP <80 MM HG: CPT | Performed by: NURSE PRACTITIONER

## 2025-01-02 PROCEDURE — 99212 OFFICE O/P EST SF 10 MIN: CPT | Performed by: NURSE PRACTITIONER

## 2025-01-02 PROCEDURE — 96374 THER/PROPH/DIAG INJ IV PUSH: CPT | Mod: INF

## 2025-01-02 PROCEDURE — 3074F SYST BP LT 130 MM HG: CPT | Performed by: NURSE PRACTITIONER

## 2025-01-02 PROCEDURE — 1036F TOBACCO NON-USER: CPT | Performed by: NURSE PRACTITIONER

## 2025-01-02 PROCEDURE — 1123F ACP DISCUSS/DSCN MKR DOCD: CPT | Performed by: NURSE PRACTITIONER

## 2025-01-02 PROCEDURE — 80048 BASIC METABOLIC PNL TOTAL CA: CPT

## 2025-01-02 PROCEDURE — 36415 COLL VENOUS BLD VENIPUNCTURE: CPT

## 2025-01-02 PROCEDURE — 1157F ADVNC CARE PLAN IN RCRD: CPT | Performed by: NURSE PRACTITIONER

## 2025-01-02 RX ORDER — FUROSEMIDE 10 MG/ML
40 INJECTION INTRAMUSCULAR; INTRAVENOUS ONCE
Status: CANCELLED | OUTPATIENT
Start: 2025-01-02 | End: 2025-01-02

## 2025-01-02 RX ORDER — FUROSEMIDE 10 MG/ML
40 INJECTION INTRAMUSCULAR; INTRAVENOUS ONCE
Status: CANCELLED | OUTPATIENT
Start: 2025-01-02

## 2025-01-02 RX ORDER — PREDNISONE 10 MG/1
TABLET ORAL
COMMUNITY

## 2025-01-02 RX ORDER — FUROSEMIDE 10 MG/ML
40 INJECTION INTRAMUSCULAR; INTRAVENOUS ONCE
Status: COMPLETED | OUTPATIENT
Start: 2025-01-02 | End: 2025-01-02

## 2025-01-02 RX ORDER — LEVOTHYROXINE SODIUM 75 UG/1
75 TABLET ORAL DAILY
Qty: 30 TABLET | Refills: 2 | Status: SHIPPED | OUTPATIENT
Start: 2025-01-02 | End: 2025-04-02

## 2025-01-02 RX ADMIN — FUROSEMIDE 40 MG: 10 INJECTION, SOLUTION INTRAMUSCULAR; INTRAVENOUS at 14:46

## 2025-01-02 ASSESSMENT — ENCOUNTER SYMPTOMS
OCCASIONAL FEELINGS OF UNSTEADINESS: 0
ACTIVITY CHANGE: 0
COUGH: 1
WHEEZING: 0
HEMATURIA: 0
WEAKNESS: 0
DEPRESSION: 0
LOSS OF SENSATION IN FEET: 0
ABDOMINAL DISTENTION: 0
BLOOD IN STOOL: 0
CONFUSION: 0
FEVER: 0
SHORTNESS OF BREATH: 1
PALPITATIONS: 0
LIGHT-HEADEDNESS: 0
CHEST TIGHTNESS: 0
CHILLS: 0
EYES NEGATIVE: 1

## 2025-01-02 ASSESSMENT — PAIN SCALES - GENERAL: PAINLEVEL_OUTOF10: 0-NO PAIN

## 2025-01-02 NOTE — PATIENT INSTRUCTIONS
Thank you for coming in today.  If you have any questions you may contact the office Monday through Friday at [phone] or on week ends at [phone].    Continue current medications.     Will give IV Lasix today.   BMP today     Please follow  a 2 GM sodium diet and limit fluid intake to 2 liters per day or 8 servings ( serving size = 8 oz. = 1 cup = 240 ml) per day.   Please avoid processed meat products (luncheon meats, sausages, loya, hot dogs for example) eat 4 servings of vegetables and 1-2 whole servings of whole fruits per day.   Please weigh daily and call [phone] for weight gain of 3 pounds in 24 hours or 5 pounds or if you experience increased swelling or shortness of breath.         Follow up:   2 weeks.     Please be sure to follow up with your cardiologist at [facility] once every year. Call [phone] to schedule appointment if you do not have a follow up appointment scheduled already.

## 2025-01-02 NOTE — PROGRESS NOTES
Subjective   Patient ID: Adwoa Forrest is a 89 y.o. female who presents for follow-up of congestive heart failure.     Current Outpatient Medications:     acetaminophen (Tylenol 8 HOUR) 650 mg ER tablet, Take 1 tablet (650 mg) by mouth every 8 hours if needed for mild pain (1 - 3). Do not crush, chew, or split., Disp: , Rfl:     albuterol 90 mcg/actuation inhaler, Inhale 2 puffs every 4 hours if needed for shortness of breath., Disp: 18 g, Rfl: 11    ALPRAZolam (Xanax) 0.5 mg tablet, Take 1 tablet (0.5 mg) by mouth 2 times a day. (Patient taking differently: Take 1 tablet (0.5 mg) by mouth 2 times a day. Pt only taking at night.), Disp: 180 tablet, Rfl: 0    amiodarone (Pacerone) 200 mg tablet, Take 1 tablet (200 mg) by mouth once daily., Disp: 90 tablet, Rfl: 3    aspirin 81 mg EC tablet, Take 1 tablet (81 mg) by mouth once daily., Disp: , Rfl:     atorvastatin (Lipitor) 80 mg tablet, Take 1 tablet (80 mg) by mouth once daily. At bedtime, Disp: 90 tablet, Rfl: 3    calcitriol (Rocaltrol) 0.25 mcg capsule, Take 1 capsule (0.25 mcg) by mouth once daily. Three times a week., Disp: , Rfl:     calcium acetate (Phoslo) 667 mg capsule, Take 1 capsule (667 mg) by mouth 2 times a day before meals., Disp: , Rfl:     dapagliflozin propanediol (Farxiga) 10 mg, Take 1 tablet (10 mg) by mouth once daily., Disp: 90 tablet, Rfl: 3    fluticasone-umeclidin-vilanter (Trelegy Ellipta) 100-62.5-25 mcg blister with device, Inhale 1 puff once daily., Disp: 30 each, Rfl: 11    ipratropium-albuteroL (Duo-Neb) 0.5-2.5 mg/3 mL nebulizer solution, Take 3 mL by nebulization 4 times a day as needed for wheezing or shortness of breath., Disp: 360 mL, Rfl: 0    levothyroxine (Synthroid, Levoxyl) 75 mcg tablet, Take 1 tablet (75 mcg) by mouth once daily., Disp: 30 tablet, Rfl: 2    linaCLOtide (Linzess) 145 mcg capsule, Take 1 capsule (145 mcg) by mouth once daily in the morning. Take before meals. Do not crush or chew., Disp: 30 capsule, Rfl:  11    metoprolol succinate XL (Toprol-XL) 25 mg 24 hr tablet, Take 1 tablet (25 mg) by mouth once daily. Do not crush or chew., Disp: 30 tablet, Rfl: 11    multivitamin tablet, Take 1 tablet by mouth once daily., Disp: , Rfl:     predniSONE 10 mg tablets,dose pack, Take by mouth. Taper pack, Disp: , Rfl:     sacubitriL-valsartan (Entresto) 24-26 mg tablet, Take 0.5 tablets by mouth 2 times a day., Disp: 30 tablet, Rfl: 11    torsemide (Demadex) 20 mg tablet, Take 3 tablets (60 mg) by mouth once daily. (Patient taking differently: Take 3 tablets (60 mg) by mouth once daily. Pt took 4 tablets daily for couple days, had swelling.), Disp: 270 tablet, Rfl: 3     HPI   Past medical history of ischemic heart disease with PCI in the past. 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 She has had nonsustained ventricular tachycardia in the past from which she was symptomatic. She has been pretty well-controlled with that on amiodarone. Recent stress MPI normal. She had hospitalization on March 4, 2024 with pneumonia and comorbid CHF. The lateral wall lead was unable to be placed to convert to BiV pacer.  Last ER visit 11/14/24.   Patient has continued to have significant lack of activity tolerance and has had consultation with advanced HF department.   Recommendations  were to continue GMDT as tolerated.  RHC results as noted.   She has declined inotrope therapy at this time.     Patient has been treated for pneumonia for which she just finished up a course of antibiotic per pulmonary she is currently on prednisone.  With that she has had an increase in edema of the lower extremities.  She tells me she is following sodium and fluid restrictions.  She has noticed that she is more short of breath.  Denies chest pain or palpitations.  She states her cough is starting to clear up.  No febrile episodes.    Review of Systems   Constitutional:  Negative for activity change, chills and  fever.   HENT:  Negative for hearing loss.    Eyes: Negative.    Respiratory:  Positive for cough and shortness of breath. Negative for chest tightness and wheezing.         Just finished ATB for pneumonia 2 days ago.   She has significant chronic sob which is persistent.   + cough    Cardiovascular:  Positive for leg swelling. Negative for chest pain and palpitations.        +1 edema of the bilateral lower extremities.   Currently on prednisone.    Gastrointestinal:  Negative for abdominal distention and blood in stool.   Genitourinary:  Negative for hematuria.   Neurological:  Negative for syncope, weakness and light-headedness.   Psychiatric/Behavioral:  Negative for confusion.        Objective     /61 (BP Location: Right arm, Patient Position: Sitting, BP Cuff Size: Adult)   Pulse 64   Resp 24   Wt 47.6 kg (105 lb)   LMP  (LMP Unknown)   SpO2 100% Comment: 3 liters o2  BMI 19.20 kg/m²     R & HC   CONCLUSIONS:   1. Systolic and diastolic heart failure.   Hemodynamic Pressures:     +----+----------+---------+------------+-------------+---+-----+-------+-------+  SiteDate Time   Phase    Systolic    Diastolic  ED Mean A-Wave V-Wave                   Name       mmHg        mmHg     mmHmmHg  mmHg   mmHg                                                     g                      +----+----------+---------+------------+-------------+---+-----+-------+-------+    PA12/10/2024  O2 REST          62           23      39                    7:40:12 AM                                                          +----+----------+---------+------------+-------------+---+-----+-------+-------+    PW12/10/2024  O2 REST                               27     21     46      7:40:47 AM                                                          +----+----------+---------+------------+-------------+---+-----+-------+-------+    RV12/10/2024  O2 REST           61            3 10                         7:41:49 AM                                                          +----+----------+---------+------------+-------------+---+-----+-------+-------+    RA12/10/2024  O2 REST                                8     10     10      7:43:17 AM                                                          +----+----------+---------+------------+-------------+---+-----+-------+-------+    AO12/10/2024  O2 REST         141           70     110                    7:55:08 AM                                                          +----+----------+---------+------------+-------------+---+-----+-------+-------+    AO12/10/2024 AIR REST         141           70     110                    7:50:34 AM                                                          +----+----------+---------+------------+-------------+---+-----+-------+-------+    PA12/10/2024 AIR REST          62           23      39                    7:59:59 AM                                                          +----+----------+---------+------------+-------------+---+-----+-------+-------+    PW12/10/2024 AIR REST                               27     21     46      8:00:04 AM                                                          +----+----------+---------+------------+-------------+---+-----+-------+-------+    RV12/10/2024 AIR REST          61            3 10                         8:00:09 AM                                                          +----+----------+---------+------------+-------------+---+-----+-------+-------+    RA12/10/2024 AIR REST                                8     10     10      8:00:12 AM                                                          +----+----------+---------+------------+-------------+---+-----+-------+-------+ 12/2024  Echocardiogram   CONCLUSIONS:   1. The left ventricular systolic function is mildly decreased, with a Lentz's biplane calculated ejection fraction of 52%.   2. There is global hypokinesis of the left ventricle with minor regional variations.   3. There is mildly reduced right ventricular systolic function.   4. Mild to moderate tricuspid regurgitation.   5. Moderately elevated right ventricular systolic pressure.   6. Mild aortic valve regurgitation.       Transthoracic echo (TTE) complete    Result Date: 12/4/2024              [place] [address]      Phone [phone] Fax [phone] TRANSTHORACIC ECHOCARDIOGRAM REPORT Patient Name:       CHERRY Noel Physician:    [id number] Darrel Hampton MD Study Date:         12/3/2024           Ordering Provider:    [id number] ADRIA JON MRN/PID:            [id number]            Fellow: Accession#:         [id number]        Nurse:                Camilla Robledo RN Date of Birth/Age:  1935 / 89 years Sonographer:          Thea Espinoza RDCS Gender Assigned at  F                   Additional Staff: Birth: Height:             157.48 cm           Admit Date: Weight:             47.17 kg            Admission Status:     Outpatient BSA / BMI:          1.45 m2 / 19.02     Department Location:  [facility] Echo                     kg/m2                                     Lab Blood Pressure: 130 /73 mmHg Study Type:    TRANSTHORACIC ECHO (TTE) COMPLETE Diagnosis/ICD: Chronic combined systolic (congestive) and diastolic (congestive)                heart failure (CHF)-I50.42 Indication:    Heart failure CPT Codes:     Echo Complete w Full Doppler-03439 Patient History: Pertinent History: CAD, HTN and CHF. Study Detail: The following Echo  studies were performed: 2D, M-Mode, Doppler and               color flow. Technically challenging study due to body habitus and               prominent lung artifact. Agitated saline used as a contrast agent               for intraseptal flow evaluation and Definity used as a contrast               agent for endocardial border definition. Total contrast used for               this procedure was 2 mL via IV push.  PHYSICIAN INTERPRETATION: Left Ventricle: The left ventricular systolic function is mildly decreased, with a Lentz's biplane calculated ejection fraction of 52%. There is mild concentric left ventricular hypertrophy. There is global hypokinesis of the left ventricle with minor regional variations. The left ventricular cavity size is normal. There is mild increased septal and mildly increased posterior left ventricular wall thickness. Spectral Doppler shows a normal pattern of left ventricular diastolic filling. Left Atrium: The left atrium is normal in size. A bubble study using agitated saline was performed. Bubble study is negative. Right Ventricle: The right ventricle is normal in size. There is mildly reduced right ventricular systolic function. A device is visualized in the right ventricle. Right Atrium: The right atrium is mildly dilated. There is a device visualized in the right atrium. Aortic Valve: The aortic valve appears structurally normal. There is mild aortic valve cusp calcification. The aortic valve dimensionless index is 0.69. There is mild aortic valve regurgitation. The peak instantaneous gradient of the aortic valve is 6 mmHg. The mean gradient of the aortic valve is 3 mmHg. Mitral Valve: The mitral valve is normal in structure. There is trace mitral valve regurgitation. Tricuspid Valve: The tricuspid valve is structurally normal. There is mild to moderate tricuspid regurgitation. The Doppler estimated RVSP is moderately elevated right ventricular systolic pressure at 66.2 mmHg.  Pulmonic Valve: The pulmonic valve is not well visualized. There is physiologic pulmonic valve regurgitation. Pericardium: No pericardial effusion noted. Aorta: The aortic root is normal.  CONCLUSIONS:  1. The left ventricular systolic function is mildly decreased, with a Lentz's biplane calculated ejection fraction of 52%.  2. There is global hypokinesis of the left ventricle with minor regional variations.  3. There is mildly reduced right ventricular systolic function.  4. Mild to moderate tricuspid regurgitation.  5. Moderately elevated right ventricular systolic pressure.  6. Mild aortic valve regurgitation. QUANTITATIVE DATA SUMMARY:  2D MEASUREMENTS:            Normal Ranges: LAs:             4.30 cm    (2.7-4.0cm) IVSd:            1.07 cm    (0.6-1.1cm) LVPWd:           1.01 cm    (0.6-1.1cm) LVIDd:           4.38 cm    (3.9-5.9cm) LVIDs:           3.30 cm LV Mass Index:   106.7 g/m2 LV % FS          24.7 %  LA VOLUME:                    Normal Ranges: LA Vol A4C:        45.6 ml    (22+/-6mL/m2) LA Vol A2C:        42.2 ml LA Vol BP:         48.2 ml LA Vol Index A4C:  31.5ml/m2 LA Vol Index A2C:  29.2 ml/m2 LA Vol Index BP:   33.3 ml/m2 LA Area A4C:       17.8 cm2 LA Area A2C:       15.6 cm2 LA Major Axis A4C: 5.9 cm LA Major Axis A2C: 4.9 cm LA Vol A4C:        42.1 ml LA Vol A2C:        41.4 ml LA Vol Index BSA:  28.8 ml/m2  RA VOLUME BY A/L METHOD:          Normal Ranges: RA Area A4C:             23.4 cm2  AORTA MEASUREMENTS:         Normal Ranges: Ao Sinus, d:        3.00 cm (2.1-3.5cm) Ao STJ, d:          2.40 cm (1.7-3.4cm) Asc Ao, d:          3.60 cm (2.1-3.4cm)  LV SYSTOLIC FUNCTION BY 2D PLANIMETRY (MOD):                      Normal Ranges: EF-A4C View:    37 % (>=55%) EF-A2C View:    63 % EF-Biplane:     52 % LV EF Reported: 52 %  LV DIASTOLIC FUNCTION:          Normal Ranges: MV Peak E:             0.63 m/s (0.7-1.2 m/s) MV Peak A:             0.25 m/s (0.42-0.7 m/s) E/A Ratio:             2.51      (1.0-2.2) MV lateral e'          0.06 m/s  MITRAL VALVE:          Normal Ranges: MV DT:        300 msec (150-240msec)  AORTIC VALVE:                     Normal Ranges: AoV Vmax:                1.20 m/s (<=1.7m/s) AoV Peak P.7 mmHg (<20mmHg) AoV Mean PG:             3.0 mmHg (1.7-11.5mmHg) LVOT Max Henry:            0.77 m/s (<=1.1m/s) AoV VTI:                 23.22 cm (18-25cm) LVOT VTI:                15.93 cm LVOT Diameter:           1.96 cm  (1.8-2.4cm) AoV Area, VTI:           2.07 cm2 (2.5-5.5cm2) AoV Area,Vmax:           1.93 cm2 (2.5-4.5cm2) AoV Dimensionless Index: 0.69  AORTIC INSUFFICIENCY: AI Vmax:       3.95 m/s AI Half-time:  419 msec AI Decel Time: 1446 msec AI Decel Rate: 273.44 cm/s2  RIGHT VENTRICLE: RV Basal 3.49 cm RV Mid   2.80 cm RV Major 7.8 cm TAPSE:   15.0 mm RV s'    0.08 m/s  TRICUSPID VALVE/RVSP:          Normal Ranges: Peak TR Velocity:     3.97 m/s RV Syst Pressure:     66 mmHg  (< 30mmHg) IVC Diam:             1.65 cm  AORTA: Asc Ao Diam 3.60 cm  [id number] Hampton MD Electronically signed on 2024 at 8:59:11 AM  ** Final **     Transthoracic Echo Limited    Result Date: 2024              [place] [address]      Phone [phone] Fax [phone] TRANSTHORACIC ECHOCARDIOGRAM REPORT  Patient Name:      CHERRY Noel Physician:    [id number] Hampton MD Study Date:        2024            Ordering Provider:    [id number] BROOKE FOFANA MRN/PID:           [id number]             Fellow: Accession#:        [id number]         Nurse:                Nette Ott RN Date of Birth/Age: 1935 / 88 years  Sonographer:          Estelita Brown RDCS Gender:            F                    Additional Staff: Height:             157.48 cm            Admit Date: Weight:            45.36 kg             Admission Status:     Outpatient BSA / BMI:         1.42 m2 / 18.29      Department Location:                    kg/m2 Blood Pressure: 102 /58 mmHg Study Type:    TRANSTHORACIC ECHO (TTE) LIMITED Diagnosis/ICD: Ventricular hypertrophy or dilatation-I51.7 Indication:    Left Ventricular hypertrophy CPT Codes:     Echo Limited-73925 Patient History: Pertinent History: LVH. Study Detail: Technically challenging study due to small rib spaces and               prominent lung artifact. Definity used as a contrast agent for               endocardial border definition. Unable to obtain suprasternal notch               and subcostal view.  PHYSICIAN INTERPRETATION: Left Ventricle: Left ventricular systolic function is moderately decreased, with an estimated ejection fraction of 35-40%. There is global hypokinesis of the left ventricle with minor regional variations. The left ventricular cavity size was not assessed. The left ventricular septal wall thickness is moderately increased. There is moderately increased left ventricular posterior wall thickness. There is moderate to severe left ventricular hypertrophy. Left ventricular diastolic filling was not assessed. Left Atrium: The left atrium was not assessed. Right Ventricle: The right ventricle was not assessed. Right ventricular systolic function not assessed. Right Atrium: The right atrium was not assessed. Aortic Valve: The aortic valve was not assessed. Aortic valve regurgitation was not assessed. Mitral Valve: The mitral valve was not assessed. Mitral valve regurgitation was not assessed. Tricuspid Valve: The tricuspid valve was not assessed. Tricuspid regurgitation was not assessed. Pulmonic Valve: The pulmonic valve was not assessed. The pulmonic valve regurgitation was not assessed. Pericardium: Pericardial effusion was not assessed. Aorta: The aortic root was not assessed.  CONCLUSIONS:  1.  Left ventricular systolic function is moderately decreased with a 35-40% estimated ejection fraction.  2. Moderately increased left ventricular septal thickness.  3. The left ventricular posterior wall thickness is moderately increased.  4. There is moderate to severe left ventricular hypertrophy.  5. There is global hypokinesis of the left ventricle with minor regional variations. QUANTITATIVE DATA SUMMARY: 2D MEASUREMENTS:                           Normal Ranges: LAs:           3.00 cm    (2.7-4.0cm) IVSd:          1.62 cm    (0.6-1.1cm) LVPWd:         1.44 cm    (0.6-1.1cm) LVIDd:         3.62 cm    (3.9-5.9cm) LVIDs:         2.69 cm LV Mass Index: 146.9 g/m2 LV % FS        25.6 % LV SYSTOLIC FUNCTION BY 2D PLANIMETRY (MOD):                     Normal Ranges: EF-A4C View: 33.5 % (>=55%) EF-A2C View: 39.2 % EF-Biplane:  39.0 %  [id number] Darrel Hampton MD Electronically signed on 2/21/2024 at 9:38:31 AM  ** Final **       Lab Results   Component Value Date    BUN 62 (H) 12/23/2024    CREATININE 1.71 (H) 12/23/2024    BNP 1,547 (H) 12/13/2024    MG 2.76 (H) 12/13/2024    K 4.1 12/23/2024     12/23/2024       Constitutional:       General:  NAD   HENT:      Head: Normocephalic     Mouth: Mucous membranes are moist.      Neck: + JVD  Eyes:      Conjunctiva/sclera: Conjunctivae normal.    Cardiovascular:      Rate and Rhythm: Normal rate and regular rhythm       Heart sounds:  S1 S2 normal, no murmur, no S3 or S4   Pulmonary:      Pulmonary effort is normal. Diminished breath sounds, few rales left posterior base. No wheezing or rales.   Abdominal:      General: Bowel sounds are normal, non- tender to palpitations. Liver is non palpable at  right costal margin.   Musculoskeletal:         General: trace to +1 pitting edema of the ankles.  DUARTE well.   Skin:     General: Skin is warm and dry   Neurological:      General: No focal deficit present.      Mental Status: alert and oriented to person, place, and time. Mental  status is at baseline.     Psychiatric:         Mood and Affect: Normal Affect.     Assessment/Plan     Problem List Items Addressed This Visit       Chronic combined systolic and diastolic heart failure      Chronic diastolic heart failure   Etiology: HTN/ASHD/ CKD  AHA Stage: C   NYHA class: 3b-4   Volume Status:  No overt congestion on exam.   GFR: 24     GDMT:  BB-Metoprolol XL 50 mg 1/2 tablet daily   ARB/ACEI/ARNI - Entresto 24/26 mg 1/2  tablet twice a day.   MRA -  GFR less than 30   SGLT2i - Farxiga 10 mg once a day   Diuretic -  Torsemide 20 mg Pt is to start taking 20 mg orally once a day if weight is , 60mg  daily if weight between 100-105, take 40 mg twice a day mg if weight is over 105lbs and hold if weight is less than 95lbs.     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 ....  was unable to upgrade to Bi V pacer.        CHF: Weight is actually stable.  She does have edema.  GDMT :  as noted above.   Has not tolerated spironolactone  in the past and the GFR is 24 ml/min.  I will give her IV Lasix today.  She will continue the oral diuretic and remainder of tolerated medications.  Follow-up in 2 weeks.     2. Atrial fibrillation: persistent.   She is s/p AV node ablation and PPM placement in the past. On ASA.  No other OAC due to hx of recurrent GIB     3. NSVT:  Amiodarone.  No palpitations.       3. CKD 3: GFR as noted. She is following with nephrology.  Repeat basic metabolic panel today.     4. HTN:  Controlled.       5. ASHD with PCI to LAD 2017:  Secondary prevention medications ASA, Statin, BB.   Recent stress/MPI scan negative for ischemia.   No angina.  Stable.      6. Chronic COPD: Scheduled for pulmonary function evaluation.  Currently finishing up treatment for pneumonia.         Rosangela Lane, APRN-CNP

## 2025-01-03 ENCOUNTER — CLINICAL SUPPORT (OUTPATIENT)
Dept: WOUND CARE | Facility: CLINIC | Age: 89
End: 2025-01-03
Payer: MEDICARE

## 2025-01-03 ENCOUNTER — APPOINTMENT (OUTPATIENT)
Dept: WOUND CARE | Facility: CLINIC | Age: 89
End: 2025-01-03
Payer: MEDICARE

## 2025-01-03 VITALS
TEMPERATURE: 98 F | HEART RATE: 76 BPM | SYSTOLIC BLOOD PRESSURE: 132 MMHG | DIASTOLIC BLOOD PRESSURE: 68 MMHG | OXYGEN SATURATION: 98 % | RESPIRATION RATE: 20 BRPM

## 2025-01-03 PROCEDURE — 99212 OFFICE O/P EST SF 10 MIN: CPT

## 2025-01-06 ENCOUNTER — TELEPHONE (OUTPATIENT)
Dept: CARDIOLOGY | Facility: HOSPITAL | Age: 89
End: 2025-01-06
Payer: MEDICARE

## 2025-01-06 ENCOUNTER — TELEPHONE (OUTPATIENT)
Dept: PULMONOLOGY | Facility: HOSPITAL | Age: 89
End: 2025-01-06
Payer: MEDICARE

## 2025-01-06 DIAGNOSIS — J18.9 COMMUNITY ACQUIRED PNEUMONIA, BILATERAL: Primary | ICD-10-CM

## 2025-01-06 RX ORDER — CEFDINIR 300 MG/1
300 CAPSULE ORAL DAILY
Qty: 7 CAPSULE | Refills: 0 | Status: SHIPPED | OUTPATIENT
Start: 2025-01-06 | End: 2025-01-13

## 2025-01-06 NOTE — TELEPHONE ENCOUNTER
I called Adwoa back. Her BP is 127/62 and HR 75. Medications were reviewed. Sodium and Fluid restrictions were reviewed. Patient is encouraged to keep all of her appointments.     Adwoa says that her breathing is ok. She does not have any pain and is not swollen.  Adwoa will call back if her symptoms worsen or is she has any questions.

## 2025-01-06 NOTE — TELEPHONE ENCOUNTER
Please call pt. Still swollen but no weight gain. Others doctors have been changing her  meds. Has pneumonia.  Not sure what dose she should take because of other med changes. Not feeling great.

## 2025-01-06 NOTE — TELEPHONE ENCOUNTER
Patient called in requesting CXR results. She states she has been coughing up green phlegm, she denies any worsening shortness of breath, fever or chills. She also asked about going back to the Anoro due to the Trelegy costing her 56$ a month. I sent a message over to Dr. Logan, he states the CXR showed pneumonia and he sent over Cefdinir for her to take once a day for 7 days. Dr. Logan said he spoke with the patient as well and told her if her symptoms worsen in the next 24 hours to be evaluated in the ER. Otherwise, she is to keep her follow up with us as scheduled on 1/15/25. Dr. Logan also told her that it was okay to go back on the Anoro. Patient acknowledged understanding.

## 2025-01-07 ENCOUNTER — TELEPHONE (OUTPATIENT)
Dept: CARDIOLOGY | Facility: HOSPITAL | Age: 89
End: 2025-01-07

## 2025-01-07 ENCOUNTER — APPOINTMENT (OUTPATIENT)
Dept: RADIOLOGY | Facility: HOSPITAL | Age: 89
End: 2025-01-07
Payer: MEDICARE

## 2025-01-07 ENCOUNTER — HOSPITAL ENCOUNTER (INPATIENT)
Facility: HOSPITAL | Age: 89
LOS: 2 days | Discharge: NURSING, REHAB OR LONG-TERM CARE | End: 2025-01-10
Attending: EMERGENCY MEDICINE | Admitting: INTERNAL MEDICINE
Payer: MEDICARE

## 2025-01-07 ENCOUNTER — TELEPHONE (OUTPATIENT)
Dept: CARDIOLOGY | Facility: HOSPITAL | Age: 89
End: 2025-01-07
Payer: MEDICARE

## 2025-01-07 ENCOUNTER — TELEPHONE (OUTPATIENT)
Dept: PRIMARY CARE | Facility: CLINIC | Age: 89
End: 2025-01-07

## 2025-01-07 DIAGNOSIS — N28.9 ACUTE ON CHRONIC RENAL INSUFFICIENCY: ICD-10-CM

## 2025-01-07 DIAGNOSIS — I12.9 BENIGN HYPERTENSIVE KIDNEY DISEASE WITH CHRONIC KIDNEY DISEASE STAGE I THROUGH STAGE IV, OR UNSPECIFIED: ICD-10-CM

## 2025-01-07 DIAGNOSIS — N17.0 ACUTE RENAL FAILURE WITH TUBULAR NECROSIS (CMS-HCC): ICD-10-CM

## 2025-01-07 DIAGNOSIS — S02.2XXA CLOSED FRACTURE OF NASAL BONE, INITIAL ENCOUNTER: ICD-10-CM

## 2025-01-07 DIAGNOSIS — G47.00 INSOMNIA, UNSPECIFIED TYPE: ICD-10-CM

## 2025-01-07 DIAGNOSIS — E03.8 OTHER SPECIFIED HYPOTHYROIDISM: ICD-10-CM

## 2025-01-07 DIAGNOSIS — R53.1 GENERALIZED WEAKNESS: Primary | ICD-10-CM

## 2025-01-07 DIAGNOSIS — N18.9 ACUTE ON CHRONIC RENAL INSUFFICIENCY: ICD-10-CM

## 2025-01-07 DIAGNOSIS — R04.0 EPISTAXIS: ICD-10-CM

## 2025-01-07 DIAGNOSIS — I50.32 CHRONIC HEART FAILURE WITH PRESERVED EJECTION FRACTION: ICD-10-CM

## 2025-01-07 DIAGNOSIS — I50.42 CHRONIC COMBINED SYSTOLIC AND DIASTOLIC HEART FAILURE: ICD-10-CM
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
## 2025-01-07 DIAGNOSIS — I10 ESSENTIAL (PRIMARY) HYPERTENSION: ICD-10-CM

## 2025-01-07 LAB
ALBUMIN SERPL BCP-MCNC: 3.6 G/DL (ref 3.4–5)
ALP SERPL-CCNC: 76 U/L (ref 33–136)
ALT SERPL W P-5'-P-CCNC: 25 U/L (ref 7–45)
ANION GAP SERPL CALC-SCNC: 15 MMOL/L (ref 10–20)
APPEARANCE UR: CLEAR
AST SERPL W P-5'-P-CCNC: 24 U/L (ref 9–39)
BASOPHILS # BLD AUTO: 0.01 X10*3/UL (ref 0–0.1)
BASOPHILS NFR BLD AUTO: 0.1 %
BILIRUB DIRECT SERPL-MCNC: 0.2 MG/DL (ref 0–0.3)
BILIRUB SERPL-MCNC: 0.7 MG/DL (ref 0–1.2)
BILIRUB UR STRIP.AUTO-MCNC: NEGATIVE MG/DL
BNP SERPL-MCNC: 1832 PG/ML (ref 0–99)
BUN SERPL-MCNC: 104 MG/DL (ref 6–23)
CALCIUM SERPL-MCNC: 9 MG/DL (ref 8.6–10.3)
CHLORIDE SERPL-SCNC: 93 MMOL/L (ref 98–107)
CHLORIDE UR-SCNC: 38 MMOL/L
CHLORIDE/CREATININE (MMOL/G) IN URINE: 90 MMOL/G CREAT (ref 38–318)
CK SERPL-CCNC: 70 U/L (ref 0–215)
CO2 SERPL-SCNC: 33 MMOL/L (ref 21–32)
COLOR UR: COLORLESS
CREAT SERPL-MCNC: 2.79 MG/DL (ref 0.5–1.05)
CREAT UR-MCNC: 42.3 MG/DL (ref 20–320)
EGFRCR SERPLBLD CKD-EPI 2021: 16 ML/MIN/1.73M*2
EOSINOPHIL # BLD AUTO: 0.02 X10*3/UL (ref 0–0.4)
EOSINOPHIL NFR BLD AUTO: 0.2 %
ERYTHROCYTE [DISTWIDTH] IN BLOOD BY AUTOMATED COUNT: 15.6 % (ref 11.5–14.5)
GLUCOSE SERPL-MCNC: 83 MG/DL (ref 74–99)
GLUCOSE UR STRIP.AUTO-MCNC: ABNORMAL MG/DL
HCT VFR BLD AUTO: 37.5 % (ref 36–46)
HGB BLD-MCNC: 11.3 G/DL (ref 12–16)
IMM GRANULOCYTES # BLD AUTO: 0.06 X10*3/UL (ref 0–0.5)
IMM GRANULOCYTES NFR BLD AUTO: 0.7 % (ref 0–0.9)
INR PPP: 1 (ref 0.9–1.1)
KETONES UR STRIP.AUTO-MCNC: NEGATIVE MG/DL
LEUKOCYTE ESTERASE UR QL STRIP.AUTO: NEGATIVE
LYMPHOCYTES # BLD AUTO: 0.25 X10*3/UL (ref 0.8–3)
LYMPHOCYTES NFR BLD AUTO: 2.7 %
MCH RBC QN AUTO: 30.1 PG (ref 26–34)
MCHC RBC AUTO-ENTMCNC: 30.1 G/DL (ref 32–36)
MCV RBC AUTO: 100 FL (ref 80–100)
MONOCYTES # BLD AUTO: 0.65 X10*3/UL (ref 0.05–0.8)
MONOCYTES NFR BLD AUTO: 7.1 %
NEUTROPHILS # BLD AUTO: 8.17 X10*3/UL (ref 1.6–5.5)
NEUTROPHILS NFR BLD AUTO: 89.2 %
NITRITE UR QL STRIP.AUTO: NEGATIVE
NRBC BLD-RTO: 0 /100 WBCS (ref 0–0)
PH UR STRIP.AUTO: 5.5 [PH]
PLATELET # BLD AUTO: 293 X10*3/UL (ref 150–450)
POTASSIUM SERPL-SCNC: 4.2 MMOL/L (ref 3.5–5.3)
POTASSIUM UR-SCNC: 15 MMOL/L
POTASSIUM/CREAT UR-RTO: 35 MMOL/G CREAT
PROT SERPL-MCNC: 5.9 G/DL (ref 6.4–8.2)
PROT UR STRIP.AUTO-MCNC: NEGATIVE MG/DL
PROTHROMBIN TIME: 11.3 SECONDS (ref 9.8–12.8)
RBC # BLD AUTO: 3.75 X10*6/UL (ref 4–5.2)
RBC # UR STRIP.AUTO: NEGATIVE /UL
SODIUM SERPL-SCNC: 137 MMOL/L (ref 136–145)
SODIUM UR-SCNC: 34 MMOL/L
SODIUM/CREAT UR-RTO: 80 MMOL/G CREAT
SP GR UR STRIP.AUTO: 1.01
UROBILINOGEN UR STRIP.AUTO-MCNC: NORMAL MG/DL
WBC # BLD AUTO: 9.2 X10*3/UL (ref 4.4–11.3)

## 2025-01-07 PROCEDURE — 82550 ASSAY OF CK (CPK): CPT | Performed by: STUDENT IN AN ORGANIZED HEALTH CARE EDUCATION/TRAINING PROGRAM

## 2025-01-07 PROCEDURE — 36415 COLL VENOUS BLD VENIPUNCTURE: CPT | Performed by: STUDENT IN AN ORGANIZED HEALTH CARE EDUCATION/TRAINING PROGRAM

## 2025-01-07 PROCEDURE — 82436 ASSAY OF URINE CHLORIDE: CPT | Performed by: STUDENT IN AN ORGANIZED HEALTH CARE EDUCATION/TRAINING PROGRAM

## 2025-01-07 PROCEDURE — 70486 CT MAXILLOFACIAL W/O DYE: CPT

## 2025-01-07 PROCEDURE — 81003 URINALYSIS AUTO W/O SCOPE: CPT | Performed by: STUDENT IN AN ORGANIZED HEALTH CARE EDUCATION/TRAINING PROGRAM

## 2025-01-07 PROCEDURE — 85610 PROTHROMBIN TIME: CPT | Performed by: EMERGENCY MEDICINE

## 2025-01-07 PROCEDURE — 76377 3D RENDER W/INTRP POSTPROCES: CPT

## 2025-01-07 PROCEDURE — 36415 COLL VENOUS BLD VENIPUNCTURE: CPT | Performed by: EMERGENCY MEDICINE

## 2025-01-07 PROCEDURE — 71045 X-RAY EXAM CHEST 1 VIEW: CPT | Performed by: STUDENT IN AN ORGANIZED HEALTH CARE EDUCATION/TRAINING PROGRAM

## 2025-01-07 PROCEDURE — 72125 CT NECK SPINE W/O DYE: CPT

## 2025-01-07 PROCEDURE — 83880 ASSAY OF NATRIURETIC PEPTIDE: CPT | Performed by: EMERGENCY MEDICINE

## 2025-01-07 PROCEDURE — 99223 1ST HOSP IP/OBS HIGH 75: CPT | Performed by: STUDENT IN AN ORGANIZED HEALTH CARE EDUCATION/TRAINING PROGRAM

## 2025-01-07 PROCEDURE — 71045 X-RAY EXAM CHEST 1 VIEW: CPT

## 2025-01-07 PROCEDURE — 82248 BILIRUBIN DIRECT: CPT | Performed by: EMERGENCY MEDICINE

## 2025-01-07 PROCEDURE — 70486 CT MAXILLOFACIAL W/O DYE: CPT | Performed by: RADIOLOGY

## 2025-01-07 PROCEDURE — 76377 3D RENDER W/INTRP POSTPROCES: CPT | Performed by: RADIOLOGY

## 2025-01-07 PROCEDURE — 99285 EMERGENCY DEPT VISIT HI MDM: CPT | Mod: 25 | Performed by: EMERGENCY MEDICINE

## 2025-01-07 PROCEDURE — 85025 COMPLETE CBC W/AUTO DIFF WBC: CPT | Performed by: EMERGENCY MEDICINE

## 2025-01-07 PROCEDURE — 70450 CT HEAD/BRAIN W/O DYE: CPT | Performed by: RADIOLOGY

## 2025-01-07 PROCEDURE — 2500000001 HC RX 250 WO HCPCS SELF ADMINISTERED DRUGS (ALT 637 FOR MEDICARE OP): Performed by: EMERGENCY MEDICINE

## 2025-01-07 PROCEDURE — 70450 CT HEAD/BRAIN W/O DYE: CPT

## 2025-01-07 PROCEDURE — 82374 ASSAY BLOOD CARBON DIOXIDE: CPT | Performed by: EMERGENCY MEDICINE

## 2025-01-07 PROCEDURE — 2500000001 HC RX 250 WO HCPCS SELF ADMINISTERED DRUGS (ALT 637 FOR MEDICARE OP): Performed by: STUDENT IN AN ORGANIZED HEALTH CARE EDUCATION/TRAINING PROGRAM

## 2025-01-07 PROCEDURE — 72125 CT NECK SPINE W/O DYE: CPT | Performed by: RADIOLOGY

## 2025-01-07 RX ORDER — CALCIUM ACETATE 667 MG/1
667 CAPSULE ORAL
Status: DISCONTINUED | OUTPATIENT
Start: 2025-01-08 | End: 2025-01-09

## 2025-01-07 RX ORDER — CEFDINIR 300 MG/1
300 CAPSULE ORAL DAILY
Status: DISCONTINUED | OUTPATIENT
Start: 2025-01-08 | End: 2025-01-10 | Stop reason: HOSPADM

## 2025-01-07 RX ORDER — ALBUTEROL SULFATE 90 UG/1
2 INHALANT RESPIRATORY (INHALATION) EVERY 4 HOURS PRN
Status: DISCONTINUED | OUTPATIENT
Start: 2025-01-07 | End: 2025-01-10 | Stop reason: HOSPADM

## 2025-01-07 RX ORDER — ONDANSETRON 4 MG/1
4 TABLET, FILM COATED ORAL EVERY 8 HOURS PRN
Status: DISCONTINUED | OUTPATIENT
Start: 2025-01-07 | End: 2025-01-10 | Stop reason: HOSPADM

## 2025-01-07 RX ORDER — LEVOTHYROXINE SODIUM 50 UG/1
75 TABLET ORAL DAILY
Status: DISCONTINUED | OUTPATIENT
Start: 2025-01-08 | End: 2025-01-08

## 2025-01-07 RX ORDER — SACUBITRIL AND VALSARTAN 24; 26 MG/1; MG/1
0.5 TABLET, FILM COATED ORAL 2 TIMES DAILY
Status: DISCONTINUED | OUTPATIENT
Start: 2025-01-07 | End: 2025-01-10

## 2025-01-07 RX ORDER — ATORVASTATIN CALCIUM 40 MG/1
80 TABLET, FILM COATED ORAL DAILY
Status: DISCONTINUED | OUTPATIENT
Start: 2025-01-08 | End: 2025-01-10 | Stop reason: HOSPADM

## 2025-01-07 RX ORDER — AMIODARONE HYDROCHLORIDE 200 MG/1
200 TABLET ORAL DAILY
Status: DISCONTINUED | OUTPATIENT
Start: 2025-01-08 | End: 2025-01-10 | Stop reason: HOSPADM

## 2025-01-07 RX ORDER — ACETAMINOPHEN 325 MG/1
975 TABLET ORAL ONCE
Status: COMPLETED | OUTPATIENT
Start: 2025-01-07 | End: 2025-01-07

## 2025-01-07 RX ORDER — METOPROLOL SUCCINATE 25 MG/1
25 TABLET, EXTENDED RELEASE ORAL DAILY
Status: DISCONTINUED | OUTPATIENT
Start: 2025-01-08 | End: 2025-01-10 | Stop reason: HOSPADM

## 2025-01-07 RX ORDER — BISACODYL 5 MG
10 TABLET, DELAYED RELEASE (ENTERIC COATED) ORAL DAILY PRN
Status: DISCONTINUED | OUTPATIENT
Start: 2025-01-07 | End: 2025-01-10 | Stop reason: HOSPADM

## 2025-01-07 RX ORDER — GUAIFENESIN 600 MG/1
600 TABLET, EXTENDED RELEASE ORAL EVERY 12 HOURS PRN
Status: DISCONTINUED | OUTPATIENT
Start: 2025-01-07 | End: 2025-01-10 | Stop reason: HOSPADM

## 2025-01-07 RX ORDER — ALPRAZOLAM 0.5 MG/1
0.5 TABLET ORAL 2 TIMES DAILY
Status: DISCONTINUED | OUTPATIENT
Start: 2025-01-07 | End: 2025-01-09

## 2025-01-07 RX ORDER — ASPIRIN 81 MG/1
81 TABLET ORAL DAILY
Status: DISCONTINUED | OUTPATIENT
Start: 2025-01-08 | End: 2025-01-10 | Stop reason: HOSPADM

## 2025-01-07 RX ORDER — OXYMETAZOLINE HCL 0.05 %
2 SPRAY, NON-AEROSOL (ML) NASAL EVERY 12 HOURS PRN
Status: DISCONTINUED | OUTPATIENT
Start: 2025-01-07 | End: 2025-01-10 | Stop reason: HOSPADM

## 2025-01-07 RX ORDER — DAPAGLIFLOZIN 5 MG/1
10 TABLET, FILM COATED ORAL DAILY
Status: DISCONTINUED | OUTPATIENT
Start: 2025-01-08 | End: 2025-01-10 | Stop reason: HOSPADM

## 2025-01-07 RX ORDER — TALC
3 POWDER (GRAM) TOPICAL NIGHTLY PRN
Status: DISCONTINUED | OUTPATIENT
Start: 2025-01-07 | End: 2025-01-10 | Stop reason: HOSPADM

## 2025-01-07 RX ORDER — TORSEMIDE 20 MG/1
60 TABLET ORAL DAILY
Status: DISCONTINUED | OUTPATIENT
Start: 2025-01-08 | End: 2025-01-10 | Stop reason: HOSPADM

## 2025-01-07 RX ORDER — IPRATROPIUM BROMIDE AND ALBUTEROL SULFATE 2.5; .5 MG/3ML; MG/3ML
3 SOLUTION RESPIRATORY (INHALATION) 4 TIMES DAILY PRN
Status: DISCONTINUED | OUTPATIENT
Start: 2025-01-07 | End: 2025-01-10 | Stop reason: HOSPADM

## 2025-01-07 RX ORDER — ONDANSETRON HYDROCHLORIDE 2 MG/ML
4 INJECTION, SOLUTION INTRAVENOUS EVERY 8 HOURS PRN
Status: DISCONTINUED | OUTPATIENT
Start: 2025-01-07 | End: 2025-01-10 | Stop reason: HOSPADM

## 2025-01-07 RX ORDER — FLUTICASONE FUROATE AND VILANTEROL 100; 25 UG/1; UG/1
1 POWDER RESPIRATORY (INHALATION)
Status: DISCONTINUED | OUTPATIENT
Start: 2025-01-08 | End: 2025-01-10 | Stop reason: HOSPADM

## 2025-01-07 RX ADMIN — ALPRAZOLAM 0.5 MG: 0.5 TABLET ORAL at 22:24

## 2025-01-07 RX ADMIN — ACETAMINOPHEN 975 MG: 325 TABLET ORAL at 18:17

## 2025-01-07 ASSESSMENT — ENCOUNTER SYMPTOMS
CHILLS: 0
WEAKNESS: 1
WOUND: 0
NUMBNESS: 0
COLOR CHANGE: 0
CONSTIPATION: 0
ABDOMINAL PAIN: 0
BACK PAIN: 0
FATIGUE: 0
ARTHRALGIAS: 0
FEVER: 0
CONFUSION: 0
DYSURIA: 0
ACTIVITY CHANGE: 0
DIFFICULTY URINATING: 0
SORE THROAT: 0
LIGHT-HEADEDNESS: 0
SINUS PAIN: 0
DIARRHEA: 0
ABDOMINAL DISTENTION: 0
DECREASED CONCENTRATION: 0
APPETITE CHANGE: 0
HEMATURIA: 0
MYALGIAS: 0
AGITATION: 0
COUGH: 0
HEADACHES: 0
SHORTNESS OF BREATH: 1
STRIDOR: 0
VOMITING: 0
CHEST TIGHTNESS: 0
DIAPHORESIS: 0
NERVOUS/ANXIOUS: 0
WHEEZING: 0
RHINORRHEA: 0
DIZZINESS: 0
NAUSEA: 0
APNEA: 0
PALPITATIONS: 0
JOINT SWELLING: 0
FLANK PAIN: 0
FREQUENCY: 0

## 2025-01-07 ASSESSMENT — PAIN - FUNCTIONAL ASSESSMENT
PAIN_FUNCTIONAL_ASSESSMENT: 0-10
PAIN_FUNCTIONAL_ASSESSMENT: 0-10

## 2025-01-07 ASSESSMENT — PAIN SCALES - GENERAL
PAINLEVEL_OUTOF10: 0 - NO PAIN
PAINLEVEL_OUTOF10: 4

## 2025-01-07 ASSESSMENT — PAIN DESCRIPTION - LOCATION: LOCATION: HEAD

## 2025-01-07 ASSESSMENT — PAIN DESCRIPTION - PAIN TYPE: TYPE: ACUTE PAIN

## 2025-01-07 ASSESSMENT — PAIN DESCRIPTION - FREQUENCY: FREQUENCY: CONSTANT/CONTINUOUS

## 2025-01-07 ASSESSMENT — PAIN DESCRIPTION - DESCRIPTORS: DESCRIPTORS: ACHING

## 2025-01-07 NOTE — TELEPHONE ENCOUNTER
1/6 - Pt called cardiology office to inquire about Levothyroxine dose increase. Informed pt this was filled by Dr. Farmer office on 1/2/2025, but that I would double check with her cardiac care team to be certain. Confirmed that cardiology did not increase this med and to follow up with PCP regarding this change.

## 2025-01-07 NOTE — TELEPHONE ENCOUNTER
Marleny pt    FYI: I had called her regarding a message she left yesterday about thyroid medication (this was addressed, she had a dose increase and wanted ot confirm why). While talking with her she fell and at that time was unable to get herself up. I advised if she was unable she would need to call 911 for assistance. She said she would try to get up but would call for help if she absolutely was unable to

## 2025-01-08 ENCOUNTER — APPOINTMENT (OUTPATIENT)
Dept: RADIOLOGY | Facility: HOSPITAL | Age: 89
End: 2025-01-08
Payer: MEDICARE

## 2025-01-08 LAB
ANION GAP SERPL CALC-SCNC: 9 MMOL/L (ref 10–20)
BUN SERPL-MCNC: 104 MG/DL (ref 6–23)
CALCIUM SERPL-MCNC: 8.3 MG/DL (ref 8.6–10.3)
CHLORIDE SERPL-SCNC: 98 MMOL/L (ref 98–107)
CO2 SERPL-SCNC: 34 MMOL/L (ref 21–32)
CREAT SERPL-MCNC: 2.61 MG/DL (ref 0.5–1.05)
CREAT UR-MCNC: 52.8 MG/DL (ref 20–320)
EGFRCR SERPLBLD CKD-EPI 2021: 17 ML/MIN/1.73M*2
ERYTHROCYTE [DISTWIDTH] IN BLOOD BY AUTOMATED COUNT: 15.6 % (ref 11.5–14.5)
GLUCOSE SERPL-MCNC: 88 MG/DL (ref 74–99)
HCT VFR BLD AUTO: 33.7 % (ref 36–46)
HGB BLD-MCNC: 10.4 G/DL (ref 12–16)
MCH RBC QN AUTO: 30 PG (ref 26–34)
MCHC RBC AUTO-ENTMCNC: 30.9 G/DL (ref 32–36)
MCV RBC AUTO: 97 FL (ref 80–100)
MICROALBUMIN UR-MCNC: 11.9 MG/L
MICROALBUMIN/CREAT UR: 22.5 UG/MG CREAT
NRBC BLD-RTO: 0 /100 WBCS (ref 0–0)
PLATELET # BLD AUTO: 270 X10*3/UL (ref 150–450)
POTASSIUM SERPL-SCNC: 4.3 MMOL/L (ref 3.5–5.3)
RBC # BLD AUTO: 3.47 X10*6/UL (ref 4–5.2)
SODIUM SERPL-SCNC: 137 MMOL/L (ref 136–145)
SODIUM UR-SCNC: 13 MMOL/L
SODIUM/CREAT UR-RTO: 25 MMOL/G CREAT
UREA/CREAT UR-SRTO: 15.1 G/G CREAT
UUN UR-MCNC: 795 MG/DL
WBC # BLD AUTO: 6.1 X10*3/UL (ref 4.4–11.3)

## 2025-01-08 PROCEDURE — 73130 X-RAY EXAM OF HAND: CPT | Mod: RIGHT SIDE | Performed by: RADIOLOGY

## 2025-01-08 PROCEDURE — 82542 COL CHROMOTOGRAPHY QUAL/QUAN: CPT | Performed by: REGISTERED NURSE

## 2025-01-08 PROCEDURE — 73130 X-RAY EXAM OF HAND: CPT | Mod: RT

## 2025-01-08 PROCEDURE — 99233 SBSQ HOSP IP/OBS HIGH 50: CPT | Performed by: PHYSICIAN ASSISTANT

## 2025-01-08 PROCEDURE — 85027 COMPLETE CBC AUTOMATED: CPT | Performed by: STUDENT IN AN ORGANIZED HEALTH CARE EDUCATION/TRAINING PROGRAM

## 2025-01-08 PROCEDURE — 84540 ASSAY OF URINE/UREA-N: CPT | Performed by: REGISTERED NURSE

## 2025-01-08 PROCEDURE — 82570 ASSAY OF URINE CREATININE: CPT | Performed by: REGISTERED NURSE

## 2025-01-08 PROCEDURE — 1210000001 HC SEMI-PRIVATE ROOM DAILY

## 2025-01-08 PROCEDURE — 82043 UR ALBUMIN QUANTITATIVE: CPT | Performed by: REGISTERED NURSE

## 2025-01-08 PROCEDURE — 97161 PT EVAL LOW COMPLEX 20 MIN: CPT | Mod: GP

## 2025-01-08 PROCEDURE — 97165 OT EVAL LOW COMPLEX 30 MIN: CPT | Mod: GO

## 2025-01-08 PROCEDURE — 2500000005 HC RX 250 GENERAL PHARMACY W/O HCPCS: Performed by: STUDENT IN AN ORGANIZED HEALTH CARE EDUCATION/TRAINING PROGRAM

## 2025-01-08 PROCEDURE — 84300 ASSAY OF URINE SODIUM: CPT | Performed by: REGISTERED NURSE

## 2025-01-08 PROCEDURE — 36415 COLL VENOUS BLD VENIPUNCTURE: CPT | Performed by: STUDENT IN AN ORGANIZED HEALTH CARE EDUCATION/TRAINING PROGRAM

## 2025-01-08 PROCEDURE — 80048 BASIC METABOLIC PNL TOTAL CA: CPT | Performed by: STUDENT IN AN ORGANIZED HEALTH CARE EDUCATION/TRAINING PROGRAM

## 2025-01-08 PROCEDURE — 2500000001 HC RX 250 WO HCPCS SELF ADMINISTERED DRUGS (ALT 637 FOR MEDICARE OP): Performed by: STUDENT IN AN ORGANIZED HEALTH CARE EDUCATION/TRAINING PROGRAM

## 2025-01-08 PROCEDURE — 2500000002 HC RX 250 W HCPCS SELF ADMINISTERED DRUGS (ALT 637 FOR MEDICARE OP, ALT 636 FOR OP/ED): Performed by: STUDENT IN AN ORGANIZED HEALTH CARE EDUCATION/TRAINING PROGRAM

## 2025-01-08 PROCEDURE — 94640 AIRWAY INHALATION TREATMENT: CPT

## 2025-01-08 RX ORDER — ACETAMINOPHEN 325 MG/1
650 TABLET ORAL EVERY 4 HOURS PRN
Status: DISCONTINUED | OUTPATIENT
Start: 2025-01-08 | End: 2025-01-10 | Stop reason: HOSPADM

## 2025-01-08 RX ORDER — LEVOTHYROXINE SODIUM 75 UG/1
37.5 TABLET ORAL DAILY
Status: DISCONTINUED | OUTPATIENT
Start: 2025-01-09 | End: 2025-01-10 | Stop reason: HOSPADM

## 2025-01-08 RX ORDER — LEVOTHYROXINE SODIUM 75 UG/1
37.5 TABLET ORAL DAILY
Start: 2025-01-08

## 2025-01-08 RX ADMIN — CALCIUM ACETATE 667 MG: 667 CAPSULE ORAL at 06:10

## 2025-01-08 RX ADMIN — ACETAMINOPHEN 650 MG: 325 TABLET ORAL at 19:54

## 2025-01-08 RX ADMIN — CALCIUM ACETATE 667 MG: 667 CAPSULE ORAL at 17:10

## 2025-01-08 RX ADMIN — TIOTROPIUM BROMIDE INHALATION SPRAY 2 PUFF: 3.12 SPRAY, METERED RESPIRATORY (INHALATION) at 06:11

## 2025-01-08 RX ADMIN — LEVOTHYROXINE SODIUM 75 MCG: 0.05 TABLET ORAL at 08:14

## 2025-01-08 RX ADMIN — CEFDINIR 300 MG: 300 CAPSULE ORAL at 08:13

## 2025-01-08 RX ADMIN — AMIODARONE HYDROCHLORIDE 200 MG: 200 TABLET ORAL at 08:13

## 2025-01-08 RX ADMIN — ATORVASTATIN CALCIUM 80 MG: 40 TABLET, FILM COATED ORAL at 08:13

## 2025-01-08 RX ADMIN — ASPIRIN 81 MG: 81 TABLET, COATED ORAL at 08:14

## 2025-01-08 RX ADMIN — ACETAMINOPHEN 650 MG: 325 TABLET ORAL at 04:47

## 2025-01-08 RX ADMIN — METOPROLOL SUCCINATE 25 MG: 25 TABLET, EXTENDED RELEASE ORAL at 08:14

## 2025-01-08 RX ADMIN — FLUTICASONE FUROATE AND VILANTEROL TRIFENATATE 1 PUFF: 100; 25 POWDER RESPIRATORY (INHALATION) at 06:11

## 2025-01-08 RX ADMIN — DAPAGLIFLOZIN 10 MG: 5 TABLET, FILM COATED ORAL at 08:15

## 2025-01-08 RX ADMIN — ALPRAZOLAM 0.5 MG: 0.5 TABLET ORAL at 21:25

## 2025-01-08 RX ADMIN — Medication 3 MG: at 19:54

## 2025-01-08 SDOH — ECONOMIC STABILITY: TRANSPORTATION INSECURITY: IN THE PAST 12 MONTHS, HAS LACK OF TRANSPORTATION KEPT YOU FROM MEDICAL APPOINTMENTS OR FROM GETTING MEDICATIONS?: NO

## 2025-01-08 SDOH — ECONOMIC STABILITY: FOOD INSECURITY: WITHIN THE PAST 12 MONTHS, THE FOOD YOU BOUGHT JUST DIDN'T LAST AND YOU DIDN'T HAVE MONEY TO GET MORE.: NEVER TRUE

## 2025-01-08 SDOH — HEALTH STABILITY: PHYSICAL HEALTH: ON AVERAGE, HOW MANY DAYS PER WEEK DO YOU ENGAGE IN MODERATE TO STRENUOUS EXERCISE (LIKE A BRISK WALK)?: 0 DAYS

## 2025-01-08 SDOH — ECONOMIC STABILITY: HOUSING INSECURITY: IN THE PAST 12 MONTHS, HOW MANY TIMES HAVE YOU MOVED WHERE YOU WERE LIVING?: 0

## 2025-01-08 SDOH — SOCIAL STABILITY: SOCIAL INSECURITY: WITHIN THE LAST YEAR, HAVE YOU BEEN AFRAID OF YOUR PARTNER OR EX-PARTNER?: NO

## 2025-01-08 SDOH — HEALTH STABILITY: MENTAL HEALTH
DO YOU FEEL STRESS - TENSE, RESTLESS, NERVOUS, OR ANXIOUS, OR UNABLE TO SLEEP AT NIGHT BECAUSE YOUR MIND IS TROUBLED ALL THE TIME - THESE DAYS?: NOT AT ALL

## 2025-01-08 SDOH — SOCIAL STABILITY: SOCIAL INSECURITY: ABUSE: ADULT

## 2025-01-08 SDOH — ECONOMIC STABILITY: HOUSING INSECURITY: AT ANY TIME IN THE PAST 12 MONTHS, WERE YOU HOMELESS OR LIVING IN A SHELTER (INCLUDING NOW)?: NO

## 2025-01-08 SDOH — ECONOMIC STABILITY: FOOD INSECURITY: WITHIN THE PAST 12 MONTHS, YOU WORRIED THAT YOUR FOOD WOULD RUN OUT BEFORE YOU GOT THE MONEY TO BUY MORE.: NEVER TRUE

## 2025-01-08 SDOH — ECONOMIC STABILITY: INCOME INSECURITY: IN THE PAST 12 MONTHS HAS THE ELECTRIC, GAS, OIL, OR WATER COMPANY THREATENED TO SHUT OFF SERVICES IN YOUR HOME?: NO

## 2025-01-08 SDOH — HEALTH STABILITY: PHYSICAL HEALTH: ON AVERAGE, HOW MANY MINUTES DO YOU ENGAGE IN EXERCISE AT THIS LEVEL?: 0 MIN

## 2025-01-08 SDOH — SOCIAL STABILITY: SOCIAL INSECURITY: WITHIN THE LAST YEAR, HAVE YOU BEEN HUMILIATED OR EMOTIONALLY ABUSED IN OTHER WAYS BY YOUR PARTNER OR EX-PARTNER?: NO

## 2025-01-08 SDOH — SOCIAL STABILITY: SOCIAL INSECURITY: ARE YOU OR HAVE YOU BEEN THREATENED OR ABUSED PHYSICALLY, EMOTIONALLY, OR SEXUALLY BY ANYONE?: NO

## 2025-01-08 SDOH — SOCIAL STABILITY: SOCIAL INSECURITY: DO YOU FEEL ANYONE HAS EXPLOITED OR TAKEN ADVANTAGE OF YOU FINANCIALLY OR OF YOUR PERSONAL PROPERTY?: NO

## 2025-01-08 SDOH — SOCIAL STABILITY: SOCIAL INSECURITY: HAS ANYONE EVER THREATENED TO HURT YOUR FAMILY OR YOUR PETS?: NO

## 2025-01-08 SDOH — SOCIAL STABILITY: SOCIAL INSECURITY: ARE THERE ANY APPARENT SIGNS OF INJURIES/BEHAVIORS THAT COULD BE RELATED TO ABUSE/NEGLECT?: NO

## 2025-01-08 SDOH — SOCIAL STABILITY: SOCIAL INSECURITY: WERE YOU ABLE TO COMPLETE ALL THE BEHAVIORAL HEALTH SCREENINGS?: YES

## 2025-01-08 SDOH — SOCIAL STABILITY: SOCIAL INSECURITY: HAVE YOU HAD ANY THOUGHTS OF HARMING ANYONE ELSE?: NO

## 2025-01-08 SDOH — ECONOMIC STABILITY: FOOD INSECURITY: HOW HARD IS IT FOR YOU TO PAY FOR THE VERY BASICS LIKE FOOD, HOUSING, MEDICAL CARE, AND HEATING?: NOT HARD AT ALL

## 2025-01-08 SDOH — SOCIAL STABILITY: SOCIAL INSECURITY: HAVE YOU HAD THOUGHTS OF HARMING ANYONE ELSE?: NO

## 2025-01-08 SDOH — ECONOMIC STABILITY: HOUSING INSECURITY: IN THE LAST 12 MONTHS, WAS THERE A TIME WHEN YOU WERE NOT ABLE TO PAY THE MORTGAGE OR RENT ON TIME?: NO

## 2025-01-08 SDOH — SOCIAL STABILITY: SOCIAL INSECURITY: HAVE YOU HAD THOUGHTS OF HARMING ANYONE ELSE?: YES

## 2025-01-08 SDOH — SOCIAL STABILITY: SOCIAL INSECURITY: DO YOU FEEL UNSAFE GOING BACK TO THE PLACE WHERE YOU ARE LIVING?: NO

## 2025-01-08 SDOH — SOCIAL STABILITY: SOCIAL INSECURITY: DOES ANYONE TRY TO KEEP YOU FROM HAVING/CONTACTING OTHER FRIENDS OR DOING THINGS OUTSIDE YOUR HOME?: NO

## 2025-01-08 ASSESSMENT — ACTIVITIES OF DAILY LIVING (ADL)
BATHING_ASSISTANCE: MODERATE
WALKS IN HOME: INDEPENDENT
LACK_OF_TRANSPORTATION: NO
HEARING - LEFT EAR: HEARING AID
GROOMING: INDEPENDENT
ADL_ASSISTANCE: NEEDS ASSISTANCE
HEARING - RIGHT EAR: HEARING AID
ADEQUATE_TO_COMPLETE_ADL: YES
BATHING: NEEDS ASSISTANCE
PATIENT'S MEMORY ADEQUATE TO SAFELY COMPLETE DAILY ACTIVITIES?: YES
JUDGMENT_ADEQUATE_SAFELY_COMPLETE_DAILY_ACTIVITIES: YES
FEEDING YOURSELF: INDEPENDENT
LACK_OF_TRANSPORTATION: NO
TOILETING: INDEPENDENT
DRESSING YOURSELF: NEEDS ASSISTANCE

## 2025-01-08 ASSESSMENT — COGNITIVE AND FUNCTIONAL STATUS - GENERAL
PERSONAL GROOMING: A LITTLE
TOILETING: A LITTLE
TURNING FROM BACK TO SIDE WHILE IN FLAT BAD: TOTAL
CLIMB 3 TO 5 STEPS WITH RAILING: A LITTLE
DRESSING REGULAR UPPER BODY CLOTHING: A LITTLE
DRESSING REGULAR LOWER BODY CLOTHING: A LITTLE
MOVING TO AND FROM BED TO CHAIR: A LITTLE
CLIMB 3 TO 5 STEPS WITH RAILING: A LOT
MOBILITY SCORE: 9
DRESSING REGULAR LOWER BODY CLOTHING: A LOT
HELP NEEDED FOR BATHING: A LITTLE
MOBILITY SCORE: 19
TOILETING: A LOT
STANDING UP FROM CHAIR USING ARMS: A LITTLE
EATING MEALS: A LITTLE
WALKING IN HOSPITAL ROOM: A LITTLE
TURNING FROM BACK TO SIDE WHILE IN FLAT BAD: A LITTLE
DRESSING REGULAR LOWER BODY CLOTHING: A LITTLE
STANDING UP FROM CHAIR USING ARMS: A LITTLE
CLIMB 3 TO 5 STEPS WITH RAILING: A LOT
WALKING IN HOSPITAL ROOM: A LOT
STANDING UP FROM CHAIR USING ARMS: A LITTLE
WALKING IN HOSPITAL ROOM: A LITTLE
DRESSING REGULAR UPPER BODY CLOTHING: A LITTLE
HELP NEEDED FOR BATHING: A LOT
TOILETING: A LITTLE
DAILY ACTIVITIY SCORE: 20
MOVING FROM LYING ON BACK TO SITTING ON SIDE OF FLAT BED WITH BEDRAILS: A LOT
DAILY ACTIVITIY SCORE: 20
MOBILITY SCORE: 18
TURNING FROM BACK TO SIDE WHILE IN FLAT BAD: A LITTLE
DAILY ACTIVITIY SCORE: 14
CLIMB 3 TO 5 STEPS WITH RAILING: TOTAL
STANDING UP FROM CHAIR USING ARMS: TOTAL
PATIENT BASELINE BEDBOUND: NO
TURNING FROM BACK TO SIDE WHILE IN FLAT BAD: A LITTLE
HELP NEEDED FOR BATHING: A LITTLE
TOILETING: A LITTLE
WALKING IN HOSPITAL ROOM: A LITTLE
DRESSING REGULAR UPPER BODY CLOTHING: A LITTLE
DRESSING REGULAR LOWER BODY CLOTHING: A LITTLE
MOVING TO AND FROM BED TO CHAIR: A LOT
DRESSING REGULAR UPPER BODY CLOTHING: A LOT
MOBILITY SCORE: 18
HELP NEEDED FOR BATHING: A LITTLE
MOVING TO AND FROM BED TO CHAIR: A LITTLE
DAILY ACTIVITIY SCORE: 20
MOVING TO AND FROM BED TO CHAIR: A LITTLE

## 2025-01-08 ASSESSMENT — ENCOUNTER SYMPTOMS
PALPITATIONS: 0
APPETITE CHANGE: 1
EYE PAIN: 0
CHILLS: 0
ABDOMINAL DISTENTION: 0
TREMORS: 0
HALLUCINATIONS: 0
DIFFICULTY URINATING: 0
APPETITE CHANGE: 0
BLOOD IN STOOL: 0
WEAKNESS: 1
VOMITING: 0
FACIAL SWELLING: 1
WOUND: 0
DIAPHORESIS: 0
BACK PAIN: 0
DIARRHEA: 0
SORE THROAT: 0
HEMATURIA: 0
DIZZINESS: 0
HEADACHES: 1
ACTIVITY CHANGE: 1
DYSURIA: 0
FREQUENCY: 0
COUGH: 0
SHORTNESS OF BREATH: 0
CONSTIPATION: 0
FLANK PAIN: 0
JOINT SWELLING: 0
DIZZINESS: 1
TROUBLE SWALLOWING: 0
FEVER: 0
NUMBNESS: 0
LIGHT-HEADEDNESS: 0
PHOTOPHOBIA: 0
FATIGUE: 1
NAUSEA: 0
ABDOMINAL PAIN: 0
WHEEZING: 0
BRUISES/BLEEDS EASILY: 1
CHEST TIGHTNESS: 0
COLOR CHANGE: 0
CONFUSION: 0

## 2025-01-08 ASSESSMENT — PAIN SCALES - GENERAL
PAINLEVEL_OUTOF10: 6
PAINLEVEL_OUTOF10: 6
PAINLEVEL_OUTOF10: 7
PAINLEVEL_OUTOF10: 4
PAINLEVEL_OUTOF10: 5 - MODERATE PAIN

## 2025-01-08 ASSESSMENT — PATIENT HEALTH QUESTIONNAIRE - PHQ9
1. LITTLE INTEREST OR PLEASURE IN DOING THINGS: NOT AT ALL
1. LITTLE INTEREST OR PLEASURE IN DOING THINGS: NOT AT ALL
2. FEELING DOWN, DEPRESSED OR HOPELESS: NOT AT ALL
SUM OF ALL RESPONSES TO PHQ9 QUESTIONS 1 & 2: 0
SUM OF ALL RESPONSES TO PHQ9 QUESTIONS 1 & 2: 0
2. FEELING DOWN, DEPRESSED OR HOPELESS: NOT AT ALL

## 2025-01-08 ASSESSMENT — LIFESTYLE VARIABLES
HOW OFTEN DO YOU HAVE 6 OR MORE DRINKS ON ONE OCCASION: NEVER
SUBSTANCE_ABUSE_PAST_12_MONTHS: NO
AUDIT-C TOTAL SCORE: 0
SKIP TO QUESTIONS 9-10: 1
HOW OFTEN DO YOU HAVE A DRINK CONTAINING ALCOHOL: NEVER
HOW OFTEN DO YOU HAVE 6 OR MORE DRINKS ON ONE OCCASION: NEVER
PRESCIPTION_ABUSE_PAST_12_MONTHS: NO
AUDIT-C TOTAL SCORE: 0
HOW MANY STANDARD DRINKS CONTAINING ALCOHOL DO YOU HAVE ON A TYPICAL DAY: PATIENT DOES NOT DRINK
SKIP TO QUESTIONS 9-10: 1
AUDIT-C TOTAL SCORE: 0
AUDIT-C TOTAL SCORE: 0
HOW MANY STANDARD DRINKS CONTAINING ALCOHOL DO YOU HAVE ON A TYPICAL DAY: PATIENT DOES NOT DRINK
HOW OFTEN DO YOU HAVE A DRINK CONTAINING ALCOHOL: NEVER

## 2025-01-08 ASSESSMENT — PAIN - FUNCTIONAL ASSESSMENT
PAIN_FUNCTIONAL_ASSESSMENT: 0-10

## 2025-01-08 ASSESSMENT — PAIN DESCRIPTION - ORIENTATION: ORIENTATION: RIGHT

## 2025-01-08 ASSESSMENT — PAIN DESCRIPTION - PAIN TYPE: TYPE: ACUTE PAIN

## 2025-01-08 ASSESSMENT — PAIN DESCRIPTION - LOCATION: LOCATION: HAND

## 2025-01-08 NOTE — PROGRESS NOTES
Occupational Therapy    Evaluation    Patient Name: Adwoa Forrest  MRN: [id number]  Department: [facility] ED  Room: [place] [address]  Today's Date: 1/8/2025  Time Calculation  Start Time: 0959  Stop Time: 1023  Time Calculation (min): 24 min        Assessment:  OT Assessment: Pt is 89 year old female admitted for fall. Pt requiring MIN A x1-2 for mobility and MIN-MAX A for ADLs. Pt would benefit from skilled OT services during hospital stay to address goals.  Prognosis: Good  Barriers to Discharge Home: Caregiver assistance, Physical needs  Caregiver Assistance: Caregiver assistance needed per identified barriers - however, level of patient's required assistance exceeds assistance available at home  Physical Needs: High falls risk due to function or environment, 24hr mobility assistance needed, 24hr ADL assistance needed  Evaluation/Treatment Tolerance: Patient tolerated treatment well  End of Session Communication: Bedside nurse  End of Session Patient Position: Bed, 2 rail up  OT Assessment Results: Decreased ADL status, Decreased upper extremity strength, Decreased safe judgment during ADL, Decreased endurance, Decreased functional mobility  Prognosis: Good  Evaluation/Treatment Tolerance: Patient tolerated treatment well  Plan:  Treatment Interventions: ADL retraining, Functional transfer training, UE strengthening/ROM, Endurance training, Patient/family training, Equipment evaluation/education, Compensatory technique education  OT Frequency: 3 times per week  OT Discharge Recommendations: Moderate intensity level of continued care  OT Recommended Transfer Status: Assist of 1, Assist of 2  OT - OK to Discharge: Yes (When medically appropriate)  Treatment Interventions: ADL retraining, Functional transfer training, UE strengthening/ROM, Endurance training, Patient/family training, Equipment evaluation/education, Compensatory technique education    Subjective   Current Problem:  1. Generalized weakness        2. Fall, initial  encounter        3. Acute on chronic renal insufficiency        4. Closed fracture of nasal bone, initial encounter        5. Epistaxis          General:  General  Reason for Referral: Weakness, fall; Imaging found Nasal and vomer fx. CT of head, c-spine (-) for acute abnormality. Noted minimal-moderate canal narrowing of C3-4.  Referred By: Henry  Past Medical History Relevant to Rehab: CHF, afib, COPD, pacemaker, CKD, recent PNA  Family/Caregiver Present: No  Co-Treatment: PT  Co-Treatment Reason: to optimzie mobility and safety while focusing on discipline specific needs  Prior to Session Communication: Bedside nurse  Patient Position Received: Bed, 2 rail up (ED latrell)  General Comment: Pt seen in ED. Drowzy initally during session. Pt pleasant and cooperative.  Precautions:  Medical Precautions: Fall precautions, Oxygen therapy device and L/min  Precautions Comment: Nasal and Vomer fx            Pain:  Pain Assessment  Pain Assessment: 0-10  0-10 (Numeric) Pain Score: 6  Pain Type: Acute pain  Pain Location: Hand  Pain Orientation: Right    Objective   Cognition:  Overall Cognitive Status: Within Functional Limits  Orientation Level: Oriented X4           Home Living:  Type of Home: House  Lives With: Adult children (Daughter, works during the day)  Home Adaptive Equipment: Walker rolling or standard  Home Layout: Two level, Able to live on main level with bedroom/bathroom  Home Access: Stairs to enter with rails  Entrance Stairs-Rails: Both  Entrance Stairs-Number of Steps: 5  Bathroom Shower/Tub: Tub/shower unit  Bathroom Toilet: Standard  Bathroom Equipment: Shower chair with back, Grab bars in shower  Prior Function:  Level of [place]: Needs assistance with ADLs, Needs assistance with homemaking  Receives Help From: Family  ADL Assistance:  (IND for dressing and toileting; Daughter assists with bathing)  Homemaking Assistance: Needs assistance  Ambulatory Assistance: Independent (MOD I with  walker)  Hand Dominance: Right  Prior Function Comments: (+) falls  IADL History:  IADL Comments: (-) drive; Friend assists with meals; daughter preforms household tasks  ADL:  Eating Assistance: Stand by  Grooming Assistance: Minimal (Anticipated)  Bathing Assistance: Moderate (Anticipated)  UE Dressing Assistance: Minimal (Anticipated)  LE Dressing Assistance: Moderate (Anticipated)  Toileting Assistance with Device: Minimal  Toileting Deficit:  (Toileting on BSC with MIN  A, 1 mild LOB while performing clothing management in standing)  Functional Assistance: Minimal  Functional Deficit: Commode transfer  Activity Tolerance:  Endurance: Decreased tolerance for upright activites  Bed Mobility/Transfers: Bed Mobility  Bed Mobility: Yes  Bed Mobility 1  Bed Mobility 1: Supine to sitting  Level of Assistance 1: Minimum assistance, +2  Bed Mobility 2  Bed Mobility  2: Sitting to supine  Level of Assistance 2: Contact guard    Transfers  Transfer: Yes  Transfer 1  Transfer From 1: Sit to, Stand to  Transfer to 1: Sit, Stand  Technique 1: Sit to stand, Stand to sit  Transfer Device 1: Walker  Transfer Level of Assistance 1: Minimum assistance  Transfers 2  Transfer From 2: Bed to, Commode-standard to  Transfer to 2: Bed, Commode-standard  Technique 2: Stand pivot  Transfer Device 2: Walker  Transfer Level of Assistance 2: Minimum assistance, +2  Transfers 3  Transfer From 3: Commode-standard to  Transfer to 3: Stand  Technique 3: Sit to stand  Transfer Device 3: Walker  Transfer Level of Assistance 3: Minimum assistance, +2      Functional Mobility:  Functional Mobility  Functional Mobility Performed:  (pivot steps bed<>BSC with MIN  Ax2 using FWW)  Sitting Balance:  Static Sitting Balance  Static Sitting-Balance Support: Bilateral upper extremity supported  Static Sitting-Level of Assistance: Contact guard  Dynamic Sitting Balance  Dynamic Sitting-Balance Support: Bilateral upper extremity supported  Dynamic  Sitting-Level of Assistance: Minimum assistance  Standing Balance:  Static Standing Balance  Static Standing-Balance Support: Bilateral upper extremity supported  Static Standing-Level of Assistance: Minimum assistance  Dynamic Standing Balance  Dynamic Standing-Balance Support: Bilateral upper extremity supported  Dynamic Standing-Level of Assistance: Minimum assistance, Moderate assistance   Modalities:     Vision:Vision - Basic Assessment  Current Vision: Wears glasses all the time   and Vision - Complex Assessment  Vision Comments: Endorsed blurred vision. Swelling and bruising present around eyes  Sensation:  Light Touch: No apparent deficits  Strength:  Strength Comments: BUE grossly 3+/5  Perception:     Coordination:      Hand Function:  Gross Grasp: Functional  Coordination: Functional      Outcome Measures:[facility] Daily Activity  Putting on and taking off regular lower body clothing: A lot  Bathing (including washing, rinsing, drying): A lot  Putting on and taking off regular upper body clothing: A lot  Toileting, which includes using toilet, bedpan or urinal: A lot  Taking care of personal grooming such as brushing teeth: A little  Eating Meals: A little  Daily Activity - Total Score: 14        Education Documentation  ADL Training, taught by Nicole Pepper OT at 1/8/2025 12:45 PM.  Learner: Patient  Readiness: Acceptance  Method: Explanation  Response: Verbalizes Understanding    Education Comments  No comments found.        OP EDUCATION:       Goals:  Encounter Problems       Encounter Problems (Active)       ADLs       Patient with complete lower body dressing with stand by assist level of assistance with PRN adaptive equipment (Progressing)       Start:  01/08/25    Expected End:  01/22/25            Patient will complete toileting including hygiene clothing management/hygiene with stand by assist level of assistance. (Progressing)       Start:  01/08/25    Expected End:  01/22/25                BALANCE       Pt will maintain dynamic standing balance during ADL task with stand by assist level of assistance in order to demonstrate decreased risk of falling and improved postural control. (Progressing)       Start:  01/08/25    Expected End:  01/22/25               TRANSFERS       Patient will complete functional transfers with least restrictive device with stand by assist level of assistance. (Progressing)       Start:  01/08/25    Expected End:  01/22/25

## 2025-01-08 NOTE — PROGRESS NOTES
"Adwoa Forrest is a 89 y.o. female on day 0 of admission presenting with Generalized weakness.      Subjective   Adwoa Forrest is a 89 y.o. female with PMHx s/f HFpEF, NSVT (on amiodarone), persistent atrial fib (not on blood thinners due to GIB), pacemaker, COPD (on 3-4L home O2), HTN and CKD (creatinine ~2 at baseline) presented 1/7/25 with fall and nosebleed. Reportedly she slipped off her couch and hit the floor.  She had a severe nosebleed at that point so EMS was called. Nosebleed has now stopped but she is not complaining of a headache. She has been on various antibiotics prescribed by her pulmonologist for what sounds like several weeks. She was originally on amoxicillin, but was then switched to Omnicef a few days ago. She does feel a bit short of breath, but is on her home O2 requirements of 3-4 L. She has a nonproductive cough. No fever, chills, nausea, vomiting chest pain, hematemesis or other symptoms. She does feel weak overall. She has been complaining of \"flashers\" in her eyes since last September that bother her and take minutes to go away to times. Cr 2.79. BNP 1832. CXR: Slight improvement of the bilateral multifocal airspace and interstitial opacities. Otherwise no interval change from 12/30/2024 chest x-ray. CT cervical/head - No acute intracranial abnormality. Acute mildly displaced fracture of the nasal bones and vomer with overlying soft tissue swelling extending to the right maxilla. No acute fracture or traumatic subluxation of the cervical spine    1/8/2025: No acute events overnight. Vitals stable, /76 this morning. CBC/BMP reviewed, BUN/cr relatively unchanged 104/2.61 respectively. Electrolytes remain WNL. CPK WNL. Hgb slightly lower 10.4 (baseline ~11-12). Her main complaint today is R hand pain, XR done overnight without any fractures.   Daughter at bedside, they live together. Both in agreement with SNF placement to [facility]. Reports she has become weaker since recent bout of " pneumonia.          Review of Systems   Constitutional:  Positive for fatigue. Negative for appetite change, chills, diaphoresis and fever.   HENT:  Positive for congestion and facial swelling. Negative for ear pain, hearing loss, nosebleeds, sore throat, tinnitus and trouble swallowing.    Eyes:  Negative for pain.   Respiratory:  Negative for cough, chest tightness, shortness of breath and wheezing.    Cardiovascular:  Negative for chest pain, palpitations and leg swelling.   Gastrointestinal:  Negative for abdominal pain, blood in stool, constipation, diarrhea, nausea and vomiting.   Genitourinary:  Negative for dysuria, flank pain, frequency, hematuria and urgency.   Musculoskeletal:  Negative for back pain and joint swelling.        +R hand pain   Skin:  Negative for rash and wound.   Neurological:  Positive for weakness and headaches. Negative for dizziness, syncope, light-headedness and numbness.   Hematological:  Bruises/bleeds easily.   Psychiatric/Behavioral:  Negative for behavioral problems, hallucinations and suicidal ideas.           Objective     Last Recorded Vitals  /71   Pulse 68   Temp 36.3 °C (97.4 °F) (Temporal)   Resp 12   Wt 46.3 kg (102 lb)   SpO2 99%     Image Results  XR hand right 3+ views    Result Date: 1/8/2025  Interpreted By:  Finkelstein, Evan, STUDY: XR HAND RIGHT 3+ VIEWS;  1/8/2025 5:13 am three views right hand   INDICATION: Signs/Symptoms:swelling.   COMPARISON: None.   ACCESSION NUMBER(S): [id number]   ORDERING CLINICIAN: ABRAHAM CHATTERJEE   FINDINGS: Bones are demineralized. Narrowing and spurring of the 1st carpometacarpal joint as well as the STT joint. Erosive appearing changes involving multiple interphalangeal joints most notable involving the proximal interphalangeal joints of the index and middle fingers as well as the distal interphalangeal joints of the ring and small fingers. There is soft tissue swelling most pronounced involving the index, middle and small  finger soft tissues. No acute displaced fracture or dislocation.       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     MACRO: None.   Signed by: Evan Finkelstein 1/8/2025 5:53 AM Dictation workstation:   [id number]    CT head wo IV contrast    Result Date: 1/7/2025  Interpreted By:  Sathya Drake, STUDY: CT HEAD WO IV CONTRAST; CT CERVICAL SPINE WO IV CONTRAST; CT FACIAL BONES WO IV CONTRAST; CT 3D RECONSTRUCTION;  1/7/2025 4:26 pm; 1/7/2025 4:27 pm   INDICATION: Signs/Symptoms:fall; Signs/Symptoms:fall nose contuson nose bleed; Signs/Symptoms:trauma.   COMPARISON: CT scan of the head 9/28/2024 CT scan of the cervical spine 11/14/.   ACCESSION NUMBER(S): [id number]; [id number]; [id number]; [id number]   ORDERING CLINICIAN: STEWART FERREIRA   TECHNIQUE: Axial noncontrast images of the head. Axial noncontrast images of the facial bones with coronal and sagittal reconstructed images. Axial noncontrast images of the cervical spine with coronal and sagittal reconstructed images. 3D reformats of the facial bones were performed on independent workstation.   FINDINGS: BRAIN PARENCHYMA: Gray-white matter interfaces are preserved. No mass effect or midline shift. Mild deep and periventricular white matter hypodensities are nonspecific, but favored to represent chronic small vessel ischemic changes. Calcifications bilateral basal ganglia are likely physiologic.   HEMORRHAGE: No acute intracranial hemorrhage. VENTRICLES and EXTRA-AXIAL SPACES: Mild volume loss with prominence of the ventricles and sulci EXTRACRANIAL SOFT TISSUES: Within normal limits. CALVARIUM: No depressed calvarial fracture. No destructive osseous lesion.   FACIAL BONES: Acute mildly displaced fracture of the nasal bones and vomer. Remainder of the osseous structures are intact. SOFT TISSUES: Soft tissue swelling overlying  the nose and right maxilla. PARANASAL SINUSES: r air-fluid levels in the maxillary sinuses, left greater than right with areas of high attenuation consistent with hemosinus. Trace layering fluid is also noted in the bilateral sphenoid sinuses with opacification of multiple ethmoid air cells suspicious for hemosinus. MASTOIDS: Within normal limits. ORBITS: The globes, extraocular muscles and optic nerve sheath complexes are symmetric. No retrobulbar hematoma.   ALIGNMENT: There is reversal of the normal lordotic curve with grade 1 anterolisthesis of C3 on 4 and C4 on 5 there is dextroconvex curvature of the mid and lower cervical spine.. VERTEBRAE: No acute fracture. Loss of disc height and anterior osteophyte formation at multiple levels most severe at C5-6 and C6-7. Facet and uncovertebral hypertrophic changes noted at all levels. SPINAL CANAL: No critical spinal canal stenosis. Mild-to-moderate canal narrowing at C3-4. PREVERTEBRAL SOFT TISSUES: No prevertebral soft tissue swelling. LUNG APICES: Biapical pleuroparenchymal scarring.   OTHER FINDINGS: Atherosclerotic calcification of the carotid bifurcations and siphons.       No acute intracranial abnormality.   Chronic changes as noted above.   Acute mildly displaced fracture of the nasal bones and vomer. There is overlying soft tissue swelling extending to the right maxilla.   Air-fluid levels in the maxillary and sphenoid sinuses with opacification of multiple ethmoid air cells. They demonstrate high attenuation consistent with hemosinus.   No acute fracture or traumatic subluxation of the cervical spine.   Advanced multilevel discogenic degenerative changes as noted above.   MACRO: None   Signed by: Sathya Drake 1/7/2025 5:11 PM Dictation workstation:   [id number]    CT maxillofacial bones wo IV contrast    Result Date: 1/7/2025  Interpreted By:  Sathya Drake, STUDY: CT HEAD WO IV CONTRAST; CT CERVICAL SPINE WO IV CONTRAST; CT FACIAL BONES WO IV CONTRAST;  CT 3D RECONSTRUCTION;  1/7/2025 4:26 pm; 1/7/2025 4:27 pm   INDICATION: Signs/Symptoms:fall; Signs/Symptoms:fall nose contuson nose bleed; Signs/Symptoms:trauma.   COMPARISON: CT scan of the head 9/28/2024 CT scan of the cervical spine 11/14/.   ACCESSION NUMBER(S): [id number]; [id number]; [id number]; [id number]   ORDERING CLINICIAN: STEWART FERREIRA   TECHNIQUE: Axial noncontrast images of the head. Axial noncontrast images of the facial bones with coronal and sagittal reconstructed images. Axial noncontrast images of the cervical spine with coronal and sagittal reconstructed images. 3D reformats of the facial bones were performed on independent workstation.   FINDINGS: BRAIN PARENCHYMA: Gray-white matter interfaces are preserved. No mass effect or midline shift. Mild deep and periventricular white matter hypodensities are nonspecific, but favored to represent chronic small vessel ischemic changes. Calcifications bilateral basal ganglia are likely physiologic.   HEMORRHAGE: No acute intracranial hemorrhage. VENTRICLES and EXTRA-AXIAL SPACES: Mild volume loss with prominence of the ventricles and sulci EXTRACRANIAL SOFT TISSUES: Within normal limits. CALVARIUM: No depressed calvarial fracture. No destructive osseous lesion.   FACIAL BONES: Acute mildly displaced fracture of the nasal bones and vomer. Remainder of the osseous structures are intact. SOFT TISSUES: Soft tissue swelling overlying the nose and right maxilla. PARANASAL SINUSES: r air-fluid levels in the maxillary sinuses, left greater than right with areas of high attenuation consistent with hemosinus. Trace layering fluid is also noted in the bilateral sphenoid sinuses with opacification of multiple ethmoid air cells suspicious for hemosinus. MASTOIDS: Within normal limits. ORBITS: The globes, extraocular muscles and optic nerve sheath complexes are symmetric. No retrobulbar hematoma.   ALIGNMENT: There is reversal of the normal lordotic curve with  grade 1 anterolisthesis of C3 on 4 and C4 on 5 there is dextroconvex curvature of the mid and lower cervical spine.. VERTEBRAE: No acute fracture. Loss of disc height and anterior osteophyte formation at multiple levels most severe at C5-6 and C6-7. Facet and uncovertebral hypertrophic changes noted at all levels. SPINAL CANAL: No critical spinal canal stenosis. Mild-to-moderate canal narrowing at C3-4. PREVERTEBRAL SOFT TISSUES: No prevertebral soft tissue swelling. LUNG APICES: Biapical pleuroparenchymal scarring.   OTHER FINDINGS: Atherosclerotic calcification of the carotid bifurcations and siphons.       No acute intracranial abnormality.   Chronic changes as noted above.   Acute mildly displaced fracture of the nasal bones and vomer. There is overlying soft tissue swelling extending to the right maxilla.   Air-fluid levels in the maxillary and sphenoid sinuses with opacification of multiple ethmoid air cells. They demonstrate high attenuation consistent with hemosinus.   No acute fracture or traumatic subluxation of the cervical spine.   Advanced multilevel discogenic degenerative changes as noted above.   MACRO: None   Signed by: Sathya Drake 1/7/2025 5:11 PM Dictation workstation:   [id number]    CT cervical spine wo IV contrast    Result Date: 1/7/2025  Interpreted By:  Sathya Drake, STUDY: CT HEAD WO IV CONTRAST; CT CERVICAL SPINE WO IV CONTRAST; CT FACIAL BONES WO IV CONTRAST; CT 3D RECONSTRUCTION;  1/7/2025 4:26 pm; 1/7/2025 4:27 pm   INDICATION: Signs/Symptoms:fall; Signs/Symptoms:fall nose contuson nose bleed; Signs/Symptoms:trauma.   COMPARISON: CT scan of the head 9/28/2024 CT scan of the cervical spine 11/14/.   ACCESSION NUMBER(S): [id number]; [id number]; [id number]; [id number]   ORDERING CLINICIAN: STEWART FERREIRA   TECHNIQUE: Axial noncontrast images of the head. Axial noncontrast images of the facial bones with coronal and sagittal reconstructed images. Axial noncontrast images of  the cervical spine with coronal and sagittal reconstructed images. 3D reformats of the facial bones were performed on independent workstation.   FINDINGS: BRAIN PARENCHYMA: Gray-white matter interfaces are preserved. No mass effect or midline shift. Mild deep and periventricular white matter hypodensities are nonspecific, but favored to represent chronic small vessel ischemic changes. Calcifications bilateral basal ganglia are likely physiologic.   HEMORRHAGE: No acute intracranial hemorrhage. VENTRICLES and EXTRA-AXIAL SPACES: Mild volume loss with prominence of the ventricles and sulci EXTRACRANIAL SOFT TISSUES: Within normal limits. CALVARIUM: No depressed calvarial fracture. No destructive osseous lesion.   FACIAL BONES: Acute mildly displaced fracture of the nasal bones and vomer. Remainder of the osseous structures are intact. SOFT TISSUES: Soft tissue swelling overlying the nose and right maxilla. PARANASAL SINUSES: r air-fluid levels in the maxillary sinuses, left greater than right with areas of high attenuation consistent with hemosinus. Trace layering fluid is also noted in the bilateral sphenoid sinuses with opacification of multiple ethmoid air cells suspicious for hemosinus. MASTOIDS: Within normal limits. ORBITS: The globes, extraocular muscles and optic nerve sheath complexes are symmetric. No retrobulbar hematoma.   ALIGNMENT: There is reversal of the normal lordotic curve with grade 1 anterolisthesis of C3 on 4 and C4 on 5 there is dextroconvex curvature of the mid and lower cervical spine.. VERTEBRAE: No acute fracture. Loss of disc height and anterior osteophyte formation at multiple levels most severe at C5-6 and C6-7. Facet and uncovertebral hypertrophic changes noted at all levels. SPINAL CANAL: No critical spinal canal stenosis. Mild-to-moderate canal narrowing at C3-4. PREVERTEBRAL SOFT TISSUES: No prevertebral soft tissue swelling. LUNG APICES: Biapical pleuroparenchymal scarring.   OTHER  FINDINGS: Atherosclerotic calcification of the carotid bifurcations and siphons.       No acute intracranial abnormality.   Chronic changes as noted above.   Acute mildly displaced fracture of the nasal bones and vomer. There is overlying soft tissue swelling extending to the right maxilla.   Air-fluid levels in the maxillary and sphenoid sinuses with opacification of multiple ethmoid air cells. They demonstrate high attenuation consistent with hemosinus.   No acute fracture or traumatic subluxation of the cervical spine.   Advanced multilevel discogenic degenerative changes as noted above.   MACRO: None   Signed by: Sathya Drake 1/7/2025 5:11 PM Dictation workstation:   [id number]    CT 3D reconstruction    Result Date: 1/7/2025  Interpreted By:  Sathya Drake, STUDY: CT HEAD WO IV CONTRAST; CT CERVICAL SPINE WO IV CONTRAST; CT FACIAL BONES WO IV CONTRAST; CT 3D RECONSTRUCTION;  1/7/2025 4:26 pm; 1/7/2025 4:27 pm   INDICATION: Signs/Symptoms:fall; Signs/Symptoms:fall nose contuson nose bleed; Signs/Symptoms:trauma.   COMPARISON: CT scan of the head 9/28/2024 CT scan of the cervical spine 11/14/.   ACCESSION NUMBER(S): [id number]; [id number]; [id number]; [id number]   ORDERING CLINICIAN: STEWART FERREIRA   TECHNIQUE: Axial noncontrast images of the head. Axial noncontrast images of the facial bones with coronal and sagittal reconstructed images. Axial noncontrast images of the cervical spine with coronal and sagittal reconstructed images. 3D reformats of the facial bones were performed on independent workstation.   FINDINGS: BRAIN PARENCHYMA: Gray-white matter interfaces are preserved. No mass effect or midline shift. Mild deep and periventricular white matter hypodensities are nonspecific, but favored to represent chronic small vessel ischemic changes. Calcifications bilateral basal ganglia are likely physiologic.   HEMORRHAGE: No acute intracranial hemorrhage. VENTRICLES and EXTRA-AXIAL SPACES: Mild  volume loss with prominence of the ventricles and sulci EXTRACRANIAL SOFT TISSUES: Within normal limits. CALVARIUM: No depressed calvarial fracture. No destructive osseous lesion.   FACIAL BONES: Acute mildly displaced fracture of the nasal bones and vomer. Remainder of the osseous structures are intact. SOFT TISSUES: Soft tissue swelling overlying the nose and right maxilla. PARANASAL SINUSES: r air-fluid levels in the maxillary sinuses, left greater than right with areas of high attenuation consistent with hemosinus. Trace layering fluid is also noted in the bilateral sphenoid sinuses with opacification of multiple ethmoid air cells suspicious for hemosinus. MASTOIDS: Within normal limits. ORBITS: The globes, extraocular muscles and optic nerve sheath complexes are symmetric. No retrobulbar hematoma.   ALIGNMENT: There is reversal of the normal lordotic curve with grade 1 anterolisthesis of C3 on 4 and C4 on 5 there is dextroconvex curvature of the mid and lower cervical spine.. VERTEBRAE: No acute fracture. Loss of disc height and anterior osteophyte formation at multiple levels most severe at C5-6 and C6-7. Facet and uncovertebral hypertrophic changes noted at all levels. SPINAL CANAL: No critical spinal canal stenosis. Mild-to-moderate canal narrowing at C3-4. PREVERTEBRAL SOFT TISSUES: No prevertebral soft tissue swelling. LUNG APICES: Biapical pleuroparenchymal scarring.   OTHER FINDINGS: Atherosclerotic calcification of the carotid bifurcations and siphons.       No acute intracranial abnormality.   Chronic changes as noted above.   Acute mildly displaced fracture of the nasal bones and vomer. There is overlying soft tissue swelling extending to the right maxilla.   Air-fluid levels in the maxillary and sphenoid sinuses with opacification of multiple ethmoid air cells. They demonstrate high attenuation consistent with hemosinus.   No acute fracture or traumatic subluxation of the cervical spine.   Advanced  multilevel discogenic degenerative changes as noted above.   MACRO: None   Signed by: Sathya Drake 1/7/2025 5:11 PM Dictation workstation:   [id number]    XR chest 1 view    Result Date: 1/7/2025  Interpreted By:  Graeme Ryan, STUDY: XR CHEST 1 VIEW; 1/7/2025 1:47 pm   INDICATION: Signs/Symptoms:sob.   COMPARISON: Chest x-ray 12/30/2024   ACCESSION NUMBER(S): [id number]   ORDERING CLINICIAN: STEWART FERREIRA   TECHNIQUE: 1 view of the chest was performed.   FINDINGS: Slightly improved bilateral multifocal airspace and interstitial opacities. Grossly stable perihilar vascular congestion. Stable layering bilateral pleural effusion. No pneumothorax.  The cardiomediastinal silhouette is enlarged. Dual lead left chest wall pacemaker noted. Aortic knob calcifications noted.       1. Slight improvement of the bilateral multifocal airspace and interstitial opacities. Otherwise no interval change from 12/30/2024 chest x-ray   Signed by: Graeme Ryan 1/7/2025 1:59 PM Dictation workstation:   [id number]    XR chest 2 views    Result Date: 12/31/2024  Interpreted By:  Tam Longo, STUDY: XR CHEST 2 VIEWS   INDICATION: Signs/Symptoms:Cough, shortness of breath.   COMPARISON: November 14   ACCESSION NUMBER(S): [id number]   ORDERING CLINICIAN: JONI ZARATE   FINDINGS: Significant interval worsening of the appearance of the chest. There are now fairly extensive bilateral multifocal airspace opacities throughout both lungs. The bilateral pleural effusions left-greater-than-right grossly similar to prior.   Cardiomegaly with pacemaker unchanged.       Significant worsening now with extensive bilateral multifocal airspace opacities throughout both lungs which could be multifocal pneumonia or a component of edema.   Bilateral effusions left-greater-than-right, grossly similar to previous.   Signed by: Tam Longo 12/31/2024 7:44 PM Dictation workstation:   [id number]    ECG 12 lead (Clinic Performed)    Result Date:  12/26/2024  AV dual-paced rhythm When compared with ECG of 14-NOV-2024 17:18, PREVIOUS ECG IS PRESENT Confirmed by Rita Workman ([id number]) on 12/26/2024 10:52:59 AM    Pulmonary Stress Test (6 Min. Walk)    Result Date: 12/23/2024  The patient underwent a 6-minute walk with oxygen titration.  The resting room air saturation 84%. At rest on 3 L/min nasal cannula resting saturation was 95%. The patient ambulated for 2 minutes and 37 seconds covering a distance of 38 m and desaturated to 86% on 3 L/min nasal cannula. Oxygen was titrated up to 5 L/min nasal cannula.  The patient ambulated further 2 minutes 45 seconds covering an additional 74 m.  The lowest recorded saturation was 90%. Impression: 1.  Desaturation at rest on room air 2.  No desaturation at rest on supplemental oxygen at 3 L/min nasal cannula 3.  Desaturation with ambulating on 3 L/min nasal cannula 4.  No desaturation with ambulating on supplemental oxygen at 5 L/min nasal cannula 5.  Marked exercise intolerance    Complete Pulmonary Function Test Pre/Post Bronchodilator (Spirometry Pre/Post/DLCO/Lung Volumes)    Result Date: 12/23/2024  Forced expiration spirometry demonstrates no airflow obstruction.  Spirograms are fair-quality plateau to forced vital capacity of 1.13 L or 51% of predicted.  This suggest a moderate restriction.  The Z-score is -2.51. The flow-volume loop suggest a restrictive pattern. The FEV1 is 1.07 L or 64% of predicted. The FEV1 to FVC ratio is 94. There is no significant response to the inhalation of an aerosolized bronchodilator. The diffusion capacity is moderately reduced at 7.93 or 46% of predicted.  The Z-score is -3.73. Impression: 1.  Fair quality study. 2.  No airflow obstruction and no response to Anoro/bronchodilator. 3.  Possible moderate restriction 4.  Mildly reduced diffusion capacity    Cardiac Catheterization Procedure    Result Date: 12/10/2024       [facility], Cath Lab [address] [place] [address],  [place] [address]    Phone [phone] Fax [phone] Cardiovascular Catheterization Report Patient Name:      CHERRY LEE       Performing Physician:  [id number] Hampton MD Study Date:        12/10/2024          Verifying Physician:   Ramon Hampton MD MRN/PID:           [id number]            Cardiologist/Co-Scrub: Accession#:        [id number]        Ordering Provider:     [id number] PERLITA LANDRY Date of Birth/Age: 1935 / 89 years Cardiologist:          Sushma Collazo MD Gender:            F                   Fellow: Encounter#:        [id number]          Surgeon:  Study: Right Heart Cath  Indications: Heart failure. Heart failure.  Procedure Description: After infiltration of local anesthetic, the Right brachial vein was cannulated with a micropuncture technique. A 5 Tajik sheath was placed in the vein. A balloon tipped catheter was advanced through the right heart to record pressures. Cardiac output was calculated via the Laura method.  Right Heart Catheterization: A balloon tipped catheter was advanced through the right heart to record pressures. Cardiac output was calculated via the Laura method. Elevated left sided filling pressures with low cardiac output. Elevated ventricular filling pressure. Cardiac output is mildly decreased. Reduced cardiac output at rest. No evidence of shunt. Elevated pulmonary vascular resistance. Pulmonary venous hypertension and pulmonary arterial hypertension.  Hemo Personnel: +---------------+---------+ Name           Duty      +---------------+---------+ Darrel Hampton MD 1 +---------------+---------+  Hemodynamic Pressures:  +----+----------+---------+------------+-------------+---+-----+-------+-------+  SiteDate Time   Phase    Systolic    Diastolic  ED Mean A-Wave V-Wave                  Name       mmHg        mmHg     mmHmmHg  mmHg   mmHg                                                    g                     +----+----------+---------+------------+-------------+---+-----+-------+-------+   PA12/10/2024  O2 REST          62           23      39                   7:40:12 AM                                                         +----+----------+---------+------------+-------------+---+-----+-------+-------+   PW12/10/2024  O2 REST                               27     21     46     7:40:47 AM                                                         +----+----------+---------+------------+-------------+---+-----+-------+-------+   RV12/10/2024  O2 REST          61            3 10                        7:41:49 AM                                                         +----+----------+---------+------------+-------------+---+-----+-------+-------+   RA12/10/2024  O2 REST                                8     10     10     7:43:17 AM                                                         +----+----------+---------+------------+-------------+---+-----+-------+-------+   AO12/10/2024  O2 REST         141           70     110                   7:55:08 AM                                                         +----+----------+---------+------------+-------------+---+-----+-------+-------+   AO12/10/2024 AIR REST         141           70     110                   7:50:34 AM                                                         +----+----------+---------+------------+-------------+---+-----+-------+-------+   PA12/10/2024 AIR REST          62           23      39                   7:59:59 AM                                                          +----+----------+---------+------------+-------------+---+-----+-------+-------+   PW12/10/2024 AIR REST                               27     21     46     8:00:04 AM                                                         +----+----------+---------+------------+-------------+---+-----+-------+-------+   RV12/10/2024 AIR REST          61            3 10                        8:00:09 AM                                                         +----+----------+---------+------------+-------------+---+-----+-------+-------+   RA12/10/2024 AIR REST                                8     10     10     8:00:12 AM                                                         +----+----------+---------+------------+-------------+---+-----+-------+-------+  Oxygen Saturation %: +-----------+----------+------------+ Sample SiteO2 Sat (%)HB (g/100ml) +-----------+----------+------------+          AO        96        12.7 +-----------+----------+------------+          RA        54        12.7 +-----------+----------+------------+          AO        96        12.7 +-----------+----------+------------+          PA        52        12.7 +-----------+----------+------------+          AO        96        12.7 +-----------+----------+------------+          RA        53        12.7 +-----------+----------+------------+          AO        96        12.7 +-----------+----------+------------+          PA        52        12.7 +-----------+----------+------------+  Cardiac Outputs: +---------------+------------------+-------+ CRISTELA CO (l/min)CRISTELA CI (l/min/m2)CRISTELA SV +---------------+------------------+-------+             2.7               1.8   33.3 +---------------+------------------+-------+             2.6               1.8        +---------------+------------------+-------+  Vascular Resistance Calculated Values (Wood Units):  +-----+---+----+-------+----+----+----+----+----+----+-------+ PhasePVRPVRIPVR/SVRSVR SVRITPR TPRITVR TVRITPR/TVR +-----+---+----+-------+----+----+----+----+----+----+-------+ 0    4.76.9 0      [phone]       +-----+---+----+-------+----+----+----+----+----+----+-------+ 1    4.76.9 0      [phone]       +-----+---+----+-------+----+----+----+----+----+----+-------+  Complications: No in-lab complications observed.  Cardiac Cath Post Procedure Notes: Post Procedure           Decompensated systolic and diastolic heart failure. Diagnosis: Blood Loss:              Estimated blood loss during the procedure was <10 ml                          mls. Specimens Removed:       Number of specimen(s) removed: none.  Recommendations: Maximize medical therapy. ____________________________________________________________________________________ CONCLUSIONS:  1. Systolic and diastolic heart failure. 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  CPT Codes: Right Heart Cath O2/Cardiac output without biopsy ([facility])-13738  [id number] Darrel Hampton MD Performing Physician Electronically signed by [id number] Darrel Hampton MD on 12/10/2024 at 5:14:46 PM  ** Final **        Lab Results  Results for orders placed or performed during the hospital encounter of 01/07/25 (from the past 24 hours)   CBC and Auto Differential   Result Value Ref Range    WBC 9.2 4.4 - 11.3 x10*3/uL    nRBC 0.0 0.0 - 0.0 /100 WBCs    RBC 3.75 (L) 4.00 - 5.20 x10*6/uL    Hemoglobin 11.3 (L) 12.0 - 16.0 g/dL    Hematocrit 37.5 36.0 - 46.0 %     80 - 100 fL    MCH 30.1 26.0 - 34.0 pg    MCHC 30.1 (L) 32.0 - 36.0 g/dL    RDW 15.6 (H) 11.5 - 14.5 %    Platelets 293 150 - 450 x10*3/uL    Neutrophils % 89.2 40.0 - 80.0 %    Immature Granulocytes %, Automated 0.7 0.0 - 0.9 %    Lymphocytes % 2.7 13.0 - 44.0 %    Monocytes % 7.1 2.0 - 10.0 %     Eosinophils % 0.2 0.0 - 6.0 %    Basophils % 0.1 0.0 - 2.0 %    Neutrophils Absolute 8.17 (H) 1.60 - 5.50 x10*3/uL    Immature Granulocytes Absolute, Automated 0.06 0.00 - 0.50 x10*3/uL    Lymphocytes Absolute 0.25 (L) 0.80 - 3.00 x10*3/uL    Monocytes Absolute 0.65 0.05 - 0.80 x10*3/uL    Eosinophils Absolute 0.02 0.00 - 0.40 x10*3/uL    Basophils Absolute 0.01 0.00 - 0.10 x10*3/uL   Basic metabolic panel   Result Value Ref Range    Glucose 83 74 - 99 mg/dL    Sodium 137 136 - 145 mmol/L    Potassium 4.2 3.5 - 5.3 mmol/L    Chloride 93 (L) 98 - 107 mmol/L    Bicarbonate 33 (H) 21 - 32 mmol/L    Anion Gap 15 10 - 20 mmol/L    Urea Nitrogen 104 (HH) 6 - 23 mg/dL    Creatinine 2.79 (H) 0.50 - 1.05 mg/dL    eGFR 16 (L) >60 mL/min/1.73m*2    Calcium 9.0 8.6 - 10.3 mg/dL   Hepatic function panel   Result Value Ref Range    Albumin 3.6 3.4 - 5.0 g/dL    Bilirubin, Total 0.7 0.0 - 1.2 mg/dL    Bilirubin, Direct 0.2 0.0 - 0.3 mg/dL    Alkaline Phosphatase 76 33 - 136 U/L    ALT 25 7 - 45 U/L    AST 24 9 - 39 U/L    Total Protein 5.9 (L) 6.4 - 8.2 g/dL   Protime-INR   Result Value Ref Range    Protime 11.3 9.8 - 12.8 seconds    INR 1.0 0.9 - 1.1   B-Type Natriuretic Peptide   Result Value Ref Range    BNP 1,832 (H) 0 - 99 pg/mL   Creatine Kinase   Result Value Ref Range    Creatine Kinase 70 0 - 215 U/L   Electrolyte Panel, Urine   Result Value Ref Range    Sodium, Urine Random 34 mmol/L    Sodium/Creatinine Ratio 80 Not established. mmol/g Creat    Potassium, Urine Random 15 mmol/L    Potassium/Creatinine Ratio 35 Not established mmol/g Creat    Chloride, Urine Random 38 mmol/L    Chloride/Creatinine Ratio 90 38 - 318 mmol/g creat    Creatinine, Urine Random 42.3 20.0 - 320.0 mg/dL   Urinalysis with Reflex Microscopic   Result Value Ref Range    Color, Urine Colorless (N) Light-Yellow, Yellow, Dark-Yellow    Appearance, Urine Clear Clear    Specific Gravity, Urine 1.011 1.005 - 1.035    pH, Urine 5.5 5.0, 5.5, 6.0, 6.5,  7.0, 7.5, 8.0    Protein, Urine NEGATIVE NEGATIVE, 10 (TRACE), 20 (TRACE) mg/dL    Glucose, Urine 300 (3+) (A) Normal mg/dL    Blood, Urine NEGATIVE NEGATIVE    Ketones, Urine NEGATIVE NEGATIVE mg/dL    Bilirubin, Urine NEGATIVE NEGATIVE    Urobilinogen, Urine Normal Normal mg/dL    Nitrite, Urine NEGATIVE NEGATIVE    Leukocyte Esterase, Urine NEGATIVE NEGATIVE   CBC   Result Value Ref Range    WBC 6.1 4.4 - 11.3 x10*3/uL    nRBC 0.0 0.0 - 0.0 /100 WBCs    RBC 3.47 (L) 4.00 - 5.20 x10*6/uL    Hemoglobin 10.4 (L) 12.0 - 16.0 g/dL    Hematocrit 33.7 (L) 36.0 - 46.0 %    MCV 97 80 - 100 fL    MCH 30.0 26.0 - 34.0 pg    MCHC 30.9 (L) 32.0 - 36.0 g/dL    RDW 15.6 (H) 11.5 - 14.5 %    Platelets 270 150 - 450 x10*3/uL   Basic metabolic panel   Result Value Ref Range    Glucose 88 74 - 99 mg/dL    Sodium 137 136 - 145 mmol/L    Potassium 4.3 3.5 - 5.3 mmol/L    Chloride 98 98 - 107 mmol/L    Bicarbonate 34 (H) 21 - 32 mmol/L    Anion Gap 9 (L) 10 - 20 mmol/L    Urea Nitrogen 104 (HH) 6 - 23 mg/dL    Creatinine 2.61 (H) 0.50 - 1.05 mg/dL    eGFR 17 (L) >60 mL/min/1.73m*2    Calcium 8.3 (L) 8.6 - 10.3 mg/dL     *Note: Due to a large number of results and/or encounters for the requested time period, some results have not been displayed. A complete set of results can be found in Results Review.        Medications  Scheduled medications:  ALPRAZolam, 0.5 mg, oral, BID  amiodarone, 200 mg, oral, Daily  aspirin, 81 mg, oral, Daily  atorvastatin, 80 mg, oral, Daily  calcium acetate, 667 mg, oral, BID AC  cefdinir, 300 mg, oral, Daily  dapagliflozin propanediol, 10 mg, oral, Daily  tiotropium, 2 puff, inhalation, Daily   And  fluticasone furoate-vilanteroL, 1 puff, inhalation, Daily  levothyroxine, 75 mcg, oral, Daily  linaCLOtide, 145 mcg, oral, Daily before breakfast  metoprolol succinate XL, 25 mg, oral, Daily  [Held by provider] sacubitriL-valsartan, 0.5 tablet, oral, BID  [Held by provider] torsemide, 60 mg, oral,  Daily      Continuous medications:     PRN medications:  PRN medications: acetaminophen, albuterol, bisacodyl, guaiFENesin, ipratropium-albuteroL, melatonin, ondansetron **OR** ondansetron, oxymetazoline     Physical Exam  Constitutional:       General: She is not in acute distress.     Appearance: Normal appearance.      Comments: Elderly   HENT:      Head: Normocephalic and atraumatic.      Right Ear: External ear normal.      Left Ear: External ear normal.      Nose:      Comments: Significant bruising around bilateral eyes     Mouth/Throat:      Mouth: Mucous membranes are moist.      Pharynx: Oropharynx is clear.   Eyes:      Extraocular Movements: Extraocular movements intact.      Conjunctiva/sclera: Conjunctivae normal.      Pupils: Pupils are equal, round, and reactive to light.   Cardiovascular:      Rate and Rhythm: Normal rate and regular rhythm.      Pulses: Normal pulses.      Heart sounds: Normal heart sounds.   Pulmonary:      Effort: Pulmonary effort is normal. No respiratory distress.      Breath sounds: Normal breath sounds. No wheezing, rhonchi or rales.   Abdominal:      General: Bowel sounds are normal.      Palpations: Abdomen is soft.      Tenderness: There is no abdominal tenderness. There is no right CVA tenderness, left CVA tenderness, guarding or rebound.   Musculoskeletal:         General: No swelling. Normal range of motion.      Cervical back: Normal range of motion and neck supple.      Comments: Arthritic changes of hands   Skin:     General: Skin is warm and dry.      Capillary Refill: Capillary refill takes less than 2 seconds.      Findings: No lesion or rash.      Comments: Age related skin changes   Neurological:      General: No focal deficit present.      Mental Status: She is alert and oriented to person, place, and time. Mental status is at baseline.   Psychiatric:         Mood and Affect: Mood normal.         Behavior: Behavior normal.                  Assessment/Plan       YEVGENIY on CKD  Cr 2.79 on arrival, baseline is around 2.0  Consult nephrology  Given her relative euvolemia today will wait hold off diuresis or IVF  Continue home Phoslo  Will hold home Entresto and torsemide for now  She endorses she mostly drinks caffeinated beverages (tea) and doesn't have a great appetite     Nasal fracture, nosebleed  No active bleeding now, continue to monitor  Hold blood thinners  Can follow up with ENT as outpatient if continued pain     HFpEF  TTE 12/24 - EF 52%, mild-mod TR  Appears euvolemic on exam. BNP is ~1800, but baseline is very high.  Recent cardiology notes reviewed - torsemide dosing based on weight.  Continue home Toprol XL     Hx Afib/SVT  Not on anticoagulation due to hx of GI bleed  Continue home amiodarone, Toprol XL     Hypothyroidism  Last TSH 12/23/24 was <37.5 (?). Free T4 0.51. Pt states her Synthroid was recently changed- now on Synthroid at 75 mcg, confirmed dosing with pharmacy- appears she was previously on 25mcg daily and now rx for 75mcg daily. Feel given her age and minimally low T4 with lack of symptoms she would be better suited for dose increase only yo 37.5mcg daily. Will adjust at this time.      COPD, hx recent pneumonia  CXR shows improvement of infiltrates compared to prior. Continue Omnicef for pneumonia.  Continue home inhalers for COPD. Does not appear to be in acute exacerbation.     HLD  Continue home aspirin, statin     Fall/weakness  PT/OT, may need SNF  Note patient is on xanax twice daily- confirmed with [facility] patient has been filling for 90 days regularly- would proceed with caution given her advanced age and comorbidities. Polypharmacy could be component in her weakness/falls. PCP should consider taper off     Code Status: Full Code          DVT ppx: SCDs (will hold blood thinners due to nosebleed)      Please see orders for more complete plan    Arpita Frazier PA-C

## 2025-01-08 NOTE — CONSULTS
"Reason For Consult    yevgeniy on ckd       History Of Present Illness  Adwoa Forrest is a 89 y.o. female presenting withPMHx s/f HFpEF, NSVT (on amiodarone), persistent atrial fib (not on blood thinners due to GIB, pacemaker, COPD (not on home O2), HTN and CKD (creatinine ~2 at baseline)  presenting with fall and nosebleed.  Currently she slipped off her couch and hit the floor.  She had a severe nosebleed at that point so EMS was called. Nosebleed has now stopped but she is not complaining of a headache. She has been on various antibiotics prescribed by her pulmonologist for what sounds like several weeks. She was originally on amoxicillin, but was then switched to Omnicef a few days ago. She does feel a bit short of breath, but is on her home o2 requirements of 3-4 L. She has a nonproductive cough. No fever, chills, nausea, vomiting chest pain, hematemesis or other symptoms. She does feel weak overall. She has been complaining of \"flashers\" in her eyes since last September that bother her and take minutes to go away to times.  .    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   Patient arrived in acute kidney injury serum creatinine peaked at 2.79 with a GFR of 17 .  Has known albuminuria Patient established with Dr. 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  Patient also received IV Lasix at cardiology office a few days ago.  Of note patient complains of persistent dizziness.  Pressures have been ranging in the lower side systolic 90s to 100s       Past Medical History  She has a past medical history of Anxiety and depression, Bowel perforation (Multi), Chronic atrial fibrillation, unspecified (Multi) (05/09/2023), Chronic heart failure with preserved ejection fraction (02/07/2024), " CKD (chronic kidney disease), COPD (chronic obstructive pulmonary disease) (Multi), Essential (primary) hypertension, GIB (gastrointestinal bleeding), History of non-ST elevation myocardial infarction (NSTEMI) (10/08/2023), Hypothyroidism, CANDELARIA (iron deficiency anemia), NSVT (nonsustained ventricular tachycardia) (Multi), Raynaud disease, Sick sinus syndrome (Multi) (02/27/2018), Sleep apnea, and Stroke (cerebrum) (Multi).    Surgical History  She has a past surgical history that includes Cataract extraction; Appendectomy; Hysterectomy; Tonsillectomy; Cholecystectomy; Other surgical history; Other surgical history; Colectomy partial / total; Cardiac electrophysiology procedure (N/A, 06/17/2024); and Cardiac catheterization (N/A, 12/10/2024).     Social History  She reports that she quit smoking about 35 years ago. Her smoking use included cigarettes. She started smoking about 69 years ago. She has a 8.5 pack-year smoking history. She has never been exposed to tobacco smoke. She has never used smokeless tobacco. She reports that she does not drink alcohol and does not use drugs.    Family History  Family History   Problem Relation Name Age of Onset    Coronary artery disease Mother      Coronary artery disease Father          Allergies  Aldactone [spironolactone], Codeine, Hydrocodone, Sotalol, and Tetracyclines    Review of Systems  .Review of Systems   Constitutional:  Positive for activity change, appetite change and fatigue.   HENT:  Positive for nosebleeds. Negative for sore throat.    Eyes:  Negative for photophobia and visual disturbance.   Respiratory:  Negative for chest tightness, shortness of breath and wheezing.    Cardiovascular:  Positive for leg swelling.   Gastrointestinal:  Negative for abdominal distention and abdominal pain.   Genitourinary:  Negative for difficulty urinating and dysuria.   Musculoskeletal:  Negative for back pain and joint swelling.   Skin:  Negative for color change, pallor and  "rash.   Neurological:  Positive for dizziness and weakness. Negative for tremors.   Hematological:  Bruises/bleeds easily.   Psychiatric/Behavioral:  Negative for behavioral problems and confusion.          Physical Exam  .Physical Exam  Constitutional:       Appearance: Normal appearance.   HENT:      Mouth/Throat:      Mouth: Mucous membranes are dry.      Pharynx: Oropharynx is clear.   Eyes:      Extraocular Movements: Extraocular movements intact.      Pupils: Pupils are equal, round, and reactive to light.   Cardiovascular:      Rate and Rhythm: Normal rate. Rhythm irregular.      Pulses: Normal pulses.      Heart sounds: Normal heart sounds.   Pulmonary:      Effort: Pulmonary effort is normal.      Breath sounds: Normal breath sounds.   Abdominal:      General: Abdomen is flat. Bowel sounds are normal.      Palpations: Abdomen is soft.   Musculoskeletal:      Right lower leg: Edema present.      Left lower leg: Edema present.      Comments: Pedal edema +1   Skin:     General: Skin is warm and dry.      Comments: Bruise face   Neurological:      Mental Status: She is alert and oriented to person, place, and time. Mental status is at baseline.   Psychiatric:         Mood and Affect: Mood normal.         Behavior: Behavior normal.                I&O 24HR  No intake or output data in the 24 hours ending 01/08/25 1012    Vitals 24HR  Heart Rate:  [62-72]   Temperature:  [36.3 °C (97.4 °F)]   Respirations:  [12-18]   BP: ()/()   Height:  [157.5 cm (5' 2\")]   Weight:  [46.3 kg (102 lb)]   Pulse Ox:  [93 %-99 %]     .  Vitals:    01/08/25 0813   BP: 104/60   Pulse:    Resp:    Temp:    SpO2:         Relevant Results  .  Results from last 7 days   Lab Units 01/08/25  0404 01/07/25  1339 01/02/25  1428   SODIUM mmol/L 137 137 141   POTASSIUM mmol/L 4.3 4.2 4.1   CHLORIDE mmol/L 98 93* 98   CO2 mmol/L 34* 33* 33*   BUN mg/dL 104* 104* 80*   CREATININE mg/dL 2.61* 2.79* 2.02*   EGFR mL/min/1.73m*2 17* 16* " 23*   GLUCOSE mg/dL 88 83 117*   CALCIUM mg/dL 8.3* 9.0 9.2    .  Results from last 7 days   Lab Units 01/08/25  0404 01/07/25  1339   WBC AUTO x10*3/uL 6.1 9.2   HEMOGLOBIN g/dL 10.4* 11.3*   HEMATOCRIT % 33.7* 37.5   PLATELETS AUTO x10*3/uL 270 293         Assessment/Plan       YEVGENIY/ATN 2/2 volume depletion, hypoperfusion injury,  on diuretics hypotension N17.0  Hypotension I95  Azotemia R79.89  CKD4  HFpEF I50.30  A-fib  Albuminuria serum creatinine    Recommendations   Serum creatinine slowly improving, baseline 1.6-1.9  FEurea elevated at 37.8%  Continue to hold Farxiga, Entresto and Demadex  Monitor for signs and symptoms of volume overload, okay with as needed diuresis for sx/s of volume overload.  Appears euvolemic at this time  Keep systolic blood pressure greater than 100 to help with renal perfusion  BUN elevated, monitor for signs of uremia will follow trend  UA bland shows glucose  Okay for blood transfusions for hemoglobin less than 7, will check iron studies urine studies ordered    Assessment & Plan  Generalized weakness          .Thank you for the consult and the opportunity to participate inn the care of this patient. Please do not hesitate to contact us with any questions or concern  INDIGO Guerrier, CNP  [facility]  [phone]   INDIGO Maya-CNP
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
## 2025-01-08 NOTE — H&P
"[facility] - GENERAL MEDICINE HISTORY AND PHYSICAL    History Obtained From (Primary Source): Patient  Collateral History (Secondary Sources): D/w ED physician    History Of Present Illness (HPI):  Adwoa Forrest is a 89 y.o. 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  Currently she slipped off her couch and hit the floor.  She had a severe nosebleed at that point so EMS was called. Nosebleed has now stopped but she is not complaining of a headache. She has been on various antibiotics prescribed by her pulmonologist for what sounds like several weeks. She was originally on amoxicillin, but was then switched to Omnicef a few days ago. She does feel a bit short of breath, but is on her home o2 requirements of 3-4 L. She has a nonproductive cough. No fever, chills, nausea, vomiting chest pain, hematemesis or other symptoms. She does feel weak overall. She has been complaining of \"flashers\" in her eyes since last September that bother her and take minutes to go away to times.    ED Course (Summary - please note all labs, imaging studies, and interventions noted below have been personally reviewed and/or interpreted on day of admission):   Vitals on presentation: 97.4 F, 65 bpm, 18 rr, 129/66, 93% on NC  Labs: CMP Cr 2.79  BNP 1832  INR 1.0  CBC WBC 9.2, Hg 11.3, Plt 293  EKG: N/A  Imaging: CXR - 1. Slight improvement of the bilateral multifocal airspace and   interstitial opacities. Otherwise no interval change from 12/30/2024   chest x-ray   CT cervical/head - No acute intracranial abnormality.   Chronic changes as noted above.   Acute mildly displaced fracture of the nasal bones and vomer. There   is overlying soft tissue swelling extending to the right maxilla.   Air-fluid levels in the maxillary and sphenoid sinuses with "   opacification of multiple ethmoid air cells. They demonstrate high   attenuation consistent with hemosinus.   No acute fracture or traumatic subluxation of the cervical spine.   Advanced multilevel discogenic degenerative changes as noted above.   Interventions: Tylenol 975 mg, Afrin nasal spray, nosebleed resolved. Pt admitted for further care.    12-point ROS reviewed and found to be negative aside from aforementioned positives in HPI and/or noted in dedicated ROS section below.     ED Course (From ED Provider):  ED Course as of 01/07/25 2206   Tue Jan 07, 2025 2022 Patient CBC with shows no leukocytosis stable hemoglobin, chemistries that show worsening kidney disease, LFTs unremarkable BNP elevated about 1800, chest ray shows improvement from prior x-ray CT head shows no traumatic injury CT facial bones shows nasal bone fractures CT C-spine shows advanced DJD no fractures, patient will be hospitalized for further care. [MT]   2056 BNP(!): 1,832 [MT]      ED Course User Index  [MT] Bala Junior MD         Diagnoses as of 01/07/25 2206   Generalized weakness   Fall, initial encounter   Acute on chronic renal insufficiency   Closed fracture of nasal bone, initial encounter   Epistaxis     Relevant Results  Results for orders placed or performed during the hospital encounter of 01/07/25 (from the past 24 hours)   CBC and Auto Differential   Result Value Ref Range    WBC 9.2 4.4 - 11.3 x10*3/uL    nRBC 0.0 0.0 - 0.0 /100 WBCs    RBC 3.75 (L) 4.00 - 5.20 x10*6/uL    Hemoglobin 11.3 (L) 12.0 - 16.0 g/dL    Hematocrit 37.5 36.0 - 46.0 %     80 - 100 fL    MCH 30.1 26.0 - 34.0 pg    MCHC 30.1 (L) 32.0 - 36.0 g/dL    RDW 15.6 (H) 11.5 - 14.5 %    Platelets 293 150 - 450 x10*3/uL    Neutrophils % 89.2 40.0 - 80.0 %    Immature Granulocytes %, Automated 0.7 0.0 - 0.9 %    Lymphocytes % 2.7 13.0 - 44.0 %    Monocytes % 7.1 2.0 - 10.0 %    Eosinophils % 0.2 0.0 - 6.0 %    Basophils % 0.1 0.0 - 2.0 %     Neutrophils Absolute 8.17 (H) 1.60 - 5.50 x10*3/uL    Immature Granulocytes Absolute, Automated 0.06 0.00 - 0.50 x10*3/uL    Lymphocytes Absolute 0.25 (L) 0.80 - 3.00 x10*3/uL    Monocytes Absolute 0.65 0.05 - 0.80 x10*3/uL    Eosinophils Absolute 0.02 0.00 - 0.40 x10*3/uL    Basophils Absolute 0.01 0.00 - 0.10 x10*3/uL   Basic metabolic panel   Result Value Ref Range    Glucose 83 74 - 99 mg/dL    Sodium 137 136 - 145 mmol/L    Potassium 4.2 3.5 - 5.3 mmol/L    Chloride 93 (L) 98 - 107 mmol/L    Bicarbonate 33 (H) 21 - 32 mmol/L    Anion Gap 15 10 - 20 mmol/L    Urea Nitrogen 104 (HH) 6 - 23 mg/dL    Creatinine 2.79 (H) 0.50 - 1.05 mg/dL    eGFR 16 (L) >60 mL/min/1.73m*2    Calcium 9.0 8.6 - 10.3 mg/dL   Hepatic function panel   Result Value Ref Range    Albumin 3.6 3.4 - 5.0 g/dL    Bilirubin, Total 0.7 0.0 - 1.2 mg/dL    Bilirubin, Direct 0.2 0.0 - 0.3 mg/dL    Alkaline Phosphatase 76 33 - 136 U/L    ALT 25 7 - 45 U/L    AST 24 9 - 39 U/L    Total Protein 5.9 (L) 6.4 - 8.2 g/dL   Protime-INR   Result Value Ref Range    Protime 11.3 9.8 - 12.8 seconds    INR 1.0 0.9 - 1.1   B-Type Natriuretic Peptide   Result Value Ref Range    BNP 1,832 (H) 0 - 99 pg/mL     *Note: Due to a large number of results and/or encounters for the requested time period, some results have not been displayed. A complete set of results can be found in Results Review.      CT head wo IV contrast    Result Date: 1/7/2025  Interpreted By:  Sathya Drake, STUDY: CT HEAD WO IV CONTRAST; CT CERVICAL SPINE WO IV CONTRAST; CT FACIAL BONES WO IV CONTRAST; CT 3D RECONSTRUCTION;  1/7/2025 4:26 pm; 1/7/2025 4:27 pm   INDICATION: Signs/Symptoms:fall; Signs/Symptoms:fall nose contuson nose bleed; Signs/Symptoms:trauma.   COMPARISON: CT scan of the head 9/28/2024 CT scan of the cervical spine 11/14/.   ACCESSION NUMBER(S): [id number]; [id number]; [id number]; [id number]   ORDERING CLINICIAN: STEWART FERREIRA   TECHNIQUE: Axial noncontrast images of  the head. Axial noncontrast images of the facial bones with coronal and sagittal reconstructed images. Axial noncontrast images of the cervical spine with coronal and sagittal reconstructed images. 3D reformats of the facial bones were performed on independent workstation.   FINDINGS: BRAIN PARENCHYMA: Gray-white matter interfaces are preserved. No mass effect or midline shift. Mild deep and periventricular white matter hypodensities are nonspecific, but favored to represent chronic small vessel ischemic changes. Calcifications bilateral basal ganglia are likely physiologic.   HEMORRHAGE: No acute intracranial hemorrhage. VENTRICLES and EXTRA-AXIAL SPACES: Mild volume loss with prominence of the ventricles and sulci EXTRACRANIAL SOFT TISSUES: Within normal limits. CALVARIUM: No depressed calvarial fracture. No destructive osseous lesion.   FACIAL BONES: Acute mildly displaced fracture of the nasal bones and vomer. Remainder of the osseous structures are intact. SOFT TISSUES: Soft tissue swelling overlying the nose and right maxilla. PARANASAL SINUSES: r air-fluid levels in the maxillary sinuses, left greater than right with areas of high attenuation consistent with hemosinus. Trace layering fluid is also noted in the bilateral sphenoid sinuses with opacification of multiple ethmoid air cells suspicious for hemosinus. MASTOIDS: Within normal limits. ORBITS: The globes, extraocular muscles and optic nerve sheath complexes are symmetric. No retrobulbar hematoma.   ALIGNMENT: There is reversal of the normal lordotic curve with grade 1 anterolisthesis of C3 on 4 and C4 on 5 there is dextroconvex curvature of the mid and lower cervical spine.. VERTEBRAE: No acute fracture. Loss of disc height and anterior osteophyte formation at multiple levels most severe at C5-6 and C6-7. Facet and uncovertebral hypertrophic changes noted at all levels. SPINAL CANAL: No critical spinal canal stenosis. Mild-to-moderate canal narrowing at  C3-4. PREVERTEBRAL SOFT TISSUES: No prevertebral soft tissue swelling. LUNG APICES: Biapical pleuroparenchymal scarring.   OTHER FINDINGS: Atherosclerotic calcification of the carotid bifurcations and siphons.       No acute intracranial abnormality.   Chronic changes as noted above.   Acute mildly displaced fracture of the nasal bones and vomer. There is overlying soft tissue swelling extending to the right maxilla.   Air-fluid levels in the maxillary and sphenoid sinuses with opacification of multiple ethmoid air cells. They demonstrate high attenuation consistent with hemosinus.   No acute fracture or traumatic subluxation of the cervical spine.   Advanced multilevel discogenic degenerative changes as noted above.   MACRO: None   Signed by: Sathya Drake 1/7/2025 5:11 PM Dictation workstation:   [id number]    CT maxillofacial bones wo IV contrast    Result Date: 1/7/2025  Interpreted By:  Sathya Drake, STUDY: CT HEAD WO IV CONTRAST; CT CERVICAL SPINE WO IV CONTRAST; CT FACIAL BONES WO IV CONTRAST; CT 3D RECONSTRUCTION;  1/7/2025 4:26 pm; 1/7/2025 4:27 pm   INDICATION: Signs/Symptoms:fall; Signs/Symptoms:fall nose contuson nose bleed; Signs/Symptoms:trauma.   COMPARISON: CT scan of the head 9/28/2024 CT scan of the cervical spine 11/14/.   ACCESSION NUMBER(S): [id number]; [id number]; [id number]; [id number]   ORDERING CLINICIAN: STEWART FERREIRA   TECHNIQUE: Axial noncontrast images of the head. Axial noncontrast images of the facial bones with coronal and sagittal reconstructed images. Axial noncontrast images of the cervical spine with coronal and sagittal reconstructed images. 3D reformats of the facial bones were performed on independent workstation.   FINDINGS: BRAIN PARENCHYMA: Gray-white matter interfaces are preserved. No mass effect or midline shift. Mild deep and periventricular white matter hypodensities are nonspecific, but favored to represent chronic small vessel ischemic changes.  Calcifications bilateral basal ganglia are likely physiologic.   HEMORRHAGE: No acute intracranial hemorrhage. VENTRICLES and EXTRA-AXIAL SPACES: Mild volume loss with prominence of the ventricles and sulci EXTRACRANIAL SOFT TISSUES: Within normal limits. CALVARIUM: No depressed calvarial fracture. No destructive osseous lesion.   FACIAL BONES: Acute mildly displaced fracture of the nasal bones and vomer. Remainder of the osseous structures are intact. SOFT TISSUES: Soft tissue swelling overlying the nose and right maxilla. PARANASAL SINUSES: r air-fluid levels in the maxillary sinuses, left greater than right with areas of high attenuation consistent with hemosinus. Trace layering fluid is also noted in the bilateral sphenoid sinuses with opacification of multiple ethmoid air cells suspicious for hemosinus. MASTOIDS: Within normal limits. ORBITS: The globes, extraocular muscles and optic nerve sheath complexes are symmetric. No retrobulbar hematoma.   ALIGNMENT: There is reversal of the normal lordotic curve with grade 1 anterolisthesis of C3 on 4 and C4 on 5 there is dextroconvex curvature of the mid and lower cervical spine.. VERTEBRAE: No acute fracture. Loss of disc height and anterior osteophyte formation at multiple levels most severe at C5-6 and C6-7. Facet and uncovertebral hypertrophic changes noted at all levels. SPINAL CANAL: No critical spinal canal stenosis. Mild-to-moderate canal narrowing at C3-4. PREVERTEBRAL SOFT TISSUES: No prevertebral soft tissue swelling. LUNG APICES: Biapical pleuroparenchymal scarring.   OTHER FINDINGS: Atherosclerotic calcification of the carotid bifurcations and siphons.       No acute intracranial abnormality.   Chronic changes as noted above.   Acute mildly displaced fracture of the nasal bones and vomer. There is overlying soft tissue swelling extending to the right maxilla.   Air-fluid levels in the maxillary and sphenoid sinuses with opacification of multiple ethmoid  air cells. They demonstrate high attenuation consistent with hemosinus.   No acute fracture or traumatic subluxation of the cervical spine.   Advanced multilevel discogenic degenerative changes as noted above.   MACRO: None   Signed by: Sathya Drake 1/7/2025 5:11 PM Dictation workstation:   [id number]    CT cervical spine wo IV contrast    Result Date: 1/7/2025  Interpreted By:  Sathya Drake, STUDY: CT HEAD WO IV CONTRAST; CT CERVICAL SPINE WO IV CONTRAST; CT FACIAL BONES WO IV CONTRAST; CT 3D RECONSTRUCTION;  1/7/2025 4:26 pm; 1/7/2025 4:27 pm   INDICATION: Signs/Symptoms:fall; Signs/Symptoms:fall nose contuson nose bleed; Signs/Symptoms:trauma.   COMPARISON: CT scan of the head 9/28/2024 CT scan of the cervical spine 11/14/.   ACCESSION NUMBER(S): [id number]; [id number]; [id number]; [id number]   ORDERING CLINICIAN: STEWART FERREIRA   TECHNIQUE: Axial noncontrast images of the head. Axial noncontrast images of the facial bones with coronal and sagittal reconstructed images. Axial noncontrast images of the cervical spine with coronal and sagittal reconstructed images. 3D reformats of the facial bones were performed on independent workstation.   FINDINGS: BRAIN PARENCHYMA: Gray-white matter interfaces are preserved. No mass effect or midline shift. Mild deep and periventricular white matter hypodensities are nonspecific, but favored to represent chronic small vessel ischemic changes. Calcifications bilateral basal ganglia are likely physiologic.   HEMORRHAGE: No acute intracranial hemorrhage. VENTRICLES and EXTRA-AXIAL SPACES: Mild volume loss with prominence of the ventricles and sulci EXTRACRANIAL SOFT TISSUES: Within normal limits. CALVARIUM: No depressed calvarial fracture. No destructive osseous lesion.   FACIAL BONES: Acute mildly displaced fracture of the nasal bones and vomer. Remainder of the osseous structures are intact. SOFT TISSUES: Soft tissue swelling overlying the nose and right maxilla.  PARANASAL SINUSES: r air-fluid levels in the maxillary sinuses, left greater than right with areas of high attenuation consistent with hemosinus. Trace layering fluid is also noted in the bilateral sphenoid sinuses with opacification of multiple ethmoid air cells suspicious for hemosinus. MASTOIDS: Within normal limits. ORBITS: The globes, extraocular muscles and optic nerve sheath complexes are symmetric. No retrobulbar hematoma.   ALIGNMENT: There is reversal of the normal lordotic curve with grade 1 anterolisthesis of C3 on 4 and C4 on 5 there is dextroconvex curvature of the mid and lower cervical spine.. VERTEBRAE: No acute fracture. Loss of disc height and anterior osteophyte formation at multiple levels most severe at C5-6 and C6-7. Facet and uncovertebral hypertrophic changes noted at all levels. SPINAL CANAL: No critical spinal canal stenosis. Mild-to-moderate canal narrowing at C3-4. PREVERTEBRAL SOFT TISSUES: No prevertebral soft tissue swelling. LUNG APICES: Biapical pleuroparenchymal scarring.   OTHER FINDINGS: Atherosclerotic calcification of the carotid bifurcations and siphons.       No acute intracranial abnormality.   Chronic changes as noted above.   Acute mildly displaced fracture of the nasal bones and vomer. There is overlying soft tissue swelling extending to the right maxilla.   Air-fluid levels in the maxillary and sphenoid sinuses with opacification of multiple ethmoid air cells. They demonstrate high attenuation consistent with hemosinus.   No acute fracture or traumatic subluxation of the cervical spine.   Advanced multilevel discogenic degenerative changes as noted above.   MACRO: None   Signed by: Sathya Drake 1/7/2025 5:11 PM Dictation workstation:   [id number]    CT 3D reconstruction    Result Date: 1/7/2025  Interpreted By:  Sathya Drake, STUDY: CT HEAD WO IV CONTRAST; CT CERVICAL SPINE WO IV CONTRAST; CT FACIAL BONES WO IV CONTRAST; CT 3D RECONSTRUCTION;  1/7/2025 4:26 pm;  1/7/2025 4:27 pm   INDICATION: Signs/Symptoms:fall; Signs/Symptoms:fall nose contuson nose bleed; Signs/Symptoms:trauma.   COMPARISON: CT scan of the head 9/28/2024 CT scan of the cervical spine 11/14/.   ACCESSION NUMBER(S): [id number]; [id number]; [id number]; [id number]   ORDERING CLINICIAN: STEWART FERREIRA   TECHNIQUE: Axial noncontrast images of the head. Axial noncontrast images of the facial bones with coronal and sagittal reconstructed images. Axial noncontrast images of the cervical spine with coronal and sagittal reconstructed images. 3D reformats of the facial bones were performed on independent workstation.   FINDINGS: BRAIN PARENCHYMA: Gray-white matter interfaces are preserved. No mass effect or midline shift. Mild deep and periventricular white matter hypodensities are nonspecific, but favored to represent chronic small vessel ischemic changes. Calcifications bilateral basal ganglia are likely physiologic.   HEMORRHAGE: No acute intracranial hemorrhage. VENTRICLES and EXTRA-AXIAL SPACES: Mild volume loss with prominence of the ventricles and sulci EXTRACRANIAL SOFT TISSUES: Within normal limits. CALVARIUM: No depressed calvarial fracture. No destructive osseous lesion.   FACIAL BONES: Acute mildly displaced fracture of the nasal bones and vomer. Remainder of the osseous structures are intact. SOFT TISSUES: Soft tissue swelling overlying the nose and right maxilla. PARANASAL SINUSES: r air-fluid levels in the maxillary sinuses, left greater than right with areas of high attenuation consistent with hemosinus. Trace layering fluid is also noted in the bilateral sphenoid sinuses with opacification of multiple ethmoid air cells suspicious for hemosinus. MASTOIDS: Within normal limits. ORBITS: The globes, extraocular muscles and optic nerve sheath complexes are symmetric. No retrobulbar hematoma.   ALIGNMENT: There is reversal of the normal lordotic curve with grade 1 anterolisthesis of C3 on 4 and C4  on 5 there is dextroconvex curvature of the mid and lower cervical spine.. VERTEBRAE: No acute fracture. Loss of disc height and anterior osteophyte formation at multiple levels most severe at C5-6 and C6-7. Facet and uncovertebral hypertrophic changes noted at all levels. SPINAL CANAL: No critical spinal canal stenosis. Mild-to-moderate canal narrowing at C3-4. PREVERTEBRAL SOFT TISSUES: No prevertebral soft tissue swelling. LUNG APICES: Biapical pleuroparenchymal scarring.   OTHER FINDINGS: Atherosclerotic calcification of the carotid bifurcations and siphons.       No acute intracranial abnormality.   Chronic changes as noted above.   Acute mildly displaced fracture of the nasal bones and vomer. There is overlying soft tissue swelling extending to the right maxilla.   Air-fluid levels in the maxillary and sphenoid sinuses with opacification of multiple ethmoid air cells. They demonstrate high attenuation consistent with hemosinus.   No acute fracture or traumatic subluxation of the cervical spine.   Advanced multilevel discogenic degenerative changes as noted above.   MACRO: None   Signed by: Sathya Drake 1/7/2025 5:11 PM Dictation workstation:   [id number]    XR chest 1 view    Result Date: 1/7/2025  Interpreted By:  Graeme Ryan, STUDY: XR CHEST 1 VIEW; 1/7/2025 1:47 pm   INDICATION: Signs/Symptoms:sob.   COMPARISON: Chest x-ray 12/30/2024   ACCESSION NUMBER(S): [id number]   ORDERING CLINICIAN: STEWART FERREIRA   TECHNIQUE: 1 view of the chest was performed.   FINDINGS: Slightly improved bilateral multifocal airspace and interstitial opacities. Grossly stable perihilar vascular congestion. Stable layering bilateral pleural effusion. No pneumothorax.  The cardiomediastinal silhouette is enlarged. Dual lead left chest wall pacemaker noted. Aortic knob calcifications noted.       1. Slight improvement of the bilateral multifocal airspace and interstitial opacities. Otherwise no interval change from 12/30/2024  chest x-ray   Signed by: Graeme Ryan 1/7/2025 1:59 PM Dictation workstation:   [id number]    Scheduled medications:  ALPRAZolam, 0.5 mg, oral, BID  [START ON 1/8/2025] amiodarone, 200 mg, oral, Daily  [START ON 1/8/2025] aspirin, 81 mg, oral, Daily  [START ON 1/8/2025] atorvastatin, 80 mg, oral, Daily  [START ON 1/8/2025] calcium acetate, 667 mg, oral, BID AC  [START ON 1/8/2025] cefdinir, 300 mg, oral, Daily  [START ON 1/8/2025] dapagliflozin propanediol, 10 mg, oral, Daily  [START ON 1/8/2025] tiotropium, 2 puff, inhalation, Daily   And  [START ON 1/8/2025] fluticasone furoate-vilanteroL, 1 puff, inhalation, Daily  [START ON 1/8/2025] levothyroxine, 75 mcg, oral, Daily  [START ON 1/8/2025] linaCLOtide, 145 mcg, oral, Daily before breakfast  [START ON 1/8/2025] metoprolol succinate XL, 25 mg, oral, Daily  [Held by provider] sacubitriL-valsartan, 0.5 tablet, oral, BID  [Held by provider] torsemide, 60 mg, oral, Daily      Continuous medications:     PRN medications:  PRN medications: albuterol, ipratropium-albuteroL, oxymetazoline     Past Medical History  She has a past medical history of Anxiety and depression, Bowel perforation (Multi), Chronic atrial fibrillation, unspecified (Multi) (05/09/2023), Chronic heart failure with preserved ejection fraction (02/07/2024), CKD (chronic kidney disease), COPD (chronic obstructive pulmonary disease) (Multi), Essential (primary) hypertension, GIB (gastrointestinal bleeding), History of non-ST elevation myocardial infarction (NSTEMI) (10/08/2023), Hypothyroidism, CANDELARIA (iron deficiency anemia), NSVT (nonsustained ventricular tachycardia) (Multi), Raynaud disease, Sick sinus syndrome (Multi) (02/27/2018), Sleep apnea, and Stroke (cerebrum) (Multi).    Surgical History  She has a past surgical history that includes Cataract extraction; Appendectomy; Hysterectomy; Tonsillectomy; Cholecystectomy; Other surgical history; Other surgical history; Colectomy partial / total; Cardiac  electrophysiology procedure (N/A, 06/17/2024); and Cardiac catheterization (N/A, 12/10/2024).     Social History  She reports that she quit smoking about 35 years ago. Her smoking use included cigarettes. She started smoking about 69 years ago. She has a 8.5 pack-year smoking history. She has never been exposed to tobacco smoke. She has never used smokeless tobacco. She reports that she does not drink alcohol and does not use drugs.    Family History  Family History   Problem Relation Name Age of Onset    Coronary artery disease Mother      Coronary artery disease Father         Allergies  Aldactone [spironolactone], Codeine, Hydrocodone, Sotalol, and Tetracyclines    Code Status  Full Code     Review of Systems   Constitutional:  Negative for activity change, appetite change, chills, diaphoresis, fatigue and fever.   HENT:  Positive for nosebleeds. Negative for congestion, ear pain, rhinorrhea, sinus pain and sore throat.    Respiratory:  Positive for shortness of breath. Negative for apnea, cough, chest tightness, wheezing and stridor.    Cardiovascular:  Negative for chest pain, palpitations and leg swelling.   Gastrointestinal:  Negative for abdominal distention, abdominal pain, constipation, diarrhea, nausea and vomiting.   Genitourinary:  Negative for difficulty urinating, dysuria, flank pain, frequency, hematuria and urgency.   Musculoskeletal:  Negative for arthralgias, back pain, gait problem, joint swelling and myalgias.   Skin:  Negative for color change, pallor, rash and wound.   Neurological:  Positive for weakness. Negative for dizziness, syncope, light-headedness, numbness and headaches.   Psychiatric/Behavioral:  Negative for agitation, behavioral problems, confusion and decreased concentration. The patient is not nervous/anxious.    All other systems reviewed and are negative.      Last Recorded Vitals  /58 (Patient Position: Sitting)   Pulse 65   Temp 36.3 °C (97.4 °F) (Temporal)   Resp 18    Wt 46.3 kg (102 lb)   SpO2 94%      Physical Exam:  Vital signs and nursing notes reviewed.   Constitutional: Pleasant and cooperative. Laying in bed in no acute distress. Conversant.   Skin: Warm and dry; no obvious lesions, rashes, pallor, or jaundice.   Eyes: EOMI. Anicteric sclera.   ENT: Mucous membranes moist; no obvious injury or deformity appreciated.   Head and Neck: Normocephalic, ROM preserved. Trachea midline. No appreciable JVD. Dried blood around nares  Respiratory: Nonlabored on NC. Lungs clear to auscultation bilaterally without obvious adventitious sounds. Chest rise is equal.  Cardiovascular: RRR. No gross murmur, gallop, or rub. Extremities are warm and well-perfused with good capillary refill (< 3 seconds). No chest wall tenderness.   GI: Abdomen soft, nontender, nondistended. No obvious organomegaly appreciated. Bowel sounds are present.  : No CVA tenderness.   MSK: No gross abnormalities appreciated. No limitations to AROM/PROM appreciated.   Extremities: No cyanosis, edema, or clubbing evident. Neurovascularly intact.   Neuro: A&Ox3. CN 2-12 grossly intact. Able to respond to questions appropriately and clearly. No acute focal neurologic deficits appreciated.  Psych: Appropriate mood and behavior.    Assessment/Plan     89 y.o. 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    Plan:  Admit to [facility] with tele.    YEVGENIY on CKD:  Cr 2.79 today, baseline is around 2.  Consult nephrology  Renal dysfunctional panel ordered  Given her relative euvolemia today will wait on results to decide on plan.  Continue home Phoslo  Will hold home Entresto and torsemide for now    CHF   TTE 12/24 - EF 52%, mild-mod TR  Appears euvolemic on exam. BNP is ~1800, but baseline is very high.  Recent cardiology notes reviewed - torsemide dosing  based on weight.  Continue home Toprol XL    Hx Afib/Svt:  Not on anticoagulation due to hx of GI bleed  Continue home amiodarone, Toprol XL  Pulse currently 65 bpm, monitor.    Hypothyroidism:  Continue Synthroid at 75 mcg, confirm dosing.  Last TSH 12/23/24 was <37.5 (?). Free T4 0.51. Pt states her Synthroid was recently changed.    COPD, hx recent pneumonia:  CXR shows improvement of infiltrates compared to prior. Continue Omnicef for pneumonia.  Continue home inhalers for COPD. Does not appear to be in acute exacerbation.    Nasal fracture, nosebleed:  No active bleeding now, continue to monitor.  Hold blood thinners    HLD:  Continue home aspirin, statin    Fall/weakness - PT/OT, may need SNF    Diet: Cardiac  DVT Prophylaxis: SCDs (will hold blood thinners due to nosebleed)  Code Status: Full code  Case Discussed With: ED provider  Additional Sources Reviewed: Past PCP, specialist notes    Anticipated Length of Stay (LOS): Patient will require 2+ midnights for YEVGENIY on CKD treatment.     DO Sneha Gottlieb dictation software was used to dictate this note and thus there may be minor errors in translation/transcription including garbled speech or misspellings. Please contact for clarification if needed.   Tranexamic Acid Counseling:  Patient advised of the small risk of bleeding problems with tranexamic acid. They were also instructed to call if they developed any nausea, vomiting or diarrhea. All of the patient's questions and concerns were addressed.

## 2025-01-08 NOTE — ED PROVIDER NOTES
HPI   Chief Complaint   Patient presents with   • Epistaxis (Nose Bleed)   • Weakness, Gen       HPI: []  89-year-old white female history of hypertension, CHF, COPD on home oxygen, CKD A-fib pacemaker placement no blood thinners due to GI bleed comes in with a mechanical fall.  She states she lost her balance and fell face forward hitting her nose on the floor sustained nosebleed.  No headache no head trauma no neck pain.  She does feel short of breath but she states she was on an antibiotic for pneumonia and she is feels better.  She has a cough nonproductive.  She denies any fever chills nausea vomit diarrhea incontinence seizures syncope or Nisky no hematemesis melena hematochezia, no hemoptysis.  Patient does complain of generalized fatigue and weakness.    Past history: Hypertension, CHF, A-fib, COPD, home oxygen dependent, pacemaker placement, GI bleed  Social: Patient is a tobacco use denies alcohol drug abuse.      REVIEW OF SYSTEMS:    GENERAL.: No weight loss, fatigue, anorexia, insomnia, fever.    EYES: No vision loss, double vision, drainage, eye pain.    ENT: No pharyngitis, dry mouth.  Positive for nosebleed    CARDIOPULMONARY: No chest pain, palpitations, syncope, near syncope. No shortness of breath, cough, hemoptysis.    GI: No abdominal pain, change in bowel habits, melena, hematemesis, hematochezia, nausea, vomiting, diarrhea.    : No discharge, dysuria, frequency, urgency, hematuria.    MS: No limb pain, joint pain, joint swelling.    SKIN: No rashes.    PSYCH: No depression, anxiety, suicidality, homicidality.    Review of systems is otherwise negative unless stated above or in history of present illness.  Social history, family history, allergies reviewed.  PHYSICAL EXAM:    GENERAL: Vitals noted, no distress. Alert and oriented  x 3. Non-toxic.      EENT: TMs clear. Posterior oropharynx unremarkable. No meningismus. No LAD.  Patient has a bilateral dried blood in the nostrils with no active  hemorrhage with no septal hematoma.  He also has swelling ecchymosis bruising over her which of the nose with the bilateral periorbital ecchymosis.  She has no tenderness over the orbits.    Eyes: Pupils equal round and reactive to light accommodation EOMs are intact.    NECK: Supple. Nontender. No midline tenderness.     CARDIAC: Regular, rate, rhythm. No murmurs rubs or gallops. No JVD    PULMONARY: Coarse bibasilar crackles, no wheezes rales or rhonchi. No respiratory distress.     ABDOMEN: Soft, nonsurgical. Nontender. No peritoneal signs. Normoactive bowel sounds. No pulsatile masses.     EXTREMITIES: Trace bilateral symmetric pitting peripheral edema. Negative Homans bilaterally, no cords.  2+ bounding pulses well-perfused.    SKIN: No rash. Intact.     NEURO: No focal neurologic deficits, NIH score of 0. Cranial nerves normal as tested from II through XII.     MEDICAL DECISION MAKING:  EKG is pending.  CBC with differential shows no leukocytosis stable hemoglobin chemistry shows worsening kidney disease, BNP elevated worse than her baseline, LFTs unremarkable.  CT head shows no traumatic injury CT of the facial bones shows nasal bone fracture chest ray shows CHF bibasilar infiltrates but improved in prior x-ray CT C-spine shows advanced DJD no fractures.    Treatment in the ED: IV established placed on a cardiac monitor she was given gentle hydration and Tylenol for the headache.  He also received Afrin nasal spray    ED course: Patient remained stable hemodynamic.  Impression: #1 generalized weakness, #2 mechanical fall, #3 epistaxis, #4 acute on chronic renal sufficiency, #5 mild volume overload, #6 nasal bone fracture    Plan/MDM: 89-year female history of heart failure no blood thinners A-fib status post pacemaker placement COPD on home oxygen comes in with a mechanical fall generalized weakness fatigue, fatigue most likely due to worsening kidney disease clearly she is has acute on chronic renal  insufficiency that could be contributory for fatigue and falls she does have epistaxis due to her nasal fracture which is well-controlled low concern for septal hematoma, low concern for STEMI NSTEMI dissection pulm embolism or sepsis, patient will be hospitalized for further care.              Patient History   Past Medical History:   Diagnosis Date   • Anxiety and depression    • Bowel perforation (Multi)     Perforation of sigmoid colon due to diverticulitis   • Chronic atrial fibrillation, unspecified (Multi) 05/09/2023   • Chronic heart failure with preserved ejection fraction 02/07/2024   • CKD (chronic kidney disease)    • COPD (chronic obstructive pulmonary disease) (Multi)    • Essential (primary) hypertension    • GIB (gastrointestinal bleeding)     recurrent   • History of non-ST elevation myocardial infarction (NSTEMI) 10/08/2023   • Hypothyroidism    • CANDELARIA (iron deficiency anemia)    • NSVT (nonsustained ventricular tachycardia) (Multi)     on amiodarone   • Raynaud disease    • Sick sinus syndrome (Multi) 02/27/2018   • Sleep apnea    • Stroke (cerebrum) (Multi)      Past Surgical History:   Procedure Laterality Date   • APPENDECTOMY     • CARDIAC CATHETERIZATION N/A 12/10/2024    Procedure: Right Heart Cath;  Surgeon: Darrel Hampton MD;  Location: [facility];  Service: Cardiovascular;  Laterality: N/A;   • CARDIAC ELECTROPHYSIOLOGY PROCEDURE N/A 06/17/2024    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 PPM UPGRADE DUAL TO BIV;  Surgeon: Balta Chaudhari MD;  Location: [facility];  CRTP upgrade, [facility], [place] [facility] 5/21/24   • CATARACT EXTRACTION     • CHOLECYSTECTOMY     • COLECTOMY PARTIAL / TOTAL      Sigmoid   • HYSTERECTOMY     • OTHER SURGICAL HISTORY      Atrial appendage closure   • OTHER SURGICAL HISTORY      Catheter Ablation Atrioventricular Node   • TONSILLECTOMY       Family History   Problem Relation Name Age of Onset   • Coronary artery  disease Mother     • Coronary artery disease Father       Social History     Tobacco Use   • Smoking status: Former     Current packs/day: 0.00     Average packs/day: 0.3 packs/day for 34.0 years (8.5 ttl pk-yrs)     Types: Cigarettes     Start date:      Quit date:      Years since quittin.0     Passive exposure: Never   • Smokeless tobacco: Never   Substance Use Topics   • Alcohol use: Never   • Drug use: Never       Physical Exam   ED Triage Vitals   Temperature Heart Rate Respirations BP   25 1213 25 1213 25 1213 25 1213   36.3 °C (97.4 °F) 65 18 129/66      Pulse Ox Temp Source Heart Rate Source Patient Position   25 1213 25 1213 25 1529 25 1529   (!) 93 % Temporal Monitor Sitting      BP Location FiO2 (%)     -- --             Physical Exam      ED Course & [facility]   ED Course as of 25 Patient CBC with shows no leukocytosis stable hemoglobin, chemistries that show worsening kidney disease, LFTs unremarkable BNP elevated about 1800, chest ray shows improvement from prior x-ray CT head shows no traumatic injury CT facial bones shows nasal bone fractures CT C-spine shows advanced DJD no fractures, patient will be hospitalized for further care. [MT]    BNP(!): 1,832 [MT]      ED Course User Index  [MT] Bala Junior MD         Diagnoses as of 25   Generalized weakness   Fall, initial encounter   Acute on chronic renal insufficiency   Closed fracture of nasal bone, initial encounter   Epistaxis                 No data recorded     Kim Coma Scale Score: 15 (25 1410 : Pilar Ward, PHOEBE)                           Medical Decision Making      Procedure  Procedures     Bala Junior MD  25       Bala Junior MD  25

## 2025-01-08 NOTE — PROGRESS NOTES
Adwoa Forrest is a 89 y.o. female admitted for Generalized weakness. Pharmacy reviewed the patient's [id number] medications and allergies for accuracy.    The list below reflects the PTA list prior to pharmacy medication history. A summary a changes to the PTA medication list has been listed below. Please review each medication in order reconciliation for additional clarification and justification.    Source of information:  T2P    Medications added:    Medications modified:  Alprazolam 0.5mg bid --> 2t hs prn    Medications to be removed:  Apap 650mg   Prednisone 10mg    Medications of concern:      Prior to Admission Medications   Prescriptions Last Dose Informant Patient Reported? Taking?   ALPRAZolam (Xanax) 0.5 mg tablet   No No   Sig: Take 1 tablet (0.5 mg) by mouth 2 times a day.   Patient taking differently: Take 1 tablet (0.5 mg) by mouth 2 times a day. Pt only taking at night.   acetaminophen (Tylenol 8 HOUR) 650 mg ER tablet   Yes No   Sig: Take 1 tablet (650 mg) by mouth every 8 hours if needed for mild pain (1 - 3). Do not crush, chew, or split.   albuterol 90 mcg/actuation inhaler   No No   Sig: Inhale 2 puffs every 4 hours if needed for shortness of breath.   amiodarone (Pacerone) 200 mg tablet   No No   Sig: Take 1 tablet (200 mg) by mouth once daily.   aspirin 81 mg EC tablet   Yes No   Sig: Take 1 tablet (81 mg) by mouth once daily.   atorvastatin (Lipitor) 80 mg tablet   No No   Sig: Take 1 tablet (80 mg) by mouth once daily. At bedtime   calcitriol (Rocaltrol) 0.25 mcg capsule   Yes No   Sig: Take 1 capsule (0.25 mcg) by mouth once daily. Three times a week.   calcium acetate (Phoslo) 667 mg capsule   Yes No   Sig: Take 1 capsule (667 mg) by mouth 2 times a day before meals.   cefdinir (Omnicef) 300 mg capsule   No No   Sig: Take 1 capsule (300 mg) by mouth once daily for 7 days.   dapagliflozin propanediol (Farxiga) 10 mg   No No   Sig: Take 1 tablet (10 mg) by mouth once daily.    fluticasone-umeclidin-vilanter (Trelegy Ellipta) 100-62.5-25 mcg blister with device   No No   Sig: Inhale 1 puff once daily.   ipratropium-albuteroL (Duo-Neb) 0.5-2.5 mg/3 mL nebulizer solution   No No   Sig: Take 3 mL by nebulization 4 times a day as needed for wheezing or shortness of breath.   levothyroxine (Synthroid, Levoxyl) 75 mcg tablet   No No   Sig: Take 1 tablet (75 mcg) by mouth once daily.   linaCLOtide (Linzess) 145 mcg capsule   No No   Sig: Take 1 capsule (145 mcg) by mouth once daily in the morning. Take before meals. Do not crush or chew.   metoprolol succinate XL (Toprol-XL) 25 mg 24 hr tablet   No No   Sig: Take 1 tablet (25 mg) by mouth once daily. Do not crush or chew.   multivitamin tablet   Yes No   Sig: Take 1 tablet by mouth once daily.   predniSONE 10 mg tablets,dose pack   Yes No   Sig: Take by mouth. Taper pack   sacubitriL-valsartan (Entresto) 24-26 mg tablet   No No   Sig: Take 0.5 tablets by mouth 2 times a day.   torsemide (Demadex) 20 mg tablet   No No   Sig: Take 3 tablets (60 mg) by mouth once daily.   Patient taking differently: Take 3 tablets (60 mg) by mouth once daily. Pt took 4 tablets daily for couple days, had swelling.      Facility-Administered Medications: None       Janneth aMn

## 2025-01-08 NOTE — PROGRESS NOTES
Emergency Medicine Transition of Care Note.    I received Adwoa Forrest in signout from Dr. Junior.  Please see the previous ED provider note for all HPI, PE and MDM up to the time of signout. This is in addition to the primary record.    In brief Adwoa Forrets is an 89 y.o. female presenting for   Chief Complaint   Patient presents with    Epistaxis (Nose Bleed)    Weakness, Gen        ED Course as of 01/07/25 2130   Tue Jan 07, 2025 2022 Patient CBC with shows no leukocytosis stable hemoglobin, chemistries that show worsening kidney disease, LFTs unremarkable BNP elevated about 1800, chest ray shows improvement from prior x-ray CT head shows no traumatic injury CT facial bones shows nasal bone fractures CT C-spine shows advanced DJD no fractures, patient will be hospitalized for further care. [MT]   2056 BNP(!): 1,832 [MT]      ED Course User Index  [MT] Bala Junior MD         Diagnoses as of 01/07/25 2130   Generalized weakness   Fall, initial encounter   Acute on chronic renal insufficiency   Closed fracture of nasal bone, initial encounter   Epistaxis       Medical Decision Making  Received patient in signout from previous provider Dr. Junior.  Patient is an 89-year-old female with history of COPD, CKD, CHF, A-fib not on anticoagulation.  Mechanical fall.  Found to have acute on chronic renal insufficiency, epistaxis, and closed fracture of nasal bone.  Epistaxis well-controlled at this time.  Patient admitted for CHF exacerbation, YEVGENIY on CKD.  Patient was admitted and boarding in the ER secondary to high patient volumes.  No new events or concerns were raised while under my care.    Final diagnoses:   [R53.1] Generalized weakness   [W19.XXXA] Fall, initial encounter   [N28.9, N18.9] Acute on chronic renal insufficiency   [S02.2XXA] Closed fracture of nasal bone, initial encounter   [R04.0] Epistaxis       Procedure  Procedures    Sruthi Montes De Oca MD

## 2025-01-08 NOTE — PROGRESS NOTES
1/8/25 1054   01/08/25 1053   Discharge Planning   Living Arrangements Children   Support Systems Children   Assistance Needed Uses a walker   Type of Residence Private residence   Home or Post Acute Services Post acute facilities (Rehab/SNF/etc)   Type of Post Acute Facility Services Skilled nursing   Expected Discharge Disposition SNF   Does the patient need discharge transport arranged? Yes   RoundTrip coordination needed? Yes   Patient Choice   Provider Choice list and CMS website (https://medicare.gov/care-compare#search) for post-acute Quality and Resource Measure Data were provided and reviewed with: Patient;Family   Patient / Family choosing to utilize agency / facility established prior to hospitalization No   Intensity of Service   Intensity of Service >30 min     Spoke with pt and pt daughter, Constanza, at bedside. Pt lives in a house with daughter. Pt states usually using a walker to get around prior to fall. PCP Marleny. Pt states that therapy came to see pt. Awaiting PT/OT notes at this time. Both are in agreement to SNF if recommended. Pt and daughter chose [facility] as FOC upon entry into room as pt has been there in the past and liked it. Provided skilled nursing list to PT and family from [facility] directory that includes facilities that are within  Post - Acute Quality Network, as well as meeting patient’s medical needs, and are in-network for patient’s insurance; while also in discharge geographic area patient prefers, and identifies each facilities CMS star rating. Instructed them to review for additional SNF choices while awaiting response from facility in the event [facility] is unable to accept. Requested [facility] to send SNF referral.   Estela Granger RN, BSN, Lauren/ [facility]ia CT Supervisor     1/8/25 1432  Updated pt that [facility] can accept. Pt confirmed [facility] as FOC and is agreeable to having auth started for facility. Requested  auth team to start auth. ADOD is tomorrow vs Friday.  Estela Granger RN, BSN,  [place]/ [facility] CT Supervisor     1/8/25 1524  Pt auth received and is good through 1/13. Pt will need updated tomorrow.   Estela Granger RN, BSN, [facility] Supervisor

## 2025-01-09 ENCOUNTER — APPOINTMENT (OUTPATIENT)
Dept: VASCULAR MEDICINE | Facility: HOSPITAL | Age: 89
End: 2025-01-09
Payer: MEDICARE

## 2025-01-09 ENCOUNTER — APPOINTMENT (OUTPATIENT)
Dept: RADIOLOGY | Facility: HOSPITAL | Age: 89
End: 2025-01-09
Payer: MEDICARE

## 2025-01-09 LAB
ANION GAP SERPL CALC-SCNC: 7 MMOL/L (ref 10–20)
BUN SERPL-MCNC: 90 MG/DL (ref 6–23)
CALCIUM SERPL-MCNC: 9 MG/DL (ref 8.6–10.3)
CHLORIDE SERPL-SCNC: 102 MMOL/L (ref 98–107)
CO2 SERPL-SCNC: 37 MMOL/L (ref 21–32)
CREAT SERPL-MCNC: 2.17 MG/DL (ref 0.5–1.05)
EGFRCR SERPLBLD CKD-EPI 2021: 21 ML/MIN/1.73M*2
ERYTHROCYTE [DISTWIDTH] IN BLOOD BY AUTOMATED COUNT: 15.8 % (ref 11.5–14.5)
GLUCOSE SERPL-MCNC: 86 MG/DL (ref 74–99)
HCT VFR BLD AUTO: 33.4 % (ref 36–46)
HGB BLD-MCNC: 10 G/DL (ref 12–16)
MAGNESIUM SERPL-MCNC: 2.66 MG/DL (ref 1.6–2.4)
MCH RBC QN AUTO: 29 PG (ref 26–34)
MCHC RBC AUTO-ENTMCNC: 29.9 G/DL (ref 32–36)
MCV RBC AUTO: 97 FL (ref 80–100)
NRBC BLD-RTO: 0 /100 WBCS (ref 0–0)
PLATELET # BLD AUTO: 266 X10*3/UL (ref 150–450)
POTASSIUM SERPL-SCNC: 4.1 MMOL/L (ref 3.5–5.3)
RBC # BLD AUTO: 3.45 X10*6/UL (ref 4–5.2)
SODIUM SERPL-SCNC: 142 MMOL/L (ref 136–145)
WBC # BLD AUTO: 4.7 X10*3/UL (ref 4.4–11.3)

## 2025-01-09 PROCEDURE — 36415 COLL VENOUS BLD VENIPUNCTURE: CPT | Performed by: PHYSICIAN ASSISTANT

## 2025-01-09 PROCEDURE — 80048 BASIC METABOLIC PNL TOTAL CA: CPT | Performed by: PHYSICIAN ASSISTANT

## 2025-01-09 PROCEDURE — 85027 COMPLETE CBC AUTOMATED: CPT | Performed by: PHYSICIAN ASSISTANT

## 2025-01-09 PROCEDURE — 1210000001 HC SEMI-PRIVATE ROOM DAILY

## 2025-01-09 PROCEDURE — 93975 VASCULAR STUDY: CPT

## 2025-01-09 PROCEDURE — 2500000005 HC RX 250 GENERAL PHARMACY W/O HCPCS: Performed by: STUDENT IN AN ORGANIZED HEALTH CARE EDUCATION/TRAINING PROGRAM

## 2025-01-09 PROCEDURE — 97116 GAIT TRAINING THERAPY: CPT | Mod: GP,CQ | Performed by: PHYSICAL THERAPY ASSISTANT

## 2025-01-09 PROCEDURE — 97110 THERAPEUTIC EXERCISES: CPT | Mod: GP,CQ | Performed by: PHYSICAL THERAPY ASSISTANT

## 2025-01-09 PROCEDURE — 2500000001 HC RX 250 WO HCPCS SELF ADMINISTERED DRUGS (ALT 637 FOR MEDICARE OP): Performed by: PHYSICIAN ASSISTANT

## 2025-01-09 PROCEDURE — 97535 SELF CARE MNGMENT TRAINING: CPT | Mod: GO,CO

## 2025-01-09 PROCEDURE — 83735 ASSAY OF MAGNESIUM: CPT | Performed by: PHYSICIAN ASSISTANT

## 2025-01-09 PROCEDURE — 76770 US EXAM ABDO BACK WALL COMP: CPT | Performed by: RADIOLOGY

## 2025-01-09 PROCEDURE — 93975 VASCULAR STUDY: CPT | Performed by: SURGERY

## 2025-01-09 PROCEDURE — 2500000001 HC RX 250 WO HCPCS SELF ADMINISTERED DRUGS (ALT 637 FOR MEDICARE OP): Performed by: STUDENT IN AN ORGANIZED HEALTH CARE EDUCATION/TRAINING PROGRAM

## 2025-01-09 PROCEDURE — 99233 SBSQ HOSP IP/OBS HIGH 50: CPT | Performed by: PHYSICIAN ASSISTANT

## 2025-01-09 PROCEDURE — 76770 US EXAM ABDO BACK WALL COMP: CPT

## 2025-01-09 PROCEDURE — 2500000002 HC RX 250 W HCPCS SELF ADMINISTERED DRUGS (ALT 637 FOR MEDICARE OP, ALT 636 FOR OP/ED): Performed by: PHYSICIAN ASSISTANT

## 2025-01-09 PROCEDURE — 2500000005 HC RX 250 GENERAL PHARMACY W/O HCPCS: Performed by: INTERNAL MEDICINE

## 2025-01-09 PROCEDURE — 97530 THERAPEUTIC ACTIVITIES: CPT | Mod: GP,CQ | Performed by: PHYSICAL THERAPY ASSISTANT

## 2025-01-09 PROCEDURE — 2500000002 HC RX 250 W HCPCS SELF ADMINISTERED DRUGS (ALT 637 FOR MEDICARE OP, ALT 636 FOR OP/ED): Performed by: STUDENT IN AN ORGANIZED HEALTH CARE EDUCATION/TRAINING PROGRAM

## 2025-01-09 RX ORDER — ALPRAZOLAM 0.5 MG/1
0.5 TABLET ORAL NIGHTLY
Status: DISCONTINUED | OUTPATIENT
Start: 2025-01-09 | End: 2025-01-10 | Stop reason: HOSPADM

## 2025-01-09 RX ADMIN — METOPROLOL SUCCINATE 25 MG: 25 TABLET, EXTENDED RELEASE ORAL at 08:56

## 2025-01-09 RX ADMIN — AMIODARONE HYDROCHLORIDE 200 MG: 200 TABLET ORAL at 08:55

## 2025-01-09 RX ADMIN — LEVOTHYROXINE SODIUM 37.5 MCG: 0.07 TABLET ORAL at 05:43

## 2025-01-09 RX ADMIN — LINACLOTIDE 145 MCG: 145 CAPSULE, GELATIN COATED ORAL at 06:04

## 2025-01-09 RX ADMIN — ALPRAZOLAM 0.5 MG: 0.5 TABLET ORAL at 20:00

## 2025-01-09 RX ADMIN — ACETAMINOPHEN, ASPIRIN, CAFFEINE 2 DROP: 250; 250; 65 TABLET, FILM COATED ORAL at 20:00

## 2025-01-09 RX ADMIN — ACETAMINOPHEN 650 MG: 325 TABLET ORAL at 14:29

## 2025-01-09 RX ADMIN — CEFDINIR 300 MG: 300 CAPSULE ORAL at 08:56

## 2025-01-09 RX ADMIN — ASPIRIN 81 MG: 81 TABLET, COATED ORAL at 08:55

## 2025-01-09 RX ADMIN — Medication 3 MG: at 23:18

## 2025-01-09 RX ADMIN — ACETAMINOPHEN 650 MG: 325 TABLET ORAL at 08:53

## 2025-01-09 RX ADMIN — CALCIUM ACETATE 667 MG: 667 CAPSULE ORAL at 15:22

## 2025-01-09 RX ADMIN — CALCIUM ACETATE 667 MG: 667 CAPSULE ORAL at 06:04

## 2025-01-09 RX ADMIN — ATORVASTATIN CALCIUM 80 MG: 40 TABLET, FILM COATED ORAL at 20:00

## 2025-01-09 ASSESSMENT — COGNITIVE AND FUNCTIONAL STATUS - GENERAL
DRESSING REGULAR UPPER BODY CLOTHING: A LITTLE
MOBILITY SCORE: 13
DRESSING REGULAR LOWER BODY CLOTHING: A LITTLE
CLIMB 3 TO 5 STEPS WITH RAILING: A LOT
TURNING FROM BACK TO SIDE WHILE IN FLAT BAD: A LITTLE
MOVING TO AND FROM BED TO CHAIR: A LITTLE
DRESSING REGULAR LOWER BODY CLOTHING: A LITTLE
WALKING IN HOSPITAL ROOM: A LITTLE
DRESSING REGULAR UPPER BODY CLOTHING: A LITTLE
MOBILITY SCORE: 18
STANDING UP FROM CHAIR USING ARMS: A LITTLE
STANDING UP FROM CHAIR USING ARMS: A LITTLE
MOVING TO AND FROM BED TO CHAIR: A LOT
DRESSING REGULAR LOWER BODY CLOTHING: A LITTLE
MOVING FROM LYING ON BACK TO SITTING ON SIDE OF FLAT BED WITH BEDRAILS: A LOT
STANDING UP FROM CHAIR USING ARMS: A LITTLE
MOVING TO AND FROM BED TO CHAIR: A LITTLE
EATING MEALS: A LITTLE
DAILY ACTIVITIY SCORE: 20
HELP NEEDED FOR BATHING: A LOT
TOILETING: A LITTLE
TURNING FROM BACK TO SIDE WHILE IN FLAT BAD: A LOT
CLIMB 3 TO 5 STEPS WITH RAILING: A LOT
DRESSING REGULAR UPPER BODY CLOTHING: A LITTLE
WALKING IN HOSPITAL ROOM: A LITTLE
MOBILITY SCORE: 18
DAILY ACTIVITIY SCORE: 16
CLIMB 3 TO 5 STEPS WITH RAILING: A LOT
TOILETING: A LOT
PERSONAL GROOMING: A LITTLE
WALKING IN HOSPITAL ROOM: A LOT
DAILY ACTIVITIY SCORE: 20
TURNING FROM BACK TO SIDE WHILE IN FLAT BAD: A LITTLE
HELP NEEDED FOR BATHING: A LITTLE
TOILETING: A LITTLE
HELP NEEDED FOR BATHING: A LITTLE

## 2025-01-09 ASSESSMENT — ENCOUNTER SYMPTOMS
BLOOD IN STOOL: 0
FATIGUE: 1
ABDOMINAL DISTENTION: 0
ABDOMINAL PAIN: 0
DIAPHORESIS: 0
TREMORS: 0
WEAKNESS: 1
NUMBNESS: 0
WHEEZING: 0
CONFUSION: 0
HALLUCINATIONS: 0
VOMITING: 0
CHILLS: 0
SHORTNESS OF BREATH: 0
SORE THROAT: 0
JOINT SWELLING: 0
DIZZINESS: 0
LIGHT-HEADEDNESS: 0
DIARRHEA: 0
CHEST TIGHTNESS: 0
FLANK PAIN: 0
HEADACHES: 1
NAUSEA: 0
FEVER: 0
DYSURIA: 0
ACTIVITY CHANGE: 1
APPETITE CHANGE: 0
FACIAL SWELLING: 1
COUGH: 0
BACK PAIN: 0
TROUBLE SWALLOWING: 0
COLOR CHANGE: 0
PHOTOPHOBIA: 0
FREQUENCY: 0
WOUND: 0
EYE PAIN: 0
CONSTIPATION: 0
DIZZINESS: 1
APPETITE CHANGE: 1
HEMATURIA: 0
DIFFICULTY URINATING: 0
BRUISES/BLEEDS EASILY: 1
PALPITATIONS: 0

## 2025-01-09 ASSESSMENT — ACTIVITIES OF DAILY LIVING (ADL): HOME_MANAGEMENT_TIME_ENTRY: 10

## 2025-01-09 ASSESSMENT — PAIN SCALES - GENERAL
PAINLEVEL_OUTOF10: 1
PAINLEVEL_OUTOF10: 4
PAINLEVEL_OUTOF10: 3
PAINLEVEL_OUTOF10: 3
PAINLEVEL_OUTOF10: 0 - NO PAIN

## 2025-01-09 ASSESSMENT — PAIN - FUNCTIONAL ASSESSMENT
PAIN_FUNCTIONAL_ASSESSMENT: 0-10

## 2025-01-09 ASSESSMENT — PAIN DESCRIPTION - DESCRIPTORS
DESCRIPTORS: HEADACHE
DESCRIPTORS: HEADACHE

## 2025-01-09 NOTE — PROGRESS NOTES
1/9/25 1517   01/09/25 1526   Discharge Planning   Home or Post Acute Services Post acute facilities (Rehab/SNF/etc)   Type of Post Acute Facility Services Skilled nursing   Expected Discharge Disposition SNF   Does the patient need discharge transport arranged? Yes   RoundTrip coordination needed? Yes   Patient Choice   Provider Choice list and CMS website (https://medicare.gov/care-compare#search) for post-acute Quality and Resource Measure Data were provided and reviewed with: Patient;Family   Patient / Family choosing to utilize agency / facility established prior to hospitalization No   Intensity of Service   Intensity of Service 0-30 min     Updated pt that auth was approved for [facility] and will possibly discharge tomorrow. Pt appeared appreciative of information. Reviewed IM with pt. Pt verbalized understanding and signed IM. Copy left at bedside.   Estela Granger RN, BSN, [facility]/ [facility] Supervisor

## 2025-01-09 NOTE — CONSULTS
Reason For Consult    katya on ckd       Interval History    Patient is doing  better     ROS  Denies chest pain, shortness of breath,  Nausea, vomiting , diarrhea, fever or chills.     No change in Past Medical History        Family History      Review of Systems  .Review of Systems   Constitutional:  Positive for activity change, appetite change and fatigue.   HENT:  Positive for nosebleeds. Negative for sore throat.    Eyes:  Negative for photophobia and visual disturbance.   Respiratory:  Negative for chest tightness, shortness of breath and wheezing.    Cardiovascular:  Positive for leg swelling.   Gastrointestinal:  Negative for abdominal distention and abdominal pain.   Genitourinary:  Negative for difficulty urinating and dysuria.   Musculoskeletal:  Negative for back pain and joint swelling.   Skin:  Negative for color change, pallor and rash.   Neurological:  Positive for dizziness and weakness. Negative for tremors.   Hematological:  Bruises/bleeds easily.   Psychiatric/Behavioral:  Negative for behavioral problems and confusion.          Physical Exam  .Physical Exam  Constitutional:       Appearance: Normal appearance.   HENT:      Mouth/Throat:      Mouth: Mucous membranes are dry.      Pharynx: Oropharynx is clear.   Eyes:      Extraocular Movements: Extraocular movements intact.      Pupils: Pupils are equal, round, and reactive to light.   Cardiovascular:      Rate and Rhythm: Normal rate. Rhythm irregular.      Pulses: Normal pulses.      Heart sounds: Normal heart sounds.   Pulmonary:      Effort: Pulmonary effort is normal.      Breath sounds: Normal breath sounds.   Abdominal:      General: Abdomen is flat. Bowel sounds are normal.      Palpations: Abdomen is soft.   Musculoskeletal:      Right lower leg: Edema present.      Left lower leg: Edema present.      Comments: Pedal edema +1   Skin:     General: Skin is warm and dry.      Comments: Bruise face   Neurological:      Mental Status: She is  alert and oriented to person, place, and time. Mental status is at baseline.   Psychiatric:         Mood and Affect: Mood normal.         Behavior: Behavior normal.                I&O 24HR  No intake or output data in the 24 hours ending 01/09/25 1727    Vitals 24HR  Heart Rate:  []   Temp:  [36.2 °C (97.2 °F)-37.1 °C (98.8 °F)]   Resp:  [16]   BP: ()/(50-68)   Weight:  [49.6 kg (109 lb 4.8 oz)]   SpO2:  [90 %-98 %]     .  Vitals:    01/09/25 1315   BP: 125/60   Pulse: 65   Resp: 16   Temp: 36.2 °C (97.2 °F)   SpO2: 93%        Relevant Results  .  Results from last 7 days   Lab Units 01/09/25  0456 01/08/25  0404 01/07/25  1339   SODIUM mmol/L 142 137 137   POTASSIUM mmol/L 4.1 4.3 4.2   CHLORIDE mmol/L 102 98 93*   CO2 mmol/L 37* 34* 33*   BUN mg/dL 90* 104* 104*   CREATININE mg/dL 2.17* 2.61* 2.79*   EGFR mL/min/1.73m*2 21* 17* 16*   GLUCOSE mg/dL 86 88 83   CALCIUM mg/dL 9.0 8.3* 9.0    .  Results from last 7 days   Lab Units 01/09/25  0456 01/08/25  0404 01/07/25  1339   WBC AUTO x10*3/uL 4.7 6.1 9.2   HEMOGLOBIN g/dL 10.0* 10.4* 11.3*   HEMATOCRIT % 33.4* 33.7* 37.5   PLATELETS AUTO x10*3/uL 266 270 293         Assessment/Plan       YEVGENIY/ATN 2/2 volume depletion, hypoperfusion injury,  on diuretics hypotension N17.0  Hypotension I95  Azotemia R79.89  CKD4  HFpEF I50.30  A-fib  Albuminuria serum creatinine    Recommendations   Serum creatinine slowly improving, baseline 1.6-1.9  FEurea elevated at 37.8%  Continue to hold Farxiga, Entresto and Demadex  Discontinued calcium acetate at this time.  Keep systolic blood pressure greater than 100 to help with renal perfusion  BUN elevated, monitor for signs of uremia will follow trend  UA bland shows glucose  Okay for blood transfusions for hemoglobin less than 7, will check iron studies urine studies ordered    Assessment & Plan  Generalized weakness

## 2025-01-09 NOTE — PROGRESS NOTES
Physical Therapy    Physical Therapy Treatment    Patient Name: Adwoa Forrest  MRN: [id number]  Department: [address]  Room: [facility]230-A  Today's Date: 1/9/2025  Time Calculation  Start Time: 0800  Stop Time: 0839  Time Calculation (min): 39 min         Assessment/Plan   PT Assessment  End of Session Communication: Bedside nurse  Assessment Comment: pt performed gait with wheeled walker this session. She is unsteady and impulsive at times which increases fall risk. She would benefit from further treatment to improve safety and technique.  End of Session Patient Position: Up in chair, Alarm on     PT Plan  Treatment/Interventions: Bed mobility, Transfer training, Gait training, Balance training, Strengthening, Stair training, Endurance training, Therapeutic exercise, Therapeutic activity  PT Plan: Ongoing PT  PT Frequency: 4 times per week  PT Discharge Recommendations: Moderate intensity level of continued care  Equipment Recommended upon Discharge: Wheeled walker  PT Recommended Transfer Status: Assist x 1  PT - OK to Discharge: Yes (To next level of care when medically cleared.)      General Visit Information:   PT  Visit  PT Received On: 01/09/25  Response to Previous Treatment: Patient with no complaints from previous session.  General  Reason for Referral: Generalized weakness. Fall. Impaired mobility.  Referred By: Kenney Figueroa DO  Past Medical History Relevant to Rehab: PMHx: CHF, afib, COPD, pacemaker, CKD, recent PNA.  Prior to Session Communication: Bedside nurse  Patient Position Received: Bed, 2 rail up  Preferred Learning Style: verbal  General Comment: pt agreeable to PT and cleared by RN. pt reporting she will do whatever she needs to get stronger.    Precautions:  Precautions  Medical Precautions: Fall precautions, Oxygen therapy device and L/min  Precautions Comment: Fall precautions. 4.5LO2.        Pain:  Pain Assessment  Pain Assessment: 0-10  0-10 (Numeric) Pain Score: 4  Pain Type: Acute  pain  Pain Location: Head  Pain Orientation: Mid  Cognition:  Cognition  Overall Cognitive Status: Within Functional Limits            Treatments:  Therapeutic Exercise  Therapeutic Exercise Performed: Yes  Therapeutic Exercise Activity 1: ankle pumps 20 ea  Therapeutic Exercise Activity 2: Marches x 15  Therapeutic Exercise Activity 3: LAQ x15  Therapeutic Exercise Activity 4: hip ab/adduction x15  Therapeutic Exercise Activity 5: Glut sets x 15    Balance/Neuromuscular Re-Education  Balance/Neuromuscular Re-Education Activity Performed: Yes  Balance/Neuromuscular Re-Education Activity 1: pt performed static and dynamic stand balance activities with wheeled walker and Min Ax 1. pt with no overt LOB but decreased safety and technique noted.    Ambulation/Gait Training  Ambulation/Gait Training Performed: Yes  Ambulation/Gait Training 1  Surface 1: Level tile  Device 1: Rolling walker  Assistance 1: Minimum assistance, Minimal verbal cues  Quality of Gait 1: Narrow base of support, Diminished heel strike  Comments/Distance (ft) 1: 40'x2 with cues for walker safety and technique. pt is impulsive at times and not able to manage walker and O2 line.  Transfers  Transfer: Yes  Transfer 1  Technique 1: Sit to stand, Stand to sit  Transfer Device 1: Walker  Transfer Level of Assistance 1: Minimum assistance, Minimal verbal cues  Trials/Comments 1: cues for hand placement and safety.  Transfers 2  Technique 2: Stand pivot  Transfer Device 2: Walker  Transfer Level of Assistance 2: Minimum assistance, Minimal verbal cues  Trials/Comments 2: cues for walker safety and technique.    Outcome Measures:  [facility] Basic Mobility  Turning from your back to your side while in a flat bed without using bedrails: A lot  Moving from lying on your back to sitting on the side of a flat bed without using bedrails: A lot  Moving to and from bed to chair (including a wheelchair): A lot  Standing up from a chair using your arms (e.g. wheelchair  or bedside chair): A little  To walk in hospital room: A lot  Climbing 3-5 steps with railing: A lot  Basic Mobility - Total Score: 13    Education Documentation  Mobility Training, taught by Cecile Amador PTA at 1/9/2025  8:56 AM.  Learner: Patient  Readiness: Acceptance  Method: Explanation  Response: Verbalizes Understanding, Needs Reinforcement    Education Comments  No comments found.               Encounter Problems       Encounter Problems (Active)       Mobility       Patient will negotiate 5 steps with 2 rails as appropriate with MIN A.  (Progressing)       Start:  01/08/25    Expected End:  01/22/25               Pain - Adult          To improve strength, balance, endurance, safety:        Patient will complete supine > sit with MIN A of 1. (Progressing)       Start:  01/08/25    Expected End:  01/22/25            Patient will complete transfers and ambulation 50 feet x 2 with WW and CGA without posterior LOB.  (Progressing)       Start:  01/08/25    Expected End:  01/22/25            Patient will participate in dynamic standing activities including LE exercise, functional reach with UE support PRN for 3 minutes with CGA with PSO2 WFL. (Progressing)       Start:  01/08/25    Expected End:  01/22/25

## 2025-01-09 NOTE — CARE PLAN
The patient's goals for the shift include      The clinical goals for the shift include Satety maintained throughout the shift.

## 2025-01-09 NOTE — DISCHARGE INSTRUCTIONS
Treatment protocols recommended:  Right posterior calcaneous- Cleanse with vashe, pat dry, cover with adaptic and foam, change every other day/prn.  Continue to off load, turning at least every 2 hours. Offload heels.  Resume outpatient wound care follow up on discharge.

## 2025-01-09 NOTE — CONSULTS
Wound Care Consult     Visit Date: 1/9/2025      Patient Name: Adwoa Forrest         MRN: [id number]           YOB: 1935     Pertinent Labs:   Albumin   Date Value Ref Range Status   01/07/2025 3.6 3.4 - 5.0 g/dL Final   11/01/2024 4.3 3.4 - 5.0 g/dL Final     Albumin, Urine Random   Date Value Ref Range Status   01/08/2025 11.9 Not established mg/L Final     Wound Assessment:  Wound 09/12/24 Other (comment) Heel Dorsal;Right (Active)   Wound Image   01/09/25 1619   Site Assessment Dry;Tan;Red 01/09/25 1619   Samina-Wound Assessment Pink 01/09/25 1619   Non-staged Wound Description Full thickness 01/09/25 1619   Shape linear 01/09/25 1619   Wound Length (cm) 4 cm 01/09/25 1619   Wound Width (cm) 0.4 cm 01/09/25 1619   Wound Surface Area (cm^2) 1.6 cm^2 01/09/25 1619   Wound Depth (cm) 0.2 cm 01/09/25 1619   Wound Volume (cm^3) 0.32 cm^3 01/09/25 1619   Wound Healing % -60 01/09/25 1619   State of Healing Healing ridge 01/09/25 1619   Margins Poorly defined 01/09/25 1619   Drainage Description None 01/09/25 1619   Drainage Amount None 01/09/25 1619   Dressing Other (Comment) 01/09/25 1619   Dressing Changed Changed 01/09/25 1619   Dressing Status Clean;Dry 01/09/25 0819     Patient seen for right posterior calcaneus (present on admission) complicated by PMH: HFpEF, NSVT (on amiodarone), and persistent atrial fib (not on blood thinners due to GIB, pacemaker, COPD (not on home O2), HTN and CKD per chart. Patient follows with wound center, last visit 1/3/24. Wound originated July/Aug 2023 from surgical incision poor healing/pressure related per outpatient record. Wound dry, no evidence of depth to pack (previously using iodoform at wound center). Skin hygiene and dressing care preformed. See detailed assessment above from flowsheet. Recommendations below, reviewed with Dr. Wright.     Treatment protocols recommended:  Right posterior calcaneous- Cleanse with vashe, pat dry, cover with adaptic and foam,  change every other day/prn.  Continue to off load, turning at least every 2 hours. Offload heels.     Therapeutic surface: Patient on [employer] standard pressure relieving mattress during exam. Sacha 20. Heels offloaded with pillow support.     Nursing updated, continue pressure injury preventions, wound care to be completed by nursing per orders and re-consult wound RN if needed.    See above recommendations for treatment. I would recommend follow up in [facility] upon discharge.     Please contact me with questions or changes in patient condition.  Najma Mccarty. RN  Wound/Ostomy Care  [phone]

## 2025-01-09 NOTE — PROGRESS NOTES
"Adwoa Forrest is a 89 y.o. female on day 1 of admission presenting with Generalized weakness.      Subjective   Adwoa Forrest is a 89 y.o. female with PMHx s/f HFpEF, NSVT (on amiodarone), persistent atrial fib (not on blood thinners due to GIB), pacemaker, COPD (on 3-4L home O2), HTN and CKD (creatinine ~2 at baseline) presented 1/7/25 with fall and nosebleed. Reportedly she slipped off her couch and hit the floor.  She had a severe nosebleed at that point so EMS was called. Nosebleed has now stopped but she is not complaining of a headache. She has been on various antibiotics prescribed by her pulmonologist for what sounds like several weeks. She was originally on amoxicillin, but was then switched to Omnicef a few days ago. She does feel a bit short of breath, but is on her home O2 requirements of 3-4 L. She has a nonproductive cough. No fever, chills, nausea, vomiting chest pain, hematemesis or other symptoms. She does feel weak overall. She has been complaining of \"flashers\" in her eyes since last September that bother her and take minutes to go away to times. Cr 2.79. BNP 1832. CXR: Slight improvement of the bilateral multifocal airspace and interstitial opacities. Otherwise no interval change from 12/30/2024 chest x-ray. CT cervical/head - No acute intracranial abnormality. Acute mildly displaced fracture of the nasal bones and vomer with overlying soft tissue swelling extending to the right maxilla. No acute fracture or traumatic subluxation of the cervical spine    1/8/2025: No acute events overnight. Vitals stable, /76 this morning. CBC/BMP reviewed, BUN/cr relatively unchanged 104/2.61 respectively. Electrolytes remain WNL. CPK WNL. Hgb slightly lower 10.4 (baseline ~11-12). Her main complaint today is R hand pain, XR done overnight without any fractures.   Daughter at bedside, they live together. Both in agreement with SNF placement to [facility]. Reports she has become weaker since recent bout of " pneumonia.     1/9/2025: No acute events overnight. Vitals stable, BP 93/50 this morning, changed to venti mask due to nasal congestion (home oxygen). CBC/BMP reviewed, BUN/cr improving to 90/2.17 respectively. Electrolytes remain WNL. Hgb slightly lower 10.0 (baseline ~11-12). Renal artery US reviewed, no evidence of hemodynamically significant stenosis. Holding farxiga, entresto, demadex  Feeling tired but overall better today. Has auth to go to [facility], will need to discuss plan with nephrology, likely needs to stay another 24 hours  We discussed her Xanax use, she reports she is rx for 1 tab twice a day but only takes 1 tab at bedtime, concern regarding how much she is getting as she states she rarely takes the second tablet but is filling very regularly per [facility]. She wants to discuss transitioning to a different medication with her PCP, she has been on this for >10 years.          Review of Systems   Constitutional:  Positive for fatigue. Negative for appetite change, chills, diaphoresis and fever.   HENT:  Positive for congestion and facial swelling. Negative for ear pain, hearing loss, nosebleeds, sore throat, tinnitus and trouble swallowing.    Eyes:  Negative for pain.   Respiratory:  Negative for cough, chest tightness, shortness of breath and wheezing.    Cardiovascular:  Negative for chest pain, palpitations and leg swelling.   Gastrointestinal:  Negative for abdominal pain, blood in stool, constipation, diarrhea, nausea and vomiting.   Genitourinary:  Negative for dysuria, flank pain, frequency, hematuria and urgency.   Musculoskeletal:  Negative for back pain and joint swelling.        +R hand pain   Skin:  Negative for rash and wound.   Neurological:  Positive for weakness and headaches. Negative for dizziness, syncope, light-headedness and numbness.   Hematological:  Bruises/bleeds easily.   Psychiatric/Behavioral:  Negative for behavioral problems, hallucinations and suicidal ideas.            Objective     Last Recorded Vitals  BP 93/50 (BP Location: Left arm, Patient Position: Lying)   Pulse 65   Temp 36.8 °C (98.3 °F) (Temporal)   Resp 16   Wt 49.6 kg (109 lb 4.8 oz)   SpO2 90%     Image Results  XR hand right 3+ views    Result Date: 1/8/2025  Interpreted By:  Finkelstein, Evan, STUDY: XR HAND RIGHT 3+ VIEWS;  1/8/2025 5:13 am three views right hand   INDICATION: Signs/Symptoms:swelling.   COMPARISON: None.   ACCESSION NUMBER(S): [id number]   ORDERING CLINICIAN: ABRAHAM CHATTERJEE   FINDINGS: Bones are demineralized. Narrowing and spurring of the 1st carpometacarpal joint as well as the STT joint. Erosive appearing changes involving multiple interphalangeal joints most notable involving the proximal interphalangeal joints of the index and middle fingers as well as the distal interphalangeal joints of the ring and small fingers. There is soft tissue swelling most pronounced involving the index, middle and small finger soft tissues. No acute displaced fracture or dislocation.       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     MACRO: None.   Signed by: Evan Finkelstein 1/8/2025 5:53 AM Dictation workstation:   [id number]    CT head wo IV contrast    Result Date: 1/7/2025  Interpreted By:  Sathya Drake, STUDY: CT HEAD WO IV CONTRAST; CT CERVICAL SPINE WO IV CONTRAST; CT FACIAL BONES WO IV CONTRAST; CT 3D RECONSTRUCTION;  1/7/2025 4:26 pm; 1/7/2025 4:27 pm   INDICATION: Signs/Symptoms:fall; Signs/Symptoms:fall nose contuson nose bleed; Signs/Symptoms:trauma.   COMPARISON: CT scan of the head 9/28/2024 CT scan of the cervical spine 11/14/.   ACCESSION NUMBER(S): [id number]; [id number]; [id number]; [id number]   ORDERING CLINICIAN: STEWART FERREIRA   TECHNIQUE: Axial noncontrast images of the head. Axial noncontrast images of the facial bones with coronal and  sagittal reconstructed images. Axial noncontrast images of the cervical spine with coronal and sagittal reconstructed images. 3D reformats of the facial bones were performed on independent workstation.   FINDINGS: BRAIN PARENCHYMA: Gray-white matter interfaces are preserved. No mass effect or midline shift. Mild deep and periventricular white matter hypodensities are nonspecific, but favored to represent chronic small vessel ischemic changes. Calcifications bilateral basal ganglia are likely physiologic.   HEMORRHAGE: No acute intracranial hemorrhage. VENTRICLES and EXTRA-AXIAL SPACES: Mild volume loss with prominence of the ventricles and sulci EXTRACRANIAL SOFT TISSUES: Within normal limits. CALVARIUM: No depressed calvarial fracture. No destructive osseous lesion.   FACIAL BONES: Acute mildly displaced fracture of the nasal bones and vomer. Remainder of the osseous structures are intact. SOFT TISSUES: Soft tissue swelling overlying the nose and right maxilla. PARANASAL SINUSES: r air-fluid levels in the maxillary sinuses, left greater than right with areas of high attenuation consistent with hemosinus. Trace layering fluid is also noted in the bilateral sphenoid sinuses with opacification of multiple ethmoid air cells suspicious for hemosinus. MASTOIDS: Within normal limits. ORBITS: The globes, extraocular muscles and optic nerve sheath complexes are symmetric. No retrobulbar hematoma.   ALIGNMENT: There is reversal of the normal lordotic curve with grade 1 anterolisthesis of C3 on 4 and C4 on 5 there is dextroconvex curvature of the mid and lower cervical spine.. VERTEBRAE: No acute fracture. Loss of disc height and anterior osteophyte formation at multiple levels most severe at C5-6 and C6-7. Facet and uncovertebral hypertrophic changes noted at all levels. SPINAL CANAL: No critical spinal canal stenosis. Mild-to-moderate canal narrowing at C3-4. PREVERTEBRAL SOFT TISSUES: No prevertebral soft tissue swelling.  LUNG APICES: Biapical pleuroparenchymal scarring.   OTHER FINDINGS: Atherosclerotic calcification of the carotid bifurcations and siphons.       No acute intracranial abnormality.   Chronic changes as noted above.   Acute mildly displaced fracture of the nasal bones and vomer. There is overlying soft tissue swelling extending to the right maxilla.   Air-fluid levels in the maxillary and sphenoid sinuses with opacification of multiple ethmoid air cells. They demonstrate high attenuation consistent with hemosinus.   No acute fracture or traumatic subluxation of the cervical spine.   Advanced multilevel discogenic degenerative changes as noted above.   MACRO: None   Signed by: Sathya Drake 1/7/2025 5:11 PM Dictation workstation:   [id number]    CT maxillofacial bones wo IV contrast    Result Date: 1/7/2025  Interpreted By:  Sathya Drake, STUDY: CT HEAD WO IV CONTRAST; CT CERVICAL SPINE WO IV CONTRAST; CT FACIAL BONES WO IV CONTRAST; CT 3D RECONSTRUCTION;  1/7/2025 4:26 pm; 1/7/2025 4:27 pm   INDICATION: Signs/Symptoms:fall; Signs/Symptoms:fall nose contuson nose bleed; Signs/Symptoms:trauma.   COMPARISON: CT scan of the head 9/28/2024 CT scan of the cervical spine 11/14/.   ACCESSION NUMBER(S): [id number]; [id number]; [id number]; [id number]   ORDERING CLINICIAN: STEWART FERREIRA   TECHNIQUE: Axial noncontrast images of the head. Axial noncontrast images of the facial bones with coronal and sagittal reconstructed images. Axial noncontrast images of the cervical spine with coronal and sagittal reconstructed images. 3D reformats of the facial bones were performed on independent workstation.   FINDINGS: BRAIN PARENCHYMA: Gray-white matter interfaces are preserved. No mass effect or midline shift. Mild deep and periventricular white matter hypodensities are nonspecific, but favored to represent chronic small vessel ischemic changes. Calcifications bilateral basal ganglia are likely physiologic.   HEMORRHAGE: No  acute intracranial hemorrhage. VENTRICLES and EXTRA-AXIAL SPACES: Mild volume loss with prominence of the ventricles and sulci EXTRACRANIAL SOFT TISSUES: Within normal limits. CALVARIUM: No depressed calvarial fracture. No destructive osseous lesion.   FACIAL BONES: Acute mildly displaced fracture of the nasal bones and vomer. Remainder of the osseous structures are intact. SOFT TISSUES: Soft tissue swelling overlying the nose and right maxilla. PARANASAL SINUSES: r air-fluid levels in the maxillary sinuses, left greater than right with areas of high attenuation consistent with hemosinus. Trace layering fluid is also noted in the bilateral sphenoid sinuses with opacification of multiple ethmoid air cells suspicious for hemosinus. MASTOIDS: Within normal limits. ORBITS: The globes, extraocular muscles and optic nerve sheath complexes are symmetric. No retrobulbar hematoma.   ALIGNMENT: There is reversal of the normal lordotic curve with grade 1 anterolisthesis of C3 on 4 and C4 on 5 there is dextroconvex curvature of the mid and lower cervical spine.. VERTEBRAE: No acute fracture. Loss of disc height and anterior osteophyte formation at multiple levels most severe at C5-6 and C6-7. Facet and uncovertebral hypertrophic changes noted at all levels. SPINAL CANAL: No critical spinal canal stenosis. Mild-to-moderate canal narrowing at C3-4. PREVERTEBRAL SOFT TISSUES: No prevertebral soft tissue swelling. LUNG APICES: Biapical pleuroparenchymal scarring.   OTHER FINDINGS: Atherosclerotic calcification of the carotid bifurcations and siphons.       No acute intracranial abnormality.   Chronic changes as noted above.   Acute mildly displaced fracture of the nasal bones and vomer. There is overlying soft tissue swelling extending to the right maxilla.   Air-fluid levels in the maxillary and sphenoid sinuses with opacification of multiple ethmoid air cells. They demonstrate high attenuation consistent with hemosinus.   No  acute fracture or traumatic subluxation of the cervical spine.   Advanced multilevel discogenic degenerative changes as noted above.   MACRO: None   Signed by: Sathya Drake 1/7/2025 5:11 PM Dictation workstation:   [id number]    CT cervical spine wo IV contrast    Result Date: 1/7/2025  Interpreted By:  Sathya Drake, STUDY: CT HEAD WO IV CONTRAST; CT CERVICAL SPINE WO IV CONTRAST; CT FACIAL BONES WO IV CONTRAST; CT 3D RECONSTRUCTION;  1/7/2025 4:26 pm; 1/7/2025 4:27 pm   INDICATION: Signs/Symptoms:fall; Signs/Symptoms:fall nose contuson nose bleed; Signs/Symptoms:trauma.   COMPARISON: CT scan of the head 9/28/2024 CT scan of the cervical spine 11/14/.   ACCESSION NUMBER(S): [id number]; [id number]; [id number]; [id number]   ORDERING CLINICIAN: STEWART FERREIRA   TECHNIQUE: Axial noncontrast images of the head. Axial noncontrast images of the facial bones with coronal and sagittal reconstructed images. Axial noncontrast images of the cervical spine with coronal and sagittal reconstructed images. 3D reformats of the facial bones were performed on independent workstation.   FINDINGS: BRAIN PARENCHYMA: Gray-white matter interfaces are preserved. No mass effect or midline shift. Mild deep and periventricular white matter hypodensities are nonspecific, but favored to represent chronic small vessel ischemic changes. Calcifications bilateral basal ganglia are likely physiologic.   HEMORRHAGE: No acute intracranial hemorrhage. VENTRICLES and EXTRA-AXIAL SPACES: Mild volume loss with prominence of the ventricles and sulci EXTRACRANIAL SOFT TISSUES: Within normal limits. CALVARIUM: No depressed calvarial fracture. No destructive osseous lesion.   FACIAL BONES: Acute mildly displaced fracture of the nasal bones and vomer. Remainder of the osseous structures are intact. SOFT TISSUES: Soft tissue swelling overlying the nose and right maxilla. PARANASAL SINUSES: r air-fluid levels in the maxillary sinuses, left greater  than right with areas of high attenuation consistent with hemosinus. Trace layering fluid is also noted in the bilateral sphenoid sinuses with opacification of multiple ethmoid air cells suspicious for hemosinus. MASTOIDS: Within normal limits. ORBITS: The globes, extraocular muscles and optic nerve sheath complexes are symmetric. No retrobulbar hematoma.   ALIGNMENT: There is reversal of the normal lordotic curve with grade 1 anterolisthesis of C3 on 4 and C4 on 5 there is dextroconvex curvature of the mid and lower cervical spine.. VERTEBRAE: No acute fracture. Loss of disc height and anterior osteophyte formation at multiple levels most severe at C5-6 and C6-7. Facet and uncovertebral hypertrophic changes noted at all levels. SPINAL CANAL: No critical spinal canal stenosis. Mild-to-moderate canal narrowing at C3-4. PREVERTEBRAL SOFT TISSUES: No prevertebral soft tissue swelling. LUNG APICES: Biapical pleuroparenchymal scarring.   OTHER FINDINGS: Atherosclerotic calcification of the carotid bifurcations and siphons.       No acute intracranial abnormality.   Chronic changes as noted above.   Acute mildly displaced fracture of the nasal bones and vomer. There is overlying soft tissue swelling extending to the right maxilla.   Air-fluid levels in the maxillary and sphenoid sinuses with opacification of multiple ethmoid air cells. They demonstrate high attenuation consistent with hemosinus.   No acute fracture or traumatic subluxation of the cervical spine.   Advanced multilevel discogenic degenerative changes as noted above.   MACRO: None   Signed by: Sathya Drake 1/7/2025 5:11 PM Dictation workstation:   [id number]    CT 3D reconstruction    Result Date: 1/7/2025  Interpreted By:  Sathya Drake, STUDY: CT HEAD WO IV CONTRAST; CT CERVICAL SPINE WO IV CONTRAST; CT FACIAL BONES WO IV CONTRAST; CT 3D RECONSTRUCTION;  1/7/2025 4:26 pm; 1/7/2025 4:27 pm   INDICATION: Signs/Symptoms:fall; Signs/Symptoms:fall nose  contuson nose bleed; Signs/Symptoms:trauma.   COMPARISON: CT scan of the head 9/28/2024 CT scan of the cervical spine 11/14/.   ACCESSION NUMBER(S): [id number]; [id number]; [id number]; [id number]   ORDERING CLINICIAN: STEWART FERREIRA   TECHNIQUE: Axial noncontrast images of the head. Axial noncontrast images of the facial bones with coronal and sagittal reconstructed images. Axial noncontrast images of the cervical spine with coronal and sagittal reconstructed images. 3D reformats of the facial bones were performed on independent workstation.   FINDINGS: BRAIN PARENCHYMA: Gray-white matter interfaces are preserved. No mass effect or midline shift. Mild deep and periventricular white matter hypodensities are nonspecific, but favored to represent chronic small vessel ischemic changes. Calcifications bilateral basal ganglia are likely physiologic.   HEMORRHAGE: No acute intracranial hemorrhage. VENTRICLES and EXTRA-AXIAL SPACES: Mild volume loss with prominence of the ventricles and sulci EXTRACRANIAL SOFT TISSUES: Within normal limits. CALVARIUM: No depressed calvarial fracture. No destructive osseous lesion.   FACIAL BONES: Acute mildly displaced fracture of the nasal bones and vomer. Remainder of the osseous structures are intact. SOFT TISSUES: Soft tissue swelling overlying the nose and right maxilla. PARANASAL SINUSES: r air-fluid levels in the maxillary sinuses, left greater than right with areas of high attenuation consistent with hemosinus. Trace layering fluid is also noted in the bilateral sphenoid sinuses with opacification of multiple ethmoid air cells suspicious for hemosinus. MASTOIDS: Within normal limits. ORBITS: The globes, extraocular muscles and optic nerve sheath complexes are symmetric. No retrobulbar hematoma.   ALIGNMENT: There is reversal of the normal lordotic curve with grade 1 anterolisthesis of C3 on 4 and C4 on 5 there is dextroconvex curvature of the mid and lower cervical spine..  VERTEBRAE: No acute fracture. Loss of disc height and anterior osteophyte formation at multiple levels most severe at C5-6 and C6-7. Facet and uncovertebral hypertrophic changes noted at all levels. SPINAL CANAL: No critical spinal canal stenosis. Mild-to-moderate canal narrowing at C3-4. PREVERTEBRAL SOFT TISSUES: No prevertebral soft tissue swelling. LUNG APICES: Biapical pleuroparenchymal scarring.   OTHER FINDINGS: Atherosclerotic calcification of the carotid bifurcations and siphons.       No acute intracranial abnormality.   Chronic changes as noted above.   Acute mildly displaced fracture of the nasal bones and vomer. There is overlying soft tissue swelling extending to the right maxilla.   Air-fluid levels in the maxillary and sphenoid sinuses with opacification of multiple ethmoid air cells. They demonstrate high attenuation consistent with hemosinus.   No acute fracture or traumatic subluxation of the cervical spine.   Advanced multilevel discogenic degenerative changes as noted above.   MACRO: None   Signed by: Sathya Drake 1/7/2025 5:11 PM Dictation workstation:   [id number]    XR chest 1 view    Result Date: 1/7/2025  Interpreted By:  Graeme Ryan, STUDY: XR CHEST 1 VIEW; 1/7/2025 1:47 pm   INDICATION: Signs/Symptoms:sob.   COMPARISON: Chest x-ray 12/30/2024   ACCESSION NUMBER(S): [id number]   ORDERING CLINICIAN: STEWART FERREIRA   TECHNIQUE: 1 view of the chest was performed.   FINDINGS: Slightly improved bilateral multifocal airspace and interstitial opacities. Grossly stable perihilar vascular congestion. Stable layering bilateral pleural effusion. No pneumothorax.  The cardiomediastinal silhouette is enlarged. Dual lead left chest wall pacemaker noted. Aortic knob calcifications noted.       1. Slight improvement of the bilateral multifocal airspace and interstitial opacities. Otherwise no interval change from 12/30/2024 chest x-ray   Signed by: Graeme Ryan 1/7/2025 1:59 PM Dictation  workstation:   [id number]    XR chest 2 views    Result Date: 12/31/2024  Interpreted By:  Tam Longo, STUDY: XR CHEST 2 VIEWS   INDICATION: Signs/Symptoms:Cough, shortness of breath.   COMPARISON: November 14   ACCESSION NUMBER(S): [id number]   ORDERING CLINICIAN: JONI ZARATE   FINDINGS: Significant interval worsening of the appearance of the chest. There are now fairly extensive bilateral multifocal airspace opacities throughout both lungs. The bilateral pleural effusions left-greater-than-right grossly similar to prior.   Cardiomegaly with pacemaker unchanged.       Significant worsening now with extensive bilateral multifocal airspace opacities throughout both lungs which could be multifocal pneumonia or a component of edema.   Bilateral effusions left-greater-than-right, grossly similar to previous.   Signed by: Tam Longo 12/31/2024 7:44 PM Dictation workstation:   [id number]    ECG 12 lead (Clinic Performed)    Result Date: 12/26/2024  AV dual-paced rhythm When compared with ECG of 14-NOV-2024 17:18, PREVIOUS ECG IS PRESENT Confirmed by Rita Workman ([id number]) on 12/26/2024 10:52:59 AM    Pulmonary Stress Test (6 Min. Walk)    Result Date: 12/23/2024  The patient underwent a 6-minute walk with oxygen titration.  The resting room air saturation 84%. At rest on 3 L/min nasal cannula resting saturation was 95%. The patient ambulated for 2 minutes and 37 seconds covering a distance of 38 m and desaturated to 86% on 3 L/min nasal cannula. Oxygen was titrated up to 5 L/min nasal cannula.  The patient ambulated further 2 minutes 45 seconds covering an additional 74 m.  The lowest recorded saturation was 90%. Impression: 1.  Desaturation at rest on room air 2.  No desaturation at rest on supplemental oxygen at 3 L/min nasal cannula 3.  Desaturation with ambulating on 3 L/min nasal cannula 4.  No desaturation with ambulating on supplemental oxygen at 5 L/min nasal cannula 5.  Marked exercise  intolerance    Complete Pulmonary Function Test Pre/Post Bronchodilator (Spirometry Pre/Post/DLCO/Lung Volumes)    Result Date: 12/23/2024  Forced expiration spirometry demonstrates no airflow obstruction.  Spirograms are fair-quality plateau to forced vital capacity of 1.13 L or 51% of predicted.  This suggest a moderate restriction.  The Z-score is -2.51. The flow-volume loop suggest a restrictive pattern. The FEV1 is 1.07 L or 64% of predicted. The FEV1 to FVC ratio is 94. There is no significant response to the inhalation of an aerosolized bronchodilator. The diffusion capacity is moderately reduced at 7.93 or 46% of predicted.  The Z-score is -3.73. Impression: 1.  Fair quality study. 2.  No airflow obstruction and no response to Anoro/bronchodilator. 3.  Possible moderate restriction 4.  Mildly reduced diffusion capacity    Cardiac Catheterization Procedure    Result Date: 12/10/2024       [facility], Cath Lab [address] [place] [address]    Phone [phone] Fax [phone] Cardiovascular Catheterization Report Patient Name:      CHERRY LEE       Performing Physician:  [id number] Hampton MD Study Date:        12/10/2024          Verifying Physician:   Ramon Hampton MD MRN/PID:           [id number]            Cardiologist/Co-Scrub: Accession#:        [id number]        Ordering Provider:     [id number] PERLITA LANDRY Date of Birth/Age: 1935 / 89 years Cardiologist:          Sushma Collazo MD Gender:            F                   Fellow: Encounter#:        [id number]          Surgeon:  Study: Right Heart Cath  Indications: Heart failure. Heart failure.  Procedure Description: After infiltration of local anesthetic, the Right  brachial vein was cannulated with a micropuncture technique. A 5 Burkinan sheath was placed in the vein. A balloon tipped catheter was advanced through the right heart to record pressures. Cardiac output was calculated via the Laura method.  Right Heart Catheterization: A balloon tipped catheter was advanced through the right heart to record pressures. Cardiac output was calculated via the Laura method. Elevated left sided filling pressures with low cardiac output. Elevated ventricular filling pressure. Cardiac output is mildly decreased. Reduced cardiac output at rest. No evidence of shunt. Elevated pulmonary vascular resistance. Pulmonary venous hypertension and pulmonary arterial hypertension.  Hemo Personnel: +---------------+---------+ Name           Duty      +---------------+---------+ Darrel Hampton MDPMARISELA HILL 1 +---------------+---------+  Hemodynamic Pressures:  +----+----------+---------+------------+-------------+---+-----+-------+-------+ SiteDate Time   Phase    Systolic    Diastolic  ED Mean A-Wave V-Wave                  Name       mmHg        mmHg     mmHmmHg  mmHg   mmHg                                                    g                     +----+----------+---------+------------+-------------+---+-----+-------+-------+   PA12/10/2024  O2 REST          62           23      39                   7:40:12 AM                                                         +----+----------+---------+------------+-------------+---+-----+-------+-------+   PW12/10/2024  O2 REST                               27     21     46     7:40:47 AM                                                         +----+----------+---------+------------+-------------+---+-----+-------+-------+   RV12/10/2024  O2 REST          61            3 10                        7:41:49 AM                                                          +----+----------+---------+------------+-------------+---+-----+-------+-------+   RA12/10/2024  O2 REST                                8     10     10     7:43:17 AM                                                         +----+----------+---------+------------+-------------+---+-----+-------+-------+   AO12/10/2024  O2 REST         141           70     110                   7:55:08 AM                                                         +----+----------+---------+------------+-------------+---+-----+-------+-------+   AO12/10/2024 AIR REST         141           70     110                   7:50:34 AM                                                         +----+----------+---------+------------+-------------+---+-----+-------+-------+   PA12/10/2024 AIR REST          62           23      39                   7:59:59 AM                                                         +----+----------+---------+------------+-------------+---+-----+-------+-------+   PW12/10/2024 AIR REST                               27     21     46     8:00:04 AM                                                         +----+----------+---------+------------+-------------+---+-----+-------+-------+   RV12/10/2024 AIR REST          61            3 10                        8:00:09 AM                                                         +----+----------+---------+------------+-------------+---+-----+-------+-------+   RA12/10/2024 AIR REST                                8     10     10     8:00:12 AM                                                         +----+----------+---------+------------+-------------+---+-----+-------+-------+  Oxygen Saturation %: +-----------+----------+------------+ Sample SiteO2 Sat (%)HB (g/100ml) +-----------+----------+------------+          AO        96         12.7 +-----------+----------+------------+          RA        54        12.7 +-----------+----------+------------+          AO        96        12.7 +-----------+----------+------------+          PA        52        12.7 +-----------+----------+------------+          AO        96        12.7 +-----------+----------+------------+          RA        53        12.7 +-----------+----------+------------+          AO        96        12.7 +-----------+----------+------------+          PA        52        12.7 +-----------+----------+------------+  Cardiac Outputs: +---------------+------------------+-------+ CRISTELA CO (l/min)CRISTELA CI (l/min/m2)CRISTELA SV +---------------+------------------+-------+             2.7               1.8   33.3 +---------------+------------------+-------+             2.6               1.8        +---------------+------------------+-------+  Vascular Resistance Calculated Values (Wood Units): +-----+---+----+-------+----+----+----+----+----+----+-------+ PhasePVRPVRIPVR/SVRSVR SVRITPR TPRITVR TVRITPR/TVR +-----+---+----+-------+----+----+----+----+----+----+-------+ 0    4.76.9 0      [phone]       +-----+---+----+-------+----+----+----+----+----+----+-------+ 1    4.76.9 0      [phone]       +-----+---+----+-------+----+----+----+----+----+----+-------+  Complications: No in-lab complications observed.  Cardiac Cath Post Procedure Notes: Post Procedure           Decompensated systolic and diastolic heart failure. Diagnosis: Blood Loss:              Estimated blood loss during the procedure was <10 ml                          mls. Specimens Removed:       Number of specimen(s) removed: none.  Recommendations: Maximize medical therapy. ____________________________________________________________________________________ CONCLUSIONS:  1. Systolic and diastolic heart failure. ICD 10 Codes:  Acute on chronic combined systolic (congestive) and diastolic (congestive) heart failure-I50.43  CPT Codes: Right Heart Cath O2/Cardiac output without biopsy ([facility])-30933  [id number] Darrel Hampton MD Performing Physician Electronically signed by [id number] Darrel Hampton MD on 12/10/2024 at 5:14:46 PM  ** Final **        Lab Results  Results for orders placed or performed during the hospital encounter of 01/07/25 (from the past 24 hours)   Sodium, Urine Random   Result Value Ref Range    Sodium, Urine Random 13 mmol/L    Creatinine, Urine Random 52.8 20.0 - 320.0 mg/dL    Sodium/Creatinine Ratio 25 Not established. mmol/g Creat   Urea Nitrogen, Urine Random   Result Value Ref Range    Urea Nitrogen, Urine Random 795 mg/dL    Creatinine, Urine Random 52.8 20.0 - 320.0 mg/dL    Urea Nitrogen/Creatinine Ratio 15.1 Not established. g/g creat   Albumin-Creatinine Ratio, Urine Random   Result Value Ref Range    Albumin, Urine Random 11.9 Not established mg/L    Creatinine, Urine Random 52.8 20.0 - 320.0 mg/dL    Albumin/Creatinine Ratio 22.5 <30.0 ug/mg Creat   Basic Metabolic Panel   Result Value Ref Range    Glucose 86 74 - 99 mg/dL    Sodium 142 136 - 145 mmol/L    Potassium 4.1 3.5 - 5.3 mmol/L    Chloride 102 98 - 107 mmol/L    Bicarbonate 37 (H) 21 - 32 mmol/L    Anion Gap 7 (L) 10 - 20 mmol/L    Urea Nitrogen 90 (H) 6 - 23 mg/dL    Creatinine 2.17 (H) 0.50 - 1.05 mg/dL    eGFR 21 (L) >60 mL/min/1.73m*2    Calcium 9.0 8.6 - 10.3 mg/dL   CBC   Result Value Ref Range    WBC 4.7 4.4 - 11.3 x10*3/uL    nRBC 0.0 0.0 - 0.0 /100 WBCs    RBC 3.45 (L) 4.00 - 5.20 x10*6/uL    Hemoglobin 10.0 (L) 12.0 - 16.0 g/dL    Hematocrit 33.4 (L) 36.0 - 46.0 %    MCV 97 80 - 100 fL    MCH 29.0 26.0 - 34.0 pg    MCHC 29.9 (L) 32.0 - 36.0 g/dL    RDW 15.8 (H) 11.5 - 14.5 %    Platelets 266 150 - 450 x10*3/uL   Magnesium   Result Value Ref Range    Magnesium 2.66 (H) 1.60 - 2.40 mg/dL   Renal artery duplex complete   Result Value Ref Range    BSA 1.47 m2      *Note: Due to a large number of results and/or encounters for the requested time period, some results have not been displayed. A complete set of results can be found in Results Review.        Medications  Scheduled medications:  ALPRAZolam, 0.5 mg, oral, BID  amiodarone, 200 mg, oral, Daily  aspirin, 81 mg, oral, Daily  atorvastatin, 80 mg, oral, Daily  calcium acetate, 667 mg, oral, BID AC  cefdinir, 300 mg, oral, Daily  [Held by provider] dapagliflozin propanediol, 10 mg, oral, Daily  tiotropium, 2 puff, inhalation, Daily   And  fluticasone furoate-vilanteroL, 1 puff, inhalation, Daily  levothyroxine, 37.5 mcg, oral, Daily  linaCLOtide, 145 mcg, oral, Daily before breakfast  metoprolol succinate XL, 25 mg, oral, Daily  [Held by provider] sacubitriL-valsartan, 0.5 tablet, oral, BID  [Held by provider] torsemide, 60 mg, oral, Daily      Continuous medications:     PRN medications:  PRN medications: acetaminophen, albuterol, bisacodyl, guaiFENesin, ipratropium-albuteroL, melatonin, ondansetron **OR** ondansetron, oxymetazoline     Physical Exam  Constitutional:       General: She is not in acute distress.     Appearance: Normal appearance.      Comments: Elderly   HENT:      Head: Normocephalic and atraumatic.      Right Ear: External ear normal.      Left Ear: External ear normal.      Nose:      Comments: Significant bruising around bilateral eyes     Mouth/Throat:      Mouth: Mucous membranes are moist.      Pharynx: Oropharynx is clear.   Eyes:      Extraocular Movements: Extraocular movements intact.      Conjunctiva/sclera: Conjunctivae normal.      Pupils: Pupils are equal, round, and reactive to light.   Cardiovascular:      Rate and Rhythm: Normal rate and regular rhythm.      Pulses: Normal pulses.      Heart sounds: Normal heart sounds.   Pulmonary:      Effort: Pulmonary effort is normal. No respiratory distress.      Breath sounds: Normal breath sounds. No wheezing, rhonchi or rales.   Abdominal:       General: Bowel sounds are normal.      Palpations: Abdomen is soft.      Tenderness: There is no abdominal tenderness. There is no right CVA tenderness, left CVA tenderness, guarding or rebound.   Musculoskeletal:         General: No swelling. Normal range of motion.      Cervical back: Normal range of motion and neck supple.      Comments: Arthritic changes of hands   Skin:     General: Skin is warm and dry.      Capillary Refill: Capillary refill takes less than 2 seconds.      Findings: No lesion or rash.      Comments: Age related skin changes   Neurological:      General: No focal deficit present.      Mental Status: She is alert and oriented to person, place, and time. Mental status is at baseline.   Psychiatric:         Mood and Affect: Mood normal.         Behavior: Behavior normal.                  Assessment/Plan      YEVGENIY on CKD  Cr 2.79 on arrival, baseline is around 2.0  Consult nephrology  Given her relative euvolemia today will wait hold off diuresis or IVF  Continue home Phoslo  She endorses she mostly drinks caffeinated beverages (tea) and doesn't have a great appetite   Renal artery US reviewed, no evidence of hemodynamically significant stenosis  Holding farxiga, entresto, demadex    Nasal fracture, nosebleed  No active bleeding now, continue to monitor  Hold blood thinners  Can follow up with ENT as outpatient if continued pain     HFpEF  TTE 12/24 - EF 52%, mild-mod TR  Appears euvolemic on exam. BNP is ~1800, but baseline is very high.  Recent cardiology notes reviewed - torsemide dosing based on weight.  Continue home Toprol XL  Holding farxiga, entresto, demadex     Hx Afib/SVT  Not on anticoagulation due to hx of GI bleed  Continue home amiodarone, Toprol XL     Hypothyroidism  Last TSH 12/23/24 was <37.5 (?). Free T4 0.51. Pt states her Synthroid was recently changed- now on Synthroid at 75 mcg, confirmed dosing with pharmacy- appears she was previously on 25mcg daily and now rx for 75mcg  daily. Feel given her age and minimally low T4 with lack of symptoms she would be better suited for dose increase only to 37.5mcg daily. Will adjust at this time.      COPD, hx recent pneumonia  CXR shows improvement of infiltrates compared to prior. Continue Omnicef for pneumonia.  Continue home inhalers for COPD. Does not appear to be in acute exacerbation.     HLD  Continue home aspirin, statin     Fall/weakness  PT/OT, may need SNF  Note patient is on xanax twice daily- confirmed with [facility] patient has been filling for 90 days regularly- would proceed with caution given her advanced age and comorbidities. Polypharmacy could be component in her weakness/falls. PCP should consider taper off     Code Status: Full Code          DVT ppx: SCDs (will hold blood thinners due to nosebleed)      Please see orders for more complete plan    Arpita Frazier PA-C

## 2025-01-09 NOTE — CARE PLAN
Problem: Pain - Adult  Goal: Verbalizes/displays adequate comfort level or baseline comfort level  Outcome: Progressing     Problem: Safety - Adult  Goal: Free from fall injury  Outcome: Progressing     Problem: Discharge Planning  Goal: Discharge to home or other facility with appropriate resources  Outcome: Progressing     Problem: Chronic Conditions and Co-morbidities  Goal: Patient's chronic conditions and co-morbidity symptoms are monitored and maintained or improved  Outcome: Progressing     Problem: Fall/Injury  Goal: Not fall by end of shift  Outcome: Progressing  Goal: Be free from injury by end of the shift  Outcome: Progressing  Goal: Verbalize understanding of personal risk factors for fall in the hospital  Outcome: Progressing  Goal: Verbalize understanding of risk factor reduction measures to prevent injury from fall in the home  Outcome: Progressing  Goal: Use assistive devices by end of the shift  Outcome: Progressing  Goal: Pace activities to prevent fatigue by end of the shift  Outcome: Progressing     Problem: Pain  Goal: Takes deep breaths with improved pain control throughout the shift  Outcome: Progressing  Goal: Turns in bed with improved pain control throughout the shift  Outcome: Progressing  Goal: Walks with improved pain control throughout the shift  Outcome: Progressing  Goal: Performs ADL's with improved pain control throughout shift  Outcome: Progressing  Goal: Participates in PT with improved pain control throughout the shift  Outcome: Progressing  Goal: Free from opioid side effects throughout the shift  Outcome: Progressing  Goal: Free from acute confusion related to pain meds throughout the shift  Outcome: Progressing       The patient's goals for the shift include      The clinical goals for the shift include patient will remain safe and free of fall and inury throughout the shift.

## 2025-01-10 ENCOUNTER — APPOINTMENT (OUTPATIENT)
Dept: WOUND CARE | Facility: CLINIC | Age: 89
End: 2025-01-10
Payer: MEDICARE

## 2025-01-10 VITALS
WEIGHT: 108.6 LBS | HEART RATE: 65 BPM | OXYGEN SATURATION: 97 % | TEMPERATURE: 97.2 F | DIASTOLIC BLOOD PRESSURE: 72 MMHG | RESPIRATION RATE: 16 BRPM | SYSTOLIC BLOOD PRESSURE: 142 MMHG | HEIGHT: 62 IN | BODY MASS INDEX: 19.98 KG/M2

## 2025-01-10 LAB
ANION GAP SERPL CALC-SCNC: <7 MMOL/L (ref 10–20)
BUN SERPL-MCNC: 78 MG/DL (ref 6–23)
CALCIUM SERPL-MCNC: 9.3 MG/DL (ref 8.6–10.3)
CHLORIDE SERPL-SCNC: 102 MMOL/L (ref 98–107)
CO2 SERPL-SCNC: 37 MMOL/L (ref 21–32)
CREAT SERPL-MCNC: 1.9 MG/DL (ref 0.5–1.05)
EGFRCR SERPLBLD CKD-EPI 2021: 25 ML/MIN/1.73M*2
ERYTHROCYTE [DISTWIDTH] IN BLOOD BY AUTOMATED COUNT: 15.9 % (ref 11.5–14.5)
GLUCOSE SERPL-MCNC: 88 MG/DL (ref 74–99)
HCT VFR BLD AUTO: 32.4 % (ref 36–46)
HGB BLD-MCNC: 9.7 G/DL (ref 12–16)
MAGNESIUM SERPL-MCNC: 2.58 MG/DL (ref 1.6–2.4)
MCH RBC QN AUTO: 29.9 PG (ref 26–34)
MCHC RBC AUTO-ENTMCNC: 29.9 G/DL (ref 32–36)
MCV RBC AUTO: 100 FL (ref 80–100)
NRBC BLD-RTO: 0 /100 WBCS (ref 0–0)
PLATELET # BLD AUTO: 251 X10*3/UL (ref 150–450)
POTASSIUM SERPL-SCNC: 4 MMOL/L (ref 3.5–5.3)
RBC # BLD AUTO: 3.24 X10*6/UL (ref 4–5.2)
SODIUM SERPL-SCNC: 141 MMOL/L (ref 136–145)
WBC # BLD AUTO: 6.1 X10*3/UL (ref 4.4–11.3)

## 2025-01-10 PROCEDURE — 2500000002 HC RX 250 W HCPCS SELF ADMINISTERED DRUGS (ALT 637 FOR MEDICARE OP, ALT 636 FOR OP/ED): Performed by: PHYSICIAN ASSISTANT

## 2025-01-10 PROCEDURE — 83735 ASSAY OF MAGNESIUM: CPT | Performed by: PHYSICIAN ASSISTANT

## 2025-01-10 PROCEDURE — 36415 COLL VENOUS BLD VENIPUNCTURE: CPT | Performed by: PHYSICIAN ASSISTANT

## 2025-01-10 PROCEDURE — 85027 COMPLETE CBC AUTOMATED: CPT | Performed by: PHYSICIAN ASSISTANT

## 2025-01-10 PROCEDURE — 2500000005 HC RX 250 GENERAL PHARMACY W/O HCPCS: Performed by: INTERNAL MEDICINE

## 2025-01-10 PROCEDURE — 80048 BASIC METABOLIC PNL TOTAL CA: CPT | Performed by: PHYSICIAN ASSISTANT

## 2025-01-10 PROCEDURE — 2500000002 HC RX 250 W HCPCS SELF ADMINISTERED DRUGS (ALT 637 FOR MEDICARE OP, ALT 636 FOR OP/ED): Performed by: STUDENT IN AN ORGANIZED HEALTH CARE EDUCATION/TRAINING PROGRAM

## 2025-01-10 PROCEDURE — 99239 HOSP IP/OBS DSCHRG MGMT >30: CPT | Performed by: PHYSICIAN ASSISTANT

## 2025-01-10 PROCEDURE — 2500000001 HC RX 250 WO HCPCS SELF ADMINISTERED DRUGS (ALT 637 FOR MEDICARE OP): Performed by: STUDENT IN AN ORGANIZED HEALTH CARE EDUCATION/TRAINING PROGRAM

## 2025-01-10 RX ORDER — GUAIFENESIN 600 MG/1
600 TABLET, EXTENDED RELEASE ORAL EVERY 12 HOURS PRN
Start: 2025-01-10

## 2025-01-10 RX ORDER — ALPRAZOLAM 0.5 MG/1
0.5 TABLET ORAL NIGHTLY
Qty: 3 TABLET | Refills: 0 | Status: SHIPPED | OUTPATIENT
Start: 2025-01-10 | End: 2025-01-13

## 2025-01-10 RX ORDER — TORSEMIDE 20 MG/1
60 TABLET ORAL DAILY
Start: 2025-01-10

## 2025-01-10 RX ADMIN — ACETAMINOPHEN, ASPIRIN, CAFFEINE 2 DROP: 250; 250; 65 TABLET, FILM COATED ORAL at 08:13

## 2025-01-10 RX ADMIN — METOPROLOL SUCCINATE 25 MG: 25 TABLET, EXTENDED RELEASE ORAL at 08:10

## 2025-01-10 RX ADMIN — AMIODARONE HYDROCHLORIDE 200 MG: 200 TABLET ORAL at 08:10

## 2025-01-10 RX ADMIN — ASPIRIN 81 MG: 81 TABLET, COATED ORAL at 08:10

## 2025-01-10 RX ADMIN — ACETAMINOPHEN 650 MG: 325 TABLET ORAL at 08:09

## 2025-01-10 RX ADMIN — CEFDINIR 300 MG: 300 CAPSULE ORAL at 08:10

## 2025-01-10 RX ADMIN — ACETAMINOPHEN 650 MG: 325 TABLET ORAL at 14:29

## 2025-01-10 RX ADMIN — LEVOTHYROXINE SODIUM 37.5 MCG: 0.07 TABLET ORAL at 08:08

## 2025-01-10 RX ADMIN — ACETAMINOPHEN, ASPIRIN, CAFFEINE 2 DROP: 250; 250; 65 TABLET, FILM COATED ORAL at 14:30

## 2025-01-10 ASSESSMENT — ENCOUNTER SYMPTOMS
HEMATURIA: 0
DIARRHEA: 0
FEVER: 0
ABDOMINAL PAIN: 0
DIAPHORESIS: 0
CHILLS: 0
FREQUENCY: 0
COUGH: 0
VOMITING: 0
NAUSEA: 0
CHEST TIGHTNESS: 0
TROUBLE SWALLOWING: 0
WEAKNESS: 1
SORE THROAT: 0
BACK PAIN: 0
JOINT SWELLING: 0
FLANK PAIN: 0
BRUISES/BLEEDS EASILY: 1
PALPITATIONS: 0
BLOOD IN STOOL: 0
LIGHT-HEADEDNESS: 0
WOUND: 0
APPETITE CHANGE: 0
DYSURIA: 0
SHORTNESS OF BREATH: 0
CONSTIPATION: 0
DIZZINESS: 0
EYE PAIN: 0
HALLUCINATIONS: 0
NUMBNESS: 0
WHEEZING: 0
FACIAL SWELLING: 1
FATIGUE: 1
HEADACHES: 1

## 2025-01-10 ASSESSMENT — PAIN - FUNCTIONAL ASSESSMENT
PAIN_FUNCTIONAL_ASSESSMENT: 0-10

## 2025-01-10 ASSESSMENT — COGNITIVE AND FUNCTIONAL STATUS - GENERAL
TURNING FROM BACK TO SIDE WHILE IN FLAT BAD: A LITTLE
DRESSING REGULAR UPPER BODY CLOTHING: A LITTLE
MOBILITY SCORE: 18
TOILETING: A LITTLE
DRESSING REGULAR LOWER BODY CLOTHING: A LITTLE
DAILY ACTIVITIY SCORE: 20
HELP NEEDED FOR BATHING: A LITTLE
STANDING UP FROM CHAIR USING ARMS: A LITTLE
WALKING IN HOSPITAL ROOM: A LITTLE
CLIMB 3 TO 5 STEPS WITH RAILING: A LOT
MOVING TO AND FROM BED TO CHAIR: A LITTLE

## 2025-01-10 ASSESSMENT — PAIN SCALES - GENERAL
PAINLEVEL_OUTOF10: 3
PAINLEVEL_OUTOF10: 1
PAINLEVEL_OUTOF10: 1
PAINLEVEL_OUTOF10: 3

## 2025-01-10 ASSESSMENT — PAIN DESCRIPTION - DESCRIPTORS
DESCRIPTORS: HEADACHE
DESCRIPTORS: HEADACHE

## 2025-01-10 NOTE — TELEPHONE ENCOUNTER
Patient and friend, Nicolette, returned call and were advised to contact PCP office with TSH/T4 levels and to follow up with PCP for adjustment of her levothyroxine. They verbalized understanding and stated that PCP had just recently done an adjustment on her levothyroxine and will be monitoring her thyroid function. Patient agrees to contact the office with any questions/concerns.

## 2025-01-10 NOTE — NURSING NOTE
Patient being discharged to [facility]. Report called, spoke to Arpita. Transport scheduled for 3:30 pm.

## 2025-01-10 NOTE — PROGRESS NOTES
1/10/25 1232   01/10/25 1231   Discharge Planning   Home or Post Acute Services Post acute facilities (Rehab/SNF/etc)   Type of Post Acute Facility Services Skilled nursing   Expected Discharge Disposition SNF   Has discharge transport been arranged? Yes   What day is the transport expected? 01/10/25   What time is the transport expected? 1530     Pt and med team aware of 1530  to [facility]. Offered to call pt family and pt stated that pt will call to notify family. Bedside RN has report number. Ambulance form on chart.   Estela Granger RN, BSN, Lauren/ Nadia CT Supervisor

## 2025-01-10 NOTE — CARE PLAN
Problem: Pain - Adult  Goal: Verbalizes/displays adequate comfort level or baseline comfort level  Outcome: Progressing     Problem: Safety - Adult  Goal: Free from fall injury  Outcome: Progressing     Problem: Discharge Planning  Goal: Discharge to home or other facility with appropriate resources  Outcome: Progressing     Problem: Chronic Conditions and Co-morbidities  Goal: Patient's chronic conditions and co-morbidity symptoms are monitored and maintained or improved  Outcome: Progressing     Problem: Fall/Injury  Goal: Not fall by end of shift  Outcome: Progressing  Goal: Be free from injury by end of the shift  Outcome: Progressing  Goal: Verbalize understanding of personal risk factors for fall in the hospital  Outcome: Progressing  Goal: Verbalize understanding of risk factor reduction measures to prevent injury from fall in the home  Outcome: Progressing  Goal: Use assistive devices by end of the shift  Outcome: Progressing  Goal: Pace activities to prevent fatigue by end of the shift  Outcome: Progressing     Problem: Pain  Goal: Takes deep breaths with improved pain control throughout the shift  Outcome: Progressing  Goal: Turns in bed with improved pain control throughout the shift  Outcome: Progressing  Goal: Walks with improved pain control throughout the shift  Outcome: Progressing  Goal: Performs ADL's with improved pain control throughout shift  Outcome: Progressing  Goal: Participates in PT with improved pain control throughout the shift  Outcome: Progressing  Goal: Free from opioid side effects throughout the shift  Outcome: Progressing  Goal: Free from acute confusion related to pain meds throughout the shift  Outcome: Progressing     Problem: Skin  Goal: Decreased wound size/increased tissue granulation at next dressing change  Outcome: Progressing  Goal: Participates in plan/prevention/treatment measures  Outcome: Progressing  Goal: Prevent/manage excess moisture  Outcome: Progressing  Goal:  Prevent/minimize sheer/friction injuries  Outcome: Progressing  Goal: Promote/optimize nutrition  Outcome: Progressing  Goal: Promote skin healing  Outcome: Progressing         The patient's goals for the shift include      The clinical goals for the shift include patient will remain safe and free of fall and injury throughout the shift.

## 2025-01-10 NOTE — CONSULTS
Reason For Consult    katya on ckd       Interval History    Patient is doing  better     ROS  Denies chest pain, shortness of breath,  Nausea, vomiting , diarrhea, fever or chills.     No change in Past Medical History        Family History      Review of Systems  .      Physical Exam  .Physical Exam  Constitutional:       Appearance: Normal appearance.   HENT:      Head:      Comments: Bilateral periorbital ecchymosis is noted     Mouth/Throat:      Mouth: Mucous membranes are dry.      Pharynx: Oropharynx is clear.   Eyes:      Extraocular Movements: Extraocular movements intact.      Pupils: Pupils are equal, round, and reactive to light.   Cardiovascular:      Rate and Rhythm: Normal rate. Rhythm irregular.      Pulses: Normal pulses.      Heart sounds: Normal heart sounds.   Pulmonary:      Effort: Pulmonary effort is normal.      Breath sounds: Normal breath sounds.   Abdominal:      General: Abdomen is flat. Bowel sounds are normal.      Palpations: Abdomen is soft.   Musculoskeletal:      Right lower leg: Edema present.      Left lower leg: Edema present.      Comments: Pedal edema +1   Skin:     General: Skin is warm and dry.      Comments: Bruise face   Neurological:      Mental Status: She is alert and oriented to person, place, and time. Mental status is at baseline.   Psychiatric:         Mood and Affect: Mood normal.         Behavior: Behavior normal.                I&O 24HR    Intake/Output Summary (Last 24 hours) at 1/10/2025 1432  Last data filed at 1/10/2025 1346  Gross per 24 hour   Intake 700 ml   Output 750 ml   Net -50 ml       Vitals 24HR  Heart Rate:  [65-67]   Temp:  [35.8 °C (96.4 °F)-36.8 °C (98.2 °F)]   Resp:  [16]   BP: (100-127)/(53-69)   Weight:  [49.3 kg (108 lb 9.6 oz)]   SpO2:  [90 %-98 %]     .  Vitals:    01/10/25 1346   BP: 127/69   Pulse: 65   Resp: 16   Temp: 36.8 °C (98.2 °F)   SpO2: 98%        Relevant Results  .  Results from last 7 days   Lab Units 01/10/25  3416  01/09/25  0456 01/08/25  0404   SODIUM mmol/L 141 142 137   POTASSIUM mmol/L 4.0 4.1 4.3   CHLORIDE mmol/L 102 102 98   CO2 mmol/L 37* 37* 34*   BUN mg/dL 78* 90* 104*   CREATININE mg/dL 1.90* 2.17* 2.61*   EGFR mL/min/1.73m*2 25* 21* 17*   GLUCOSE mg/dL 88 86 88   CALCIUM mg/dL 9.3 9.0 8.3*    .  Results from last 7 days   Lab Units 01/10/25  0355 01/09/25  0456 01/08/25  0404   WBC AUTO x10*3/uL 6.1 4.7 6.1   HEMOGLOBIN g/dL 9.7* 10.0* 10.4*   HEMATOCRIT % 32.4* 33.4* 33.7*   PLATELETS AUTO x10*3/uL 251 266 270         Assessment/Plan       YEVGENIY/ATN 2/2 volume depletion, hypoperfusion injury,  on diuretics hypotension N17.0  Hypotension I95  Azotemia R79.89  CKD4  HFpEF I50.30  A-fib  Albuminuria serum creatinine    Recommendations   Serum creatinine slowly improving, baseline 1.6-1.9 kidney function is improved and is back to baseline.  FEurea elevated at 37.8%, no albuminuria on most recent testing.   Hold Entresto on discharge Farxiga may be resumed.  Resume Demadex with hold parameter.  Personally discussed with primary team to facilitate disposition  Discontinued calcium acetate at this time.  Keep systolic blood pressure greater than 100 to help with renal perfusion  BUN elevated, monitor for signs of uremia will follow trend  UA bland shows glucose       Assessment & Plan  Generalized weakness

## 2025-01-10 NOTE — CARE PLAN
The patient's goals for the shift include      The clinical goals for the shift include patient will remain free from falls and injury this shift      Problem: Pain - Adult  Goal: Verbalizes/displays adequate comfort level or baseline comfort level  Outcome: Progressing     Problem: Safety - Adult  Goal: Free from fall injury  Outcome: Progressing     Problem: Discharge Planning  Goal: Discharge to home or other facility with appropriate resources  Outcome: Progressing     Problem: Chronic Conditions and Co-morbidities  Goal: Patient's chronic conditions and co-morbidity symptoms are monitored and maintained or improved  Outcome: Progressing     Problem: Fall/Injury  Goal: Not fall by end of shift  Outcome: Progressing  Goal: Be free from injury by end of the shift  Outcome: Progressing  Goal: Verbalize understanding of personal risk factors for fall in the hospital  Outcome: Progressing  Goal: Verbalize understanding of risk factor reduction measures to prevent injury from fall in the home  Outcome: Progressing  Goal: Use assistive devices by end of the shift  Outcome: Progressing  Goal: Pace activities to prevent fatigue by end of the shift  Outcome: Progressing     Problem: Pain  Goal: Takes deep breaths with improved pain control throughout the shift  Outcome: Progressing  Goal: Turns in bed with improved pain control throughout the shift  Outcome: Progressing  Goal: Walks with improved pain control throughout the shift  Outcome: Progressing  Goal: Performs ADL's with improved pain control throughout shift  Outcome: Progressing  Goal: Participates in PT with improved pain control throughout the shift  Outcome: Progressing  Goal: Free from opioid side effects throughout the shift  Outcome: Progressing  Goal: Free from acute confusion related to pain meds throughout the shift  Outcome: Progressing     Problem: Skin  Goal: Decreased wound size/increased tissue granulation at next dressing change  Outcome:  Progressing  Goal: Participates in plan/prevention/treatment measures  Outcome: Progressing  Goal: Prevent/manage excess moisture  Outcome: Progressing  Goal: Prevent/minimize sheer/friction injuries  Outcome: Progressing  Goal: Promote/optimize nutrition  Outcome: Progressing  Goal: Promote skin healing  Outcome: Progressing

## 2025-01-10 NOTE — DISCHARGE SUMMARY
"Admission Date: 1/7/2025 12:20 PM  Discharge Date:  1/10/2025   Condition at discharge: Stable    Discharge Diagnosis  YEVGENIY on CKD (baseline is around 2.0)  HFpEF  Nasal fracture, nosebleed  Hx Afib/SVT- not on AC, +amio  Hypothyroidism  COPD, hx recent pneumonia on Omnicef  HLD  Fall/weakness  Code Status: Full Code     Test Results Pending At Discharge  Pending Labs       Order Current Status    Creatine Disorder Panel, Urine In process            Hospital Course   Adwoa Forrest is a 89 y.o. female with PMHx s/f HFpEF, NSVT (on amiodarone), persistent atrial fib (not on blood thinners due to GIB), pacemaker, COPD (on 3-4L home O2), HTN and CKD (creatinine ~2 at baseline) presented 1/7/25 with fall and nosebleed. Reportedly she slipped off her couch and hit the floor.  She had a severe nosebleed at that point so EMS was called. Nosebleed has now stopped but she is not complaining of a headache. She has been on various antibiotics prescribed by her pulmonologist for what sounds like several weeks. She was originally on amoxicillin, but was then switched to Omnicef a few days ago. She does feel a bit short of breath, but is on her home O2 requirements of 3-4 L. She has a nonproductive cough. No fever, chills, nausea, vomiting chest pain, hematemesis or other symptoms. She does feel weak overall. She has been complaining of \"flashers\" in her eyes since last September that bother her and take minutes to go away to times. Cr 2.79. BNP 1832. CXR: Slight improvement of the bilateral multifocal airspace and interstitial opacities. Otherwise no interval change from 12/30/2024 chest x-ray. CT cervical/head - No acute intracranial abnormality. Acute mildly displaced fracture of the nasal bones and vomer with overlying soft tissue swelling extending to the right maxilla. No acute fracture or traumatic subluxation of the cervical spine     1/8/2025: No acute events overnight. Vitals stable, /76 this morning. CBC/BMP " reviewed, BUN/cr relatively unchanged 104/2.61 respectively. Electrolytes remain WNL. CPK WNL. Hgb slightly lower 10.4 (baseline ~11-12). Her main complaint today is R hand pain, XR done overnight without any fractures.   Daughter at bedside, they live together. Both in agreement with SNF placement to [facility]. Reports she has become weaker since recent bout of pneumonia.      1/9/2025: No acute events overnight. Vitals stable, BP 93/50 this morning, changed to venti mask due to nasal congestion (home oxygen). CBC/BMP reviewed, BUN/cr improving to 90/2.17 respectively. Electrolytes remain WNL. Hgb slightly lower 10.0 (baseline ~11-12). Renal artery US reviewed, no evidence of hemodynamically significant stenosis. Holding farxiga, entresto, demadex  Feeling tired but overall better today. Has auth to go to [facility], will need to discuss plan with nephrology, likely needs to stay another 24 hours  We discussed her Xanax use, she reports she is rx for 1 tab twice a day but only takes 1 tab at bedtime, concern regarding how much she is getting as she states she rarely takes the second tablet but is filling very regularly per [facility]. She wants to discuss transitioning to a different medication with her PCP, she has been on this for >10 years.      1/10/2025: No acute events overnight. Vitals stable, /69 this morning, on home oxygen. CBC/BMP reviewed, BUN/cr improving to 78/1.90 respectively. Electrolytes remain WNL. Hgb slightly lower 9.7 (baseline ~11-12). Renal artery US reviewed, no evidence of hemodynamically significant stenosis. Holding farxiga, entresto, demadex  Will review with nephrology, likely can dc to SNF today  Phoslo discontinued by nephrology. Discussed with Dr. Rich- will stop entresto on dc. Resume Farxiga at this time. Resume torsemide at weight based dosing.   Medically she is ready for dc to SNF. She reports she feels she wants to stay in the hospitla longer. We  discussed need to transition to  ECF for further therapy to get stronger as she medically has no acute issues to adjust in hospital.     She will need close outpatient follow up with nephrology and PCP. PCP to monitor TFT's and patient to discuss changing off xanax. She can follow up with ENT for nasal bone fracture     Consultations: Nephrology consulted- treatment options were discussed and plan of care agreed upon.    Pertinent Physical Exam At Time of Discharge  Constitutional:       General: She is not in acute distress.     Appearance: Normal appearance.      Comments: Elderly   HENT:      Head: Normocephalic and atraumatic.      Right Ear: External ear normal.      Left Ear: External ear normal.      Nose:      Comments: Significant bruising around bilateral eyes     Mouth/Throat:      Mouth: Mucous membranes are moist.      Pharynx: Oropharynx is clear.   Eyes:      Extraocular Movements: Extraocular movements intact.      Conjunctiva/sclera: Conjunctivae normal.      Pupils: Pupils are equal, round, and reactive to light.   Cardiovascular:      Rate and Rhythm: Normal rate and regular rhythm.      Pulses: Normal pulses.      Heart sounds: Normal heart sounds.   Pulmonary:      Effort: Pulmonary effort is normal. No respiratory distress.      Breath sounds: Normal breath sounds. No wheezing, rhonchi or rales.   Abdominal:      General: Bowel sounds are normal.      Palpations: Abdomen is soft.      Tenderness: There is no abdominal tenderness. There is no right CVA tenderness, left CVA tenderness, guarding or rebound.   Musculoskeletal:         General: No swelling. Normal range of motion.      Cervical back: Normal range of motion and neck supple.      Comments: Arthritic changes of hands   Skin:     General: Skin is warm and dry.      Capillary Refill: Capillary refill takes less than 2 seconds.      Findings: No lesion or rash.      Comments: Age related skin changes   Neurological:      General: No focal deficit present.      Mental  Status: She is alert and oriented to person, place, and time. Mental status is at baseline.   Psychiatric:         Mood and Affect: Mood normal.         Behavior: Behavior normal.     Home Medications     Medication List      START taking these medications     guaiFENesin 600 mg 12 hr tablet; Commonly known as: Mucinex; Take 1   tablet (600 mg) by mouth every 12 hours if needed for congestion. Do not   crush, chew, or split.     CHANGE how you take these medications     ALPRAZolam 0.5 mg tablet; Commonly known as: Xanax; Take 1 tablet (0.5   mg) by mouth once daily at bedtime for 3 days.; What changed: when to take   this   levothyroxine 75 mcg tablet; Commonly known as: Synthroid, Levoxyl; Take   0.5 tablets (37.5 mcg) by mouth once daily.; What changed: how much to   take   torsemide 20 mg tablet; Commonly known as: Demadex; Take 3 tablets (60   mg) by mouth once daily. If weight >110 lbs; What changed: additional   instructions     CONTINUE taking these medications     acetaminophen 650 mg ER tablet; Commonly known as: Tylenol 8 HOUR   albuterol 90 mcg/actuation inhaler; Inhale 2 puffs every 4 hours if   needed for shortness of breath.   amiodarone 200 mg tablet; Commonly known as: Pacerone; Take 1 tablet   (200 mg) by mouth once daily.   aspirin 81 mg EC tablet   atorvastatin 80 mg tablet; Commonly known as: Lipitor; Take 1 tablet (80   mg) by mouth once daily. At bedtime   calcitriol 0.25 mcg capsule; Commonly known as: Rocaltrol   cefdinir 300 mg capsule; Commonly known as: Omnicef; Take 1 capsule (300   mg) by mouth once daily for 7 days.   dapagliflozin propanediol 10 mg; Commonly known as: Farxiga; Take 1   tablet (10 mg) by mouth once daily.   ipratropium-albuteroL 0.5-2.5 mg/3 mL nebulizer solution; Commonly known   as: Duo-Neb; Take 3 mL by nebulization 4 times a day as needed for   wheezing or shortness of breath.   linaCLOtide 145 mcg capsule; Commonly known as: Linzess; Take 1 capsule   (145 mcg) by  mouth once daily in the morning. Take before meals. Do not   crush or chew.   metoprolol succinate XL 25 mg 24 hr tablet; Commonly known as:   Toprol-XL; Take 1 tablet (25 mg) by mouth once daily. Do not crush or   chew.   multivitamin tablet   Trelegy Ellipta 100-62.5-25 mcg blister with device; Generic drug:   fluticasone-umeclidin-vilanter; Inhale 1 puff once daily.     STOP taking these medications     calcium acetate 667 mg capsule; Commonly known as: Phoslo   sacubitriL-valsartan 24-26 mg tablet; Commonly known as: Entresto       Outpatient Follow-Up  Future Appointments   Date Time Provider Department Center   1/14/2025  1:00 PM INDIGO Freeman-CNP [id number] [place]   1/15/2025  1:00 PM Jovon Logan MD [email or web address] [facility]   2/11/2025  4:30 PM [email or web address]   6/11/2025  3:00 PM Michael Latham MD [id number] [facility]   9/22/2025  1:00 PM INDIGO Staley-CNP [id number] [place]         At the time of discharge, patient's pain was controlled with oral analgesia, patient was urinating, having BMs, sleeping, and eating well. Follow up recommendations are in discharge paperwork. Discharge plan was discussed with the patient/family and all of the questions were answered. Medications were ordered to be delivered to bedside prior to discharge.     Discharge planning took greater than 35 minutes    Diagnoses at time of discharge:  YEVGENIY on CKD (baseline is around 2.0)  HFpEF  Nasal fracture, nosebleed  Hx Afib/SVT- not on AC, +amio  Hypothyroidism  COPD, hx recent pneumonia on Omnicef  HLD  Fall/weakness  Code Status: Full Code     Anticipated discharge destination: skilled nursing facility    Please see orders for more complete plan    Arpita Frazier PA-C

## 2025-01-10 NOTE — PROGRESS NOTES
"Adwoa Forrest is a 89 y.o. female on day 2 of admission presenting with Generalized weakness.      Subjective   Adwoa Forrest is a 89 y.o. female with PMHx s/f HFpEF, NSVT (on amiodarone), persistent atrial fib (not on blood thinners due to GIB), pacemaker, COPD (on 3-4L home O2), HTN and CKD (creatinine ~2 at baseline) presented 1/7/25 with fall and nosebleed. Reportedly she slipped off her couch and hit the floor.  She had a severe nosebleed at that point so EMS was called. Nosebleed has now stopped but she is not complaining of a headache. She has been on various antibiotics prescribed by her pulmonologist for what sounds like several weeks. She was originally on amoxicillin, but was then switched to Omnicef a few days ago. She does feel a bit short of breath, but is on her home O2 requirements of 3-4 L. She has a nonproductive cough. No fever, chills, nausea, vomiting chest pain, hematemesis or other symptoms. She does feel weak overall. She has been complaining of \"flashers\" in her eyes since last September that bother her and take minutes to go away to times. Cr 2.79. BNP 1832. CXR: Slight improvement of the bilateral multifocal airspace and interstitial opacities. Otherwise no interval change from 12/30/2024 chest x-ray. CT cervical/head - No acute intracranial abnormality. Acute mildly displaced fracture of the nasal bones and vomer with overlying soft tissue swelling extending to the right maxilla. No acute fracture or traumatic subluxation of the cervical spine    1/8/2025: No acute events overnight. Vitals stable, /76 this morning. CBC/BMP reviewed, BUN/cr relatively unchanged 104/2.61 respectively. Electrolytes remain WNL. CPK WNL. Hgb slightly lower 10.4 (baseline ~11-12). Her main complaint today is R hand pain, XR done overnight without any fractures.   Daughter at bedside, they live together. Both in agreement with SNF placement to [facility]. Reports she has become weaker since recent bout of " pneumonia.     1/9/2025: No acute events overnight. Vitals stable, BP 93/50 this morning, changed to venti mask due to nasal congestion (home oxygen). CBC/BMP reviewed, BUN/cr improving to 90/2.17 respectively. Electrolytes remain WNL. Hgb slightly lower 10.0 (baseline ~11-12). Renal artery US reviewed, no evidence of hemodynamically significant stenosis. Holding farxiga, entresto, demadex  Feeling tired but overall better today. Has auth to go to [facility], will need to discuss plan with nephrology, likely needs to stay another 24 hours  We discussed her Xanax use, she reports she is rx for 1 tab twice a day but only takes 1 tab at bedtime, concern regarding how much she is getting as she states she rarely takes the second tablet but is filling very regularly per [facility]. She wants to discuss transitioning to a different medication with her PCP, she has been on this for >10 years.     1/10/2025: No acute events overnight. Vitals stable, /69 this morning, on home oxygen. CBC/BMP reviewed, BUN/cr improving to 78/1.90 respectively. Electrolytes remain WNL. Hgb slightly lower 9.7 (baseline ~11-12). Renal artery US reviewed, no evidence of hemodynamically significant stenosis. Holding farxiga, entresto, demadex  Will review with nephrology, likely can dc to SNF today  Phoslo discontinued by nephrology. Discussed with Dr. Rich- will stop entresto on dc. Resume Farxiga at this time. Resume torsemide at weight based dosing.   Medically she is ready for dc to SNF. She reports she feels she wants to stay in the hospitla longer. We  discussed need to transition to ECF for further therapy to get stronger as she medically has no acute issues to adjust in hospital.          Review of Systems   Constitutional:  Positive for fatigue. Negative for appetite change, chills, diaphoresis and fever.   HENT:  Positive for congestion and facial swelling. Negative for ear pain, hearing loss, nosebleeds, sore throat, tinnitus and  trouble swallowing.    Eyes:  Negative for pain.   Respiratory:  Negative for cough, chest tightness, shortness of breath and wheezing.    Cardiovascular:  Negative for chest pain, palpitations and leg swelling.   Gastrointestinal:  Negative for abdominal pain, blood in stool, constipation, diarrhea, nausea and vomiting.   Genitourinary:  Negative for dysuria, flank pain, frequency, hematuria and urgency.   Musculoskeletal:  Negative for back pain and joint swelling.        +R hand pain   Skin:  Negative for rash and wound.   Neurological:  Positive for weakness and headaches. Negative for dizziness, syncope, light-headedness and numbness.   Hematological:  Bruises/bleeds easily.   Psychiatric/Behavioral:  Negative for behavioral problems, hallucinations and suicidal ideas.           Objective     Last Recorded Vitals  /69 (BP Location: Left arm, Patient Position: Lying)   Pulse 65   Temp 36.1 °C (97 °F) (Temporal)   Resp 16   Wt 49.3 kg (108 lb 9.6 oz)   SpO2 90%     Image Results  XR hand right 3+ views    Result Date: 1/8/2025  Interpreted By:  Finkelstein, Evan, STUDY: XR HAND RIGHT 3+ VIEWS;  1/8/2025 5:13 am three views right hand   INDICATION: Signs/Symptoms:swelling.   COMPARISON: None.   ACCESSION NUMBER(S): [id number]   ORDERING CLINICIAN: ABRAHAM CHATTERJEE   FINDINGS: Bones are demineralized. Narrowing and spurring of the 1st carpometacarpal joint as well as the STT joint. Erosive appearing changes involving multiple interphalangeal joints most notable involving the proximal interphalangeal joints of the index and middle fingers as well as the distal interphalangeal joints of the ring and small fingers. There is soft tissue swelling most pronounced involving the index, middle and small finger soft tissues. No acute displaced fracture or dislocation.       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     MACRO: None.   Signed by: Evan Finkelstein 1/8/2025 5:53 AM Dictation workstation:   [id number]    CT head wo IV contrast    Result Date: 1/7/2025  Interpreted By:  Sathya Drake, STUDY: CT HEAD WO IV CONTRAST; CT CERVICAL SPINE WO IV CONTRAST; CT FACIAL BONES WO IV CONTRAST; CT 3D RECONSTRUCTION;  1/7/2025 4:26 pm; 1/7/2025 4:27 pm   INDICATION: Signs/Symptoms:fall; Signs/Symptoms:fall nose contuson nose bleed; Signs/Symptoms:trauma.   COMPARISON: CT scan of the head 9/28/2024 CT scan of the cervical spine 11/14/.   ACCESSION NUMBER(S): [id number]; [id number]; [id number]; [id number]   ORDERING CLINICIAN: STEWART FERREIRA   TECHNIQUE: Axial noncontrast images of the head. Axial noncontrast images of the facial bones with coronal and sagittal reconstructed images. Axial noncontrast images of the cervical spine with coronal and sagittal reconstructed images. 3D reformats of the facial bones were performed on independent workstation.   FINDINGS: BRAIN PARENCHYMA: Gray-white matter interfaces are preserved. No mass effect or midline shift. Mild deep and periventricular white matter hypodensities are nonspecific, but favored to represent chronic small vessel ischemic changes. Calcifications bilateral basal ganglia are likely physiologic.   HEMORRHAGE: No acute intracranial hemorrhage. VENTRICLES and EXTRA-AXIAL SPACES: Mild volume loss with prominence of the ventricles and sulci EXTRACRANIAL SOFT TISSUES: Within normal limits. CALVARIUM: No depressed calvarial fracture. No destructive osseous lesion.   FACIAL BONES: Acute mildly displaced fracture of the nasal bones and vomer. Remainder of the osseous structures are intact. SOFT TISSUES: Soft tissue swelling overlying the nose and right maxilla. PARANASAL SINUSES: r air-fluid levels in the maxillary sinuses, left greater than right with areas of high attenuation consistent with hemosinus. Trace layering fluid is also noted in the  bilateral sphenoid sinuses with opacification of multiple ethmoid air cells suspicious for hemosinus. MASTOIDS: Within normal limits. ORBITS: The globes, extraocular muscles and optic nerve sheath complexes are symmetric. No retrobulbar hematoma.   ALIGNMENT: There is reversal of the normal lordotic curve with grade 1 anterolisthesis of C3 on 4 and C4 on 5 there is dextroconvex curvature of the mid and lower cervical spine.. VERTEBRAE: No acute fracture. Loss of disc height and anterior osteophyte formation at multiple levels most severe at C5-6 and C6-7. Facet and uncovertebral hypertrophic changes noted at all levels. SPINAL CANAL: No critical spinal canal stenosis. Mild-to-moderate canal narrowing at C3-4. PREVERTEBRAL SOFT TISSUES: No prevertebral soft tissue swelling. LUNG APICES: Biapical pleuroparenchymal scarring.   OTHER FINDINGS: Atherosclerotic calcification of the carotid bifurcations and siphons.       No acute intracranial abnormality.   Chronic changes as noted above.   Acute mildly displaced fracture of the nasal bones and vomer. There is overlying soft tissue swelling extending to the right maxilla.   Air-fluid levels in the maxillary and sphenoid sinuses with opacification of multiple ethmoid air cells. They demonstrate high attenuation consistent with hemosinus.   No acute fracture or traumatic subluxation of the cervical spine.   Advanced multilevel discogenic degenerative changes as noted above.   MACRO: None   Signed by: Sathya Drake 1/7/2025 5:11 PM Dictation workstation:   [id number]    CT maxillofacial bones wo IV contrast    Result Date: 1/7/2025  Interpreted By:  Sathya Drake, STUDY: CT HEAD WO IV CONTRAST; CT CERVICAL SPINE WO IV CONTRAST; CT FACIAL BONES WO IV CONTRAST; CT 3D RECONSTRUCTION;  1/7/2025 4:26 pm; 1/7/2025 4:27 pm   INDICATION: Signs/Symptoms:fall; Signs/Symptoms:fall nose contuson nose bleed; Signs/Symptoms:trauma.   COMPARISON: CT scan of the head 9/28/2024 CT scan of  the cervical spine 11/14/.   ACCESSION NUMBER(S): [id number]; [id number]; [id number]; [id number]   ORDERING CLINICIAN: STEWART FERREIRA   TECHNIQUE: Axial noncontrast images of the head. Axial noncontrast images of the facial bones with coronal and sagittal reconstructed images. Axial noncontrast images of the cervical spine with coronal and sagittal reconstructed images. 3D reformats of the facial bones were performed on independent workstation.   FINDINGS: BRAIN PARENCHYMA: Gray-white matter interfaces are preserved. No mass effect or midline shift. Mild deep and periventricular white matter hypodensities are nonspecific, but favored to represent chronic small vessel ischemic changes. Calcifications bilateral basal ganglia are likely physiologic.   HEMORRHAGE: No acute intracranial hemorrhage. VENTRICLES and EXTRA-AXIAL SPACES: Mild volume loss with prominence of the ventricles and sulci EXTRACRANIAL SOFT TISSUES: Within normal limits. CALVARIUM: No depressed calvarial fracture. No destructive osseous lesion.   FACIAL BONES: Acute mildly displaced fracture of the nasal bones and vomer. Remainder of the osseous structures are intact. SOFT TISSUES: Soft tissue swelling overlying the nose and right maxilla. PARANASAL SINUSES: r air-fluid levels in the maxillary sinuses, left greater than right with areas of high attenuation consistent with hemosinus. Trace layering fluid is also noted in the bilateral sphenoid sinuses with opacification of multiple ethmoid air cells suspicious for hemosinus. MASTOIDS: Within normal limits. ORBITS: The globes, extraocular muscles and optic nerve sheath complexes are symmetric. No retrobulbar hematoma.   ALIGNMENT: There is reversal of the normal lordotic curve with grade 1 anterolisthesis of C3 on 4 and C4 on 5 there is dextroconvex curvature of the mid and lower cervical spine.. VERTEBRAE: No acute fracture. Loss of disc height and anterior osteophyte formation at multiple  levels most severe at C5-6 and C6-7. Facet and uncovertebral hypertrophic changes noted at all levels. SPINAL CANAL: No critical spinal canal stenosis. Mild-to-moderate canal narrowing at C3-4. PREVERTEBRAL SOFT TISSUES: No prevertebral soft tissue swelling. LUNG APICES: Biapical pleuroparenchymal scarring.   OTHER FINDINGS: Atherosclerotic calcification of the carotid bifurcations and siphons.       No acute intracranial abnormality.   Chronic changes as noted above.   Acute mildly displaced fracture of the nasal bones and vomer. There is overlying soft tissue swelling extending to the right maxilla.   Air-fluid levels in the maxillary and sphenoid sinuses with opacification of multiple ethmoid air cells. They demonstrate high attenuation consistent with hemosinus.   No acute fracture or traumatic subluxation of the cervical spine.   Advanced multilevel discogenic degenerative changes as noted above.   MACRO: None   Signed by: Sathya Drake 1/7/2025 5:11 PM Dictation workstation:   [id number]    CT cervical spine wo IV contrast    Result Date: 1/7/2025  Interpreted By:  Sathya Drake, STUDY: CT HEAD WO IV CONTRAST; CT CERVICAL SPINE WO IV CONTRAST; CT FACIAL BONES WO IV CONTRAST; CT 3D RECONSTRUCTION;  1/7/2025 4:26 pm; 1/7/2025 4:27 pm   INDICATION: Signs/Symptoms:fall; Signs/Symptoms:fall nose contuson nose bleed; Signs/Symptoms:trauma.   COMPARISON: CT scan of the head 9/28/2024 CT scan of the cervical spine 11/14/.   ACCESSION NUMBER(S): [id number]; [id number]; [id number]; [id number]   ORDERING CLINICIAN: STEWART FERREIRA   TECHNIQUE: Axial noncontrast images of the head. Axial noncontrast images of the facial bones with coronal and sagittal reconstructed images. Axial noncontrast images of the cervical spine with coronal and sagittal reconstructed images. 3D reformats of the facial bones were performed on independent workstation.   FINDINGS: BRAIN PARENCHYMA: Gray-white matter interfaces are  preserved. No mass effect or midline shift. Mild deep and periventricular white matter hypodensities are nonspecific, but favored to represent chronic small vessel ischemic changes. Calcifications bilateral basal ganglia are likely physiologic.   HEMORRHAGE: No acute intracranial hemorrhage. VENTRICLES and EXTRA-AXIAL SPACES: Mild volume loss with prominence of the ventricles and sulci EXTRACRANIAL SOFT TISSUES: Within normal limits. CALVARIUM: No depressed calvarial fracture. No destructive osseous lesion.   FACIAL BONES: Acute mildly displaced fracture of the nasal bones and vomer. Remainder of the osseous structures are intact. SOFT TISSUES: Soft tissue swelling overlying the nose and right maxilla. PARANASAL SINUSES: r air-fluid levels in the maxillary sinuses, left greater than right with areas of high attenuation consistent with hemosinus. Trace layering fluid is also noted in the bilateral sphenoid sinuses with opacification of multiple ethmoid air cells suspicious for hemosinus. MASTOIDS: Within normal limits. ORBITS: The globes, extraocular muscles and optic nerve sheath complexes are symmetric. No retrobulbar hematoma.   ALIGNMENT: There is reversal of the normal lordotic curve with grade 1 anterolisthesis of C3 on 4 and C4 on 5 there is dextroconvex curvature of the mid and lower cervical spine.. VERTEBRAE: No acute fracture. Loss of disc height and anterior osteophyte formation at multiple levels most severe at C5-6 and C6-7. Facet and uncovertebral hypertrophic changes noted at all levels. SPINAL CANAL: No critical spinal canal stenosis. Mild-to-moderate canal narrowing at C3-4. PREVERTEBRAL SOFT TISSUES: No prevertebral soft tissue swelling. LUNG APICES: Biapical pleuroparenchymal scarring.   OTHER FINDINGS: Atherosclerotic calcification of the carotid bifurcations and siphons.       No acute intracranial abnormality.   Chronic changes as noted above.   Acute mildly displaced fracture of the nasal  bones and vomer. There is overlying soft tissue swelling extending to the right maxilla.   Air-fluid levels in the maxillary and sphenoid sinuses with opacification of multiple ethmoid air cells. They demonstrate high attenuation consistent with hemosinus.   No acute fracture or traumatic subluxation of the cervical spine.   Advanced multilevel discogenic degenerative changes as noted above.   MACRO: None   Signed by: Sathya Drake 1/7/2025 5:11 PM Dictation workstation:   [id number]    CT 3D reconstruction    Result Date: 1/7/2025  Interpreted By:  Sathya Drake, STUDY: CT HEAD WO IV CONTRAST; CT CERVICAL SPINE WO IV CONTRAST; CT FACIAL BONES WO IV CONTRAST; CT 3D RECONSTRUCTION;  1/7/2025 4:26 pm; 1/7/2025 4:27 pm   INDICATION: Signs/Symptoms:fall; Signs/Symptoms:fall nose contuson nose bleed; Signs/Symptoms:trauma.   COMPARISON: CT scan of the head 9/28/2024 CT scan of the cervical spine 11/14/.   ACCESSION NUMBER(S): [id number]; [id number]; [id number]; [id number]   ORDERING CLINICIAN: STEWART FERREIRA   TECHNIQUE: Axial noncontrast images of the head. Axial noncontrast images of the facial bones with coronal and sagittal reconstructed images. Axial noncontrast images of the cervical spine with coronal and sagittal reconstructed images. 3D reformats of the facial bones were performed on independent workstation.   FINDINGS: BRAIN PARENCHYMA: Gray-white matter interfaces are preserved. No mass effect or midline shift. Mild deep and periventricular white matter hypodensities are nonspecific, but favored to represent chronic small vessel ischemic changes. Calcifications bilateral basal ganglia are likely physiologic.   HEMORRHAGE: No acute intracranial hemorrhage. VENTRICLES and EXTRA-AXIAL SPACES: Mild volume loss with prominence of the ventricles and sulci EXTRACRANIAL SOFT TISSUES: Within normal limits. CALVARIUM: No depressed calvarial fracture. No destructive osseous lesion.   FACIAL BONES: Acute mildly  displaced fracture of the nasal bones and vomer. Remainder of the osseous structures are intact. SOFT TISSUES: Soft tissue swelling overlying the nose and right maxilla. PARANASAL SINUSES: r air-fluid levels in the maxillary sinuses, left greater than right with areas of high attenuation consistent with hemosinus. Trace layering fluid is also noted in the bilateral sphenoid sinuses with opacification of multiple ethmoid air cells suspicious for hemosinus. MASTOIDS: Within normal limits. ORBITS: The globes, extraocular muscles and optic nerve sheath complexes are symmetric. No retrobulbar hematoma.   ALIGNMENT: There is reversal of the normal lordotic curve with grade 1 anterolisthesis of C3 on 4 and C4 on 5 there is dextroconvex curvature of the mid and lower cervical spine.. VERTEBRAE: No acute fracture. Loss of disc height and anterior osteophyte formation at multiple levels most severe at C5-6 and C6-7. Facet and uncovertebral hypertrophic changes noted at all levels. SPINAL CANAL: No critical spinal canal stenosis. Mild-to-moderate canal narrowing at C3-4. PREVERTEBRAL SOFT TISSUES: No prevertebral soft tissue swelling. LUNG APICES: Biapical pleuroparenchymal scarring.   OTHER FINDINGS: Atherosclerotic calcification of the carotid bifurcations and siphons.       No acute intracranial abnormality.   Chronic changes as noted above.   Acute mildly displaced fracture of the nasal bones and vomer. There is overlying soft tissue swelling extending to the right maxilla.   Air-fluid levels in the maxillary and sphenoid sinuses with opacification of multiple ethmoid air cells. They demonstrate high attenuation consistent with hemosinus.   No acute fracture or traumatic subluxation of the cervical spine.   Advanced multilevel discogenic degenerative changes as noted above.   MACRO: None   Signed by: Sathya Drake 1/7/2025 5:11 PM Dictation workstation:   [id number]    XR chest 1 view    Result Date:  1/7/2025  Interpreted By:  Graeme Ryan, STUDY: XR CHEST 1 VIEW; 1/7/2025 1:47 pm   INDICATION: Signs/Symptoms:sob.   COMPARISON: Chest x-ray 12/30/2024   ACCESSION NUMBER(S): [id number]   ORDERING CLINICIAN: STEWART FERREIRA   TECHNIQUE: 1 view of the chest was performed.   FINDINGS: Slightly improved bilateral multifocal airspace and interstitial opacities. Grossly stable perihilar vascular congestion. Stable layering bilateral pleural effusion. No pneumothorax.  The cardiomediastinal silhouette is enlarged. Dual lead left chest wall pacemaker noted. Aortic knob calcifications noted.       1. Slight improvement of the bilateral multifocal airspace and interstitial opacities. Otherwise no interval change from 12/30/2024 chest x-ray   Signed by: Graeme Ryan 1/7/2025 1:59 PM Dictation workstation:   [id number]    XR chest 2 views    Result Date: 12/31/2024  Interpreted By:  Tam Longo, STUDY: XR CHEST 2 VIEWS   INDICATION: Signs/Symptoms:Cough, shortness of breath.   COMPARISON: November 14   ACCESSION NUMBER(S): [id number]   ORDERING CLINICIAN: JONI ZARATE   FINDINGS: Significant interval worsening of the appearance of the chest. There are now fairly extensive bilateral multifocal airspace opacities throughout both lungs. The bilateral pleural effusions left-greater-than-right grossly similar to prior.   Cardiomegaly with pacemaker unchanged.       Significant worsening now with extensive bilateral multifocal airspace opacities throughout both lungs which could be multifocal pneumonia or a component of edema.   Bilateral effusions left-greater-than-right, grossly similar to previous.   Signed by: Tam Longo 12/31/2024 7:44 PM Dictation workstation:   [id number]    ECG 12 lead (Clinic Performed)    Result Date: 12/26/2024  AV dual-paced rhythm When compared with ECG of 14-NOV-2024 17:18, PREVIOUS ECG IS PRESENT Confirmed by Rita Workman ([id number]) on 12/26/2024 10:52:59 AM    Pulmonary Stress Test (6  Min. Walk)    Result Date: 12/23/2024  The patient underwent a 6-minute walk with oxygen titration.  The resting room air saturation 84%. At rest on 3 L/min nasal cannula resting saturation was 95%. The patient ambulated for 2 minutes and 37 seconds covering a distance of 38 m and desaturated to 86% on 3 L/min nasal cannula. Oxygen was titrated up to 5 L/min nasal cannula.  The patient ambulated further 2 minutes 45 seconds covering an additional 74 m.  The lowest recorded saturation was 90%. Impression: 1.  Desaturation at rest on room air 2.  No desaturation at rest on supplemental oxygen at 3 L/min nasal cannula 3.  Desaturation with ambulating on 3 L/min nasal cannula 4.  No desaturation with ambulating on supplemental oxygen at 5 L/min nasal cannula 5.  Marked exercise intolerance    Complete Pulmonary Function Test Pre/Post Bronchodilator (Spirometry Pre/Post/DLCO/Lung Volumes)    Result Date: 12/23/2024  Forced expiration spirometry demonstrates no airflow obstruction.  Spirograms are fair-quality plateau to forced vital capacity of 1.13 L or 51% of predicted.  This suggest a moderate restriction.  The Z-score is -2.51. The flow-volume loop suggest a restrictive pattern. The FEV1 is 1.07 L or 64% of predicted. The FEV1 to FVC ratio is 94. There is no significant response to the inhalation of an aerosolized bronchodilator. The diffusion capacity is moderately reduced at 7.93 or 46% of predicted.  The Z-score is -3.73. Impression: 1.  Fair quality study. 2.  No airflow obstruction and no response to Anoro/bronchodilator. 3.  Possible moderate restriction 4.  Mildly reduced diffusion capacity    Cardiac Catheterization Procedure    Result Date: 12/10/2024       [facility], Cath Lab [address] [place], [place] [id number]    Phone [phone] Fax [phone] Cardiovascular Catheterization Report Patient Name:      CHERRY ROJAS JESUS       Performing Physician:  [id number] Hampton                                                                MD Study Date:        12/10/2024          Verifying Physician:   [id number] Darrel Hampton MD MRN/PID:           [id number]            Cardiologist/Co-Scrub: Accession#:        [id number]        Ordering Provider:     [id number] PERLITA LANDRY Date of Birth/Age: 1935 / 89 years Cardiologist:          Sushma Collazo MD Gender:            F                   Fellow: Encounter#:        [id number]          Surgeon:  Study: Right Heart Cath  Indications: Heart failure. Heart failure.  Procedure Description: After infiltration of local anesthetic, the Right brachial vein was cannulated with a micropuncture technique. A 5 Latvian sheath was placed in the vein. A balloon tipped catheter was advanced through the right heart to record pressures. Cardiac output was calculated via the Laura method.  Right Heart Catheterization: A balloon tipped catheter was advanced through the right heart to record pressures. Cardiac output was calculated via the Laura method. Elevated left sided filling pressures with low cardiac output. Elevated ventricular filling pressure. Cardiac output is mildly decreased. Reduced cardiac output at rest. No evidence of shunt. Elevated pulmonary vascular resistance. Pulmonary venous hypertension and pulmonary arterial hypertension.  Hemo Personnel: +---------------+---------+ Name           Duty      +---------------+---------+ Darrel Hampton MD 1 +---------------+---------+  Hemodynamic Pressures:  +----+----------+---------+------------+-------------+---+-----+-------+-------+ SiteDate Time   Phase    Systolic    Diastolic  ED Mean A-Wave V-Wave                  Name       mmHg        mmHg     mmHmmHg  mmHg   mmHg                                                     g                     +----+----------+---------+------------+-------------+---+-----+-------+-------+   PA12/10/2024  O2 REST          62           23      39                   7:40:12 AM                                                         +----+----------+---------+------------+-------------+---+-----+-------+-------+   PW12/10/2024  O2 REST                               27     21     46     7:40:47 AM                                                         +----+----------+---------+------------+-------------+---+-----+-------+-------+   RV12/10/2024  O2 REST          61            3 10                        7:41:49 AM                                                         +----+----------+---------+------------+-------------+---+-----+-------+-------+   RA12/10/2024  O2 REST                                8     10     10     7:43:17 AM                                                         +----+----------+---------+------------+-------------+---+-----+-------+-------+   AO12/10/2024  O2 REST         141           70     110                   7:55:08 AM                                                         +----+----------+---------+------------+-------------+---+-----+-------+-------+   AO12/10/2024 AIR REST         141           70     110                   7:50:34 AM                                                         +----+----------+---------+------------+-------------+---+-----+-------+-------+   PA12/10/2024 AIR REST          62           23      39                   7:59:59 AM                                                         +----+----------+---------+------------+-------------+---+-----+-------+-------+   PW12/10/2024 AIR REST                               27     21     46     8:00:04 AM                                                          +----+----------+---------+------------+-------------+---+-----+-------+-------+   RV12/10/2024 AIR REST          61            3 10                        8:00:09 AM                                                         +----+----------+---------+------------+-------------+---+-----+-------+-------+   RA12/10/2024 AIR REST                                8     10     10     8:00:12 AM                                                         +----+----------+---------+------------+-------------+---+-----+-------+-------+  Oxygen Saturation %: +-----------+----------+------------+ Sample SiteO2 Sat (%)HB (g/100ml) +-----------+----------+------------+          AO        96        12.7 +-----------+----------+------------+          RA        54        12.7 +-----------+----------+------------+          AO        96        12.7 +-----------+----------+------------+          PA        52        12.7 +-----------+----------+------------+          AO        96        12.7 +-----------+----------+------------+          RA        53        12.7 +-----------+----------+------------+          AO        96        12.7 +-----------+----------+------------+          PA        52        12.7 +-----------+----------+------------+  Cardiac Outputs: +---------------+------------------+-------+ CRISTELA CO (l/min)CRISTELA CI (l/min/m2)CRISTELA SV +---------------+------------------+-------+             2.7               1.8   33.3 +---------------+------------------+-------+             2.6               1.8        +---------------+------------------+-------+  Vascular Resistance Calculated Values (Wood Units): +-----+---+----+-------+----+----+----+----+----+----+-------+ PhasePVRPVRIPVR/SVRSVR SVRITPR TPRITVR TVRITPR/TVR +-----+---+----+-------+----+----+----+----+----+----+-------+ 0    4.76.9 0       [phone]       +-----+---+----+-------+----+----+----+----+----+----+-------+ 1    4.76.9 0      [phone]       +-----+---+----+-------+----+----+----+----+----+----+-------+  Complications: No in-lab complications observed.  Cardiac Cath Post Procedure Notes: Post Procedure           Decompensated systolic and diastolic heart failure. Diagnosis: Blood Loss:              Estimated blood loss during the procedure was <10 ml                          mls. Specimens Removed:       Number of specimen(s) removed: none.  Recommendations: Maximize medical therapy. ____________________________________________________________________________________ CONCLUSIONS:  1. Systolic and diastolic heart failure. 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  CPT Codes: Right Heart Cath O2/Cardiac output without biopsy ([facility])-51325  [id number] Darrel Hampton MD Performing Physician Electronically signed by [id number] Darrel Hampton MD on 12/10/2024 at 5:14:46 PM  ** Final **        Lab Results  Results for orders placed or performed during the hospital encounter of 01/07/25 (from the past 24 hours)   Basic Metabolic Panel   Result Value Ref Range    Glucose 88 74 - 99 mg/dL    Sodium 141 136 - 145 mmol/L    Potassium 4.0 3.5 - 5.3 mmol/L    Chloride 102 98 - 107 mmol/L    Bicarbonate 37 (H) 21 - 32 mmol/L    Anion Gap <7 (L) 10 - 20 mmol/L    Urea Nitrogen 78 (H) 6 - 23 mg/dL    Creatinine 1.90 (H) 0.50 - 1.05 mg/dL    eGFR 25 (L) >60 mL/min/1.73m*2    Calcium 9.3 8.6 - 10.3 mg/dL   CBC   Result Value Ref Range    WBC 6.1 4.4 - 11.3 x10*3/uL    nRBC 0.0 0.0 - 0.0 /100 WBCs    RBC 3.24 (L) 4.00 - 5.20 x10*6/uL    Hemoglobin 9.7 (L) 12.0 - 16.0 g/dL    Hematocrit 32.4 (L) 36.0 - 46.0 %     80 - 100 fL    MCH 29.9 26.0 - 34.0 pg    MCHC 29.9 (L) 32.0 - 36.0 g/dL    RDW 15.9 (H) 11.5 - 14.5 %    Platelets 251 150 - 450 x10*3/uL    Magnesium   Result Value Ref Range    Magnesium 2.58 (H) 1.60 - 2.40 mg/dL     *Note: Due to a large number of results and/or encounters for the requested time period, some results have not been displayed. A complete set of results can be found in Results Review.        Medications  Scheduled medications:  ALPRAZolam, 0.5 mg, oral, Nightly  amiodarone, 200 mg, oral, Daily  aspirin, 81 mg, oral, Daily  atorvastatin, 80 mg, oral, Daily  cefdinir, 300 mg, oral, Daily  [Held by provider] dapagliflozin propanediol, 10 mg, oral, Daily  tiotropium, 2 puff, inhalation, Daily   And  fluticasone furoate-vilanteroL, 1 puff, inhalation, Daily  levothyroxine, 37.5 mcg, oral, Daily  linaCLOtide, 145 mcg, oral, Daily before breakfast  metoprolol succinate XL, 25 mg, oral, Daily  [Held by provider] sacubitriL-valsartan, 0.5 tablet, oral, BID  [Held by provider] torsemide, 60 mg, oral, Daily      Continuous medications:     PRN medications:  PRN medications: acetaminophen, albuterol, bisacodyl, guaiFENesin, ipratropium-albuteroL, lubricating eye drops, melatonin, ondansetron **OR** ondansetron, oxymetazoline     Physical Exam  Constitutional:       General: She is not in acute distress.     Appearance: Normal appearance.      Comments: Elderly   HENT:      Head: Normocephalic and atraumatic.      Right Ear: External ear normal.      Left Ear: External ear normal.      Nose:      Comments: Significant bruising around bilateral eyes     Mouth/Throat:      Mouth: Mucous membranes are moist.      Pharynx: Oropharynx is clear.   Eyes:      Extraocular Movements: Extraocular movements intact.      Conjunctiva/sclera: Conjunctivae normal.      Pupils: Pupils are equal, round, and reactive to light.   Cardiovascular:      Rate and Rhythm: Normal rate and regular rhythm.      Pulses: Normal pulses.      Heart sounds: Normal heart sounds.   Pulmonary:      Effort: Pulmonary effort is normal. No respiratory distress.      Breath sounds:  Normal breath sounds. No wheezing, rhonchi or rales.   Abdominal:      General: Bowel sounds are normal.      Palpations: Abdomen is soft.      Tenderness: There is no abdominal tenderness. There is no right CVA tenderness, left CVA tenderness, guarding or rebound.   Musculoskeletal:         General: No swelling. Normal range of motion.      Cervical back: Normal range of motion and neck supple.      Comments: Arthritic changes of hands   Skin:     General: Skin is warm and dry.      Capillary Refill: Capillary refill takes less than 2 seconds.      Findings: No lesion or rash.      Comments: Age related skin changes   Neurological:      General: No focal deficit present.      Mental Status: She is alert and oriented to person, place, and time. Mental status is at baseline.   Psychiatric:         Mood and Affect: Mood normal.         Behavior: Behavior normal.                  Assessment/Plan      YEVGENIY on CKD  Cr 2.79 on arrival, baseline is around 2.0  Consult nephrology  Given her relative euvolemia today will wait hold off diuresis or IVF  Home Phoslo discontinued   She endorses she mostly drinks caffeinated beverages (tea) and doesn't have a great appetite   Renal artery US reviewed, no evidence of hemodynamically significant stenosis  Holding farxiga, entresto, demadex    Nasal fracture, nosebleed  No active bleeding now, continue to monitor  Hold blood thinners  Can follow up with ENT as outpatient if continued pain     HFpEF  TTE 12/24 - EF 52%, mild-mod TR  Appears euvolemic on exam. BNP is ~1800, but baseline is very high.  Recent cardiology notes reviewed - torsemide dosing based on weight.  Continue home Toprol XL  Holding farxiga, entresto, demadex     Hx Afib/SVT  Not on anticoagulation due to hx of GI bleed  Continue home amiodarone, Toprol XL     Hypothyroidism  Last TSH 12/23/24 was <37.5 (?). Free T4 0.51. Pt states her Synthroid was recently changed- now on Synthroid at 75 mcg, confirmed dosing with  pharmacy- appears she was previously on 25mcg daily and now rx for 75mcg daily. Feel given her age and minimally low T4 with lack of symptoms she would be better suited for dose increase only to 37.5mcg daily. Will adjust at this time.      COPD, hx recent pneumonia  CXR shows improvement of infiltrates compared to prior. Continue Omnicef for pneumonia.  Continue home inhalers for COPD. Does not appear to be in acute exacerbation.     HLD  Continue home aspirin, statin     Fall/weakness  PT/OT, may need SNF  Note patient is on xanax twice daily- confirmed with [facility] patient has been filling for 90 days regularly- would proceed with caution given her advanced age and comorbidities. Polypharmacy could be component in her weakness/falls. PCP should consider taper off     Code Status: Full Code          DVT ppx: SCDs (will hold blood thinners due to nosebleed)      Please see orders for more complete plan    Arpita Frazier PA-C

## 2025-01-11 NOTE — NURSING NOTE
Patient was given her discharge paperwork, it was reviewed by day shift RN, report was called to the [facility] [place] by dayshift RN,  patient was advised to follow up with her PCP or return to the ER with any further issues or concerns. Her IV was removed, EMS was given report, paperwork was provided to EMS, belongings given to EMS transport, and the patient was placed on O2 per her base orders exiting without any issues.

## 2025-01-13 ENCOUNTER — TELEPHONE (OUTPATIENT)
Dept: CARDIOLOGY | Facility: HOSPITAL | Age: 89
End: 2025-01-13
Payer: MEDICARE

## 2025-01-13 DIAGNOSIS — I50.32 CHRONIC HEART FAILURE WITH PRESERVED EJECTION FRACTION: ICD-10-CM

## 2025-01-13 DIAGNOSIS — I50.32 CHRONIC HEART FAILURE WITH PRESERVED EJECTION FRACTION: Primary | ICD-10-CM

## 2025-01-13 RX ORDER — TORSEMIDE 20 MG/1
60 TABLET ORAL DAILY
Qty: 120 TABLET | Refills: 1 | Status: SHIPPED | OUTPATIENT
Start: 2025-01-13 | End: 2025-01-13 | Stop reason: SDUPTHER

## 2025-01-13 RX ORDER — TORSEMIDE 20 MG/1
60 TABLET ORAL DAILY
Qty: 270 TABLET | Refills: 3 | Status: SHIPPED | OUTPATIENT
Start: 2025-01-13 | End: 2026-01-13

## 2025-01-13 NOTE — TELEPHONE ENCOUNTER
Adwoa called today to inform the CHF clinic that she is a Skilled Nursing Facility. She fell and was hospitalized last week. Call her cell phone if you have updates.  Her Torsemide was changed to PRN and her Entresto is held due to rise in kidney function.     Adwoa is encouraged to follow up with the CHF clinic once she is discharged home.

## 2025-01-13 NOTE — TELEPHONE ENCOUNTER
Kimani,  please send prescription for her torsemide to the SNF.  Her baseline Cr. Runs around 2.0.   She will end up right back in hospital without the diuretic therapy.   Can hold the Entresto for now.  Please call SNF and let them know and also she will need BMP in 1 week.  Thanks.

## 2025-01-13 NOTE — TELEPHONE ENCOUNTER
I called [facility] in [place] and updated Adwoa's nurse on the med change (Torsemide 60 mg daily) and lab work.

## 2025-01-14 ENCOUNTER — APPOINTMENT (OUTPATIENT)
Dept: CARDIOLOGY | Facility: HOSPITAL | Age: 89
End: 2025-01-14
Payer: MEDICARE

## 2025-01-15 ENCOUNTER — APPOINTMENT (OUTPATIENT)
Dept: PULMONOLOGY | Facility: HOSPITAL | Age: 89
End: 2025-01-15
Payer: MEDICARE

## 2025-01-17 LAB
CREAT UR-SCNC: 4417.5 UMOL/L
CREATINE URINE DISORDER URINE INTERPRETATION FOR CDP: NORMAL
CREATINE/CREAT UR-SRTO: 21 MMOL/MOL CRT (ref 10–370)
GUANIDINOACETATE/CREAT UR-SRTO: 1 MMOL/MOL CRT (ref 7–130)

## 2025-01-20 ENCOUNTER — OFFICE VISIT (OUTPATIENT)
Dept: CARDIOLOGY | Facility: HOSPITAL | Age: 89
End: 2025-01-20
Payer: MEDICARE

## 2025-01-20 VITALS
WEIGHT: 107.1 LBS | OXYGEN SATURATION: 94 % | BODY MASS INDEX: 19.59 KG/M2 | SYSTOLIC BLOOD PRESSURE: 112 MMHG | RESPIRATION RATE: 22 BRPM | HEART RATE: 60 BPM | DIASTOLIC BLOOD PRESSURE: 67 MMHG

## 2025-01-20 DIAGNOSIS — I50.32 CHRONIC HEART FAILURE WITH PRESERVED EJECTION FRACTION: ICD-10-CM

## 2025-01-20 DIAGNOSIS — I50.42 CHRONIC COMBINED SYSTOLIC AND DIASTOLIC HEART FAILURE: Primary | ICD-10-CM

## 2025-01-20 PROCEDURE — 99213 OFFICE O/P EST LOW 20 MIN: CPT | Performed by: NURSE PRACTITIONER

## 2025-01-20 PROCEDURE — 3078F DIAST BP <80 MM HG: CPT | Performed by: NURSE PRACTITIONER

## 2025-01-20 PROCEDURE — 1157F ADVNC CARE PLAN IN RCRD: CPT | Performed by: NURSE PRACTITIONER

## 2025-01-20 PROCEDURE — 3074F SYST BP LT 130 MM HG: CPT | Performed by: NURSE PRACTITIONER

## 2025-01-20 PROCEDURE — 1159F MED LIST DOCD IN RCRD: CPT | Performed by: NURSE PRACTITIONER

## 2025-01-20 PROCEDURE — 1123F ACP DISCUSS/DSCN MKR DOCD: CPT | Performed by: NURSE PRACTITIONER

## 2025-01-20 PROCEDURE — 1111F DSCHRG MED/CURRENT MED MERGE: CPT | Performed by: NURSE PRACTITIONER

## 2025-01-20 PROCEDURE — 1036F TOBACCO NON-USER: CPT | Performed by: NURSE PRACTITIONER

## 2025-01-20 RX ORDER — TORSEMIDE 20 MG/1
40 TABLET ORAL 2 TIMES DAILY
Qty: 360 TABLET | Refills: 2 | Status: SHIPPED | OUTPATIENT
Start: 2025-01-20 | End: 2026-01-20

## 2025-01-20 RX ORDER — TORSEMIDE 20 MG/1
40 TABLET ORAL 2 TIMES DAILY
Qty: 360 TABLET | Refills: 2 | Status: SHIPPED | OUTPATIENT
Start: 2025-01-20 | End: 2025-01-20 | Stop reason: SDUPTHER

## 2025-01-20 ASSESSMENT — ENCOUNTER SYMPTOMS
PALPITATIONS: 0
WHEEZING: 0
LIGHT-HEADEDNESS: 0
ABDOMINAL DISTENTION: 0
FEVER: 0
BLOOD IN STOOL: 0
OCCASIONAL FEELINGS OF UNSTEADINESS: 1
EYES NEGATIVE: 1
SHORTNESS OF BREATH: 0
ACTIVITY CHANGE: 0
HEMATURIA: 0
CONFUSION: 0
WEAKNESS: 0
DEPRESSION: 1
CHILLS: 0
CHEST TIGHTNESS: 0
LOSS OF SENSATION IN FEET: 0
COUGH: 0

## 2025-01-20 NOTE — PATIENT INSTRUCTIONS
Thank you for coming in today.  If you have any questions you may contact the office Monday through Friday at [phone] or on week ends at [phone].    Please increase  torsemide to 40 mg in the morning and 40 mg after lunch time every day.     Please drawn BMP and Magnesium  in 7-10 days.     Please follow  a 2 GM sodium diet and limit fluid intake to 2 liters per day or 8 servings ( serving size = 8 oz. = 1 cup = 240 ml) per day.   Please avoid processed meat products (luncheon meats, sausages, loya, hot dogs for example) eat 4 servings of vegetables and 1-2 whole servings of whole fruits per day.   Please weigh daily and call [phone] for weight gain of 3 pounds in 24 hours or 5 pounds or if you experience increased swelling or shortness of breath.         Follow up: 10-14 days.     Please be sure to follow up with your cardiologist at [facility] once every year. Call [phone] to schedule appointment if you do not have a follow up appointment scheduled already.

## 2025-01-20 NOTE — PROGRESS NOTES
Subjective   Patient ID: Adwoa Forrest is a 89 y.o. female who presents for follow-up of congestive heart failure.     Current Outpatient Medications:     acetaminophen (Tylenol 8 HOUR) 650 mg ER tablet, Take 1 tablet (650 mg) by mouth every 8 hours if needed for mild pain (1 - 3). Do not crush, chew, or split., Disp: , Rfl:     albuterol 90 mcg/actuation inhaler, Inhale 2 puffs every 4 hours if needed for shortness of breath., Disp: 18 g, Rfl: 11    amiodarone (Pacerone) 200 mg tablet, Take 1 tablet (200 mg) by mouth once daily., Disp: 90 tablet, Rfl: 3    aspirin 81 mg EC tablet, Take 1 tablet (81 mg) by mouth once daily., Disp: , Rfl:     atorvastatin (Lipitor) 80 mg tablet, Take 1 tablet (80 mg) by mouth once daily. At bedtime, Disp: 90 tablet, Rfl: 3    calcitriol (Rocaltrol) 0.25 mcg capsule, Take 1 capsule (0.25 mcg) by mouth once daily. Three times a week., Disp: , Rfl:     dapagliflozin propanediol (Farxiga) 10 mg, Take 1 tablet (10 mg) by mouth once daily., Disp: 90 tablet, Rfl: 3    fluticasone-umeclidin-vilanter (Trelegy Ellipta) 100-62.5-25 mcg blister with device, Inhale 1 puff once daily., Disp: 30 each, Rfl: 11    guaiFENesin (Mucinex) 600 mg 12 hr tablet, Take 1 tablet (600 mg) by mouth every 12 hours if needed for congestion. Do not crush, chew, or split., Disp: , Rfl:     ipratropium-albuteroL (Duo-Neb) 0.5-2.5 mg/3 mL nebulizer solution, Take 3 mL by nebulization 4 times a day as needed for wheezing or shortness of breath., Disp: 360 mL, Rfl: 0    levothyroxine (Synthroid, Levoxyl) 75 mcg tablet, Take 0.5 tablets (37.5 mcg) by mouth once daily., Disp: , Rfl:     linaCLOtide (Linzess) 145 mcg capsule, Take 1 capsule (145 mcg) by mouth once daily in the morning. Take before meals. Do not crush or chew., Disp: 30 capsule, Rfl: 11    metoprolol succinate XL (Toprol-XL) 25 mg 24 hr tablet, Take 1 tablet (25 mg) by mouth once daily. Do not crush or chew., Disp: 30 tablet, Rfl: 11    multivitamin  tablet, Take 1 tablet by mouth once daily., Disp: , Rfl:     torsemide (Demadex) 20 mg tablet, Take 3 tablets (60 mg) by mouth once daily., Disp: 270 tablet, Rfl: 3    ALPRAZolam (Xanax) 0.5 mg tablet, Take 1 tablet (0.5 mg) by mouth once daily at bedtime for 3 days., Disp: 3 tablet, Rfl: 0     HPI     Past medical history of ischemic heart disease with PCI in the past. 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 She has had nonsustained ventricular tachycardia in the past from which she was symptomatic. She has been pretty well-controlled with that on amiodarone. Recent stress MPI normal. She had hospitalization on March 4, 2024 with pneumonia and comorbid CHF. The lateral wall lead was unable to be placed to convert to BiV pacer. Last ER visit     Presents today for follow-up in heart failure clinic.  She apparently slid off the sofa fell forward and broke her nose and has bruising of the bridge of her nose and face today in clinic.  She is now in rehab facility for physical therapy.  She continues to need the oxygen continuously she continues to have orthopnea.  She denies PND.  She is chronically short of breath.  She denies chest pain or palpitations.  She is beginning to redevelop edema in the lower extremities.  Lungs have a few coarse breath sounds in the posterior bases and are generally diminished.  Her son and daughter with her today.    Review of Systems   Constitutional:  Negative for activity change, chills and fever.   HENT:  Negative for hearing loss.    Eyes: Negative.    Respiratory:  Negative for cough, chest tightness, shortness of breath and wheezing.         COPD with chronic O2  3 liters continuous, positive orthopnea, no PND.       Cardiovascular:  Negative for chest pain, palpitations and leg swelling.   Gastrointestinal:  Negative for abdominal distention and blood in stool.   Genitourinary:  Negative for hematuria.   Neurological:  Negative  for syncope, weakness and light-headedness.   Psychiatric/Behavioral:  Negative for confusion.        Objective     /67 (BP Location: Right arm, Patient Position: Sitting, BP Cuff Size: Small adult)   Pulse 60   Resp 22   Wt 48.6 kg (107 lb 1.6 oz)   LMP  (LMP Unknown)   SpO2 94%   BMI 19.59 kg/m²     Transthoracic echo (TTE) complete    Result Date: 12/4/2024              [place] [address]      Phone [phone] Fax [phone] TRANSTHORACIC ECHOCARDIOGRAM REPORT Patient Name:       CHERRY ROJAS JESUS Noel Physician:    [id number] Darrel Hampton MD Study Date:         12/3/2024           Ordering Provider:    [id number] ADRIA JON MRN/PID:            [id number]            Fellow: Accession#:         [id number]        Nurse:                Camilla Robledo RN Date of Birth/Age:  1935 / 89 years Sonographer:          Thea Espinoza RDCS Gender Assigned at  F                   Additional Staff: Birth: Height:             157.48 cm           Admit Date: Weight:             47.17 kg            Admission Status:     Outpatient BSA / BMI:          1.45 m2 / 19.02     Department Location:  [facility] Echo                     kg/m2                                     Lab Blood Pressure: 130 /73 mmHg Study Type:    TRANSTHORACIC ECHO (TTE) COMPLETE Diagnosis/ICD: Chronic combined systolic (congestive) and diastolic (congestive)                heart failure (CHF)-I50.42 Indication:    Heart failure CPT Codes:     Echo Complete w Full Doppler-50030 Patient History: Pertinent History: CAD, HTN and CHF. Study Detail: The following Echo studies were performed: 2D, M-Mode, Doppler and               color flow. Technically challenging study due to body habitus and                prominent lung artifact. Agitated saline used as a contrast agent               for intraseptal flow evaluation and Definity used as a contrast               agent for endocardial border definition. Total contrast used for               this procedure was 2 mL via IV push.  PHYSICIAN INTERPRETATION: Left Ventricle: The left ventricular systolic function is mildly decreased, with a Lentz's biplane calculated ejection fraction of 52%. There is mild concentric left ventricular hypertrophy. There is global hypokinesis of the left ventricle with minor regional variations. The left ventricular cavity size is normal. There is mild increased septal and mildly increased posterior left ventricular wall thickness. Spectral Doppler shows a normal pattern of left ventricular diastolic filling. Left Atrium: The left atrium is normal in size. A bubble study using agitated saline was performed. Bubble study is negative. Right Ventricle: The right ventricle is normal in size. There is mildly reduced right ventricular systolic function. A device is visualized in the right ventricle. Right Atrium: The right atrium is mildly dilated. There is a device visualized in the right atrium. Aortic Valve: The aortic valve appears structurally normal. There is mild aortic valve cusp calcification. The aortic valve dimensionless index is 0.69. There is mild aortic valve regurgitation. The peak instantaneous gradient of the aortic valve is 6 mmHg. The mean gradient of the aortic valve is 3 mmHg. Mitral Valve: The mitral valve is normal in structure. There is trace mitral valve regurgitation. Tricuspid Valve: The tricuspid valve is structurally normal. There is mild to moderate tricuspid regurgitation. The Doppler estimated RVSP is moderately elevated right ventricular systolic pressure at 66.2 mmHg. Pulmonic Valve: The pulmonic valve is not well visualized. There is physiologic pulmonic valve regurgitation. Pericardium: No pericardial effusion  noted. Aorta: The aortic root is normal.  CONCLUSIONS:  1. The left ventricular systolic function is mildly decreased, with a Lentz's biplane calculated ejection fraction of 52%.  2. There is global hypokinesis of the left ventricle with minor regional variations.  3. There is mildly reduced right ventricular systolic function.  4. Mild to moderate tricuspid regurgitation.  5. Moderately elevated right ventricular systolic pressure.  6. Mild aortic valve regurgitation. QUANTITATIVE DATA SUMMARY:  2D MEASUREMENTS:            Normal Ranges: LAs:             4.30 cm    (2.7-4.0cm) IVSd:            1.07 cm    (0.6-1.1cm) LVPWd:           1.01 cm    (0.6-1.1cm) LVIDd:           4.38 cm    (3.9-5.9cm) LVIDs:           3.30 cm LV Mass Index:   106.7 g/m2 LV % FS          24.7 %  LA VOLUME:                    Normal Ranges: LA Vol A4C:        45.6 ml    (22+/-6mL/m2) LA Vol A2C:        42.2 ml LA Vol BP:         48.2 ml LA Vol Index A4C:  31.5ml/m2 LA Vol Index A2C:  29.2 ml/m2 LA Vol Index BP:   33.3 ml/m2 LA Area A4C:       17.8 cm2 LA Area A2C:       15.6 cm2 LA Major Axis A4C: 5.9 cm LA Major Axis A2C: 4.9 cm LA Vol A4C:        42.1 ml LA Vol A2C:        41.4 ml LA Vol Index BSA:  28.8 ml/m2  RA VOLUME BY A/L METHOD:          Normal Ranges: RA Area A4C:             23.4 cm2  AORTA MEASUREMENTS:         Normal Ranges: Ao Sinus, d:        3.00 cm (2.1-3.5cm) Ao STJ, d:          2.40 cm (1.7-3.4cm) Asc Ao, d:          3.60 cm (2.1-3.4cm)  LV SYSTOLIC FUNCTION BY 2D PLANIMETRY (MOD):                      Normal Ranges: EF-A4C View:    37 % (>=55%) EF-A2C View:    63 % EF-Biplane:     52 % LV EF Reported: 52 %  LV DIASTOLIC FUNCTION:          Normal Ranges: MV Peak E:             0.63 m/s (0.7-1.2 m/s) MV Peak A:             0.25 m/s (0.42-0.7 m/s) E/A Ratio:             2.51     (1.0-2.2) MV lateral e'          0.06 m/s  MITRAL VALVE:          Normal Ranges: MV DT:        300 msec (150-240msec)  AORTIC VALVE:                      Normal Ranges: AoV Vmax:                1.20 m/s (<=1.7m/s) AoV Peak P.7 mmHg (<20mmHg) AoV Mean PG:             3.0 mmHg (1.7-11.5mmHg) LVOT Max Henry:            0.77 m/s (<=1.1m/s) AoV VTI:                 23.22 cm (18-25cm) LVOT VTI:                15.93 cm LVOT Diameter:           1.96 cm  (1.8-2.4cm) AoV Area, VTI:           2.07 cm2 (2.5-5.5cm2) AoV Area,Vmax:           1.93 cm2 (2.5-4.5cm2) AoV Dimensionless Index: 0.69  AORTIC INSUFFICIENCY: AI Vmax:       3.95 m/s AI Half-time:  419 msec AI Decel Time: 1446 msec AI Decel Rate: 273.44 cm/s2  RIGHT VENTRICLE: RV Basal 3.49 cm RV Mid   2.80 cm RV Major 7.8 cm TAPSE:   15.0 mm RV s'    0.08 m/s  TRICUSPID VALVE/RVSP:          Normal Ranges: Peak TR Velocity:     3.97 m/s RV Syst Pressure:     66 mmHg  (< 30mmHg) IVC Diam:             1.65 cm  AORTA: Asc Ao Diam 3.60 cm  [id number] Darrel Hampton MD Electronically signed on 2024 at 8:59:11 AM  ** Final **     Transthoracic Echo Limited    Result Date: 2024              [place] [id number]      Phone [phone] Fax [phone] TRANSTHORACIC ECHOCARDIOGRAM REPORT  Patient Name:      CHERRY Noel Physician:    [id number] Darrel Hampton MD Study Date:        2024            Ordering Provider:    [id number] BROOKE FOFANA MRN/PID:           [id number]             Fellow: Accession#:        [id number]         Nurse:                Nette Ott RN Date of Birth/Age: 1935 / 88 years  Sonographer:          Estelita Brown RDCS Gender:            F                    Additional Staff: Height:            157.48 cm            Admit Date: Weight:            45.36 kg             Admission Status:     Outpatient BSA / BMI:         1.42 m2 / 18.29       Department Location:                    kg/m2 Blood Pressure: 102 /58 mmHg Study Type:    TRANSTHORACIC ECHO (TTE) LIMITED Diagnosis/ICD: Ventricular hypertrophy or dilatation-I51.7 Indication:    Left Ventricular hypertrophy CPT Codes:     Echo Limited-23660 Patient History: Pertinent History: LVH. Study Detail: Technically challenging study due to small rib spaces and               prominent lung artifact. Definity used as a contrast agent for               endocardial border definition. Unable to obtain suprasternal notch               and subcostal view.  PHYSICIAN INTERPRETATION: Left Ventricle: Left ventricular systolic function is moderately decreased, with an estimated ejection fraction of 35-40%. There is global hypokinesis of the left ventricle with minor regional variations. The left ventricular cavity size was not assessed. The left ventricular septal wall thickness is moderately increased. There is moderately increased left ventricular posterior wall thickness. There is moderate to severe left ventricular hypertrophy. Left ventricular diastolic filling was not assessed. Left Atrium: The left atrium was not assessed. Right Ventricle: The right ventricle was not assessed. Right ventricular systolic function not assessed. Right Atrium: The right atrium was not assessed. Aortic Valve: The aortic valve was not assessed. Aortic valve regurgitation was not assessed. Mitral Valve: The mitral valve was not assessed. Mitral valve regurgitation was not assessed. Tricuspid Valve: The tricuspid valve was not assessed. Tricuspid regurgitation was not assessed. Pulmonic Valve: The pulmonic valve was not assessed. The pulmonic valve regurgitation was not assessed. Pericardium: Pericardial effusion was not assessed. Aorta: The aortic root was not assessed.  CONCLUSIONS:  1. Left ventricular systolic function is moderately decreased with a 35-40% estimated ejection fraction.  2. Moderately increased left ventricular  septal thickness.  3. The left ventricular posterior wall thickness is moderately increased.  4. There is moderate to severe left ventricular hypertrophy.  5. There is global hypokinesis of the left ventricle with minor regional variations. QUANTITATIVE DATA SUMMARY: 2D MEASUREMENTS:                           Normal Ranges: LAs:           3.00 cm    (2.7-4.0cm) IVSd:          1.62 cm    (0.6-1.1cm) LVPWd:         1.44 cm    (0.6-1.1cm) LVIDd:         3.62 cm    (3.9-5.9cm) LVIDs:         2.69 cm LV Mass Index: 146.9 g/m2 LV % FS        25.6 % LV SYSTOLIC FUNCTION BY 2D PLANIMETRY (MOD):                     Normal Ranges: EF-A4C View: 33.5 % (>=55%) EF-A2C View: 39.2 % EF-Biplane:  39.0 %  [id number] Darrel Hampton MD Electronically signed on 2/21/2024 at 9:38:31 AM  ** Final **       Lab Results   Component Value Date    BUN 78 (H) 01/10/2025    CREATININE 1.90 (H) 01/10/2025    BNP 1,832 (H) 01/07/2025    MG 2.58 (H) 01/10/2025    K 4.0 01/10/2025     01/10/2025       Constitutional:       General:  NAD   HENT:      Head: Normocephalic     Mouth: Mucous membranes are moist.      Neck:  No JVD or HJR   Eyes:      Conjunctiva/sclera: Conjunctivae normal.    Cardiovascular:      Rate and Rhythm: Normal rate and regular rhythm     Heart sounds:  S1 S2 normal, positive aortic stenosis murmur at the base no S3 or S4   Pulmonary:   Chronic shortness of breath with any exertional activity.  She is chronically dependent on 3 L of nasal cannula oxygen.  Lungs with coarse crackles posterior bases few scattered wheezes lung excursion is diminished prolonged expiratory phase.     Abdominal:      General: Bowel sounds are normal, non- tender to palpitations.   Musculoskeletal:         General: +2 pitting edema of the bilateral feet and ankles.  DUARTE well.   Skin:     General: Skin is warm and dry   Neurological:      General: No focal deficit present.      Mental Status: alert and oriented to person, place, and time. Mental status  is at baseline.     Psychiatric:         Mood and Affect: Normal Affect.     Assessment/Plan     Problem List Items Addressed This Visit       Chronic combined systolic and diastolic heart failure      Chronic diastolic heart failure   Etiology: HTN/ASHD/ CKD  AHA Stage: C   NYHA class: 3b  Volume Status:  No overt congestion on exam.   GFR: 24     GDMT:  BB-Metoprolol XL 50 mg 1/2 tablet daily   ARB/ACEI/ARNI -   MRA -  GFR less than 30   SGLT2i - Farxiga 10 mg once a day   Diuretic -  Torsemide  80 mg once a day    Device Therapy: PPM - 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 ....  was unable to upgrade to Bi V pacer.        CHF:    She has had weight gain and is having + 2 pitting edema of the bilateral lower extremities.    Will increase the Torsemide 40 mg  in the morning and after lunch.   BMP in 1 week.  Follow up in  7-10 days.   2 liter fluid restriction and 2 gm sodium diet.      2. Atrial fibrillation: persistent.   She is s/p AV node ablation and PPM placement in the past. On ASA.  No other OAC due to hx of recurrent GIB     3. NSVT:  Amiodarone.  No palpitations.       3. CKD 3: GFR as noted. She is following with nephrology.  Repeat basic metabolic panel today.     4. HTN:  Controlled.       5. ASHD with PCI to LAD 2017:  Secondary prevention medications ASA, Statin, BB.   Recent stress/MPI scan negative for ischemia.   No angina.  Stable.      6. Chronic COPD: Scheduled for pulmonary function evaluation.  Currently finishing up treatment for pneumonia.     Prognosis is poor.  She is not tolerating guideline directed medical therapies.  I had a long discussion today with Adwoa her daughter and her son concerning advanced directives including a DNR order.  Forms were provided for them today.  It sounds as though Adwoa does not want to have CPR or be defibrillated.  There is some question about whether she would want to be put on the ventilator or not.  I encouraged family to discuss these  matters in light of the fact that she has had a fairly steady decline over the last 9 to 10 months.  We did briefly discuss hospice.  It does not sound like she is ready for that step.    Rosangela Lane, INDIGO-CNP

## 2025-01-28 DIAGNOSIS — J44.9 CHRONIC OBSTRUCTIVE PULMONARY DISEASE, UNSPECIFIED COPD TYPE (MULTI): ICD-10-CM

## 2025-01-28 RX ORDER — IPRATROPIUM BROMIDE AND ALBUTEROL SULFATE 2.5; .5 MG/3ML; MG/3ML
3 SOLUTION RESPIRATORY (INHALATION) 4 TIMES DAILY PRN
Qty: 360 ML | Refills: 3 | Status: SHIPPED | OUTPATIENT
Start: 2025-01-28 | End: 2026-01-28

## 2025-01-29 LAB
ANION GAP SERPL CALCULATED.4IONS-SCNC: 13 MMOL/L (CALC) (ref 7–17)
BUN SERPL-MCNC: 58 MG/DL (ref 7–25)
BUN/CREAT SERPL: 25 (CALC) (ref 6–22)
CALCIUM SERPL-MCNC: 9 MG/DL (ref 8.6–10.4)
CHLORIDE SERPL-SCNC: 95 MMOL/L (ref 98–110)
CO2 SERPL-SCNC: 34 MMOL/L (ref 20–32)
CREAT SERPL-MCNC: 2.28 MG/DL (ref 0.6–0.95)
EGFRCR SERPLBLD CKD-EPI 2021: 20 ML/MIN/1.73M2
GLUCOSE SERPL-MCNC: 65 MG/DL (ref 65–139)
MAGNESIUM SERPL-MCNC: 2.5 MG/DL (ref 1.5–2.5)
POTASSIUM SERPL-SCNC: 4.3 MMOL/L (ref 3.5–5.3)
SODIUM SERPL-SCNC: 142 MMOL/L (ref 135–146)

## 2025-01-30 ENCOUNTER — OFFICE VISIT (OUTPATIENT)
Dept: CARDIOLOGY | Facility: HOSPITAL | Age: 89
End: 2025-01-30
Payer: MEDICARE

## 2025-01-30 ENCOUNTER — INFUSION (OUTPATIENT)
Dept: INFUSION THERAPY | Facility: HOSPITAL | Age: 89
End: 2025-01-30
Payer: MEDICARE

## 2025-01-30 VITALS
HEART RATE: 65 BPM | BODY MASS INDEX: 20.01 KG/M2 | DIASTOLIC BLOOD PRESSURE: 69 MMHG | SYSTOLIC BLOOD PRESSURE: 125 MMHG | RESPIRATION RATE: 20 BRPM | WEIGHT: 109.4 LBS | OXYGEN SATURATION: 92 %

## 2025-01-30 VITALS
DIASTOLIC BLOOD PRESSURE: 70 MMHG | WEIGHT: 109 LBS | OXYGEN SATURATION: 92 % | RESPIRATION RATE: 20 BRPM | SYSTOLIC BLOOD PRESSURE: 130 MMHG | HEART RATE: 65 BPM | TEMPERATURE: 98.6 F | BODY MASS INDEX: 19.94 KG/M2

## 2025-01-30 DIAGNOSIS — I50.42 CHRONIC COMBINED SYSTOLIC AND DIASTOLIC HEART FAILURE: ICD-10-CM

## 2025-01-30 DIAGNOSIS — I50.32 CHRONIC HEART FAILURE WITH PRESERVED EJECTION FRACTION: Primary | ICD-10-CM

## 2025-01-30 DIAGNOSIS — I50.43 ACUTE ON CHRONIC COMBINED SYSTOLIC AND DIASTOLIC HEART FAILURE: ICD-10-CM

## 2025-01-30 PROCEDURE — 1157F ADVNC CARE PLAN IN RCRD: CPT | Performed by: NURSE PRACTITIONER

## 2025-01-30 PROCEDURE — 99213 OFFICE O/P EST LOW 20 MIN: CPT | Performed by: NURSE PRACTITIONER

## 2025-01-30 PROCEDURE — 1036F TOBACCO NON-USER: CPT | Performed by: NURSE PRACTITIONER

## 2025-01-30 PROCEDURE — 3078F DIAST BP <80 MM HG: CPT | Performed by: NURSE PRACTITIONER

## 2025-01-30 PROCEDURE — 1159F MED LIST DOCD IN RCRD: CPT | Performed by: NURSE PRACTITIONER

## 2025-01-30 PROCEDURE — 1123F ACP DISCUSS/DSCN MKR DOCD: CPT | Performed by: NURSE PRACTITIONER

## 2025-01-30 PROCEDURE — 1111F DSCHRG MED/CURRENT MED MERGE: CPT | Performed by: NURSE PRACTITIONER

## 2025-01-30 PROCEDURE — 96374 THER/PROPH/DIAG INJ IV PUSH: CPT | Mod: INF

## 2025-01-30 PROCEDURE — 1126F AMNT PAIN NOTED NONE PRSNT: CPT | Performed by: NURSE PRACTITIONER

## 2025-01-30 PROCEDURE — 3074F SYST BP LT 130 MM HG: CPT | Performed by: NURSE PRACTITIONER

## 2025-01-30 PROCEDURE — 2500000001 HC RX 250 WO HCPCS SELF ADMINISTERED DRUGS (ALT 637 FOR MEDICARE OP): Performed by: NURSE PRACTITIONER

## 2025-01-30 PROCEDURE — 2500000004 HC RX 250 GENERAL PHARMACY W/ HCPCS (ALT 636 FOR OP/ED): Performed by: NURSE PRACTITIONER

## 2025-01-30 RX ORDER — METOLAZONE 5 MG/1
2.5 TABLET ORAL ONCE
Status: CANCELLED | OUTPATIENT
Start: 2025-01-30 | End: 2025-01-30

## 2025-01-30 RX ORDER — FLUTICASONE PROPIONATE 50 MCG
2 SPRAY, SUSPENSION (ML) NASAL DAILY
COMMUNITY
Start: 2024-12-26

## 2025-01-30 RX ORDER — FUROSEMIDE 10 MG/ML
60 INJECTION INTRAMUSCULAR; INTRAVENOUS ONCE
Status: COMPLETED | OUTPATIENT
Start: 2025-01-30 | End: 2025-01-30

## 2025-01-30 RX ORDER — FUROSEMIDE 10 MG/ML
80 INJECTION INTRAMUSCULAR; INTRAVENOUS ONCE
Status: CANCELLED | OUTPATIENT
Start: 2025-01-30

## 2025-01-30 RX ORDER — FUROSEMIDE 10 MG/ML
60 INJECTION INTRAMUSCULAR; INTRAVENOUS ONCE
Status: CANCELLED | OUTPATIENT
Start: 2025-01-30 | End: 2025-01-30

## 2025-01-30 RX ORDER — CINACALCET 30 MG/1
30 TABLET, FILM COATED ORAL 3 TIMES WEEKLY
COMMUNITY

## 2025-01-30 RX ORDER — METOLAZONE 2.5 MG/1
2.5 TABLET ORAL ONCE
Status: CANCELLED | OUTPATIENT
Start: 2025-01-30 | End: 2025-01-30

## 2025-01-30 RX ORDER — TORSEMIDE 20 MG/1
40 TABLET ORAL 2 TIMES DAILY
Qty: 360 TABLET | Refills: 2 | Status: SHIPPED | OUTPATIENT
Start: 2025-01-30 | End: 2025-01-31 | Stop reason: SDUPTHER

## 2025-01-30 RX ORDER — METOLAZONE 2.5 MG/1
2.5 TABLET ORAL ONCE
Status: COMPLETED | OUTPATIENT
Start: 2025-01-30 | End: 2025-01-30

## 2025-01-30 RX ADMIN — FUROSEMIDE 60 MG: 10 INJECTION, SOLUTION INTRAMUSCULAR; INTRAVENOUS at 12:00

## 2025-01-30 RX ADMIN — METOLAZONE 2.5 MG: 2.5 TABLET ORAL at 11:49

## 2025-01-30 ASSESSMENT — COLUMBIA-SUICIDE SEVERITY RATING SCALE - C-SSRS
2. HAVE YOU ACTUALLY HAD ANY THOUGHTS OF KILLING YOURSELF?: NO
1. IN THE PAST MONTH, HAVE YOU WISHED YOU WERE DEAD OR WISHED YOU COULD GO TO SLEEP AND NOT WAKE UP?: NO
2. HAVE YOU ACTUALLY HAD ANY THOUGHTS OF KILLING YOURSELF?: NO
1. IN THE PAST MONTH, HAVE YOU WISHED YOU WERE DEAD OR WISHED YOU COULD GO TO SLEEP AND NOT WAKE UP?: NO
6. HAVE YOU EVER DONE ANYTHING, STARTED TO DO ANYTHING, OR PREPARED TO DO ANYTHING TO END YOUR LIFE?: NO
6. HAVE YOU EVER DONE ANYTHING, STARTED TO DO ANYTHING, OR PREPARED TO DO ANYTHING TO END YOUR LIFE?: NO

## 2025-01-30 ASSESSMENT — ENCOUNTER SYMPTOMS
BLOOD IN STOOL: 0
DEPRESSION: 0
CHILLS: 0
FEVER: 0
COUGH: 0
LIGHT-HEADEDNESS: 0
WHEEZING: 0
CHEST TIGHTNESS: 0
WEAKNESS: 0
DEPRESSION: 0
EYES NEGATIVE: 1
CONFUSION: 0
PALPITATIONS: 0
ABDOMINAL DISTENTION: 0
ACTIVITY CHANGE: 0
OCCASIONAL FEELINGS OF UNSTEADINESS: 0
OCCASIONAL FEELINGS OF UNSTEADINESS: 0
LOSS OF SENSATION IN FEET: 0
SHORTNESS OF BREATH: 1
LOSS OF SENSATION IN FEET: 0
HEMATURIA: 0

## 2025-01-30 ASSESSMENT — PAIN SCALES - GENERAL
PAINLEVEL_OUTOF10: 0-NO PAIN
PAINLEVEL_OUTOF10: 0-NO PAIN

## 2025-01-30 ASSESSMENT — PATIENT HEALTH QUESTIONNAIRE - PHQ9
SUM OF ALL RESPONSES TO PHQ9 QUESTIONS 1 AND 2: 0
2. FEELING DOWN, DEPRESSED OR HOPELESS: NOT AT ALL
1. LITTLE INTEREST OR PLEASURE IN DOING THINGS: NOT AT ALL
1. LITTLE INTEREST OR PLEASURE IN DOING THINGS: NOT AT ALL
2. FEELING DOWN, DEPRESSED OR HOPELESS: NOT AT ALL
SUM OF ALL RESPONSES TO PHQ9 QUESTIONS 1 AND 2: 0

## 2025-01-30 NOTE — PROGRESS NOTES
Subjective   Patient ID: Adwoa Forrest is a 89 y.o. female who presents for follow-up of congestive heart failure.     Current Outpatient Medications:     acetaminophen (Tylenol 8 HOUR) 650 mg ER tablet, Take 1 tablet (650 mg) by mouth every 8 hours if needed for mild pain (1 - 3). Do not crush, chew, or split., Disp: , Rfl:     albuterol 90 mcg/actuation inhaler, Inhale 2 puffs every 4 hours if needed for shortness of breath., Disp: 18 g, Rfl: 11    amiodarone (Pacerone) 200 mg tablet, Take 1 tablet (200 mg) by mouth once daily., Disp: 90 tablet, Rfl: 3    aspirin 81 mg EC tablet, Take 1 tablet (81 mg) by mouth once daily., Disp: , Rfl:     atorvastatin (Lipitor) 80 mg tablet, Take 1 tablet (80 mg) by mouth once daily. At bedtime, Disp: 90 tablet, Rfl: 3    calcitriol (Rocaltrol) 0.25 mcg capsule, Take 1 capsule (0.25 mcg) by mouth once daily. Three times a week., Disp: , Rfl:     cinacalcet (Sensipar) 30 mg tablet, Take 1 tablet (30 mg) by mouth 3 times a week. Take with food or shortly afer a meal. Swallow tablet whole; do not break or divide., Disp: , Rfl:     dapagliflozin propanediol (Farxiga) 10 mg, Take 1 tablet (10 mg) by mouth once daily., Disp: 90 tablet, Rfl: 3    finerenone (Kerendia) 10 mg tablet tablet, Take 1 tablet (10 mg) by mouth once daily., Disp: , Rfl:     fluticasone (Flonase) 50 mcg/actuation nasal spray, Administer 2 sprays into each nostril once daily., Disp: , Rfl:     fluticasone-umeclidin-vilanter (Trelegy Ellipta) 100-62.5-25 mcg blister with device, Inhale 1 puff once daily., Disp: 30 each, Rfl: 11    guaiFENesin (Mucinex) 600 mg 12 hr tablet, Take 1 tablet (600 mg) by mouth every 12 hours if needed for congestion. Do not crush, chew, or split., Disp: , Rfl:     ipratropium-albuteroL (Duo-Neb) 0.5-2.5 mg/3 mL nebulizer solution, TAKE 3 ML BY NEBULIZATION 4 TIMES A DAY AS NEEDED FOR WHEEZING OR SHORTNESS OF BREATH., Disp: 360 mL, Rfl: 3    levothyroxine (Synthroid, Levoxyl) 75 mcg  tablet, Take 0.5 tablets (37.5 mcg) by mouth once daily., Disp: , Rfl:     linaCLOtide (Linzess) 145 mcg capsule, Take 1 capsule (145 mcg) by mouth once daily in the morning. Take before meals. Do not crush or chew., Disp: 30 capsule, Rfl: 11    metoprolol succinate XL (Toprol-XL) 25 mg 24 hr tablet, Take 1 tablet (25 mg) by mouth once daily. Do not crush or chew., Disp: 30 tablet, Rfl: 11    multivitamin tablet, Take 1 tablet by mouth once daily., Disp: , Rfl:     torsemide (Demadex) 20 mg tablet, Take 2 tablets (40 mg) by mouth 2 times a day. (Patient taking differently: Take 2 tablets (40 mg) by mouth see administration instructions. Take 1 tablets orally twice a day (40 mg total daily) if your weight is between  lbs Take 2 tablets in the morning and 1 tablet in the afternoon (60 mg total daily) if your weight is between 100 -105 lbs Take 2 tablets in the morning and 2 tablets in the afternoon (80 mg total daily) if your weight is between 105 -110 lbs), Disp: 360 tablet, Rfl: 2    ALPRAZolam (Xanax) 0.5 mg tablet, Take 1 tablet (0.5 mg) by mouth once daily at bedtime for 3 days., Disp: 3 tablet, Rfl: 0     HPI     Past medical history of ischemic heart disease with PCI in the past. 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 She has had nonsustained ventricular tachycardia in the past from which she was symptomatic. She has been pretty well-controlled with that on amiodarone. Recent stress MPI normal. She had hospitalization on March 4, 2024 with pneumonia and comorbid CHF. The lateral wall lead was unable to be placed to convert to BiV pacer.     Patient presents today with weight gain.  She has had significant worsening of the shortness of breath.  Lungs are diminished about long term up the posterior bases with a few coarse breath sounds.  Interestingly she does not complain of being more shortness of breath that she is orthopneic she denies PND she is  concerned with the lower extremity edema.  She denies chest pain or palpitations.  She denies near or travis syncope.  Daughter is with her here today.  She states she is taking medications as directed and following sodium and fluid restrictions.  Most recent creatinine is 2.28 which is up from previous.       Review of Systems   Constitutional:  Negative for activity change, chills and fever.   HENT:  Negative for hearing loss.    Eyes: Negative.    Respiratory:  Positive for shortness of breath. Negative for cough, chest tightness and wheezing.         Patient reports sob with any activity.   Positive orthopnea.  Denies PND.   No febrile illnesses   Cardiovascular:  Positive for leg swelling. Negative for chest pain and palpitations.        2+ pitting edema of the bilateral lower extremities  Weight is up.    Gastrointestinal:  Negative for abdominal distention and blood in stool.   Genitourinary:  Negative for hematuria.   Neurological:  Negative for syncope, weakness and light-headedness.   Psychiatric/Behavioral:  Negative for confusion.        Objective     /69 (BP Location: Right arm, Patient Position: Sitting)   Pulse 65   Resp 20   Wt 49.6 kg (109 lb 6.4 oz)   LMP  (LMP Unknown)   SpO2 92% Comment: 3 LPM via NC  BMI 20.01 kg/m²         Transthoracic echo (TTE) complete    Result Date: 12/4/2024              [place] [address]      Phone [phone] Fax [phone] TRANSTHORACIC ECHOCARDIOGRAM REPORT Patient Name:       CHERRY ROJAS JESUS Noel Physician:    [id number] Darrel Hampton MD Study Date:         12/3/2024           Ordering Provider:    [id number] ADRIA JON MRN/PID:            [id number]            Fellow: Accession#:         [id number]        Nurse:                Camilla Robledo RN Date of Birth/Age:  1935 / 89  years Sonographer:          Thea Espinoza                                                               CHER Gender Assigned at  F                   Additional Staff: Birth: Height:             157.48 cm           Admit Date: Weight:             47.17 kg            Admission Status:     Outpatient BSA / BMI:          1.45 m2 / 19.02     Department Location:  [facility] Echo                     kg/m2                                     Lab Blood Pressure: 130 /73 mmHg Study Type:    TRANSTHORACIC ECHO (TTE) COMPLETE Diagnosis/ICD: 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 Study Detail: The following Echo studies were performed: 2D, M-Mode, Doppler and               color flow. Technically challenging study due to body habitus and               prominent lung artifact. Agitated saline used as a contrast agent               for intraseptal flow evaluation and Definity used as a contrast               agent for endocardial border definition. Total contrast used for               this procedure was 2 mL via IV push.  PHYSICIAN INTERPRETATION: Left Ventricle: The left ventricular systolic function is mildly decreased, with a Lentz's biplane calculated ejection fraction of 52%. There is mild concentric left ventricular hypertrophy. There is global hypokinesis of the left ventricle with minor regional variations. The left ventricular cavity size is normal. There is mild increased septal and mildly increased posterior left ventricular wall thickness. Spectral Doppler shows a normal pattern of left ventricular diastolic filling. Left Atrium: The left atrium is normal in size. A bubble study using agitated saline was performed. Bubble study is negative. Right Ventricle: The right ventricle is normal in size. There is mildly  reduced right ventricular systolic function. A device is visualized in the right ventricle. Right Atrium: The right atrium is mildly dilated. There is a device visualized in the right atrium. Aortic Valve: The aortic valve appears structurally normal. There is mild aortic valve cusp calcification. The aortic valve dimensionless index is 0.69. There is mild aortic valve regurgitation. The peak instantaneous gradient of the aortic valve is 6 mmHg. The mean gradient of the aortic valve is 3 mmHg. Mitral Valve: The mitral valve is normal in structure. There is trace mitral valve regurgitation. Tricuspid Valve: The tricuspid valve is structurally normal. There is mild to moderate tricuspid regurgitation. The Doppler estimated RVSP is moderately elevated right ventricular systolic pressure at 66.2 mmHg. Pulmonic Valve: The pulmonic valve is not well visualized. There is physiologic pulmonic valve regurgitation. Pericardium: No pericardial effusion noted. Aorta: The aortic root is normal.  CONCLUSIONS:  1. The left ventricular systolic function is mildly decreased, with a Lentz's biplane calculated ejection fraction of 52%.  2. There is global hypokinesis of the left ventricle with minor regional variations.  3. There is mildly reduced right ventricular systolic function.  4. Mild to moderate tricuspid regurgitation.  5. Moderately elevated right ventricular systolic pressure.  6. Mild aortic valve regurgitation. QUANTITATIVE DATA SUMMARY:  2D MEASUREMENTS:            Normal Ranges: LAs:             4.30 cm    (2.7-4.0cm) IVSd:            1.07 cm    (0.6-1.1cm) LVPWd:           1.01 cm    (0.6-1.1cm) LVIDd:           4.38 cm    (3.9-5.9cm) LVIDs:           3.30 cm LV Mass Index:   106.7 g/m2 LV % FS          24.7 %  LA VOLUME:                    Normal Ranges: LA Vol A4C:        45.6 ml    (22+/-6mL/m2) LA Vol A2C:        42.2 ml LA Vol BP:         48.2 ml LA Vol Index A4C:  31.5ml/m2 LA Vol Index A2C:  29.2 ml/m2 LA Vol  Index BP:   33.3 ml/m2 LA Area A4C:       17.8 cm2 LA Area A2C:       15.6 cm2 LA Major Axis A4C: 5.9 cm LA Major Axis A2C: 4.9 cm LA Vol A4C:        42.1 ml LA Vol A2C:        41.4 ml LA Vol Index BSA:  28.8 ml/m2  RA VOLUME BY A/L METHOD:          Normal Ranges: RA Area A4C:             23.4 cm2  AORTA MEASUREMENTS:         Normal Ranges: Ao Sinus, d:        3.00 cm (2.1-3.5cm) Ao STJ, d:          2.40 cm (1.7-3.4cm) Asc Ao, d:          3.60 cm (2.1-3.4cm)  LV SYSTOLIC FUNCTION BY 2D PLANIMETRY (MOD):                      Normal Ranges: EF-A4C View:    37 % (>=55%) EF-A2C View:    63 % EF-Biplane:     52 % LV EF Reported: 52 %  LV DIASTOLIC FUNCTION:          Normal Ranges: MV Peak E:             0.63 m/s (0.7-1.2 m/s) MV Peak A:             0.25 m/s (0.42-0.7 m/s) E/A Ratio:             2.51     (1.0-2.2) MV lateral e'          0.06 m/s  MITRAL VALVE:          Normal Ranges: MV DT:        300 msec (150-240msec)  AORTIC VALVE:                     Normal Ranges: AoV Vmax:                1.20 m/s (<=1.7m/s) AoV Peak P.7 mmHg (<20mmHg) AoV Mean PG:             3.0 mmHg (1.7-11.5mmHg) LVOT Max Henry:            0.77 m/s (<=1.1m/s) AoV VTI:                 23.22 cm (18-25cm) LVOT VTI:                15.93 cm LVOT Diameter:           1.96 cm  (1.8-2.4cm) AoV Area, VTI:           2.07 cm2 (2.5-5.5cm2) AoV Area,Vmax:           1.93 cm2 (2.5-4.5cm2) AoV Dimensionless Index: 0.69  AORTIC INSUFFICIENCY: AI Vmax:       3.95 m/s AI Half-time:  419 msec AI Decel Time: 1446 msec AI Decel Rate: 273.44 cm/s2  RIGHT VENTRICLE: RV Basal 3.49 cm RV Mid   2.80 cm RV Major 7.8 cm TAPSE:   15.0 mm RV s'    0.08 m/s  TRICUSPID VALVE/RVSP:          Normal Ranges: Peak TR Velocity:     3.97 m/s RV Syst Pressure:     66 mmHg  (< 30mmHg) IVC Diam:             1.65 cm  AORTA: Asc Ao Diam 3.60 cm  [id number] Darrel Hampton MD Electronically signed on 2024 at 8:59:11 AM  ** Final **     Transthoracic Echo Limited    Result Date:  2/21/2024              [address] [address]      Phone [phone] Fax [phone] TRANSTHORACIC ECHOCARDIOGRAM REPORT  Patient Name:      CHERRY ROJAS JESUS Noel Physician:    [id number] Darrel Hampton MD Study Date:        2/20/2024            Ordering Provider:    [id number] BROOKE FOFANA MRN/PID:           [id number]             Fellow: Accession#:        [id number]         Nurse:                Nette Ott RN Date of Birth/Age: 1935 / 88 years  Sonographer:          Estelita Brown                                                               RDCHER Gender:            F                    Additional Staff: Height:            157.48 cm            Admit Date: Weight:            45.36 kg             Admission Status:     Outpatient BSA / BMI:         1.42 m2 / 18.29      Department Location:                    kg/m2 Blood Pressure: 102 /58 mmHg Study Type:    TRANSTHORACIC ECHO (TTE) LIMITED Diagnosis/ICD: Ventricular hypertrophy or dilatation-I51.7 Indication:    Left Ventricular hypertrophy CPT Codes:     Echo Limited-39513 Patient History: Pertinent History: LVH. Study Detail: Technically challenging study due to small rib spaces and               prominent lung artifact. Definity used as a contrast agent for               endocardial border definition. Unable to obtain suprasternal notch               and subcostal view.  PHYSICIAN INTERPRETATION: Left Ventricle: Left ventricular systolic function is moderately decreased, with an estimated ejection fraction of 35-40%. There is global hypokinesis of the left ventricle with minor regional variations. The left ventricular cavity size was not assessed. The left ventricular septal wall thickness is moderately increased. There is moderately increased left ventricular posterior wall thickness.  There is moderate to severe left ventricular hypertrophy. Left ventricular diastolic filling was not assessed. Left Atrium: The left atrium was not assessed. Right Ventricle: The right ventricle was not assessed. Right ventricular systolic function not assessed. Right Atrium: The right atrium was not assessed. Aortic Valve: The aortic valve was not assessed. Aortic valve regurgitation was not assessed. Mitral Valve: The mitral valve was not assessed. Mitral valve regurgitation was not assessed. Tricuspid Valve: The tricuspid valve was not assessed. Tricuspid regurgitation was not assessed. Pulmonic Valve: The pulmonic valve was not assessed. The pulmonic valve regurgitation was not assessed. Pericardium: Pericardial effusion was not assessed. Aorta: The aortic root was not assessed.  CONCLUSIONS:  1. Left ventricular systolic function is moderately decreased with a 35-40% estimated ejection fraction.  2. Moderately increased left ventricular septal thickness.  3. The left ventricular posterior wall thickness is moderately increased.  4. There is moderate to severe left ventricular hypertrophy.  5. There is global hypokinesis of the left ventricle with minor regional variations. QUANTITATIVE DATA SUMMARY: 2D MEASUREMENTS:                           Normal Ranges: LAs:           3.00 cm    (2.7-4.0cm) IVSd:          1.62 cm    (0.6-1.1cm) LVPWd:         1.44 cm    (0.6-1.1cm) LVIDd:         3.62 cm    (3.9-5.9cm) LVIDs:         2.69 cm LV Mass Index: 146.9 g/m2 LV % FS        25.6 % LV SYSTOLIC FUNCTION BY 2D PLANIMETRY (MOD):                     Normal Ranges: EF-A4C View: 33.5 % (>=55%) EF-A2C View: 39.2 % EF-Biplane:  39.0 %  [id number] Darrel Hampton MD Electronically signed on 2/21/2024 at 9:38:31 AM  ** Final **       Lab Results   Component Value Date    BUN 58 (H) 01/28/2025    CREATININE 2.28 (H) 01/28/2025    BNP 1,832 (H) 01/07/2025    MG 2.5 01/28/2025    K 4.3 01/28/2025     01/28/2025       Constitutional:        General:  NAD   HENT:      Head: Normocephalic     Mouth: Mucous membranes are moist.      Neck:  + JVD    Eyes:      Conjunctiva/sclera: Conjunctivae normal.    Cardiovascular:      Rate and Rhythm: irregularly irregular rhythm today.  HR is controlled.      Heart sounds:  S1 S2 normal, no murmur, no S3 or S4   Pulmonary:      Pulmonary effort is normal.  Significantly diminished air exchange.  She does have some coarse crackles in the posterior fields.  Abdominal:      General: Bowel sounds are normal, non- tender to palpitations.   Musculoskeletal:         General: She has 2+ pitting edema bilateral lower extremities extending to the knees.  DUARTE   Skin:     General: Skin is warm and dry   Neurological:      General: No focal deficit present.      Mental Status: alert and oriented to person, place, and time. Mental status is at baseline.     Psychiatric:         Mood and Affect: Normal Affect.     Assessment/Plan     Problem List Items Addressed This Visit       Chronic heart failure with preserved ejection fraction      Chronic diastolic heart failure   Etiology: HTN/ASHD/ CKD  AHA Stage: D  NYHA class: 4  Volume Status: She is significantly congested today.  GFR: 24     GDMT:  BB-Metoprolol XL 50 mg 1/2 tablet daily   ARB/ACEI/ARNI - on hold   MRA -  GFR less than 30   SGLT2i - Farxiga 10 mg once a day   - hold for now.   Diuretic -  Torsemide  80 mg once a day    Device Therapy: PPM - 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 ....  was unable to upgrade to Bi V pacer.        CHF:   Weight is up and she has worsening edema and lung fields are significantly diminished today.   Cr. 2.28 today.  Discussed palliative measures including hospitalization for possible trial of inotrope supported diuresis.  But patient does not want to do the inotrope.  Will trial IV Lasix today with low dose metolazone.  Will see patient again in clinic tomorrow.  Possible repeat IV Lasix tomorrow.  Discussed that we are  now at palliative stage of treatment and if condition worsens /does not improve will need to to consider code status and hospice referral.  We did discuss this with patient and family at last visit as well.   BMP a.m.      2. Atrial fibrillation: persistent.   She is s/p AV node ablation and PPM placement in the past. On ASA.  No other OAC due to hx of recurrent GIB     3. NSVT:  Amiodarone.  No palpitations.       3. CKD 3: GFR as noted.  GFR is 20 ml/min.   Hold the Farxiga.     4. HTN:  Controlled.       5. ASHD with PCI to LAD 2017:  Secondary prevention medications ASA, Statin, BB.   Recent stress/MPI scan negative for ischemia.   No angina.  Stable.      6. Chronic COPD: Scheduled for pulmonary function evaluation.  Currently finishing up treatment for pneumonia.     Prognosis is poor.  She is not tolerating guideline directed medical therapies.  I had a long discussion  with Adwoa her daughter and her son concerning advanced directives including a DNR order at last visit.  She seems to have had progression of her symptoms since her last visit.  I again discussed these issues with her daughter briefly today.  We will see how she responds to diuretic therapy.      Rosangela Lane, APRN-CNP

## 2025-01-30 NOTE — PATIENT INSTRUCTIONS
Thank you for coming in today.  If you have any questions you may contact the office Monday through Friday at [phone] or on week ends at [phone].    IV Lasix today.       Stop the Farxiga     Please continue the Torsemide 40 mg twice a day for now.   Will likely increase this dose next visit.       Please follow  a 2 GM sodium diet and limit fluid intake to 2 liters per day or 8 servings ( serving size = 8 oz. = 1 cup = 240 ml) per day.   Please avoid processed meat products (luncheon meats, sausages, loya, hot dogs for example) eat 4 servings of vegetables and 1-2 whole servings of whole fruits per day.   Please weigh daily and call [phone] for weight gain of 3 pounds in 24 hours or 5 pounds or if you experience increased swelling or shortness of breath.         Follow up:   tomorrow.       Please be sure to follow up with your cardiologist at [facility] once every year. Call [phone] to schedule appointment if you do not have a follow up appointment scheduled already.

## 2025-01-31 ENCOUNTER — OFFICE VISIT (OUTPATIENT)
Dept: WOUND CARE | Facility: CLINIC | Age: 89
End: 2025-01-31
Payer: MEDICARE

## 2025-01-31 ENCOUNTER — OFFICE VISIT (OUTPATIENT)
Dept: CARDIOLOGY | Facility: HOSPITAL | Age: 89
End: 2025-01-31
Payer: MEDICARE

## 2025-01-31 VITALS
OXYGEN SATURATION: 92 % | SYSTOLIC BLOOD PRESSURE: 108 MMHG | HEART RATE: 65 BPM | WEIGHT: 107.2 LBS | RESPIRATION RATE: 20 BRPM | DIASTOLIC BLOOD PRESSURE: 67 MMHG | BODY MASS INDEX: 19.61 KG/M2

## 2025-01-31 DIAGNOSIS — I50.32 CHRONIC HEART FAILURE WITH PRESERVED EJECTION FRACTION: Primary | ICD-10-CM

## 2025-01-31 LAB
ANION GAP SERPL CALC-SCNC: 12 MMOL/L (ref 10–20)
BUN SERPL-MCNC: 72 MG/DL (ref 6–23)
CALCIUM SERPL-MCNC: 9.2 MG/DL (ref 8.6–10.3)
CHLORIDE SERPL-SCNC: 94 MMOL/L (ref 98–107)
CO2 SERPL-SCNC: 38 MMOL/L (ref 21–32)
CREAT SERPL-MCNC: 2.48 MG/DL (ref 0.5–1.05)
EGFRCR SERPLBLD CKD-EPI 2021: 18 ML/MIN/1.73M*2
GLUCOSE SERPL-MCNC: 70 MG/DL (ref 74–99)
MAGNESIUM SERPL-MCNC: 2.25 MG/DL (ref 1.6–2.4)
POTASSIUM SERPL-SCNC: 3.9 MMOL/L (ref 3.5–5.3)
SODIUM SERPL-SCNC: 140 MMOL/L (ref 136–145)

## 2025-01-31 PROCEDURE — 99213 OFFICE O/P EST LOW 20 MIN: CPT | Mod: 25 | Performed by: NURSE PRACTITIONER

## 2025-01-31 PROCEDURE — 1111F DSCHRG MED/CURRENT MED MERGE: CPT | Performed by: NURSE PRACTITIONER

## 2025-01-31 PROCEDURE — 1036F TOBACCO NON-USER: CPT | Performed by: NURSE PRACTITIONER

## 2025-01-31 PROCEDURE — 83735 ASSAY OF MAGNESIUM: CPT | Performed by: NURSE PRACTITIONER

## 2025-01-31 PROCEDURE — 3074F SYST BP LT 130 MM HG: CPT | Performed by: NURSE PRACTITIONER

## 2025-01-31 PROCEDURE — 11042 DBRDMT SUBQ TIS 1ST 20SQCM/<: CPT

## 2025-01-31 PROCEDURE — 99213 OFFICE O/P EST LOW 20 MIN: CPT | Performed by: NURSE PRACTITIONER

## 2025-01-31 PROCEDURE — 36415 COLL VENOUS BLD VENIPUNCTURE: CPT | Performed by: NURSE PRACTITIONER

## 2025-01-31 PROCEDURE — 1123F ACP DISCUSS/DSCN MKR DOCD: CPT | Performed by: NURSE PRACTITIONER

## 2025-01-31 PROCEDURE — 80048 BASIC METABOLIC PNL TOTAL CA: CPT | Performed by: NURSE PRACTITIONER

## 2025-01-31 PROCEDURE — 3078F DIAST BP <80 MM HG: CPT | Performed by: NURSE PRACTITIONER

## 2025-01-31 PROCEDURE — 1126F AMNT PAIN NOTED NONE PRSNT: CPT | Performed by: NURSE PRACTITIONER

## 2025-01-31 PROCEDURE — 1157F ADVNC CARE PLAN IN RCRD: CPT | Performed by: NURSE PRACTITIONER

## 2025-01-31 RX ORDER — TORSEMIDE 20 MG/1
40 TABLET ORAL 2 TIMES DAILY
Qty: 360 TABLET | Refills: 2 | Status: SHIPPED | OUTPATIENT
Start: 2025-01-31 | End: 2026-01-31

## 2025-01-31 ASSESSMENT — ENCOUNTER SYMPTOMS
HEMATURIA: 0
PALPITATIONS: 0
BLOOD IN STOOL: 0
LOSS OF SENSATION IN FEET: 0
FEVER: 0
SHORTNESS OF BREATH: 0
ACTIVITY CHANGE: 0
CONFUSION: 0
WHEEZING: 0
EYES NEGATIVE: 1
CHEST TIGHTNESS: 0
LIGHT-HEADEDNESS: 0
OCCASIONAL FEELINGS OF UNSTEADINESS: 0
DEPRESSION: 0
CHILLS: 0
ABDOMINAL DISTENTION: 0
WEAKNESS: 0
COUGH: 0

## 2025-01-31 ASSESSMENT — PAIN SCALES - GENERAL: PAINLEVEL_OUTOF10: 0-NO PAIN

## 2025-01-31 NOTE — PROGRESS NOTES
Subjective   Patient ID: Adwoa Forrest is a 89 y.o. female who presents for follow-up of congestive heart failure.     Current Outpatient Medications:     acetaminophen (Tylenol 8 HOUR) 650 mg ER tablet, Take 1 tablet (650 mg) by mouth every 8 hours if needed for mild pain (1 - 3). Do not crush, chew, or split., Disp: , Rfl:     albuterol 90 mcg/actuation inhaler, Inhale 2 puffs every 4 hours if needed for shortness of breath., Disp: 18 g, Rfl: 11    amiodarone (Pacerone) 200 mg tablet, Take 1 tablet (200 mg) by mouth once daily., Disp: 90 tablet, Rfl: 3    aspirin 81 mg EC tablet, Take 1 tablet (81 mg) by mouth once daily., Disp: , Rfl:     atorvastatin (Lipitor) 80 mg tablet, Take 1 tablet (80 mg) by mouth once daily. At bedtime, Disp: 90 tablet, Rfl: 3    calcitriol (Rocaltrol) 0.25 mcg capsule, Take 1 capsule (0.25 mcg) by mouth once daily. Three times a week., Disp: , Rfl:     cinacalcet (Sensipar) 30 mg tablet, Take 1 tablet (30 mg) by mouth 3 times a week. Take with food or shortly afer a meal. Swallow tablet whole; do not break or divide., Disp: , Rfl:     finerenone (Kerendia) 10 mg tablet tablet, Take 1 tablet (10 mg) by mouth once daily., Disp: , Rfl:     fluticasone (Flonase) 50 mcg/actuation nasal spray, Administer 2 sprays into each nostril once daily., Disp: , Rfl:     fluticasone-umeclidin-vilanter (Trelegy Ellipta) 100-62.5-25 mcg blister with device, Inhale 1 puff once daily., Disp: 30 each, Rfl: 11    guaiFENesin (Mucinex) 600 mg 12 hr tablet, Take 1 tablet (600 mg) by mouth every 12 hours if needed for congestion. Do not crush, chew, or split., Disp: , Rfl:     ipratropium-albuteroL (Duo-Neb) 0.5-2.5 mg/3 mL nebulizer solution, TAKE 3 ML BY NEBULIZATION 4 TIMES A DAY AS NEEDED FOR WHEEZING OR SHORTNESS OF BREATH., Disp: 360 mL, Rfl: 3    levothyroxine (Synthroid, Levoxyl) 75 mcg tablet, Take 0.5 tablets (37.5 mcg) by mouth once daily., Disp: , Rfl:     linaCLOtide (Linzess) 145 mcg capsule, Take  1 capsule (145 mcg) by mouth once daily in the morning. Take before meals. Do not crush or chew., Disp: 30 capsule, Rfl: 11    metoprolol succinate XL (Toprol-XL) 25 mg 24 hr tablet, Take 1 tablet (25 mg) by mouth once daily. Do not crush or chew., Disp: 30 tablet, Rfl: 11    multivitamin tablet, Take 1 tablet by mouth once daily., Disp: , Rfl:     torsemide (Demadex) 20 mg tablet, Take 2 tablets (40 mg) by mouth 2 times a day., Disp: 360 tablet, Rfl: 2    ALPRAZolam (Xanax) 0.5 mg tablet, Take 1 tablet (0.5 mg) by mouth once daily at bedtime for 3 days., Disp: 3 tablet, Rfl: 0    dapagliflozin propanediol (Farxiga) 10 mg, Take 1 tablet (10 mg) by mouth once daily. (Patient not taking: Reported on 1/31/2025), Disp: 90 tablet, Rfl: 3  No current facility-administered medications for this visit.     HPI     Past medical history of ischemic heart disease with PCI in the past. 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 She has had nonsustained ventricular tachycardia in the past from which she was symptomatic. She has been pretty well-controlled with that on amiodarone. Recent stress MPI normal. She had hospitalization on March 4, 2024 with pneumonia and comorbid CHF. The lateral wall lead was unable to be placed to convert to BiV pacer.     Presents today for follow-up in heart failure clinic.  Her weight is down 2 pounds.  The lower extremity edema is slightly improved.  She states that she slept better last night.  She still has significant shortness of breath for which she uses the oxygen.  She is feeling slightly improved overall with the shortness of breath.  Posterior breath sounds are slightly improved.  She still has significant fluid load consistent with persistent end-stage heart failure.      Review of Systems   Constitutional:  Negative for activity change, chills and fever.   HENT:  Negative for hearing loss.    Eyes: Negative.    Respiratory:  Negative for  cough, chest tightness, shortness of breath and wheezing.    Cardiovascular:  Negative for chest pain, palpitations and leg swelling.   Gastrointestinal:  Negative for abdominal distention and blood in stool.   Genitourinary:  Negative for hematuria.   Neurological:  Negative for syncope, weakness and light-headedness.   Psychiatric/Behavioral:  Negative for confusion.        Objective     /67 (BP Location: Left arm, Patient Position: Sitting)   Pulse 65   Resp 20   Wt 48.6 kg (107 lb 3.2 oz)   LMP  (LMP Unknown)   SpO2 92% Comment: 3 LPM O2  BMI 19.61 kg/m²         Transthoracic echo (TTE) complete    Result Date: 12/4/2024              [place] [address]      Phone [phone] Fax [phone] TRANSTHORACIC ECHOCARDIOGRAM REPORT Patient Name:       CHERRY Noel Physician:    [id number] Darrel Hampton MD Study Date:         12/3/2024           Ordering Provider:    [id number] ADRIA JON MRN/PID:            [id number]            Fellow: Accession#:         [id number]        Nurse:                Camilla Robledo RN Date of Birth/Age:  1935 / 89 years Sonographer:          Thea Espinoza RDCS Gender Assigned at  F                   Additional Staff: Birth: Height:             157.48 cm           Admit Date: Weight:             47.17 kg            Admission Status:     Outpatient BSA / BMI:          1.45 m2 / 19.02     Department Location:  [facility] Echo                     kg/m2                                     Lab Blood Pressure: 130 /73 mmHg Study Type:    TRANSTHORACIC ECHO (TTE) COMPLETE Diagnosis/ICD: Chronic combined systolic (congestive) and diastolic (congestive)                heart failure (CHF)-I50.42 Indication:    Heart failure CPT Codes:      Echo Complete w Full Doppler-32358 Patient History: Pertinent History: CAD, HTN and CHF. Study Detail: The following Echo studies were performed: 2D, M-Mode, Doppler and               color flow. Technically challenging study due to body habitus and               prominent lung artifact. Agitated saline used as a contrast agent               for intraseptal flow evaluation and Definity used as a contrast               agent for endocardial border definition. Total contrast used for               this procedure was 2 mL via IV push.  PHYSICIAN INTERPRETATION: Left Ventricle: The left ventricular systolic function is mildly decreased, with a Lentz's biplane calculated ejection fraction of 52%. There is mild concentric left ventricular hypertrophy. There is global hypokinesis of the left ventricle with minor regional variations. The left ventricular cavity size is normal. There is mild increased septal and mildly increased posterior left ventricular wall thickness. Spectral Doppler shows a normal pattern of left ventricular diastolic filling. Left Atrium: The left atrium is normal in size. A bubble study using agitated saline was performed. Bubble study is negative. Right Ventricle: The right ventricle is normal in size. There is mildly reduced right ventricular systolic function. A device is visualized in the right ventricle. Right Atrium: The right atrium is mildly dilated. There is a device visualized in the right atrium. Aortic Valve: The aortic valve appears structurally normal. There is mild aortic valve cusp calcification. The aortic valve dimensionless index is 0.69. There is mild aortic valve regurgitation. The peak instantaneous gradient of the aortic valve is 6 mmHg. The mean gradient of the aortic valve is 3 mmHg. Mitral Valve: The mitral valve is normal in structure. There is trace mitral valve regurgitation. Tricuspid Valve: The tricuspid valve is structurally normal. There is mild to moderate tricuspid  regurgitation. The Doppler estimated RVSP is moderately elevated right ventricular systolic pressure at 66.2 mmHg. Pulmonic Valve: The pulmonic valve is not well visualized. There is physiologic pulmonic valve regurgitation. Pericardium: No pericardial effusion noted. Aorta: The aortic root is normal.  CONCLUSIONS:  1. The left ventricular systolic function is mildly decreased, with a Lentz's biplane calculated ejection fraction of 52%.  2. There is global hypokinesis of the left ventricle with minor regional variations.  3. There is mildly reduced right ventricular systolic function.  4. Mild to moderate tricuspid regurgitation.  5. Moderately elevated right ventricular systolic pressure.  6. Mild aortic valve regurgitation. QUANTITATIVE DATA SUMMARY:  2D MEASUREMENTS:            Normal Ranges: LAs:             4.30 cm    (2.7-4.0cm) IVSd:            1.07 cm    (0.6-1.1cm) LVPWd:           1.01 cm    (0.6-1.1cm) LVIDd:           4.38 cm    (3.9-5.9cm) LVIDs:           3.30 cm LV Mass Index:   106.7 g/m2 LV % FS          24.7 %  LA VOLUME:                    Normal Ranges: LA Vol A4C:        45.6 ml    (22+/-6mL/m2) LA Vol A2C:        42.2 ml LA Vol BP:         48.2 ml LA Vol Index A4C:  31.5ml/m2 LA Vol Index A2C:  29.2 ml/m2 LA Vol Index BP:   33.3 ml/m2 LA Area A4C:       17.8 cm2 LA Area A2C:       15.6 cm2 LA Major Axis A4C: 5.9 cm LA Major Axis A2C: 4.9 cm LA Vol A4C:        42.1 ml LA Vol A2C:        41.4 ml LA Vol Index BSA:  28.8 ml/m2  RA VOLUME BY A/L METHOD:          Normal Ranges: RA Area A4C:             23.4 cm2  AORTA MEASUREMENTS:         Normal Ranges: Ao Sinus, d:        3.00 cm (2.1-3.5cm) Ao STJ, d:          2.40 cm (1.7-3.4cm) Asc Ao, d:          3.60 cm (2.1-3.4cm)  LV SYSTOLIC FUNCTION BY 2D PLANIMETRY (MOD):                      Normal Ranges: EF-A4C View:    37 % (>=55%) EF-A2C View:    63 % EF-Biplane:     52 % LV EF Reported: 52 %  LV DIASTOLIC FUNCTION:          Normal Ranges: MV Peak  E:             0.63 m/s (0.7-1.2 m/s) MV Peak A:             0.25 m/s (0.42-0.7 m/s) E/A Ratio:             2.51     (1.0-2.2) MV lateral e'          0.06 m/s  MITRAL VALVE:          Normal Ranges: MV DT:        300 msec (150-240msec)  AORTIC VALVE:                     Normal Ranges: AoV Vmax:                1.20 m/s (<=1.7m/s) AoV Peak P.7 mmHg (<20mmHg) AoV Mean PG:             3.0 mmHg (1.7-11.5mmHg) LVOT Max Henry:            0.77 m/s (<=1.1m/s) AoV VTI:                 23.22 cm (18-25cm) LVOT VTI:                15.93 cm LVOT Diameter:           1.96 cm  (1.8-2.4cm) AoV Area, VTI:           2.07 cm2 (2.5-5.5cm2) AoV Area,Vmax:           1.93 cm2 (2.5-4.5cm2) AoV Dimensionless Index: 0.69  AORTIC INSUFFICIENCY: AI Vmax:       3.95 m/s AI Half-time:  419 msec AI Decel Time: 1446 msec AI Decel Rate: 273.44 cm/s2  RIGHT VENTRICLE: RV Basal 3.49 cm RV Mid   2.80 cm RV Major 7.8 cm TAPSE:   15.0 mm RV s'    0.08 m/s  TRICUSPID VALVE/RVSP:          Normal Ranges: Peak TR Velocity:     3.97 m/s RV Syst Pressure:     66 mmHg  (< 30mmHg) IVC Diam:             1.65 cm  AORTA: Asc Ao Diam 3.60 cm  [id number] Darrel Hampton MD Electronically signed on 2024 at 8:59:11 AM  ** Final **     Transthoracic Echo Limited    Result Date: 2024              [address] [address]      Phone [phone] Fax [phone] TRANSTHORACIC ECHOCARDIOGRAM REPORT  Patient Name:      CHERRY ROJAS JESUS Noel Physician:    [id number] Hampton MD Study Date:        2024            Ordering Provider:    [id number] FOFANA MRN/PID:           [id number]             Fellow: Accession#:        [id number]         Nurse:                Nette Ott RN Date of Birth/Age: 1935 years  Sonographer:          Estelita Brown                                                                [facility] Gender:            F                    Additional Staff: Height:            157.48 cm            Admit Date: Weight:            45.36 kg             Admission Status:     Outpatient BSA / BMI:         1.42 m2 / 18.29      Department Location:                    kg/m2 Blood Pressure: 102 /58 mmHg Study Type:    TRANSTHORACIC ECHO (TTE) LIMITED Diagnosis/ICD: Ventricular hypertrophy or dilatation-I51.7 Indication:    Left Ventricular hypertrophy CPT Codes:     Echo Limited-[id number] Patient History: Pertinent History: LVH. Study Detail: Technically challenging study due to small rib spaces and               prominent lung artifact. Definity used as a contrast agent for               endocardial border definition. Unable to obtain suprasternal notch               and subcostal view.  PHYSICIAN INTERPRETATION: Left Ventricle: Left ventricular systolic function is moderately decreased, with an estimated ejection fraction of 35-40%. There is global hypokinesis of the left ventricle with minor regional variations. The left ventricular cavity size was not assessed. The left ventricular septal wall thickness is moderately increased. There is moderately increased left ventricular posterior wall thickness. There is moderate to severe left ventricular hypertrophy. Left ventricular diastolic filling was not assessed. Left Atrium: The left atrium was not assessed. Right Ventricle: The right ventricle was not assessed. Right ventricular systolic function not assessed. Right Atrium: The right atrium was not assessed. Aortic Valve: The aortic valve was not assessed. Aortic valve regurgitation was not assessed. Mitral Valve: The mitral valve was not assessed. Mitral valve regurgitation was not assessed. Tricuspid Valve: The tricuspid valve was not assessed. Tricuspid regurgitation was not assessed. Pulmonic Valve: The pulmonic valve was not assessed. The pulmonic valve regurgitation was not  assessed. Pericardium: Pericardial effusion was not assessed. Aorta: The aortic root was not assessed.  CONCLUSIONS:  1. Left ventricular systolic function is moderately decreased with a 35-40% estimated ejection fraction.  2. Moderately increased left ventricular septal thickness.  3. The left ventricular posterior wall thickness is moderately increased.  4. There is moderate to severe left ventricular hypertrophy.  5. There is global hypokinesis of the left ventricle with minor regional variations. QUANTITATIVE DATA SUMMARY: 2D MEASUREMENTS:                           Normal Ranges: LAs:           3.00 cm    (2.7-4.0cm) IVSd:          1.62 cm    (0.6-1.1cm) LVPWd:         1.44 cm    (0.6-1.1cm) LVIDd:         3.62 cm    (3.9-5.9cm) LVIDs:         2.69 cm LV Mass Index: 146.9 g/m2 LV % FS        25.6 % LV SYSTOLIC FUNCTION BY 2D PLANIMETRY (MOD):                     Normal Ranges: EF-A4C View: 33.5 % (>=55%) EF-A2C View: 39.2 % EF-Biplane:  39.0 %  [id number] Darrel Hampton MD Electronically signed on 2/21/2024 at 9:38:31 AM  ** Final **       Lab Results   Component Value Date    BUN 58 (H) 01/28/2025    CREATININE 2.28 (H) 01/28/2025    BNP 1,832 (H) 01/07/2025    MG 2.5 01/28/2025    K 4.3 01/28/2025     01/28/2025       Constitutional:       General: Frail elderly female.   HENT:      Head: Normocephalic     Mouth: Mucous membranes are moist.      Neck:  positive JVD   Eyes:      Conjunctiva/sclera: Conjunctivae normal.    Cardiovascular:      Rate and Rhythm: Normal rate and regular rhythm     Heart sounds:  S1 S2 normal, no murmur, no S3 or S4   Pulmonary:      Pulmonary effort is normal.  Posterior breath sounds are significantly diminished.  Not appreciating rhonchi today.  Air exchange may be slightly improved in the left posterior fields   Abdominal:      General: Bowel sounds are normal, non- tender to palpitations.   Musculoskeletal:         General:   There is 3+ edema in the feet and extending up to just  above the ankles.  This is somewhat improved today.  DUARTE well.   Skin:     General: Skin is warm and dry   Neurological:      General: No focal deficit present.      Mental Status: alert and oriented to person, place, and time. Mental status is at baseline.     Psychiatric:         Mood and Affect: Normal Affect.     Assessment/Plan     Problem List Items Addressed This Visit       Chronic heart failure with preserved ejection fraction      Chronic diastolic heart failure   Etiology: HTN/ASHD/ CKD  AHA Stage: D  NYHA class: 4  Volume Status: Continues to have significant fluid congestion.  GFR: 24     GDMT:  BB-Metoprolol XL 50 mg 1/2 tablet daily   ARB/ACEI/ARNI - on hold   MRA -  GFR less than 30   SGLT2i - Farxiga 10 mg once a day   - hold for now for GFR 20   Diuretic -  Torsemide 40 mg twice a day.   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 ....  was unable to upgrade to Bi V pacer.        CHF: Patient is down 2 pounds overall after having metolazone and IV Lasix yesterday.  She has had improvement in the edema.  She slept better last night.  She denies feeling acutely short of breath today.  She still has significant congestion on exam.    BUN/CR  72/2.48  GFR 18 .  Will continue oral diuretic, sodium and fluid restrictions.    Again discussed that the measures we are taking even now are palliative as her heart failure is severe and end-stage and complicated with significant COPD.  We reviewed the fact that the kidney function is worsening due to diuresis and that her prognosis is extremely poor.  I did suggest again to the patient and her daughter that they consider instituting a DNR comfort care order and consider hospice referral.  Patient does have the DNR paperwork at home but has not signed it yet.      2. Atrial fibrillation: persistent.   She is s/p AV node ablation and PPM placement in the past. On ASA.  No other OAC due to hx of recurrent GIB     3. NSVT:  Amiodarone.   No palpitations.       3. CKD 3: GFR 19 . Farxiga is on hold.  Will continue current diuretic dose and follow up early next week.    4. HTN:  Controlled.       5. ASHD with PCI to LAD 2017:  Secondary prevention medications ASA, Statin, BB.   Recent stress/MPI scan negative for ischemia.   No angina.  Stable.      6. Chronic COPD: Scheduled for pulmonary function evaluation.  Currently finishing up treatment for pneumonia. She has follow up with pulmonary next week.      Prognosis is poor.  She is not tolerating guideline directed medical therapies.   She did have evaluation with [facility]. Will continue palliative treatment as tolerated and encourage family to consider DNR and possible hospice care.     Rosangela Lane, APRN-CNP

## 2025-01-31 NOTE — PATIENT INSTRUCTIONS
Thank you for coming in today.  If you have any questions you may contact the office Monday through Friday at [phone] or on week ends at [phone].    Continue Torsemide 40 mg 1 tablet twice a day.     Hold the Farxiga.     Please follow  a 2 GM sodium diet and limit fluid intake to 2 liters per day or 8 servings ( serving size = 8 oz. = 1 cup = 240 ml) per day.   Please avoid processed meat products (luncheon meats, sausages, loya, hot dogs for example) eat 4 servings of vegetables and 1-2 whole servings of whole fruits per day.   Please weigh daily and call [phone] for weight gain of 3 pounds in 24 hours or 5 pounds or if you experience increased swelling or shortness of breath.    Follow up:  early next week.     Please be sure to follow up with your cardiologist at [facility] once every year. Call [phone] to schedule appointment if you do not have a follow up appointment scheduled already.

## 2025-02-03 ENCOUNTER — APPOINTMENT (OUTPATIENT)
Dept: CARDIOLOGY | Facility: HOSPITAL | Age: 89
End: 2025-02-03
Payer: MEDICARE

## 2025-02-07 ENCOUNTER — OFFICE VISIT (OUTPATIENT)
Dept: CARDIOLOGY | Facility: HOSPITAL | Age: 89
End: 2025-02-07
Payer: MEDICARE

## 2025-02-07 ENCOUNTER — OFFICE VISIT (OUTPATIENT)
Dept: PULMONOLOGY | Facility: HOSPITAL | Age: 89
End: 2025-02-07
Payer: MEDICARE

## 2025-02-07 VITALS
DIASTOLIC BLOOD PRESSURE: 58 MMHG | OXYGEN SATURATION: 94 % | SYSTOLIC BLOOD PRESSURE: 99 MMHG | TEMPERATURE: 97 F | HEART RATE: 65 BPM | RESPIRATION RATE: 18 BRPM

## 2025-02-07 VITALS
OXYGEN SATURATION: 100 % | SYSTOLIC BLOOD PRESSURE: 110 MMHG | WEIGHT: 105.85 LBS | BODY MASS INDEX: 19.36 KG/M2 | DIASTOLIC BLOOD PRESSURE: 57 MMHG | RESPIRATION RATE: 22 BRPM | HEART RATE: 60 BPM

## 2025-02-07 DIAGNOSIS — J44.9 CHRONIC OBSTRUCTIVE PULMONARY DISEASE, UNSPECIFIED COPD TYPE (MULTI): Primary | ICD-10-CM

## 2025-02-07 DIAGNOSIS — N18.4 CHRONIC KIDNEY DISEASE (CKD), ACTIVE MEDICAL MANAGEMENT WITHOUT DIALYSIS, STAGE 4 (SEVERE) (MULTI): ICD-10-CM

## 2025-02-07 DIAGNOSIS — I48.20 CHRONIC ATRIAL FIBRILLATION (MULTI): ICD-10-CM

## 2025-02-07 DIAGNOSIS — J96.11 CHRONIC RESPIRATORY FAILURE WITH HYPOXIA (MULTI): ICD-10-CM

## 2025-02-07 DIAGNOSIS — I50.42 CHRONIC COMBINED SYSTOLIC AND DIASTOLIC HEART FAILURE: ICD-10-CM

## 2025-02-07 DIAGNOSIS — I50.32 CHRONIC HEART FAILURE WITH PRESERVED EJECTION FRACTION: Primary | ICD-10-CM

## 2025-02-07 DIAGNOSIS — Z87.891 FORMER SMOKER: ICD-10-CM

## 2025-02-07 PROCEDURE — 1159F MED LIST DOCD IN RCRD: CPT | Performed by: NURSE PRACTITIONER

## 2025-02-07 PROCEDURE — 1111F DSCHRG MED/CURRENT MED MERGE: CPT | Performed by: NURSE PRACTITIONER

## 2025-02-07 PROCEDURE — 3074F SYST BP LT 130 MM HG: CPT | Performed by: NURSE PRACTITIONER

## 2025-02-07 PROCEDURE — 1159F MED LIST DOCD IN RCRD: CPT | Performed by: INTERNAL MEDICINE

## 2025-02-07 PROCEDURE — 3078F DIAST BP <80 MM HG: CPT | Performed by: INTERNAL MEDICINE

## 2025-02-07 PROCEDURE — 99214 OFFICE O/P EST MOD 30 MIN: CPT | Performed by: INTERNAL MEDICINE

## 2025-02-07 PROCEDURE — 3074F SYST BP LT 130 MM HG: CPT | Performed by: INTERNAL MEDICINE

## 2025-02-07 PROCEDURE — 99212 OFFICE O/P EST SF 10 MIN: CPT | Performed by: NURSE PRACTITIONER

## 2025-02-07 PROCEDURE — 1123F ACP DISCUSS/DSCN MKR DOCD: CPT | Performed by: INTERNAL MEDICINE

## 2025-02-07 PROCEDURE — 3078F DIAST BP <80 MM HG: CPT | Performed by: NURSE PRACTITIONER

## 2025-02-07 PROCEDURE — 1123F ACP DISCUSS/DSCN MKR DOCD: CPT | Performed by: NURSE PRACTITIONER

## 2025-02-07 PROCEDURE — 1157F ADVNC CARE PLAN IN RCRD: CPT | Performed by: INTERNAL MEDICINE

## 2025-02-07 PROCEDURE — 1111F DSCHRG MED/CURRENT MED MERGE: CPT | Performed by: INTERNAL MEDICINE

## 2025-02-07 PROCEDURE — 1157F ADVNC CARE PLAN IN RCRD: CPT | Performed by: NURSE PRACTITIONER

## 2025-02-07 RX ORDER — FLUTICASONE FUROATE, UMECLIDINIUM BROMIDE AND VILANTEROL TRIFENATATE 100; 62.5; 25 UG/1; UG/1; UG/1
1 POWDER RESPIRATORY (INHALATION) DAILY
Qty: 30 EACH | Refills: 11 | Status: SHIPPED | OUTPATIENT
Start: 2025-02-07 | End: 2026-02-07

## 2025-02-07 RX ORDER — EPLERENONE 25 MG/1
25 TABLET, FILM COATED ORAL DAILY
COMMUNITY

## 2025-02-07 ASSESSMENT — ENCOUNTER SYMPTOMS
FATIGUE: 0
DEPRESSION: 0
CONFUSION: 0
LOSS OF SENSATION IN FEET: 0
OCCASIONAL FEELINGS OF UNSTEADINESS: 0
RHINORRHEA: 0
CHILLS: 0
HEMATURIA: 0
EYES NEGATIVE: 1
ABDOMINAL DISTENTION: 0
FEVER: 0
CHEST TIGHTNESS: 0
ACTIVITY CHANGE: 0
UNEXPECTED WEIGHT CHANGE: 0
COUGH: 0
WEAKNESS: 0
FEVER: 0
LIGHT-HEADEDNESS: 0
COUGH: 0
CHILLS: 0
WHEEZING: 0
BLOOD IN STOOL: 0
SHORTNESS OF BREATH: 1
PALPITATIONS: 0
WHEEZING: 0
SHORTNESS OF BREATH: 1

## 2025-02-07 NOTE — PROGRESS NOTES
Subjective   Patient ID: Adwoa Forrest is a 89 y.o. female who presents for follow up for shortness of breath.     HPI: Patient has PMH of chronic systolic and diastolic heart failure, atrial fibrillation, GI bleeds, HTN, iron deficiency anemia, HLD, GERD, and anxiety. She was referred for shortness of breath by cardiology. She was hospitalized for CHF exacerbation and pacer. She states that she gets shortness of breath on exertion. She denies any cough or wheezing. She has never been diagnosed with COPD and never been on any inhalers. She is following closely with cardiology. She is a former smoker from [id number] and exposed to second hand smoke. She denies prior occupational exposure. She was in ER recently had a CXR that showed worsening pleural effusions and volume overload. Her BNP was elevated.     Today she is here for follow up. She was treated for pneumonia around Dec 30th but did have hospitalization around Jan 7th for CHF and Acute Renal Failure. She is taking Trelegy inhaler daily and takes Duonebs as needed. She has significant ankle swelling persisting despite taking Torsemide and has appt again today with cardiology. She continues to feel shortness of breath with activity but doing better at rest. She is now using 3 L o2 at rest and was advised to increase to 5 L with activity on POC. She denies having cough or wheezing. She notes her SOB is also better since her cardiac meds were adjusted.     Review of Systems   Constitutional:  Negative for chills, fatigue, fever and unexpected weight change.   HENT:  Negative for congestion, postnasal drip and rhinorrhea.    Respiratory:  Positive for shortness of breath. Negative for cough (denies hemoptysis.) and wheezing.    Cardiovascular:  Negative for chest pain and leg swelling.   All other systems reviewed and are negative.      Objective   Physical Exam  Vitals reviewed.   Constitutional:       Appearance: Normal appearance.   HENT:      Head:  Normocephalic.   Cardiovascular:      Rate and Rhythm: Normal rate and regular rhythm.   Pulmonary:      Effort: Pulmonary effort is normal.      Breath sounds: Decreased breath sounds present.   Musculoskeletal:      Right lower le+ Pitting Edema present.      Left lower le+ Pitting Edema present.   Skin:     General: Skin is warm and dry.   Neurological:      Mental Status: She is alert.         Assessment/Plan   Shortness of breath  COPD  Former smoker  Chronic systolic heart failure   Atrial fibrillation  Chronic respiratory failure with hypoxia 3L at rest and 5 L exertion  CKD Stage IV    Plan:    -Patient had advanced COPD on CXR and CT chest, discussed results at length. Changed Anoro inhaler to Trelegy inhaler daily as did not notice improvement with Anoro and also started Duonebs prn.  -Mucinex on prn basis  -PFTs were only fair quality but no obstruction was reported.   -Continue albuterol prn.    -Discussed that she has significant emphysema on CT chest and CXR.  -She is following closely with cardiology.   -Continue to wear O2 @ 3 L continuous and will get 6MWT and Noc Pox to assess if she needs to continue home O2.   -Follow up with Dr. Rich for CKD    Patient's dyspnea in absence of coughing and wheezing is secondary to CHF. Her worsening does correlate with elevated BNP and pulmonary vascular congestion and effusions on CXR. Her COPD is currently optimized. She did get pneumonia in Dec 2024 and has recovered. Her Hypoxia is controlled on 3 L O2 at rest and 5 L O2 with activity. Recommend continue current treatment from COPD standpoint and notify if any worsening as early as possible. Discusesd with patient and her friend accompanying today Nicolette.     I will see her in 4 months.

## 2025-02-07 NOTE — PATIENT INSTRUCTIONS
Thank you for coming in today.  If you have any questions you may contact the office Monday through Friday at [phone] or on week ends at [phone].    Please continue current medications.    Follow up BMP in 1 week.       Please follow  a 2 GM sodium diet and limit fluid intake to 2 liters per day or 8 servings ( serving size = 8 oz. = 1 cup = 240 ml) per day.   Please avoid processed meat products (luncheon meats, sausages, loya, hot dogs for example) eat 4 servings of vegetables and 1-2 whole servings of whole fruits per day.   Please weigh daily and call [phone] for weight gain of 3 pounds in 24 hours or 5 pounds or if you experience increased swelling or shortness of breath.         Follow up:  2 weeks.     Please be sure to follow up with your cardiologist at [facility] once every year. Call [phone] to schedule appointment if you do not have a follow up appointment scheduled already.

## 2025-02-07 NOTE — PATIENT INSTRUCTIONS
Please take Trelegy inhaler 1 puff once daily. Rinse your mouth after you take the inhalation.  Continue albuterol inhaler if needed for shortness of breath.  Call with any questions or concerns.   Please use 3 L O2 at rest and use 5 L O2 when you do activity.   Follow up with me in 4 months or earlier if needed for any worsening COPD symptoms as discussed.

## 2025-02-07 NOTE — PROGRESS NOTES
Subjective   Patient ID: Adwoa Forrest is a 89 y.o. female who presents for follow-up of congestive heart failure.     Current Outpatient Medications:     acetaminophen (Tylenol 8 HOUR) 650 mg ER tablet, Take 1 tablet (650 mg) by mouth every 8 hours if needed for mild pain (1 - 3). Do not crush, chew, or split., Disp: , Rfl:     albuterol 90 mcg/actuation inhaler, Inhale 2 puffs every 4 hours if needed for shortness of breath., Disp: 18 g, Rfl: 11    ALPRAZolam (Xanax) 0.5 mg tablet, Take 1 tablet (0.5 mg) by mouth once daily at bedtime for 3 days. (Patient taking differently: Take 1 tablet (0.5 mg) by mouth once daily at bedtime. Taking 1 tablet twice a day), Disp: 3 tablet, Rfl: 0    amiodarone (Pacerone) 200 mg tablet, Take 1 tablet (200 mg) by mouth once daily., Disp: 90 tablet, Rfl: 3    aspirin 81 mg EC tablet, Take 1 tablet (81 mg) by mouth once daily., Disp: , Rfl:     atorvastatin (Lipitor) 80 mg tablet, Take 1 tablet (80 mg) by mouth once daily. At bedtime, Disp: 90 tablet, Rfl: 3    calcitriol (Rocaltrol) 0.25 mcg capsule, Take 1 capsule (0.25 mcg) by mouth once daily. Three times a week., Disp: , Rfl:     cinacalcet (Sensipar) 30 mg tablet, Take 1 tablet (30 mg) by mouth 3 times a week. Take with food or shortly afer a meal. Swallow tablet whole; do not break or divide., Disp: , Rfl:     finerenone (Kerendia) 10 mg tablet tablet, Take 1 tablet (10 mg) by mouth once daily., Disp: , Rfl:     fluticasone-umeclidin-vilanter (Trelegy Ellipta) 100-62.5-25 mcg blister with device, Inhale 1 puff once daily., Disp: 30 each, Rfl: 11    guaiFENesin (Mucinex) 600 mg 12 hr tablet, Take 1 tablet (600 mg) by mouth every 12 hours if needed for congestion. Do not crush, chew, or split., Disp: , Rfl:     ipratropium-albuteroL (Duo-Neb) 0.5-2.5 mg/3 mL nebulizer solution, TAKE 3 ML BY NEBULIZATION 4 TIMES A DAY AS NEEDED FOR WHEEZING OR SHORTNESS OF BREATH., Disp: 360 mL, Rfl: 3    levothyroxine (Synthroid, Levoxyl) 75 mcg  tablet, Take 0.5 tablets (37.5 mcg) by mouth once daily., Disp: , Rfl:     metoprolol succinate XL (Toprol-XL) 25 mg 24 hr tablet, Take 1 tablet (25 mg) by mouth once daily. Do not crush or chew., Disp: 30 tablet, Rfl: 11    multivitamin tablet, Take 1 tablet by mouth once daily., Disp: , Rfl:     torsemide (Demadex) 20 mg tablet, Take 2 tablets (40 mg) by mouth 2 times a day., Disp: 360 tablet, Rfl: 2    dapagliflozin propanediol (Farxiga) 10 mg, Take 1 tablet (10 mg) by mouth once daily. (Patient not taking: Reported on 2/7/2025), Disp: 90 tablet, Rfl: 3    linaCLOtide (Linzess) 145 mcg capsule, Take 1 capsule (145 mcg) by mouth once daily in the morning. Take before meals. Do not crush or chew. (Patient not taking: Reported on 2/7/2025), Disp: 30 capsule, Rfl: 11     HPI     Past medical history of ischemic heart disease with PCI in the past. 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 She has had nonsustained ventricular tachycardia in the past from which she was symptomatic. She has been pretty well-controlled with that on amiodarone. Recent stress MPI normal. She had hospitalization on March 4, 2024 with pneumonia and comorbid CHF. The lateral wall lead was unable to be placed to convert to BiV pacer.     Patient reports that weight has been stable.  She feels she has had improvement in LE edema although she still has +1 pitting edema in ankles and feet.  Denies any increase in sob since last visit.     Review of Systems   Constitutional:  Negative for activity change, chills and fever.   HENT:  Negative for hearing loss.    Eyes: Negative.    Respiratory:  Positive for shortness of breath. Negative for cough, chest tightness and wheezing.         Positive orthopnea but denies PND.  Severe O2 dependent COPD.    Cardiovascular:  Positive for leg swelling. Negative for chest pain and palpitations.   Gastrointestinal:  Negative for abdominal distention and blood in  stool.   Genitourinary:  Negative for hematuria.   Neurological:  Negative for syncope, weakness and light-headedness.   Psychiatric/Behavioral:  Negative for confusion.        Objective     /57 (BP Location: Left arm, Patient Position: Sitting, BP Cuff Size: Adult)   Pulse 60   Resp 22   Wt 48 kg (105 lb 13.6 oz)   LMP  (LMP Unknown)   SpO2 100% Comment: 4 liters oxygen  BMI 19.36 kg/m²         Transthoracic echo (TTE) complete    Result Date: 12/4/2024              [place] [address]      Phone [phone] Fax [phone] TRANSTHORACIC ECHOCARDIOGRAM REPORT Patient Name:       CHERRY Noel Physician:    [id number] Darrel Hampton MD Study Date:         12/3/2024           Ordering Provider:    [id number] ADRIA JON MRN/PID:            [id number]            Fellow: Accession#:         [id number]        Nurse:                Camilla Robledo RN Date of Birth/Age:  1935 / 89 years Sonographer:          Thea Espinoza RDCS Gender Assigned at  F                   Additional Staff: Birth: Height:             157.48 cm           Admit Date: Weight:             47.17 kg            Admission Status:     Outpatient BSA / BMI:          1.45 m2 / 19.02     Department Location:  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 Study Detail: The following Echo studies were performed: 2D, M-Mode, Doppler and                color flow. Technically challenging study due to body habitus and               prominent lung artifact. Agitated saline used as a contrast agent               for intraseptal flow evaluation and Definity used as a contrast               agent for endocardial border definition. Total contrast used for               this procedure was 2 mL via IV push.  PHYSICIAN INTERPRETATION: Left Ventricle: The left ventricular systolic function is mildly decreased, with a Lentz's biplane calculated ejection fraction of 52%. There is mild concentric left ventricular hypertrophy. There is global hypokinesis of the left ventricle with minor regional variations. The left ventricular cavity size is normal. There is mild increased septal and mildly increased posterior left ventricular wall thickness. Spectral Doppler shows a normal pattern of left ventricular diastolic filling. Left Atrium: The left atrium is normal in size. A bubble study using agitated saline was performed. Bubble study is negative. Right Ventricle: The right ventricle is normal in size. There is mildly reduced right ventricular systolic function. A device is visualized in the right ventricle. Right Atrium: The right atrium is mildly dilated. There is a device visualized in the right atrium. Aortic Valve: The aortic valve appears structurally normal. There is mild aortic valve cusp calcification. The aortic valve dimensionless index is 0.69. There is mild aortic valve regurgitation. The peak instantaneous gradient of the aortic valve is 6 mmHg. The mean gradient of the aortic valve is 3 mmHg. Mitral Valve: The mitral valve is normal in structure. There is trace mitral valve regurgitation. Tricuspid Valve: The tricuspid valve is structurally normal. There is mild to moderate tricuspid regurgitation. The Doppler estimated RVSP is moderately elevated right ventricular systolic pressure at 66.2 mmHg. Pulmonic Valve: The pulmonic valve is not well visualized.  There is physiologic pulmonic valve regurgitation. Pericardium: No pericardial effusion noted. Aorta: The aortic root is normal.  CONCLUSIONS:  1. The left ventricular systolic function is mildly decreased, with a Lentz's biplane calculated ejection fraction of 52%.  2. There is global hypokinesis of the left ventricle with minor regional variations.  3. There is mildly reduced right ventricular systolic function.  4. Mild to moderate tricuspid regurgitation.  5. Moderately elevated right ventricular systolic pressure.  6. Mild aortic valve regurgitation. QUANTITATIVE DATA SUMMARY:  2D MEASUREMENTS:            Normal Ranges: LAs:             4.30 cm    (2.7-4.0cm) IVSd:            1.07 cm    (0.6-1.1cm) LVPWd:           1.01 cm    (0.6-1.1cm) LVIDd:           4.38 cm    (3.9-5.9cm) LVIDs:           3.30 cm LV Mass Index:   106.7 g/m2 LV % FS          24.7 %  LA VOLUME:                    Normal Ranges: LA Vol A4C:        45.6 ml    (22+/-6mL/m2) LA Vol A2C:        42.2 ml LA Vol BP:         48.2 ml LA Vol Index A4C:  31.5ml/m2 LA Vol Index A2C:  29.2 ml/m2 LA Vol Index BP:   33.3 ml/m2 LA Area A4C:       17.8 cm2 LA Area A2C:       15.6 cm2 LA Major Axis A4C: 5.9 cm LA Major Axis A2C: 4.9 cm LA Vol A4C:        42.1 ml LA Vol A2C:        41.4 ml LA Vol Index BSA:  28.8 ml/m2  RA VOLUME BY A/L METHOD:          Normal Ranges: RA Area A4C:             23.4 cm2  AORTA MEASUREMENTS:         Normal Ranges: Ao Sinus, d:        3.00 cm (2.1-3.5cm) Ao STJ, d:          2.40 cm (1.7-3.4cm) Asc Ao, d:          3.60 cm (2.1-3.4cm)  LV SYSTOLIC FUNCTION BY 2D PLANIMETRY (MOD):                      Normal Ranges: EF-A4C View:    37 % (>=55%) EF-A2C View:    63 % EF-Biplane:     52 % LV EF Reported: 52 %  LV DIASTOLIC FUNCTION:          Normal Ranges: MV Peak E:             0.63 m/s (0.7-1.2 m/s) MV Peak A:             0.25 m/s (0.42-0.7 m/s) E/A Ratio:             2.51     (1.0-2.2) MV lateral e'          0.06 m/s  MITRAL VALVE:           Normal Ranges: MV DT:        300 msec (150-240msec)  AORTIC VALVE:                     Normal Ranges: AoV Vmax:                1.20 m/s (<=1.7m/s) AoV Peak P.7 mmHg (<20mmHg) AoV Mean PG:             3.0 mmHg (1.7-11.5mmHg) LVOT Max Henry:            0.77 m/s (<=1.1m/s) AoV VTI:                 23.22 cm (18-25cm) LVOT VTI:                15.93 cm LVOT Diameter:           1.96 cm  (1.8-2.4cm) AoV Area, VTI:           2.07 cm2 (2.5-5.5cm2) AoV Area,Vmax:           1.93 cm2 (2.5-4.5cm2) AoV Dimensionless Index: 0.69  AORTIC INSUFFICIENCY: AI Vmax:       3.95 m/s AI Half-time:  419 msec AI Decel Time: 1446 msec AI Decel Rate: 273.44 cm/s2  RIGHT VENTRICLE: RV Basal 3.49 cm RV Mid   2.80 cm RV Major 7.8 cm TAPSE:   15.0 mm RV s'    0.08 m/s  TRICUSPID VALVE/RVSP:          Normal Ranges: Peak TR Velocity:     3.97 m/s RV Syst Pressure:     66 mmHg  (< 30mmHg) IVC Diam:             1.65 cm  AORTA: Asc Ao Diam 3.60 cm  [id number] Hampton MD Electronically signed on 2024 at 8:59:11 AM  ** Final **     Transthoracic Echo Limited    Result Date: 2024              [place] [id number]      Phone [phone] Fax [phone] TRANSTHORACIC ECHOCARDIOGRAM REPORT  Patient Name:      CHERRY Noel Physician:    [id number] Hampton MD Study Date:        2024            Ordering Provider:    [id number] BROOKE FOFANA MRN/PID:           [id number]             Fellow: Accession#:        [id number]         Nurse:                Nette Ott RN Date of Birth/Age: 1935 / 88 years  Sonographer:          Estelita Brown RDCS Gender:            F                    Additional Staff: Height:            157.48 cm            Admit Date: Weight:            45.36  kg             Admission Status:     Outpatient BSA / BMI:         1.42 m2 / 18.29      Department Location:                    kg/m2 Blood Pressure: 102 /58 mmHg Study Type:    TRANSTHORACIC ECHO (TTE) LIMITED Diagnosis/ICD: Ventricular hypertrophy or dilatation-I51.7 Indication:    Left Ventricular hypertrophy CPT Codes:     Echo Limited-96149 Patient History: Pertinent History: LVH. Study Detail: Technically challenging study due to small rib spaces and               prominent lung artifact. Definity used as a contrast agent for               endocardial border definition. Unable to obtain suprasternal notch               and subcostal view.  PHYSICIAN INTERPRETATION: Left Ventricle: Left ventricular systolic function is moderately decreased, with an estimated ejection fraction of 35-40%. There is global hypokinesis of the left ventricle with minor regional variations. The left ventricular cavity size was not assessed. The left ventricular septal wall thickness is moderately increased. There is moderately increased left ventricular posterior wall thickness. There is moderate to severe left ventricular hypertrophy. Left ventricular diastolic filling was not assessed. Left Atrium: The left atrium was not assessed. Right Ventricle: The right ventricle was not assessed. Right ventricular systolic function not assessed. Right Atrium: The right atrium was not assessed. Aortic Valve: The aortic valve was not assessed. Aortic valve regurgitation was not assessed. Mitral Valve: The mitral valve was not assessed. Mitral valve regurgitation was not assessed. Tricuspid Valve: The tricuspid valve was not assessed. Tricuspid regurgitation was not assessed. Pulmonic Valve: The pulmonic valve was not assessed. The pulmonic valve regurgitation was not assessed. Pericardium: Pericardial effusion was not assessed. Aorta: The aortic root was not assessed.  CONCLUSIONS:  1. Left ventricular systolic function is moderately decreased  with a 35-40% estimated ejection fraction.  2. Moderately increased left ventricular septal thickness.  3. The left ventricular posterior wall thickness is moderately increased.  4. There is moderate to severe left ventricular hypertrophy.  5. There is global hypokinesis of the left ventricle with minor regional variations. QUANTITATIVE DATA SUMMARY: 2D MEASUREMENTS:                           Normal Ranges: LAs:           3.00 cm    (2.7-4.0cm) IVSd:          1.62 cm    (0.6-1.1cm) LVPWd:         1.44 cm    (0.6-1.1cm) LVIDd:         3.62 cm    (3.9-5.9cm) LVIDs:         2.69 cm LV Mass Index: 146.9 g/m2 LV % FS        25.6 % LV SYSTOLIC FUNCTION BY 2D PLANIMETRY (MOD):                     Normal Ranges: EF-A4C View: 33.5 % (>=55%) EF-A2C View: 39.2 % EF-Biplane:  39.0 %  [id number] Darrel Hampton MD Electronically signed on 2/21/2024 at 9:38:31 AM  ** Final **       Lab Results   Component Value Date    BUN 72 (H) 01/31/2025    BUN 58 (H) 01/28/2025    CREATININE 2.48 (H) 01/31/2025    CREATININE 2.28 (H) 01/28/2025    BNP 1,832 (H) 01/07/2025    MG 2.25 01/31/2025    MG 2.5 01/28/2025    K 3.9 01/31/2025    K 4.3 01/28/2025     01/31/2025     01/28/2025       Constitutional:       General:  NAD   HENT:      Head: Normocephalic     Mouth: Mucous membranes are moist.      Neck:  No JVD or HJR   Eyes:      Conjunctiva/sclera: Conjunctivae normal.    Cardiovascular:      Rate and Rhythm: Normal rate and regular rhythm      Heart sounds:  S1 S2 normal, + systolic murmur no S3 or S4   Pulmonary:      Pulmonary effort is normal.  She is sob with any activity.  O2 3-5 liters continuously. Significantly diminished breath sounds. No wheezes.  Expiratory phase is prolonged.   Abdominal:      General: Bowel sounds are normal, non- tender to palpitations. Liver is non palpable at  right costal margin.   Musculoskeletal:         General: No swelling +2 bilateral lower extremity edema.  DUARTE well.   Skin:     General: Skin is  warm and dry   Neurological:      General: No focal deficit present.      Mental Status: alert and oriented to person, place, and time. Mental status is at baseline.     Psychiatric:         Mood and Affect: Normal Affect.     Assessment/Plan     Problem List Items Addressed This Visit       Chronic heart failure with preserved ejection fraction      Chronic diastolic heart failure   Etiology: HTN/ASHD/ CKD  AHA Stage: D  NYHA class: 4  Volume Status: Continues to have significant fluid congestion.  GFR: 24     GDMT:  BB-Metoprolol XL 50 mg 1/2 tablet daily   ARB/ACEI/ARNI - on hold   MRA -  GFR less than 30   SGLT2i - Farxiga 10 mg once a day   - hold for now for GFR 20   Diuretic -  Torsemide 40 mg twice a day.   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 ....  was unable to upgrade to Bi V pacer.        CHF: Patient is down 2 more pounds over the past week.  Will continue current medications and check BMP next week.  Continue fluid and sodium restrictions.   Prognosis is poor and have discussed with patient DNR/palliative care and Hospice options on several occasions but without decision yet from patient or family.   Will continue to monitor and adjust medications as tolerated.  Lab work in 1 week.   Follow up in 2 weeks.      2. Atrial fibrillation: persistent.   She is s/p AV node ablation and PPM placement in the past. On ASA.  No other OAC due to hx of recurrent GIB     3. NSVT:  Amiodarone.  No palpitations.       3. CKD 3: GFR 19 . Farxiga is on hold.  Will continue current diuretic dose and follow up early next week.    4. HTN:  Controlled.       5. ASHD with PCI to LAD 2017:  Secondary prevention medications ASA, Statin, BB.   Recent stress/MPI scan negative for ischemia.   No angina.  Stable.      6. Chronic COPD: Scheduled for pulmonary function evaluation.  Currently finishing up treatment for pneumonia. She has follow up with pulmonary next week.      Prognosis is poor.   She is not tolerating guideline directed medical therapies.   She did have evaluation with [facility]. Will continue palliative treatment as tolerated and encourage family to consider DNR and possible hospice care.          Rosangela Lane, APRN-CNP

## 2025-02-11 ENCOUNTER — APPOINTMENT (OUTPATIENT)
Dept: PRIMARY CARE | Facility: CLINIC | Age: 89
End: 2025-02-11
Payer: MEDICARE

## 2025-02-13 ENCOUNTER — TELEPHONE (OUTPATIENT)
Dept: CARDIOLOGY | Facility: HOSPITAL | Age: 89
End: 2025-02-13
Payer: MEDICARE

## 2025-02-13 NOTE — TELEPHONE ENCOUNTER
Adwoa calls today and say that she thinks that she has taken an extra 40 mg of torsemide today by mistake.     Adwoa feel fine. BP is 140/67 and HR 65. She is eating and drinking like normal. She is no dizzy or lightheadedness.

## 2025-02-14 DIAGNOSIS — I50.32 CHRONIC HEART FAILURE WITH PRESERVED EJECTION FRACTION: ICD-10-CM

## 2025-02-19 LAB
ANION GAP SERPL CALCULATED.4IONS-SCNC: 17 MMOL/L (CALC) (ref 7–17)
BUN SERPL-MCNC: 83 MG/DL (ref 7–25)
BUN/CREAT SERPL: 36 (CALC) (ref 6–22)
CALCIUM SERPL-MCNC: 8.4 MG/DL (ref 8.6–10.4)
CHLORIDE SERPL-SCNC: 92 MMOL/L (ref 98–110)
CO2 SERPL-SCNC: 32 MMOL/L (ref 20–32)
CREAT SERPL-MCNC: 2.28 MG/DL (ref 0.6–0.95)
EGFRCR SERPLBLD CKD-EPI 2021: 20 ML/MIN/1.73M2
GLUCOSE SERPL-MCNC: 186 MG/DL (ref 65–139)
POTASSIUM SERPL-SCNC: 3.9 MMOL/L (ref 3.5–5.3)
SODIUM SERPL-SCNC: 141 MMOL/L (ref 135–146)

## 2025-02-20 ENCOUNTER — HOSPITAL ENCOUNTER (OUTPATIENT)
Dept: CARDIOLOGY | Facility: HOSPITAL | Age: 89
Discharge: HOME | End: 2025-02-20
Payer: MEDICARE

## 2025-02-20 ENCOUNTER — TELEPHONE (OUTPATIENT)
Dept: PRIMARY CARE | Facility: CLINIC | Age: 89
End: 2025-02-20
Payer: MEDICARE

## 2025-02-20 DIAGNOSIS — I50.42 CHRONIC COMBINED SYSTOLIC AND DIASTOLIC HEART FAILURE: ICD-10-CM

## 2025-02-20 DIAGNOSIS — I47.29 NSVT (NONSUSTAINED VENTRICULAR TACHYCARDIA) (MULTI): ICD-10-CM

## 2025-02-20 DIAGNOSIS — Z95.0 PRESENCE OF CARDIAC PACEMAKER: ICD-10-CM

## 2025-02-20 DIAGNOSIS — I43 CARDIOMYOPATHY IN DISEASES CLASSIFIED ELSEWHERE (MULTI): ICD-10-CM

## 2025-02-20 DIAGNOSIS — I48.91 ATRIAL FIBRILLATION, UNSPECIFIED TYPE (MULTI): ICD-10-CM

## 2025-02-20 DIAGNOSIS — I44.2 AV BLOCK, COMPLETE (MULTI): ICD-10-CM

## 2025-02-20 DIAGNOSIS — I25.10 CORONARY ARTERY DISEASE, UNSPECIFIED VESSEL OR LESION TYPE, UNSPECIFIED WHETHER ANGINA PRESENT, UNSPECIFIED WHETHER NATIVE OR TRANSPLANTED HEART: ICD-10-CM

## 2025-02-20 PROCEDURE — 93296 REM INTERROG EVL PM/IDS: CPT

## 2025-02-20 NOTE — TELEPHONE ENCOUNTER
Patient called, is confused about levothyroxine dose. She was initially on 75 mcg, then was brought down to 37.5 mcg. She has both doses at home and not sure which one she needs ot refill. I do see the last dose that was filled was 37.5 mcg but was sent as 75 mcg 0.5 tabs daily. Looks like cardiology made the last adjustment but you did prescribe before

## 2025-02-20 NOTE — TELEPHONE ENCOUNTER
Patient notified to be on 75 mcg 1/2 tab daily (37.5 mcg). Will you bbe ordering labs or should she reach out to cardiology?

## 2025-02-21 ENCOUNTER — INFUSION (OUTPATIENT)
Dept: INFUSION THERAPY | Facility: HOSPITAL | Age: 89
End: 2025-02-21
Payer: MEDICARE

## 2025-02-21 ENCOUNTER — OFFICE VISIT (OUTPATIENT)
Dept: CARDIOLOGY | Facility: HOSPITAL | Age: 89
End: 2025-02-21
Payer: MEDICARE

## 2025-02-21 VITALS — HEART RATE: 66 BPM | DIASTOLIC BLOOD PRESSURE: 67 MMHG | RESPIRATION RATE: 20 BRPM | SYSTOLIC BLOOD PRESSURE: 108 MMHG

## 2025-02-21 VITALS
BODY MASS INDEX: 20.34 KG/M2 | HEART RATE: 70 BPM | SYSTOLIC BLOOD PRESSURE: 128 MMHG | OXYGEN SATURATION: 99 % | WEIGHT: 111.2 LBS | RESPIRATION RATE: 16 BRPM | DIASTOLIC BLOOD PRESSURE: 62 MMHG

## 2025-02-21 DIAGNOSIS — I50.42 CHRONIC COMBINED SYSTOLIC AND DIASTOLIC HEART FAILURE: ICD-10-CM

## 2025-02-21 DIAGNOSIS — I50.43 ACUTE ON CHRONIC COMBINED SYSTOLIC AND DIASTOLIC HEART FAILURE: ICD-10-CM

## 2025-02-21 DIAGNOSIS — I50.32 CHRONIC HEART FAILURE WITH PRESERVED EJECTION FRACTION: Primary | ICD-10-CM

## 2025-02-21 PROCEDURE — 99212 OFFICE O/P EST SF 10 MIN: CPT | Mod: 25 | Performed by: NURSE PRACTITIONER

## 2025-02-21 PROCEDURE — 99212 OFFICE O/P EST SF 10 MIN: CPT | Performed by: NURSE PRACTITIONER

## 2025-02-21 PROCEDURE — 3074F SYST BP LT 130 MM HG: CPT | Performed by: NURSE PRACTITIONER

## 2025-02-21 PROCEDURE — 3078F DIAST BP <80 MM HG: CPT | Performed by: NURSE PRACTITIONER

## 2025-02-21 PROCEDURE — 1159F MED LIST DOCD IN RCRD: CPT | Performed by: NURSE PRACTITIONER

## 2025-02-21 PROCEDURE — 2500000004 HC RX 250 GENERAL PHARMACY W/ HCPCS (ALT 636 FOR OP/ED): Performed by: NURSE PRACTITIONER

## 2025-02-21 PROCEDURE — 1157F ADVNC CARE PLAN IN RCRD: CPT | Performed by: NURSE PRACTITIONER

## 2025-02-21 PROCEDURE — 1036F TOBACCO NON-USER: CPT | Performed by: NURSE PRACTITIONER

## 2025-02-21 PROCEDURE — 1123F ACP DISCUSS/DSCN MKR DOCD: CPT | Performed by: NURSE PRACTITIONER

## 2025-02-21 PROCEDURE — 96374 THER/PROPH/DIAG INJ IV PUSH: CPT | Mod: INF

## 2025-02-21 RX ORDER — FUROSEMIDE 10 MG/ML
60 INJECTION INTRAMUSCULAR; INTRAVENOUS ONCE
Status: CANCELLED | OUTPATIENT
Start: 2025-02-21

## 2025-02-21 RX ORDER — FUROSEMIDE 10 MG/ML
60 INJECTION INTRAMUSCULAR; INTRAVENOUS ONCE
OUTPATIENT
Start: 2025-02-22 | End: 2025-02-22

## 2025-02-21 RX ORDER — FUROSEMIDE 10 MG/ML
60 INJECTION INTRAMUSCULAR; INTRAVENOUS ONCE
Status: COMPLETED | OUTPATIENT
Start: 2025-02-21 | End: 2025-02-21

## 2025-02-21 RX ADMIN — FUROSEMIDE 60 MG: 10 INJECTION, SOLUTION INTRAVENOUS at 14:43

## 2025-02-21 ASSESSMENT — ENCOUNTER SYMPTOMS
COUGH: 0
HEMATURIA: 0
SHORTNESS OF BREATH: 0
LIGHT-HEADEDNESS: 0
ABDOMINAL DISTENTION: 0
BLOOD IN STOOL: 0
WHEEZING: 0
EYES NEGATIVE: 1
ACTIVITY CHANGE: 0
CHILLS: 0
CHEST TIGHTNESS: 0
PALPITATIONS: 0
CONFUSION: 0
FEVER: 0
WEAKNESS: 0

## 2025-02-21 NOTE — PROGRESS NOTES
Subjective   Patient ID: Adwoa Forrest is a 89 y.o. female who presents for follow-up of congestive heart failure.     Current Outpatient Medications:     acetaminophen (Tylenol 8 HOUR) 650 mg ER tablet, Take 1 tablet (650 mg) by mouth every 8 hours if needed for mild pain (1 - 3). Do not crush, chew, or split., Disp: , Rfl:     albuterol 90 mcg/actuation inhaler, Inhale 2 puffs every 4 hours if needed for shortness of breath., Disp: 18 g, Rfl: 11    amiodarone (Pacerone) 200 mg tablet, Take 1 tablet (200 mg) by mouth once daily., Disp: 90 tablet, Rfl: 3    aspirin 81 mg EC tablet, Take 1 tablet (81 mg) by mouth once daily., Disp: , Rfl:     atorvastatin (Lipitor) 80 mg tablet, Take 1 tablet (80 mg) by mouth once daily. At bedtime, Disp: 90 tablet, Rfl: 3    calcitriol (Rocaltrol) 0.25 mcg capsule, Take 1 capsule (0.25 mcg) by mouth once daily. Three times a week., Disp: , Rfl:     cinacalcet (Sensipar) 30 mg tablet, Take 1 tablet (30 mg) by mouth 3 times a week. Take with food or shortly afer a meal. Swallow tablet whole; do not break or divide., Disp: , Rfl:     dapagliflozin propanediol (Farxiga) 10 mg, Take 1 tablet (10 mg) by mouth once daily., Disp: 90 tablet, Rfl: 3    eplerenone (Inspra) 25 mg tablet, Take 1 tablet (25 mg) by mouth once daily., Disp: , Rfl:     finerenone (Kerendia) 10 mg tablet tablet, Take 1 tablet (10 mg) by mouth once daily., Disp: , Rfl:     fluticasone-umeclidin-vilanter (Trelegy Ellipta) 100-62.5-25 mcg blister with device, Inhale 1 puff once daily., Disp: 30 each, Rfl: 11    guaiFENesin (Mucinex) 600 mg 12 hr tablet, Take 1 tablet (600 mg) by mouth every 12 hours if needed for congestion. Do not crush, chew, or split., Disp: , Rfl:     ipratropium-albuteroL (Duo-Neb) 0.5-2.5 mg/3 mL nebulizer solution, TAKE 3 ML BY NEBULIZATION 4 TIMES A DAY AS NEEDED FOR WHEEZING OR SHORTNESS OF BREATH., Disp: 360 mL, Rfl: 3    levothyroxine (Synthroid, Levoxyl) 75 mcg tablet, Take 0.5 tablets (37.5  mcg) by mouth once daily., Disp: , Rfl:     linaCLOtide (Linzess) 145 mcg capsule, Take 1 capsule (145 mcg) by mouth once daily in the morning. Take before meals. Do not crush or chew., Disp: 30 capsule, Rfl: 11    metoprolol succinate XL (Toprol-XL) 25 mg 24 hr tablet, Take 1 tablet (25 mg) by mouth once daily. Do not crush or chew., Disp: 30 tablet, Rfl: 11    multivitamin tablet, Take 1 tablet by mouth once daily., Disp: , Rfl:     torsemide (Demadex) 20 mg tablet, Take 2 tablets (40 mg) by mouth 2 times a day., Disp: 360 tablet, Rfl: 2    ALPRAZolam (Xanax) 0.5 mg tablet, Take 1 tablet (0.5 mg) by mouth once daily at bedtime for 3 days. (Patient taking differently: Take 1 tablet (0.5 mg) by mouth once daily at bedtime. Taking 1 tablet twice a day), Disp: 3 tablet, Rfl: 0     HPI     Past medical history of ischemic heart disease with PCI in the past. 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 She has had nonsustained ventricular tachycardia in the past from which she was symptomatic. She has been pretty well-controlled with that on amiodarone. Recent stress MPI normal. She had hospitalization on March 4, 2024 with pneumonia and comorbid CHF. The lateral wall lead was unable to be placed to convert to BiV pacer.     Review of Systems   Constitutional:  Negative for activity change, chills and fever.   HENT:  Negative for hearing loss.    Eyes: Negative.    Respiratory:  Negative for cough, chest tightness, shortness of breath and wheezing.    Cardiovascular:  Negative for chest pain, palpitations and leg swelling.   Gastrointestinal:  Negative for abdominal distention and blood in stool.   Genitourinary:  Negative for hematuria.   Neurological:  Negative for syncope, weakness and light-headedness.   Psychiatric/Behavioral:  Negative for confusion.        Objective     /62 (BP Location: Right arm, Patient Position: Sitting, BP Cuff Size: Adult)   Pulse 70    Resp 16   Wt 50.4 kg (111 lb 3.2 oz)   LMP  (LMP Unknown)   SpO2 99% Comment: 3 liters oxygen  BMI 20.34 kg/m²         Transthoracic echo (TTE) complete    Result Date: 12/4/2024              [place] [id number]      Phone [phone] Fax [phone] TRANSTHORACIC ECHOCARDIOGRAM REPORT Patient Name:       CHERRY BOB JESUS Noel Physician:    [id number] Darrel Hampton MD Study Date:         12/3/2024           Ordering Provider:    [id number] ADRIA JON MRN/PID:            [id number]            Fellow: Accession#:         [id number]        Nurse:                Camilla Robledo RN Date of Birth/Age:  1935 / 89 years Sonographer:          Thea Espinoza RDCS Gender Assigned at  F                   Additional Staff: Birth: Height:             157.48 cm           Admit Date: Weight:             47.17 kg            Admission Status:     Outpatient BSA / BMI:          1.45 m2 / 19.02     Department Location:  [facility] Echo                     kg/m2                                     Lab Blood Pressure: 130 /73 mmHg Study Type:    TRANSTHORACIC ECHO (TTE) COMPLETE Diagnosis/ICD: 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 Study Detail: The following Echo studies were performed: 2D, M-Mode, Doppler and               color flow. Technically challenging study due to body habitus and               prominent lung artifact. Agitated saline used as a contrast agent               for intraseptal flow evaluation and Definity used as a contrast               agent for endocardial border definition.  Total contrast used for               this procedure was 2 mL via IV push.  PHYSICIAN INTERPRETATION: Left Ventricle: The left ventricular systolic function is mildly decreased, with a Lentz's biplane calculated ejection fraction of 52%. There is mild concentric left ventricular hypertrophy. There is global hypokinesis of the left ventricle with minor regional variations. The left ventricular cavity size is normal. There is mild increased septal and mildly increased posterior left ventricular wall thickness. Spectral Doppler shows a normal pattern of left ventricular diastolic filling. Left Atrium: The left atrium is normal in size. A bubble study using agitated saline was performed. Bubble study is negative. Right Ventricle: The right ventricle is normal in size. There is mildly reduced right ventricular systolic function. A device is visualized in the right ventricle. Right Atrium: The right atrium is mildly dilated. There is a device visualized in the right atrium. Aortic Valve: The aortic valve appears structurally normal. There is mild aortic valve cusp calcification. The aortic valve dimensionless index is 0.69. There is mild aortic valve regurgitation. The peak instantaneous gradient of the aortic valve is 6 mmHg. The mean gradient of the aortic valve is 3 mmHg. Mitral Valve: The mitral valve is normal in structure. There is trace mitral valve regurgitation. Tricuspid Valve: The tricuspid valve is structurally normal. There is mild to moderate tricuspid regurgitation. The Doppler estimated RVSP is moderately elevated right ventricular systolic pressure at 66.2 mmHg. Pulmonic Valve: The pulmonic valve is not well visualized. There is physiologic pulmonic valve regurgitation. Pericardium: No pericardial effusion noted. Aorta: The aortic root is normal.  CONCLUSIONS:  1. The left ventricular systolic function is mildly decreased, with a Lentz's biplane calculated ejection fraction of 52%.  2. There is global  hypokinesis of the left ventricle with minor regional variations.  3. There is mildly reduced right ventricular systolic function.  4. Mild to moderate tricuspid regurgitation.  5. Moderately elevated right ventricular systolic pressure.  6. Mild aortic valve regurgitation. QUANTITATIVE DATA SUMMARY:  2D MEASUREMENTS:            Normal Ranges: LAs:             4.30 cm    (2.7-4.0cm) IVSd:            1.07 cm    (0.6-1.1cm) LVPWd:           1.01 cm    (0.6-1.1cm) LVIDd:           4.38 cm    (3.9-5.9cm) LVIDs:           3.30 cm LV Mass Index:   106.7 g/m2 LV % FS          24.7 %  LA VOLUME:                    Normal Ranges: LA Vol A4C:        45.6 ml    (22+/-6mL/m2) LA Vol A2C:        42.2 ml LA Vol BP:         48.2 ml LA Vol Index A4C:  31.5ml/m2 LA Vol Index A2C:  29.2 ml/m2 LA Vol Index BP:   33.3 ml/m2 LA Area A4C:       17.8 cm2 LA Area A2C:       15.6 cm2 LA Major Axis A4C: 5.9 cm LA Major Axis A2C: 4.9 cm LA Vol A4C:        42.1 ml LA Vol A2C:        41.4 ml LA Vol Index BSA:  28.8 ml/m2  RA VOLUME BY A/L METHOD:          Normal Ranges: RA Area A4C:             23.4 cm2  AORTA MEASUREMENTS:         Normal Ranges: Ao Sinus, d:        3.00 cm (2.1-3.5cm) Ao STJ, d:          2.40 cm (1.7-3.4cm) Asc Ao, d:          3.60 cm (2.1-3.4cm)  LV SYSTOLIC FUNCTION BY 2D PLANIMETRY (MOD):                      Normal Ranges: EF-A4C View:    37 % (>=55%) EF-A2C View:    63 % EF-Biplane:     52 % LV EF Reported: 52 %  LV DIASTOLIC FUNCTION:          Normal Ranges: MV Peak E:             0.63 m/s (0.7-1.2 m/s) MV Peak A:             0.25 m/s (0.42-0.7 m/s) E/A Ratio:             2.51     (1.0-2.2) MV lateral e'          0.06 m/s  MITRAL VALVE:          Normal Ranges: MV DT:        300 msec (150-240msec)  AORTIC VALVE:                     Normal Ranges: AoV Vmax:                1.20 m/s (<=1.7m/s) AoV Peak P.7 mmHg (<20mmHg) AoV Mean PG:             3.0 mmHg (1.7-11.5mmHg) LVOT Max Henry:            0.77 m/s  (<=1.1m/s) AoV VTI:                 23.22 cm (18-25cm) LVOT VTI:                15.93 cm LVOT Diameter:           1.96 cm  (1.8-2.4cm) AoV Area, VTI:           2.07 cm2 (2.5-5.5cm2) AoV Area,Vmax:           1.93 cm2 (2.5-4.5cm2) AoV Dimensionless Index: 0.69  AORTIC INSUFFICIENCY: AI Vmax:       3.95 m/s AI Half-time:  419 msec AI Decel Time: 1446 msec AI Decel Rate: 273.44 cm/s2  RIGHT VENTRICLE: RV Basal 3.49 cm RV Mid   2.80 cm RV Major 7.8 cm TAPSE:   15.0 mm RV s'    0.08 m/s  TRICUSPID VALVE/RVSP:          Normal Ranges: Peak TR Velocity:     3.97 m/s RV Syst Pressure:     66 mmHg  (< 30mmHg) IVC Diam:             1.65 cm  AORTA: Asc Ao Diam 3.60 cm  [id number] Darrel Hampton MD Electronically signed on 12/4/2024 at 8:59:11 AM  ** Final **       Lab Results   Component Value Date    BUN 83 (H) 02/18/2025    CREATININE 2.28 (H) 02/18/2025    BNP 1,832 (H) 01/07/2025    MG 2.25 01/31/2025    MG 2.5 01/28/2025    K 3.9 02/18/2025     02/18/2025       Constitutional:       General:  NAD   HENT:      Head: Normocephalic     Mouth: Mucous membranes are moist.      Neck:  + JVD   Eyes:      Conjunctiva/sclera: Conjunctivae normal.    Cardiovascular:      Rate and Rhythm:      Heart sounds:  S1 S2 normal,  systolic murmur noted. no S3 or S4   Pulmonary:      Pulmonary effort is normal.  diminished breath sounds bilateral posterior bases.  No wheezing or rales.   Abdominal:      General: Bowel sounds are normal, non- tender to palpitations.   Musculoskeletal:         General: + 2-3 edema of the bilateral lower legs.   DUARTE well.   Skin:     General: Skin is warm and dry   Neurological:      General: No focal deficit present.      Mental Status: alert and oriented to person, place, and time. Mental status is at baseline.     Psychiatric:         Mood and Affect: Normal Affect.     Assessment/Plan     Problem List Items Addressed This Visit       Chronic heart failure with preserved ejection fraction      Chronic diastolic  heart failure   Etiology: HTN/ASHD/ CKD  AHA Stage: D  NYHA class: 4  Volume Status: Continues to have significant fluid congestion.  GFR: 20     GDMT:  BB-Metoprolol XL 50 mg 1/2 tablet daily   ARB/ACEI/ARNI - on hold   MRA -  GFR less than 20  SGLT2i - Farxiga 10 mg on hold due to renal insuffiiciency   Diuretic -  Torsemide 40 mg twice a day.   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 ....  was unable to upgrade to Bi V pacer.        CHF: Patient weight is up 6 #.   Will give IV Lasix today. Will continue current medications.  Continue fluid and sodium restrictions.   Prognosis is poor and have discussed with patient DNR/palliative care and Hospice options on several occasions but without decision yet from patient or family.   Will continue to monitor and adjust medications as tolerated.  Follow up in clinic 10-14 days.      2. Atrial fibrillation: persistent.   She is s/p AV node ablation and PPM placement in the past. On ASA.  No other OAC due to hx of recurrent GIB. Stable.      3. NSVT:  Amiodarone.  No palpitations.   Stable.      3. CKD 3: GFR 20. Farxiga is on hold.      4. HTN:  Controlled.       5. ASHD with PCI to LAD 2017:  Secondary prevention medications ASA, Statin, BB.   Recent stress/MPI scan negative for ischemia.   No angina.  Stable.      6. Chronic COPD: Scheduled for pulmonary function evaluation. Follows with pulmonology.      Prognosis is poor.  She is not tolerating guideline directed medical therapies.   She did have evaluation with [facility]. Will continue palliative treatment as tolerated and encourage family to consider DNR and possible hospice care.  No further decision yet.       Rosangela Lane, APRN-CNP

## 2025-02-28 ENCOUNTER — OFFICE VISIT (OUTPATIENT)
Dept: WOUND CARE | Facility: CLINIC | Age: 89
End: 2025-02-28
Payer: MEDICARE

## 2025-02-28 PROCEDURE — 11042 DBRDMT SUBQ TIS 1ST 20SQCM/<: CPT

## 2025-03-07 ENCOUNTER — OFFICE VISIT (OUTPATIENT)
Dept: CARDIOLOGY | Facility: HOSPITAL | Age: 89
End: 2025-03-07
Payer: MEDICARE

## 2025-03-07 VITALS
BODY MASS INDEX: 20.3 KG/M2 | DIASTOLIC BLOOD PRESSURE: 68 MMHG | HEART RATE: 68 BPM | OXYGEN SATURATION: 92 % | RESPIRATION RATE: 20 BRPM | WEIGHT: 111 LBS | SYSTOLIC BLOOD PRESSURE: 108 MMHG

## 2025-03-07 DIAGNOSIS — I50.32 CHRONIC HEART FAILURE WITH PRESERVED EJECTION FRACTION: Primary | ICD-10-CM

## 2025-03-07 LAB
ANION GAP SERPL CALC-SCNC: 12 MMOL/L (ref 10–20)
BNP SERPL-MCNC: 1907 PG/ML (ref 0–99)
BUN SERPL-MCNC: 93 MG/DL (ref 6–23)
CALCIUM SERPL-MCNC: 8.2 MG/DL (ref 8.6–10.3)
CHLORIDE SERPL-SCNC: 95 MMOL/L (ref 98–107)
CO2 SERPL-SCNC: 35 MMOL/L (ref 21–32)
CREAT SERPL-MCNC: 2.63 MG/DL (ref 0.5–1.05)
EGFRCR SERPLBLD CKD-EPI 2021: 17 ML/MIN/1.73M*2
GLUCOSE SERPL-MCNC: 65 MG/DL (ref 74–99)
MAGNESIUM SERPL-MCNC: 2.18 MG/DL (ref 1.6–2.4)
POTASSIUM SERPL-SCNC: 4 MMOL/L (ref 3.5–5.3)
SODIUM SERPL-SCNC: 138 MMOL/L (ref 136–145)

## 2025-03-07 PROCEDURE — 3078F DIAST BP <80 MM HG: CPT | Performed by: NURSE PRACTITIONER

## 2025-03-07 PROCEDURE — 1036F TOBACCO NON-USER: CPT | Performed by: NURSE PRACTITIONER

## 2025-03-07 PROCEDURE — 1159F MED LIST DOCD IN RCRD: CPT | Performed by: NURSE PRACTITIONER

## 2025-03-07 PROCEDURE — 80048 BASIC METABOLIC PNL TOTAL CA: CPT | Performed by: NURSE PRACTITIONER

## 2025-03-07 PROCEDURE — 3074F SYST BP LT 130 MM HG: CPT | Performed by: NURSE PRACTITIONER

## 2025-03-07 PROCEDURE — 1123F ACP DISCUSS/DSCN MKR DOCD: CPT | Performed by: NURSE PRACTITIONER

## 2025-03-07 PROCEDURE — 36415 COLL VENOUS BLD VENIPUNCTURE: CPT | Performed by: NURSE PRACTITIONER

## 2025-03-07 PROCEDURE — 1125F AMNT PAIN NOTED PAIN PRSNT: CPT | Performed by: NURSE PRACTITIONER

## 2025-03-07 PROCEDURE — 1157F ADVNC CARE PLAN IN RCRD: CPT | Performed by: NURSE PRACTITIONER

## 2025-03-07 PROCEDURE — 83735 ASSAY OF MAGNESIUM: CPT | Performed by: NURSE PRACTITIONER

## 2025-03-07 PROCEDURE — 83880 ASSAY OF NATRIURETIC PEPTIDE: CPT | Performed by: NURSE PRACTITIONER

## 2025-03-07 PROCEDURE — 99213 OFFICE O/P EST LOW 20 MIN: CPT | Performed by: NURSE PRACTITIONER

## 2025-03-07 RX ORDER — TORSEMIDE 20 MG/1
40 TABLET ORAL 2 TIMES DAILY
Qty: 360 TABLET | Refills: 3 | Status: SHIPPED | OUTPATIENT
Start: 2025-03-07 | End: 2026-03-07

## 2025-03-07 ASSESSMENT — ENCOUNTER SYMPTOMS
BLOOD IN STOOL: 0
LIGHT-HEADEDNESS: 0
DEPRESSION: 0
CHEST TIGHTNESS: 0
CONFUSION: 0
WHEEZING: 0
OCCASIONAL FEELINGS OF UNSTEADINESS: 0
FEVER: 0
LOSS OF SENSATION IN FEET: 0
ABDOMINAL DISTENTION: 0
CHILLS: 0
WEAKNESS: 0
COUGH: 0
HEMATURIA: 0
ACTIVITY CHANGE: 0
EYES NEGATIVE: 1
SHORTNESS OF BREATH: 1
PALPITATIONS: 0

## 2025-03-07 ASSESSMENT — PAIN SCALES - GENERAL: PAINLEVEL_OUTOF10: 3

## 2025-03-07 NOTE — PATIENT INSTRUCTIONS
Thank you for coming in today.  If you have any questions you may contact the office Monday through Friday at [phone] or on week ends at [phone].    Please take an additional Torsemide 20 mg this evening and in in the morning tomorrow.  Then  resume Torsemide 40 mg twice a day.        Lab work drawn in the clinic today.     Please follow  a 2 GM sodium diet and limit fluid intake to 2 liters per day or 8 servings ( serving size = 8 oz. = 1 cup = 240 ml) per day.   Please avoid processed meat products (luncheon meats, sausages, loya, hot dogs for example) eat 4 servings of vegetables and 1-2 whole servings of whole fruits per day.   Please weigh daily and call [phone] for weight gain of 3 pounds in 24 hours or 5 pounds or if you experience increased swelling or shortness of breath.     Can consider a dose of IV Lasix in clinic next week if no improvement with increase in oral torsemide.     Follow up:   Follow up in 2 weeks.      Please be sure to follow up with your cardiologist at [facility] once every year. Call [phone] to schedule appointment if you do not have a follow up appointment scheduled already.

## 2025-03-07 NOTE — PROGRESS NOTES
Subjective   Patient ID: Adwoa Forrest is a 89 y.o. female who presents for follow-up of congestive heart failure.     Current Outpatient Medications:     acetaminophen (Tylenol 8 HOUR) 650 mg ER tablet, Take 1 tablet (650 mg) by mouth every 8 hours if needed for mild pain (1 - 3). Do not crush, chew, or split., Disp: , Rfl:     albuterol 90 mcg/actuation inhaler, Inhale 2 puffs every 4 hours if needed for shortness of breath., Disp: 18 g, Rfl: 11    amiodarone (Pacerone) 200 mg tablet, Take 1 tablet (200 mg) by mouth once daily., Disp: 90 tablet, Rfl: 3    aspirin 81 mg EC tablet, Take 1 tablet (81 mg) by mouth once daily., Disp: , Rfl:     atorvastatin (Lipitor) 80 mg tablet, Take 1 tablet (80 mg) by mouth once daily. At bedtime, Disp: 90 tablet, Rfl: 3    calcitriol (Rocaltrol) 0.25 mcg capsule, Take 1 capsule (0.25 mcg) by mouth once daily. Three times a week., Disp: , Rfl:     cinacalcet (Sensipar) 30 mg tablet, Take 1 tablet (30 mg) by mouth 3 times a week. Take with food or shortly afer a meal. Swallow tablet whole; do not break or divide., Disp: , Rfl:     eplerenone (Inspra) 25 mg tablet, Take 1 tablet (25 mg) by mouth once daily., Disp: , Rfl:     fluticasone-umeclidin-vilanter (Trelegy Ellipta) 100-62.5-25 mcg blister with device, Inhale 1 puff once daily., Disp: 30 each, Rfl: 11    guaiFENesin (Mucinex) 600 mg 12 hr tablet, Take 1 tablet (600 mg) by mouth every 12 hours if needed for congestion. Do not crush, chew, or split., Disp: , Rfl:     ipratropium-albuteroL (Duo-Neb) 0.5-2.5 mg/3 mL nebulizer solution, TAKE 3 ML BY NEBULIZATION 4 TIMES A DAY AS NEEDED FOR WHEEZING OR SHORTNESS OF BREATH., Disp: 360 mL, Rfl: 3    levothyroxine (Synthroid, Levoxyl) 75 mcg tablet, Take 0.5 tablets (37.5 mcg) by mouth once daily., Disp: , Rfl:     metoprolol succinate XL (Toprol-XL) 25 mg 24 hr tablet, Take 1 tablet (25 mg) by mouth once daily. Do not crush or chew., Disp: 30 tablet, Rfl: 11    multivitamin tablet,  Take 1 tablet by mouth once daily., Disp: , Rfl:     torsemide (Demadex) 20 mg tablet, Take 2 tablets (40 mg) by mouth 2 times a day., Disp: 360 tablet, Rfl: 2    ALPRAZolam (Xanax) 0.5 mg tablet, Take 1 tablet (0.5 mg) by mouth once daily at bedtime for 3 days. (Patient taking differently: Take 1 tablet (0.5 mg) by mouth once daily at bedtime. Taking 1 tablet twice a day), Disp: 3 tablet, Rfl: 0     HPI     Past medical history of ischemic heart disease with PCI in the past. 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 She has had nonsustained ventricular tachycardia in the past from which she was symptomatic. She has been pretty well-controlled with that on amiodarone. Recent stress MPI normal.     Review of Systems   Constitutional:  Negative for activity change, chills and fever.   HENT:  Negative for hearing loss.    Eyes: Negative.    Respiratory:  Positive for shortness of breath. Negative for cough, chest tightness and wheezing.         Reports sob waxes and wanes but seems to be relatively about the same.  COPD dependent on 3 liters N/C oxygen.  Sx present at rest and with exertion.  Positive orthopnea,  denies PND.    Cardiovascular:  Positive for leg swelling. Negative for chest pain and palpitations.        Still with persistent LE edema.      Gastrointestinal:  Negative for abdominal distention and blood in stool.   Genitourinary:  Negative for hematuria.   Neurological:  Negative for syncope, weakness and light-headedness.   Psychiatric/Behavioral:  Negative for confusion.        Objective     /68 (BP Location: Left arm, Patient Position: Sitting)   Pulse 68   Resp 20   Wt 50.3 kg (111 lb)   LMP  (LMP Unknown)   SpO2 92%   BMI 20.30 kg/m²         Transthoracic echo (TTE) complete    Result Date: 12/4/2024              [place] [address]      Phone [phone] Fax [phone] TRANSTHORACIC  ECHOCARDIOGRAM REPORT Patient Name:       CHERRY LEE       Sadie Physician:    [id number] Darrel Hampton MD Study Date:         12/3/2024           Ordering Provider:    [id number] ADRIA CHATMAN                                                               DONTRELL MRN/PID:            [id number]            Fellow: Accession#:         [id number]        Nurse:                Camilla Robledo RN Date of Birth/Age:  1935 / 89 years Sonographer:          Thea Espinoza RDCS Gender Assigned at  F                   Additional Staff: Birth: Height:             157.48 cm           Admit Date: Weight:             47.17 kg            Admission Status:     Outpatient BSA / BMI:          1.45 m2 / 19.02     Department Location:  [facility] Echo                     kg/m2                                     Lab Blood Pressure: 130 /73 mmHg Study Type:    TRANSTHORACIC ECHO (TTE) COMPLETE Diagnosis/ICD: 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 Study Detail: The following Echo studies were performed: 2D, M-Mode, Doppler and               color flow. Technically challenging study due to body habitus and               prominent lung artifact. Agitated saline used as a contrast agent               for intraseptal flow evaluation and Definity used as a contrast               agent for endocardial border definition. Total contrast used for               this procedure was 2 mL via IV push.  PHYSICIAN INTERPRETATION: Left Ventricle: The left ventricular systolic function is mildly decreased, with a Lentz's biplane calculated ejection fraction of 52%. There is mild concentric left ventricular hypertrophy. There is global  hypokinesis of the left ventricle with minor regional variations. The left ventricular cavity size is normal. There is mild increased septal and mildly increased posterior left ventricular wall thickness. Spectral Doppler shows a normal pattern of left ventricular diastolic filling. Left Atrium: The left atrium is normal in size. A bubble study using agitated saline was performed. Bubble study is negative. Right Ventricle: The right ventricle is normal in size. There is mildly reduced right ventricular systolic function. A device is visualized in the right ventricle. Right Atrium: The right atrium is mildly dilated. There is a device visualized in the right atrium. Aortic Valve: The aortic valve appears structurally normal. There is mild aortic valve cusp calcification. The aortic valve dimensionless index is 0.69. There is mild aortic valve regurgitation. The peak instantaneous gradient of the aortic valve is 6 mmHg. The mean gradient of the aortic valve is 3 mmHg. Mitral Valve: The mitral valve is normal in structure. There is trace mitral valve regurgitation. Tricuspid Valve: The tricuspid valve is structurally normal. There is mild to moderate tricuspid regurgitation. The Doppler estimated RVSP is moderately elevated right ventricular systolic pressure at 66.2 mmHg. Pulmonic Valve: The pulmonic valve is not well visualized. There is physiologic pulmonic valve regurgitation. Pericardium: No pericardial effusion noted. Aorta: The aortic root is normal.  CONCLUSIONS:  1. The left ventricular systolic function is mildly decreased, with a Lentz's biplane calculated ejection fraction of 52%.  2. There is global hypokinesis of the left ventricle with minor regional variations.  3. There is mildly reduced right ventricular systolic function.  4. Mild to moderate tricuspid regurgitation.  5. Moderately elevated right ventricular systolic pressure.  6. Mild aortic valve regurgitation. QUANTITATIVE DATA SUMMARY:  2D  MEASUREMENTS:            Normal Ranges: LAs:             4.30 cm    (2.7-4.0cm) IVSd:            1.07 cm    (0.6-1.1cm) LVPWd:           1.01 cm    (0.6-1.1cm) LVIDd:           4.38 cm    (3.9-5.9cm) LVIDs:           3.30 cm LV Mass Index:   106.7 g/m2 LV % FS          24.7 %  LA VOLUME:                    Normal Ranges: LA Vol A4C:        45.6 ml    (22+/-6mL/m2) LA Vol A2C:        42.2 ml LA Vol BP:         48.2 ml LA Vol Index A4C:  31.5ml/m2 LA Vol Index A2C:  29.2 ml/m2 LA Vol Index BP:   33.3 ml/m2 LA Area A4C:       17.8 cm2 LA Area A2C:       15.6 cm2 LA Major Axis A4C: 5.9 cm LA Major Axis A2C: 4.9 cm LA Vol A4C:        42.1 ml LA Vol A2C:        41.4 ml LA Vol Index BSA:  28.8 ml/m2  RA VOLUME BY A/L METHOD:          Normal Ranges: RA Area A4C:             23.4 cm2  AORTA MEASUREMENTS:         Normal Ranges: Ao Sinus, d:        3.00 cm (2.1-3.5cm) Ao STJ, d:          2.40 cm (1.7-3.4cm) Asc Ao, d:          3.60 cm (2.1-3.4cm)  LV SYSTOLIC FUNCTION BY 2D PLANIMETRY (MOD):                      Normal Ranges: EF-A4C View:    37 % (>=55%) EF-A2C View:    63 % EF-Biplane:     52 % LV EF Reported: 52 %  LV DIASTOLIC FUNCTION:          Normal Ranges: MV Peak E:             0.63 m/s (0.7-1.2 m/s) MV Peak A:             0.25 m/s (0.42-0.7 m/s) E/A Ratio:             2.51     (1.0-2.2) MV lateral e'          0.06 m/s  MITRAL VALVE:          Normal Ranges: MV DT:        300 msec (150-240msec)  AORTIC VALVE:                     Normal Ranges: AoV Vmax:                1.20 m/s (<=1.7m/s) AoV Peak P.7 mmHg (<20mmHg) AoV Mean PG:             3.0 mmHg (1.7-11.5mmHg) LVOT Max Henry:            0.77 m/s (<=1.1m/s) AoV VTI:                 23.22 cm (18-25cm) LVOT VTI:                15.93 cm LVOT Diameter:           1.96 cm  (1.8-2.4cm) AoV Area, VTI:           2.07 cm2 (2.5-5.5cm2) AoV Area,Vmax:           1.93 cm2 (2.5-4.5cm2) AoV Dimensionless Index: 0.69  AORTIC INSUFFICIENCY: AI Vmax:       3.95 m/s AI  Half-time:  419 msec AI Decel Time: 1446 msec AI Decel Rate: 273.44 cm/s2  RIGHT VENTRICLE: RV Basal 3.49 cm RV Mid   2.80 cm RV Major 7.8 cm TAPSE:   15.0 mm RV s'    0.08 m/s  TRICUSPID VALVE/RVSP:          Normal Ranges: Peak TR Velocity:     3.97 m/s RV Syst Pressure:     66 mmHg  (< 30mmHg) IVC Diam:             1.65 cm  AORTA: Asc Ao Diam 3.60 cm  [id number] Darrel Hampton MD Electronically signed on 12/4/2024 at 8:59:11 AM  ** Final **       Lab Results   Component Value Date    BUN 83 (H) 02/18/2025    CREATININE 2.28 (H) 02/18/2025    BNP 1,832 (H) 01/07/2025    MG 2.25 01/31/2025    MG 2.5 01/28/2025    K 3.9 02/18/2025     02/18/2025       Constitutional:       General:  NAD   HENT:      Head: Normocephalic     Mouth: Mucous membranes are moist.      Neck:  No JVD   Eyes:      Conjunctiva/sclera: Conjunctivae normal.    Cardiovascular:      Rate and Rhythm: Normal rate and regular rhythm      Heart sounds:  S1 S2 normal, + murmur  no S3 or S4   Pulmonary:      Pulmonary effort is normal.  She has diminished posterior bases and some basilar crackles.   Abdominal:      General: Bowel sounds are normal, non- tender to palpitations.   Musculoskeletal:         General:   Chronic pitting edema of the bilateral lower extremities which seems worse to my assessment today.   Skin:     General: Skin is warm and dry   Neurological:      General: No focal deficit present.      Mental Status: alert and oriented to person, place, and time. Mental status is at baseline.     Psychiatric:         Mood and Affect: Normal Affect.     Assessment/Plan     Problem List Items Addressed This Visit       Chronic heart failure with preserved ejection fraction      Chronic diastolic heart failure   Etiology: HTN/ASHD/ CKD  AHA Stage: D  NYHA class: 4  Volume Status: Continues to have significant fluid congestion.  GFR: 20     GDMT:  BB-Metoprolol XL 50 mg 1/2 tablet daily   ARB/ACEI/ARNI - on hold   MRA -  GFR less than 20  SGLT2i -  Farxiga 10 mg on hold due to renal insuffiiciency   Diuretic -  Torsemide 40 mg twice a day.   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 ....  was unable to upgrade to Bi V pacer.        CHF:   weight is unchanged from last visit.   She continues to have edema.  She is not sure the IV Lasix really helped her that much last time.   She does not feel like she wants to have IV Lasix today.    BMP/MAG/BNP   She will take an additional Torsemide 20 mg tonight and tomorrow a.m.    Then resume usual dose.   We can bring her in for IV lasix next week if she so chooses.    She is meeting with Hospice tomorrow to discuss options which would be an appropriate consideration at this time.      2. Atrial fibrillation: persistent. RRR on auscultation.   She is s/p AV node ablation and PPM placement in the past. On ASA.  No other OAC due to hx of recurrent GIB. Stable.      3. NSVT:  Amiodarone.  No palpitations.   Stable.      3. CKD 3: GFR 20. Farxiga is on hold due to the decreased GFR      4. HTN:  Controlled.       5. ASHD with PCI to LAD 2017:  Secondary prevention medications ASA, Statin, BB.   Recent stress/MPI scan negative for ischemia.   No angina.  Stable.      6. Chronic COPD: Scheduled for pulmonary function evaluation. Follows with pulmonology.      Addendum:   BUN/CR 93/2.63.  GFR 17.   She is nearing end stage renal failure.   Discussed this briefly with her today when discussing diuretic adjustments.   She states she is not willing to have dialysis and likely is not a very good candidate for dialysis in any case due to other co morbidities and frail condition.   She has discussion with Hospice tomorrow.  Will await decision after she has discussed option with family.      Rosangela Lane, APRN-CNP

## 2025-03-11 PROBLEM — N18.9 ACUTE KIDNEY INJURY SUPERIMPOSED ON CKD: Status: ACTIVE | Noted: 2025-01-01

## 2025-03-11 PROBLEM — N17.9 ACUTE KIDNEY INJURY SUPERIMPOSED ON CKD: Status: ACTIVE | Noted: 2025-01-01

## 2025-03-11 PROBLEM — J47.0 BRONCHIECTASIS WITH ACUTE LOWER RESPIRATORY INFECTION (MULTI): Status: ACTIVE | Noted: 2025-01-01

## 2025-03-11 NOTE — H&P
[facility] - GENERAL MEDICINE HISTORY AND PHYSICAL    History Obtained From (Primary Source): Family mostly (pt currently somnolent after fentanyl administration)  Collateral History (Secondary Sources): D/w ED physician    History Of Present Illness (HPI):  Adwoa Forrest is a 89 y.o. female with PMHx s/f HFpEF, NSVT (on amiodarone), persistent atrial fib (not on blood thinners due to GIB, pacemaker, COPD (not on home O2), HTN and CKD (creatinine ~2 at baseline) presenting with CHF and YEVGENIY. Patient was admitted in January for a fall with nosebleed and nasal fracture. Pt had her Entresto stopped last admission as well. Pt has been following up with cardiology and nephrology since discharge.  Cardiology initially increased her torsemide to 40 mg BID. Her weight continued to increase so they discussed hospitalization for possible trial of inotrope supported diuresis, which pt declined. Instead they gave her IV Lasix several times in order to facilitate diuresis. Cardiology has been discussing DNR, palliative and hospice options considering the pt does not seem to be responding to therapies. Pt presents today with multiple complaints. Family says she is very weak and fatigued for the past week and has been unable to ambulate. She is complaining of thoracic and lumbar back pain. She has not been eating or drinking much and she is complaining of generalized pain. She had flu-like upper respiratory symptoms several days ago which have since resolved. She is on 3.5 L NC oxygen at baseline. No nausea, vomiting, fever, chills, worsening shortness of breath, cough, abdominal pain, or urinary complaints. Pt's leg swelling has reportedly actually improved a bit.    ED Course (Summary - please note all labs, imaging studies, and interventions noted below have been personally reviewed and/or interpreted on day of admission):   Vitals on presentation: T 35.9 °C (96.6 °F)  HR 79  BP 87/72  RR (!) 22  O2 (!) 90 %  Supplemental oxygen  Labs: CMP cl 95, bUN 103, Cr 3.37 (baseline around 2.5), alk phos 274, ,   BNP 1164  Trop 67 -> 81  CBC WBC 5.8, hg 11.0, platelets 68  EKG: AV dual paced rhythm at 93 bpm  Imaging: CXR - Cardiomegaly with thickened interlobular septal markings compatible   with pulmonary edema. Layering bilateral pleural effusions with   superimposed atelectasis versus pneumonia.   CT head - No acute intracranial hemorrhage, mass effect or midline shift.   Nonspecific scattered white matter hypodensities favored to represent   sequela of small vessel ischemia.   CT thoracic/lumbar wout contrast - Thoracic spondylosis without acute fracture or traumatic malalignment.   Lumbar spondylosis without acute fracture or traumatic malalignment.   Large right and small left pleural effusions with overlying airspace   opacities favored to represent atelectasis. Developing infection is   not excluded in the appropriate clinical setting.   Moderate volume ascites.   Scattered colonic diverticulosis without CT evidence of acute diverticulitis.   Interventions: fentanyl 50 mcg X1, pt admitted for further care.    12-point ROS reviewed and found to be negative aside from aforementioned positives in HPI and/or noted in dedicated ROS section below.     ED Course (From ED Provider):  ED Course as of 03/11/25 0548   Tue Mar 11, 2025   0314 EKG shows sinus rhythm.  No STEMI.  Normal intervals and axis. [RS]      ED Course User Index  [RS] Amari Kelly DO         Diagnoses as of 03/11/25 0548   Acute kidney injury superimposed on CKD (CMS-HCC)   Acute pulmonary edema   Unable to ambulate     Relevant Results  Results for orders placed or performed during the hospital encounter of 03/11/25 (from the past 24 hours)   Comprehensive metabolic panel   Result Value Ref Range    Glucose 82 74 - 99 mg/dL    Sodium 134 (L) 136 - 145 mmol/L    Potassium 4.7 3.5 - 5.3 mmol/L    Chloride 95 (L) 98 - 107 mmol/L    Bicarbonate 25  21 - 32 mmol/L    Anion Gap 19 10 - 20 mmol/L    Urea Nitrogen 103 (HH) 6 - 23 mg/dL    Creatinine 3.37 (H) 0.50 - 1.05 mg/dL    eGFR 13 (L) >60 mL/min/1.73m*2    Calcium 7.6 (L) 8.6 - 10.3 mg/dL    Albumin 3.5 3.4 - 5.0 g/dL    Alkaline Phosphatase 274 (H) 33 - 136 U/L    Total Protein 5.4 (L) 6.4 - 8.2 g/dL     (H) 9 - 39 U/L    Bilirubin, Total 1.1 0.0 - 1.2 mg/dL     (H) 7 - 45 U/L   Troponin I, High Sensitivity, Initial   Result Value Ref Range    Troponin I, High Sensitivity 67 (HH) 0 - 13 ng/L   CBC and Auto Differential   Result Value Ref Range    WBC 5.8 4.4 - 11.3 x10*3/uL    nRBC 0.7 (H) 0.0 - 0.0 /100 WBCs    RBC 4.00 4.00 - 5.20 x10*6/uL    Hemoglobin 11.0 (L) 12.0 - 16.0 g/dL    Hematocrit 35.1 (L) 36.0 - 46.0 %    MCV 88 80 - 100 fL    MCH 27.5 26.0 - 34.0 pg    MCHC 31.3 (L) 32.0 - 36.0 g/dL    RDW 19.3 (H) 11.5 - 14.5 %    Platelets 68 (L) 150 - 450 x10*3/uL    Neutrophils % 88.4 40.0 - 80.0 %    Immature Granulocytes %, Automated 0.3 0.0 - 0.9 %    Lymphocytes % 4.5 13.0 - 44.0 %    Monocytes % 6.3 2.0 - 10.0 %    Eosinophils % 0.3 0.0 - 6.0 %    Basophils % 0.2 0.0 - 2.0 %    Neutrophils Absolute 5.08 1.60 - 5.50 x10*3/uL    Immature Granulocytes Absolute, Automated 0.02 0.00 - 0.50 x10*3/uL    Lymphocytes Absolute 0.26 (L) 0.80 - 3.00 x10*3/uL    Monocytes Absolute 0.36 0.05 - 0.80 x10*3/uL    Eosinophils Absolute 0.02 0.00 - 0.40 x10*3/uL    Basophils Absolute 0.01 0.00 - 0.10 x10*3/uL   Troponin, High Sensitivity, 1 Hour   Result Value Ref Range    Troponin I, High Sensitivity 81 (HH) 0 - 13 ng/L   B-Type Natriuretic Peptide   Result Value Ref Range    BNP 1,164 (H) 0 - 99 pg/mL   Morphology   Result Value Ref Range    RBC Morphology See Below     Hypochromia Mild     Target Cells Few     Ovalocytes Few     [place] Cells Many     Acanthocytes Few      *Note: Due to a large number of results and/or encounters for the requested time period, some results have not been displayed. A  complete set of results can be found in Results Review.      CT thoracic spine wo IV contrast    Result Date: 3/11/2025  Interpreted By:  Finkelstein, Evan, STUDY: CT THORACIC SPINE WO IV CONTRAST; CT LUMBAR SPINE WO IV CONTRAST; 3/11/2025 4:04 am; 3/11/2025 4:03 am   INDICATION: Signs/Symptoms:back pain.     COMPARISON: None.   ACCESSION NUMBER(S): [id number]; [id number]   ORDERING CLINICIAN: DEEP MONACO   TECHNIQUE: Axial noncontrast images of the thoracic and lumbar spine with coronal and sagittal reconstructed images.   FINDINGS: THORACIC SPINE: ALIGNMENT: No traumatic malalignment in the thoracic spine. There is reversal of the normal cervical lordosis in the visualized lower cervical spine, which may be positional or related to spasm. VERTEBRAE: No acute fracture. Bones are demineralized SPINAL CANAL: No critical spinal canal stenosis. Mild facet arthropathy diffusely. PREVERTEBRAL SOFT TISSUES: No prevertebral soft tissue swelling.     LUMBAR SPINE: ALIGNMENT: No traumatic malalignment VERTEBRAE: No acute fracture. Bones are demineralized. Disc space: Disc space narrowing at L4/L5 with vacuum disc phenomenon. SPINAL CANAL: Diffuse facet arthropathy. No severe bony foraminal or central narrowing. PREVERTEBRAL SOFT TISSUES: No prevertebral soft tissue swelling.   OTHER FINDINGS: Large right and small left pleural effusions with superimposed airspace opacities in the visualized lungs. Moderate volume ascites. There are scattered colonic diverticula without definite focal pericolonic inflammatory stranding. Extensive atherosclerotic calcifications in the aorta and iliac vessels.       Thoracic spondylosis without acute fracture or traumatic malalignment.   Lumbar spondylosis without acute fracture or traumatic malalignment.   Large right and small left pleural effusions with overlying airspace opacities favored to represent atelectasis. Developing infection is not excluded in the appropriate clinical  setting.   Moderate volume ascites.   Scattered colonic diverticulosis without CT evidence of acute diverticulitis.   MACRO: None.   Signed by: Evan Finkelstein 3/11/2025 4:16 AM Dictation workstation:   [id number]    CT lumbar spine wo IV contrast    Result Date: 3/11/2025  Interpreted By:  Finkelstein, Evan, STUDY: CT THORACIC SPINE WO IV CONTRAST; CT LUMBAR SPINE WO IV CONTRAST; 3/11/2025 4:04 am; 3/11/2025 4:03 am   INDICATION: Signs/Symptoms:back pain.     COMPARISON: None.   ACCESSION NUMBER(S): [id number]; [id number]   ORDERING CLINICIAN: DEEP MONACO   TECHNIQUE: Axial noncontrast images of the thoracic and lumbar spine with coronal and sagittal reconstructed images.   FINDINGS: THORACIC SPINE: ALIGNMENT: No traumatic malalignment in the thoracic spine. There is reversal of the normal cervical lordosis in the visualized lower cervical spine, which may be positional or related to spasm. VERTEBRAE: No acute fracture. Bones are demineralized SPINAL CANAL: No critical spinal canal stenosis. Mild facet arthropathy diffusely. PREVERTEBRAL SOFT TISSUES: No prevertebral soft tissue swelling.     LUMBAR SPINE: ALIGNMENT: No traumatic malalignment VERTEBRAE: No acute fracture. Bones are demineralized. Disc space: Disc space narrowing at L4/L5 with vacuum disc phenomenon. SPINAL CANAL: Diffuse facet arthropathy. No severe bony foraminal or central narrowing. PREVERTEBRAL SOFT TISSUES: No prevertebral soft tissue swelling.   OTHER FINDINGS: Large right and small left pleural effusions with superimposed airspace opacities in the visualized lungs. Moderate volume ascites. There are scattered colonic diverticula without definite focal pericolonic inflammatory stranding. Extensive atherosclerotic calcifications in the aorta and iliac vessels.       Thoracic spondylosis without acute fracture or traumatic malalignment.   Lumbar spondylosis without acute fracture or traumatic malalignment.   Large right and small left  pleural effusions with overlying airspace opacities favored to represent atelectasis. Developing infection is not excluded in the appropriate clinical setting.   Moderate volume ascites.   Scattered colonic diverticulosis without CT evidence of acute diverticulitis.   MACRO: None.   Signed by: Evan Finkelstein 3/11/2025 4:16 AM Dictation workstation:   [id number]    CT head wo IV contrast    Result Date: 3/11/2025  Interpreted By:  Finkelstein, Evan, STUDY: CT HEAD WO IV CONTRAST;  3/11/2025 4:03 am   INDICATION: Signs/Symptoms:headache.     COMPARISON: CT brain 01/07/2025   ACCESSION NUMBER(S): [id number]   ORDERING CLINICIAN: DEEP MONACO   TECHNIQUE: Axial noncontrast CT images of the head with coronal and sagittal reconstructions.   FINDINGS: EXTRACRANIAL SOFT TISSUES: Unremarkable.   CALVARIUM: No depressed skull fracture. No destructive osseous lesion.   PARANASAL SINUSES/MASTOIDS: Partial opacification of the right sphenoid sinus. Mastoid air cells are aerated.   HEMORRHAGE: No acute intracranial hemorrhage.   BRAIN PARENCHYMA: Gray-white matter interfaces are preserved. No mass effect or midline shift. There are nonspecific scattered white matter hypodensities.   VENTRICLES and EXTRA-AXIAL SPACES: Normal size.   OTHER FINDINGS: There are calcifications within the cavernous carotids       No acute intracranial hemorrhage, mass effect or midline shift.   Nonspecific scattered white matter hypodensities favored to represent sequela of small vessel ischemia.       MACRO: None.   Signed by: Evan Finkelstein 3/11/2025 4:08 AM Dictation workstation:   [id number]    XR chest 1 view    Result Date: 3/11/2025  Interpreted By:  Finkelstein, Evan, STUDY: XR CHEST 1 VIEW;  3/11/2025 3:41 am   INDICATION: Signs/Symptoms:SOB.     COMPARISON: Chest radiograph 01/07/2025. CT abdomen pelvis 11/14/2024   ACCESSION NUMBER(S): [id number]   ORDERING CLINICIAN: DEEP MONACO   FINDINGS: Left chest wall pacemaker is again  seen in place, similar compared to prior imaging.   CARDIOMEDIASTINAL SILHOUETTE: Enlarged cardiac silhouette, similar compared to prior imaging.   LUNGS: Hazy opacities overlie the lower lungs bilaterally favored to represent layering pleural effusions. There are bibasilar airspace opacities. Prominent interstitial markings. There are curvilinear lines overlying the lungs bilaterally favored to represent skin folds with suspected lung markings seen more peripherally.   ABDOMEN: No remarkable upper abdominal findings.   BONES: No acute osseous abnormality.       Cardiomegaly with thickened interlobular septal markings compatible with pulmonary edema. Layering bilateral pleural effusions with superimposed atelectasis versus pneumonia.   MACRO: None.   Signed by: Evan Finkelstein 3/11/2025 3:52 AM Dictation workstation:   [id number]    Scheduled medications:  amiodarone, 200 mg, oral, Daily  aspirin, 81 mg, oral, Daily  [Held by provider] atorvastatin, 80 mg, oral, Daily  calcitriol, 0.25 mcg, oral, Daily  cinacalcet, 30 mg, oral, Once per day on Monday Wednesday Friday  [Held by provider] eplerenone, 25 mg, oral, Daily  tiotropium, 2 puff, inhalation, Daily   And  fluticasone furoate-vilanteroL, 1 puff, inhalation, Daily  heparin (porcine), 5,000 Units, subcutaneous, q8h MACRINA  levothyroxine, 37.5 mcg, oral, Daily  metoprolol succinate XL, 25 mg, oral, Daily  pantoprazole, 40 mg, oral, Daily before breakfast   Or  pantoprazole, 40 mg, intravenous, Daily before breakfast      Continuous medications:     PRN medications:  PRN medications: albuterol, bisacodyl, guaiFENesin, melatonin, ondansetron **OR** ondansetron     Past Medical History  She has a past medical history of Anxiety and depression, Bowel perforation (Multi), Chronic atrial fibrillation, unspecified (Multi) (05/09/2023), Chronic heart failure with preserved ejection fraction (02/07/2024), CKD (chronic kidney disease), COPD (chronic obstructive pulmonary  disease) (Multi), Essential (primary) hypertension, GIB (gastrointestinal bleeding), History of non-ST elevation myocardial infarction (NSTEMI) (10/08/2023), Hypothyroidism, CANDELARIA (iron deficiency anemia), NSVT (nonsustained ventricular tachycardia) (Multi), Raynaud disease, Sick sinus syndrome (Multi) (02/27/2018), Sleep apnea, and Stroke (cerebrum) (Multi).    Surgical History  She has a past surgical history that includes Cataract extraction; Appendectomy; Hysterectomy; Tonsillectomy; Cholecystectomy; Other surgical history; Other surgical history; Colectomy partial / total; Cardiac electrophysiology procedure (N/A, 06/17/2024); and Cardiac catheterization (N/A, 12/10/2024).     Social History  She reports that she quit smoking about 35 years ago. Her smoking use included cigarettes. She started smoking about 69 years ago. She has a 8.5 pack-year smoking history. She has never been exposed to tobacco smoke. She has never used smokeless tobacco. She reports that she does not drink alcohol and does not use drugs.    Family History  Family History   Problem Relation Name Age of Onset    Coronary artery disease Mother      Coronary artery disease Father         Allergies  Aldactone [spironolactone], Codeine, Hydrocodone, Sotalol, and Tetracyclines    Code Status  Full Code     Review of Systems   Constitutional:  Positive for appetite change. Negative for activity change, chills, diaphoresis, fatigue and fever.   HENT:  Negative for congestion, ear pain, rhinorrhea, sinus pain and sore throat.    Respiratory:  Negative for apnea, cough, chest tightness, shortness of breath, wheezing and stridor.    Cardiovascular:  Negative for chest pain, palpitations and leg swelling.   Gastrointestinal:  Negative for abdominal distention, abdominal pain, constipation, diarrhea, nausea and vomiting.   Genitourinary:  Negative for difficulty urinating, dysuria, flank pain, frequency, hematuria and urgency.   Musculoskeletal:  Positive  for myalgias. Negative for arthralgias, back pain, gait problem and joint swelling.   Skin:  Negative for color change, pallor, rash and wound.   Neurological:  Positive for weakness. Negative for dizziness, syncope, light-headedness, numbness and headaches.   Psychiatric/Behavioral:  Negative for agitation, behavioral problems, confusion and decreased concentration. The patient is not nervous/anxious.    All other systems reviewed and are negative.      Last Recorded Vitals  BP 87/72   Pulse 87   Temp 35.9 °C (96.6 °F)   Resp 16   Wt 50.3 kg (111 lb)   SpO2 97%      Physical Exam:  Vital signs and nursing notes reviewed.   Constitutional: Pleasant and cooperative. Laying in bed in no acute distress. Conversant.   Skin: Warm and dry; no obvious lesions, rashes, pallor, or jaundice.   Eyes: EOMI. Anicteric sclera.   ENT: Mucous membranes moist; no obvious injury or deformity appreciated.   Head and Neck: Normocephalic, atraumatic. ROM preserved. Trachea midline. No appreciable JVD.   Respiratory: Nonlabored on NC. Lungs diminished to auscultation bilaterally without obvious adventitious sounds. Chest rise is equal.  Cardiovascular: RRR. No gross murmur, gallop, or rub. Extremities are warm and well-perfused with good capillary refill (< 3 seconds). No chest wall tenderness.   GI: Abdomen soft, nontender, nondistended. No obvious organomegaly appreciated. Bowel sounds are present.  : No CVA tenderness.   MSK: No gross abnormalities appreciated. No limitations to AROM/PROM appreciated.   Extremities: No cyanosis,  or clubbing evident. Neurovascularly intact. 1+ pitting edema b/l  Neuro: A&Ox3. CN 2-12 grossly intact. Able to respond to questions appropriately and clearly. No acute focal neurologic deficits appreciated.  Psych: Appropriate mood and behavior.    Assessment/Plan     89 y.o. female with PMHx s/f HFpEF, NSVT (on amiodarone), persistent atrial fib (not on blood thinners due to GIB, pacemaker, COPD (on  3.5 L home O2), HTN and CKD (creatinine ~2 at baseline) presenting with CHF and YEVGENIY.    Plan:  Admit to SDU    CHF, elevated troponins:  Imaging showing fluid overload with large R and small L pleural effusions along with moderate volume ascites.  Pt unable to be diuresed currently due to hypotension and YEVGENIY  TTE 11/24 showed mildly decreased LVEF of 52% with global hypokinesis of the L ventricle and mild-mod TR.  Trops elevated at 67 -> 81, chronically elevated.  Cardiology consulted    YEVGENIY on CKD:  Creatinine elevated at 3.37, with most recent values being in the 2 range  Renal consulted  Avoid nephrotoxins, hold diuretics, etc.  Renally dose medications.  Continue home calcitrol and cinacalcet    Hs NSVT, afib:  Continue home amiodarone, Toprol XL  Monitor heart rate    Hypothyroidism:  Continue home Synthroid 100 mcg, recently changed.    HLD:  Continue home aspirin  Hold statin due to transaminitis    Transaminitis:  Pt has new onset findings of elevated AST/ALT into the 100-200s with elevated alk phos  No abdominal pain or findings on CT currently. Continue to monitor.  Hold home statin    COPD:  Continue home inhalers  Does not appear to be in acute exacerbation.    PT/OT consulted  Diet: Cardiorenal  DVT Prophylaxis: Heparin SQ  Code Status: Full Code - did discuss this with family and they said they would like to try dialysis before discussing comfort measures/hospice.  Case Discussed With: ED provider  Additional Sources Reviewed: ED note day of admission; past cardiology notes, past admission notes    Anticipated Length of Stay (LOS): Patient will require 2+ midnights for treatment of CHF and YEVGENIY on CKD.     DO Sneha Gottlieb dictation software was used to dictate this note and thus there may be minor errors in translation/transcription including garbled speech or misspellings. Please contact for clarification if needed.

## 2025-03-11 NOTE — PROGRESS NOTES
Social work consult placed for discharge planning. SW reviewed pt's chart and communicated with TCC. No SW needs foreseen at this time. SW signing off; available upon request.    MERVIN Leung ([id number])   Care Transitions

## 2025-03-11 NOTE — CARE PLAN
The patient's goals for the shift include Pt. will have a safe, restful and uneventful evening    The clinical goals for the shift include Pt. will remain HDS this shift      Problem: Pain - Adult  Goal: Verbalizes/displays adequate comfort level or baseline comfort level  Outcome: Progressing     Problem: Safety - Adult  Goal: Free from fall injury  Outcome: Progressing     Problem: Discharge Planning  Goal: Discharge to home or other facility with appropriate resources  Outcome: Progressing     Problem: Chronic Conditions and Co-morbidities  Goal: Patient's chronic conditions and co-morbidity symptoms are monitored and maintained or improved  Outcome: Progressing     Problem: Nutrition  Goal: Nutrient intake appropriate for maintaining nutritional needs  Outcome: Progressing     Problem: Skin  Goal: Decreased wound size/increased tissue granulation at next dressing change  Outcome: Progressing  Goal: Participates in plan/prevention/treatment measures  Outcome: Progressing  Goal: Prevent/manage excess moisture  Outcome: Progressing  Goal: Prevent/minimize sheer/friction injuries  Outcome: Progressing  Goal: Promote/optimize nutrition  Outcome: Progressing  Goal: Promote skin healing  Outcome: Progressing     Problem: Fall/Injury  Goal: Not fall by end of shift  Outcome: Progressing  Goal: Be free from injury by end of the shift  Outcome: Progressing  Goal: Verbalize understanding of personal risk factors for fall in the hospital  Outcome: Progressing  Goal: Verbalize understanding of risk factor reduction measures to prevent injury from fall in the home  Outcome: Progressing  Goal: Use assistive devices by end of the shift  Outcome: Progressing  Goal: Pace activities to prevent fatigue by end of the shift  Outcome: Progressing     Problem: Heart Failure  Goal: Improved gas exchange this shift  Outcome: Progressing  Goal: Improved urinary output this shift  Outcome: Progressing  Goal: Reduction in peripheral edema  within 24 hours  Outcome: Progressing  Goal: Report improvement of dyspnea/breathlessness this shift  Outcome: Progressing  Goal: Weight from fluid excess reduced over 2-3 days, then stabilize  Outcome: Progressing  Goal: Increase self care and/or family involvement in 24 hours  Outcome: Progressing     Problem: Pain  Goal: Takes deep breaths with improved pain control throughout the shift  Outcome: Progressing  Goal: Turns in bed with improved pain control throughout the shift  Outcome: Progressing  Goal: Walks with improved pain control throughout the shift  Outcome: Progressing  Goal: Performs ADL's with improved pain control throughout shift  Outcome: Progressing  Goal: Participates in PT with improved pain control throughout the shift  Outcome: Progressing  Goal: Free from opioid side effects throughout the shift  Outcome: Progressing  Goal: Free from acute confusion related to pain meds throughout the shift  Outcome: Progressing

## 2025-03-11 NOTE — TELEPHONE ENCOUNTER
Andie ALEXANDER from [facility] would like to speak Rosangela regarding Adwoa, Please call [phone]

## 2025-03-11 NOTE — CONSULTS
Inpatient consult to Cardiology  Consult performed by: Michael Latham MD  Consult ordered by: Kenney Figueroa DO        History Of Present Illness:    Adwoa Forrest is a 89 y.o. female presenting with worsening shortness of breath and renal function, refractory to outpatient diuretic therapy.  She has been getting 40 twice daily torsemide outpatient.  Blood pressure has been low leading to discontinuation of Entresto.  Did not tolerate Aldactone in the past.    Has been seeing advanced heart failure specialist as well.  She has prior history of chronic diastolic heart failure, moderate LVH with equivocal PYP, recent declining LV function to 35 to 40%, 99% RV pacing, persistent atrial fibrillation maintaining sinus rhythm, after AV derek ablation.       She has history of persistent atrial fibrillation status post AV derek ablation and permanent pacemaker placement, failed Watchman device placement in 2018 requiring urgent surgery and left atrial appendage ligation, hypertension recurrent GI bleed not on oral anticoagulants, on amiodarone for nonsustained VT and symptomatic PVCs.  She tells me she had stents placed in her coronary artery in 2017(Dr Hampton, unknown settings and I don't have the records available anywhere).     Recent attempt to CRT upgrade was unsuccessful.  Patient is also following up with advanced heart failure for possible cardiac MRI to rule out infiltrative cardiomyopathy.    Reviewed EKG personally, AV paced rhythm noted.  Was supposed to have outpatient MRI or cardiac biopsy with Dr. Collazo but does not look like this was completed.  There is a suspicion for infiltrative cardiomyopathy.  Last Recorded Vitals:  Vitals:    03/11/25 0552 03/11/25 0701 03/11/25 0800 03/11/25 1012   BP: 80/53 85/51  86/55   BP Location:  Left arm     Patient Position:  Lying     Pulse: (!) 120 66  78   Resp: 16 18     Temp:  36.1 °C (96.9 °F)     TempSrc:  Temporal     SpO2: 98% 90%     Weight:   48.3 kg (106  "lb 7.7 oz)    Height:   1.549 m (5' 1\")        Last Labs:  CBC - 3/11/2025:  4:39 AM  5.8 11.0 68    35.1      CMP - 3/11/2025:  3:13 AM  7.6 5.4 220 --- 1.1   3.7 3.5 151 274      PTT - No results in last year.  1.0   11.3 _     Troponin I, High Sensitivity   Date/Time Value Ref Range Status   03/11/2025 06:15 AM 77 (HH) 0 - 13 ng/L Final     Comment:     Previous result verified on 3/11/2025 0359 on specimen/case [id number] called with component TRPHS for procedure Troponin I, High Sensitivity, Initial with value 67 ng/L.   03/11/2025 04:39 AM 81 (HH) 0 - 13 ng/L Final     Comment:     Previous result verified on 3/11/2025 0359 on specimen/case [id number] called with component TRPHS for procedure Troponin I, High Sensitivity, Initial with value 67 ng/L.   03/11/2025 03:13 AM 67 (HH) 0 - 13 ng/L Final     BNP   Date/Time Value Ref Range Status   03/11/2025 04:39 AM 1,164 (H) 0 - 99 pg/mL Final   03/07/2025 03:20 PM 1,907 (H) 0 - 99 pg/mL Final     LDL Calculated   Date/Time Value Ref Range Status   06/18/2024 04:20 AM 33 <=99 mg/dL Final     Comment:                                 Near   Borderline      AGE      Desirable  Optimal    High     High     Very High     0-19 Y     0 - 109     ---    110-129   >/= 130     ----    20-24 Y     0 - 119     ---    120-159   >/= 160     ----      >24 Y     0 -  99   100-129  130-159   160-189     >/=190     02/07/2024 03:17 PM 35 <=99 mg/dL Final     Comment:                                 Near   Borderline      AGE      Desirable  Optimal    High     High     Very High     0-19 Y     0 - 109     ---    110-129   >/= 130     ----    20-24 Y     0 - 119     ---    120-159   >/= 160     ----      >24 Y     0 -  99   100-129  130-159   160-189     >/=190       VLDL   Date/Time Value Ref Range Status   06/18/2024 04:20 AM 23 0 - 40 mg/dL Final   02/07/2024 03:17 PM 19 0 - 40 mg/dL Final   02/23/2023 10:08 AM 15 0 - 40 mg/dL Final   01/12/2022 09:53 AM 16 0 - 40 mg/dL " Final   08/03/2021 09:45 AM 28 0 - 40 mg/dL Final      Last I/O:  No intake/output data recorded.    Past Cardiology Tests (Last 3 Years):  EKG:  ECG 12 lead (Clinic Performed) 12/18/2024      ECG 12 lead 11/14/2024      ECG 12 lead 09/28/2024      ECG 12 Lead 06/11/2024      ECG 12 lead (Clinic Performed) 04/29/2024      ECG 12 lead (Clinic Performed) 04/29/2024      ECG 12 lead 03/05/2024      ECG 12 lead 03/04/2024      ECG 12 Lead 02/07/2024      ECG 12 lead (Clinic Performed) 11/17/2023      ECG 12 Lead       ECG 12 Lead 10/09/2023    Echo:  Transthoracic echo (TTE) complete 12/03/2024      Transthoracic Echo Limited 02/20/2024      Transthoracic echo (TTE) complete 10/03/2023    Ejection Fractions:  EF   Date/Time Value Ref Range Status   12/03/2024 04:18 PM 52 %    02/20/2024 02:44 PM 39 %      Cath:  Cardiac Catheterization Procedure 12/10/2024    Stress Test:  Nuclear Stress Test 03/20/2024    Cardiac Imaging:  No results found for this or any previous visit from the past 1095 days.      Past Medical History:  She has a past medical history of Anxiety and depression, Bowel perforation (Multi), Chronic atrial fibrillation, unspecified (Multi) (05/09/2023), Chronic heart failure with preserved ejection fraction (02/07/2024), CKD (chronic kidney disease), COPD (chronic obstructive pulmonary disease) (Multi), Essential (primary) hypertension, GIB (gastrointestinal bleeding), History of non-ST elevation myocardial infarction (NSTEMI) (10/08/2023), Hypothyroidism, CANDELARIA (iron deficiency anemia), NSVT (nonsustained ventricular tachycardia) (Multi), Raynaud disease, Sick sinus syndrome (Multi) (02/27/2018), Sleep apnea, and Stroke (cerebrum) (Multi).    Past Surgical History:  She has a past surgical history that includes Cataract extraction; Appendectomy; Hysterectomy; Tonsillectomy; Cholecystectomy; Other surgical history; Other surgical history; Colectomy partial / total; Cardiac electrophysiology procedure (N/A,  06/17/2024); and Cardiac catheterization (N/A, 12/10/2024).      Social History:  She reports that she quit smoking about 35 years ago. Her smoking use included cigarettes. She started smoking about 69 years ago. She has a 8.5 pack-year smoking history. She has never been exposed to tobacco smoke. She has never used smokeless tobacco. She reports that she does not drink alcohol and does not use drugs.    Family History:  Family History   Problem Relation Name Age of Onset    Coronary artery disease Mother      Coronary artery disease Father          Allergies:  Aldactone [spironolactone], Codeine, Hydrocodone, Sotalol, and Tetracyclines    Inpatient Medications:  Scheduled medications   Medication Dose Route Frequency    amiodarone  200 mg oral Daily    aspirin  81 mg oral Daily    [Held by provider] atorvastatin  80 mg oral Daily    bumetanide  2 mg oral BID    [START ON 3/12/2025] calcitriol  0.25 mcg oral Once per day on Monday Wednesday Friday    [START ON 3/12/2025] cinacalcet  30 mg oral Once per day on Monday Wednesday Friday    tiotropium  2 puff inhalation Daily    And    fluticasone furoate-vilanteroL  1 puff inhalation Daily    heparin (porcine)  5,000 Units subcutaneous q8h MACRINA    levothyroxine  37.5 mcg oral Daily    metOLazone  5 mg oral Daily    metoprolol succinate XL  25 mg oral Daily    pantoprazole  40 mg oral Daily    Or    pantoprazole  40 mg intravenous Daily     PRN medications   Medication    albuterol    bisacodyl    guaiFENesin    melatonin    ondansetron    Or    ondansetron     Continuous Medications   Medication Dose Last Rate     Outpatient Medications:  Current Outpatient Medications   Medication Instructions    acetaminophen (TYLENOL 8 HOUR) 650 mg, Every 8 hours PRN    albuterol 90 mcg/actuation inhaler 2 puffs, inhalation, Every 4 hours PRN    ALPRAZolam (XANAX) 0.5 mg, oral, Nightly    amiodarone (PACERONE) 200 mg, oral, Daily    aspirin 81 mg EC tablet 1 tablet, Daily     atorvastatin (LIPITOR) 80 mg, oral, Daily, At bedtime    calcitriol (ROCALTROL) 0.25 mcg, Daily    cinacalcet (SENSIPAR) 30 mg, 3 times weekly    eplerenone (INSPRA) 25 mg, Daily    fluticasone-umeclidin-vilanter (Trelegy Ellipta) 100-62.5-25 mcg blister with device 1 puff, inhalation, Daily    guaiFENesin (MUCINEX) 600 mg, oral, Every 12 hours PRN, Do not crush, chew, or split.    ipratropium-albuteroL (Duo-Neb) 0.5-2.5 mg/3 mL nebulizer solution 3 mL, nebulization, 4 times daily PRN    levothyroxine (SYNTHROID, LEVOXYL) 37.5 mcg, oral, Daily    metoprolol succinate XL (TOPROL-XL) 25 mg, oral, Daily, Do not crush or chew.    multivitamin tablet 1 tablet, Daily    torsemide (DEMADEX) 40 mg, oral, 2 times daily       Physical Exam:  Physical Exam  Vitals reviewed.   Constitutional:       Appearance: Normal appearance.   Neck:      Vascular: JVD present. No carotid bruit.   Cardiovascular:      Rate and Rhythm: Normal rate and regular rhythm.      Pulses: Normal pulses.      Heart sounds: Normal heart sounds, S1 normal and S2 normal.   Pulmonary:      Effort: Pulmonary effort is normal. No respiratory distress.      Breath sounds: Examination of the right-lower field reveals rhonchi. Examination of the left-lower field reveals rhonchi. Rhonchi present. No wheezing or rales.   Abdominal:      General: Abdomen is flat.      Palpations: Abdomen is soft.   Musculoskeletal:      Right lower le+ Edema present.      Left lower leg: No edema.   Skin:     General: Skin is warm.   Neurological:      Mental Status: She is alert and oriented to person, place, and time. Mental status is at baseline.   Psychiatric:         Mood and Affect: Mood normal.         Behavior: Behavior normal.           Assessment/Plan   1-acute on chronic diastolic heart failure-with cardiorenal syndrome, worsening renal function poor urine output and low blood pressure.  Palliative care with high-dose diuretics.  Patient is not very keen on dialysis  and unlikely that she would tolerate it with low blood pressure.  Keeping overall poor prognosis in mind best to start discussion for hospice care while continuing symptom relief with high-dose potent diuretics.      2-paroxysmal atrial fibrillation-maintaining sinus rhythm with amiodarone we will continue also has history of VT for which she was started on amiodarone.  She is not anticoagulated with history of bleeding.    3-CAD-prior history of stent, unlikely to be contributory.  Continue outpatient management as needed for symptom control only.    Code Status:  Full Code          Michael Latham MD

## 2025-03-11 NOTE — PROGRESS NOTES
Physical Therapy                 Therapy Communication Note    Patient Name: Adwoa Forrest  MRN: [id number]  Department: [address]  Room: 2026/2026-A  Today's Date: 3/11/2025     Discipline: Physical Therapy          Missed Visit Reason: Patient placed on medical hold    Missed Time: Attempt    Comment: Chart reviewed. RN approved PT assessment. Patient BP in supine L UE was 79/53. Plan to hold PT until BP more stable for mobility. Interviewed pt re: PLOF and home set up. She lives with her daughter in a 2 level home with ability to live on the main level. Her daughter works during the day, patient is then alone.  Uses 3LO2 at rest and 5LO2 with exertion. Requires assistance with bathing and tub transfers (tub seat and grab bar available). Completes dressing with independence and ambulates with WW independently. [address] home with 1 rail. Friend or daughter assist pt with stair negotiation and provide transportation. She has had 1 fall in the past 6 months (nasal fx).

## 2025-03-11 NOTE — PROGRESS NOTES
Adwoa Forrest is a 89 y.o. female on day 0 of admission presenting with Acute kidney injury superimposed on CKD (CMS-[facility]).      Subjective   Patient examined sitting up in bed with family at bedside.  She denies any pain at the present time.       Objective     Last Recorded Vitals  BP 84/54 (BP Location: Left arm, Patient Position: Lying)   Pulse 65   Temp 36.1 °C (96.9 °F) (Temporal)   Resp 18   Wt 48.3 kg (106 lb 7.7 oz)   SpO2 95%   Intake/Output last 3 Shifts:    Intake/Output Summary (Last 24 hours) at 3/11/2025 1315  Last data filed at 3/11/2025 1123  Gross per 24 hour   Intake 140 ml   Output --   Net 140 ml       Admission Weight  Weight: 50.3 kg (111 lb) (03/11/25 0216)    Daily Weight  03/11/25 : 48.3 kg (106 lb 7.7 oz)          Physical Exam    Constitutional: No acute distress, calm, cooperative, weakness, appetite change  HEENT: PERRL, normocephalic, atraumatic, mucous membranes moist  Cardiovascular: Regular rhythm and rate,   Respiratory: Lungs clear to auscultation,   Gastrointestinal: Bowel sounds positive x 4, soft, nontender  Neurologic: Alert and oriented x 3 equal strength bilaterally  Musculoskeletal: Able to move all extremities, no edema, positive for myalgias  Skin: Warm, dry and intact             Assessment/Plan                 Assessment & Plan  Acute kidney injury superimposed on CKD (CMS-HCC)  Avoid nephrotoxins  Currently on Bumex for her CHF  Renally dose medication  Continue home calcitriol and cinacalcet  Nephrology consulted  Acute on chronic diastolic heart failure  Elevated troponins  Large right and small left pleural effusion  Echocardiogram done November 2024 showed an ejection fraction of 52%  Troponins were 67, 81, and 77 respectively  Cardiology consulted  Started on Bumex  Atrial fibrillation  On amiodarone and Toprol  Hypothyroidism  On levothyroxine  Transaminitis  Hold statin  New onset findings of elevated AST and ALT  No abdominal pain or findings on current  CT  Continue to monitor  COPD  Continue home IBD/ICS  Disposition  Pending clearance by consults  Palliative care consult pending                  INDIGO Delong-CNP

## 2025-03-11 NOTE — CONSULTS
Reason For Consult    YEVGENIY on CKD       History Of Present Illness  Adwoa Forrest is a 89 y.o. female presenting with  female with PMHx s/f HFpEF, NSVT (on amiodarone), persistent atrial fib (not on blood thinners due to GIB, pacemaker, COPD (not on home O2), HTN and CKD (creatinine ~2 at baseline) presenting with CHF and YEVGENIY. Patient was admitted in January for a fall with nosebleed and nasal fracture. Pt had her Entresto stopped last admission as well. Pt has been following up with cardiology and nephrology since discharge.  Cardiology initially increased her torsemide to 40 mg BID. Her weight continued to increase so they discussed hospitalization for possible trial of inotrope supported diuresis, which pt declined. Instead they gave her IV Lasix several times in order to facilitate diuresis. Cardiology has been discussing DNR, palliative and hospice options considering the pt does not seem to be responding to therapies. Pt presents today with multiple complaints. Family says she is very weak and fatigued for the past week and has been unable to ambulate. She is complaining of thoracic and lumbar back pain. She has not been eating or drinking much and she is complaining of generalized pain. She had flu-like upper respiratory symptoms several days ago which have since resolved. She is on 3.5 L NC oxygen at baseline. No nausea, vomiting, fever, chills, worsening shortness of breath, cough, abdominal pain, or urinary complaints. Pt's leg swelling has reportedly actually improved a bit.      Nephrology consulted for acute kidney injury serum creatinine  baseline of 1.6-1.9 more recently serum creatinine has been around 2.  On March 7, 2025 serum creatinine was 2.63, on January 10, 2025 serum creatinine was 1.90.  Serum creatinine currently 3.37.  Has not received any recent contrast.  Patient hypotensive systolic blood pressures have been in the 80s.  eplerenone was discontinued today.  Home meds consists of Demadex  40 mg twice a day and  eplerenone 25 mg daily.  Was recently increased to 60 mg twice daily on last Friday and Saturday for 2 doses per patient.  Patient has been complaining of decreased urine output, more excessively tired and not eating much.     Past Medical History  She has a past medical history of Anxiety and depression, Bowel perforation (Multi), Chronic atrial fibrillation, unspecified (Multi) (05/09/2023), Chronic heart failure with preserved ejection fraction (02/07/2024), CKD (chronic kidney disease), COPD (chronic obstructive pulmonary disease) (Multi), Essential (primary) hypertension, GIB (gastrointestinal bleeding), History of non-ST elevation myocardial infarction (NSTEMI) (10/08/2023), Hypothyroidism, CANDELARIA (iron deficiency anemia), NSVT (nonsustained ventricular tachycardia) (Multi), Raynaud disease, Sick sinus syndrome (Multi) (02/27/2018), Sleep apnea, and Stroke (cerebrum) (Multi).    Surgical History  She has a past surgical history that includes Cataract extraction; Appendectomy; Hysterectomy; Tonsillectomy; Cholecystectomy; Other surgical history; Other surgical history; Colectomy partial / total; Cardiac electrophysiology procedure (N/A, 06/17/2024); and Cardiac catheterization (N/A, 12/10/2024).     Social History  She reports that she quit smoking about 35 years ago. Her smoking use included cigarettes. She started smoking about 69 years ago. She has a 8.5 pack-year smoking history. She has never been exposed to tobacco smoke. She has never used smokeless tobacco. She reports that she does not drink alcohol and does not use drugs.    Family History  Family History   Problem Relation Name Age of Onset    Coronary artery disease Mother      Coronary artery disease Father          Allergies  Aldactone [spironolactone], Codeine, Hydrocodone, Sotalol, and Tetracyclines  .  Current Facility-Administered Medications:     albuterol 90 mcg/actuation inhaler 2 puff, 2 puff, inhalation, q4h PRN,  Kenney Figueroa DO    amiodarone (Pacerone) tablet 200 mg, 200 mg, oral, Daily, Kenney Figueroa DO    aspirin EC tablet 81 mg, 81 mg, oral, Daily, Kenney Figueroa DO, 81 mg at 03/11/25 1012    [Held by provider] atorvastatin (Lipitor) tablet 80 mg, 80 mg, oral, Daily, Kenney Figueroa DO    bisacodyl (Dulcolax) EC tablet 10 mg, 10 mg, oral, Daily PRN, Kenney Figueroa DO    bumetanide (Bumex) tablet 2 mg, 2 mg, oral, BID, Michael Latham MD, 2 mg at 03/11/25 1119    [START ON 3/12/2025] calcitriol (Rocaltrol) capsule 0.25 mcg, 0.25 mcg, oral, Once per day on Monday Wednesday Friday, Kenney Figueroa DO    [START ON 3/12/2025] cinacalcet (Sensipar) tablet 30 mg, 30 mg, oral, Once per day on Monday Wednesday Friday, Kenney Figueroa DO    tiotropium (Spiriva Respimat) 2.5 mcg/actuation inhaler 2 puff, 2 puff, inhalation, Daily, 2 puff at 03/11/25 1012 **AND** fluticasone furoate-vilanteroL (Breo Ellipta) 100-25 mcg/dose inhaler 1 puff, 1 puff, inhalation, Daily, Kenney Figueroa DO, 1 puff at 03/11/25 1012    guaiFENesin (Mucinex) 12 hr tablet 600 mg, 600 mg, oral, q12h PRN, Kenney Figueroa DO    heparin (porcine) injection 5,000 Units, 5,000 Units, subcutaneous, q8h MACRINA, Kenney Figueroa DO, 5,000 Units at 03/11/25 0736    levothyroxine (Synthroid, Levoxyl) tablet 37.5 mcg, 37.5 mcg, oral, Daily, Kenney Figueroa DO, 37.5 mcg at 03/11/25 0736    melatonin tablet 3 mg, 3 mg, oral, Nightly PRN, Kenney J [place], DO    metOLazone (Zaroxolyn) tablet 5 mg, 5 mg, oral, Daily, Michael Latham MD, 5 mg at 03/11/25 1119    ondansetron (Zofran) tablet 4 mg, 4 mg, oral, q8h PRN **OR** ondansetron (Zofran) injection 4 mg, 4 mg, intravenous, q8h PRN, Kenney Figueroa, DO    pantoprazole (ProtoNix) EC tablet 40 mg, 40 mg, oral, Daily **OR** pantoprazole (ProtoNix) injection 40 mg, 40 mg, intravenous, Daily, Kenney Figueroa, DO    sodium chloride 0.9% infusion, 100 mL/hr, intravenous, Continuous, Ric Morales, APRN-CNP   Review of Systems  .Review  of Systems   Constitutional:  Positive for activity change and appetite change.   HENT:  Negative for congestion and dental problem.    Eyes:  Negative for photophobia and visual disturbance.   Respiratory:  Negative for chest tightness and shortness of breath.    Cardiovascular:  Positive for leg swelling. Negative for chest pain and palpitations.   Gastrointestinal:  Positive for abdominal distention. Negative for abdominal pain and blood in stool.   Endocrine: Negative for polyphagia and polyuria.   Genitourinary:  Positive for decreased urine volume. Negative for pelvic pain and urgency.   Musculoskeletal:  Negative for arthralgias and back pain.   Skin:  Negative for color change.   Neurological:  Positive for weakness. Negative for tremors and syncope.   Psychiatric/Behavioral:  Negative for agitation and behavioral problems.          Physical Exam  .Physical Exam  Constitutional:       Appearance: She is ill-appearing and toxic-appearing.   HENT:      Mouth/Throat:      Mouth: Mucous membranes are dry.      Pharynx: Oropharynx is clear.   Eyes:      Extraocular Movements: Extraocular movements intact.      Pupils: Pupils are equal, round, and reactive to light.   Cardiovascular:      Rate and Rhythm: Normal rate. Rhythm irregular.      Pulses: Normal pulses.      Heart sounds: Normal heart sounds.   Pulmonary:      Effort: Pulmonary effort is normal.      Breath sounds: Normal breath sounds.      Comments: On 5 L of oxygen  Abdominal:      General: Bowel sounds are normal. There is distension.      Palpations: Abdomen is soft.   Musculoskeletal:      Right lower leg: Edema present.      Left lower leg: Edema present.      Comments: +1 edema   Skin:     General: Skin is warm and dry.   Neurological:      Mental Status: She is oriented to person, place, and time. Mental status is at baseline.   Psychiatric:         Mood and Affect: Mood normal.         Behavior: Behavior normal.                I&O 24HR  No  "intake or output data in the 24 hours ending 03/11/25 1037    Vitals 24HR  Heart Rate:  []   Temp:  [35.9 °C (96.6 °F)-36.1 °C (96.9 °F)]   Resp:  [16-22]   BP: (80-87)/(51-72)   Height:  [154.9 cm (5' 1\")]   Weight:  [48.3 kg (106 lb 7.7 oz)-50.3 kg (111 lb)]   SpO2:  [90 %-98 %]     .  Vitals:    03/11/25 0552 03/11/25 0701 03/11/25 0800 03/11/25 1012   BP: 80/53 85/51  86/55   BP Location:  Left arm     Patient Position:  Lying     Pulse: (!) 120 66  78   Resp: 16 18     Temp:  36.1 °C (96.9 °F)     TempSrc:  Temporal     SpO2: 98% 90%     Weight:   48.3 kg (106 lb 7.7 oz)    Height:   1.549 m (5' 1\")         Relevant Results  .  Results from last 7 days   Lab Units 03/11/25  0313 03/07/25  1520   SODIUM mmol/L 134* 138   POTASSIUM mmol/L 4.7 4.0   CHLORIDE mmol/L 95* 95*   CO2 mmol/L 25 35*   BUN mg/dL 103* 93*   CREATININE mg/dL 3.37* 2.63*   EGFR mL/min/1.73m*2 13* 17*   GLUCOSE mg/dL 82 65*   CALCIUM mg/dL 7.6* 8.2*    .  Results from last 7 days   Lab Units 03/11/25  0439   WBC AUTO x10*3/uL 5.8   HEMOGLOBIN g/dL 11.0*   HEMATOCRIT % 35.1*   PLATELETS AUTO x10*3/uL 68*      Chest x-ray reviewed from 3/11/2025 cardiomegaly with thickened interlobular septal markings compatible  with pulmonary edema. Layering bilateral pleural effusions with  superimposed atelectasis versus pneumonia.  Assessment/Plan       YEVGENIY/ATN 2/2 volume depleted N17.0  Hyponatremia E87.1  Azotemia R79.89  Hypotension I95.9  Hypocalcemia E83.51  Thrombocytopenia  Moderate volume ascites  Large right and small left pleural effusions  A-fib  CHF elevated troponins  Transaminitis  Bronchiectasis.     Recommendations  Serum creatinine baseline 1.6-1.9, has been closer to 2.0, oliguric  Patient complaints of intermittent shortness of breath currently on 5 L of oxygen, wears 2 to 5 L at baseline  Given low blood pressures and patient does not appear to be volume overloaded on exam  We will hold diuretics and give gentle IV fluids  Urine " studies ordered as well as urinalysis  Recommend avoiding contrast  Strict DRAKE documentation  We will check a renal ultrasound  Bladder scan for 61 cc  Chest x-ray reviewed and shows cardiomegaly with thickening interlobular septal markings  Cardiology note reviewed and recommends hospice consult  Briefly discussed dialysis with patient and family, patient unsure if she will want to pursue dialysis given her age and would like to discuss more with family at this time.  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  The plan was co formulated and reflects my assessment and I take responsibility for the patient management. Any variance is noted below.     Weight History:   Wt Readings from Last 10 Encounters:   03/11/25 48.3 kg (106 lb 7.7 oz)   03/07/25 50.3 kg (111 lb)   02/21/25 50.4 kg (111 lb 3.2 oz)   02/07/25 48 kg (105 lb 13.6 oz)   01/31/25 48.6 kg (107 lb 3.2 oz)   01/30/25 49.4 kg (109 lb)   01/30/25 49.6 kg (109 lb 6.4 oz)   01/20/25 48.6 kg (107 lb 1.6 oz)   01/10/25 49.3 kg (108 lb 9.6 oz)   01/02/25 47.6 kg (105 lb)        Weight Change %:                 Complicated patient with multiple comorbidities and frailty.  I personally spoke with cardiology attending.  Discussed my concern.  Cardiology feels patient has terminal heart failure and may be hospice appropriate.  On my assessment she  Has no evidence of peripheral volume overload she has prominent crackles which would be  consistent with bronchiectasis as seen on prior imaging studies.  She is hypotensive Azotemia weak and likely in acute renal failure.  I did not see any prominent jugular veins, BNP is lower than in past.  Her hemoglobin if anything is higher than where it has been in the past consistent with volume contracted state.  It is possible she has developed ATN.  Her weight is not any higher than where it has been.    Her prognosis would be poor if the ATN has setting and requires renal replacement therapy.    Cardiology okay with us trying volume expansion as a last resort.    RBC morphology without any evidence of schistocytes.  [place] cells are noted which can be reflection of kidney disease.  Hypochromia is noted and target cells are noted.  Will initiate patient on multivitamin replacement she does have elevated RBC distribution width and will check iron studies as well.      Electronically signed by Ike Rich MD

## 2025-03-11 NOTE — ED PROVIDER NOTES
HPI   Chief Complaint   Patient presents with    Weakness, Gen     Pt states that she feels funny, respers non labored.     Shortness of Breath       89-year-old female past med history of CKD, CHF, A-fib, coronary artery disease.  ED with multiple complaints.  Overall she has been feeling more weak and fatigued for the last couple days.  She has been unable to ambulate due to this.  She was also having pain to her mid and lumbar back.  She has intermittent chest pain.  She felt more short of breath than usual.  Denies fever cough.  No abdominal pain vomiting or diarrhea.  No dysuria or hematuria.  Denies any worsening leg swelling from her baseline.              Patient History   Past Medical History:   Diagnosis Date    Anxiety and depression     Bowel perforation (Multi)     Perforation of sigmoid colon due to diverticulitis    Chronic atrial fibrillation, unspecified (Multi) 05/09/2023    Chronic heart failure with preserved ejection fraction 02/07/2024    CKD (chronic kidney disease)     COPD (chronic obstructive pulmonary disease) (Multi)     Essential (primary) hypertension     GIB (gastrointestinal bleeding)     recurrent    History of non-ST elevation myocardial infarction (NSTEMI) 10/08/2023    Hypothyroidism     CANDELARIA (iron deficiency anemia)     NSVT (nonsustained ventricular tachycardia) (Multi)     on amiodarone    Raynaud disease     Sick sinus syndrome (Multi) 02/27/2018    Sleep apnea     Stroke (cerebrum) (Multi)      Past Surgical History:   Procedure Laterality Date    APPENDECTOMY      CARDIAC CATHETERIZATION N/A 12/10/2024    Procedure: Right Heart Cath;  Surgeon: Darrel Hampton MD;  Location: [facility];  Service: Cardiovascular;  Laterality: N/A;    CARDIAC ELECTROPHYSIOLOGY PROCEDURE N/A 06/17/2024    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 PPM UPGRADE DUAL TO BIV;  Surgeon: Balta Chaudhari MD;  Location: [facility];  CRTP upgrade, [facility],  [address]    CATARACT EXTRACTION      CHOLECYSTECTOMY      COLECTOMY PARTIAL / TOTAL      Sigmoid    HYSTERECTOMY      OTHER SURGICAL HISTORY      Atrial appendage closure    OTHER SURGICAL HISTORY      Catheter Ablation Atrioventricular Node    TONSILLECTOMY       Family History   Problem Relation Name Age of Onset    Coronary artery disease Mother      Coronary artery disease Father       Social History     Tobacco Use    Smoking status: Former     Current packs/day: 0.00     Average packs/day: 0.3 packs/day for 34.0 years (8.5 ttl pk-yrs)     Types: Cigarettes     Start date:      Quit date:      Years since quittin.2     Passive exposure: Never    Smokeless tobacco: Never   Vaping Use    Vaping status: Never Used   Substance Use Topics    Alcohol use: Never    Drug use: Never       Physical Exam   ED Triage Vitals [25 0216]   Temperature Heart Rate Respirations BP   35.9 °C (96.6 °F) 79 (!) 22 87/72      Pulse Ox Temp src Heart Rate Source Patient Position   (!) 90 % -- -- --      BP Location FiO2 (%)     -- --       Physical Exam  Vitals and nursing note reviewed.   Constitutional:       General: She is not in acute distress.     Appearance: She is well-developed.   HENT:      Head: Normocephalic and atraumatic.   Eyes:      Conjunctiva/sclera: Conjunctivae normal.   Cardiovascular:      Rate and Rhythm: Normal rate and regular rhythm.      Heart sounds: No murmur heard.  Pulmonary:      Effort: Pulmonary effort is normal. No respiratory distress.      Breath sounds: Examination of the right-lower field reveals rhonchi. Examination of the left-lower field reveals rhonchi. Rhonchi present.   Abdominal:      Palpations: Abdomen is soft.      Tenderness: There is no abdominal tenderness.   Musculoskeletal:         General: No swelling.      Cervical back: Neck supple.      Right lower leg: No tenderness. Edema present.      Left lower leg: No tenderness. Edema present.    Skin:     General: Skin is warm and dry.      Capillary Refill: Capillary refill takes less than 2 seconds.   Neurological:      Mental Status: She is alert.   Psychiatric:         Mood and Affect: Mood normal.           ED Course & MDM   ED Course as of 03/11/25 0519   Tue Mar 11, 2025   0314 EKG shows sinus rhythm.  No STEMI.  Normal intervals and axis. [RS]      ED Course User Index  [RS] Amari Kelly DO         Diagnoses as of 03/11/25 0519   Acute kidney injury superimposed on CKD (CMS-HCC)   Acute pulmonary edema   Unable to ambulate                 No data recorded     Kim Coma Scale Score: 15 (03/11/25 0225 : Polo Ordoñez RN)                           Medical Decision Making  HISTORIAN:  Patient    CHART REVIEW:  No pertinent findings    PT SUMMARY:  89-year-old female presents ED with multiple complaints including shortness of breath, back pain, chest pain, generalized weakness.    DDX:  YEVGENIY, electrolyte abnormality, dehydration, UTI, pneumonia, ACS      PLAN:  Obtain CBC, BMP, EKG, troponin, BNP, chest x-ray, UA, CT brain, CT thoracic and lumbar spine.  Will treat patient symptoms IV fentanyl    DISPO/RE-EVAL:  Patient's BMP shows acute worsening of her kidney function compared to 4 days ago.  No significant lecture abnormality.  Troponins are elevated but not significant change from baseline.  BNP is elevated but not significant change.  Chest x-ray shows cardiomegaly and pulmonary edema.  CT brain and otherwise the rest of CT imaging shows no other acute findings.  Due to her worsening kidney function, pulmonary edema and her inability to ambulate we will admit patient to stepdown unit for further management.          Procedure  Procedures     Amari Kelly DO  03/11/25 0520

## 2025-03-11 NOTE — ASSESSMENT & PLAN NOTE
Avoid nephrotoxins  Currently on Bumex for her CHF  Renally dose medication  Continue home calcitriol and cinacalcet  Nephrology consulted  Acute on chronic diastolic heart failure  Elevated troponins  Large right and small left pleural effusion  Echocardiogram done November 2024 showed an ejection fraction of 52%  Troponins were 67, 81, and 77 respectively  Cardiology consulted  Started on Bumex  Atrial fibrillation  On amiodarone and Toprol  Hypothyroidism  On levothyroxine  Transaminitis  Hold statin  New onset findings of elevated AST and ALT  No abdominal pain or findings on current CT  Continue to monitor  COPD  Continue home IBD/ICS  Disposition  Pending clearance by consults  Palliative care consult pending

## 2025-03-11 NOTE — PROGRESS NOTES
Occupational Therapy                 Therapy Communication Note    Patient Name: Adwoa Forrest  MRN: [id number]  Department: [facility] 2 W  Room: 2026/2026-A  Today's Date: 3/11/2025     Discipline: Occupational Therapy          Missed Visit Reason: Missed Visit Reason: Patient placed on medical hold (OT consult received and chart reviewed. Upon arrival, pt's BP 79/53 supine. Defer OT eval until pt is medically appropriate to participate in therapy. See PT note for home set-up/PLOF.)    Missed Time: Attempt       137

## 2025-03-12 PROBLEM — N17.9 ACUTE KIDNEY INJURY SUPERIMPOSED ON CKD (CMS-HCC): Status: RESOLVED | Noted: 2025-01-01 | Resolved: 2025-01-01

## 2025-03-12 PROBLEM — N18.9 ACUTE KIDNEY INJURY SUPERIMPOSED ON CKD: Status: RESOLVED | Noted: 2025-01-01 | Resolved: 2025-01-01

## 2025-03-12 PROBLEM — I13.10 HYPERTENSIVE HEART AND CHRONIC KIDNEY DISEASE STAGE 3: Status: RESOLVED | Noted: 2023-07-22 | Resolved: 2025-01-01

## 2025-03-12 PROBLEM — N18.30 HYPERTENSIVE HEART AND CHRONIC KIDNEY DISEASE STAGE 3: Status: RESOLVED | Noted: 2023-07-22 | Resolved: 2025-01-01

## 2025-03-12 PROBLEM — E03.2 HYPOTHYROIDISM DUE TO DRUGS: Status: RESOLVED | Noted: 2023-05-09 | Resolved: 2025-01-01

## 2025-03-12 PROBLEM — J47.0 BRONCHIECTASIS WITH ACUTE LOWER RESPIRATORY INFECTION: Status: RESOLVED | Noted: 2025-01-01 | Resolved: 2025-01-01

## 2025-03-12 PROBLEM — N17.9 ACUTE RENAL FAILURE (CMS-HCC): Status: RESOLVED | Noted: 2023-07-22 | Resolved: 2025-01-01

## 2025-03-12 NOTE — NURSING NOTE
0700:  Report received from Minda RN    0736:  Patient  at this time, hospitalist, supervisor, family notified    Medical equipment removed and patient cleaned up at this time, belongings sent home with patient     0800:  [facility] called papers printed and placed in lab folder and with patient. Patient is a potential donor. Still awaiting family to decide a  home    1000:  [place] [facility] [place]

## 2025-03-12 NOTE — NURSING NOTE
Pt. BP dropped to 80/50.  Manual BP 82/58. 500cc LR bolus initiated as ordered.  Pt. Xanax and dilaudid is now d/c'd as patient's pressures do not support it.  Pt. States she does not feel well.   Will continue to monitor for comfort and safety.

## 2025-03-12 NOTE — CARE PLAN
Patient declining throughout the night.  Hypothermic, hypotensive, hypoxic, and hypoglycemic. In renal failure not making any urine.  Patient currently somnolent and unable to participate in goals of care discussions. Called family and expressed concerns that patient is actively dying.  Both daughter Constanza and son Barron came to the hospital and discussed goals of care.  Patient with progressive decline over the last few years and poor quality of life.  Expressed in the past that she would never want dialysis. Constanza and Barron decided to pursue comfort measures.  Comfort orders placed.    Fransico Hassan MD

## 2025-03-12 NOTE — NURSING NOTE
0500 Pt transferred for SDU to ICU 1001. Pt on BiPAP, pt opens eyes to voice but pt not following commands. Pt connected to monitor, AV paced on monitor at 65 bmp. Difficulty obtaining SPO2, FiO2 increased to 100% per Dr. Meyer at bedside. BP 52/38 (44), Dr. Hassan at bedside start norepinephrine at 0.05 mcg/kg/min, per provider okay to increase by 0.05 mcg/kg/min every minute due to profound hypotension     0519: Norepinephrine increased to maximum peripheral dose of 0.2 mcg/kg/min. Polverine APRN-CNP at bedside to evaluate. Vasopressin ordered and started     0525: Dr. Hassan and Polverine APRN-CNP at bedside with pt family. Code status to be changed to DNR CCO

## 2025-03-12 NOTE — DISCHARGE SUMMARY
Discharge Diagnosis  Cardiopulmonary arrest  Acute kidney injury superimposed on CKD (CMS-HCC)  Acute diastolic congestive heart failure  Atrial fibrillation  History of nonsustained V. tach  Hypothyroidism  Hyperlipidemia  Transaminitis  COPD    Issues Requiring Follow-Up      Discharge Meds     Medication List      ASK your doctor about these medications     acetaminophen 650 mg ER tablet; Commonly known as: Tylenol 8 HOUR   albuterol 90 mcg/actuation inhaler; Inhale 2 puffs every 4 hours if   needed for shortness of breath.   ALPRAZolam 0.5 mg tablet; Commonly known as: Xanax; Take 1 tablet (0.5   mg) by mouth once daily at bedtime for 3 days.   amiodarone 200 mg tablet; Commonly known as: Pacerone; Take 1 tablet   (200 mg) by mouth once daily.   aspirin 81 mg EC tablet   atorvastatin 80 mg tablet; Commonly known as: Lipitor; Take 1 tablet (80   mg) by mouth once daily. At bedtime   calcitriol 0.25 mcg capsule; Commonly known as: Rocaltrol   cinacalcet 30 mg tablet; Commonly known as: Sensipar   eplerenone 25 mg tablet; Commonly known as: Inspra   guaiFENesin 600 mg 12 hr tablet; Commonly known as: Mucinex; Take 1   tablet (600 mg) by mouth every 12 hours if needed for congestion. Do not   crush, chew, or split.   ipratropium-albuteroL 0.5-2.5 mg/3 mL nebulizer solution; Commonly known   as: Duo-Neb; TAKE 3 ML BY NEBULIZATION 4 TIMES A DAY AS NEEDED FOR   WHEEZING OR SHORTNESS OF BREATH.   levothyroxine 75 mcg tablet; Commonly known as: Synthroid, Levoxyl; Take   0.5 tablets (37.5 mcg) by mouth once daily.   metoprolol succinate XL 25 mg 24 hr tablet; Commonly known as:   Toprol-XL; Take 1 tablet (25 mg) by mouth once daily. Do not crush or   chew.   multivitamin tablet   torsemide 20 mg tablet; Commonly known as: Demadex; Take 2 tablets (40   mg) by mouth 2 times a day.   Trelegy Ellipta 100-62.5-25 mcg blister with device; Generic drug:   fluticasone-umeclidin-vilanter; Inhale 1 puff once daily.       Test  Results Pending At Discharge  Pending Labs       No current pending labs.            Hospital Course  Adwoa Forrest is a 89 y.o. female with PMHx s/f HFpEF, NSVT (on amiodarone), persistent atrial fib (not on blood thinners due to GIB, pacemaker, COPD (not on home O2), HTN and CKD (creatinine ~2 at baseline) presenting with CHF and YEVGENIY. Patient was admitted in January for a fall with nosebleed and nasal fracture. Pt had her Entresto stopped last admission as well. Pt has been following up with cardiology and nephrology since discharge.  Cardiology initially increased her torsemide to 40 mg BID. Her weight continued to increase so they discussed hospitalization for possible trial of inotrope supported diuresis, which pt declined. Instead they gave her IV Lasix several times in order to facilitate diuresis. Cardiology has been discussing DNR, palliative and hospice options considering the pt does not seem to be responding to therapies. Pt presents today with multiple complaints. Family says she is very weak and fatigued for the past week and has been unable to ambulate. She is complaining of thoracic and lumbar back pain. She has not been eating or drinking much and she is complaining of generalized pain. She had flu-like upper respiratory symptoms several days ago which have since resolved. She is on 3.5 L NC oxygen at baseline. No nausea, vomiting, fever, chills, worsening shortness of breath, cough, abdominal pain, or urinary complaints. Pt's leg swelling has reportedly actually improved a bit.     ED Course (Summary - please note all labs, imaging studies, and interventions noted below have been personally reviewed and/or interpreted on day of admission):   Vitals on presentation: T 35.9 °C (96.6 °F)  HR 79  BP 87/72  RR (!) 22  O2 (!) 90 % Supplemental oxygen  Labs: CMP cl 95, bUN 103, Cr 3.37 (baseline around 2.5), alk phos 274, ,   BNP 1164  Trop 67 -> 81  CBC WBC 5.8, hg 11.0, platelets  68  EKG: AV dual paced rhythm at 93 bpm  Imaging: CXR - Cardiomegaly with thickened interlobular septal markings compatible   with pulmonary edema. Layering bilateral pleural effusions with   superimposed atelectasis versus pneumonia.   CT head - No acute intracranial hemorrhage, mass effect or midline shift.   Nonspecific scattered white matter hypodensities favored to represent   sequela of small vessel ischemia.   CT thoracic/lumbar wout contrast - Thoracic spondylosis without acute fracture or traumatic malalignment.   Lumbar spondylosis without acute fracture or traumatic malalignment.   Large right and small left pleural effusions with overlying airspace   opacities favored to represent atelectasis. Developing infection is   not excluded in the appropriate clinical setting.   Moderate volume ascites.   Scattered colonic diverticulosis without CT evidence of acute diverticulitis.   Interventions: fentanyl 50 mcg X1, pt admitted for further care.  On admission on the regular nursing floor, patient patient declining throughout the night.  She was hypothermic, hypotensive, hypoxic, hypoglycemic and in renal failure not making any urine.  Patient became somnolent and unable to participate in goals of care discussions.  Family was called and informed of concerns that patient is actively dying.  Both daughter Constanza and son Barron came to the hospital and discussed goals of care.  They admitted that patient had been progressively declining over the last few years with poor quality of life.  She had expressed in the past that she would never want dialysis. Constanza and Barron decided to pursue comfort measures.  Comfort orders placed.  She was pronounced dead at 7:36 AM on 3/12/2025.  Family was at bedside when she stopped breathing.  She was later discharged to the [facility] [place].      The discharge process took about 45 minutes             Pertinent Physical Exam At Time of Discharge    Physical Exam  Pupils were fixed and dilated  bilaterally  Cardiovascular; no spontaneous cardiac activity on auscultation  Respiratory; no spontaneous respiratory activity on auscultation  Neurologic; no response to noxious or painful stimuli    Outpatient Follow-Up  Future Appointments   Date Time Provider Department [place]   3/21/2025  1:00 PM Sushma Collazo MD PhD [id number] [facility]   3/28/2025  1:00 PM Brent Patel DPM [id number] [facility]   4/7/2025  2:00 PM Rosangela Lane, APRN-CNP [id number] [place]   6/3/2025  1:00 PM Jovon Logan MD [id number] [facility]   6/11/2025  3:00 PM Michael Latham MD [id number] [facility]   6/24/2025  4:30 PM Minh Farmer DO [id number]   9/22/2025  1:00 PM Stefanie Richey, APRN-CNP [id number] [facility]         Nita Coles MD

## 2025-03-14 LAB
ATRIAL RATE: 130 BPM
ATRIAL RATE: 70 BPM
P AXIS: 73 DEGREES
Q ONSET: 175 MS
Q ONSET: 214 MS
QRS COUNT: 22 BEATS
QRS COUNT: 23 BEATS
QRS DURATION: 156 MS
QRS DURATION: 178 MS
QT INTERVAL: 236 MS
QT INTERVAL: 294 MS
QTC CALCULATION(BAZETT): 357 MS
QTC CALCULATION(BAZETT): 432 MS
QTC FREDERICIA: 311 MS
QTC FREDERICIA: 380 MS
R AXIS: 197 DEGREES
R AXIS: 221 DEGREES
T AXIS: 29 DEGREES
T AXIS: 53 DEGREES
T OFFSET: 322 MS
T OFFSET: 332 MS
VENTRICULAR RATE: 130 BPM
VENTRICULAR RATE: 138 BPM

## 2025-03-21 ENCOUNTER — APPOINTMENT (OUTPATIENT)
Dept: CARDIOLOGY | Facility: HOSPITAL | Age: 89
End: 2025-03-21
Payer: MEDICARE

## 2025-03-28 ENCOUNTER — APPOINTMENT (OUTPATIENT)
Dept: WOUND CARE | Facility: CLINIC | Age: 89
End: 2025-03-28
Payer: MEDICARE

## 2025-03-29 DIAGNOSIS — E03.8 OTHER SPECIFIED HYPOTHYROIDISM: ICD-10-CM

## 2025-03-29 RX ORDER — LEVOTHYROXINE SODIUM 75 UG/1
75 TABLET ORAL DAILY
Qty: 30 TABLET | Refills: 2 | OUTPATIENT
Start: 2025-03-29

## 2025-04-07 ENCOUNTER — APPOINTMENT (OUTPATIENT)
Dept: CARDIOLOGY | Facility: HOSPITAL | Age: 89
End: 2025-04-07
Payer: MEDICARE

## 2025-05-22 ENCOUNTER — APPOINTMENT (OUTPATIENT)
Dept: CARDIOLOGY | Facility: HOSPITAL | Age: 89
End: 2025-05-22
Payer: MEDICARE

## 2025-06-03 ENCOUNTER — APPOINTMENT (OUTPATIENT)
Dept: PULMONOLOGY | Facility: HOSPITAL | Age: 89
End: 2025-06-03
Payer: MEDICARE

## 2025-06-11 ENCOUNTER — APPOINTMENT (OUTPATIENT)
Dept: CARDIOLOGY | Facility: CLINIC | Age: 89
End: 2025-06-11
Payer: MEDICARE

## 2025-06-24 ENCOUNTER — APPOINTMENT (OUTPATIENT)
Dept: PRIMARY CARE | Facility: CLINIC | Age: 89
End: 2025-06-24
Payer: MEDICARE

## 2025-09-22 ENCOUNTER — APPOINTMENT (OUTPATIENT)
Dept: CARDIOLOGY | Facility: HOSPITAL | Age: 89
End: 2025-09-22
Payer: MEDICARE

## (undated) DEVICE — PHOTONBLADE, WITH ADAPTIVE SMOKE EVAC

## (undated) DEVICE — Device

## (undated) DEVICE — CATHETER, WEDGE PRESSURE, BALLOON, DOUBLE LUMEN, 5 FR, 110 CM

## (undated) DEVICE — SHEATH, GLIDESHEATH, SLENDER, 5FR 10CM

## (undated) DEVICE — GUIDEWIRE, INQWIRE, 3MM J, .035 X 210CM, FIXED

## (undated) DEVICE — CONTROL WIRE, .018 X 300

## (undated) DEVICE — CATHETER, INQUIRY, 6FR X 110CM, 2-5-2MM SPACING, 1MM TIP, LG CURVE STEERABLE

## (undated) DEVICE — CATHETER, ATTAIN COMMAND, 50CM, EXTENDED HOOK, 3MM OD

## (undated) DEVICE — ENVELOPE, ANTIBACTERIAL, AIGIS RX TYRX, ABSORBABLE, LRG

## (undated) DEVICE — CATHETER, THERMODILUTION, 7FR X 110CM, MULTIFLEX

## (undated) DEVICE — ACCESS KIT, S-MAK MINI, 4FR 10CM 0.018IN 40CM, NT/PT, ECHO ENHANCE NEEDLE

## (undated) DEVICE — GUIDEWIRE, HI-TORQUE WHISPER ES, 0.014IN/3CM STRT TIP/190CM